# Patient Record
Sex: MALE | Race: WHITE | Employment: PART TIME | URBAN - METROPOLITAN AREA
[De-identification: names, ages, dates, MRNs, and addresses within clinical notes are randomized per-mention and may not be internally consistent; named-entity substitution may affect disease eponyms.]

---

## 2017-07-31 ENCOUNTER — TELEPHONE (OUTPATIENT)
Dept: FAMILY MEDICINE CLINIC | Age: 62
End: 2017-07-31

## 2017-12-04 DIAGNOSIS — I10 ESSENTIAL HYPERTENSION: ICD-10-CM

## 2017-12-04 RX ORDER — ATENOLOL 100 MG/1
TABLET ORAL
Qty: 30 TABLET | Refills: 0 | Status: SHIPPED | OUTPATIENT
Start: 2017-12-04 | End: 2017-12-05 | Stop reason: SDUPTHER

## 2017-12-04 NOTE — TELEPHONE ENCOUNTER
[facility] [address] fax received refill of Atenolol 50 mg Tablet. LM for patient to call the office to schedule visit as well.

## 2017-12-04 NOTE — TELEPHONE ENCOUNTER
Patient called and scheduled an appointment for 1/2/18. He could not come in sooner due to work schedule.

## 2017-12-05 DIAGNOSIS — I10 ESSENTIAL HYPERTENSION: ICD-10-CM

## 2017-12-05 RX ORDER — ATENOLOL 100 MG/1
TABLET ORAL
Qty: 90 TABLET | Refills: 3 | Status: ON HOLD | OUTPATIENT
Start: 2017-12-05 | End: 2021-12-07 | Stop reason: ALTCHOICE

## 2017-12-15 DIAGNOSIS — I10 ESSENTIAL HYPERTENSION: ICD-10-CM

## 2017-12-15 RX ORDER — ATENOLOL 50 MG/1
TABLET ORAL
Qty: 90 TABLET | Refills: 2 | Status: ON HOLD | OUTPATIENT
Start: 2017-12-15 | End: 2021-12-07

## 2018-01-02 ENCOUNTER — OFFICE VISIT (OUTPATIENT)
Dept: FAMILY MEDICINE CLINIC | Age: 63
End: 2018-01-02
Payer: COMMERCIAL

## 2018-01-02 VITALS
BODY MASS INDEX: 37.27 KG/M2 | DIASTOLIC BLOOD PRESSURE: 84 MMHG | HEART RATE: 60 BPM | RESPIRATION RATE: 12 BRPM | SYSTOLIC BLOOD PRESSURE: 152 MMHG | HEIGHT: 65 IN | WEIGHT: 223.7 LBS

## 2018-01-02 DIAGNOSIS — E66.9 OBESITY (BMI 30-39.9): ICD-10-CM

## 2018-01-02 DIAGNOSIS — Z00.00 WELL ADULT EXAM: Primary | ICD-10-CM

## 2018-01-02 DIAGNOSIS — E78.2 MIXED HYPERLIPIDEMIA: ICD-10-CM

## 2018-01-02 DIAGNOSIS — I10 ESSENTIAL HYPERTENSION: ICD-10-CM

## 2018-01-02 PROCEDURE — 99396 PREV VISIT EST AGE 40-64: CPT | Performed by: NURSE PRACTITIONER

## 2018-01-02 RX ORDER — ATENOLOL 100 MG/1
TABLET ORAL
Qty: 90 TABLET | Refills: 3 | Status: CANCELLED | OUTPATIENT
Start: 2018-01-02

## 2018-01-02 RX ORDER — ATENOLOL 50 MG/1
TABLET ORAL
Qty: 90 TABLET | Refills: 2 | Status: CANCELLED | OUTPATIENT
Start: 2018-01-02

## 2018-01-02 RX ORDER — METOPROLOL SUCCINATE 100 MG/1
100 TABLET, EXTENDED RELEASE ORAL DAILY
Qty: 90 TABLET | Refills: 3 | Status: SHIPPED | OUTPATIENT
Start: 2018-01-02 | End: 2018-12-03 | Stop reason: SDUPTHER

## 2018-01-02 RX ORDER — AMLODIPINE BESYLATE 10 MG/1
TABLET ORAL
Qty: 90 TABLET | Refills: 3 | Status: SHIPPED | OUTPATIENT
Start: 2018-01-02 | End: 2019-02-16 | Stop reason: SDUPTHER

## 2018-01-02 ASSESSMENT — ENCOUNTER SYMPTOMS
COUGH: 0
NAUSEA: 0
ABDOMINAL PAIN: 0

## 2018-01-02 ASSESSMENT — PATIENT HEALTH QUESTIONNAIRE - PHQ9
1. LITTLE INTEREST OR PLEASURE IN DOING THINGS: 0
2. FEELING DOWN, DEPRESSED OR HOPELESS: 0
SUM OF ALL RESPONSES TO PHQ9 QUESTIONS 1 & 2: 0
SUM OF ALL RESPONSES TO PHQ QUESTIONS 1-9: 0

## 2018-01-02 NOTE — PATIENT INSTRUCTIONS
You may receive a survey about your visit with us today. The feedback from our patients helps us identify what is working well and where the service to all patients can be enhanced. Thank you! Patient Education        High Blood Pressure: Care Instructions  Your Care Instructions    If your blood pressure is usually above 140/90, you have high blood pressure, or hypertension. That means the top number is 140 or higher or the bottom number is 90 or higher, or both. Despite what a lot of people think, high blood pressure usually doesn't cause headaches or make you feel dizzy or lightheaded. It usually has no symptoms. But it does increase your risk for heart attack, stroke, and kidney or eye damage. The higher your blood pressure, the more your risk increases. Your doctor will give you a goal for your blood pressure. Your goal will be based on your health and your age. An example of a goal is to keep your blood pressure below 140/90. Lifestyle changes, such as eating healthy and being active, are always important to help lower blood pressure. You might also take medicine to reach your blood pressure goal.  Follow-up care is a key part of your treatment and safety. Be sure to make and go to all appointments, and call your doctor if you are having problems. It's also a good idea to know your test results and keep a list of the medicines you take. How can you care for yourself at home? Medical treatment  · If you stop taking your medicine, your blood pressure will go back up. You may take one or more types of medicine to lower your blood pressure. Be safe with medicines. Take your medicine exactly as prescribed. Call your doctor if you think you are having a problem with your medicine. · Talk to your doctor before you start taking aspirin every day. Aspirin can help certain people lower their risk of a heart attack or stroke.  But taking aspirin isn't right for everyone, because it can cause serious

## 2018-01-02 NOTE — PROGRESS NOTES
08/30/2013 0651            No results found for: TSH, F4ADKQL, E0NJWON, THYROIDAB    Lab Results   Component Value Date    WBC 13.7 (H) 08/30/2013    HGB 12.8 (L) 08/30/2013    HCT 39.0 (L) 08/30/2013    MCV 89.2 08/30/2013     08/30/2013         Health Maintenance   Topic Date Due    Hepatitis C screen  1955    HIV screen  06/24/1970    DTaP/Tdap/Td vaccine (1 - Tdap) 01/02/2008    Diabetes screen  11/04/2016    Flu vaccine (1) 09/01/2017    Lipid screen  11/04/2018    Colon cancer screen colonoscopy  01/11/2026    Zostavax vaccine  Addressed       Immunization History   Administered Date(s) Administered    Tetanus 01/01/2008       Review of Systems   Constitutional: Negative for chills and fever. HENT: Negative. Respiratory: Negative for cough. Gastrointestinal: Negative for abdominal pain and nausea. Musculoskeletal: Negative for arthralgias, gait problem and myalgias. Skin: Negative for rash. Neurological: Negative for dizziness and light-headedness. Psychiatric/Behavioral: Negative. Objective:   Physical Exam   Constitutional: He is oriented to person, place, and time. Vital signs are normal. He appears well-developed and well-nourished. He is active. He does not have a sickly appearance. No distress. HENT:   Right Ear: Tympanic membrane normal.   Left Ear: Tympanic membrane normal.   Nose: Nose normal.   Mouth/Throat: Oropharynx is clear and moist and mucous membranes are normal.   Cardiovascular: Normal rate, regular rhythm, S1 normal, S2 normal, normal heart sounds and normal pulses. Exam reveals no S3. No murmur heard. Pulmonary/Chest: Effort normal and breath sounds normal. He has no decreased breath sounds. He has no wheezes. He has no rhonchi. Abdominal: Soft. Bowel sounds are normal. There is no tenderness. Neurological: He is alert and oriented to person, place, and time. Psychiatric: He has a normal mood and affect.  His behavior is normal.

## 2018-12-03 DIAGNOSIS — I10 ESSENTIAL HYPERTENSION: ICD-10-CM

## 2018-12-03 RX ORDER — METOPROLOL SUCCINATE 100 MG/1
100 TABLET, EXTENDED RELEASE ORAL DAILY
Qty: 90 TABLET | Refills: 3 | Status: SHIPPED | OUTPATIENT
Start: 2018-12-03 | End: 2019-12-11 | Stop reason: SDUPTHER

## 2019-02-16 DIAGNOSIS — I10 ESSENTIAL HYPERTENSION: ICD-10-CM

## 2019-02-18 RX ORDER — AMLODIPINE BESYLATE 10 MG/1
TABLET ORAL
Qty: 90 TABLET | Refills: 3 | Status: SHIPPED | OUTPATIENT
Start: 2019-02-18 | End: 2020-02-24

## 2019-12-11 DIAGNOSIS — I10 ESSENTIAL HYPERTENSION: ICD-10-CM

## 2019-12-11 RX ORDER — METOPROLOL SUCCINATE 100 MG/1
100 TABLET, EXTENDED RELEASE ORAL DAILY
Qty: 90 TABLET | Refills: 0 | Status: SHIPPED | OUTPATIENT
Start: 2019-12-11 | End: 2020-01-13

## 2020-01-13 RX ORDER — METOPROLOL SUCCINATE 100 MG/1
TABLET, EXTENDED RELEASE ORAL
Qty: 90 TABLET | Refills: 0 | Status: SHIPPED | OUTPATIENT
Start: 2020-01-13 | End: 2020-03-16

## 2020-02-24 RX ORDER — AMLODIPINE BESYLATE 10 MG/1
TABLET ORAL
Qty: 90 TABLET | Refills: 3 | Status: SHIPPED | OUTPATIENT
Start: 2020-02-24 | End: 2020-03-17

## 2020-03-17 RX ORDER — AMLODIPINE BESYLATE 10 MG/1
TABLET ORAL
Qty: 90 TABLET | Refills: 3 | Status: SHIPPED | OUTPATIENT
Start: 2020-03-17 | End: 2020-12-31 | Stop reason: SDUPTHER

## 2020-03-27 ENCOUNTER — TELEPHONE (OUTPATIENT)
Dept: FAMILY MEDICINE CLINIC | Age: 65
End: 2020-03-27

## 2020-12-31 DIAGNOSIS — I10 ESSENTIAL HYPERTENSION: ICD-10-CM

## 2021-01-04 RX ORDER — AMLODIPINE BESYLATE 10 MG/1
10 TABLET ORAL DAILY
Qty: 90 TABLET | Refills: 0 | Status: SHIPPED | OUTPATIENT
Start: 2021-01-04 | End: 2021-02-26 | Stop reason: SDUPTHER

## 2021-02-26 DIAGNOSIS — I10 ESSENTIAL HYPERTENSION: ICD-10-CM

## 2021-02-26 RX ORDER — AMLODIPINE BESYLATE 10 MG/1
10 TABLET ORAL DAILY
Qty: 90 TABLET | Refills: 0 | Status: SHIPPED | OUTPATIENT
Start: 2021-02-26 | End: 2021-05-26 | Stop reason: SDUPTHER

## 2021-02-26 NOTE — TELEPHONE ENCOUNTER
Requested Prescriptions     Pending Prescriptions Disp Refills    amLODIPine (NORVASC) 10 MG tablet 90 tablet 0     Sig: Take 1 tablet by mouth daily

## 2021-05-26 DIAGNOSIS — I10 ESSENTIAL HYPERTENSION: ICD-10-CM

## 2021-05-26 RX ORDER — AMLODIPINE BESYLATE 10 MG/1
10 TABLET ORAL DAILY
Qty: 90 TABLET | Refills: 0 | Status: SHIPPED | OUTPATIENT
Start: 2021-05-26 | End: 2021-08-06 | Stop reason: SDUPTHER

## 2021-05-26 RX ORDER — METOPROLOL SUCCINATE 100 MG/1
100 TABLET, EXTENDED RELEASE ORAL DAILY
Qty: 90 TABLET | Refills: 3 | Status: ON HOLD | OUTPATIENT
Start: 2021-05-26 | End: 2021-12-15 | Stop reason: SDUPTHER

## 2021-08-06 DIAGNOSIS — I10 ESSENTIAL HYPERTENSION: ICD-10-CM

## 2021-08-06 RX ORDER — AMLODIPINE BESYLATE 10 MG/1
10 TABLET ORAL DAILY
Qty: 90 TABLET | Refills: 0 | Status: SHIPPED | OUTPATIENT
Start: 2021-08-06 | End: 2021-11-24 | Stop reason: SDUPTHER

## 2021-08-06 NOTE — TELEPHONE ENCOUNTER
----- Message from Francesca Mcmahon sent at 8/5/2021  6:21 PM EDT -----  Subject: Refill Request    QUESTIONS  Name of Medication? amLODIPine (NORVASC) 10 MG tablet  Patient-reported dosage and instructions? 1 per day   How many days do you have left? 30  Preferred Pharmacy? [id number] [facility] - [place], [place] phone number (if available)? [phone]  ---------------------------------------------------------------------------  --------------  CALL BACK INFO  What is the best way for the office to contact you? OK to leave message on   voicemail  Preferred Call Back Phone Number?  [phone] normal...

## 2021-11-24 DIAGNOSIS — I10 ESSENTIAL HYPERTENSION: ICD-10-CM

## 2021-11-24 RX ORDER — AMLODIPINE BESYLATE 10 MG/1
10 TABLET ORAL DAILY
Qty: 90 TABLET | Refills: 0 | Status: ON HOLD | OUTPATIENT
Start: 2021-11-24 | End: 2022-01-27 | Stop reason: HOSPADM

## 2021-11-24 RX ORDER — AMLODIPINE BESYLATE 10 MG/1
TABLET ORAL
Qty: 90 TABLET | Refills: 0 | OUTPATIENT
Start: 2021-11-24

## 2021-11-24 NOTE — TELEPHONE ENCOUNTER
Spoke with patient annual exam scheduled for 1/14/22. Patient is out of Amlodipine 10 mg asking to send script to [facility]. Patient will check with the pharmacy later today.

## 2021-12-07 ENCOUNTER — APPOINTMENT (OUTPATIENT)
Dept: CT IMAGING | Age: 66
DRG: 177 | End: 2021-12-07
Payer: MEDICARE

## 2021-12-07 ENCOUNTER — APPOINTMENT (OUTPATIENT)
Dept: GENERAL RADIOLOGY | Age: 66
DRG: 177 | End: 2021-12-07
Payer: MEDICARE

## 2021-12-07 ENCOUNTER — HOSPITAL ENCOUNTER (INPATIENT)
Age: 66
LOS: 13 days | Discharge: NURSING, REHAB OR LONG-TERM CARE | DRG: 177 | End: 2021-12-20
Attending: EMERGENCY MEDICINE | Admitting: INTERNAL MEDICINE
Payer: MEDICARE

## 2021-12-07 DIAGNOSIS — J96.01 ACUTE RESPIRATORY FAILURE WITH HYPOXIA (HCC): ICD-10-CM

## 2021-12-07 DIAGNOSIS — I10 ESSENTIAL HYPERTENSION: ICD-10-CM

## 2021-12-07 DIAGNOSIS — U07.1 COVID-19: Primary | ICD-10-CM

## 2021-12-07 LAB
ANION GAP SERPL CALCULATED.3IONS-SCNC: 17 MEQ/L (ref 8–16)
APTT: 29.7 SECONDS (ref 22–38)
BASOPHILS # BLD: 0.6 %
BASOPHILS ABSOLUTE: 0 THOU/MM3 (ref 0–0.1)
BUN BLDV-MCNC: 17 MG/DL (ref 7–22)
C-REACTIVE PROTEIN: 9.3 MG/DL (ref 0–1)
CALCIUM SERPL-MCNC: 8.8 MG/DL (ref 8.5–10.5)
CHLORIDE BLD-SCNC: 98 MEQ/L (ref 98–111)
CO2: 22 MEQ/L (ref 23–33)
CREAT SERPL-MCNC: 0.9 MG/DL (ref 0.4–1.2)
EKG ATRIAL RATE: 71 BPM
EKG P AXIS: 48 DEGREES
EKG P-R INTERVAL: 214 MS
EKG Q-T INTERVAL: 414 MS
EKG QRS DURATION: 90 MS
EKG QTC CALCULATION (BAZETT): 449 MS
EKG R AXIS: 17 DEGREES
EKG T AXIS: 79 DEGREES
EKG VENTRICULAR RATE: 71 BPM
EOSINOPHIL # BLD: 0.3 %
EOSINOPHILS ABSOLUTE: 0 THOU/MM3 (ref 0–0.4)
ERYTHROCYTE [DISTWIDTH] IN BLOOD BY AUTOMATED COUNT: 12.5 % (ref 11.5–14.5)
ERYTHROCYTE [DISTWIDTH] IN BLOOD BY AUTOMATED COUNT: 40.4 FL (ref 35–45)
FERRITIN: 1561 NG/ML (ref 22–322)
FLU A ANTIGEN: NEGATIVE
FLU B ANTIGEN: NEGATIVE
GFR SERPL CREATININE-BSD FRML MDRD: 84 ML/MIN/1.73M2
GLUCOSE BLD-MCNC: 224 MG/DL (ref 70–108)
GLUCOSE BLD-MCNC: 284 MG/DL (ref 70–108)
HCT VFR BLD CALC: 48.1 % (ref 42–52)
HEMOGLOBIN: 15.8 GM/DL (ref 14–18)
IMMATURE GRANS (ABS): 0.12 THOU/MM3 (ref 0–0.07)
IMMATURE GRANULOCYTES: 1.7 %
INR BLD: 1.3 (ref 0.85–1.13)
LD: 575 U/L (ref 100–190)
LYMPHOCYTES # BLD: 11.1 %
LYMPHOCYTES ABSOLUTE: 0.8 THOU/MM3 (ref 1–4.8)
MCH RBC QN AUTO: 29.2 PG (ref 26–33)
MCHC RBC AUTO-ENTMCNC: 32.8 GM/DL (ref 32.2–35.5)
MCV RBC AUTO: 88.7 FL (ref 80–94)
MONOCYTES # BLD: 9.3 %
MONOCYTES ABSOLUTE: 0.6 THOU/MM3 (ref 0.4–1.3)
NUCLEATED RED BLOOD CELLS: 0 /100 WBC
OSMOLALITY CALCULATION: 282.3 MOSMOL/KG (ref 275–300)
PLATELET # BLD: 246 THOU/MM3 (ref 130–400)
PMV BLD AUTO: 10.7 FL (ref 9.4–12.4)
POTASSIUM REFLEX MAGNESIUM: 3.7 MEQ/L (ref 3.5–5.2)
PRO-BNP: 451.7 PG/ML (ref 0–900)
PROCALCITONIN: 0.12 NG/ML (ref 0.01–0.09)
RBC # BLD: 5.42 MILL/MM3 (ref 4.7–6.1)
SARS-COV-2, NAAT: DETECTED
SEG NEUTROPHILS: 77 %
SEGMENTED NEUTROPHILS ABSOLUTE COUNT: 5.3 THOU/MM3 (ref 1.8–7.7)
SODIUM BLD-SCNC: 137 MEQ/L (ref 135–145)
TROPONIN T: < 0.01 NG/ML
WBC # BLD: 6.9 THOU/MM3 (ref 4.8–10.8)

## 2021-12-07 PROCEDURE — 2700000000 HC OXYGEN THERAPY PER DAY

## 2021-12-07 PROCEDURE — 99284 EMERGENCY DEPT VISIT MOD MDM: CPT

## 2021-12-07 PROCEDURE — 93010 ELECTROCARDIOGRAM REPORT: CPT | Performed by: NUCLEAR MEDICINE

## 2021-12-07 PROCEDURE — 74174 CTA ABD&PLVS W/CONTRAST: CPT

## 2021-12-07 PROCEDURE — 82728 ASSAY OF FERRITIN: CPT

## 2021-12-07 PROCEDURE — 6360000002 HC RX W HCPCS: Performed by: STUDENT IN AN ORGANIZED HEALTH CARE EDUCATION/TRAINING PROGRAM

## 2021-12-07 PROCEDURE — 83880 ASSAY OF NATRIURETIC PEPTIDE: CPT

## 2021-12-07 PROCEDURE — 87804 INFLUENZA ASSAY W/OPTIC: CPT

## 2021-12-07 PROCEDURE — 82948 REAGENT STRIP/BLOOD GLUCOSE: CPT

## 2021-12-07 PROCEDURE — 36415 COLL VENOUS BLD VENIPUNCTURE: CPT

## 2021-12-07 PROCEDURE — 85610 PROTHROMBIN TIME: CPT

## 2021-12-07 PROCEDURE — 84145 PROCALCITONIN (PCT): CPT

## 2021-12-07 PROCEDURE — 84484 ASSAY OF TROPONIN QUANT: CPT

## 2021-12-07 PROCEDURE — 96374 THER/PROPH/DIAG INJ IV PUSH: CPT

## 2021-12-07 PROCEDURE — 87635 SARS-COV-2 COVID-19 AMP PRB: CPT

## 2021-12-07 PROCEDURE — 86140 C-REACTIVE PROTEIN: CPT

## 2021-12-07 PROCEDURE — 85730 THROMBOPLASTIN TIME PARTIAL: CPT

## 2021-12-07 PROCEDURE — 71275 CT ANGIOGRAPHY CHEST: CPT

## 2021-12-07 PROCEDURE — 71045 X-RAY EXAM CHEST 1 VIEW: CPT

## 2021-12-07 PROCEDURE — 2140000000 HC CCU INTERMEDIATE R&B

## 2021-12-07 PROCEDURE — 6370000000 HC RX 637 (ALT 250 FOR IP): Performed by: STUDENT IN AN ORGANIZED HEALTH CARE EDUCATION/TRAINING PROGRAM

## 2021-12-07 PROCEDURE — 80048 BASIC METABOLIC PNL TOTAL CA: CPT

## 2021-12-07 PROCEDURE — 85025 COMPLETE CBC W/AUTO DIFF WBC: CPT

## 2021-12-07 PROCEDURE — 94761 N-INVAS EAR/PLS OXIMETRY MLT: CPT

## 2021-12-07 PROCEDURE — 51798 US URINE CAPACITY MEASURE: CPT

## 2021-12-07 PROCEDURE — 83615 LACTATE (LD) (LDH) ENZYME: CPT

## 2021-12-07 PROCEDURE — 6370000000 HC RX 637 (ALT 250 FOR IP): Performed by: NURSE PRACTITIONER

## 2021-12-07 PROCEDURE — 2580000003 HC RX 258: Performed by: NURSE PRACTITIONER

## 2021-12-07 PROCEDURE — 99223 1ST HOSP IP/OBS HIGH 75: CPT | Performed by: NURSE PRACTITIONER

## 2021-12-07 PROCEDURE — 93005 ELECTROCARDIOGRAM TRACING: CPT | Performed by: STUDENT IN AN ORGANIZED HEALTH CARE EDUCATION/TRAINING PROGRAM

## 2021-12-07 PROCEDURE — 6360000004 HC RX CONTRAST MEDICATION: Performed by: STUDENT IN AN ORGANIZED HEALTH CARE EDUCATION/TRAINING PROGRAM

## 2021-12-07 PROCEDURE — 94640 AIRWAY INHALATION TREATMENT: CPT

## 2021-12-07 RX ORDER — DEXAMETHASONE SODIUM PHOSPHATE 10 MG/ML
10 INJECTION, EMULSION INTRAMUSCULAR; INTRAVENOUS EVERY 24 HOURS
Status: DISCONTINUED | OUTPATIENT
Start: 2021-12-16 | End: 2021-12-10 | Stop reason: ALTCHOICE

## 2021-12-07 RX ORDER — GUAIFENESIN/DEXTROMETHORPHAN 100-10MG/5
5 SYRUP ORAL EVERY 4 HOURS PRN
Status: DISCONTINUED | OUTPATIENT
Start: 2021-12-07 | End: 2021-12-20 | Stop reason: HOSPADM

## 2021-12-07 RX ORDER — DEXAMETHASONE SODIUM PHOSPHATE 4 MG/ML
10 INJECTION, SOLUTION INTRA-ARTICULAR; INTRALESIONAL; INTRAMUSCULAR; INTRAVENOUS; SOFT TISSUE ONCE
Status: COMPLETED | OUTPATIENT
Start: 2021-12-07 | End: 2021-12-07

## 2021-12-07 RX ORDER — DEXAMETHASONE SODIUM PHOSPHATE 10 MG/ML
10 INJECTION, EMULSION INTRAMUSCULAR; INTRAVENOUS ONCE
Status: DISCONTINUED | OUTPATIENT
Start: 2021-12-07 | End: 2021-12-10 | Stop reason: ALTCHOICE

## 2021-12-07 RX ORDER — AMLODIPINE BESYLATE 10 MG/1
10 TABLET ORAL DAILY
Status: DISCONTINUED | OUTPATIENT
Start: 2021-12-07 | End: 2021-12-20 | Stop reason: HOSPADM

## 2021-12-07 RX ORDER — ASCORBIC ACID 500 MG
500 TABLET ORAL DAILY
Status: DISCONTINUED | OUTPATIENT
Start: 2021-12-07 | End: 2021-12-20 | Stop reason: HOSPADM

## 2021-12-07 RX ORDER — AZITHROMYCIN 250 MG/1
500 TABLET, FILM COATED ORAL DAILY
Status: ACTIVE | OUTPATIENT
Start: 2021-12-07 | End: 2021-12-08

## 2021-12-07 RX ORDER — SODIUM CHLORIDE 9 MG/ML
25 INJECTION, SOLUTION INTRAVENOUS PRN
Status: DISCONTINUED | OUTPATIENT
Start: 2021-12-07 | End: 2021-12-20 | Stop reason: HOSPADM

## 2021-12-07 RX ORDER — IPRATROPIUM BROMIDE AND ALBUTEROL SULFATE 2.5; .5 MG/3ML; MG/3ML
1 SOLUTION RESPIRATORY (INHALATION) ONCE
Status: COMPLETED | OUTPATIENT
Start: 2021-12-07 | End: 2021-12-07

## 2021-12-07 RX ORDER — ONDANSETRON 2 MG/ML
4 INJECTION INTRAMUSCULAR; INTRAVENOUS EVERY 6 HOURS PRN
Status: DISCONTINUED | OUTPATIENT
Start: 2021-12-07 | End: 2021-12-20 | Stop reason: HOSPADM

## 2021-12-07 RX ORDER — SODIUM CHLORIDE 0.9 % (FLUSH) 0.9 %
5-40 SYRINGE (ML) INJECTION EVERY 12 HOURS SCHEDULED
Status: DISCONTINUED | OUTPATIENT
Start: 2021-12-07 | End: 2021-12-20 | Stop reason: HOSPADM

## 2021-12-07 RX ORDER — ZINC SULFATE 50(220)MG
50 CAPSULE ORAL DAILY
Status: DISCONTINUED | OUTPATIENT
Start: 2021-12-07 | End: 2021-12-20 | Stop reason: HOSPADM

## 2021-12-07 RX ORDER — DEXAMETHASONE SODIUM PHOSPHATE 4 MG/ML
10 INJECTION, SOLUTION INTRA-ARTICULAR; INTRALESIONAL; INTRAMUSCULAR; INTRAVENOUS; SOFT TISSUE EVERY 24 HOURS
Status: DISCONTINUED | OUTPATIENT
Start: 2021-12-12 | End: 2021-12-07

## 2021-12-07 RX ORDER — ACETAMINOPHEN 650 MG/1
650 SUPPOSITORY RECTAL EVERY 6 HOURS PRN
Status: DISCONTINUED | OUTPATIENT
Start: 2021-12-07 | End: 2021-12-20 | Stop reason: HOSPADM

## 2021-12-07 RX ORDER — SODIUM CHLORIDE 0.9 % (FLUSH) 0.9 %
5-40 SYRINGE (ML) INJECTION PRN
Status: DISCONTINUED | OUTPATIENT
Start: 2021-12-07 | End: 2021-12-20 | Stop reason: HOSPADM

## 2021-12-07 RX ORDER — METOPROLOL SUCCINATE 100 MG/1
100 TABLET, EXTENDED RELEASE ORAL DAILY
Status: DISCONTINUED | OUTPATIENT
Start: 2021-12-07 | End: 2021-12-12

## 2021-12-07 RX ORDER — POLYETHYLENE GLYCOL 3350 17 G/17G
17 POWDER, FOR SOLUTION ORAL DAILY PRN
Status: DISCONTINUED | OUTPATIENT
Start: 2021-12-07 | End: 2021-12-20 | Stop reason: HOSPADM

## 2021-12-07 RX ORDER — VITAMIN B COMPLEX
2000 TABLET ORAL DAILY
Status: DISCONTINUED | OUTPATIENT
Start: 2021-12-07 | End: 2021-12-20 | Stop reason: HOSPADM

## 2021-12-07 RX ORDER — ONDANSETRON 4 MG/1
4 TABLET, ORALLY DISINTEGRATING ORAL EVERY 8 HOURS PRN
Status: DISCONTINUED | OUTPATIENT
Start: 2021-12-07 | End: 2021-12-20 | Stop reason: HOSPADM

## 2021-12-07 RX ORDER — ACETAMINOPHEN 325 MG/1
650 TABLET ORAL EVERY 6 HOURS PRN
Status: DISCONTINUED | OUTPATIENT
Start: 2021-12-07 | End: 2021-12-20 | Stop reason: HOSPADM

## 2021-12-07 RX ORDER — AZITHROMYCIN 250 MG/1
250 TABLET, FILM COATED ORAL DAILY
Status: COMPLETED | OUTPATIENT
Start: 2021-12-08 | End: 2021-12-11

## 2021-12-07 RX ADMIN — METOPROLOL SUCCINATE 100 MG: 100 TABLET, EXTENDED RELEASE ORAL at 20:53

## 2021-12-07 RX ADMIN — AMLODIPINE BESYLATE 10 MG: 10 TABLET ORAL at 20:53

## 2021-12-07 RX ADMIN — IPRATROPIUM BROMIDE AND ALBUTEROL SULFATE 1 AMPULE: .5; 3 SOLUTION RESPIRATORY (INHALATION) at 10:32

## 2021-12-07 RX ADMIN — DEXAMETHASONE SODIUM PHOSPHATE 10 MG: 4 INJECTION INTRA-ARTICULAR; INTRALESIONAL; INTRAMUSCULAR; INTRAVENOUS; SOFT TISSUE at 12:07

## 2021-12-07 RX ADMIN — IOPAMIDOL 80 ML: 755 INJECTION, SOLUTION INTRAVENOUS at 13:17

## 2021-12-07 RX ADMIN — SODIUM CHLORIDE, PRESERVATIVE FREE 10 ML: 5 INJECTION INTRAVENOUS at 20:53

## 2021-12-07 ASSESSMENT — ENCOUNTER SYMPTOMS
BACK PAIN: 0
COUGH: 1
SORE THROAT: 0
DIARRHEA: 0
ABDOMINAL PAIN: 0
SINUS PAIN: 0
VOMITING: 0
NAUSEA: 0
EYE REDNESS: 0
RHINORRHEA: 0
SHORTNESS OF BREATH: 1

## 2021-12-07 ASSESSMENT — PAIN SCALES - GENERAL: PAINLEVEL_OUTOF10: 0

## 2021-12-07 NOTE — ED NOTES
Called [facility] to notify that pt would be up soon, talked to [address]. Pt transported by cart, stable.       Oleg Angulo  12/07/21 1927

## 2021-12-07 NOTE — ED NOTES
Pt resting on cot with unlabored respirations. Pt shows no signs of distress.       Tor FARMERMJREGINO RN  12/07/21 9404

## 2021-12-07 NOTE — H&P
History & Physical        Patient:  Nikole Thayer  YOB: 1955    MRN: [id number]     Acct: [de-identified]    PCP: DIONTE Dueñas CNP    Date of Admission: 12/7/2021    Date of Service: Pt seen/examined on 12/07/21  and Admitted to Inpatient with expected LOS greater than two midnights due to medical therapy. ASSESSMENT/PLAN:    1. Pneumonia secondary to COVID-19 infection--Decadron 20 mg daily x 5 doses then 10 mg daily x 5 doses; baricitinib 12/7; add vitamin C, vitamin D and zinc; check CRP however other inflammatory markers are elevated; CTA of the chest is pending  2. Acute hypoxic respiratory failure--requiring high flow oxygen, wean as able, add incentive spirometry and Acapella  3. Gross hematuria--await urinalysis, urology has been consulted  4. Essential hypertension, uncontrolled--patient has not taken his medications in the last 2 to 3 weeks per him, will resume his home medications and monitor and adjust accordingly  5. Obesity with BMI 37.23    Chief Complaint: Shortness of breath, fatigue, cough    History Of Present Illness:    77 y.o. male who presented to [facility] with feeling well for 4 weeks; he has a past medical history of hypertension; he states he has been short of breath along with fatigue, myalgias and a nonproductive cough for the last 4 weeks; he states the last 2 days his symptoms have increased which prompted him for an evaluation in the ER today; he has received 1 dose of the Franks Ser vaccination for Covid. He complains of lightheadedness and dizziness along with a productive cough and shortness of breath; he denies chest pain, denies nausea, vomiting or diarrhea; he tells me he is not eaten anything in the last 2 to 3 weeks; he also tells me he is not taking his blood pressure medicine in the last 2 to 3 weeks either; in the ER it was noted that he had gross hematuria which patient states this is new.     In the emergency department, he had initial O2 sat of 78%, he was initially placed on 6 L bringing him up to 90% and is currently on high flow oxygen at 90% FiO2 and 40 L satting 98%; chest x-ray showed pneumonia; CT of the abdomen and chest are currently pending, he is being admitted to hospital service for further care and evaluation. Past Medical History:          Diagnosis Date    Diabetes mellitus ([facility] [address]     Hypertension        Past Surgical History:          Procedure Laterality Date    OTHER SURGICAL HISTORY  Aug 29, 2013    Neck Abcess Incision and Drainage (Dr. Gennaro Wiggins, [address] [place] [address])       Medications Prior to Admission:      Prior to Admission medications    Medication Sig Start Date End Date Taking? Authorizing Provider   amLODIPine (NORVASC) 10 MG tablet Take 1 tablet by mouth daily 11/24/21   DIONTE Justin CNP   metoprolol succinate (TOPROL XL) 100 MG extended release tablet Take 1 tablet by mouth daily 5/26/21   DIONTE Justin CNP   atenolol (TENORMIN) 50 MG tablet TAKE 3 TABLETS BY MOUTH EVERY DAY  Patient taking differently: take 1 tablet by mouth daily along with atenolol 100mg 12/15/17   DIONTE Justin CNP   atenolol (TENORMIN) 100 MG tablet Take 1 tablet by mouth once daily. Take with 50 mg tab 12/5/17   DIONTE Justin CNP   aspirin 81 MG tablet Take 81 mg by mouth daily. Historical Provider, MD       Allergies:  Patient has no known allergies. Social History:   reports that he has never smoked. He has never used smokeless tobacco. He reports that he does not drink alcohol and does not use drugs.     Family History:      Positive as follows:        Problem Relation Age of Onset    Heart Disease Father     Other Mother         alzheimers       REVIEW OF SYSTEMS:     Constitutional: ROS: positive for - fatigue or malaise  Head: no headache, no head injury, no migraine   Eyes ROS: denies blurred/double vision  Ears ROS: no hearing difficulty, no tinnitus  Mouth and Throat ROS: no ulceration, dysphagia, dental caries  Psychological ROS: no depression, no anxiety, no panic attacks, denies suicide/homicide ideation  Endocrine ROS: denies polyuria, polydypsia, no heat or cold intolerance  Respiratory ROS: positive for - cough, shortness of breath and sputum changes  Cardiovascular ROS: no chest pain or dyspnea on exertion  Gastrointestinal ROS: no abdominal pain, change in bowel habits, or black or bloody stools  Genito-Urinary ROS: denies dysuria, frequency, urgency; denies hematuria  Musculoskeletal ROS: negative  Neurological ROS: no syncope, no seizures, no numbness or tingling of hands, no numbness or tingling of feet, no paresis  Dermatology: no skin rash, no eczema  Endocrine: no polyuria, polydypsia, no heat/cold intolerance  Hematology: denies bruising easily, denies bleeding problems, denies clotting disorders    PHYSICAL EXAM:    BP (!) 190/73   Pulse 70   Temp 98.1 °F (36.7 °C) (Oral)   Resp 14   SpO2 98%     General appearance:  No apparent distress, appears stated age and cooperative. HEENT:  Normal cephalic, atraumatic without obvious deformity. Pupils equal, round, and reactive to light. Conjunctivae/corneas clear. Neck: Supple, with full range of motion. No jugular venous distention. Trachea midline. Respiratory:  Normal respiratory effort. Diminished with occasional rhonchi  Cardiovascular:  Regular rate and rhythm with normal S1/S2 without murmurs, rubs or gallops. Abdomen: Soft, non-tender, non-distended with normal bowel sounds. Obese  Musculoskeletal:  No clubbing, cyanosis or edema bilaterally. Full range of motion without deformity. Skin: Skin color, texture, turgor normal.    Neurologic:  Neurovascularly intact without any focal sensory/motor deficits.  Cranial nerves: II-XII intact, grossly non-focal.  Psychiatric:  Alert and oriented, thought content appropriate  Capillary Refill: Brisk,< 3 seconds   Peripheral Pulses: +2 palpable, equal bilaterally       Labs: Recent Labs     12/07/21  1027   WBC 6.9   HGB 15.8   HCT 48.1        Recent Labs     12/07/21  1027      K 3.7   CL 98   CO2 22*   BUN 17   CREATININE 0.9   CALCIUM 8.8     Recent Labs     12/07/21  1027   INR 1.30*     Recent Labs     12/07/21  1027   TROPONINT < 0.010     Procalcitonin:  Recent Labs     12/07/21  1027   PROCAL 0.12*     Radiology:     XR CHEST PORTABLE    Result Date: 12/7/2021  PROCEDURE: XR CHEST PORTABLE CLINICAL INFORMATION: Shortness of breath. COMPARISON: None. TECHNIQUE: AP portable chest radiograph performed. FINDINGS: Lines/tubes: None. Heart/mediastinum: The heart size is normal. The pulmonary vascularity is unremarkable. Lungs: Bilateral multifocal upper and lower lobe airspace opacities and interstitial opacities are observed. No pleural effusion or pneumothorax is identified. Low lung volumes are present. Bones: Diffuse osteopenia is observed. The visualized skeletal structures appear intact. Degenerative disc disease is noted in the thoracic spine. Multifocal airspace opacities suggest pneumonia in the appropriate clinical setting. The patient is pending CT assessment. **This report has been created using voice recognition software. It may contain minor errors which are inherent in voice recognition technology. ** Final report electronically signed by Dr Lazarus Silversmith on 12/7/2021 10:58 AM    Thank you DIONTE Ventura CNP for the opportunity to be involved in this patient's care.     Electronically signed by DIONTE Duenas CNP on 12/7/2021 at 12:37 PM

## 2021-12-07 NOTE — ED PROVIDER NOTES
[place] ENCOUNTER          Pt Name: Mandeep Melgoza  MRN: [id number]  [place] 1955  Date of evaluation: 12/7/2021  Treating Resident Physician: Grace Bourne MD  Supervising Physician: Dr Lebron Vega       Chief Complaint   Patient presents with    Cough     History obtained from the patient. HISTORY OF PRESENT ILLNESS    HPI  Mandeep Melgoza is a 77 y.o. male with pmhx HTN and DM who presents to the emergency department for evaluation of dyspnea on exertion shortness of breath fatigue myalgias nonproductive cough for the past 4 weeks has worsened over the past 2 days. He also had an episode of gross hematuria noted this a.m. that was painless. Denies any abdominal pain nausea vomiting but does mention having some nonbloody loose/watery stools over last 3 days. Partially vaccinated against COVID-19 having received 1 dose of moderna. The patient has no other acute complaints at this time. REVIEW OF SYSTEMS   Review of Systems   Constitutional: Positive for chills and fatigue. Negative for fever. HENT: Negative for rhinorrhea, sinus pain and sore throat. Eyes: Negative for redness. Respiratory: Positive for cough and shortness of breath. Cardiovascular: Negative for chest pain. Gastrointestinal: Negative for abdominal pain, diarrhea, nausea and vomiting. Genitourinary: Positive for hematuria. Negative for dysuria, penile pain, penile swelling, scrotal swelling and testicular pain. Musculoskeletal: Positive for myalgias. Negative for back pain. Skin: Negative for rash. Neurological: Negative for light-headedness and headaches. Psychiatric/Behavioral: Negative for agitation.          PAST MEDICAL AND SURGICAL HISTORY     Past Medical History:   Diagnosis Date    Diabetes mellitus ([address]     Hypertension      Past Surgical History:   Procedure Laterality Date    OTHER SURGICAL HISTORY Aug 29, 2013    Neck Abcess Incision and Drainage (Dr. Treasure Steward, [facility])         MEDICATIONS   No current facility-administered medications for this encounter. Current Outpatient Medications:     amLODIPine (NORVASC) 10 MG tablet, Take 1 tablet by mouth daily, Disp: 90 tablet, Rfl: 0    metoprolol succinate (TOPROL XL) 100 MG extended release tablet, Take 1 tablet by mouth daily, Disp: 90 tablet, Rfl: 3    atenolol (TENORMIN) 50 MG tablet, TAKE 3 TABLETS BY MOUTH EVERY DAY (Patient taking differently: take 1 tablet by mouth daily along with atenolol 100mg), Disp: 90 tablet, Rfl: 2    atenolol (TENORMIN) 100 MG tablet, Take 1 tablet by mouth once daily. Take with 50 mg tab, Disp: 90 tablet, Rfl: 3    aspirin 81 MG tablet, Take 81 mg by mouth daily. , Disp: , Rfl:       SOCIAL HISTORY     Social History     Social History Narrative    Not on file     Social History     Tobacco Use    Smoking status: Never Smoker    Smokeless tobacco: Never Used   Substance Use Topics    Alcohol use: No    Drug use: No         ALLERGIES   No Known Allergies      FAMILY HISTORY     Family History   Problem Relation Age of Onset    Heart Disease Father     Other Mother         alzheimers         PREVIOUS RECORDS   Previous records reviewed: Patient was seen on 7/21/2014 for an abscess. PHYSICAL EXAM     ED Triage Vitals [12/07/21 0952]   BP Temp Temp Source Pulse Resp SpO2 Height Weight   (!) 190/73 98.1 °F (36.7 °C) Oral 83 24 (!) 78 % -- --     Initial vital signs and nursing assessment reviewed and abnormal from Hypertensive. Pulsoximetry is abnormal per my interpretation. Additional Vital Signs:  Vitals:    12/07/21 1055   BP:    Pulse:    Resp:    Temp:    SpO2: 98%       Physical Exam  Vitals and nursing note reviewed. Constitutional:       General: He is in acute distress. Appearance: He is ill-appearing. HENT:      Head: Normocephalic and atraumatic.       Right Ear: External ear normal.      Left Ear: External ear normal.      Nose: Nose normal.      Mouth/Throat:      Mouth: Mucous membranes are dry. Pharynx: Oropharynx is clear. Eyes:      General: No scleral icterus. Conjunctiva/sclera: Conjunctivae normal.   Cardiovascular:      Rate and Rhythm: Normal rate and regular rhythm. Pulses: Normal pulses. Heart sounds: Normal heart sounds. Pulmonary:      Effort: Respiratory distress present. Comments: Diminished bilaterally  Abdominal:      General: Abdomen is flat. There is no distension. Palpations: Abdomen is soft. Tenderness: There is no abdominal tenderness. There is no guarding or rebound. Genitourinary:     Comments: Dried blood at the tip of the glans, testicles are descended with intact cremasteric reflexes bilaterally no swelling masses or tenderness to palpation  Musculoskeletal:         General: Normal range of motion. Cervical back: Normal range of motion and neck supple. No rigidity. No muscular tenderness. Lymphadenopathy:      Cervical: No cervical adenopathy. Skin:     General: Skin is warm and dry. Capillary Refill: Capillary refill takes less than 2 seconds. Coloration: Skin is not jaundiced. Neurological:      General: No focal deficit present. Mental Status: He is alert and oriented to person, place, and time. Psychiatric:         Mood and Affect: Mood normal.         Behavior: Behavior normal.           MEDICAL DECISION MAKING   Initial Assessment: This is a 79-year-old man who has had fever fatigue myalgias dyspnea exertion or shortness of breath of the past 4 weeks of progressively worsening the past few days. Was hypoxic upon arrival requiring supplemental oxygenation. He has only received 1 dose of a COVID-19 vaccination of moderna. Also came in for evaluation of gross hematuria noted this a.m. that was painless he has no testicular pain swelling or abdominal pain.   Does mention having 2-3 episodes of loose/watery diarrhea last couple days. Differential Diagnosis Included but not limited to: COVID-19, influenza, pneumonia, superimposed bacterial pneumonia, pulmonary embolism, malignancy,    MDM:   Patient was updated on his COVID-19 status, he is requiring high flow nasal cannula for his symptomatic hypoxia. He was given Decadron. CTAs of the chest and abdomen are pending however the hospitalist was contacted for admission and agreed to accept the patient.         ED RESULTS   Laboratory results:  Labs Reviewed   COVID-19, RAPID - Abnormal; Notable for the following components:       Result Value    SARS-CoV-2, NAAT DETECTED (*)     All other components within normal limits   CBC WITH AUTO DIFFERENTIAL - Abnormal; Notable for the following components:    Lymphocytes Absolute 0.8 (*)     Immature Grans (Abs) 0.12 (*)     All other components within normal limits   BASIC METABOLIC PANEL W/ REFLEX TO MG FOR LOW K - Abnormal; Notable for the following components:    CO2 22 (*)     Glucose 224 (*)     All other components within normal limits   PROTIME-INR - Abnormal; Notable for the following components:    INR 1.30 (*)     All other components within normal limits   LACTATE DEHYDROGENASE - Abnormal; Notable for the following components:     (*)     All other components within normal limits   FERRITIN - Abnormal; Notable for the following components:    Ferritin 1,561 (*)     All other components within normal limits   PROCALCITONIN - Abnormal; Notable for the following components:    Procalcitonin 0.12 (*)     All other components within normal limits   ANION GAP - Abnormal; Notable for the following components:    Anion Gap 17.0 (*)     All other components within normal limits   GLOMERULAR FILTRATION RATE, ESTIMATED - Abnormal; Notable for the following components:    Est, Glom Filt Rate 84 (*)     All other components within normal limits   RAPID INFLUENZA A/B ANTIGENS   TROPONIN   BRAIN NATRIURETIC PEPTIDE   APTT   OSMOLALITY       Radiologic studies results:  XR CHEST PORTABLE   Final Result   Multifocal airspace opacities suggest pneumonia in the appropriate clinical setting. The patient is pending CT assessment. **This report has been created using voice recognition software. It may contain minor errors which are inherent in voice recognition technology. **      Final report electronically signed by Dr Gardenia Morrell on 12/7/2021 10:58 AM      [facility] [address]    (Results Pending)    [facility] [address]    (Results Pending)       ED Medications administered this visit:   Medications   ipratropium-albuterol (DUONEB) nebulizer solution 1 ampule (1 ampule Inhalation Given 12/7/21 1032)   Dexamethasone Sodium Phosphate injection 10 mg (10 mg IntraVENous Given 12/7/21 1207)         ED COURSE     ED Course as of 12/07/21 1229   Tue Dec 07, 2021   1042 WBC: 6.9 [AL]   1042 Hemoglobin Quant: 15.8 [AL]   1042 Hematocrit: 48.1 [AL]   1042 Platelet Count: 129 [AL]   1058 INR(!): 1.30  Is not on any anticoagulant medications he does mention being on a baby aspirin daily. [AL]   1059 aPTT: 29.7 [AL]   1228 SARS-CoV-2, NAAT(!!): DETECTED [AL]   1228 Flu A Antigen: Negative [AL]   7822 Flu B Antigen: Negative [AL]   1228 Procalcitonin(!): 0.12 [AL]   1228 Ferritin(!): 1,561 [AL]      ED Course User Index  [AL] Gael De Luna MD       MEDICATION CHANGES     New Prescriptions    No medications on file         FINAL DISPOSITION     Final diagnoses:   COVID-19   Acute respiratory failure with hypoxia ([facility] [address]     Condition: condition: stable  Dispo: Admit to telemetry      This transcription was electronically signed. Parts of this transcriptions may have been dictated by use of voice recognition software and electronically transcribed, and parts may have been transcribed with the assistance of an ED scribe. The transcription may contain errors not detected in proofreading.   Please refer to my supervising physician's documentation if my documentation differs.     Electronically Signed: Elana Dickerson MD, 12/07/21, 12:29 PM         Elana Dickerson MD  Resident  12/07/21 2748       Elana Dickerson MD  Resident  12/07/21 8115

## 2021-12-08 LAB
AVERAGE GLUCOSE: 237 MG/DL (ref 70–126)
BACTERIA: ABNORMAL /HPF
BILIRUBIN URINE: NEGATIVE
BLOOD, URINE: ABNORMAL
C-REACTIVE PROTEIN: 10.05 MG/DL (ref 0–1)
CASTS 2: ABNORMAL /LPF
CASTS UA: ABNORMAL /LPF
CHARACTER, URINE: CLEAR
COLOR: YELLOW
CRYSTALS, UA: ABNORMAL
EPITHELIAL CELLS, UA: ABNORMAL /HPF
GLUCOSE BLD-MCNC: 147 MG/DL (ref 70–108)
GLUCOSE BLD-MCNC: 248 MG/DL (ref 70–108)
GLUCOSE BLD-MCNC: 295 MG/DL (ref 70–108)
GLUCOSE BLD-MCNC: 466 MG/DL (ref 70–108)
GLUCOSE URINE: >= 1000 MG/DL
HBA1C MFR BLD: 9.9 % (ref 4.4–6.4)
KETONES, URINE: ABNORMAL
LEUKOCYTE ESTERASE, URINE: NEGATIVE
MISCELLANEOUS 2: ABNORMAL
NITRITE, URINE: NEGATIVE
PH UA: 5.5 (ref 5–9)
PROTEIN UA: 30
RBC URINE: ABNORMAL /HPF
RENAL EPITHELIAL, UA: ABNORMAL
SPECIFIC GRAVITY, URINE: > 1.03 (ref 1–1.03)
UROBILINOGEN, URINE: 1 EU/DL (ref 0–1)
WBC UA: ABNORMAL /HPF
YEAST: ABNORMAL

## 2021-12-08 PROCEDURE — 6370000000 HC RX 637 (ALT 250 FOR IP): Performed by: NURSE PRACTITIONER

## 2021-12-08 PROCEDURE — 6370000000 HC RX 637 (ALT 250 FOR IP): Performed by: UROLOGY

## 2021-12-08 PROCEDURE — 83036 HEMOGLOBIN GLYCOSYLATED A1C: CPT

## 2021-12-08 PROCEDURE — 81001 URINALYSIS AUTO W/SCOPE: CPT

## 2021-12-08 PROCEDURE — 6370000000 HC RX 637 (ALT 250 FOR IP): Performed by: INTERNAL MEDICINE

## 2021-12-08 PROCEDURE — 82948 REAGENT STRIP/BLOOD GLUCOSE: CPT

## 2021-12-08 PROCEDURE — 6370000000 HC RX 637 (ALT 250 FOR IP): Performed by: HOSPITALIST

## 2021-12-08 PROCEDURE — 94761 N-INVAS EAR/PLS OXIMETRY MLT: CPT

## 2021-12-08 PROCEDURE — 2700000000 HC OXYGEN THERAPY PER DAY

## 2021-12-08 PROCEDURE — 99233 SBSQ HOSP IP/OBS HIGH 50: CPT | Performed by: INTERNAL MEDICINE

## 2021-12-08 PROCEDURE — 86140 C-REACTIVE PROTEIN: CPT

## 2021-12-08 PROCEDURE — 2140000000 HC CCU INTERMEDIATE R&B

## 2021-12-08 PROCEDURE — 36415 COLL VENOUS BLD VENIPUNCTURE: CPT

## 2021-12-08 PROCEDURE — 99221 1ST HOSP IP/OBS SF/LOW 40: CPT | Performed by: UROLOGY

## 2021-12-08 PROCEDURE — 2580000003 HC RX 258: Performed by: NURSE PRACTITIONER

## 2021-12-08 RX ORDER — HYDRALAZINE HYDROCHLORIDE 25 MG/1
25 TABLET, FILM COATED ORAL EVERY 8 HOURS
Status: DISCONTINUED | OUTPATIENT
Start: 2021-12-08 | End: 2021-12-09

## 2021-12-08 RX ORDER — NICOTINE POLACRILEX 4 MG
15 LOZENGE BUCCAL PRN
Status: DISCONTINUED | OUTPATIENT
Start: 2021-12-08 | End: 2021-12-20 | Stop reason: HOSPADM

## 2021-12-08 RX ORDER — INSULIN GLARGINE 100 [IU]/ML
0.25 INJECTION, SOLUTION SUBCUTANEOUS NIGHTLY
Status: DISCONTINUED | OUTPATIENT
Start: 2021-12-08 | End: 2021-12-15

## 2021-12-08 RX ORDER — TAMSULOSIN HYDROCHLORIDE 0.4 MG/1
0.4 CAPSULE ORAL DAILY
Status: DISCONTINUED | OUTPATIENT
Start: 2021-12-08 | End: 2021-12-20 | Stop reason: HOSPADM

## 2021-12-08 RX ORDER — DEXTROSE MONOHYDRATE 25 G/50ML
12.5 INJECTION, SOLUTION INTRAVENOUS PRN
Status: DISCONTINUED | OUTPATIENT
Start: 2021-12-08 | End: 2021-12-20 | Stop reason: HOSPADM

## 2021-12-08 RX ORDER — DEXTROSE MONOHYDRATE 50 MG/ML
100 INJECTION, SOLUTION INTRAVENOUS PRN
Status: DISCONTINUED | OUTPATIENT
Start: 2021-12-08 | End: 2021-12-20 | Stop reason: HOSPADM

## 2021-12-08 RX ADMIN — SODIUM CHLORIDE, PRESERVATIVE FREE 10 ML: 5 INJECTION INTRAVENOUS at 09:29

## 2021-12-08 RX ADMIN — SODIUM CHLORIDE, PRESERVATIVE FREE 10 ML: 5 INJECTION INTRAVENOUS at 22:08

## 2021-12-08 RX ADMIN — Medication 50 MG: at 10:39

## 2021-12-08 RX ADMIN — AZITHROMYCIN MONOHYDRATE 250 MG: 250 TABLET ORAL at 09:30

## 2021-12-08 RX ADMIN — BARICITINIB 4 MG: 2 TABLET, FILM COATED ORAL at 22:07

## 2021-12-08 RX ADMIN — INSULIN LISPRO 12 UNITS: 100 INJECTION, SOLUTION INTRAVENOUS; SUBCUTANEOUS at 12:43

## 2021-12-08 RX ADMIN — METOPROLOL SUCCINATE 100 MG: 100 TABLET, EXTENDED RELEASE ORAL at 09:28

## 2021-12-08 RX ADMIN — INSULIN LISPRO 6 UNITS: 100 INJECTION, SOLUTION INTRAVENOUS; SUBCUTANEOUS at 17:57

## 2021-12-08 RX ADMIN — Medication 2000 UNITS: at 10:39

## 2021-12-08 RX ADMIN — AMLODIPINE BESYLATE 10 MG: 10 TABLET ORAL at 09:28

## 2021-12-08 RX ADMIN — INSULIN GLARGINE 24 UNITS: 100 INJECTION, SOLUTION SUBCUTANEOUS at 22:09

## 2021-12-08 RX ADMIN — TAMSULOSIN HYDROCHLORIDE 0.4 MG: 0.4 CAPSULE ORAL at 10:39

## 2021-12-08 RX ADMIN — HYDRALAZINE HYDROCHLORIDE 25 MG: 25 TABLET, FILM COATED ORAL at 22:22

## 2021-12-08 RX ADMIN — OXYCODONE HYDROCHLORIDE AND ACETAMINOPHEN 500 MG: 500 TABLET ORAL at 10:39

## 2021-12-08 RX ADMIN — INSULIN LISPRO 4 UNITS: 100 INJECTION, SOLUTION INTRAVENOUS; SUBCUTANEOUS at 09:31

## 2021-12-08 ASSESSMENT — PAIN SCALES - GENERAL
PAINLEVEL_OUTOF10: 0
PAINLEVEL_OUTOF10: 0

## 2021-12-08 NOTE — CARE COORDINATION
12/8/21, 11:13 AM EST  DISCHARGE PLANNING EVALUATION:    Mandeep Melgoza       Admitted: 12/7/2021/ [address]  day: 1   Location: [address] [place] [address]- Reason for admit: Acute respiratory failure with hypoxia ([facility] [address] [J96.01]  COVID-19 virus detected [U07.1]  COVID-19 [U07.1]   PMH:  has a past medical history of Diabetes mellitus ([facility] [address] Hyperlipidemia, and Hypertension. Procedure:   12/7 CXR: Multifocal airspace opacities suggest pneumonia in the appropriate clinical setting  12/7 CTA Chest/Abd/Pelvis: No pulmonary embolism. No aneurysm of the thoracic or abdominal aorta. No dissection; extensive bilateral infiltrates    Barriers to Discharge: Presented to ED with c/o lightheadedness and dizziness with productive cough and SOB for 4 weeks that worsened in the last 2 days. Reports not eating much in the last 2-3 weeks and has not been taking his BP medicine in the last 2-3 weeks. In ED had gross hematuria which he states is new. Urology consulted. Sats on arrival to ED were 78%; placed on 6L O2 then required HFNC. +COVID 19. Admitted to 3B. Remains on HFNC, 40L, 70% FIO2, sats 92%. Afebrile. SB 40's. Ox4. Dietitian consulted. Telemetry, IS, agustín Savage. Norvasc, vit C, po zithromax, IV decadron, lantus, SSI, toprol xl, flomax, vit D, zinc. Procal 0.12, CRP 9.3 - now 10.05, , trop neg, hgbA1C 9.9, ferritin 1561, INR 1.3. Rapid flu was negative. PCP: DIONTE Martino CNP  Readmission Risk Score: 11.1 ( )%    Patient Goals/Plan/Treatment Preferences: Spoke with Darvin Glover; states he lives at home alone and did not use any DME or have any HH services PTA. He drives, cares for himself independently, and has a PCP. If he needs home O2, he states he has no preference for [address]. He is agreeable to [facility] at discharge. Transportation/Food Security/Housekeeping Addressed:  No issues identified.

## 2021-12-08 NOTE — PROGRESS NOTES
Hospitalist Progress Note      Patient:  Omkar Gomez    Unit/Bed:3B-38/038-A  YOB: 1955  MRN: [id number]   Acct: [de-identified]   PCP: DIONTE Sylvester CNP  Date of Admission: 12/7/2021    Assessment/Plan:    1. Acute hypoxic resp failure  COVID 19 PNA   - On high dose steroids. Add baricitinib   - Wean off O2 as tolerated   - On IV abx    2. DM II - uncontrolled   - A1c 9.9   - start on basal-bolus regimen   -Continue sliding scale. Monitor blood glucose closely. Will adjust as needed. 3. Essential Hypertension   - BP not well controlled. Add hydralazine    4. Obesity   - patient will benefit with weight loss and dietary modification    5.  Gross hematuria - resolved   - urology following    Chief Complaint: SOB    Initial H and P:-      77 y.o. male who presented to [facility] with feeling well for 4 weeks; he has a past medical history of hypertension; he states he has been short of breath along with fatigue, myalgias and a nonproductive cough for the last 4 weeks; he states the last 2 days his symptoms have increased which prompted him for an evaluation in the ER today; he has received 1 dose of the Moderna vaccination for Covid.     He complains of lightheadedness and dizziness along with a productive cough and shortness of breath; he denies chest pain, denies nausea, vomiting or diarrhea; he tells me he is not eaten anything in the last 2 to 3 weeks; he also tells me he is not taking his blood pressure medicine in the last 2 to 3 weeks either; in the ER it was noted that he had gross hematuria which patient states this is new.     In the emergency department, he had initial O2 sat of 78%, he was initially placed on 6 L bringing him up to 90% and is currently on high flow oxygen at 90% FiO2 and 40 L satting 98%; chest x-ray showed pneumonia; CT of the abdomen and chest are currently pending, he is being admitted Subjective (past 24 hours):     Feels better. No worsening in dyspnea. No NVD. No fevers or chills. Past medical history, family history, social history and allergies reviewed again and is unchanged since admission. ROS (All review of systems completed. Pertinent positives noted. Otherwise All other systems reviewed and negative.)     Medications:  Reviewed    Infusion Medications    dextrose      sodium chloride       Scheduled Medications    insulin lispro  0-12 Units SubCUTAneous TID WC    insulin lispro  0-6 Units SubCUTAneous Nightly    insulin glargine  0.25 Units/kg SubCUTAneous Nightly    tamsulosin  0.4 mg Oral Daily    insulin lispro  7 Units SubCUTAneous TID WC    amLODIPine  10 mg Oral Daily    metoprolol succinate  100 mg Oral Daily    sodium chloride flush  5-40 mL IntraVENous 2 times per day    [Held by provider] enoxaparin  30 mg SubCUTAneous BID    Vitamin D  2,000 Units Oral Daily    ascorbic acid  500 mg Oral Daily    zinc sulfate  50 mg Oral Daily    dexamethasone  10 mg IntraVENous Once    [START ON 12/12/2021] dexamethasone  20 mg IntraVENous Q24H    Followed by   Halle Brace ON 12/16/2021] dexamethasone  10 mg IntraVENous Q24H    azithromycin  250 mg Oral Daily     PRN Meds: glucose, dextrose, glucagon (rDNA), dextrose, sodium chloride flush, sodium chloride, ondansetron **OR** ondansetron, polyethylene glycol, acetaminophen **OR** acetaminophen, guaiFENesin-dextromethorphan      Intake/Output Summary (Last 24 hours) at 12/8/2021 1516  Last data filed at 12/8/2021 1421  Gross per 24 hour   Intake 1040 ml   Output 1460 ml   Net -420 ml       Diet:  ADULT DIET; Regular; No Added Salt (3-4 gm)    Exam:  BP (!) 184/82   Pulse 53   Temp 97.4 °F (36.3 °C) (Oral)   Resp 20   Ht 5' 6\" (1.676 m)   Wt 213 lb (96.6 kg)   SpO2 94%   BMI 34.38 kg/m²   General appearance: No apparent distress, appears stated age and cooperative.   HEENT: Pupils equal, round, and reactive to light. Conjunctivae/corneas clear. Neck: Supple, with full range of motion. No jugular venous distention. Trachea midline. Respiratory: diminished breath sounds b/l  Cardiovascular: S1/S2 without murmurs, rubs or gallops. Abdomen: Soft, non-tender, non-distended with normal bowel sounds. Musculoskeletal: passive and active ROM x 4 extremities. Skin: Skin color, texture, turgor normal.  No rashes or lesions. Neurologic:  Neurovascularly intact without any focal sensory/motor deficits. Cranial nerves: II-XII intact, grossly non-focal.  Psychiatric: Alert and oriented, thought content appropriate, normal insight  Capillary Refill: Brisk,< 3 seconds   Peripheral Pulses: +2 palpable, equal bilaterally     Labs:   Recent Labs     12/07/21  1027   WBC 6.9   HGB 15.8   HCT 48.1        Recent Labs     12/07/21  1027      K 3.7   CL 98   CO2 22*   BUN 17   CREATININE 0.9   CALCIUM 8.8     No results for input(s): AST, ALT, BILIDIR, BILITOT, ALKPHOS in the last 72 hours. Recent Labs     12/07/21  1027   INR 1.30*     No results for input(s): Patrisha Meigs in the last 72 hours. Microbiology:    Blood culture #1: No results found for: [facility]    Blood culture #2:No results found for: Eric Cunningham    Organism:No results found for: [facility]      Lab Results   Component Value Date    LABGRAM  07/21/2014     Few segmented neutrophils observed. Rare epithelial cells observed. Rare gram positive cocci occurring singly and in pairs.          MRSA culture only:No results found for: [facility]    Urine culture: No results found for: LABURIN    Respiratory culture: No results found for: CULTRESP    Aerobic and Anaerobic :  Lab Results   Component Value Date    LABAERO No growth-preliminary  No growth   07/21/2014     No results found for: VERONICA    Urinalysis:      Lab Results   Component Value Date    NITRU NEGATIVE 12/08/2021    WBCUA 0-2 12/08/2021    BACTERIA NONE SEEN 12/08/2021    RBCUA 5-10 12/08/2021    BLOODU SMALL 12/08/2021    GLUCOSEU >= 1000 12/08/2021       Radiology:  CTA CHEST W WO CONTRAST   Final Result       1. No pulmonary embolism. No aneurysm of the thoracic or abdominal aorta. No dissection. 2. Extensive bilateral infiltrates. **This report has been created using voice recognition software. It may contain minor errors which are inherent in voice recognition technology. **      Final report electronically signed by Dr. Alta Cortez on 12/7/2021 1:41 PM      CTA ABDOMEN PELVIS W WO CONTRAST   Final Result       1. No pulmonary embolism. No aneurysm of the thoracic or abdominal aorta. No dissection. 2. Extensive bilateral infiltrates. **This report has been created using voice recognition software. It may contain minor errors which are inherent in voice recognition technology. **      Final report electronically signed by Dr. Alta Cortez on 12/7/2021 1:41 PM      XR CHEST PORTABLE   Final Result   Multifocal airspace opacities suggest pneumonia in the appropriate clinical setting. The patient is pending CT assessment. **This report has been created using voice recognition software. It may contain minor errors which are inherent in voice recognition technology. **      Final report electronically signed by Dr Jenni Corona on 12/7/2021 10:58 AM        CTA CHEST W WO CONTRAST    Result Date: 12/7/2021  PROCEDURE: CTA CHEST W WO CONTRAST, CTA ABDOMEN PELVIS W WO CONTRAST CLINICAL INFORMATION: pulmonary embolism. COMPARISON: No prior study. TECHNIQUE: 2-D multiplanar pre and postcontrast images of the chest abdomen and pelvis with 3-D MIPS and 3-D rendering of the aorta. Isovue was the intravenous contrast utilized. All CT scans at this facility use dose modulation, iterative reconstruction, and/or weight-based dosing when appropriate to reduce radiation dose to as low as reasonably achievable. FINDINGS:  Chest There is no pulmonary embolism.   The aorta is within acceptable limits. The heart size is normal. There is no pericardial or pleural effusion. There is a right hilar enlarged lymph node measuring 11 mm short axis. There is a subcarinal mass/adenopathy 16 mm short axis. Extensive bilateral upper lobe, perihilar and lower lobe groundglass infiltrates. There is no effusion. No suspicious osseous lesions are present. The thyroid gland is enlarged and heterogeneous in attenuation suggesting underlying nodules. Abdomen and pelvis: There is no aneurysm or dissection. There is no renal calculi. There is no hydronephrosis. Kidneys enhance symmetrically. No gallstones are present. Liver and spleen are unremarkable. Pancreas is unremarkable. Adrenals are normal. No central mesenteric, retroperitoneal, pelvic or inguinal lymphadenopathy. Urinary bladder is distended. No abnormal fluid collections. No bowel obstruction. No suspicious bone lesions. It changes throughout the lumbar spine degenerative change both hips right greater than left. 1. No pulmonary embolism. No aneurysm of the thoracic or abdominal aorta. No dissection. 2. Extensive bilateral infiltrates. **This report has been created using voice recognition software. It may contain minor errors which are inherent in voice recognition technology. ** Final report electronically signed by Dr. Juliet Quezada on 12/7/2021 1:41 PM    XR CHEST PORTABLE    Result Date: 12/7/2021  PROCEDURE: XR CHEST PORTABLE CLINICAL INFORMATION: Shortness of breath. COMPARISON: None. TECHNIQUE: AP portable chest radiograph performed. FINDINGS: Lines/tubes: None. Heart/mediastinum: The heart size is normal. The pulmonary vascularity is unremarkable. Lungs: Bilateral multifocal upper and lower lobe airspace opacities and interstitial opacities are observed. No pleural effusion or pneumothorax is identified. Low lung volumes are present. Bones: Diffuse osteopenia is observed. The visualized skeletal structures appear intact.  Degenerative disc disease is noted in the thoracic spine. Multifocal airspace opacities suggest pneumonia in the appropriate clinical setting. The patient is pending CT assessment. **This report has been created using voice recognition software. It may contain minor errors which are inherent in voice recognition technology. ** Final report electronically signed by Dr Gianni Panda on 12/7/2021 10:58 AM    CTA ABDOMEN PELVIS W WO CONTRAST    Result Date: 12/7/2021  PROCEDURE: CTA CHEST W WO CONTRAST, CTA ABDOMEN PELVIS W WO CONTRAST CLINICAL INFORMATION: pulmonary embolism. COMPARISON: No prior study. TECHNIQUE: 2-D multiplanar pre and postcontrast images of the chest abdomen and pelvis with 3-D MIPS and 3-D rendering of the aorta. Isovue was the intravenous contrast utilized. All CT scans at this facility use dose modulation, iterative reconstruction, and/or weight-based dosing when appropriate to reduce radiation dose to as low as reasonably achievable. FINDINGS:  Chest There is no pulmonary embolism. The aorta is within acceptable limits. The heart size is normal. There is no pericardial or pleural effusion. There is a right hilar enlarged lymph node measuring 11 mm short axis. There is a subcarinal mass/adenopathy 16 mm short axis. Extensive bilateral upper lobe, perihilar and lower lobe groundglass infiltrates. There is no effusion. No suspicious osseous lesions are present. The thyroid gland is enlarged and heterogeneous in attenuation suggesting underlying nodules. Abdomen and pelvis: There is no aneurysm or dissection. There is no renal calculi. There is no hydronephrosis. Kidneys enhance symmetrically. No gallstones are present. Liver and spleen are unremarkable. Pancreas is unremarkable. Adrenals are normal. No central mesenteric, retroperitoneal, pelvic or inguinal lymphadenopathy. Urinary bladder is distended. No abnormal fluid collections. No bowel obstruction. No suspicious bone lesions.  It changes throughout the lumbar spine degenerative change both hips right greater than left. 1. No pulmonary embolism. No aneurysm of the thoracic or abdominal aorta. No dissection. 2. Extensive bilateral infiltrates. **This report has been created using voice recognition software. It may contain minor errors which are inherent in voice recognition technology. ** Final report electronically signed by Dr. Morales Goldsmith on 12/7/2021 1:41 PM      Electronically signed by Eleanor Romano MD on 12/8/2021 at 3:16 PM

## 2021-12-08 NOTE — CONSULTS
[address] [address]  Dept: [phone]  Loc: [phone]  Visit Date: 12/7/2021    Urology Consult Note    Reason for Consult:  Gross hematuria  Requesting Physician:  Crista Gabriel    History Obtained From:  Patient, EMR, nurse    Chief Complaint: SOB, fatigue, cough    HISTORY OF PRESENT ILLNESS:                 The patient is a 77 y.o. male with significant past medical history of with feeling well for 4 weeks; he has a past medical history of hypertension; he states he has been short of breath along with fatigue, myalgias and a nonproductive cough for the last 4 weeks; he states the last 2 days his symptoms have increased which prompted him for an evaluation in the ER today; he has received 1 dose of the Moderna vaccination for Covid.     He complains of lightheadedness and dizziness along with a productive cough and shortness of breath; he denies chest pain, denies nausea, vomiting or diarrhea; he tells me he is not eaten anything in the last 2 to 3 weeks; he also tells me he is not taking his blood pressure medicine in the last 2 to 3 weeks either; in the ER it was noted that he had gross hematuria which patient states this is new.     In the emergency department, he had initial O2 sat of 78%, he was initially placed on 6 L bringing him up to 90% and is currently on high flow oxygen at 90% FiO2 and 40 L satting 98%; chest x-ray showed pneumonia. Urology was consulted for gross hematuria. Bladder scan showed 728ml, order for randolph catheter was given. Nurse able to pass, but reports some resistance at prostate  CT shows   There is no renal calculi. There is no hydronephrosis. Kidneys enhance symmetrically. No gallstones are present. Liver and spleen are unremarkable. Pancreas is unremarkable. Adrenals are normal.   No central mesenteric, retroperitoneal, pelvic or inguinal lymphadenopathy.    Urinary bladder is distended. No abnormal fluid collections. Ua with small blood, neg nitrite, neg leuk. No infection in UA      Past Medical History:        Diagnosis Date    Diabetes mellitus ([address]     Hyperlipidemia     Hypertension      Past Surgical History:        Procedure Laterality Date    ENDOSCOPY, COLON, DIAGNOSTIC      swallowing difficulties    OTHER SURGICAL HISTORY  Aug 29, 2013    Neck Abcess Incision and Drainage (Dr. Robbie Mcneill, [facility])     Allergies:  Patient has no known allergies. Social History:  Social History     Socioeconomic History    Marital status:      Spouse name: Not on file    Number of children: Not on file    Years of education: Not on file    Highest education level: Not on file   Occupational History    Not on file   Tobacco Use    Smoking status: Never Smoker    Smokeless tobacco: Never Used   Substance and Sexual Activity    Alcohol use: No    Drug use: No    Sexual activity: Not on file   Other Topics Concern    Not on file   Social History Narrative    Not on file     Social Determinants of Health     Financial Resource Strain:     Difficulty of Paying Living Expenses: Not on file   Food Insecurity:     Worried About Running Out of Food in the Last Year: Not on file    Mackenzie of Food in the Last Year: Not on file   Transportation Needs:     Lack of Transportation (Medical): Not on file    Lack of Transportation (Non-Medical):  Not on file   Physical Activity:     Days of Exercise per Week: Not on file    Minutes of Exercise per Session: Not on file   Stress:     Feeling of Stress : Not on file   Social Connections:     Frequency of Communication with Friends and Family: Not on file    Frequency of Social Gatherings with Friends and Family: Not on file    Attends Bahai Services: Not on file    Active Member of Clubs or Organizations: Not on file    Attends Club or Organization Meetings: Not on file    Marital Status: Not on file   Intimate Partner Violence:     Fear of Current or Ex-Partner: Not on file    Emotionally Abused: Not on file    Physically Abused: Not on file    Sexually Abused: Not on file   Housing Stability:     Unable to Pay for Housing in the Last Year: Not on file    Number of Places Lived in the Last Year: Not on file    Unstable Housing in the Last Year: Not on file     Family History:       Problem Relation Age of Onset    Heart Disease Father     Other Mother         alzheimers       ROS:  Constitutional: ++fatigue, malaise  Eyes: Denies reported visual changes. ENT: Denies headache, difficulty swallowing, earache, and nosebleeds. Cardiovascular: Negative for chest pain, palpitations, tachycardia or edema. Respiratory: Denies cough or SOB. GI:The patient denies abdominal or flank pain, anorexia, nausea or vomiting. : see HPI. Musculoskeletal: Patient denies low back pain or painful or reduced range of ROM. Neurological: The patient denies any symptoms of neurological impairment or TIA. Psychiatric: Denies anxiety or depression. Skin: Denies rash or lesions. All remaining ROS negative. PHYSICAL EXAM:  VITALS:  BP (!) 199/88   Pulse (!) 49   Temp 97.6 °F (36.4 °C) (Oral)   Resp 20   Ht 5' 6\" (1.676 m)   Wt 213 lb (96.6 kg)   SpO2 98%   BMI 34.38 kg/m² . Nursing note and vitals reviewed. Constitutional: Alert and oriented times x3, no acute distress, and cooperative to examination with appropriate mood and affect. HEENT:   Head:         Normocephalic and atraumatic. Mouth/Throat:          Mucous membranes are normal.   Eyes:         EOM are normal. No scleral icterus. Nose:    The external appearance of the nose is normal  Ears: The ears appear normal to external inspection. Cardiovascular:       Normal rate, regular rhythm. Pulmonary/Chest:  Normal respiratory rate and rhthym. No use of accessory muscles. On high flow, 70%FiO2   Abdominal:          Soft. No tenderness.              Genitalia: Normal uncircumcised penis  Urethral meatus is normal in size and location  Scrotal contents normal to inspection and palpation   Normal testes palpated bilaterally  Dale draining yellow urine  Musculoskeletal:    Normal range of motion. He exhibits no edema or tenderness of lower extremities. Extremities:    No cyanosis, clubbing, or edema present. Neurological:    Alert and oriented. DATA:  CBC:   Lab Results   Component Value Date    WBC 6.9 12/07/2021    RBC 5.42 12/07/2021    HGB 15.8 12/07/2021    HCT 48.1 12/07/2021    MCV 88.7 12/07/2021    MCH 29.2 12/07/2021    MCHC 32.8 12/07/2021    RDW 13.0 08/30/2013     12/07/2021    MPV 10.7 12/07/2021     BMP:    Lab Results   Component Value Date     12/07/2021    K 3.7 12/07/2021    CL 98 12/07/2021    CO2 22 12/07/2021    BUN 17 12/07/2021    CREATININE 0.9 12/07/2021    CALCIUM 8.8 12/07/2021    LABGLOM 84 12/07/2021    GLUCOSE 224 12/07/2021     BUN/Creatinine:    Lab Results   Component Value Date    BUN 17 12/07/2021    CREATININE 0.9 12/07/2021     Magnesium:  No results found for: MG  Phosphorus:  No results found for: PHOS  PT/INR:    Lab Results   Component Value Date    INR 1.30 12/07/2021     U/A:    Lab Results   Component Value Date    COLORU YELLOW 12/08/2021    PHUR 5.5 12/08/2021    WBCUA 0-2 12/08/2021    RBCUA 5-10 12/08/2021    YEAST NONE SEEN 12/08/2021    BACTERIA NONE SEEN 12/08/2021    LEUKOCYTESUR NEGATIVE 12/08/2021    UROBILINOGEN 1.0 12/08/2021    BILIRUBINUR NEGATIVE 12/08/2021    BLOODU SMALL 12/08/2021    GLUCOSEU >= 1000 12/08/2021       Imaging: The patient has had a CT Without and With Contrast which I have independently reviewed along with its accompanying report.   The study demonstrates   Narrative   PROCEDURE: CTA CHEST W WO CONTRAST, CTA ABDOMEN PELVIS W WO CONTRAST       CLINICAL INFORMATION: pulmonary embolism.       COMPARISON: No prior study.       TECHNIQUE: 2-D multiplanar pre and postcontrast images of the chest abdomen and pelvis with 3-D MIPS and 3-D rendering of the aorta. Isovue was the intravenous contrast utilized.       All CT scans at this facility use dose modulation, iterative reconstruction, and/or weight-based dosing when appropriate to reduce radiation dose to as low as reasonably achievable.       FINDINGS:    Chest       There is no pulmonary embolism.  The aorta is within acceptable limits.           The heart size is normal. There is no pericardial or pleural effusion. Hollace Gondola is a right hilar enlarged lymph node measuring 11 mm short axis. There is a subcarinal mass/adenopathy 16 mm short axis.       Extensive bilateral upper lobe, perihilar and lower lobe groundglass infiltrates. There is no effusion.  No suspicious osseous lesions are present.     The thyroid gland is enlarged and heterogeneous in attenuation suggesting underlying nodules.               Abdomen and pelvis: There is no aneurysm or dissection. There is no renal calculi. There is no hydronephrosis. Kidneys enhance symmetrically. No gallstones are present. Liver and spleen are unremarkable. Pancreas is unremarkable. Adrenals are normal.   No central mesenteric, retroperitoneal, pelvic or inguinal lymphadenopathy. Urinary bladder is distended. No abnormal fluid collections. No bowel obstruction. No suspicious bone lesions. It changes throughout the lumbar spine degenerative change both hips right greater than left.           Impression       1. No pulmonary embolism. No aneurysm of the thoracic or abdominal aorta. No dissection. 2. Extensive bilateral infiltrates.               IMPRESSION/PLAN:  Dale catheter draining yellow urine, no infection in urine. Ua micro 5-10 RBC    Gross hematuria - pt reported and episode yesterday, Ua with small blood.   CT shows no stones, no hydro, distended urinary bladder  Urinary retention - will start on Flomax    Urology will follow peripherally, call with return of hematuria    Thank you for including us in the care of [id number], APRN - CNP, APRN  12/08/21 9:10 AM  Urology

## 2021-12-08 NOTE — PROCEDURES
A Bladder scan was performed at 2310 . The patient's last void was at 2100 . The residual amount was measured to be 728 ML. Report of results was given to [facility].

## 2021-12-09 LAB
ANION GAP SERPL CALCULATED.3IONS-SCNC: 14 MEQ/L (ref 8–16)
BUN BLDV-MCNC: 28 MG/DL (ref 7–22)
C-REACTIVE PROTEIN: 5.34 MG/DL (ref 0–1)
CALCIUM SERPL-MCNC: 8.4 MG/DL (ref 8.5–10.5)
CHLORIDE BLD-SCNC: 99 MEQ/L (ref 98–111)
CO2: 21 MEQ/L (ref 23–33)
CREAT SERPL-MCNC: 1.1 MG/DL (ref 0.4–1.2)
ERYTHROCYTE [DISTWIDTH] IN BLOOD BY AUTOMATED COUNT: 13 % (ref 11.5–14.5)
ERYTHROCYTE [DISTWIDTH] IN BLOOD BY AUTOMATED COUNT: 43.5 FL (ref 35–45)
GFR SERPL CREATININE-BSD FRML MDRD: 67 ML/MIN/1.73M2
GLUCOSE BLD-MCNC: 103 MG/DL (ref 70–108)
GLUCOSE BLD-MCNC: 150 MG/DL (ref 70–108)
GLUCOSE BLD-MCNC: 163 MG/DL (ref 70–108)
GLUCOSE BLD-MCNC: 189 MG/DL (ref 70–108)
GLUCOSE BLD-MCNC: 226 MG/DL (ref 70–108)
HCT VFR BLD CALC: 43.6 % (ref 42–52)
HEMOGLOBIN: 13.9 GM/DL (ref 14–18)
MCH RBC QN AUTO: 29.1 PG (ref 26–33)
MCHC RBC AUTO-ENTMCNC: 31.9 GM/DL (ref 32.2–35.5)
MCV RBC AUTO: 91.2 FL (ref 80–94)
PLATELET # BLD: 254 THOU/MM3 (ref 130–400)
PMV BLD AUTO: 10.7 FL (ref 9.4–12.4)
POTASSIUM SERPL-SCNC: 4.4 MEQ/L (ref 3.5–5.2)
PROCALCITONIN: 0.12 NG/ML (ref 0.01–0.09)
RBC # BLD: 4.78 MILL/MM3 (ref 4.7–6.1)
SODIUM BLD-SCNC: 134 MEQ/L (ref 135–145)
WBC # BLD: 8.9 THOU/MM3 (ref 4.8–10.8)

## 2021-12-09 PROCEDURE — 36415 COLL VENOUS BLD VENIPUNCTURE: CPT

## 2021-12-09 PROCEDURE — 99231 SBSQ HOSP IP/OBS SF/LOW 25: CPT | Performed by: NURSE PRACTITIONER

## 2021-12-09 PROCEDURE — 94761 N-INVAS EAR/PLS OXIMETRY MLT: CPT

## 2021-12-09 PROCEDURE — 82948 REAGENT STRIP/BLOOD GLUCOSE: CPT

## 2021-12-09 PROCEDURE — 99233 SBSQ HOSP IP/OBS HIGH 50: CPT | Performed by: INTERNAL MEDICINE

## 2021-12-09 PROCEDURE — 85027 COMPLETE CBC AUTOMATED: CPT

## 2021-12-09 PROCEDURE — 6370000000 HC RX 637 (ALT 250 FOR IP): Performed by: UROLOGY

## 2021-12-09 PROCEDURE — 80048 BASIC METABOLIC PNL TOTAL CA: CPT

## 2021-12-09 PROCEDURE — 86140 C-REACTIVE PROTEIN: CPT

## 2021-12-09 PROCEDURE — 84145 PROCALCITONIN (PCT): CPT

## 2021-12-09 PROCEDURE — 6370000000 HC RX 637 (ALT 250 FOR IP): Performed by: NURSE PRACTITIONER

## 2021-12-09 PROCEDURE — 6370000000 HC RX 637 (ALT 250 FOR IP): Performed by: HOSPITALIST

## 2021-12-09 PROCEDURE — 6370000000 HC RX 637 (ALT 250 FOR IP): Performed by: INTERNAL MEDICINE

## 2021-12-09 PROCEDURE — 2140000000 HC CCU INTERMEDIATE R&B

## 2021-12-09 PROCEDURE — 2700000000 HC OXYGEN THERAPY PER DAY

## 2021-12-09 PROCEDURE — 2580000003 HC RX 258: Performed by: NURSE PRACTITIONER

## 2021-12-09 RX ORDER — TAMSULOSIN HYDROCHLORIDE 0.4 MG/1
0.4 CAPSULE ORAL DAILY
Qty: 30 CAPSULE | Refills: 3 | Status: SHIPPED | OUTPATIENT
Start: 2021-12-10 | End: 2022-04-04 | Stop reason: SDUPTHER

## 2021-12-09 RX ORDER — HYDRALAZINE HYDROCHLORIDE 50 MG/1
50 TABLET, FILM COATED ORAL EVERY 8 HOURS
Status: DISCONTINUED | OUTPATIENT
Start: 2021-12-09 | End: 2021-12-13

## 2021-12-09 RX ADMIN — Medication 2000 UNITS: at 08:03

## 2021-12-09 RX ADMIN — HYDRALAZINE HYDROCHLORIDE 50 MG: 50 TABLET ORAL at 20:59

## 2021-12-09 RX ADMIN — OXYCODONE HYDROCHLORIDE AND ACETAMINOPHEN 500 MG: 500 TABLET ORAL at 08:05

## 2021-12-09 RX ADMIN — HYDRALAZINE HYDROCHLORIDE 25 MG: 25 TABLET, FILM COATED ORAL at 08:04

## 2021-12-09 RX ADMIN — INSULIN GLARGINE 24 UNITS: 100 INJECTION, SOLUTION SUBCUTANEOUS at 21:00

## 2021-12-09 RX ADMIN — HYDRALAZINE HYDROCHLORIDE 50 MG: 50 TABLET ORAL at 14:23

## 2021-12-09 RX ADMIN — TAMSULOSIN HYDROCHLORIDE 0.4 MG: 0.4 CAPSULE ORAL at 08:05

## 2021-12-09 RX ADMIN — Medication 50 MG: at 08:03

## 2021-12-09 RX ADMIN — AMLODIPINE BESYLATE 10 MG: 10 TABLET ORAL at 08:05

## 2021-12-09 RX ADMIN — METOPROLOL SUCCINATE 100 MG: 100 TABLET, EXTENDED RELEASE ORAL at 08:05

## 2021-12-09 RX ADMIN — BARICITINIB 4 MG: 2 TABLET, FILM COATED ORAL at 08:04

## 2021-12-09 RX ADMIN — HYDRALAZINE HYDROCHLORIDE 25 MG: 25 TABLET, FILM COATED ORAL at 06:04

## 2021-12-09 RX ADMIN — SODIUM CHLORIDE, PRESERVATIVE FREE 10 ML: 5 INJECTION INTRAVENOUS at 21:03

## 2021-12-09 RX ADMIN — SODIUM CHLORIDE, PRESERVATIVE FREE 10 ML: 5 INJECTION INTRAVENOUS at 08:06

## 2021-12-09 RX ADMIN — INSULIN LISPRO 2 UNITS: 100 INJECTION, SOLUTION INTRAVENOUS; SUBCUTANEOUS at 08:50

## 2021-12-09 RX ADMIN — INSULIN LISPRO 2 UNITS: 100 INJECTION, SOLUTION INTRAVENOUS; SUBCUTANEOUS at 13:03

## 2021-12-09 RX ADMIN — AZITHROMYCIN MONOHYDRATE 250 MG: 250 TABLET ORAL at 08:06

## 2021-12-09 ASSESSMENT — PAIN SCALES - GENERAL
PAINLEVEL_OUTOF10: 0

## 2021-12-09 NOTE — PROGRESS NOTES
Hospitalist Progress Note      Patient:  Neeraj [facility]    Unit/Bed:[id number]-38/038-A  YOB: 1955  MRN: [id number]   Acct: [de-identified]   PCP: DIONTE Tao CNP  Date of Admission: 12/7/2021    Assessment/Plan:    1. Acute hypoxic resp failure  COVID 19 PNA   - On high dose steroids. Add baricitinib   - Wean off O2 as tolerated   - On IV abx    12/9: On HFNC, slowly weaning down. Continue steroids and baricitinib and abx. Inflam markers trending down. CTA on 12/7, no PE. 2. DM II - uncontrolled   - A1c 9.9   - start on basal-bolus regimen   -Continue sliding scale. Monitor blood glucose closely. Will adjust as needed. 12/9: Better controlled this am. Continue as above    3. Essential Hypertension   - BP not well controlled. Add hydralazine    12/9: suboptimal control. Increase hydralazine dose    4. Obesity   - patient will benefit with weight loss and dietary modification    5.  Gross hematuria - resolved   - urology following    Chief Complaint: SOB    Initial H and P:-      77 y.o. male who presented to [address] [place] [address] with feeling well for 4 weeks; he has a past medical history of hypertension; he states he has been short of breath along with fatigue, myalgias and a nonproductive cough for the last 4 weeks; he states the last 2 days his symptoms have increased which prompted him for an evaluation in the ER today; he has received 1 dose of the Moderna vaccination for Covid.     He complains of lightheadedness and dizziness along with a productive cough and shortness of breath; he denies chest pain, denies nausea, vomiting or diarrhea; he tells me he is not eaten anything in the last 2 to 3 weeks; he also tells me he is not taking his blood pressure medicine in the last 2 to 3 weeks either; in the ER it was noted that he had gross hematuria which patient states this is new.     In the emergency department, he had initial O2 sat of 78%, he was initially placed on 6 L bringing him up to 90% and is currently on high flow oxygen at 90% FiO2 and 40 L satting 98%; chest x-ray showed pneumonia; CT of the abdomen and chest are currently pending, he is being admitted     Subjective (past 24 hours):     Feels better. No worsening in dyspnea. No NVD. No fevers or chills. Past medical history, family history, social history and allergies reviewed again and is unchanged since admission. ROS (All review of systems completed. Pertinent positives noted. Otherwise All other systems reviewed and negative.)     Medications:  Reviewed    Infusion Medications    dextrose      sodium chloride       Scheduled Medications    insulin lispro  0-12 Units SubCUTAneous TID WC    insulin lispro  0-6 Units SubCUTAneous Nightly    insulin glargine  0.25 Units/kg SubCUTAneous Nightly    tamsulosin  0.4 mg Oral Daily    insulin lispro  7 Units SubCUTAneous TID WC    baricitinib  4 mg Oral Daily    hydrALAZINE  25 mg Oral Q8H    amLODIPine  10 mg Oral Daily    metoprolol succinate  100 mg Oral Daily    sodium chloride flush  5-40 mL IntraVENous 2 times per day    [Held by provider] enoxaparin  30 mg SubCUTAneous BID    Vitamin D  2,000 Units Oral Daily    ascorbic acid  500 mg Oral Daily    zinc sulfate  50 mg Oral Daily    dexamethasone  10 mg IntraVENous Once    [START ON 12/12/2021] dexamethasone  20 mg IntraVENous Q24H    Followed by   Howard Fuentes ON 12/16/2021] dexamethasone  10 mg IntraVENous Q24H    azithromycin  250 mg Oral Daily     PRN Meds: glucose, dextrose, glucagon (rDNA), dextrose, sodium chloride flush, sodium chloride, ondansetron **OR** ondansetron, polyethylene glycol, acetaminophen **OR** acetaminophen, guaiFENesin-dextromethorphan      Intake/Output Summary (Last 24 hours) at 12/9/2021 0849  Last data filed at 12/8/2021 2143  Gross per 24 hour   Intake 440 ml   Output 550 ml   Net -110 ml       Diet:  ADULT DIET; Regular;  No Added Salt (3-4 gm)    Exam:  BP (!) 184/81   Pulse 50   Temp 98 °F (36.7 °C) (Oral)   Resp 20   Ht 5' 6\" (1.676 m)   Wt 213 lb (96.6 kg)   SpO2 92%   BMI 34.38 kg/m²   General appearance: No apparent distress, appears stated age and cooperative. HEENT: Pupils equal, round, and reactive to light. Conjunctivae/corneas clear. Neck: Supple, with full range of motion. No jugular venous distention. Trachea midline. Respiratory: diminished breath sounds b/l  Cardiovascular: S1/S2 without murmurs, rubs or gallops. Abdomen: Soft, non-tender, non-distended with normal bowel sounds. Musculoskeletal: passive and active ROM x 4 extremities. Skin: Skin color, texture, turgor normal.  No rashes or lesions. Neurologic:  Neurovascularly intact without any focal sensory/motor deficits. Cranial nerves: II-XII intact, grossly non-focal.  Psychiatric: Alert and oriented, thought content appropriate, normal insight  Capillary Refill: Brisk,< 3 seconds   Peripheral Pulses: +2 palpable, equal bilaterally     Labs:   Recent Labs     12/07/21  1027 12/09/21  0424   WBC 6.9 8.9   HGB 15.8 13.9*   HCT 48.1 43.6    254     Recent Labs     12/07/21  1027 12/09/21  0424    134*   K 3.7 4.4   CL 98 99   CO2 22* 21*   BUN 17 28*   CREATININE 0.9 1.1   CALCIUM 8.8 8.4*     No results for input(s): AST, ALT, BILIDIR, BILITOT, ALKPHOS in the last 72 hours. Recent Labs     12/07/21  1027   INR 1.30*     No results for input(s): Maria Dolores Dross in the last 72 hours. Microbiology:    Blood culture #1: No results found for: [facility]    Blood culture #2:No results found for: [facility]    Organism:No results found for: Health system      Lab Results   Component Value Date    LABGRAM  07/21/2014     Few segmented neutrophils observed. Rare epithelial cells observed. Rare gram positive cocci occurring singly and in pairs.          MRSA culture only:No results found for: [facility]    Urine culture: No results found for: LABURIN    Respiratory abdomen and pelvis with 3-D MIPS and 3-D rendering of the aorta. Isovue was the intravenous contrast utilized. All CT scans at this facility use dose modulation, iterative reconstruction, and/or weight-based dosing when appropriate to reduce radiation dose to as low as reasonably achievable. FINDINGS:  Chest There is no pulmonary embolism. The aorta is within acceptable limits. The heart size is normal. There is no pericardial or pleural effusion. There is a right hilar enlarged lymph node measuring 11 mm short axis. There is a subcarinal mass/adenopathy 16 mm short axis. Extensive bilateral upper lobe, perihilar and lower lobe groundglass infiltrates. There is no effusion. No suspicious osseous lesions are present. The thyroid gland is enlarged and heterogeneous in attenuation suggesting underlying nodules. Abdomen and pelvis: There is no aneurysm or dissection. There is no renal calculi. There is no hydronephrosis. Kidneys enhance symmetrically. No gallstones are present. Liver and spleen are unremarkable. Pancreas is unremarkable. Adrenals are normal. No central mesenteric, retroperitoneal, pelvic or inguinal lymphadenopathy. Urinary bladder is distended. No abnormal fluid collections. No bowel obstruction. No suspicious bone lesions. It changes throughout the lumbar spine degenerative change both hips right greater than left. 1. No pulmonary embolism. No aneurysm of the thoracic or abdominal aorta. No dissection. 2. Extensive bilateral infiltrates. **This report has been created using voice recognition software. It may contain minor errors which are inherent in voice recognition technology. ** Final report electronically signed by Dr. Thom Crystal on 12/7/2021 1:41 PM    XR CHEST PORTABLE    Result Date: 12/7/2021  PROCEDURE: XR CHEST PORTABLE CLINICAL INFORMATION: Shortness of breath. COMPARISON: None. TECHNIQUE: AP portable chest radiograph performed. FINDINGS: Lines/tubes: None. Heart/mediastinum:  The heart size is normal. The pulmonary vascularity is unremarkable. Lungs: Bilateral multifocal upper and lower lobe airspace opacities and interstitial opacities are observed. No pleural effusion or pneumothorax is identified. Low lung volumes are present. Bones: Diffuse osteopenia is observed. The visualized skeletal structures appear intact. Degenerative disc disease is noted in the thoracic spine. Multifocal airspace opacities suggest pneumonia in the appropriate clinical setting. The patient is pending CT assessment. **This report has been created using voice recognition software. It may contain minor errors which are inherent in voice recognition technology. ** Final report electronically signed by Dr Dacia Araujo on 12/7/2021 10:58 AM    CTA ABDOMEN PELVIS W WO CONTRAST    Result Date: 12/7/2021  PROCEDURE: CTA CHEST W WO CONTRAST, CTA ABDOMEN PELVIS W WO CONTRAST CLINICAL INFORMATION: pulmonary embolism. COMPARISON: No prior study. TECHNIQUE: 2-D multiplanar pre and postcontrast images of the chest abdomen and pelvis with 3-D MIPS and 3-D rendering of the aorta. Isovue was the intravenous contrast utilized. All CT scans at this facility use dose modulation, iterative reconstruction, and/or weight-based dosing when appropriate to reduce radiation dose to as low as reasonably achievable. FINDINGS:  Chest There is no pulmonary embolism. The aorta is within acceptable limits. The heart size is normal. There is no pericardial or pleural effusion. There is a right hilar enlarged lymph node measuring 11 mm short axis. There is a subcarinal mass/adenopathy 16 mm short axis. Extensive bilateral upper lobe, perihilar and lower lobe groundglass infiltrates. There is no effusion. No suspicious osseous lesions are present. The thyroid gland is enlarged and heterogeneous in attenuation suggesting underlying nodules. Abdomen and pelvis: There is no aneurysm or dissection. There is no renal calculi.  There is no hydronephrosis. Kidneys enhance symmetrically. No gallstones are present. Liver and spleen are unremarkable. Pancreas is unremarkable. Adrenals are normal. No central mesenteric, retroperitoneal, pelvic or inguinal lymphadenopathy. Urinary bladder is distended. No abnormal fluid collections. No bowel obstruction. No suspicious bone lesions. It changes throughout the lumbar spine degenerative change both hips right greater than left. 1. No pulmonary embolism. No aneurysm of the thoracic or abdominal aorta. No dissection. 2. Extensive bilateral infiltrates. **This report has been created using voice recognition software. It may contain minor errors which are inherent in voice recognition technology. ** Final report electronically signed by Dr. Mack Ayala on 12/7/2021 1:41 PM      Electronically signed by Tory Rodriguez MD on 12/9/2021 at 8:49 AM

## 2021-12-09 NOTE — PROGRESS NOTES
Urology Progress Note    Chief Complaint: SOB, fatigue, cough  Reason for consultation:  Gross hematuria       Subjective: Dale draining goldstein colored urine. No abdominal pain. Vitals:  BP (!) 140/64   Pulse 54   Temp 98 °F (36.7 °C) (Oral)   Resp 20   Ht 5' 6\" (1.676 m)   Wt 213 lb (96.6 kg)   SpO2 94%   BMI 34.38 kg/m²   Temp  Av.4 °F (36.9 °C)  Min: 98 °F (36.7 °C)  Max: 99.1 °F (37.3 °C)    Intake/Output Summary (Last 24 hours) at 2021 1405  Last data filed at 2021 1104  Gross per 24 hour   Intake 400 ml   Output 550 ml   Net -150 ml       Social History     Socioeconomic History    Marital status:      Spouse name: Not on file    Number of children: Not on file    Years of education: Not on file    Highest education level: Not on file   Occupational History    Not on file   Tobacco Use    Smoking status: Never Smoker    Smokeless tobacco: Never Used   Substance and Sexual Activity    Alcohol use: No    Drug use: No    Sexual activity: Not on file   Other Topics Concern    Not on file   Social History Narrative    Not on file     Social Determinants of Health     Financial Resource Strain:     Difficulty of Paying Living Expenses: Not on file   Food Insecurity:     Worried About Running Out of Food in the Last Year: Not on file    Mackenzie of Food in the Last Year: Not on file   Transportation Needs:     Lack of Transportation (Medical): Not on file    Lack of Transportation (Non-Medical):  Not on file   Physical Activity:     Days of Exercise per Week: Not on file    Minutes of Exercise per Session: Not on file   Stress:     Feeling of Stress : Not on file   Social Connections:     Frequency of Communication with Friends and Family: Not on file    Frequency of Social Gatherings with Friends and Family: Not on file    Attends Jew Services: Not on file    Active Member of Clubs or Organizations: Not on file    Attends Club or Organization Meetings: Not on file    Marital Status: Not on file   Intimate Partner Violence:     Fear of Current or Ex-Partner: Not on file    Emotionally Abused: Not on file    Physically Abused: Not on file    Sexually Abused: Not on file   Housing Stability:     Unable to Pay for Housing in the Last Year: Not on file    Number of [place] in the Last Year: Not on file    Unstable Housing in the Last Year: Not on file     Family History   Problem Relation Age of Onset    Heart Disease Father     Other Mother         alzheimers     No Known Allergies      Constitutional: Alert and oriented times x3, no acute distress, and cooperative to examination with appropriate mood and affect. HEENT:   Head:         Normocephalic and atraumatic. Mucous membranes are normal.   Eyes:         EOM are normal. No scleral icterus. Nose:    The external appearance of the nose is normal  Ears: The ears appear normal to external inspection. Cardiovascular:       Normal rate, regular rhythm. Pulmonary/Chest:  On hiflow O2. No increased work of breathing. Productive cough. Diminished lung sounds  Abdominal:          Soft. No tenderness. Active bowel sounds. Genitalia:    Dale catheter draining goldstein colored urine  Musculoskeletal:    Normal range of motion. Exhibits no edema or tenderness of lower extremities. Extremities:    No cyanosis, clubbing, or edema present. Neurological:    Alert and oriented.      Labs:  WBC:    Lab Results   Component Value Date    WBC 8.9 12/09/2021     Hemoglobin/Hematocrit:    Lab Results   Component Value Date    HGB 13.9 12/09/2021    HCT 43.6 12/09/2021     BMP:    Lab Results   Component Value Date     12/09/2021    K 4.4 12/09/2021    K 3.7 12/07/2021    CL 99 12/09/2021    CO2 21 12/09/2021    BUN 28 12/09/2021    LABALBU 3.7 08/30/2013    CREATININE 1.1 12/09/2021    CALCIUM 8.4 12/09/2021    LABGLOM 67 12/09/2021     information found for this patient  Narrative PROCEDURE: CTA CHEST W WO CONTRAST, CTA ABDOMEN PELVIS W WO CONTRAST       CLINICAL INFORMATION: pulmonary embolism.       COMPARISON: No prior study.       TECHNIQUE: 2-D multiplanar pre and postcontrast images of the chest abdomen and pelvis with 3-D MIPS and 3-D rendering of the aorta. Isovue was the intravenous contrast utilized.       All CT scans at this facility use dose modulation, iterative reconstruction, and/or weight-based dosing when appropriate to reduce radiation dose to as low as reasonably achievable.       FINDINGS:    Chest       There is no pulmonary embolism.  The aorta is within acceptable limits.           The heart size is normal. There is no pericardial or pleural effusion. Calleen Sylvia is a right hilar enlarged lymph node measuring 11 mm short axis. There is a subcarinal mass/adenopathy 16 mm short axis.       Extensive bilateral upper lobe, perihilar and lower lobe groundglass infiltrates. There is no effusion.  No suspicious osseous lesions are present.     The thyroid gland is enlarged and heterogeneous in attenuation suggesting underlying nodules.               Abdomen and pelvis: There is no aneurysm or dissection. There is no renal calculi. There is no hydronephrosis. Kidneys enhance symmetrically. No gallstones are present. Liver and spleen are unremarkable. Pancreas is unremarkable. Adrenals are normal.   No central mesenteric, retroperitoneal, pelvic or inguinal lymphadenopathy. Urinary bladder is distended. No abnormal fluid collections. No bowel obstruction. No suspicious bone lesions. It changes throughout the lumbar spine degenerative change both hips right greater than left.           Impression       1. No pulmonary embolism. No aneurysm of the thoracic or abdominal aorta. No dissection. 2. Extensive bilateral infiltrates.               Impression:  Gross hematuria, resolved  Acute urinary retention  BPH  COVID 19    Plan:    No further hematuria.   CTA without acute findings. Possible cystoscopy in follow up. Call urology if recurs. Urinary retention of 728 mls of urine. Started on Flomax--continue at d/c.     Voiding trial in 1 week if clinically stable. F/u in urology office at discharge for retention follow up and possible cystoscopy. Urology signing off. Please call for any hematuria recurrence, change in urologic condition, or questions.     DIONTE Mcgee - [facility]  12/09/21 2:05 PM  Urology

## 2021-12-09 NOTE — CARE COORDINATION
Discharge Planning Update:    Hospitalist following. Requiring high flow: 70%, 40L. CRP 5.34. IV zithromax. Baricitinib. IV dexamethasone. Vit C, D, zinc. Plans home , new DM clinic. Follow for home O2 needs.

## 2021-12-09 NOTE — PROGRESS NOTES
Comprehensive Nutrition Assessment    Type and Reason for Visit:  Initial, Consult (poor po pta, DM education)    Nutrition Recommendations/Plan:   Diet changed to Carb Controlled: 4 cho choices per meal and continue VERONICA  Patient declines ONS stating appetite is improved  Basic DM education provided and plan DM clinic post discharge    Nutrition Assessment:    Pt. nutritionally compromised AEB unintentional weight loss pta, poor po pta. At risk for further nutrition compromise r/t admitted with acute respiratory failure with hypoxia, +covid with symptom onset 4 weeks pta, obese, currently on HFNC, Hgb A1C 9.9 (12/8/21), and underlying medical condition (hx: DM, HTN, HLD). Nutrition recommendations/interventions as per above. Malnutrition Assessment:  Malnutrition Status: At risk for malnutrition (Comment)    Context:  Acute Illness     Findings of the 6 clinical characteristics of malnutrition:  Energy Intake:  1 - 75% or less of estimated energy requirements for 7 or more days  Weight Loss:   (cannot verify but 40# (16%) loss over ~1 month per patient)     Body Fat Loss:  No significant body fat loss     Muscle Mass Loss:  No significant muscle mass loss    Fluid Accumulation:  No significant fluid accumulation     Strength:  Not Performed    Estimated Daily Nutrient Needs:  Energy (kcal):  ~1800 (~18 kcals/kg); Weight Used for Energy Requirements:  Current (97kg - bedscale weight on 12/7)     Protein (g):   (1.2-2.0 grams/kg); Weight Used for Protein Requirements:  Ideal (65kg)          Nutrition Related Findings:    Patient to ED with 4 week hx of not feeling well:  short of breath along with fatigue, myalgias, nonproductive cough, not eating. Patient seen today on HFNC and reports that his appetite is better and patient does admit that he is a picky eater. Patient says that he was not eating pta. Patient says that his weight was 253# and now he is 213#.  Offered ONS and patient declines at this time since he is feeling better. Patient does admit that he is filling up faster. Patient again declines ONS. Patient is agreeable to reviewed Carb Controlled diet and states that he does not think that he has ever had diet education for his DM. Basic education provided to patient and questions answered. Patient is agreeable to continued DM follow-up post-discharge and knows that plans of for DM clinic after hospital stay. Patient mentions that he is retired so will have the time to attend clinic. Patient is aware of elevated Hbg a1C (9.9 on 12/8/21) and says that it should be more like 6. Labs reviewed: sodium: 134, BUN: 28, POC: 189, CRP: 5.34. Meds reviewed: vitamin C, vitamin D, zinc, Lantus, Humalog, ATB    Wounds:  None       Current Nutrition Therapies:    ADULT DIET; Regular; No Added Salt (3-4 gm)    Anthropometric Measures:  · Height: 5' 6\" (167.6 cm)  · Current Body Weight: 213 lb (96.6 kg) (12/7/21, bedscale, no edema)   · Admission Body Weight: 213 lb (96.6 kg) (12/7, bedscale, no edema)    · Usual Body Weight: 253 lb (114.8 kg) (per patient report, no weight hx in EMR)     · Ideal Body Weight: 142 lbs;     · BMI: 34.4  · Adjusted Body Weight:  ; No Adjustment   · BMI Categories: Obese Class 1 (BMI 30.0-34. 9)       Nutrition Diagnosis:   · Unintended weight loss related to catabolic illness, inadequate protein-energy intake as evidenced by intake 0-25%, intake 51-75%, poor intake prior to admission    Nutrition Interventions:   Food and/or Nutrient Delivery:  Continue Current Diet  Nutrition Education/Counseling:  Education completed (12/9 - basic DM educ provided based on 4 carb servings/meal, plan DM clinic post hospital stay)   Coordination of Nutrition Care:  No recommendation at this time    Goals:  Patient will consume 75% of meal during LOS       Nutrition Monitoring and Evaluation:   Behavioral-Environmental Outcomes:  None Identified   Food/Nutrient Intake Outcomes:  Diet Advancement/Tolerance, Food and Nutrient Intake  Physical Signs/Symptoms Outcomes:  Biochemical Data, GI Status, Fluid Status or Edema, Hemodynamic Status, Meal Time Behavior, Nutrition Focused Physical Findings, Skin, Weight     Discharge Planning:     Too soon to determine     Electronically signed by Lina Guzman RD, LD on 12/9/21 at 2:34 PM EST    Contact: [phone]

## 2021-12-10 LAB
GLUCOSE BLD-MCNC: 102 MG/DL (ref 70–108)
GLUCOSE BLD-MCNC: 114 MG/DL (ref 70–108)
GLUCOSE BLD-MCNC: 120 MG/DL (ref 70–108)
GLUCOSE BLD-MCNC: 120 MG/DL (ref 70–108)

## 2021-12-10 PROCEDURE — 2700000000 HC OXYGEN THERAPY PER DAY

## 2021-12-10 PROCEDURE — 2580000003 HC RX 258: Performed by: NURSE PRACTITIONER

## 2021-12-10 PROCEDURE — 82948 REAGENT STRIP/BLOOD GLUCOSE: CPT

## 2021-12-10 PROCEDURE — 6370000000 HC RX 637 (ALT 250 FOR IP): Performed by: UROLOGY

## 2021-12-10 PROCEDURE — 2140000000 HC CCU INTERMEDIATE R&B

## 2021-12-10 PROCEDURE — 94761 N-INVAS EAR/PLS OXIMETRY MLT: CPT

## 2021-12-10 PROCEDURE — 6360000002 HC RX W HCPCS: Performed by: INTERNAL MEDICINE

## 2021-12-10 PROCEDURE — 6370000000 HC RX 637 (ALT 250 FOR IP): Performed by: INTERNAL MEDICINE

## 2021-12-10 PROCEDURE — 6370000000 HC RX 637 (ALT 250 FOR IP): Performed by: HOSPITALIST

## 2021-12-10 PROCEDURE — 6360000002 HC RX W HCPCS: Performed by: NURSE PRACTITIONER

## 2021-12-10 PROCEDURE — XW0DXM6 INTRODUCTION OF BARICITINIB INTO MOUTH AND PHARYNX, EXTERNAL APPROACH, NEW TECHNOLOGY GROUP 6: ICD-10-PCS | Performed by: INTERNAL MEDICINE

## 2021-12-10 PROCEDURE — 99233 SBSQ HOSP IP/OBS HIGH 50: CPT | Performed by: INTERNAL MEDICINE

## 2021-12-10 PROCEDURE — 6370000000 HC RX 637 (ALT 250 FOR IP): Performed by: NURSE PRACTITIONER

## 2021-12-10 RX ORDER — DEXAMETHASONE 4 MG/1
6 TABLET ORAL DAILY
Status: COMPLETED | OUTPATIENT
Start: 2021-12-10 | End: 2021-12-19

## 2021-12-10 RX ADMIN — TAMSULOSIN HYDROCHLORIDE 0.4 MG: 0.4 CAPSULE ORAL at 10:08

## 2021-12-10 RX ADMIN — Medication 2000 UNITS: at 10:08

## 2021-12-10 RX ADMIN — SODIUM CHLORIDE, PRESERVATIVE FREE 10 ML: 5 INJECTION INTRAVENOUS at 10:09

## 2021-12-10 RX ADMIN — ACETAMINOPHEN 650 MG: 325 TABLET ORAL at 15:54

## 2021-12-10 RX ADMIN — HYDRALAZINE HYDROCHLORIDE 50 MG: 50 TABLET ORAL at 05:36

## 2021-12-10 RX ADMIN — SODIUM CHLORIDE, PRESERVATIVE FREE 10 ML: 5 INJECTION INTRAVENOUS at 20:43

## 2021-12-10 RX ADMIN — INSULIN GLARGINE 24 UNITS: 100 INJECTION, SOLUTION SUBCUTANEOUS at 20:43

## 2021-12-10 RX ADMIN — DEXAMETHASONE 6 MG: 4 TABLET ORAL at 17:55

## 2021-12-10 RX ADMIN — AZITHROMYCIN MONOHYDRATE 250 MG: 250 TABLET ORAL at 10:14

## 2021-12-10 RX ADMIN — HYDRALAZINE HYDROCHLORIDE 50 MG: 50 TABLET ORAL at 20:42

## 2021-12-10 RX ADMIN — ENOXAPARIN SODIUM 30 MG: 100 INJECTION SUBCUTANEOUS at 20:42

## 2021-12-10 RX ADMIN — METOPROLOL SUCCINATE 100 MG: 100 TABLET, EXTENDED RELEASE ORAL at 10:08

## 2021-12-10 RX ADMIN — BARICITINIB 4 MG: 2 TABLET, FILM COATED ORAL at 10:08

## 2021-12-10 RX ADMIN — HYDRALAZINE HYDROCHLORIDE 50 MG: 50 TABLET ORAL at 15:54

## 2021-12-10 RX ADMIN — Medication 50 MG: at 10:07

## 2021-12-10 RX ADMIN — AMLODIPINE BESYLATE 10 MG: 10 TABLET ORAL at 10:08

## 2021-12-10 RX ADMIN — OXYCODONE HYDROCHLORIDE AND ACETAMINOPHEN 500 MG: 500 TABLET ORAL at 10:08

## 2021-12-10 ASSESSMENT — PAIN SCALES - GENERAL
PAINLEVEL_OUTOF10: 0

## 2021-12-10 NOTE — PROGRESS NOTES
"Zandra Mayen D/C'd. Discharge instructions including the importance of hydration, the use of OTC medications, information on Fussiness, viral syndrome, and neutropenia associated infection and the proper follow up recommendations have been provided to the pt's parents. Pt's parents verbalizes understanding, no further questions or concerns at this time. A copy of the discharge instructions have been provided to pt's parents. A signed copy is in the chart. Pt carried out of department by mother; pt in NAD, awake, alert, and age appropriate. VS stable, BP (!) 111/57   Pulse 125   Temp 36.8 °C (98.3 °F) (Temporal) Comment: Pt neutropenic w/ abd. pain. temporal temp checked.  Resp 36   Ht 0.66 m (2' 2\")   Wt 7.865 kg (17 lb 5.4 oz)   SpO2 99%   BMI 18.03 kg/m²    Pt's parents aware of need to return to ER for concerns or condition changes.    " Called and updated Pt's point of contact on overnight events and pt status. All questions answered at this time.

## 2021-12-10 NOTE — PROGRESS NOTES
gross hematuria which patient states this is new.     In the emergency department, he had initial O2 sat of 78%, he was initially placed on 6 L bringing him up to 90% and is currently on high flow oxygen at 90% FiO2 and 40 L satting 98%; chest x-ray showed pneumonia; CT of the abdomen and chest are currently pending, he is being admitted     Subjective (past 24 hours):     Feels better. No worsening in dyspnea. No NVD. No fevers or chills. Past medical history, family history, social history and allergies reviewed again and is unchanged since admission. ROS (All review of systems completed. Pertinent positives noted.  Otherwise All other systems reviewed and negative.)     Medications:  Reviewed    Infusion Medications    dextrose      sodium chloride       Scheduled Medications    hydrALAZINE  50 mg Oral Q8H    insulin lispro  0-12 Units SubCUTAneous TID WC    insulin lispro  0-6 Units SubCUTAneous Nightly    insulin glargine  0.25 Units/kg SubCUTAneous Nightly    tamsulosin  0.4 mg Oral Daily    insulin lispro  7 Units SubCUTAneous TID WC    baricitinib  4 mg Oral Daily    amLODIPine  10 mg Oral Daily    metoprolol succinate  100 mg Oral Daily    sodium chloride flush  5-40 mL IntraVENous 2 times per day    [Held by provider] enoxaparin  30 mg SubCUTAneous BID    Vitamin D  2,000 Units Oral Daily    ascorbic acid  500 mg Oral Daily    zinc sulfate  50 mg Oral Daily    dexamethasone  10 mg IntraVENous Once    [START ON 12/12/2021] dexamethasone  20 mg IntraVENous Q24H    Followed by   Mayodan Seats ON 12/16/2021] dexamethasone  10 mg IntraVENous Q24H    azithromycin  250 mg Oral Daily     PRN Meds: glucose, dextrose, glucagon (rDNA), dextrose, sodium chloride flush, sodium chloride, ondansetron **OR** ondansetron, polyethylene glycol, acetaminophen **OR** acetaminophen, guaiFENesin-dextromethorphan      Intake/Output Summary (Last 24 hours) at 12/10/2021 1417  Last data filed at 12/10/2021 2782  Gross per 24 hour   Intake 1100 ml   Output 1175 ml   Net -75 ml       Diet:  ADULT DIET; Regular; 4 carb choices (60 gm/meal); No Added Salt (3-4 gm)    Exam:  BP (!) 174/77   Pulse 60   Temp 98.4 °F (36.9 °C) (Oral)   Resp 20   Ht 5' 6\" (1.676 m)   Wt 213 lb (96.6 kg)   SpO2 93%   BMI 34.38 kg/m²   General appearance: No apparent distress, appears stated age and cooperative. HEENT: Pupils equal, round, and reactive to light. Conjunctivae/corneas clear. Neck: Supple, with full range of motion. No jugular venous distention. Trachea midline. Respiratory: diminished breath sounds b/l  Cardiovascular: S1/S2 without murmurs, rubs or gallops. Abdomen: Soft, non-tender, non-distended with normal bowel sounds. Musculoskeletal: passive and active ROM x 4 extremities. Skin: Skin color, texture, turgor normal.  No rashes or lesions. Neurologic:  Neurovascularly intact without any focal sensory/motor deficits. Cranial nerves: II-XII intact, grossly non-focal.  Psychiatric: Alert and oriented, thought content appropriate, normal insight  Capillary Refill: Brisk,< 3 seconds   Peripheral Pulses: +2 palpable, equal bilaterally     Labs:   Recent Labs     12/09/21  0424   WBC 8.9   HGB 13.9*   HCT 43.6        Recent Labs     12/09/21  0424   *   K 4.4   CL 99   CO2 21*   BUN 28*   CREATININE 1.1   CALCIUM 8.4*     No results for input(s): AST, ALT, BILIDIR, BILITOT, ALKPHOS in the last 72 hours. No results for input(s): INR in the last 72 hours. No results for input(s): Darian Jakes in the last 72 hours. Microbiology:    Blood culture #1: No results found for: [facility]    Blood culture #2:No results found for: Afshan Simpson    Organism:No results found for: [facility]      Lab Results   Component Value Date    LABGRAM  07/21/2014     Few segmented neutrophils observed. Rare epithelial cells observed. Rare gram positive cocci occurring singly and in pairs.          MRSA culture only:No results found for: [facility]    Urine culture: No results found for: LABURIN    Respiratory culture: No results found for: CULTRESP    Aerobic and Anaerobic :  Lab Results   Component Value Date    LABAERO No growth-preliminary  No growth   07/21/2014     No results found for: LABANAE    Urinalysis:      Lab Results   Component Value Date    NITRU NEGATIVE 12/08/2021    WBCUA 0-2 12/08/2021    BACTERIA NONE SEEN 12/08/2021    RBCUA 5-10 12/08/2021    BLOODU SMALL 12/08/2021    GLUCOSEU >= 1000 12/08/2021       Radiology:  CTA CHEST W WO CONTRAST   Final Result       1. No pulmonary embolism. No aneurysm of the thoracic or abdominal aorta. No dissection. 2. Extensive bilateral infiltrates. **This report has been created using voice recognition software. It may contain minor errors which are inherent in voice recognition technology. **      Final report electronically signed by Dr. Della Thayer on 12/7/2021 1:41 PM      CTA ABDOMEN PELVIS W WO CONTRAST   Final Result       1. No pulmonary embolism. No aneurysm of the thoracic or abdominal aorta. No dissection. 2. Extensive bilateral infiltrates. **This report has been created using voice recognition software. It may contain minor errors which are inherent in voice recognition technology. **      Final report electronically signed by Dr. Della Thayer on 12/7/2021 1:41 PM      XR CHEST PORTABLE   Final Result   Multifocal airspace opacities suggest pneumonia in the appropriate clinical setting. The patient is pending CT assessment. **This report has been created using voice recognition software. It may contain minor errors which are inherent in voice recognition technology. **      Final report electronically signed by Dr Jazmine Paige on 12/7/2021 10:58 AM        CTA CHEST W WO CONTRAST    Result Date: 12/7/2021  PROCEDURE: CTA CHEST W WO CONTRAST, CTA ABDOMEN PELVIS W WO CONTRAST CLINICAL INFORMATION: pulmonary embolism. COMPARISON: No prior study. TECHNIQUE: 2-D multiplanar pre and postcontrast images of the chest abdomen and pelvis with 3-D MIPS and 3-D rendering of the aorta. Isovue was the intravenous contrast utilized. All CT scans at this facility use dose modulation, iterative reconstruction, and/or weight-based dosing when appropriate to reduce radiation dose to as low as reasonably achievable. FINDINGS:  Chest There is no pulmonary embolism. The aorta is within acceptable limits. The heart size is normal. There is no pericardial or pleural effusion. There is a right hilar enlarged lymph node measuring 11 mm short axis. There is a subcarinal mass/adenopathy 16 mm short axis. Extensive bilateral upper lobe, perihilar and lower lobe groundglass infiltrates. There is no effusion. No suspicious osseous lesions are present. The thyroid gland is enlarged and heterogeneous in attenuation suggesting underlying nodules. Abdomen and pelvis: There is no aneurysm or dissection. There is no renal calculi. There is no hydronephrosis. Kidneys enhance symmetrically. No gallstones are present. Liver and spleen are unremarkable. Pancreas is unremarkable. Adrenals are normal. No central mesenteric, retroperitoneal, pelvic or inguinal lymphadenopathy. Urinary bladder is distended. No abnormal fluid collections. No bowel obstruction. No suspicious bone lesions. It changes throughout the lumbar spine degenerative change both hips right greater than left. 1. No pulmonary embolism. No aneurysm of the thoracic or abdominal aorta. No dissection. 2. Extensive bilateral infiltrates. **This report has been created using voice recognition software. It may contain minor errors which are inherent in voice recognition technology. ** Final report electronically signed by Dr. José Manuel Lama on 12/7/2021 1:41 PM    XR CHEST PORTABLE    Result Date: 12/7/2021  PROCEDURE: XR CHEST PORTABLE CLINICAL INFORMATION: Shortness of breath. COMPARISON: None.  TECHNIQUE: AP portable chest radiograph performed. FINDINGS: Lines/tubes: None. Heart/mediastinum: The heart size is normal. The pulmonary vascularity is unremarkable. Lungs: Bilateral multifocal upper and lower lobe airspace opacities and interstitial opacities are observed. No pleural effusion or pneumothorax is identified. Low lung volumes are present. Bones: Diffuse osteopenia is observed. The visualized skeletal structures appear intact. Degenerative disc disease is noted in the thoracic spine. Multifocal airspace opacities suggest pneumonia in the appropriate clinical setting. The patient is pending CT assessment. **This report has been created using voice recognition software. It may contain minor errors which are inherent in voice recognition technology. ** Final report electronically signed by Dr Ritu Paz on 12/7/2021 10:58 AM    CTA ABDOMEN PELVIS W WO CONTRAST    Result Date: 12/7/2021  PROCEDURE: CTA CHEST W WO CONTRAST, CTA ABDOMEN PELVIS W WO CONTRAST CLINICAL INFORMATION: pulmonary embolism. COMPARISON: No prior study. TECHNIQUE: 2-D multiplanar pre and postcontrast images of the chest abdomen and pelvis with 3-D MIPS and 3-D rendering of the aorta. Isovue was the intravenous contrast utilized. All CT scans at this facility use dose modulation, iterative reconstruction, and/or weight-based dosing when appropriate to reduce radiation dose to as low as reasonably achievable. FINDINGS:  Chest There is no pulmonary embolism. The aorta is within acceptable limits. The heart size is normal. There is no pericardial or pleural effusion. There is a right hilar enlarged lymph node measuring 11 mm short axis. There is a subcarinal mass/adenopathy 16 mm short axis. Extensive bilateral upper lobe, perihilar and lower lobe groundglass infiltrates. There is no effusion. No suspicious osseous lesions are present. The thyroid gland is enlarged and heterogeneous in attenuation suggesting underlying nodules. Abdomen and pelvis:  There is no aneurysm or dissection. There is no renal calculi. There is no hydronephrosis. Kidneys enhance symmetrically. No gallstones are present. Liver and spleen are unremarkable. Pancreas is unremarkable. Adrenals are normal. No central mesenteric, retroperitoneal, pelvic or inguinal lymphadenopathy. Urinary bladder is distended. No abnormal fluid collections. No bowel obstruction. No suspicious bone lesions. It changes throughout the lumbar spine degenerative change both hips right greater than left. 1. No pulmonary embolism. No aneurysm of the thoracic or abdominal aorta. No dissection. 2. Extensive bilateral infiltrates. **This report has been created using voice recognition software. It may contain minor errors which are inherent in voice recognition technology. ** Final report electronically signed by Dr. Ade Madsen on 12/7/2021 1:41 PM      Electronically signed by Myah Garber MD on 12/10/2021 at 2:17 PM

## 2021-12-11 ENCOUNTER — APPOINTMENT (OUTPATIENT)
Dept: GENERAL RADIOLOGY | Age: 66
DRG: 177 | End: 2021-12-11
Payer: MEDICARE

## 2021-12-11 LAB
ALBUMIN SERPL-MCNC: 3.4 G/DL (ref 3.5–5.1)
ALP BLD-CCNC: 95 U/L (ref 38–126)
ALT SERPL-CCNC: 61 U/L (ref 11–66)
ANION GAP SERPL CALCULATED.3IONS-SCNC: 11 MEQ/L (ref 8–16)
AST SERPL-CCNC: 43 U/L (ref 5–40)
BILIRUB SERPL-MCNC: 0.6 MG/DL (ref 0.3–1.2)
BUN BLDV-MCNC: 28 MG/DL (ref 7–22)
C-REACTIVE PROTEIN: 4.08 MG/DL (ref 0–1)
CALCIUM SERPL-MCNC: 9.1 MG/DL (ref 8.5–10.5)
CHLORIDE BLD-SCNC: 103 MEQ/L (ref 98–111)
CO2: 22 MEQ/L (ref 23–33)
CREAT SERPL-MCNC: 1 MG/DL (ref 0.4–1.2)
ERYTHROCYTE [DISTWIDTH] IN BLOOD BY AUTOMATED COUNT: 12.5 % (ref 11.5–14.5)
ERYTHROCYTE [DISTWIDTH] IN BLOOD BY AUTOMATED COUNT: 40.2 FL (ref 35–45)
FERRITIN: 1503 NG/ML (ref 22–322)
GFR SERPL CREATININE-BSD FRML MDRD: 75 ML/MIN/1.73M2
GLUCOSE BLD-MCNC: 173 MG/DL (ref 70–108)
GLUCOSE BLD-MCNC: 177 MG/DL (ref 70–108)
GLUCOSE BLD-MCNC: 190 MG/DL (ref 70–108)
GLUCOSE BLD-MCNC: 212 MG/DL (ref 70–108)
GLUCOSE BLD-MCNC: 261 MG/DL (ref 70–108)
HCT VFR BLD CALC: 48.9 % (ref 42–52)
HEMOGLOBIN: 15.9 GM/DL (ref 14–18)
LD: 465 U/L (ref 100–190)
MCH RBC QN AUTO: 28.7 PG (ref 26–33)
MCHC RBC AUTO-ENTMCNC: 32.5 GM/DL (ref 32.2–35.5)
MCV RBC AUTO: 88.3 FL (ref 80–94)
PLATELET # BLD: 297 THOU/MM3 (ref 130–400)
PMV BLD AUTO: 10.5 FL (ref 9.4–12.4)
POTASSIUM SERPL-SCNC: 5.4 MEQ/L (ref 3.5–5.2)
PROCALCITONIN: 0.09 NG/ML (ref 0.01–0.09)
RBC # BLD: 5.54 MILL/MM3 (ref 4.7–6.1)
SODIUM BLD-SCNC: 136 MEQ/L (ref 135–145)
TOTAL PROTEIN: 7.5 G/DL (ref 6.1–8)
WBC # BLD: 7.8 THOU/MM3 (ref 4.8–10.8)

## 2021-12-11 PROCEDURE — 84145 PROCALCITONIN (PCT): CPT

## 2021-12-11 PROCEDURE — 6360000002 HC RX W HCPCS: Performed by: NURSE PRACTITIONER

## 2021-12-11 PROCEDURE — 2580000003 HC RX 258: Performed by: NURSE PRACTITIONER

## 2021-12-11 PROCEDURE — 2140000000 HC CCU INTERMEDIATE R&B

## 2021-12-11 PROCEDURE — 82948 REAGENT STRIP/BLOOD GLUCOSE: CPT

## 2021-12-11 PROCEDURE — 6370000000 HC RX 637 (ALT 250 FOR IP): Performed by: NURSE PRACTITIONER

## 2021-12-11 PROCEDURE — 2700000000 HC OXYGEN THERAPY PER DAY

## 2021-12-11 PROCEDURE — 6360000002 HC RX W HCPCS: Performed by: INTERNAL MEDICINE

## 2021-12-11 PROCEDURE — 80053 COMPREHEN METABOLIC PANEL: CPT

## 2021-12-11 PROCEDURE — 6370000000 HC RX 637 (ALT 250 FOR IP): Performed by: INTERNAL MEDICINE

## 2021-12-11 PROCEDURE — 6370000000 HC RX 637 (ALT 250 FOR IP): Performed by: UROLOGY

## 2021-12-11 PROCEDURE — 86140 C-REACTIVE PROTEIN: CPT

## 2021-12-11 PROCEDURE — 99233 SBSQ HOSP IP/OBS HIGH 50: CPT | Performed by: INTERNAL MEDICINE

## 2021-12-11 PROCEDURE — 36415 COLL VENOUS BLD VENIPUNCTURE: CPT

## 2021-12-11 PROCEDURE — 71045 X-RAY EXAM CHEST 1 VIEW: CPT

## 2021-12-11 PROCEDURE — 83615 LACTATE (LD) (LDH) ENZYME: CPT

## 2021-12-11 PROCEDURE — 6370000000 HC RX 637 (ALT 250 FOR IP): Performed by: HOSPITALIST

## 2021-12-11 PROCEDURE — 85027 COMPLETE CBC AUTOMATED: CPT

## 2021-12-11 PROCEDURE — 82728 ASSAY OF FERRITIN: CPT

## 2021-12-11 RX ORDER — FUROSEMIDE 10 MG/ML
40 INJECTION INTRAMUSCULAR; INTRAVENOUS ONCE
Status: COMPLETED | OUTPATIENT
Start: 2021-12-11 | End: 2021-12-11

## 2021-12-11 RX ADMIN — OXYCODONE HYDROCHLORIDE AND ACETAMINOPHEN 500 MG: 500 TABLET ORAL at 10:00

## 2021-12-11 RX ADMIN — HYDRALAZINE HYDROCHLORIDE 50 MG: 50 TABLET ORAL at 20:46

## 2021-12-11 RX ADMIN — HYDRALAZINE HYDROCHLORIDE 50 MG: 50 TABLET ORAL at 13:27

## 2021-12-11 RX ADMIN — SODIUM CHLORIDE, PRESERVATIVE FREE 10 ML: 5 INJECTION INTRAVENOUS at 21:13

## 2021-12-11 RX ADMIN — ENOXAPARIN SODIUM 30 MG: 100 INJECTION SUBCUTANEOUS at 09:59

## 2021-12-11 RX ADMIN — METOPROLOL SUCCINATE 100 MG: 100 TABLET, EXTENDED RELEASE ORAL at 10:00

## 2021-12-11 RX ADMIN — AMLODIPINE BESYLATE 10 MG: 10 TABLET ORAL at 10:00

## 2021-12-11 RX ADMIN — FUROSEMIDE 40 MG: 40 INJECTION, SOLUTION INTRAMUSCULAR; INTRAVENOUS at 15:19

## 2021-12-11 RX ADMIN — ENOXAPARIN SODIUM 30 MG: 100 INJECTION SUBCUTANEOUS at 20:42

## 2021-12-11 RX ADMIN — TAMSULOSIN HYDROCHLORIDE 0.4 MG: 0.4 CAPSULE ORAL at 09:59

## 2021-12-11 RX ADMIN — AZITHROMYCIN MONOHYDRATE 250 MG: 250 TABLET ORAL at 10:02

## 2021-12-11 RX ADMIN — INSULIN LISPRO 2 UNITS: 100 INJECTION, SOLUTION INTRAVENOUS; SUBCUTANEOUS at 13:35

## 2021-12-11 RX ADMIN — DEXAMETHASONE 6 MG: 4 TABLET ORAL at 09:59

## 2021-12-11 RX ADMIN — INSULIN LISPRO 6 UNITS: 100 INJECTION, SOLUTION INTRAVENOUS; SUBCUTANEOUS at 17:00

## 2021-12-11 RX ADMIN — Medication 2000 UNITS: at 10:00

## 2021-12-11 RX ADMIN — INSULIN GLARGINE 24 UNITS: 100 INJECTION, SOLUTION SUBCUTANEOUS at 20:42

## 2021-12-11 RX ADMIN — SODIUM CHLORIDE, PRESERVATIVE FREE 10 ML: 5 INJECTION INTRAVENOUS at 10:00

## 2021-12-11 RX ADMIN — Medication 50 MG: at 09:59

## 2021-12-11 RX ADMIN — HYDRALAZINE HYDROCHLORIDE 50 MG: 50 TABLET ORAL at 04:27

## 2021-12-11 RX ADMIN — BARICITINIB 4 MG: 2 TABLET, FILM COATED ORAL at 09:59

## 2021-12-11 RX ADMIN — INSULIN LISPRO 2 UNITS: 100 INJECTION, SOLUTION INTRAVENOUS; SUBCUTANEOUS at 09:38

## 2021-12-11 ASSESSMENT — PAIN SCALES - GENERAL
PAINLEVEL_OUTOF10: 0

## 2021-12-11 NOTE — PROGRESS NOTES
Hospitalist Progress Note      Patient:  Nikole Thayer    Unit/Bed:3B-38/038-A  YOB: 1955  MRN: [id number]   Acct: [de-identified]   PCP: DIONTE Dueñas CNP  Date of Admission: 12/7/2021    Assessment/Plan:    1. Acute hypoxic resp failure  COVID 19 PNA   - On high dose steroids. Add baricitinib   - Wean off O2 as tolerated   - On IV abx    12/9: On HFNC, slowly weaning down. Continue steroids and baricitinib and abx. Inflam markers trending down. CTA on 12/7, no PE.   12/10: On minimal high flow settings. Will transition to nasal cannula today. Continue care as above. 12/11: Was transitioned to NC overnight but with minimal physical activity had to be placed back on HFNC. Not ready yet. Continue care as above. CXR shows slightly worsening. Inflam markers trending down    2. DM II - uncontrolled   - A1c 9.9   - start on basal-bolus regimen   -Continue sliding scale. Monitor blood glucose closely. Will adjust as needed. 12/9: Better controlled this am. Continue as above    3. Essential Hypertension   - BP not well controlled. Add hydralazine    12/9: suboptimal control. Increase hydralazine dose    4. Obesity   - patient will benefit with weight loss and dietary modification    5. Gross hematuria - resolved   - urology following    6.  Mild Hyperkalemia   - Give dose a lasix   - Monitor for now    Chief Complaint: SOB    Initial H and P:-      77 y.o. male who presented to [address] [place] [address] with feeling well for 4 weeks; he has a past medical history of hypertension; he states he has been short of breath along with fatigue, myalgias and a nonproductive cough for the last 4 weeks; he states the last 2 days his symptoms have increased which prompted him for an evaluation in the ER today; he has received 1 dose of the Moderna vaccination for Covid.     He complains of lightheadedness and dizziness along with a productive cough and shortness of breath; he denies chest pain, denies nausea, vomiting or diarrhea; he tells me he is not eaten anything in the last 2 to 3 weeks; he also tells me he is not taking his blood pressure medicine in the last 2 to 3 weeks either; in the ER it was noted that he had gross hematuria which patient states this is new.     In the emergency department, he had initial O2 sat of 78%, he was initially placed on 6 L bringing him up to 90% and is currently on high flow oxygen at 90% FiO2 and 40 L satting 98%; chest x-ray showed pneumonia; CT of the abdomen and chest are currently pending, he is being admitted     Subjective (past 24 hours):     Feels about the same. No NVD. NO chest pain. Past medical history, family history, social history and allergies reviewed again and is unchanged since admission. ROS (All review of systems completed. Pertinent positives noted.  Otherwise All other systems reviewed and negative.)     Medications:  Reviewed    Infusion Medications    dextrose      sodium chloride       Scheduled Medications    dexamethasone  6 mg Oral Daily    hydrALAZINE  50 mg Oral Q8H    insulin lispro  0-12 Units SubCUTAneous TID WC    insulin lispro  0-6 Units SubCUTAneous Nightly    insulin glargine  0.25 Units/kg SubCUTAneous Nightly    tamsulosin  0.4 mg Oral Daily    insulin lispro  7 Units SubCUTAneous TID WC    baricitinib  4 mg Oral Daily    amLODIPine  10 mg Oral Daily    metoprolol succinate  100 mg Oral Daily    sodium chloride flush  5-40 mL IntraVENous 2 times per day    enoxaparin  30 mg SubCUTAneous BID    Vitamin D  2,000 Units Oral Daily    ascorbic acid  500 mg Oral Daily    zinc sulfate  50 mg Oral Daily     PRN Meds: glucose, dextrose, glucagon (rDNA), dextrose, sodium chloride flush, sodium chloride, ondansetron **OR** ondansetron, polyethylene glycol, acetaminophen **OR** acetaminophen, guaiFENesin-dextromethorphan      Intake/Output Summary (Last 24 hours) at 12/11/2021 1401  Last data filed at 12/11/2021 0425  Gross per 24 hour   Intake 480 ml   Output 1550 ml   Net -1070 ml       Diet:  ADULT DIET; Regular; 4 carb choices (60 gm/meal); No Added Salt (3-4 gm)    Exam:  BP (!) 150/67   Pulse 61   Temp 98.2 °F (36.8 °C) (Oral)   Resp 20   Ht 5' 6\" (1.676 m)   Wt 213 lb (96.6 kg)   SpO2 96%   BMI 34.38 kg/m²   General appearance: No apparent distress, appears stated age and cooperative. HEENT: Pupils equal, round, and reactive to light. Conjunctivae/corneas clear. Neck: Supple, with full range of motion. No jugular venous distention. Trachea midline. Respiratory: diminished breath sounds b/l  Cardiovascular: S1/S2 without murmurs, rubs or gallops. Abdomen: Soft, non-tender, non-distended with normal bowel sounds. Musculoskeletal: passive and active ROM x 4 extremities. Skin: Skin color, texture, turgor normal.  No rashes or lesions. Neurologic:  Neurovascularly intact without any focal sensory/motor deficits. Cranial nerves: II-XII intact, grossly non-focal.  Psychiatric: Alert and oriented, thought content appropriate, normal insight  Capillary Refill: Brisk,< 3 seconds   Peripheral Pulses: +2 palpable, equal bilaterally     Labs:   Recent Labs     12/09/21  0424 12/11/21  1027   WBC 8.9 7.8   HGB 13.9* 15.9   HCT 43.6 48.9    297     Recent Labs     12/09/21  0424 12/11/21  1026   * 136   K 4.4 5.4*   CL 99 103   CO2 21* 22*   BUN 28* 28*   CREATININE 1.1 1.0   CALCIUM 8.4* 9.1     Recent Labs     12/11/21  1026   AST 43*   ALT 61   BILITOT 0.6   ALKPHOS 95     No results for input(s): INR in the last 72 hours. No results for input(s): Patrisha Meigs in the last 72 hours. Microbiology:    Blood culture #1: No results found for: [facility]    Blood culture #2:No results found for: Eric Cunningham    Organism:No results found for: [facility]      Lab Results   Component Value Date    LABGRAM  07/21/2014     Few segmented neutrophils observed.   Rare epithelial cells observed. Rare gram positive cocci occurring singly and in pairs. MRSA culture only:No results found for: [facility]    Urine culture: No results found for: LABURIN    Respiratory culture: No results found for: CULTRESP    Aerobic and Anaerobic :  Lab Results   Component Value Date    LABAERO No growth-preliminary  No growth   07/21/2014     No results found for: LABANAE    Urinalysis:      Lab Results   Component Value Date    NITRU NEGATIVE 12/08/2021    WBCUA 0-2 12/08/2021    BACTERIA NONE SEEN 12/08/2021    RBCUA 5-10 12/08/2021    BLOODU SMALL 12/08/2021    GLUCOSEU >= 1000 12/08/2021       Radiology:  XR CHEST PORTABLE   Final Result   Redemonstration of diffuse patchy opacities which is more confluent in the right upper lobe and mildly worsened in the interval.               **This report has been created using voice recognition software. It may contain minor errors which are inherent in voice recognition technology. **      Final report electronically signed by Dr. Aracely Solis MD on 12/11/2021 11:21 AM      CTA CHEST W [address]   Final Result       1. No pulmonary embolism. No aneurysm of the thoracic or abdominal aorta. No dissection. 2. Extensive bilateral infiltrates. **This report has been created using voice recognition software. It may contain minor errors which are inherent in voice recognition technology. **      Final report electronically signed by Dr. Thom Crystal on 12/7/2021 1:41 PM      CTA ABDOMEN PELVIS W WO CONTRAST   Final Result       1. No pulmonary embolism. No aneurysm of the thoracic or abdominal aorta. No dissection. 2. Extensive bilateral infiltrates. **This report has been created using voice recognition software. It may contain minor errors which are inherent in voice recognition technology. **      Final report electronically signed by Dr. Thom Crystal on 12/7/2021 1:41 PM      XR CHEST PORTABLE   Final Result Multifocal airspace opacities suggest pneumonia in the appropriate clinical setting. The patient is pending CT assessment. **This report has been created using voice recognition software. It may contain minor errors which are inherent in voice recognition technology. **      Final report electronically signed by Dr Bharati Joyce on 12/7/2021 10:58 AM        CTA CHEST W WO CONTRAST    Result Date: 12/7/2021  PROCEDURE: CTA CHEST W WO CONTRAST, CTA ABDOMEN PELVIS W WO CONTRAST CLINICAL INFORMATION: pulmonary embolism. COMPARISON: No prior study. TECHNIQUE: 2-D multiplanar pre and postcontrast images of the chest abdomen and pelvis with 3-D MIPS and 3-D rendering of the aorta. Isovue was the intravenous contrast utilized. All CT scans at this facility use dose modulation, iterative reconstruction, and/or weight-based dosing when appropriate to reduce radiation dose to as low as reasonably achievable. FINDINGS:  Chest There is no pulmonary embolism. The aorta is within acceptable limits. The heart size is normal. There is no pericardial or pleural effusion. There is a right hilar enlarged lymph node measuring 11 mm short axis. There is a subcarinal mass/adenopathy 16 mm short axis. Extensive bilateral upper lobe, perihilar and lower lobe groundglass infiltrates. There is no effusion. No suspicious osseous lesions are present. The thyroid gland is enlarged and heterogeneous in attenuation suggesting underlying nodules. Abdomen and pelvis: There is no aneurysm or dissection. There is no renal calculi. There is no hydronephrosis. Kidneys enhance symmetrically. No gallstones are present. Liver and spleen are unremarkable. Pancreas is unremarkable. Adrenals are normal. No central mesenteric, retroperitoneal, pelvic or inguinal lymphadenopathy. Urinary bladder is distended. No abnormal fluid collections. No bowel obstruction. No suspicious bone lesions.  It changes throughout the lumbar spine degenerative change both hips right greater than left. 1. No pulmonary embolism. No aneurysm of the thoracic or abdominal aorta. No dissection. 2. Extensive bilateral infiltrates. **This report has been created using voice recognition software. It may contain minor errors which are inherent in voice recognition technology. ** Final report electronically signed by Dr. Mack Ayala on 12/7/2021 1:41 PM    XR CHEST PORTABLE    Result Date: 12/7/2021  PROCEDURE: XR CHEST PORTABLE CLINICAL INFORMATION: Shortness of breath. COMPARISON: None. TECHNIQUE: AP portable chest radiograph performed. FINDINGS: Lines/tubes: None. Heart/mediastinum: The heart size is normal. The pulmonary vascularity is unremarkable. Lungs: Bilateral multifocal upper and lower lobe airspace opacities and interstitial opacities are observed. No pleural effusion or pneumothorax is identified. Low lung volumes are present. Bones: Diffuse osteopenia is observed. The visualized skeletal structures appear intact. Degenerative disc disease is noted in the thoracic spine. Multifocal airspace opacities suggest pneumonia in the appropriate clinical setting. The patient is pending CT assessment. **This report has been created using voice recognition software. It may contain minor errors which are inherent in voice recognition technology. ** Final report electronically signed by Dr Sohan Buchanan on 12/7/2021 10:58 AM    CTA ABDOMEN PELVIS W WO CONTRAST    Result Date: 12/7/2021  PROCEDURE: CTA CHEST W WO CONTRAST, CTA ABDOMEN PELVIS W WO CONTRAST CLINICAL INFORMATION: pulmonary embolism. COMPARISON: No prior study. TECHNIQUE: 2-D multiplanar pre and postcontrast images of the chest abdomen and pelvis with 3-D MIPS and 3-D rendering of the aorta. Isovue was the intravenous contrast utilized.  All CT scans at this facility use dose modulation, iterative reconstruction, and/or weight-based dosing when appropriate to reduce radiation dose to as low as reasonably achievable. FINDINGS:  Chest There is no pulmonary embolism. The aorta is within acceptable limits. The heart size is normal. There is no pericardial or pleural effusion. There is a right hilar enlarged lymph node measuring 11 mm short axis. There is a subcarinal mass/adenopathy 16 mm short axis. Extensive bilateral upper lobe, perihilar and lower lobe groundglass infiltrates. There is no effusion. No suspicious osseous lesions are present. The thyroid gland is enlarged and heterogeneous in attenuation suggesting underlying nodules. Abdomen and pelvis: There is no aneurysm or dissection. There is no renal calculi. There is no hydronephrosis. Kidneys enhance symmetrically. No gallstones are present. Liver and spleen are unremarkable. Pancreas is unremarkable. Adrenals are normal. No central mesenteric, retroperitoneal, pelvic or inguinal lymphadenopathy. Urinary bladder is distended. No abnormal fluid collections. No bowel obstruction. No suspicious bone lesions. It changes throughout the lumbar spine degenerative change both hips right greater than left. 1. No pulmonary embolism. No aneurysm of the thoracic or abdominal aorta. No dissection. 2. Extensive bilateral infiltrates. **This report has been created using voice recognition software. It may contain minor errors which are inherent in voice recognition technology. ** Final report electronically signed by Dr. Juliet Quezada on 12/7/2021 1:41 PM      Electronically signed by Evan Walker MD on 12/11/2021 at 2:01 PM

## 2021-12-12 LAB
ALBUMIN SERPL-MCNC: 3.2 G/DL (ref 3.5–5.1)
ALP BLD-CCNC: 81 U/L (ref 38–126)
ALT SERPL-CCNC: 47 U/L (ref 11–66)
ANION GAP SERPL CALCULATED.3IONS-SCNC: 14 MEQ/L (ref 8–16)
AST SERPL-CCNC: 28 U/L (ref 5–40)
BILIRUB SERPL-MCNC: 0.4 MG/DL (ref 0.3–1.2)
BUN BLDV-MCNC: 36 MG/DL (ref 7–22)
C-REACTIVE PROTEIN: 1.65 MG/DL (ref 0–1)
CALCIUM SERPL-MCNC: 8.5 MG/DL (ref 8.5–10.5)
CHLORIDE BLD-SCNC: 103 MEQ/L (ref 98–111)
CO2: 21 MEQ/L (ref 23–33)
CREAT SERPL-MCNC: 1.1 MG/DL (ref 0.4–1.2)
ERYTHROCYTE [DISTWIDTH] IN BLOOD BY AUTOMATED COUNT: 12.6 % (ref 11.5–14.5)
ERYTHROCYTE [DISTWIDTH] IN BLOOD BY AUTOMATED COUNT: 41.3 FL (ref 35–45)
GFR SERPL CREATININE-BSD FRML MDRD: 67 ML/MIN/1.73M2
GLUCOSE BLD-MCNC: 135 MG/DL (ref 70–108)
GLUCOSE BLD-MCNC: 160 MG/DL (ref 70–108)
GLUCOSE BLD-MCNC: 165 MG/DL (ref 70–108)
GLUCOSE BLD-MCNC: 169 MG/DL (ref 70–108)
GLUCOSE BLD-MCNC: 203 MG/DL (ref 70–108)
HCT VFR BLD CALC: 44.4 % (ref 42–52)
HEMOGLOBIN: 14.3 GM/DL (ref 14–18)
MCH RBC QN AUTO: 28.8 PG (ref 26–33)
MCHC RBC AUTO-ENTMCNC: 32.2 GM/DL (ref 32.2–35.5)
MCV RBC AUTO: 89.5 FL (ref 80–94)
PLATELET # BLD: 272 THOU/MM3 (ref 130–400)
PMV BLD AUTO: 10.7 FL (ref 9.4–12.4)
POTASSIUM SERPL-SCNC: 4.8 MEQ/L (ref 3.5–5.2)
RBC # BLD: 4.96 MILL/MM3 (ref 4.7–6.1)
SODIUM BLD-SCNC: 138 MEQ/L (ref 135–145)
TOTAL PROTEIN: 6.5 G/DL (ref 6.1–8)
WBC # BLD: 9.4 THOU/MM3 (ref 4.8–10.8)

## 2021-12-12 PROCEDURE — 6370000000 HC RX 637 (ALT 250 FOR IP): Performed by: INTERNAL MEDICINE

## 2021-12-12 PROCEDURE — 6360000002 HC RX W HCPCS: Performed by: INTERNAL MEDICINE

## 2021-12-12 PROCEDURE — 94761 N-INVAS EAR/PLS OXIMETRY MLT: CPT

## 2021-12-12 PROCEDURE — 2580000003 HC RX 258: Performed by: NURSE PRACTITIONER

## 2021-12-12 PROCEDURE — 6370000000 HC RX 637 (ALT 250 FOR IP): Performed by: HOSPITALIST

## 2021-12-12 PROCEDURE — 99233 SBSQ HOSP IP/OBS HIGH 50: CPT | Performed by: INTERNAL MEDICINE

## 2021-12-12 PROCEDURE — 80053 COMPREHEN METABOLIC PANEL: CPT

## 2021-12-12 PROCEDURE — 6360000002 HC RX W HCPCS: Performed by: NURSE PRACTITIONER

## 2021-12-12 PROCEDURE — 2700000000 HC OXYGEN THERAPY PER DAY

## 2021-12-12 PROCEDURE — 82948 REAGENT STRIP/BLOOD GLUCOSE: CPT

## 2021-12-12 PROCEDURE — 86140 C-REACTIVE PROTEIN: CPT

## 2021-12-12 PROCEDURE — 85027 COMPLETE CBC AUTOMATED: CPT

## 2021-12-12 PROCEDURE — 6370000000 HC RX 637 (ALT 250 FOR IP): Performed by: NURSE PRACTITIONER

## 2021-12-12 PROCEDURE — 36415 COLL VENOUS BLD VENIPUNCTURE: CPT

## 2021-12-12 PROCEDURE — 6370000000 HC RX 637 (ALT 250 FOR IP): Performed by: UROLOGY

## 2021-12-12 PROCEDURE — 2140000000 HC CCU INTERMEDIATE R&B

## 2021-12-12 RX ORDER — METOPROLOL SUCCINATE 100 MG/1
100 TABLET, EXTENDED RELEASE ORAL DAILY
Status: DISCONTINUED | OUTPATIENT
Start: 2021-12-13 | End: 2021-12-19

## 2021-12-12 RX ADMIN — Medication 2000 UNITS: at 09:41

## 2021-12-12 RX ADMIN — AMLODIPINE BESYLATE 10 MG: 10 TABLET ORAL at 09:44

## 2021-12-12 RX ADMIN — ENOXAPARIN SODIUM 30 MG: 100 INJECTION SUBCUTANEOUS at 21:10

## 2021-12-12 RX ADMIN — BARICITINIB 4 MG: 2 TABLET, FILM COATED ORAL at 09:43

## 2021-12-12 RX ADMIN — OXYCODONE HYDROCHLORIDE AND ACETAMINOPHEN 500 MG: 500 TABLET ORAL at 09:43

## 2021-12-12 RX ADMIN — SODIUM CHLORIDE, PRESERVATIVE FREE 10 ML: 5 INJECTION INTRAVENOUS at 10:13

## 2021-12-12 RX ADMIN — TAMSULOSIN HYDROCHLORIDE 0.4 MG: 0.4 CAPSULE ORAL at 09:41

## 2021-12-12 RX ADMIN — HYDRALAZINE HYDROCHLORIDE 50 MG: 50 TABLET ORAL at 21:10

## 2021-12-12 RX ADMIN — HYDRALAZINE HYDROCHLORIDE 50 MG: 50 TABLET ORAL at 12:33

## 2021-12-12 RX ADMIN — Medication 50 MG: at 09:40

## 2021-12-12 RX ADMIN — METOPROLOL SUCCINATE 100 MG: 100 TABLET, EXTENDED RELEASE ORAL at 09:42

## 2021-12-12 RX ADMIN — ENOXAPARIN SODIUM 30 MG: 100 INJECTION SUBCUTANEOUS at 09:44

## 2021-12-12 RX ADMIN — HYDRALAZINE HYDROCHLORIDE 50 MG: 50 TABLET ORAL at 04:27

## 2021-12-12 RX ADMIN — SODIUM CHLORIDE, PRESERVATIVE FREE 10 ML: 5 INJECTION INTRAVENOUS at 21:19

## 2021-12-12 RX ADMIN — INSULIN GLARGINE 24 UNITS: 100 INJECTION, SOLUTION SUBCUTANEOUS at 21:06

## 2021-12-12 RX ADMIN — INSULIN LISPRO 2 UNITS: 100 INJECTION, SOLUTION INTRAVENOUS; SUBCUTANEOUS at 12:34

## 2021-12-12 RX ADMIN — INSULIN LISPRO 4 UNITS: 100 INJECTION, SOLUTION INTRAVENOUS; SUBCUTANEOUS at 17:47

## 2021-12-12 RX ADMIN — DEXAMETHASONE 6 MG: 4 TABLET ORAL at 09:43

## 2021-12-12 ASSESSMENT — PAIN SCALES - GENERAL
PAINLEVEL_OUTOF10: 0

## 2021-12-12 NOTE — PROGRESS NOTES
Hospitalist Progress Note      Patient:  Dolly Wasserman    Unit/Bed:3B-38/038-A  YOB: 1955  MRN: [id number]   Acct: [de-identified]   PCP: DIONTE Mckeon CNP  Date of Admission: 12/7/2021    Assessment/Plan:    1. Acute hypoxic resp failure  COVID 19 PNA   - On high dose steroids. Add baricitinib   - Wean off O2 as tolerated   - On IV abx    12/9: On HFNC, slowly weaning down. Continue steroids and baricitinib and abx. Inflam markers trending down. CTA on 12/7, no PE.   12/10: On minimal high flow settings. Will transition to nasal cannula today. Continue care as above. 12/11: Was transitioned to NC overnight but with minimal physical activity had to be placed back on HFNC. Not ready yet. Continue care as above. CXR shows slightly worsening. Inflam markers trending down  12/12: Weaning down on HFNC. Otherwise, no issues. 2. DM II - uncontrolled   - A1c 9.9   - start on basal-bolus regimen   -Continue sliding scale. Monitor blood glucose closely. Will adjust as needed. 12/9: Better controlled this am. Continue as above    3. Essential Hypertension   - BP not well controlled. Add hydralazine    12/9: suboptimal control. Increase hydralazine dose    4. Obesity   - patient will benefit with weight loss and dietary modification    5. Gross hematuria - resolved   - urology following    6. Mild Hyperkalemia   - Give dose a lasix   - Monitor for now  12/12: Stable    7. Sinus bradycardia likely related to COVID   -  Continue to monitor.  Asymptomatic    Chief Complaint: SOB    Initial H and P:-      77 y.o. male who presented to [address] [place] [address] with feeling well for 4 weeks; he has a past medical history of hypertension; he states he has been short of breath along with fatigue, myalgias and a nonproductive cough for the last 4 weeks; he states the last 2 days his symptoms have increased which prompted him for an evaluation in the ER today; he has received 1 dose of the Moderna vaccination for Covid.     He complains of lightheadedness and dizziness along with a productive cough and shortness of breath; he denies chest pain, denies nausea, vomiting or diarrhea; he tells me he is not eaten anything in the last 2 to 3 weeks; he also tells me he is not taking his blood pressure medicine in the last 2 to 3 weeks either; in the ER it was noted that he had gross hematuria which patient states this is new.     In the emergency department, he had initial O2 sat of 78%, he was initially placed on 6 L bringing him up to 90% and is currently on high flow oxygen at 90% FiO2 and 40 L satting 98%; chest x-ray showed pneumonia; CT of the abdomen and chest are currently pending, he is being admitted     Subjective (past 24 hours):     Feels about the same. No NVD. NO chest pain. Past medical history, family history, social history and allergies reviewed again and is unchanged since admission. ROS (All review of systems completed. Pertinent positives noted.  Otherwise All other systems reviewed and negative.)     Medications:  Reviewed    Infusion Medications    dextrose      sodium chloride       Scheduled Medications    [START ON 12/13/2021] metoprolol succinate  100 mg Oral Daily    dexamethasone  6 mg Oral Daily    hydrALAZINE  50 mg Oral Q8H    insulin lispro  0-12 Units SubCUTAneous TID     insulin lispro  0-6 Units SubCUTAneous Nightly    insulin glargine  0.25 Units/kg SubCUTAneous Nightly    tamsulosin  0.4 mg Oral Daily    insulin lispro  7 Units SubCUTAneous TID     baricitinib  4 mg Oral Daily    amLODIPine  10 mg Oral Daily    sodium chloride flush  5-40 mL IntraVENous 2 times per day    enoxaparin  30 mg SubCUTAneous BID    Vitamin D  2,000 Units Oral Daily    ascorbic acid  500 mg Oral Daily    zinc sulfate  50 mg Oral Daily     PRN Meds: glucose, dextrose, glucagon (rDNA), dextrose, sodium chloride flush, sodium chloride, ondansetron **OR** ondansetron, polyethylene glycol, acetaminophen **OR** acetaminophen, guaiFENesin-dextromethorphan      Intake/Output Summary (Last 24 hours) at 12/12/2021 1343  Last data filed at 12/12/2021 0310  Gross per 24 hour   Intake --   Output 1750 ml   Net -1750 ml       Diet:  ADULT DIET; Regular; 4 carb choices (60 gm/meal); No Added Salt (3-4 gm)    Exam:  BP (!) 177/80   Pulse (!) 46   Temp 97.9 °F (36.6 °C) (Oral)   Resp 20   Ht 5' 6\" (1.676 m)   Wt 213 lb (96.6 kg)   SpO2 93%   BMI 34.38 kg/m²   General appearance: No apparent distress, appears stated age and cooperative. HEENT: Pupils equal, round, and reactive to light. Conjunctivae/corneas clear. Neck: Supple, with full range of motion. No jugular venous distention. Trachea midline. Respiratory: diminished breath sounds b/l  Cardiovascular: S1/S2 without murmurs, rubs or gallops. Abdomen: Soft, non-tender, non-distended with normal bowel sounds. Musculoskeletal: passive and active ROM x 4 extremities. Skin: Skin color, texture, turgor normal.  No rashes or lesions. Neurologic:  Neurovascularly intact without any focal sensory/motor deficits. Cranial nerves: II-XII intact, grossly non-focal.  Psychiatric: Alert and oriented, thought content appropriate, normal insight  Capillary Refill: Brisk,< 3 seconds   Peripheral Pulses: +2 palpable, equal bilaterally     Labs:   Recent Labs     12/11/21  1027 12/12/21  0351   WBC 7.8 9.4   HGB 15.9 14.3   HCT 48.9 44.4    272     Recent Labs     12/11/21  1026 12/12/21  0351    138   K 5.4* 4.8    103   CO2 22* 21*   BUN 28* 36*   CREATININE 1.0 1.1   CALCIUM 9.1 8.5     Recent Labs     12/11/21  1026 12/12/21  0351   AST 43* 28   ALT 61 47   BILITOT 0.6 0.4   ALKPHOS 95 81     No results for input(s): INR in the last 72 hours. No results for input(s): Kathyrn Belch in the last 72 hours.     Microbiology:    Blood culture #1: No results found for: [facility]    Blood culture #2:No results found for: Kellen Fell    Organism:No results found for: [facility]      Lab Results   Component Value Date    LABGRAM  07/21/2014     Few segmented neutrophils observed. Rare epithelial cells observed. Rare gram positive cocci occurring singly and in pairs. MRSA culture only:No results found for: [address]    Urine culture: No results found for: LABURIN    Respiratory culture: No results found for: CULTRESP    Aerobic and Anaerobic :  Lab Results   Component Value Date    LABAERO No growth-preliminary  No growth   07/21/2014     No results found for: LABANAE    Urinalysis:      Lab Results   Component Value Date    NITRU NEGATIVE 12/08/2021    WBCUA 0-2 12/08/2021    BACTERIA NONE SEEN 12/08/2021    RBCUA 5-10 12/08/2021    BLOODU SMALL 12/08/2021    GLUCOSEU >= 1000 12/08/2021       Radiology:  XR CHEST PORTABLE   Final Result   Redemonstration of diffuse patchy opacities which is more confluent in the right upper lobe and mildly worsened in the interval.               **This report has been created using voice recognition software. It may contain minor errors which are inherent in voice recognition technology. **      Final report electronically signed by Dr. Tyrese Bauer MD on 12/11/2021 11:21 AM      CTA CHEST [address]   Final Result       1. No pulmonary embolism. No aneurysm of the thoracic or abdominal aorta. No dissection. 2. Extensive bilateral infiltrates. **This report has been created using voice recognition software. It may contain minor errors which are inherent in voice recognition technology. **      Final report electronically signed by Dr. Morales Goldsmith on 12/7/2021 1:41 PM      CTA ABDOMEN PELVIS W WO CONTRAST   Final Result       1. No pulmonary embolism. No aneurysm of the thoracic or abdominal aorta. No dissection. 2. Extensive bilateral infiltrates. **This report has been created using voice recognition software.  It may contain minor errors which are inherent in voice recognition technology. **      Final report electronically signed by Dr. Tg Neves on 12/7/2021 1:41 PM      XR CHEST PORTABLE   Final Result   Multifocal airspace opacities suggest pneumonia in the appropriate clinical setting. The patient is pending CT assessment. **This report has been created using voice recognition software. It may contain minor errors which are inherent in voice recognition technology. **      Final report electronically signed by Dr Giovanny Sharma on 12/7/2021 10:58 AM        CTA CHEST W WO CONTRAST    Result Date: 12/7/2021  PROCEDURE: CTA CHEST W WO CONTRAST, CTA ABDOMEN PELVIS W WO CONTRAST CLINICAL INFORMATION: pulmonary embolism. COMPARISON: No prior study. TECHNIQUE: 2-D multiplanar pre and postcontrast images of the chest abdomen and pelvis with 3-D MIPS and 3-D rendering of the aorta. Isovue was the intravenous contrast utilized. All CT scans at this facility use dose modulation, iterative reconstruction, and/or weight-based dosing when appropriate to reduce radiation dose to as low as reasonably achievable. FINDINGS:  Chest There is no pulmonary embolism. The aorta is within acceptable limits. The heart size is normal. There is no pericardial or pleural effusion. There is a right hilar enlarged lymph node measuring 11 mm short axis. There is a subcarinal mass/adenopathy 16 mm short axis. Extensive bilateral upper lobe, perihilar and lower lobe groundglass infiltrates. There is no effusion. No suspicious osseous lesions are present. The thyroid gland is enlarged and heterogeneous in attenuation suggesting underlying nodules. Abdomen and pelvis: There is no aneurysm or dissection. There is no renal calculi. There is no hydronephrosis. Kidneys enhance symmetrically. No gallstones are present. Liver and spleen are unremarkable. Pancreas is unremarkable.  Adrenals are normal. No central mesenteric, retroperitoneal, pelvic or inguinal lymphadenopathy. Urinary bladder is distended. No abnormal fluid collections. No bowel obstruction. No suspicious bone lesions. It changes throughout the lumbar spine degenerative change both hips right greater than left. 1. No pulmonary embolism. No aneurysm of the thoracic or abdominal aorta. No dissection. 2. Extensive bilateral infiltrates. **This report has been created using voice recognition software. It may contain minor errors which are inherent in voice recognition technology. ** Final report electronically signed by Dr. Santa Holly on 12/7/2021 1:41 PM    XR CHEST PORTABLE    Result Date: 12/7/2021  PROCEDURE: XR CHEST PORTABLE CLINICAL INFORMATION: Shortness of breath. COMPARISON: None. TECHNIQUE: AP portable chest radiograph performed. FINDINGS: Lines/tubes: None. Heart/mediastinum: The heart size is normal. The pulmonary vascularity is unremarkable. Lungs: Bilateral multifocal upper and lower lobe airspace opacities and interstitial opacities are observed. No pleural effusion or pneumothorax is identified. Low lung volumes are present. Bones: Diffuse osteopenia is observed. The visualized skeletal structures appear intact. Degenerative disc disease is noted in the thoracic spine. Multifocal airspace opacities suggest pneumonia in the appropriate clinical setting. The patient is pending CT assessment. **This report has been created using voice recognition software. It may contain minor errors which are inherent in voice recognition technology. ** Final report electronically signed by Dr Devin Baires on 12/7/2021 10:58 AM    CTA ABDOMEN PELVIS W WO CONTRAST    Result Date: 12/7/2021  PROCEDURE: CTA CHEST W WO CONTRAST, CTA ABDOMEN PELVIS W WO CONTRAST CLINICAL INFORMATION: pulmonary embolism. COMPARISON: No prior study. TECHNIQUE: 2-D multiplanar pre and postcontrast images of the chest abdomen and pelvis with 3-D MIPS and 3-D rendering of the aorta.  Isovue was the intravenous contrast utilized. All CT scans at this facility use dose modulation, iterative reconstruction, and/or weight-based dosing when appropriate to reduce radiation dose to as low as reasonably achievable. FINDINGS:  Chest There is no pulmonary embolism. The aorta is within acceptable limits. The heart size is normal. There is no pericardial or pleural effusion. There is a right hilar enlarged lymph node measuring 11 mm short axis. There is a subcarinal mass/adenopathy 16 mm short axis. Extensive bilateral upper lobe, perihilar and lower lobe groundglass infiltrates. There is no effusion. No suspicious osseous lesions are present. The thyroid gland is enlarged and heterogeneous in attenuation suggesting underlying nodules. Abdomen and pelvis: There is no aneurysm or dissection. There is no renal calculi. There is no hydronephrosis. Kidneys enhance symmetrically. No gallstones are present. Liver and spleen are unremarkable. Pancreas is unremarkable. Adrenals are normal. No central mesenteric, retroperitoneal, pelvic or inguinal lymphadenopathy. Urinary bladder is distended. No abnormal fluid collections. No bowel obstruction. No suspicious bone lesions. It changes throughout the lumbar spine degenerative change both hips right greater than left. 1. No pulmonary embolism. No aneurysm of the thoracic or abdominal aorta. No dissection. 2. Extensive bilateral infiltrates. **This report has been created using voice recognition software. It may contain minor errors which are inherent in voice recognition technology. ** Final report electronically signed by Dr. Tg Neves on 12/7/2021 1:41 PM      Electronically signed by New Carrasco MD on 12/12/2021 at 1:43 PM

## 2021-12-13 LAB
ANION GAP SERPL CALCULATED.3IONS-SCNC: 9 MEQ/L (ref 8–16)
BUN BLDV-MCNC: 35 MG/DL (ref 7–22)
CALCIUM SERPL-MCNC: 8.6 MG/DL (ref 8.5–10.5)
CHLORIDE BLD-SCNC: 104 MEQ/L (ref 98–111)
CO2: 21 MEQ/L (ref 23–33)
CREAT SERPL-MCNC: 1 MG/DL (ref 0.4–1.2)
EKG ATRIAL RATE: 51 BPM
EKG P AXIS: -14 DEGREES
EKG P-R INTERVAL: 224 MS
EKG Q-T INTERVAL: 476 MS
EKG QRS DURATION: 104 MS
EKG QTC CALCULATION (BAZETT): 438 MS
EKG R AXIS: 21 DEGREES
EKG T AXIS: 46 DEGREES
EKG VENTRICULAR RATE: 51 BPM
ERYTHROCYTE [DISTWIDTH] IN BLOOD BY AUTOMATED COUNT: 12.6 % (ref 11.5–14.5)
ERYTHROCYTE [DISTWIDTH] IN BLOOD BY AUTOMATED COUNT: 41.1 FL (ref 35–45)
GFR SERPL CREATININE-BSD FRML MDRD: 75 ML/MIN/1.73M2
GLUCOSE BLD-MCNC: 113 MG/DL (ref 70–108)
GLUCOSE BLD-MCNC: 138 MG/DL (ref 70–108)
GLUCOSE BLD-MCNC: 166 MG/DL (ref 70–108)
GLUCOSE BLD-MCNC: 186 MG/DL (ref 70–108)
GLUCOSE BLD-MCNC: 236 MG/DL (ref 70–108)
HCT VFR BLD CALC: 43.8 % (ref 42–52)
HEMOGLOBIN: 14 GM/DL (ref 14–18)
LV EF: 60 %
LVEF MODALITY: NORMAL
MCH RBC QN AUTO: 28.7 PG (ref 26–33)
MCHC RBC AUTO-ENTMCNC: 32 GM/DL (ref 32.2–35.5)
MCV RBC AUTO: 89.9 FL (ref 80–94)
PLATELET # BLD: 245 THOU/MM3 (ref 130–400)
PMV BLD AUTO: 11.2 FL (ref 9.4–12.4)
POTASSIUM SERPL-SCNC: 4.7 MEQ/L (ref 3.5–5.2)
RBC # BLD: 4.87 MILL/MM3 (ref 4.7–6.1)
SODIUM BLD-SCNC: 134 MEQ/L (ref 135–145)
WBC # BLD: 9.5 THOU/MM3 (ref 4.8–10.8)

## 2021-12-13 PROCEDURE — 36415 COLL VENOUS BLD VENIPUNCTURE: CPT

## 2021-12-13 PROCEDURE — 99233 SBSQ HOSP IP/OBS HIGH 50: CPT | Performed by: INTERNAL MEDICINE

## 2021-12-13 PROCEDURE — 6370000000 HC RX 637 (ALT 250 FOR IP): Performed by: INTERNAL MEDICINE

## 2021-12-13 PROCEDURE — 6370000000 HC RX 637 (ALT 250 FOR IP): Performed by: UROLOGY

## 2021-12-13 PROCEDURE — 85027 COMPLETE CBC AUTOMATED: CPT

## 2021-12-13 PROCEDURE — 6360000002 HC RX W HCPCS: Performed by: INTERNAL MEDICINE

## 2021-12-13 PROCEDURE — 93306 TTE W/DOPPLER COMPLETE: CPT

## 2021-12-13 PROCEDURE — 6360000002 HC RX W HCPCS: Performed by: NURSE PRACTITIONER

## 2021-12-13 PROCEDURE — 93010 ELECTROCARDIOGRAM REPORT: CPT | Performed by: INTERNAL MEDICINE

## 2021-12-13 PROCEDURE — 2140000000 HC CCU INTERMEDIATE R&B

## 2021-12-13 PROCEDURE — 6370000000 HC RX 637 (ALT 250 FOR IP): Performed by: NURSE PRACTITIONER

## 2021-12-13 PROCEDURE — 82948 REAGENT STRIP/BLOOD GLUCOSE: CPT

## 2021-12-13 PROCEDURE — 94761 N-INVAS EAR/PLS OXIMETRY MLT: CPT

## 2021-12-13 PROCEDURE — 80048 BASIC METABOLIC PNL TOTAL CA: CPT

## 2021-12-13 PROCEDURE — 93005 ELECTROCARDIOGRAM TRACING: CPT | Performed by: INTERNAL MEDICINE

## 2021-12-13 PROCEDURE — 2580000003 HC RX 258: Performed by: NURSE PRACTITIONER

## 2021-12-13 PROCEDURE — 6370000000 HC RX 637 (ALT 250 FOR IP): Performed by: HOSPITALIST

## 2021-12-13 RX ORDER — HYDROCHLOROTHIAZIDE 25 MG/1
25 TABLET ORAL DAILY
Status: DISCONTINUED | OUTPATIENT
Start: 2021-12-13 | End: 2021-12-14

## 2021-12-13 RX ORDER — HYDRALAZINE HYDROCHLORIDE 50 MG/1
100 TABLET, FILM COATED ORAL EVERY 8 HOURS
Status: DISCONTINUED | OUTPATIENT
Start: 2021-12-13 | End: 2021-12-20 | Stop reason: HOSPADM

## 2021-12-13 RX ADMIN — INSULIN LISPRO 2 UNITS: 100 INJECTION, SOLUTION INTRAVENOUS; SUBCUTANEOUS at 14:57

## 2021-12-13 RX ADMIN — ENOXAPARIN SODIUM 30 MG: 100 INJECTION SUBCUTANEOUS at 08:30

## 2021-12-13 RX ADMIN — BARICITINIB 4 MG: 2 TABLET, FILM COATED ORAL at 08:30

## 2021-12-13 RX ADMIN — HYDRALAZINE HYDROCHLORIDE 50 MG: 50 TABLET ORAL at 04:48

## 2021-12-13 RX ADMIN — TAMSULOSIN HYDROCHLORIDE 0.4 MG: 0.4 CAPSULE ORAL at 08:30

## 2021-12-13 RX ADMIN — ENOXAPARIN SODIUM 30 MG: 100 INJECTION SUBCUTANEOUS at 21:31

## 2021-12-13 RX ADMIN — DEXAMETHASONE 6 MG: 4 TABLET ORAL at 08:30

## 2021-12-13 RX ADMIN — HYDRALAZINE HYDROCHLORIDE 100 MG: 50 TABLET ORAL at 21:31

## 2021-12-13 RX ADMIN — INSULIN GLARGINE 24 UNITS: 100 INJECTION, SOLUTION SUBCUTANEOUS at 21:31

## 2021-12-13 RX ADMIN — INSULIN LISPRO 4 UNITS: 100 INJECTION, SOLUTION INTRAVENOUS; SUBCUTANEOUS at 18:17

## 2021-12-13 RX ADMIN — HYDRALAZINE HYDROCHLORIDE 100 MG: 50 TABLET ORAL at 15:05

## 2021-12-13 RX ADMIN — SODIUM CHLORIDE, PRESERVATIVE FREE 10 ML: 5 INJECTION INTRAVENOUS at 21:33

## 2021-12-13 RX ADMIN — Medication 2000 UNITS: at 08:30

## 2021-12-13 RX ADMIN — OXYCODONE HYDROCHLORIDE AND ACETAMINOPHEN 500 MG: 500 TABLET ORAL at 08:30

## 2021-12-13 RX ADMIN — Medication 50 MG: at 08:30

## 2021-12-13 RX ADMIN — AMLODIPINE BESYLATE 10 MG: 10 TABLET ORAL at 08:30

## 2021-12-13 RX ADMIN — HYDROCHLOROTHIAZIDE 25 MG: 25 TABLET ORAL at 15:05

## 2021-12-13 RX ADMIN — SODIUM CHLORIDE, PRESERVATIVE FREE 10 ML: 5 INJECTION INTRAVENOUS at 08:31

## 2021-12-13 ASSESSMENT — PAIN SCALES - GENERAL
PAINLEVEL_OUTOF10: 0

## 2021-12-13 NOTE — PROGRESS NOTES
Hospitalist Progress Note      Patient:  Hina Urias    Unit/Bed:3B-38/038-A  YOB: 1955  MRN: [id number]   Acct: [de-identified]   PCP: DIONTE Nolan CNP  Date of Admission: 12/7/2021    Assessment/Plan:    1. Acute hypoxic resp failure  COVID 19 PNA   - On high dose steroids. Add baricitinib   - Wean off O2 as tolerated   - On IV abx    12/9: On HFNC, slowly weaning down. Continue steroids and baricitinib and abx. Inflam markers trending down. CTA on 12/7, no PE.   12/10: On minimal high flow settings. Will transition to nasal cannula today. Continue care as above. 12/11: Was transitioned to NC overnight but with minimal physical activity had to be placed back on HFNC. Not ready yet. Continue care as above. CXR shows slightly worsening. Inflam markers trending down  12/12: Weaning down on HFNC. Otherwise, no issues. 12/13: On minimal settings. Plan to switch to NC today. Also needs PT/OT as he looks really deconditioned. 2. DM II - uncontrolled   - A1c 9.9   - start on basal-bolus regimen   -Continue sliding scale. Monitor blood glucose closely. Will adjust as needed. 12/9: Better controlled this am. Continue as above    3. Essential Hypertension   - BP not well controlled. Add hydralazine    12/9: suboptimal control. Increase hydralazine dose  12/13: Hydralazine increased, BB stopped, add HCTZ    4. Obesity   - patient will benefit with weight loss and dietary modification    5. Gross hematuria - resolved   - urology following    6. Sinus bradycardia   -  Continue to monitor. Asymptomatic    12/13: EKG shows sinus [place] with first degree AV block. Check Echo. Likely due to COVID/decadron.  Hold home BB    Chief Complaint: SOB    Initial H and P:-      77 y.o. [facility] [place] [address] with feeling well for 4 weeks; he has a past medical history of hypertension; he states he has been short of breath along with fatigue, myalgias and a nonproductive cough for the last 4 weeks; he states the last 2 days his symptoms have increased which prompted him for an evaluation in the ER today; he has received 1 dose of the Moderna vaccination for Covid.     He complains of lightheadedness and dizziness along with a productive cough and shortness of breath; he denies chest pain, denies nausea, vomiting or diarrhea; he tells me he is not eaten anything in the last 2 to 3 weeks; he also tells me he is not taking his blood pressure medicine in the last 2 to 3 weeks either; in the ER it was noted that he had gross hematuria which patient states this is new.     In the emergency department, he had initial O2 sat of 78%, he was initially placed on 6 L bringing him up to 90% and is currently on high flow oxygen at 90% FiO2 and 40 L satting 98%; chest x-ray showed pneumonia; CT of the abdomen and chest are currently pending, he is being admitted     Subjective (past 24 hours):     Feels about the same. No NVD. NO chest pain. Past medical history, family history, social history and allergies reviewed again and is unchanged since admission. ROS (All review of systems completed. Pertinent positives noted.  Otherwise All other systems reviewed and negative.)     Medications:  Reviewed    Infusion Medications    dextrose      sodium chloride       Scheduled Medications    hydrALAZINE  100 mg Oral Q8H    hydroCHLOROthiazide  25 mg Oral Daily    [Held by provider] metoprolol succinate  100 mg Oral Daily    dexamethasone  6 mg Oral Daily    insulin lispro  0-12 Units SubCUTAneous TID     insulin lispro  0-6 Units SubCUTAneous Nightly    insulin glargine  0.25 Units/kg SubCUTAneous Nightly    tamsulosin  0.4 mg Oral Daily    insulin lispro  7 Units SubCUTAneous TID     baricitinib  4 mg Oral Daily    amLODIPine  10 mg Oral Daily    sodium chloride flush  5-40 mL IntraVENous 2 times per day    enoxaparin  30 mg SubCUTAneous 8.6     Recent Labs     12/11/21  1026 12/12/21  0351   AST 43* 28   ALT 61 47   BILITOT 0.6 0.4   ALKPHOS 95 81     No results for input(s): INR in the last 72 hours. No results for input(s): Magdalene Chavez in the last 72 hours. Microbiology:    Blood culture #1: No results found for: [facility]    Blood culture #2:No results found for: Ricardo Tony    Organism:No results found for: [facility]      Lab Results   Component Value Date    LABGRAM  07/21/2014     Few segmented neutrophils observed. Rare epithelial cells observed. Rare gram positive cocci occurring singly and in pairs. MRSA culture only:No results found for: [address]     Urine culture: No results found for: LABURIN    Respiratory culture: No results found for: CULTRESP    Aerobic and Anaerobic :  Lab Results   Component Value Date    LABAERO No growth-preliminary  No growth   07/21/2014     No results found for: LABANAE    Urinalysis:      Lab Results   Component Value Date    NITRU NEGATIVE 12/08/2021    WBCUA 0-2 12/08/2021    BACTERIA NONE SEEN 12/08/2021    RBCUA 5-10 12/08/2021    BLOODU SMALL 12/08/2021    GLUCOSEU >= 1000 12/08/2021       Radiology:  XR CHEST PORTABLE   Final Result   Redemonstration of diffuse patchy opacities which is more confluent in the right upper lobe and mildly worsened in the interval.               **This report has been created using voice recognition software. It may contain minor errors which are inherent in voice recognition technology. **      Final report electronically signed by Dr. Leighton Smith MD on 12/11/2021 11:21 AM      CTA CHEST [address]   Final Result       1. No pulmonary embolism. No aneurysm of the thoracic or abdominal aorta. No dissection. 2. Extensive bilateral infiltrates. **This report has been created using voice recognition software. It may contain minor errors which are inherent in voice recognition technology. **      Final report electronically signed by Dr. Jr Villagran Gregorio on 12/7/2021 1:41 PM      CTA ABDOMEN PELVIS W WO CONTRAST   Final Result       1. No pulmonary embolism. No aneurysm of the thoracic or abdominal aorta. No dissection. 2. Extensive bilateral infiltrates. **This report has been created using voice recognition software. It may contain minor errors which are inherent in voice recognition technology. **      Final report electronically signed by Dr. Taylor Turcios on 12/7/2021 1:41 PM      XR CHEST PORTABLE   Final Result   Multifocal airspace opacities suggest pneumonia in the appropriate clinical setting. The patient is pending CT assessment. **This report has been created using voice recognition software. It may contain minor errors which are inherent in voice recognition technology. **      Final report electronically signed by Dr Bhavesh Herrera on 12/7/2021 10:58 AM        CTA CHEST W WO CONTRAST    Result Date: 12/7/2021  PROCEDURE: CTA CHEST W WO CONTRAST, CTA ABDOMEN PELVIS W WO CONTRAST CLINICAL INFORMATION: pulmonary embolism. COMPARISON: No prior study. TECHNIQUE: 2-D multiplanar pre and postcontrast images of the chest abdomen and pelvis with 3-D MIPS and 3-D rendering of the aorta. Isovue was the intravenous contrast utilized. All CT scans at this facility use dose modulation, iterative reconstruction, and/or weight-based dosing when appropriate to reduce radiation dose to as low as reasonably achievable. FINDINGS:  Chest There is no pulmonary embolism. The aorta is within acceptable limits. The heart size is normal. There is no pericardial or pleural effusion. There is a right hilar enlarged lymph node measuring 11 mm short axis. There is a subcarinal mass/adenopathy 16 mm short axis. Extensive bilateral upper lobe, perihilar and lower lobe groundglass infiltrates. There is no effusion. No suspicious osseous lesions are present. The thyroid gland is enlarged and heterogeneous in attenuation suggesting underlying nodules. Abdomen and pelvis: There is no aneurysm or dissection. There is no renal calculi. There is no hydronephrosis. Kidneys enhance symmetrically. No gallstones are present. Liver and spleen are unremarkable. Pancreas is unremarkable. Adrenals are normal. No central mesenteric, retroperitoneal, pelvic or inguinal lymphadenopathy. Urinary bladder is distended. No abnormal fluid collections. No bowel obstruction. No suspicious bone lesions. It changes throughout the lumbar spine degenerative change both hips right greater than left. 1. No pulmonary embolism. No aneurysm of the thoracic or abdominal aorta. No dissection. 2. Extensive bilateral infiltrates. **This report has been created using voice recognition software. It may contain minor errors which are inherent in voice recognition technology. ** Final report electronically signed by Dr. Gilmar Gomez on 12/7/2021 1:41 PM    XR CHEST PORTABLE    Result Date: 12/7/2021  PROCEDURE: XR CHEST PORTABLE CLINICAL INFORMATION: Shortness of breath. COMPARISON: None. TECHNIQUE: AP portable chest radiograph performed. FINDINGS: Lines/tubes: None. Heart/mediastinum: The heart size is normal. The pulmonary vascularity is unremarkable. Lungs: Bilateral multifocal upper and lower lobe airspace opacities and interstitial opacities are observed. No pleural effusion or pneumothorax is identified. Low lung volumes are present. Bones: Diffuse osteopenia is observed. The visualized skeletal structures appear intact. Degenerative disc disease is noted in the thoracic spine. Multifocal airspace opacities suggest pneumonia in the appropriate clinical setting. The patient is pending CT assessment. **This report has been created using voice recognition software. It may contain minor errors which are inherent in voice recognition technology. ** Final report electronically signed by Dr Rand Butler on 12/7/2021 10:58 AM    CTA ABDOMEN PELVIS W WO CONTRAST    Result Date: 12/7/2021  PROCEDURE: CTA CHEST W WO CONTRAST, CTA ABDOMEN PELVIS W WO CONTRAST CLINICAL INFORMATION: pulmonary embolism. COMPARISON: No prior study. TECHNIQUE: 2-D multiplanar pre and postcontrast images of the chest abdomen and pelvis with 3-D MIPS and 3-D rendering of the aorta. Isovue was the intravenous contrast utilized. All CT scans at this facility use dose modulation, iterative reconstruction, and/or weight-based dosing when appropriate to reduce radiation dose to as low as reasonably achievable. FINDINGS:  Chest There is no pulmonary embolism. The aorta is within acceptable limits. The heart size is normal. There is no pericardial or pleural effusion. There is a right hilar enlarged lymph node measuring 11 mm short axis. There is a subcarinal mass/adenopathy 16 mm short axis. Extensive bilateral upper lobe, perihilar and lower lobe groundglass infiltrates. There is no effusion. No suspicious osseous lesions are present. The thyroid gland is enlarged and heterogeneous in attenuation suggesting underlying nodules. Abdomen and pelvis: There is no aneurysm or dissection. There is no renal calculi. There is no hydronephrosis. Kidneys enhance symmetrically. No gallstones are present. Liver and spleen are unremarkable. Pancreas is unremarkable. Adrenals are normal. No central mesenteric, retroperitoneal, pelvic or inguinal lymphadenopathy. Urinary bladder is distended. No abnormal fluid collections. No bowel obstruction. No suspicious bone lesions. It changes throughout the lumbar spine degenerative change both hips right greater than left. 1. No pulmonary embolism. No aneurysm of the thoracic or abdominal aorta. No dissection. 2. Extensive bilateral infiltrates. **This report has been created using voice recognition software. It may contain minor errors which are inherent in voice recognition technology. ** Final report electronically signed by Dr. Alta Cortez on 12/7/2021 1:41 PM      Electronically signed by Jose Lopez MD on 12/13/2021 at 11:59 AM

## 2021-12-13 NOTE — CARE COORDINATION
Discharge Planning Update:    Remains on high flow, FiO2 35, 15L, sat is 92%. Plan to attempt to wean to NC today. Vit C, baricitinib, po decadron, lovenox, Vit D, and zinc in use. PT/OT ordered.  Electronically signed by Selvin Bacon RN on 12/13/2021 at 3:09 PM

## 2021-12-14 LAB
ANION GAP SERPL CALCULATED.3IONS-SCNC: 16 MEQ/L (ref 8–16)
BUN BLDV-MCNC: 29 MG/DL (ref 7–22)
CALCIUM SERPL-MCNC: 8.9 MG/DL (ref 8.5–10.5)
CHLORIDE BLD-SCNC: 101 MEQ/L (ref 98–111)
CO2: 18 MEQ/L (ref 23–33)
CREAT SERPL-MCNC: 1 MG/DL (ref 0.4–1.2)
ERYTHROCYTE [DISTWIDTH] IN BLOOD BY AUTOMATED COUNT: 12.4 % (ref 11.5–14.5)
ERYTHROCYTE [DISTWIDTH] IN BLOOD BY AUTOMATED COUNT: 40.3 FL (ref 35–45)
GFR SERPL CREATININE-BSD FRML MDRD: 75 ML/MIN/1.73M2
GLUCOSE BLD-MCNC: 130 MG/DL (ref 70–108)
GLUCOSE BLD-MCNC: 153 MG/DL (ref 70–108)
GLUCOSE BLD-MCNC: 253 MG/DL (ref 70–108)
GLUCOSE BLD-MCNC: 267 MG/DL (ref 70–108)
GLUCOSE BLD-MCNC: 277 MG/DL (ref 70–108)
HCT VFR BLD CALC: 47 % (ref 42–52)
HEMOGLOBIN: 15.6 GM/DL (ref 14–18)
MCH RBC QN AUTO: 29.5 PG (ref 26–33)
MCHC RBC AUTO-ENTMCNC: 33.2 GM/DL (ref 32.2–35.5)
MCV RBC AUTO: 88.8 FL (ref 80–94)
PLATELET # BLD: 270 THOU/MM3 (ref 130–400)
PMV BLD AUTO: 10.9 FL (ref 9.4–12.4)
POTASSIUM SERPL-SCNC: 4.5 MEQ/L (ref 3.5–5.2)
RBC # BLD: 5.29 MILL/MM3 (ref 4.7–6.1)
SODIUM BLD-SCNC: 135 MEQ/L (ref 135–145)
WBC # BLD: 12.5 THOU/MM3 (ref 4.8–10.8)

## 2021-12-14 PROCEDURE — 6370000000 HC RX 637 (ALT 250 FOR IP): Performed by: INTERNAL MEDICINE

## 2021-12-14 PROCEDURE — 97530 THERAPEUTIC ACTIVITIES: CPT

## 2021-12-14 PROCEDURE — 80048 BASIC METABOLIC PNL TOTAL CA: CPT

## 2021-12-14 PROCEDURE — 97167 OT EVAL HIGH COMPLEX 60 MIN: CPT

## 2021-12-14 PROCEDURE — 6370000000 HC RX 637 (ALT 250 FOR IP): Performed by: HOSPITALIST

## 2021-12-14 PROCEDURE — 82948 REAGENT STRIP/BLOOD GLUCOSE: CPT

## 2021-12-14 PROCEDURE — 99233 SBSQ HOSP IP/OBS HIGH 50: CPT | Performed by: INTERNAL MEDICINE

## 2021-12-14 PROCEDURE — 6360000002 HC RX W HCPCS: Performed by: INTERNAL MEDICINE

## 2021-12-14 PROCEDURE — 6370000000 HC RX 637 (ALT 250 FOR IP): Performed by: NURSE PRACTITIONER

## 2021-12-14 PROCEDURE — 6370000000 HC RX 637 (ALT 250 FOR IP): Performed by: UROLOGY

## 2021-12-14 PROCEDURE — 6360000002 HC RX W HCPCS: Performed by: NURSE PRACTITIONER

## 2021-12-14 PROCEDURE — 85027 COMPLETE CBC AUTOMATED: CPT

## 2021-12-14 PROCEDURE — 2140000000 HC CCU INTERMEDIATE R&B

## 2021-12-14 PROCEDURE — 2580000003 HC RX 258: Performed by: NURSE PRACTITIONER

## 2021-12-14 PROCEDURE — 6370000000 HC RX 637 (ALT 250 FOR IP): Performed by: PHYSICIAN ASSISTANT

## 2021-12-14 PROCEDURE — 36415 COLL VENOUS BLD VENIPUNCTURE: CPT

## 2021-12-14 RX ORDER — HYDROCHLOROTHIAZIDE 25 MG/1
50 TABLET ORAL DAILY
Status: DISCONTINUED | OUTPATIENT
Start: 2021-12-15 | End: 2021-12-20 | Stop reason: HOSPADM

## 2021-12-14 RX ORDER — SIMETHICONE 80 MG
80 TABLET,CHEWABLE ORAL EVERY 6 HOURS PRN
Status: DISCONTINUED | OUTPATIENT
Start: 2021-12-14 | End: 2021-12-20 | Stop reason: HOSPADM

## 2021-12-14 RX ORDER — DOXAZOSIN 2 MG/1
2 TABLET ORAL DAILY
Status: DISCONTINUED | OUTPATIENT
Start: 2021-12-14 | End: 2021-12-18

## 2021-12-14 RX ORDER — LOSARTAN POTASSIUM 100 MG/1
100 TABLET ORAL DAILY
Status: DISCONTINUED | OUTPATIENT
Start: 2021-12-15 | End: 2021-12-20 | Stop reason: HOSPADM

## 2021-12-14 RX ORDER — LOSARTAN POTASSIUM 50 MG/1
50 TABLET ORAL DAILY
Status: DISCONTINUED | OUTPATIENT
Start: 2021-12-14 | End: 2021-12-14

## 2021-12-14 RX ADMIN — ENOXAPARIN SODIUM 30 MG: 100 INJECTION SUBCUTANEOUS at 21:20

## 2021-12-14 RX ADMIN — Medication 2000 UNITS: at 09:05

## 2021-12-14 RX ADMIN — SIMETHICONE 80 MG: 80 TABLET, CHEWABLE ORAL at 21:20

## 2021-12-14 RX ADMIN — DOXAZOSIN 2 MG: 2 TABLET ORAL at 13:42

## 2021-12-14 RX ADMIN — HYDROCHLOROTHIAZIDE 25 MG: 25 TABLET ORAL at 09:08

## 2021-12-14 RX ADMIN — INSULIN LISPRO 6 UNITS: 100 INJECTION, SOLUTION INTRAVENOUS; SUBCUTANEOUS at 17:47

## 2021-12-14 RX ADMIN — Medication 50 MG: at 09:05

## 2021-12-14 RX ADMIN — HYDRALAZINE HYDROCHLORIDE 100 MG: 50 TABLET ORAL at 13:16

## 2021-12-14 RX ADMIN — SODIUM CHLORIDE, PRESERVATIVE FREE 10 ML: 5 INJECTION INTRAVENOUS at 09:05

## 2021-12-14 RX ADMIN — OXYCODONE HYDROCHLORIDE AND ACETAMINOPHEN 500 MG: 500 TABLET ORAL at 09:06

## 2021-12-14 RX ADMIN — BARICITINIB 4 MG: 2 TABLET, FILM COATED ORAL at 09:05

## 2021-12-14 RX ADMIN — DEXAMETHASONE 6 MG: 4 TABLET ORAL at 09:05

## 2021-12-14 RX ADMIN — TAMSULOSIN HYDROCHLORIDE 0.4 MG: 0.4 CAPSULE ORAL at 09:08

## 2021-12-14 RX ADMIN — INSULIN LISPRO 6 UNITS: 100 INJECTION, SOLUTION INTRAVENOUS; SUBCUTANEOUS at 13:20

## 2021-12-14 RX ADMIN — HYDRALAZINE HYDROCHLORIDE 100 MG: 50 TABLET ORAL at 21:20

## 2021-12-14 RX ADMIN — LOSARTAN POTASSIUM 50 MG: 50 TABLET, FILM COATED ORAL at 02:04

## 2021-12-14 RX ADMIN — ENOXAPARIN SODIUM 30 MG: 100 INJECTION SUBCUTANEOUS at 09:05

## 2021-12-14 RX ADMIN — SODIUM CHLORIDE, PRESERVATIVE FREE 10 ML: 5 INJECTION INTRAVENOUS at 21:20

## 2021-12-14 RX ADMIN — POLYETHYLENE GLYCOL 3350 17 G: 17 POWDER, FOR SOLUTION ORAL at 21:21

## 2021-12-14 RX ADMIN — AMLODIPINE BESYLATE 10 MG: 10 TABLET ORAL at 09:06

## 2021-12-14 RX ADMIN — SIMETHICONE 80 MG: 80 TABLET, CHEWABLE ORAL at 14:18

## 2021-12-14 RX ADMIN — HYDRALAZINE HYDROCHLORIDE 100 MG: 50 TABLET ORAL at 04:06

## 2021-12-14 RX ADMIN — INSULIN GLARGINE 24 UNITS: 100 INJECTION, SOLUTION SUBCUTANEOUS at 21:18

## 2021-12-14 ASSESSMENT — PAIN SCALES - GENERAL
PAINLEVEL_OUTOF10: 0
PAINLEVEL_OUTOF10: 0

## 2021-12-14 NOTE — CARE COORDINATION
DISCHARGE PLANNING UPDATE    Barriers to Discharge: WBC 12.5; monitor. /s-190/s; monitor.  Oxygen 3L, Olumiant, PO Steroids, Cozaar, Lopressor, Vitamin D, Zinc continued    Discharge Plan: lives alone (apartment) w new [facility]; monitor for home oxygen eval; therapy recommends (likely SNF) not safe home alone; SW referral

## 2021-12-14 NOTE — PROGRESS NOTES
Nurse alerted [facility] PA to increasing blood pressures of this patient. 2318 BP was 199/82 and at 2327 BP was 191/73. The PA has not yet added any additional BP medication. Will continue to monitor. 0300-Shasha CASTANO ordered Cozar this was scheduled and given at 2AM and rechecked at [facility]. The blood pressure was still found to be very high (192/82). The nurse again consulted [facility] for further orders. [facility] PA asked if patient was symptomatic. Patient is not complaining of shortness of breath or chest pain of any kind. Patient is resting comfortably. This was reported to the PA.  Will continue to monitor

## 2021-12-14 NOTE — PROGRESS NOTES
Writer attempted to call patient's brother Ismael Pinto ([phone]) to provide an update. Call went to voicemail. No message left.

## 2021-12-14 NOTE — PROGRESS NOTES
Hospitalist Progress Note      Patient:  Siena Guzman    Unit/Bed:3B-38/038-A  YOB: 1955  MRN: [id number]   Acct: [de-identified]   PCP: DIONTE Castillo CNP  Date of Admission: 12/7/2021    Assessment/Plan:    1. Acute hypoxic resp failure  COVID 19 PNA   - On high dose steroids. Add baricitinib   - Wean off O2 as tolerated   - On IV abx    12/9: On HFNC, slowly weaning down. Continue steroids and baricitinib and abx. Inflam markers trending down. CTA on 12/7, no PE.   12/10: On minimal high flow settings. Will transition to nasal cannula today. Continue care as above. 12/11: Was transitioned to NC overnight but with minimal physical activity had to be placed back on HFNC. Not ready yet. Continue care as above. CXR shows slightly worsening. Inflam markers trending down  12/12: Weaning down on HFNC. Otherwise, no issues. 12/13: On minimal settings. Plan to switch to NC today. Also needs PT/OT as he looks really deconditioned. 12/14: Switched to 3L NC. Still awaiting PT/OT. Needs Home O2 eval prior to d/c    2. DM II - uncontrolled   - A1c 9.9   - start on basal-bolus regimen   -Continue sliding scale. Monitor blood glucose closely. Will adjust as needed. 12/9: Better controlled this am. Continue as above    3. Essential Hypertension   - BP not well controlled. Add hydralazine    12/9: suboptimal control. Increase hydralazine dose  12/13: Hydralazine increased, BB stopped, add HCTZ  12/14: Still uncontrolled. Increase HCTZ, ARB, Hydralazine, will add cardura. 4. Obesity   - patient will benefit with weight loss and dietary modification    5. Gross hematuria - resolved   - urology following    6. Sinus bradycardia   -  Continue to monitor. Asymptomatic    12/13: EKG shows sinus [place] with first degree AV block. Check Echo. Likely due to COVID/decadron.  Hold home BB    Chief Complaint: SOB    Initial H and P:-      77 y.o. male who presented to [address] [place] [address] with feeling well for 4 weeks; he has a past medical history of hypertension; he states he has been short of breath along with fatigue, myalgias and a nonproductive cough for the last 4 weeks; he states the last 2 days his symptoms have increased which prompted him for an evaluation in the ER today; he has received 1 dose of the Moderna vaccination for Covid.     He complains of lightheadedness and dizziness along with a productive cough and shortness of breath; he denies chest pain, denies nausea, vomiting or diarrhea; he tells me he is not eaten anything in the last 2 to 3 weeks; he also tells me he is not taking his blood pressure medicine in the last 2 to 3 weeks either; in the ER it was noted that he had gross hematuria which patient states this is new.     In the emergency department, he had initial O2 sat of 78%, he was initially placed on 6 L bringing him up to 90% and is currently on high flow oxygen at 90% FiO2 and 40 L satting 98%; chest x-ray showed pneumonia; CT of the abdomen and chest are currently pending, he is being admitted     Subjective (past 24 hours):     Feels about the same. No NVD. NO chest pain. Past medical history, family history, social history and allergies reviewed again and is unchanged since admission. ROS (All review of systems completed. Pertinent positives noted.  Otherwise All other systems reviewed and negative.)     Medications:  Reviewed    Infusion Medications    dextrose      sodium chloride       Scheduled Medications    [START ON 12/15/2021] hydroCHLOROthiazide  50 mg Oral Daily    [START ON 12/15/2021] losartan  100 mg Oral Daily    hydrALAZINE  100 mg Oral Q8H    [Held by provider] metoprolol succinate  100 mg Oral Daily    dexamethasone  6 mg Oral Daily    insulin lispro  0-12 Units SubCUTAneous TID     insulin lispro  0-6 Units SubCUTAneous Nightly    insulin glargine  0.25 Units/kg SubCUTAneous Nightly    tamsulosin  0.4 mg Oral Daily    insulin lispro  7 Units SubCUTAneous TID     baricitinib  4 mg Oral Daily    amLODIPine  10 mg Oral Daily    sodium chloride flush  5-40 mL IntraVENous 2 times per day    enoxaparin  30 mg SubCUTAneous BID    Vitamin D  2,000 Units Oral Daily    ascorbic acid  500 mg Oral Daily    zinc sulfate  50 mg Oral Daily     PRN Meds: perflutren lipid microspheres, glucose, dextrose, glucagon (rDNA), dextrose, sodium chloride flush, sodium chloride, ondansetron **OR** ondansetron, polyethylene glycol, acetaminophen **OR** acetaminophen, guaiFENesin-dextromethorphan      Intake/Output Summary (Last 24 hours) at 12/14/2021 1240  Last data filed at 12/14/2021 0300  Gross per 24 hour   Intake 180 ml   Output 1775 ml   Net -1595 ml       Diet:  ADULT DIET; Regular; 4 carb choices (60 gm/meal); No Added Salt (3-4 gm)    Exam:  BP (!) 187/77   Pulse 78   Temp 98.3 °F (36.8 °C) (Oral)   Resp 20   Ht 5' 6\" (1.676 m)   Wt 213 lb (96.6 kg)   SpO2 90%   BMI 34.38 kg/m²   General appearance: No apparent distress, appears stated age and cooperative. HEENT: Pupils equal, round, and reactive to light. Conjunctivae/corneas clear. Neck: Supple, with full range of motion. No jugular venous distention. Trachea midline. Respiratory: diminished breath sounds b/l  Cardiovascular: S1/S2 without murmurs, rubs or gallops. Abdomen: Soft, non-tender, non-distended with normal bowel sounds. Musculoskeletal: passive and active ROM x 4 extremities. Skin: Skin color, texture, turgor normal.  No rashes or lesions. Neurologic:  Neurovascularly intact without any focal sensory/motor deficits.  Cranial nerves: II-XII intact, grossly non-focal.  Psychiatric: Alert and oriented, thought content appropriate, normal insight  Capillary Refill: Brisk,< 3 seconds   Peripheral Pulses: +2 palpable, equal bilaterally     Labs:   Recent Labs     12/12/21  0351 12/13/21  0341 12/14/21  0344   WBC 9.4 9.5 12.5*   HGB 14.3 14.0 15.6   HCT 44.4 43.8 47.0    245 270     Recent Labs     12/12/21  0351 12/13/21  0341 12/14/21  0344    134* 135   K 4.8 4.7 4.5    104 101   CO2 21* 21* 18*   BUN 36* 35* 29*   CREATININE 1.1 1.0 1.0   CALCIUM 8.5 8.6 8.9     Recent Labs     12/12/21  0351   AST 28   ALT 47   BILITOT 0.4   ALKPHOS 81     No results for input(s): INR in the last 72 hours. No results for input(s): Samantha Horn in the last 72 hours. Microbiology:    Blood culture #1: No results found for: [facility]    Blood culture #2:No results found for: Chris Otero    Organism:No results found for: [facility]      Lab Results   Component Value Date    LABGRAM  07/21/2014     Few segmented neutrophils observed. Rare epithelial cells observed. Rare gram positive cocci occurring singly and in pairs. MRSA culture only:No results found for: [facility]    Urine culture: No results found for: LABURIN    Respiratory culture: No results found for: CULTRESP    Aerobic and Anaerobic :  Lab Results   Component Value Date    LABAERO No growth-preliminary  No growth   07/21/2014     No results found for: VERONICA    Urinalysis:      Lab Results   Component Value Date    NITRU NEGATIVE 12/08/2021    WBCUA 0-2 12/08/2021    BACTERIA NONE SEEN 12/08/2021    RBCUA 5-10 12/08/2021    BLOODU SMALL 12/08/2021    GLUCOSEU >= 1000 12/08/2021       Radiology:  XR CHEST PORTABLE   Final Result   Redemonstration of diffuse patchy opacities which is more confluent in the right upper lobe and mildly worsened in the interval.               **This report has been created using voice recognition software. It may contain minor errors which are inherent in voice recognition technology. **      Final report electronically signed by Dr. Ron Ojeda MD on 12/11/2021 11:21 AM      CTA CHEST [address]   Final Result       1. No pulmonary embolism. No aneurysm of the thoracic or abdominal aorta. No dissection.    2. Extensive bilateral infiltrates. **This report has been created using voice recognition software. It may contain minor errors which are inherent in voice recognition technology. **      Final report electronically signed by Dr. Charisse Issa on 12/7/2021 1:41 PM      CTA ABDOMEN PELVIS W WO CONTRAST   Final Result       1. No pulmonary embolism. No aneurysm of the thoracic or abdominal aorta. No dissection. 2. Extensive bilateral infiltrates. **This report has been created using voice recognition software. It may contain minor errors which are inherent in voice recognition technology. **      Final report electronically signed by Dr. Charisse Issa on 12/7/2021 1:41 PM      XR CHEST PORTABLE   Final Result   Multifocal airspace opacities suggest pneumonia in the appropriate clinical setting. The patient is pending CT assessment. **This report has been created using voice recognition software. It may contain minor errors which are inherent in voice recognition technology. **      Final report electronically signed by Dr Rika Pack on 12/7/2021 10:58 AM        CTA CHEST W WO CONTRAST    Result Date: 12/7/2021  PROCEDURE: CTA CHEST W WO CONTRAST, CTA ABDOMEN PELVIS W WO CONTRAST CLINICAL INFORMATION: pulmonary embolism. COMPARISON: No prior study. TECHNIQUE: 2-D multiplanar pre and postcontrast images of the chest abdomen and pelvis with 3-D MIPS and 3-D rendering of the aorta. Isovue was the intravenous contrast utilized. All CT scans at this facility use dose modulation, iterative reconstruction, and/or weight-based dosing when appropriate to reduce radiation dose to as low as reasonably achievable. FINDINGS:  Chest There is no pulmonary embolism. The aorta is within acceptable limits. The heart size is normal. There is no pericardial or pleural effusion. There is a right hilar enlarged lymph node measuring 11 mm short axis. There is a subcarinal mass/adenopathy 16 mm short axis.  Extensive bilateral upper lobe, perihilar and lower lobe groundglass infiltrates. There is no effusion. No suspicious osseous lesions are present. The thyroid gland is enlarged and heterogeneous in attenuation suggesting underlying nodules. Abdomen and pelvis: There is no aneurysm or dissection. There is no renal calculi. There is no hydronephrosis. Kidneys enhance symmetrically. No gallstones are present. Liver and spleen are unremarkable. Pancreas is unremarkable. Adrenals are normal. No central mesenteric, retroperitoneal, pelvic or inguinal lymphadenopathy. Urinary bladder is distended. No abnormal fluid collections. No bowel obstruction. No suspicious bone lesions. It changes throughout the lumbar spine degenerative change both hips right greater than left. 1. No pulmonary embolism. No aneurysm of the thoracic or abdominal aorta. No dissection. 2. Extensive bilateral infiltrates. **This report has been created using voice recognition software. It may contain minor errors which are inherent in voice recognition technology. ** Final report electronically signed by Dr. Maverick Barbosa on 12/7/2021 1:41 PM    XR CHEST PORTABLE    Result Date: 12/7/2021  PROCEDURE: XR CHEST PORTABLE CLINICAL INFORMATION: Shortness of breath. COMPARISON: None. TECHNIQUE: AP portable chest radiograph performed. FINDINGS: Lines/tubes: None. Heart/mediastinum: The heart size is normal. The pulmonary vascularity is unremarkable. Lungs: Bilateral multifocal upper and lower lobe airspace opacities and interstitial opacities are observed. No pleural effusion or pneumothorax is identified. Low lung volumes are present. Bones: Diffuse osteopenia is observed. The visualized skeletal structures appear intact. Degenerative disc disease is noted in the thoracic spine. Multifocal airspace opacities suggest pneumonia in the appropriate clinical setting. The patient is pending CT assessment.  **This report has been created using voice recognition software. It may contain minor errors which are inherent in voice recognition technology. ** Final report electronically signed by Dr Dacia Araujo on 12/7/2021 10:58 AM    CTA ABDOMEN PELVIS W WO CONTRAST    Result Date: 12/7/2021  PROCEDURE: CTA CHEST W WO CONTRAST, CTA ABDOMEN PELVIS W WO CONTRAST CLINICAL INFORMATION: pulmonary embolism. COMPARISON: No prior study. TECHNIQUE: 2-D multiplanar pre and postcontrast images of the chest abdomen and pelvis with 3-D MIPS and 3-D rendering of the aorta. Isovue was the intravenous contrast utilized. All CT scans at this facility use dose modulation, iterative reconstruction, and/or weight-based dosing when appropriate to reduce radiation dose to as low as reasonably achievable. FINDINGS:  Chest There is no pulmonary embolism. The aorta is within acceptable limits. The heart size is normal. There is no pericardial or pleural effusion. There is a right hilar enlarged lymph node measuring 11 mm short axis. There is a subcarinal mass/adenopathy 16 mm short axis. Extensive bilateral upper lobe, perihilar and lower lobe groundglass infiltrates. There is no effusion. No suspicious osseous lesions are present. The thyroid gland is enlarged and heterogeneous in attenuation suggesting underlying nodules. Abdomen and pelvis: There is no aneurysm or dissection. There is no renal calculi. There is no hydronephrosis. Kidneys enhance symmetrically. No gallstones are present. Liver and spleen are unremarkable. Pancreas is unremarkable. Adrenals are normal. No central mesenteric, retroperitoneal, pelvic or inguinal lymphadenopathy. Urinary bladder is distended. No abnormal fluid collections. No bowel obstruction. No suspicious bone lesions. It changes throughout the lumbar spine degenerative change both hips right greater than left. 1. No pulmonary embolism. No aneurysm of the thoracic or abdominal aorta. No dissection. 2. Extensive bilateral infiltrates.  **This report has been created using voice recognition software. It may contain minor errors which are inherent in voice recognition technology. ** Final report electronically signed by Dr. Maverick Barbosa on 12/7/2021 1:41 PM      Electronically signed by Kenny Dyer MD on 12/14/2021 at 12:40 PM

## 2021-12-14 NOTE — PROGRESS NOTES
[facility] 60  INPATIENT OCCUPATIONAL THERAPY  [facility] CCU-STEPDOWN 3B  EVALUATION    Time:   Time In: 6918  Time Out: 1218  Timed Code Treatment Minutes: 23 Minutes  Minutes: 34          Date: 2021  Patient Name: Mandeep Melgoza,   Gender: male      MRN: [id number]  : 1955  (77 y.o.)  Referring Practitioner: Shantell Aguilar MD  Diagnosis: acute respiratory failure with hypoxia  Additional Pertinent Hx: In the emergency department, he had initial O2 sat of 78%, requiring HFNC. CXR showed pneumonia. Restrictions/Precautions:  Restrictions/Precautions: General Precautions, Fall Risk, Isolation  Position Activity Restriction  Other position/activity restrictions: droplet +    Subjective  Chart Reviewed: Yes, Orders, Progress Notes, History and Physical  Patient assessed for rehabilitation services?: Yes  Family / Caregiver Present: No    Subjective: Pt seated in bed upon arrival, agreeable to OT session. RT just arriving to attempt completing home O2 eval, both RN and RT ok with OT assisting. Pt reports \"feeling good today\" denies feeling SOB although is visibly SOB both at rest and with activity. Pain:  Pain Assessment  Patient Currently in Pain: Denies    Vitals: Blood Pressure: systolic reading in 507P in bed/at rest with tech present to take vitals   Oxygen: 88-91% on 3L on arrival; Titrated to 4L with pt able to maintain at 91-92% at rest; with transition to EOB, Sp02 decreasing to 82% at lowest, with O2 titrated to 5 then 6L; Max cues for pursed lip breathing and pt only able to increase Spo2 to 86% at highest when seating at EOB for ~5 minutes, therefore returned to supine.  After resting for ~10 minutes, Sp02 still remained at 88-90% on 6L  Heart Rate: 68 bpm on arrival, remained stable    Social/Functional History:  Lives With: Alone  Type of Home: Apartment  Home Layout: One level  Home Access: Level entry  Home Equipment:  (none)   Bathroom Shower/Tub: Tub/Shower unit  Bathroom Toilet: Standard  Bathroom Equipment:  (none)       ADL Assistance: Independent  Homemaking Assistance: Independent  Ambulation Assistance: Independent  Transfer Assistance: Independent    Active : Yes  Occupation: Part time employment  Type of occupation: [employer] in [place]  Additional Comments: No home O2 at baseline    VISION:WFL    HEARING:  WFL    COGNITION: Slow Processing, Decreased Recall, Decreased Insight, Impaired Memory, Decreased Problem Solving and Decreased Safety Awareness  Poor recall/memory, repeating questions multiple times during session without awareness. Poor insight into impairments. Pt states throughout \"I don't feel SOB\" although is visibly SOB with Sp02 <90%. RANGE OF MOTION:  Bilateral Upper Extremity:  WFL    STRENGTH:  Bilateral Upper Extremity:  Impaired - grossly deconditioned    ADL:   Lower Extremity Dressing: Dependent. socks. BALANCE:  Sitting Balance:  Stand By Assistance, [employer]. at EOB; occasional posterior lean; max cues for pursed lip breathing; tolerated sitting at EOB X5 minutes with SP02 not increasing to 90%, therefore returned to supine and session terminated    BED MOBILITY:  Supine to Sit: Minimal Assistance, with head of bed raised, with rail, with verbal cues , with increased time for completion ; assist to elevate trunk; pt required rest break after each aspect of task (with each movement of leg or transition with sitting upright)  Sit to Supine: Minimal Assistance to bring BLes up into bed  Scooting: Contact Guard Assistance ; advancing hips forward to EOB    TRANSFERS:  OTR to further assess as able/appropriate    FUNCTIONAL MOBILITY:  OTR to further assess as able/appropriate    Activity Tolerance:  Patient tolerance of  treatment: poor. Significantly limited by decline in Sp02, unable to tolerate activity/maintain saturations.  Significantly increased time required for monitoring Spo2, cues for pursed lip breathing. RN/RT informed of all. Assessment:  Assessment: Pt presents requiring increased assistance for ADLs, transfers, and functional mobility compared to PLOF. Pt will continue to benefit from OT services to improve independence with these tasks, in addition to overall strength/endurance to facilitate return to PLOF. Performance deficits / Impairments: Decreased functional mobility , Decreased ADL status, Decreased strength, Decreased cognition, Decreased endurance, Decreased balance, Decreased high-level IADLs  Prognosis: Fair  REQUIRES OT FOLLOW UP: Yes  Decision Making: High Complexity    Treatment Initiated: Treatment and education initiated within context of evaluation. Evaluation time included review of current medical information, gathering information related to past medical, social and functional history, completion of standardized testing, formal and informal observation of tasks, assessment of data and development of plan of care and goals. Treatment time included skilled education and facilitation of tasks to increase safety and independence with ADL's for improved functional independence and quality of life. Discharge Recommendations:  Patient would benefit from continued therapy after discharge, Continue to assess pending progress (not safe to return home alone)    Patient Education:  OT Education: OT Role, Plan of Care, Energy Conservation (pursed lip breathing)    Equipment Recommendations: Other: will continue to monitor pending progress    Plan:  Times per week: 5x  Current Treatment Recommendations: Strengthening, Balance Training, Functional Mobility Training, Endurance Training, Patient/Caregiver Education & Training, Equipment Evaluation, Education, & procurement, Safety Education & Training, Self-Care / ADL. See long-term goal time frame for expected duration of plan of care. If no long-term goals established, a short length of stay is anticipated.     Goals:     Short term goals  Time Frame for Short term goals: by discharge  Short term goal 1: Pt will tolerate sitting at EOB X10 minutes with supervision and Sp02 >=90% in prep for ADL completion. Short term goal 2: Pt will complete BADL tasks with minimal assistance, incorporating ECT with minimal cues, to increase independence and ease with self care tasks. Short term goal 3: Pt will tolerate further assessment of transfers/functional mobility by OTR when Spo2 90% at EOB         Following session, patient left in safe position with all fall risk precautions in place.

## 2021-12-14 NOTE — CARE COORDINATION
12/14/21, 3:25 PM EST    DISCHARGE PLANNING EVALUATION    Spoke with patient and he is in agreement with a short term rehab stay at [facility]. Referral made to Kevin Arthur and they will review.

## 2021-12-15 LAB
ANION GAP SERPL CALCULATED.3IONS-SCNC: 16 MEQ/L (ref 8–16)
BUN BLDV-MCNC: 35 MG/DL (ref 7–22)
CALCIUM SERPL-MCNC: 9.1 MG/DL (ref 8.5–10.5)
CHLORIDE BLD-SCNC: 98 MEQ/L (ref 98–111)
CO2: 19 MEQ/L (ref 23–33)
CREAT SERPL-MCNC: 1.1 MG/DL (ref 0.4–1.2)
ERYTHROCYTE [DISTWIDTH] IN BLOOD BY AUTOMATED COUNT: 12.8 % (ref 11.5–14.5)
ERYTHROCYTE [DISTWIDTH] IN BLOOD BY AUTOMATED COUNT: 41.8 FL (ref 35–45)
GFR SERPL CREATININE-BSD FRML MDRD: 67 ML/MIN/1.73M2
GLUCOSE BLD-MCNC: 172 MG/DL (ref 70–108)
GLUCOSE BLD-MCNC: 197 MG/DL (ref 70–108)
GLUCOSE BLD-MCNC: 207 MG/DL (ref 70–108)
GLUCOSE BLD-MCNC: 223 MG/DL (ref 70–108)
GLUCOSE BLD-MCNC: 236 MG/DL (ref 70–108)
HCT VFR BLD CALC: 48.1 % (ref 42–52)
HEMOGLOBIN: 15.5 GM/DL (ref 14–18)
MCH RBC QN AUTO: 28.9 PG (ref 26–33)
MCHC RBC AUTO-ENTMCNC: 32.2 GM/DL (ref 32.2–35.5)
MCV RBC AUTO: 89.6 FL (ref 80–94)
PLATELET # BLD: 246 THOU/MM3 (ref 130–400)
PMV BLD AUTO: 11.3 FL (ref 9.4–12.4)
POTASSIUM SERPL-SCNC: 4.7 MEQ/L (ref 3.5–5.2)
RBC # BLD: 5.37 MILL/MM3 (ref 4.7–6.1)
SODIUM BLD-SCNC: 133 MEQ/L (ref 135–145)
WBC # BLD: 17.5 THOU/MM3 (ref 4.8–10.8)

## 2021-12-15 PROCEDURE — 36415 COLL VENOUS BLD VENIPUNCTURE: CPT

## 2021-12-15 PROCEDURE — 6370000000 HC RX 637 (ALT 250 FOR IP): Performed by: INTERNAL MEDICINE

## 2021-12-15 PROCEDURE — 6370000000 HC RX 637 (ALT 250 FOR IP): Performed by: UROLOGY

## 2021-12-15 PROCEDURE — 6370000000 HC RX 637 (ALT 250 FOR IP): Performed by: NURSE PRACTITIONER

## 2021-12-15 PROCEDURE — 6360000002 HC RX W HCPCS: Performed by: INTERNAL MEDICINE

## 2021-12-15 PROCEDURE — 97530 THERAPEUTIC ACTIVITIES: CPT

## 2021-12-15 PROCEDURE — 6370000000 HC RX 637 (ALT 250 FOR IP): Performed by: STUDENT IN AN ORGANIZED HEALTH CARE EDUCATION/TRAINING PROGRAM

## 2021-12-15 PROCEDURE — 97162 PT EVAL MOD COMPLEX 30 MIN: CPT

## 2021-12-15 PROCEDURE — 97535 SELF CARE MNGMENT TRAINING: CPT

## 2021-12-15 PROCEDURE — 85027 COMPLETE CBC AUTOMATED: CPT

## 2021-12-15 PROCEDURE — 99233 SBSQ HOSP IP/OBS HIGH 50: CPT | Performed by: INTERNAL MEDICINE

## 2021-12-15 PROCEDURE — 6360000002 HC RX W HCPCS: Performed by: NURSE PRACTITIONER

## 2021-12-15 PROCEDURE — 82948 REAGENT STRIP/BLOOD GLUCOSE: CPT

## 2021-12-15 PROCEDURE — 80048 BASIC METABOLIC PNL TOTAL CA: CPT

## 2021-12-15 PROCEDURE — 2580000003 HC RX 258: Performed by: NURSE PRACTITIONER

## 2021-12-15 PROCEDURE — 2140000000 HC CCU INTERMEDIATE R&B

## 2021-12-15 RX ORDER — SIMETHICONE 80 MG
80 TABLET,CHEWABLE ORAL EVERY 6 HOURS PRN
Qty: 30 TABLET | Refills: 0 | DISCHARGE
Start: 2021-12-15 | End: 2022-04-04

## 2021-12-15 RX ORDER — DEXAMETHASONE 6 MG/1
6 TABLET ORAL DAILY
Qty: 4 TABLET | Refills: 0 | DISCHARGE
Start: 2021-12-16 | End: 2021-12-20

## 2021-12-15 RX ORDER — INSULIN GLARGINE 100 [IU]/ML
30 INJECTION, SOLUTION SUBCUTANEOUS NIGHTLY
Status: DISCONTINUED | OUTPATIENT
Start: 2021-12-15 | End: 2021-12-19

## 2021-12-15 RX ORDER — CHOLECALCIFEROL (VITAMIN D3) 50 MCG
2000 TABLET ORAL DAILY
Qty: 30 TABLET | Refills: 0 | DISCHARGE
Start: 2021-12-16 | End: 2022-04-04

## 2021-12-15 RX ORDER — HYDRALAZINE HYDROCHLORIDE 100 MG/1
100 TABLET, FILM COATED ORAL EVERY 8 HOURS
Qty: 90 TABLET | Refills: 0 | DISCHARGE
Start: 2021-12-15 | End: 2022-04-04

## 2021-12-15 RX ORDER — ZINC SULFATE 50(220)MG
50 CAPSULE ORAL DAILY
Qty: 30 CAPSULE | Refills: 0 | DISCHARGE
Start: 2021-12-16 | End: 2022-04-04

## 2021-12-15 RX ORDER — GUAIFENESIN/DEXTROMETHORPHAN 100-10MG/5
5 SYRUP ORAL EVERY 4 HOURS PRN
Qty: 120 ML | DISCHARGE
Start: 2021-12-15 | End: 2021-12-25

## 2021-12-15 RX ORDER — HYDROCHLOROTHIAZIDE 50 MG/1
50 TABLET ORAL DAILY
Qty: 30 TABLET | Refills: 3 | Status: ON HOLD | DISCHARGE
Start: 2021-12-16 | End: 2022-01-27 | Stop reason: HOSPADM

## 2021-12-15 RX ORDER — METOPROLOL SUCCINATE 100 MG/1
50 TABLET, EXTENDED RELEASE ORAL DAILY
Qty: 30 TABLET | Refills: 0 | DISCHARGE
Start: 2021-12-15 | End: 2021-12-20 | Stop reason: HOSPADM

## 2021-12-15 RX ORDER — LOSARTAN POTASSIUM 100 MG/1
100 TABLET ORAL DAILY
Qty: 30 TABLET | Refills: 0 | DISCHARGE
Start: 2021-12-16 | End: 2022-04-04 | Stop reason: SDUPTHER

## 2021-12-15 RX ORDER — DOXAZOSIN 2 MG/1
2 TABLET ORAL DAILY
Qty: 30 TABLET | Refills: 0 | DISCHARGE
Start: 2021-12-16 | End: 2021-12-20 | Stop reason: HOSPADM

## 2021-12-15 RX ORDER — ASCORBIC ACID 500 MG
500 TABLET ORAL DAILY
Qty: 30 TABLET | Refills: 0 | DISCHARGE
Start: 2021-12-16 | End: 2022-04-04

## 2021-12-15 RX ORDER — INSULIN GLARGINE 100 [IU]/ML
24 INJECTION, SOLUTION SUBCUTANEOUS NIGHTLY
Qty: 10 ML | Refills: 0 | DISCHARGE
Start: 2021-12-15 | End: 2021-12-20 | Stop reason: HOSPADM

## 2021-12-15 RX ADMIN — DEXAMETHASONE 6 MG: 4 TABLET ORAL at 11:15

## 2021-12-15 RX ADMIN — HYDRALAZINE HYDROCHLORIDE 100 MG: 50 TABLET ORAL at 17:28

## 2021-12-15 RX ADMIN — INSULIN LISPRO 4 UNITS: 100 INJECTION, SOLUTION INTRAVENOUS; SUBCUTANEOUS at 17:21

## 2021-12-15 RX ADMIN — HYDRALAZINE HYDROCHLORIDE 100 MG: 50 TABLET ORAL at 05:11

## 2021-12-15 RX ADMIN — OXYCODONE HYDROCHLORIDE AND ACETAMINOPHEN 500 MG: 500 TABLET ORAL at 11:15

## 2021-12-15 RX ADMIN — SODIUM CHLORIDE, PRESERVATIVE FREE 10 ML: 5 INJECTION INTRAVENOUS at 11:16

## 2021-12-15 RX ADMIN — INSULIN GLARGINE 30 UNITS: 100 INJECTION, SOLUTION SUBCUTANEOUS at 21:31

## 2021-12-15 RX ADMIN — Medication 50 MG: at 11:15

## 2021-12-15 RX ADMIN — TAMSULOSIN HYDROCHLORIDE 0.4 MG: 0.4 CAPSULE ORAL at 11:15

## 2021-12-15 RX ADMIN — Medication 2000 UNITS: at 11:16

## 2021-12-15 RX ADMIN — BARICITINIB 4 MG: 2 TABLET, FILM COATED ORAL at 11:14

## 2021-12-15 RX ADMIN — AMLODIPINE BESYLATE 10 MG: 10 TABLET ORAL at 11:16

## 2021-12-15 RX ADMIN — DOXAZOSIN 2 MG: 2 TABLET ORAL at 11:51

## 2021-12-15 RX ADMIN — ENOXAPARIN SODIUM 30 MG: 100 INJECTION SUBCUTANEOUS at 11:14

## 2021-12-15 RX ADMIN — LOSARTAN POTASSIUM 100 MG: 100 TABLET, FILM COATED ORAL at 11:15

## 2021-12-15 RX ADMIN — HYDROCHLOROTHIAZIDE 50 MG: 25 TABLET ORAL at 11:15

## 2021-12-15 RX ADMIN — SODIUM CHLORIDE, PRESERVATIVE FREE 10 ML: 5 INJECTION INTRAVENOUS at 21:32

## 2021-12-15 RX ADMIN — INSULIN LISPRO 4 UNITS: 100 INJECTION, SOLUTION INTRAVENOUS; SUBCUTANEOUS at 11:20

## 2021-12-15 RX ADMIN — HYDRALAZINE HYDROCHLORIDE 100 MG: 50 TABLET ORAL at 21:30

## 2021-12-15 RX ADMIN — SIMETHICONE 80 MG: 80 TABLET, CHEWABLE ORAL at 17:24

## 2021-12-15 RX ADMIN — ENOXAPARIN SODIUM 30 MG: 100 INJECTION SUBCUTANEOUS at 21:30

## 2021-12-15 ASSESSMENT — PAIN SCALES - GENERAL
PAINLEVEL_OUTOF10: 0

## 2021-12-15 NOTE — CARE COORDINATION
12/15/21, 2:35 PM EST    DISCHARGE PLANNING EVALUATION    Patient accepted to [facility] when medically stable spoke with Abad Wolff. Patient also aware.

## 2021-12-15 NOTE — PROGRESS NOTES
[address] [place] [address]  Occupational Therapy  Daily Note  Time:   Time In: 0848  Time Out: 0911  Timed Code Treatment Minutes: 23 Minutes  Minutes: 23          Date: 12/15/2021  Patient Name: Eduard Blake,   Gender: male      Room: [facility]38/038-A  MRN: [id number]  : 1955  (77 y.o.)  Referring Practitioner: Ira Barragan MD  Diagnosis: acute respiratory failure with hypoxia  Additional Pertinent Hx: In the emergency department, he had initial O2 sat of 78%, requiring HFNC. CXR showed pneumonia. Restrictions/Precautions:  Restrictions/Precautions: General Precautions, Fall Risk, Isolation  Position Activity Restriction  Other position/activity restrictions: droplet +     SUBJECTIVE: RN approved session. Patient supine in bed upon OT arrival and slowly working on breakfast. Patient agreeable to OT and to sit in recliner to eat breakfast.     PAIN:  Reports \"gas pain\"; did not rate. Vitals: Oxygen: on 4 L O2 NC with resting at 91%, upon sitting EOB; O2 dropped to 80% with increased time to increase and O2 was increased to 5 L and still taking time to increase to 90%. O2 turned back down to 4 L once seated in recliner and had time for rest break with O2 at 91%. COGNITION: Slow Processing and Decreased Insight    ADL:   Feeding: with set-up. for tray and opening some containers  Lower Extremity Dressing: Stand By Assistance. with increased time to reach foot by crossing leg to doff/don slipper socks seated in recliner  Toileting: Maximum Assistance. for BM toilet hygiene. noted patient had soiled his chux pad in his bed and stood by recliner for this writer to complete BM toilet hygiene with MAX A. . BALANCE:  Sitting Balance:  Stand By Assistance. no unsteadiness while seated EOB to prep for stand pivot to recliner   Standing Balance: Contact Guard Assistance.  wtih no AD; reaching out to items in room to steady self    BED MOBILITY:  Supine to Sit: Minimal Assistance cues for hand placement and crossing midline with R hand to  bed rails to assist with sitting upright. TRANSFERS:  Sit to Stand:  Minimal Assistance. from EOB; cues for hand placement   Stand Pivot: Contact Guard Assistance, Minimal Assistance. with increased time and assist to manage O2 tubing and catheter. patient reaching out to recliner arm rest. patient would benefit from 2 w/w to complete     FUNCTIONAL MOBILITY:  Assistive Device: not tested  Assist Level:  Not tested. Distance: OTR to assess when appropriate and stable           ASSESSMENT:  Assessment: patient is motivated to participate in OT and requires increased time to decrease SOB b/t change in position and cues for pursed lip breathing t/o. patient demonstrates unsteadiness with functional tranfers and would benefit from continued, skilled OT to increase activity tolerance, ease and (I) with ADLs and functional transfers to safely transition to prior living environment, decrease caregiver burden and prevent re-hospitalization. Activity Tolerance:  Patient tolerance of  treatment: fair. O2 dependent, de-conditioned, unsteadiness with functional transfers        Discharge Recommendations: Continue to assess pending progress and Patient would benefit from continued OT at discharge  Equipment Recommendations:  Other: will continue to monitor pending progress  Plan: Times per week: 5x  Current Treatment Recommendations: Strengthening, Balance Training, Functional Mobility Training, Endurance Training, Patient/Caregiver Education & Training, Equipment Evaluation, Education, & procurement, Safety Education & Training, Self-Care / ADL    Patient Education  Patient Education: Role of OT, Plan of Care, ADL's, Precautions, Importance of Increasing Activity and pursed lip breathing     Goals  Short term goals  Time Frame for Short term goals: by discharge  Short term goal 1: Pt will tolerate sitting at EOB X10 minutes with supervision and Sp02 >=90% in prep for ADL completion. Short term goal 2: Pt will complete BADL tasks with minimal assistance, incorporating ECT with minimal cues, to increase independence and ease with self care tasks. Short term goal 3: Pt will tolerate further assessment of transfers/functional mobility by OTR when Spo2 90% at EOB, MET-REVISED. Revised Short-Term Goals  Short term goals  Time Frame for Short term goals: by discharge  Short term goal 1: Pt will tolerate sitting at EOB X10 minutes with supervision and Sp02 >=90% in prep for ADL completion. Short term goal 2: Pt will complete BADL tasks with minimal assistance, incorporating ECT with minimal cues, to increase independence and ease with self care tasks. Short term goal 3: Patient will complete stand pivot transfers with least restrictive device with (S) and 0-1 verbal cues for safety and sequencing. Short term goal 4: patient will tolerate 4 min functional standing with two hand release with MOD I with O2 >/= 90% to increase activity tolerance for toileting. Short term goal 5: OTR to assess functional mobility and create goal as appropriate. Following session, patient left in safe position with all fall risk precautions in place.

## 2021-12-15 NOTE — PROGRESS NOTES
[facility]  INPATIENT PHYSICAL THERAPY  EVALUATION  [facility] CCU-STEPDOWN 3B - 3B-38/038-A    Time In: 5858  Time Out: 0387  Timed Code Treatment Minutes: 25 Minutes  Minutes: 38        Date: 12/15/2021  Patient Name: Nataliya Mary,  Gender:  male        MRN: [id number]  : 1955  (77 y.o.)      Referring Practitioner: Jorge Ahmadi MD  Diagnosis: Acute respiratory failure with hypoxia  Additional Pertinent Hx: 77 y.o. male who presented to [facility] with feeling well for 4 weeks; he has a past medical history of hypertension; he states he has been short of breath along with fatigue, myalgias and a nonproductive cough for the last 4 weeks; he states the last 2 days his symptoms have increased which prompted him for an evaluation in the ER today; he has received 1 dose of the Ladene Zenaida vaccination for Covid. He complains of lightheadedness and dizziness along with a productive cough and shortness of breath; he denies chest pain, denies nausea, vomiting or diarrhea; he tells me he is not eaten anything in the last 2 to 3 weeks; he also tells me he is not taking his blood pressure medicine in the last 2 to 3 weeks either; in the ER it was noted that he had gross hematuria which patient states this is new. Restrictions/Precautions:  Restrictions/Precautions: General Precautions, Fall Risk, Isolation  Position Activity Restriction  Other position/activity restrictions: droplet +    Subjective:  Chart Reviewed: Yes  Patient assessed for rehabilitation services?: Yes  Family / Caregiver Present: No  Subjective: RN approved session, pt is seated in recliner, agreeable to PT.     General:  Overall Orientation Status: Within Functional Limits  Follows Commands: Within Functional Limits    Vision: Within Functional Limits    Hearing: Within functional limits       Pain: denies    Vitals: Oxygen: 4 L, 90-93% at rest; 78% during ambulation, denies dizziness, lightheadedness or SOB, recovers to 90% in just under 2 minutes then decreased to low to mid 80's for >10 minutes, titrated to 5 L and pt able to maintain sats >90% (RN notified)    Social/Functional History:    Lives With: Alone  Type of Home: Apartment  Home Layout: One level  Home Access: Level entry  Home Equipment:  (none)     Bathroom Shower/Tub: Tub/Shower unit  Bathroom Toilet: Standard  Bathroom Equipment:  (none)       ADL Assistance: Independent  Homemaking Assistance: Independent  Ambulation Assistance: Independent  Transfer Assistance: Independent    Active : Yes  Occupation: Part time employment  Type of occupation: [employer] in [facility] [employer]  Additional Comments: No home O2 at baseline    OBJECTIVE:    Balance:  Static Standing Balance: [employer]  Dynamic Standing Balance: Minimal Assistance    Bed Mobility:  Not Tested    Transfers:  Sit to Stand: [address] 6 to [address]    Ambulation:  Minimal Assistance  Distance: 14 feet  Surface: Level Tile  Device:Rolling Walker  Gait Deviations: Forward Flexed Posture, Slow Mariama, Decreased Step Length Bilaterally, Decreased Gait Speed and Unsteady Gait  **Cues for posture, direction and walker safety, pt denies dizziness or SOB when up    Exercise:  Patient was guided in 15-20 reps of IS, multiple cues for proper technique and to slow down, cues for posture. Functional Outcome Measures: Completed  AM-PAC Inpatient Mobility Raw Score : 16  AM-PAC Inpatient T-Scale Score : 40.78    ASSESSMENT:  Activity Tolerance:  Patient tolerance of  treatment: fair. Treatment Initiated: Treatment and education initiated within context of evaluation. Evaluation time included review of current medical information, gathering information related to past medical, social and functional history, completion of standardized testing, formal and informal observation of tasks, assessment of data and development of plan of care and goals. Treatment time included skilled education and facilitation of tasks to increase safety and independence with functional mobility for improved independence and quality of life. Assessment: Body structures, Functions, Activity limitations: Decreased functional mobility , Decreased balance, Decreased strength, Decreased endurance  Assessment: Pt tolerates session fair, limited by impaired endurance, weakness. PT to continue to progress strength and functional mobility. Prognosis: Good    REQUIRES PT FOLLOW UP: Yes    Discharge Recommendations:  Discharge Recommendations: [address]    Patient Education:  PT Education: PT Role, Plan of Care, Energy Conservation    Equipment Recommendations:  Equipment Needed: No    Plan:  Times per week: 5x C  Current Treatment Recommendations: Strengthening, Safety Education & Training, Balance Training, Endurance Training, Functional Mobility Training, Transfer Training, Gait Training, Patient/Caregiver Education & Training    Goals:  Patient goals : to get stronger  Short term goals  Time Frame for Short term goals: by discharge  Short term goal 1: Pt to transfer supine <--> sit SBA to enable pt to get in/out of bed. Short term goal 2: Pt to transfer sit <--> stand SBA for increased functional mobility. Short term goal 3: Pt to ambulate >50 feet with RW SBA for household ambulation. Long term goals  Time Frame for Long term goals : NA due to short length of stay. Following session, patient left in safe position with all fall risk precautions in place.

## 2021-12-15 NOTE — DISCHARGE INSTR - COC
Continuity of Care Form    Patient Name: Sidra Velazquez   :  1955  MRN:  [id number]    Admit date:  2021  Discharge date:  21    Code Status Order: Full Code   Advance Directives:      Admitting Physician:  No admitting provider for patient encounter. PCP: DIONTE Garcia CNP    Discharging Nurse: [facility] Unit/Room#: 3B-38/038-A  Discharging Unit Phone Number: [phone]    Emergency Contact:   Extended Emergency Contact Information  Primary Emergency Contact: Nic Salinas Phone: [phone]  Relation: Brother/Sister   needed? No    Past Surgical History:  Past Surgical History:   Procedure Laterality Date    ENDOSCOPY, COLON, DIAGNOSTIC      swallowing difficulties    OTHER SURGICAL HISTORY  Aug 29, 2013    Neck Abcess Incision and Drainage (Dr. Estephanie Mendez, [facility])       Immunization History:   Immunization History   Administered Date(s) Administered    Tetanus 2008       Active Problems:  Patient Active Problem List   Diagnosis Code    Cellulitis and abscess of neck L03.221, L02.11    Hypertension I10    Leukocytosis D72.829    Obesity (BMI 30-39. 9) E66.9    Hyperlipidemia E78.5    COVID-19 virus detected U07.1       Isolation/Infection:   Isolation            Droplet Plus          Patient Infection Status       Infection Onset Added Last Indicated Last Indicated By Review Planned Expiration Resolved Resolved By    COVID-19 21 SARS-CoV-2 NAAT (Rapid) 21      S/S x 4 weeks, worsening , + - 78%    Resolved    COVID-19 (Rule Out) 21 SARS-CoV-2 NAAT (Rapid) (Ordered)   21 Rule-Out Test Resulted            Nurse Assessment:  Last Vital Signs: BP (!) 181/75   Pulse 84   Temp 98.2 °F (36.8 °C) (Oral)   Resp 20   Ht 5' 6\" (1.676 m)   Wt 209 lb 6.4 oz (95 kg)   SpO2 91%   BMI 33.80 kg/m²     Last documented pain score (0-10 scale): Pain Level: 0  Last Weight: Wt Readings from Last 1 Encounters:   12/15/21 209 lb 6.4 oz (95 kg)     Mental Status:  oriented    IV Access:  - None    Nursing Mobility/ADLs:  Walking   Assisted  Transfer  Assisted  Bathing  Assisted  Dressing  Assisted  Toileting  Assisted  Feeding  Independent  Med Admin  Assisted  Med Delivery   crushed    Wound Care Documentation and Therapy:  Incision 08/29/13 Neck Right (Active)   Number of days: 9714        Elimination:  Continence: Bowel: Yes  Bladder: Yes  Urinary Catheter: randolph-retention  Colostomy/Ileostomy/Ileal Conduit: No       Date of Last Bm:12/19/21    Intake/Output Summary (Last 24 hours) at 12/15/2021 1439  Last data filed at 12/15/2021 0307  Gross per 24 hour   Intake 790 ml   Output 475 ml   Net 315 ml     I/O last 3 completed shifts: In: 56 [P.O.:890]  Out: 1175 [Urine:1175]    Safety Concerns:     None    Impairments/Disabilities:      None    Nutrition Therapy:  Current Nutrition Therapy:   - Oral Diet:  Carb Control 4 carbs/meal (1800kcals/day)    Routes of Feeding: Oral  Liquids: Thin Liquids  Daily Fluid Restriction: no  Last Modified Barium Swallow with Video (Video Swallowing Test): not done    Treatments at the Time of Hospital Discharge:   Respiratory Treatments: oxygen  Oxygen Therapy:  is on oxygen at 1 L/min per nasal cannula.   Ventilator:    - No ventilator support    Rehab Therapies: Physical Therapy, Occupational Therapy, and Speech/Language Therapy  Weight Bearing Status/Restrictions: No weight bearing restirctions  Other Medical Equipment (for information only, NOT a DME order):  wheelchair, walker, and hospital bed  Other Treatments: none    Patient's personal belongings (please select all that are sent with patient):  Glasses, Dentures upper and lower    RN SIGNATURE:  Electronically signed by Mandeep Marmolejo RN on 12/20/21 at 9:31 AM EST    CASE MANAGEMENT/SOCIAL WORK SECTION    Inpatient Status Date: 12/7/2021    Readmission Risk Assessment Score:  Readmission Risk Risk of Unplanned Readmission:  13           Discharging to Facility/ Agency   Name:  [facility] [address] [place] [address] [address], [address] [address]         Phone: [phone]       Fax: [phone]          Dialysis Facility (if applicable)   Name:  Address:  Dialysis Schedule:  Phone:  Fax:    / signature: Electronically signed by PALMA Gomez on 12/15/21 at 2:40 PM EST    PHYSICIAN SECTION    Prognosis: Good    Condition at Discharge: Stable    Rehab Potential (if transferring to Rehab): Good    Recommended Labs or Other Treatments After Discharge:       Physician Certification: I certify the above information and transfer of Marlys Hyde  is necessary for the continuing treatment of the diagnosis listed and that he requires [facility]   for less 30 days.      Update Admission H&P: No change in H&P    PHYSICIAN SIGNATURE:  Electronically signed by Chandler Koyanagi, MD on 12/20/21 at 10:42 AM EST

## 2021-12-15 NOTE — PROGRESS NOTES
Patients brother called back to check on brother. Gave him an update and answered his questions. Thankful for update.

## 2021-12-15 NOTE — CARE COORDINATION
DISCHARGE PLAN UPDATE      Barriers to Discharge:  WBC 17.5, Barcitinib, Decadron po, Vitamins. PT/OT. On 4L oxygen NC sats 89%. Discharge Plans: plans rehab stay at [facility]. SW follows.

## 2021-12-15 NOTE — PROGRESS NOTES
PROGRESS NOTE      Patient:  Scottie Lindsay      Unit/Bed:3B-38/038-A    YOB: 1955    MRN: [id number]       Acct: [de-identified]     PCP: Huel Cockayne, APRN - CNP    Date of Admission: 12/7/2021      Assessment/Plan:    Anticipated Discharge pending weaning    Active Hospital Problems    Diagnosis Date Noted    COVID-19 virus detected [U07.1] 12/07/2021     Acute hypoxic resp failure secondary to Covid-19  COVID 19 Pneumonia  - Continue Decadron, Baricitnib, Vitamins. Completed 5 days of Zithromax  - Currently on NC. Wean off O2 as tolerated  - Still awaiting PT/OT. Needs Home O2 eval prior to d/c     DM II - uncontrolled  - A1c 9.9  - Increase Lantus to 30 units QHS, Humalog 10 units TID AC plus SSI      Primary Hypertension  - BP continues to be uncontrolled   - Continue Norvasc 10 mg daily, Cardura 2 mg daily, Hydralazine 100 mg TID, HCTZ 50 mg daily, Cozaar 100 mg daily    Urinary retention  Gross hematuria - resolved  - Dale catheter in place. Will try voiding trial this week  - Continue Flomax  - Urology signed off. Follow up at discharge. May need cystoscopy.     Sinus bradycardia  - Likely due to COVID/Decadron?  - 12/13: EKG shows sinus orlando with first degree AV block. - Echo 12/13 no structural heart disease. EF 60%. - Hold home Toprol.  - Continue to monitor. Asymptomatic     Obesity  - Lifestyle modifications     Chief Complaint: shortness of breath    Hospital Course:      77 y. o. male who presented to [address] [place] [address] with feeling well for 4 weeks; he has a past medical history of hypertension; he states he has been short of breath along with fatigue, myalgias and a nonproductive cough for the last 4 weeks; he states the last 2 days his symptoms have increased which prompted him for an evaluation in the ER today; he has received 1 dose of the Moderna vaccination for Covid.     He complains of lightheadedness and dizziness along with a productive cough and shortness of breath; he denies chest pain, denies nausea, vomiting or diarrhea; he tells me he is not eaten anything in the last 2 to 3 weeks; he also tells me he is not taking his blood pressure medicine in the last 2 to 3 weeks either; in the ER it was noted that he had gross hematuria which patient states this is new.     In the emergency department, he had initial O2 sat of 78%, he was initially placed on 6 L bringing him up to 90% and is currently on high flow oxygen at 90% FiO2 and 40 L satting 98%; chest x-ray showed pneumonia; CT of the abdomen and chest are currently pending, he is being admitted        Subjective:  Patient seen and examined. No acute events overnight. He is sitting up in chair working with PT. He reports improvement in breathing. He denies fevers, chills, chest pain, abdominal pain, nausea/vomiting, leg pain or swelling.      Medications:  Reviewed    Infusion Medications    dextrose      sodium chloride       Scheduled Medications    hydroCHLOROthiazide  50 mg Oral Daily    losartan  100 mg Oral Daily    doxazosin  2 mg Oral Daily    hydrALAZINE  100 mg Oral Q8H    [Held by provider] metoprolol succinate  100 mg Oral Daily    dexamethasone  6 mg Oral Daily    insulin lispro  0-12 Units SubCUTAneous TID WC    insulin lispro  0-6 Units SubCUTAneous Nightly    insulin glargine  0.25 Units/kg SubCUTAneous Nightly    tamsulosin  0.4 mg Oral Daily    insulin lispro  7 Units SubCUTAneous TID WC    baricitinib  4 mg Oral Daily    amLODIPine  10 mg Oral Daily    sodium chloride flush  5-40 mL IntraVENous 2 times per day    enoxaparin  30 mg SubCUTAneous BID    Vitamin D  2,000 Units Oral Daily    ascorbic acid  500 mg Oral Daily    zinc sulfate  50 mg Oral Daily     PRN Meds: simethicone, perflutren lipid microspheres, glucose, dextrose, glucagon (rDNA), dextrose, sodium chloride flush, sodium chloride, ondansetron **OR** ondansetron, polyethylene glycol, acetaminophen **OR** acetaminophen, guaiFENesin-dextromethorphan      Intake/Output Summary (Last 24 hours) at 12/15/2021 1352  Last data filed at 12/15/2021 0307  Gross per 24 hour   Intake 890 ml   Output 1175 ml   Net -285 ml       Diet:  ADULT DIET; Regular; 4 carb choices (60 gm/meal); No Added Salt (3-4 gm)    Exam:  BP (!) 145/65   Pulse 85   Temp 97.9 °F (36.6 °C) (Oral)   Resp 20   Ht 5' 6\" (1.676 m)   Wt 209 lb 6.4 oz (95 kg)   SpO2 (!) 89%   BMI 33.80 kg/m²     Physical Exam  General appearance: No apparent distress, appears stated age and cooperative. HEENT: Pupils equal, round, and reactive to light. Conjunctivae/corneas clear. Neck: Supple, with full range of motion. No jugular venous distention. Trachea midline. Respiratory: Lungs tight with diminished breath sounds b/l  Cardiovascular: RRR, N2/T6 with systolic ejection murmur  Abdomen: Soft, non-tender, non-distended with normal bowel sounds. Musculoskeletal: passive and active ROM x 4 extremities. Skin: Skin color, texture, turgor normal.  No rashes or lesions. Neurologic:  Neurovascularly intact without any focal sensory/motor deficits. Cranial nerves: II-XII intact, grossly non-focal.  Psychiatric: Alert and oriented, thought content appropriate, normal insight  Capillary Refill: Brisk,< 3 seconds   Peripheral Pulses: +2 palpable, equal bilaterally     Labs:   Recent Labs     12/13/21  0341 12/14/21  0344 12/15/21  0341   WBC 9.5 12.5* 17.5*   HGB 14.0 15.6 15.5   HCT 43.8 47.0 48.1    270 246     Recent Labs     12/13/21  0341 12/14/21  0344 12/15/21  0341   * 135 133*   K 4.7 4.5 4.7    101 98   CO2 21* 18* 19*   BUN 35* 29* 35*   CREATININE 1.0 1.0 1.1   CALCIUM 8.6 8.9 9.1     No results for input(s): AST, ALT, BILIDIR, BILITOT, ALKPHOS in the last 72 hours. No results for input(s): INR in the last 72 hours. No results for input(s): Jamie Brunson in the last 72 hours.     Urinalysis:      Lab Results   Component Value Date    NITRU NEGATIVE 12/08/2021    WBCUA 0-2 12/08/2021    BACTERIA NONE SEEN 12/08/2021    RBCUA 5-10 12/08/2021    BLOODU SMALL 12/08/2021    GLUCOSEU >= 1000 12/08/2021       Radiology:  XR CHEST PORTABLE   Final Result   Redemonstration of diffuse patchy opacities which is more confluent in the right upper lobe and mildly worsened in the interval.               **This report has been created using voice recognition software. It may contain minor errors which are inherent in voice recognition technology. **      Final report electronically signed by Dr. James Wu MD on 12/11/2021 11:21 AM      CTA CHEST [address]   Final Result       1. No pulmonary embolism. No aneurysm of the thoracic or abdominal aorta. No dissection. 2. Extensive bilateral infiltrates. **This report has been created using voice recognition software. It may contain minor errors which are inherent in voice recognition technology. **      Final report electronically signed by Dr. Deng Ojeda on 12/7/2021 1:41 PM      CTA ABDOMEN PELVIS W WO CONTRAST   Final Result       1. No pulmonary embolism. No aneurysm of the thoracic or abdominal aorta. No dissection. 2. Extensive bilateral infiltrates. **This report has been created using voice recognition software. It may contain minor errors which are inherent in voice recognition technology. **      Final report electronically signed by Dr. Deng Ojeda on 12/7/2021 1:41 PM      XR CHEST PORTABLE   Final Result   Multifocal airspace opacities suggest pneumonia in the appropriate clinical setting. The patient is pending CT assessment. **This report has been created using voice recognition software. It may contain minor errors which are inherent in voice recognition technology. **      Final report electronically signed by Dr Tracy Horn on 12/7/2021 10:58 AM          Diet: ADULT DIET; Regular; 4 carb choices (60 gm/meal);  No Added Salt (3-4 gm)    DVT prophylaxis: [x] Lovenox                                 [] SCDs                                 [] SQ Heparin                                 [] Encourage ambulation           [] Already on Anticoagulation     Disposition:    [] Home       [] TCU       [] Rehab       [] Psych       [x] SNF       [] [facility]       [] Other-    Code Status: Full Code    PT/OT Eval Status: pending      Electronically signed by Kaitlin Gonzales MD on 12/15/2021 at 1:52 PM    This note was electronically signed. Parts of this note may have been dictated by use of voice recognition software and electronically transcribed. The note may contain errors not detected in proofreading. Please refer to my supervising physician's documentation if my documentation differs.

## 2021-12-15 NOTE — CARE COORDINATION
12/15/21, 10:44 AM EST    DISCHARGE PLANNING EVALUATION    Spoke with Adebayo Dee at [facility] and follow up on referral.  No answer at this time.

## 2021-12-16 LAB
ANION GAP SERPL CALCULATED.3IONS-SCNC: 16 MEQ/L (ref 8–16)
BASOPHILS # BLD: 0.2 %
BASOPHILS ABSOLUTE: 0 THOU/MM3 (ref 0–0.1)
BUN BLDV-MCNC: 37 MG/DL (ref 7–22)
CALCIUM SERPL-MCNC: 8.9 MG/DL (ref 8.5–10.5)
CHLORIDE BLD-SCNC: 97 MEQ/L (ref 98–111)
CO2: 19 MEQ/L (ref 23–33)
CREAT SERPL-MCNC: 1.2 MG/DL (ref 0.4–1.2)
EOSINOPHIL # BLD: 0 %
EOSINOPHILS ABSOLUTE: 0 THOU/MM3 (ref 0–0.4)
ERYTHROCYTE [DISTWIDTH] IN BLOOD BY AUTOMATED COUNT: 12.7 % (ref 11.5–14.5)
ERYTHROCYTE [DISTWIDTH] IN BLOOD BY AUTOMATED COUNT: 41.6 FL (ref 35–45)
GFR SERPL CREATININE-BSD FRML MDRD: 60 ML/MIN/1.73M2
GLUCOSE BLD-MCNC: 124 MG/DL (ref 70–108)
GLUCOSE BLD-MCNC: 168 MG/DL (ref 70–108)
GLUCOSE BLD-MCNC: 169 MG/DL (ref 70–108)
GLUCOSE BLD-MCNC: 181 MG/DL (ref 70–108)
GLUCOSE BLD-MCNC: 215 MG/DL (ref 70–108)
HCT VFR BLD CALC: 48 % (ref 42–52)
HEMOGLOBIN: 15.7 GM/DL (ref 14–18)
IMMATURE GRANS (ABS): 0.2 THOU/MM3 (ref 0–0.07)
IMMATURE GRANULOCYTES: 1 %
LYMPHOCYTES # BLD: 5.1 %
LYMPHOCYTES ABSOLUTE: 1 THOU/MM3 (ref 1–4.8)
MCH RBC QN AUTO: 29.3 PG (ref 26–33)
MCHC RBC AUTO-ENTMCNC: 32.7 GM/DL (ref 32.2–35.5)
MCV RBC AUTO: 89.6 FL (ref 80–94)
MONOCYTES # BLD: 6 %
MONOCYTES ABSOLUTE: 1.2 THOU/MM3 (ref 0.4–1.3)
NUCLEATED RED BLOOD CELLS: 0 /100 WBC
PLATELET # BLD: 265 THOU/MM3 (ref 130–400)
PMV BLD AUTO: 11 FL (ref 9.4–12.4)
POTASSIUM SERPL-SCNC: 4.9 MEQ/L (ref 3.5–5.2)
RBC # BLD: 5.36 MILL/MM3 (ref 4.7–6.1)
SEG NEUTROPHILS: 87.7 %
SEGMENTED NEUTROPHILS ABSOLUTE COUNT: 17.1 THOU/MM3 (ref 1.8–7.7)
SODIUM BLD-SCNC: 132 MEQ/L (ref 135–145)
WBC # BLD: 19.5 THOU/MM3 (ref 4.8–10.8)

## 2021-12-16 PROCEDURE — 6370000000 HC RX 637 (ALT 250 FOR IP): Performed by: INTERNAL MEDICINE

## 2021-12-16 PROCEDURE — 97530 THERAPEUTIC ACTIVITIES: CPT

## 2021-12-16 PROCEDURE — 6370000000 HC RX 637 (ALT 250 FOR IP): Performed by: NURSE PRACTITIONER

## 2021-12-16 PROCEDURE — 2580000003 HC RX 258: Performed by: NURSE PRACTITIONER

## 2021-12-16 PROCEDURE — 6370000000 HC RX 637 (ALT 250 FOR IP): Performed by: STUDENT IN AN ORGANIZED HEALTH CARE EDUCATION/TRAINING PROGRAM

## 2021-12-16 PROCEDURE — 6370000000 HC RX 637 (ALT 250 FOR IP): Performed by: UROLOGY

## 2021-12-16 PROCEDURE — 36415 COLL VENOUS BLD VENIPUNCTURE: CPT

## 2021-12-16 PROCEDURE — 80048 BASIC METABOLIC PNL TOTAL CA: CPT

## 2021-12-16 PROCEDURE — 6360000002 HC RX W HCPCS: Performed by: NURSE PRACTITIONER

## 2021-12-16 PROCEDURE — 85025 COMPLETE CBC W/AUTO DIFF WBC: CPT

## 2021-12-16 PROCEDURE — 99233 SBSQ HOSP IP/OBS HIGH 50: CPT | Performed by: INTERNAL MEDICINE

## 2021-12-16 PROCEDURE — 82948 REAGENT STRIP/BLOOD GLUCOSE: CPT

## 2021-12-16 PROCEDURE — 2700000000 HC OXYGEN THERAPY PER DAY

## 2021-12-16 PROCEDURE — 97116 GAIT TRAINING THERAPY: CPT

## 2021-12-16 PROCEDURE — 97110 THERAPEUTIC EXERCISES: CPT

## 2021-12-16 PROCEDURE — 2140000000 HC CCU INTERMEDIATE R&B

## 2021-12-16 PROCEDURE — 6360000002 HC RX W HCPCS: Performed by: INTERNAL MEDICINE

## 2021-12-16 RX ORDER — FAMOTIDINE 20 MG/1
20 TABLET, FILM COATED ORAL 2 TIMES DAILY
Status: DISCONTINUED | OUTPATIENT
Start: 2021-12-16 | End: 2021-12-17

## 2021-12-16 RX ADMIN — HYDROCHLOROTHIAZIDE 50 MG: 25 TABLET ORAL at 10:59

## 2021-12-16 RX ADMIN — Medication 50 MG: at 10:59

## 2021-12-16 RX ADMIN — INSULIN LISPRO 2 UNITS: 100 INJECTION, SOLUTION INTRAVENOUS; SUBCUTANEOUS at 10:29

## 2021-12-16 RX ADMIN — SODIUM CHLORIDE, PRESERVATIVE FREE 10 ML: 5 INJECTION INTRAVENOUS at 22:27

## 2021-12-16 RX ADMIN — DOXAZOSIN 2 MG: 2 TABLET ORAL at 11:00

## 2021-12-16 RX ADMIN — ENOXAPARIN SODIUM 30 MG: 100 INJECTION SUBCUTANEOUS at 22:26

## 2021-12-16 RX ADMIN — HYDRALAZINE HYDROCHLORIDE 100 MG: 50 TABLET ORAL at 05:17

## 2021-12-16 RX ADMIN — INSULIN GLARGINE 30 UNITS: 100 INJECTION, SOLUTION SUBCUTANEOUS at 22:27

## 2021-12-16 RX ADMIN — HYDRALAZINE HYDROCHLORIDE 100 MG: 50 TABLET ORAL at 14:35

## 2021-12-16 RX ADMIN — INSULIN LISPRO 4 UNITS: 100 INJECTION, SOLUTION INTRAVENOUS; SUBCUTANEOUS at 14:24

## 2021-12-16 RX ADMIN — BARICITINIB 4 MG: 2 TABLET, FILM COATED ORAL at 10:59

## 2021-12-16 RX ADMIN — TAMSULOSIN HYDROCHLORIDE 0.4 MG: 0.4 CAPSULE ORAL at 11:00

## 2021-12-16 RX ADMIN — FAMOTIDINE 20 MG: 20 TABLET ORAL at 22:26

## 2021-12-16 RX ADMIN — HYDRALAZINE HYDROCHLORIDE 100 MG: 50 TABLET ORAL at 22:28

## 2021-12-16 RX ADMIN — DEXAMETHASONE 6 MG: 4 TABLET ORAL at 10:59

## 2021-12-16 RX ADMIN — LOSARTAN POTASSIUM 100 MG: 100 TABLET, FILM COATED ORAL at 10:59

## 2021-12-16 RX ADMIN — OXYCODONE HYDROCHLORIDE AND ACETAMINOPHEN 500 MG: 500 TABLET ORAL at 10:59

## 2021-12-16 RX ADMIN — AMLODIPINE BESYLATE 10 MG: 10 TABLET ORAL at 10:59

## 2021-12-16 RX ADMIN — ENOXAPARIN SODIUM 30 MG: 100 INJECTION SUBCUTANEOUS at 11:00

## 2021-12-16 RX ADMIN — Medication 2000 UNITS: at 10:59

## 2021-12-16 RX ADMIN — INSULIN LISPRO 2 UNITS: 100 INJECTION, SOLUTION INTRAVENOUS; SUBCUTANEOUS at 18:16

## 2021-12-16 RX ADMIN — SODIUM CHLORIDE, PRESERVATIVE FREE 10 ML: 5 INJECTION INTRAVENOUS at 11:00

## 2021-12-16 ASSESSMENT — PAIN SCALES - GENERAL
PAINLEVEL_OUTOF10: 0
PAINLEVEL_OUTOF10: 0

## 2021-12-16 NOTE — CARE COORDINATION
Discharge Plan update    [address] day: 9        Barriers to Discharge: 6 L O2 via Nasal Canula  sats 94% and with activity patient dropping O2 at 81 %. Decadron, Baricitnib, Vitamins, Lantus and ISS with DM control. Dale in place, follow for void trial.     Patient Goals/Plan/Treatment Preferences: plans [address] SNF when med stable.

## 2021-12-16 NOTE — PROGRESS NOTES
PROGRESS NOTE      Patient:  Sidra Velazquez      Unit/Bed:3B-38/038-A    YOB: 1955    MRN: [id number]       Acct: [de-identified]     PCP: DIONTE Garcia - CNP    Date of Admission: 12/7/2021      Assessment/Plan:    Acute hypoxic resp failure secondary to Covid-19  COVID 19 Pneumonia  - Patient required high flow nasal cannula this admission  - CTA 12/7/2021 showing no PE  - Continue Decadron, Baricitnib, Vitamins. Completed 5 days of Zithromax  - Currently on NC. Wean off O2 as tolerated  - Pulmonary hygiene     DM II - uncontrolled  - A1c 9.9  - Lantus 30 units QHS, Humalog 10 units TID AC plus SSI      Primary Hypertension  - Blood pressures better controlled currently  - Continue Norvasc 10 mg daily, Cardura 2 mg daily, Hydralazine 100 mg TID, HCTZ 50 mg daily, Cozaar 100 mg daily    Urinary retention  Gross hematuria - resolved  - Dale catheter in place. Will try voiding trial this week  - Continue Flomax  - Urology signed off. Follow up at discharge. May need cystoscopy. Reflux  - Likely due to being on steroids  - We will add Pepcid      Sinus bradycardia  - Likely due to COVID/Decadron?  - 12/13: EKG shows sinus orlando with first degree AV block. - Echo 12/13 no structural heart disease. EF 60%. - Hold home Toprol.  - Continue to monitor. Asymptomatic     Obesity  - Lifestyle modifications       Chief Complaint: shortness of breath    Hospital Course:      77 y. o. male who presented to [facility] with feeling well for 4 weeks; he has a past medical history of hypertension; he states he has been short of breath along with fatigue, myalgias and a nonproductive cough for the last 4 weeks; he states the last 2 days his symptoms have increased which prompted him for an evaluation in the ER today; he has received 1 dose of the Moderna vaccination for Covid.     He complains of lightheadedness and dizziness along with a productive cough and shortness of breath; he denies chest pain, denies nausea, vomiting or diarrhea; he tells me he is not eaten anything in the last 2 to 3 weeks; he also tells me he is not taking his blood pressure medicine in the last 2 to 3 weeks either; in the ER it was noted that he had gross hematuria which patient states this is new.     In the emergency department, he had initial O2 sat of 78%, he was initially placed on 6 L bringing him up to 90% and is currently on high flow oxygen at 90% FiO2 and 40 L satting 98%; chest x-ray showed pneumonia; CT of the abdomen and chest are currently pending, he is being admitted        Subjective:  Patient seen and examined. Overall patient feels about the same as yesterday. Feels that his breathing is doing all right. Endorses some reflux symptoms/pain. Denies any shortness of breath at rest.  Denies any chest pain, nausea/vomiting, leg swelling.     Medications:  Reviewed    Infusion Medications    dextrose      sodium chloride       Scheduled Medications    insulin glargine  30 Units SubCUTAneous Nightly    insulin lispro  10 Units SubCUTAneous TID     hydroCHLOROthiazide  50 mg Oral Daily    losartan  100 mg Oral Daily    doxazosin  2 mg Oral Daily    hydrALAZINE  100 mg Oral Q8H    [Held by provider] metoprolol succinate  100 mg Oral Daily    dexamethasone  6 mg Oral Daily    insulin lispro  0-12 Units SubCUTAneous TID WC    insulin lispro  0-6 Units SubCUTAneous Nightly    tamsulosin  0.4 mg Oral Daily    baricitinib  4 mg Oral Daily    amLODIPine  10 mg Oral Daily    sodium chloride flush  5-40 mL IntraVENous 2 times per day    enoxaparin  30 mg SubCUTAneous BID    Vitamin D  2,000 Units Oral Daily    ascorbic acid  500 mg Oral Daily    zinc sulfate  50 mg Oral Daily     PRN Meds: simethicone, perflutren lipid microspheres, glucose, dextrose, glucagon (rDNA), dextrose, sodium chloride flush, sodium chloride, ondansetron **OR** ondansetron, polyethylene glycol, acetaminophen **OR** acetaminophen, guaiFENesin-dextromethorphan      Intake/Output Summary (Last 24 hours) at 12/16/2021 1451  Last data filed at 12/16/2021 1149  Gross per 24 hour   Intake 1300 ml   Output 1425 ml   Net -125 ml       Diet:  ADULT DIET; Regular; 4 carb choices (60 gm/meal); No Added Salt (3-4 gm)    Exam:  BP (!) 164/58   Pulse 84   Temp 97.4 °F (36.3 °C) (Oral)   Resp 20   Ht 5' 6\" (1.676 m)   Wt 207 lb 6.4 oz (94.1 kg)   SpO2 91%   BMI 33.48 kg/m²       General appearance: No apparent distress, appears stated age and cooperative. Sitting in chair, supple oxygen intact  HEENT: Pupils equal, round, and reactive to light. Conjunctivae/corneas clear. Neck: Supple, with full range of motion. No jugular venous distention. Trachea midline. Respiratory:  Normal respiratory effort on supplemental oxygen, lungs with diminished breath sounds bilaterally, no wheezing  Cardiovascular: RRR, S0/Z3 with systolic ejection murmur  Abdomen: Soft, non-tender, non-distended with normal bowel sounds. Musculoskeletal: passive and active ROM x 4 extremities. Skin: Skin color, texture, turgor normal.  No rashes or lesions. Neurologic:  Neurovascularly intact without any focal sensory/motor deficits. Cranial nerves: II-XII intact, grossly non-focal.  Psychiatric: Alert and oriented, thought content appropriate, normal insight  Capillary Refill: Brisk,< 3 seconds   Peripheral Pulses: +2 palpable, equal bilaterally     Labs:   Recent Labs     12/14/21  0344 12/15/21  0341 12/16/21  0339   WBC 12.5* 17.5* 19.5*   HGB 15.6 15.5 15.7   HCT 47.0 48.1 48.0    246 265     Recent Labs     12/14/21  0344 12/15/21  0341 12/16/21  0339    133* 132*   K 4.5 4.7 4.9    98 97*   CO2 18* 19* 19*   BUN 29* 35* 37*   CREATININE 1.0 1.1 1.2   CALCIUM 8.9 9.1 8.9     No results for input(s): AST, ALT, BILIDIR, BILITOT, ALKPHOS in the last 72 hours. No results for input(s): INR in the last 72 hours.   No results for input(s): Prentis Revels in the last 72 hours. Urinalysis:      Lab Results   Component Value Date    NITRU NEGATIVE 12/08/2021    WBCUA 0-2 12/08/2021    BACTERIA NONE SEEN 12/08/2021    RBCUA 5-10 12/08/2021    BLOODU SMALL 12/08/2021    GLUCOSEU >= 1000 12/08/2021       Radiology:  XR CHEST PORTABLE   Final Result   Redemonstration of diffuse patchy opacities which is more confluent in the right upper lobe and mildly worsened in the interval.               **This report has been created using voice recognition software. It may contain minor errors which are inherent in voice recognition technology. **      Final report electronically signed by Dr. Tyrese Bauer MD on 12/11/2021 11:21 AM      CTA CHEST [address]   Final Result       1. No pulmonary embolism. No aneurysm of the thoracic or abdominal aorta. No dissection. 2. Extensive bilateral infiltrates. **This report has been created using voice recognition software. It may contain minor errors which are inherent in voice recognition technology. **      Final report electronically signed by Dr. Morales Goldsmith on 12/7/2021 1:41 PM      CTA ABDOMEN PELVIS W WO CONTRAST   Final Result       1. No pulmonary embolism. No aneurysm of the thoracic or abdominal aorta. No dissection. 2. Extensive bilateral infiltrates. **This report has been created using voice recognition software. It may contain minor errors which are inherent in voice recognition technology. **      Final report electronically signed by Dr. Morales Goldsmith on 12/7/2021 1:41 PM      XR CHEST PORTABLE   Final Result   Multifocal airspace opacities suggest pneumonia in the appropriate clinical setting. The patient is pending CT assessment. **This report has been created using voice recognition software. It may contain minor errors which are inherent in voice recognition technology. **      Final report electronically signed by Dr Stormy Kim on 12/7/2021 10:58 AM          Diet: ADULT DIET; Regular; 4 carb choices (60 gm/meal); No Added Salt (3-4 gm)    DVT prophylaxis: [x] Lovenox                                 [] SCDs                                 [] SQ Heparin                                 [] Encourage ambulation           [] Already on Anticoagulation     Disposition:    [] Home       [] TCU       [] Rehab       [] Psych       [x] SNF       [] [facility]       [] Other-    Code Status: Full Code    PT/OT Eval Status: pending      Electronically signed by Taylor Goel MD on 12/16/2021 at 2:51 PM    This note was electronically signed. Parts of this note may have been dictated by use of voice recognition software and electronically transcribed. The note may contain errors not detected in proofreading. Please refer to my supervising physician's documentation if my documentation differs.

## 2021-12-16 NOTE — PROGRESS NOTES
[address] [place] [address]  INPATIENT PHYSICAL THERAPY  DAILY NOTE  [facility] CCU-STEPDOWN 3B - 3B-38/038-A  Time In: 1280  Time Out: 1248  Timed Code Treatment Minutes: 33 Minutes  Minutes: 33          Date: 2021  Patient Name: Kalani Islas,  Gender:  male        MRN: [id number]  : 1955  (77 y.o.)     Referring Practitioner: Crista Gabriel MD  Diagnosis: Acute respiratory failure with hypoxia  Additional Pertinent Hx: 77 y.o. male who presented to [address] [place] [address] with feeling well for 4 weeks; he has a past medical history of hypertension; he states he has been short of breath along with fatigue, myalgias and a nonproductive cough for the last 4 weeks; he states the last 2 days his symptoms have increased which prompted him for an evaluation in the ER today; he has received 1 dose of the Claudeen Fermo vaccination for Covid. He complains of lightheadedness and dizziness along with a productive cough and shortness of breath; he denies chest pain, denies nausea, vomiting or diarrhea; he tells me he is not eaten anything in the last 2 to 3 weeks; he also tells me he is not taking his blood pressure medicine in the last 2 to 3 weeks either; in the ER it was noted that he had gross hematuria which patient states this is new. Prior Level of Function:  Lives With: Alone  Type of Home: Apartment  Home Layout: One level  Home Access: Level entry  Home Equipment:  (none)   Bathroom Shower/Tub: Tub/Shower unit  Bathroom Toilet: Standard  Bathroom Equipment:  (none)    ADL Assistance: Independent  Homemaking Assistance: Independent  Ambulation Assistance: Independent  Transfer Assistance: Independent  Active : Yes  Additional Comments: No home O2 at baseline    Restrictions/Precautions:  Restrictions/Precautions: General Precautions, Fall Risk, Isolation  Position Activity Restriction  Other position/activity restrictions: droplet +     SUBJECTIVE: OK to see pt per nursing.  Pt in bedside chair when therapy arrived, agreeable to PT session. Pt required increased time due to SOB reported during session. PAIN: chest pain with coughing, did not rate    Vitals: Oxygen: 6 L of O2, 89% at rest, 88-91% with exercises, decreased to 84% with exercises, required 5 min of  deep breathing to recover    OBJECTIVE:  Bed Mobility:  Not Tested    Transfers:  Sit to Stand: Contact Guard Assistance, X 1, cues for hand placement, with verbal cues  Stand to Sit:Contact Guard Assistance, X 1, cues for hand placement, with verbal cues  RW for support as needed, with cues for safety  Ambulation:  Contact Guard Assistance, X 1, with cues for safety, with verbal cues , with increased time for completion  Distance: 15 feet, in room  Surface: Level Tile  Device:Rolling Walker  Gait Deviations: Forward Flexed Posture, Slow Mariama, Decreased Step Length Bilaterally, Decreased Weight Shift Bilaterally, Decreased Gait Speed, Mild Path Deviations, Unsteady Gait and Decreased Terminal Knee Extension  Cues for safety with max A for O2 management  Balance:  Static Sitting Balance:  Independent  Static Standing Balance: Contact Guard Assistance, X 1, with cues for safety, with verbal cues   RW for support for safety  Exercise:  Patient was guided in 1 set(s) 15 reps of exercise to both lower extremities. Ankle pumps, Glut sets, Quad sets, Heelslides, Hip abduction/adduction and Straight leg raises. Exercises were completed for increased independence with functional mobility. Pt required VC and visual cues for proper technique of exercises for completion, good demo, frequent rest breaks required. Functional Outcome Measures: Completed  AM-PAC Inpatient Mobility Raw Score : 16  AM-PAC Inpatient T-Scale Score : 40.78    ASSESSMENT:  Assessment: Patient progressing toward established goals. Activity Tolerance:  Patient tolerance of  treatment: fair.  SOB reported, reduced O2 with mobility     Equipment Recommendations:Equipment Needed: No  Discharge Recommendations: Continue to assess pending progress, Subacte/Skilled Nursing Facility and Patient would benefit from continued PT at discharge  Plan: Times per week: 5x C  Current Treatment Recommendations: Strengthening, Safety Education & Training, Balance Training, Endurance Training, Functional Mobility Training, Transfer Training, Gait Training, Patient/Caregiver Education & Training    Patient Education  Patient Education: Plan of Care, Home Exercise Program, Precautions/Restrictions, Equipment Education, Transfers, Gait, Use of Gait Blanco, Verbal Exercise Instruction, Incentive Spirometer, Pursed Lip Breathing,  - Patient Verbalized Understanding, - Patient Requires Continued Education    Goals:  Patient goals : to get stronger  Short term goals  Time Frame for Short term goals: by discharge  Short term goal 1: Pt to transfer supine <--> sit SBA to enable pt to get in/out of bed. Short term goal 2: Pt to transfer sit <--> stand SBA for increased functional mobility. Short term goal 3: Pt to ambulate >50 feet with RW SBA for household ambulation. Long term goals  Time Frame for Long term goals : NA due to short length of stay. Following session, patient left in safe position with all fall risk precautions in place. Pt left in bedside chair with all needs and call light in reach following session.

## 2021-12-16 NOTE — PROGRESS NOTES
[address] [place] [address]  Occupational Therapy  Daily Note  Time:   Time In: 830  Time Out:   Timed Code Treatment Minutes: 26 Minutes  Minutes: 26          Date: 2021  Patient Name: Neeraj Pompa,   Gender: male      Room: -38/038-A  MRN: [id number]  : 1955  (77 y.o.)  Referring Practitioner: Kayden Gonsales MD  Diagnosis: acute respiratory failure with hypoxia  Additional Pertinent Hx: In the emergency department, he had initial O2 sat of 78%, requiring HFNC. CXR showed pneumonia. Restrictions/Precautions:  Restrictions/Precautions: General Precautions, Fall Risk, Isolation  Position Activity Restriction  Other position/activity restrictions: droplet +     SUBJECTIVE: RN approved session, In bed upon arrival, agreeable to OT session, cooperative    PAIN: 0/10:     Vitals: Patient on 6 L O2 via Nasal Canula  upon arrival to room. Patient O2 sats at 92 %. Upon activity patient dropping O2 at 81 %. Pt requiring maximum lengthy rest break(s) to recover. COGNITION: WFL    ADL:   Grooming: with set-up. washed face sitting in bedside chair . BALANCE:  Sitting Balance:  Supervision. at EOB greater than 5 minutes to recover breathing    BED MOBILITY:  Supine to Sit: Stand By Assistance HOB elevated, used bedrail    TRANSFERS:  Sit to Stand:  Contact Guard Assistance. EOB  Stand to Sit: Contact Guard Assistance. bedside chair    FUNCTIONAL MOBILITY:  Assistive Device: Rolling Walker  Assist Level:  CGA-min assist  Distance: EOB>chair        ASSESSMENT:     Activity Tolerance:  Patient tolerance of  treatment: good. Limited by breathing      Discharge Recommendations: Continue to assess pending progress  Equipment Recommendations:  Other: will continue to monitor pending progress  Plan: Times per week: 5x  Current Treatment Recommendations: Strengthening, Balance Training, Functional Mobility Training, Endurance Training, Patient/Caregiver Education & Training, Equipment Evaluation, Education, & procurement, Safety Education & Training, Self-Care / ADL    Patient Education  Patient Education: ADL's and deep breathing    Goals  Short term goals  Time Frame for Short term goals: by discharge  Short term goal 1: Pt will tolerate sitting at EOB X10 minutes with supervision and Sp02 >=90% in prep for ADL completion. Short term goal 2: Pt will complete BADL tasks with minimal assistance, incorporating ECT with minimal cues, to increase independence and ease with self care tasks. Short term goal 3: Patient will complete stand pivot transfers with least restrictive device with (S) and 0-1 verbal cues for safety and sequencing. Short term goal 4: patient will tolerate 4 min functional standing with two hand release with MOD I with O2 >/= 90% to increase activity tolerance for toileting. Short term goal 5: OTR to assess functional mobility and create goal as appropriate. Goal met, revised. Revised Short-Term Goals  Short term goals  Time Frame for Short term goals: by discharge  Short term goal 1: Pt will tolerate sitting at EOB X10 minutes with supervision and Sp02 >=90% in prep for ADL completion. Short term goal 2: Pt will complete BADL tasks with minimal assistance, incorporating ECT with minimal cues, to increase independence and ease with self care tasks. Short term goal 3: Patient will complete stand pivot transfers with least restrictive device with (S) and 0-1 verbal cues for safety and sequencing. Short term goal 4: patient will tolerate 4 min functional standing with two hand release with MOD I with O2 >/= 90% to increase activity tolerance for toileting. Short term goal 5: Pt will increase activity tolerance for functional mobility to/from [facility] or chair, progressing to bathroom as able, with supervision and Sp02 >=90% in prep for toileting tasks. Following session, patient left in safe position with all fall risk precautions in place.     Treatment session

## 2021-12-17 LAB
ANION GAP SERPL CALCULATED.3IONS-SCNC: 16 MEQ/L (ref 8–16)
ANION GAP SERPL CALCULATED.3IONS-SCNC: 17 MEQ/L (ref 8–16)
BUN BLDV-MCNC: 66 MG/DL (ref 7–22)
BUN BLDV-MCNC: 71 MG/DL (ref 7–22)
CALCIUM SERPL-MCNC: 8.7 MG/DL (ref 8.5–10.5)
CALCIUM SERPL-MCNC: 8.9 MG/DL (ref 8.5–10.5)
CHLORIDE BLD-SCNC: 96 MEQ/L (ref 98–111)
CHLORIDE BLD-SCNC: 97 MEQ/L (ref 98–111)
CO2: 18 MEQ/L (ref 23–33)
CO2: 18 MEQ/L (ref 23–33)
CREAT SERPL-MCNC: 1.7 MG/DL (ref 0.4–1.2)
CREAT SERPL-MCNC: 1.9 MG/DL (ref 0.4–1.2)
ERYTHROCYTE [DISTWIDTH] IN BLOOD BY AUTOMATED COUNT: 13.1 % (ref 11.5–14.5)
ERYTHROCYTE [DISTWIDTH] IN BLOOD BY AUTOMATED COUNT: 42.9 FL (ref 35–45)
GFR SERPL CREATININE-BSD FRML MDRD: 36 ML/MIN/1.73M2
GFR SERPL CREATININE-BSD FRML MDRD: 40 ML/MIN/1.73M2
GLUCOSE BLD-MCNC: 108 MG/DL (ref 70–108)
GLUCOSE BLD-MCNC: 125 MG/DL (ref 70–108)
GLUCOSE BLD-MCNC: 173 MG/DL (ref 70–108)
GLUCOSE BLD-MCNC: 182 MG/DL (ref 70–108)
GLUCOSE BLD-MCNC: 192 MG/DL (ref 70–108)
GLUCOSE BLD-MCNC: 205 MG/DL (ref 70–108)
GLUCOSE BLD-MCNC: 207 MG/DL (ref 70–108)
HCT VFR BLD CALC: 45 % (ref 42–52)
HEMOGLOBIN: 14.8 GM/DL (ref 14–18)
MCH RBC QN AUTO: 29.6 PG (ref 26–33)
MCHC RBC AUTO-ENTMCNC: 32.9 GM/DL (ref 32.2–35.5)
MCV RBC AUTO: 90 FL (ref 80–94)
PLATELET # BLD: 280 THOU/MM3 (ref 130–400)
PMV BLD AUTO: 11.2 FL (ref 9.4–12.4)
POTASSIUM SERPL-SCNC: 5.4 MEQ/L (ref 3.5–5.2)
POTASSIUM SERPL-SCNC: 5.4 MEQ/L (ref 3.5–5.2)
RBC # BLD: 5 MILL/MM3 (ref 4.7–6.1)
SODIUM BLD-SCNC: 130 MEQ/L (ref 135–145)
SODIUM BLD-SCNC: 132 MEQ/L (ref 135–145)
WBC # BLD: 20.4 THOU/MM3 (ref 4.8–10.8)

## 2021-12-17 PROCEDURE — 6370000000 HC RX 637 (ALT 250 FOR IP): Performed by: UROLOGY

## 2021-12-17 PROCEDURE — 82948 REAGENT STRIP/BLOOD GLUCOSE: CPT

## 2021-12-17 PROCEDURE — 97116 GAIT TRAINING THERAPY: CPT

## 2021-12-17 PROCEDURE — 6360000002 HC RX W HCPCS: Performed by: NURSE PRACTITIONER

## 2021-12-17 PROCEDURE — 2140000000 HC CCU INTERMEDIATE R&B

## 2021-12-17 PROCEDURE — 2580000003 HC RX 258: Performed by: STUDENT IN AN ORGANIZED HEALTH CARE EDUCATION/TRAINING PROGRAM

## 2021-12-17 PROCEDURE — 85027 COMPLETE CBC AUTOMATED: CPT

## 2021-12-17 PROCEDURE — 97530 THERAPEUTIC ACTIVITIES: CPT

## 2021-12-17 PROCEDURE — 6370000000 HC RX 637 (ALT 250 FOR IP): Performed by: INTERNAL MEDICINE

## 2021-12-17 PROCEDURE — 97110 THERAPEUTIC EXERCISES: CPT

## 2021-12-17 PROCEDURE — 97535 SELF CARE MNGMENT TRAINING: CPT

## 2021-12-17 PROCEDURE — 6370000000 HC RX 637 (ALT 250 FOR IP): Performed by: NURSE PRACTITIONER

## 2021-12-17 PROCEDURE — 6370000000 HC RX 637 (ALT 250 FOR IP): Performed by: STUDENT IN AN ORGANIZED HEALTH CARE EDUCATION/TRAINING PROGRAM

## 2021-12-17 PROCEDURE — 99233 SBSQ HOSP IP/OBS HIGH 50: CPT | Performed by: INTERNAL MEDICINE

## 2021-12-17 PROCEDURE — 6360000002 HC RX W HCPCS: Performed by: INTERNAL MEDICINE

## 2021-12-17 PROCEDURE — 80048 BASIC METABOLIC PNL TOTAL CA: CPT

## 2021-12-17 PROCEDURE — 2580000003 HC RX 258: Performed by: NURSE PRACTITIONER

## 2021-12-17 PROCEDURE — 36415 COLL VENOUS BLD VENIPUNCTURE: CPT

## 2021-12-17 RX ORDER — FAMOTIDINE 20 MG/1
20 TABLET, FILM COATED ORAL DAILY
Status: DISCONTINUED | OUTPATIENT
Start: 2021-12-17 | End: 2021-12-19

## 2021-12-17 RX ORDER — SODIUM CHLORIDE 9 MG/ML
INJECTION, SOLUTION INTRAVENOUS CONTINUOUS
Status: DISCONTINUED | OUTPATIENT
Start: 2021-12-17 | End: 2021-12-19

## 2021-12-17 RX ADMIN — ENOXAPARIN SODIUM 30 MG: 100 INJECTION SUBCUTANEOUS at 10:12

## 2021-12-17 RX ADMIN — SODIUM CHLORIDE, PRESERVATIVE FREE 10 ML: 5 INJECTION INTRAVENOUS at 21:09

## 2021-12-17 RX ADMIN — INSULIN GLARGINE 30 UNITS: 100 INJECTION, SOLUTION SUBCUTANEOUS at 21:05

## 2021-12-17 RX ADMIN — DOXAZOSIN 2 MG: 2 TABLET ORAL at 10:13

## 2021-12-17 RX ADMIN — DEXAMETHASONE 6 MG: 4 TABLET ORAL at 10:13

## 2021-12-17 RX ADMIN — FAMOTIDINE 20 MG: 20 TABLET ORAL at 10:53

## 2021-12-17 RX ADMIN — SODIUM CHLORIDE: 9 INJECTION, SOLUTION INTRAVENOUS at 10:42

## 2021-12-17 RX ADMIN — HYDRALAZINE HYDROCHLORIDE 100 MG: 50 TABLET ORAL at 22:24

## 2021-12-17 RX ADMIN — BARICITINIB 2 MG: 2 TABLET, FILM COATED ORAL at 10:13

## 2021-12-17 RX ADMIN — HYDRALAZINE HYDROCHLORIDE 100 MG: 50 TABLET ORAL at 15:26

## 2021-12-17 RX ADMIN — HYDRALAZINE HYDROCHLORIDE 100 MG: 50 TABLET ORAL at 06:39

## 2021-12-17 RX ADMIN — AMLODIPINE BESYLATE 10 MG: 10 TABLET ORAL at 10:13

## 2021-12-17 RX ADMIN — SODIUM CHLORIDE, PRESERVATIVE FREE 10 ML: 5 INJECTION INTRAVENOUS at 10:12

## 2021-12-17 RX ADMIN — TAMSULOSIN HYDROCHLORIDE 0.4 MG: 0.4 CAPSULE ORAL at 10:13

## 2021-12-17 RX ADMIN — ENOXAPARIN SODIUM 30 MG: 100 INJECTION SUBCUTANEOUS at 21:08

## 2021-12-17 RX ADMIN — Medication 50 MG: at 10:13

## 2021-12-17 RX ADMIN — Medication 2000 UNITS: at 10:13

## 2021-12-17 RX ADMIN — OXYCODONE HYDROCHLORIDE AND ACETAMINOPHEN 500 MG: 500 TABLET ORAL at 10:13

## 2021-12-17 ASSESSMENT — PAIN SCALES - GENERAL
PAINLEVEL_OUTOF10: 0

## 2021-12-17 NOTE — PROGRESS NOTES
[address] [place] [address]  Occupational Therapy  Daily Note  Time:   Time In: 0800  Time Out: 0825  Timed Code Treatment Minutes: 25 Minutes  Minutes: 25          Date: 2021  Patient Name: Jacqueline Simmons,   Gender: male      Room: -38/038-A  MRN: [id number]  : 1955  (77 y.o.)  Referring Practitioner: Angie Mccloud MD  Diagnosis: acute respiratory failure with hypoxia  Additional Pertinent Hx: In the emergency department, he had initial O2 sat of 78%, requiring HFNC. CXR showed pneumonia. Restrictions/Precautions:  Restrictions/Precautions: General Precautions, Fall Risk, Isolation  Position Activity Restriction  Other position/activity restrictions: droplet +     SUBJECTIVE: Nurse Laureen obregon'd session, In bed upon arrival, agreeable to OT session    PAIN: 0/10:     Vitals: Patient on 6 L O2 via Nasal Canula  upon arrival to room. Patient O2 sats at 94 %. Upon activity patient dropping O2 at 83 %. Pt requiring moderate rest break(s) to recover. COGNITION: WNL    ADL:   Grooming: with set-up. brushed teeth sitting in bedside chair. BED MOBILITY:  Supine to Sit: Stand By Assistance, with head of bed raised, with rail      TRANSFERS:  Sit to Stand:  Contact Guard Assistance. EOB  Stand to Sit: Contact Guard Assistance. Bedside chair    FUNCTIONAL MOBILITY:  Assistive Device: Rolling Walker  Assist Level:  Contact Guard Assistance. Distance:  EOB to bedside chair       ADDITIONAL ACTIVITIES:  Guided patient through BUE AROM this date x10 reps x1 set in chair in order to increase BUE strength and improve activity tolerance for ADLs and homemaking tasks. ASSESSMENT:     Activity Tolerance:  Patient tolerance of  treatment: good. Discharge Recommendations: Not safe to return home at this time  Equipment Recommendations:  Other: will continue to monitor pending progress  Plan: Times per week: 5x  Current Treatment Recommendations: Strengthening, Balance Training, Functional Mobility Training, Endurance Training, Patient/Caregiver Education & Training, Equipment Evaluation, Education, & procurement, Safety Education & Training, Self-Care / ADL    Patient Education  Patient Education: Reviewed Prior Education    Goals  Short term goals  Time Frame for Short term goals: by discharge  Short term goal 1: Pt will tolerate sitting at EOB X10 minutes with supervision and Sp02 >=90% in prep for ADL completion. Short term goal 2: Pt will complete BADL tasks with minimal assistance, incorporating ECT with minimal cues, to increase independence and ease with self care tasks. Short term goal 3: Patient will complete stand pivot transfers with least restrictive device with (S) and 0-1 verbal cues for safety and sequencing. Short term goal 4: patient will tolerate 4 min functional standing with two hand release with MOD I with O2 >/= 90% to increase activity tolerance for toileting. Short term goal 5: Pt will increase activity tolerance for functional mobility to/from [facility] or chair, progressing to bathroom as able, with supervision and Sp02 >=90% in prep for toileting tasks. Following session, patient left in safe position with all fall risk precautions in place.

## 2021-12-17 NOTE — PROGRESS NOTES
[facility]  INPATIENT PHYSICAL THERAPY  DAILY NOTE  [facility] CCU-STEPDOWN 3B - 3B-38/038-A      Time In: 9435  Time Out: 2957  Timed Code Treatment Minutes: 41 Minutes  Minutes: 41          Date: 2021  Patient Name: Scottie Lindsay,  Gender:  male        MRN: [id number]  : 1955  (77 y.o.)     Referring Practitioner: Thu Christian MD  Diagnosis: Acute respiratory failure with hypoxia  Additional Pertinent Hx: 77 y.o. male who presented to [facility] with feeling well for 4 weeks; he has a past medical history of hypertension; he states he has been short of breath along with fatigue, myalgias and a nonproductive cough for the last 4 weeks; he states the last 2 days his symptoms have increased which prompted him for an evaluation in the ER today; he has received 1 dose of the Liss Charnley vaccination for Covid. He complains of lightheadedness and dizziness along with a productive cough and shortness of breath; he denies chest pain, denies nausea, vomiting or diarrhea; he tells me he is not eaten anything in the last 2 to 3 weeks; he also tells me he is not taking his blood pressure medicine in the last 2 to 3 weeks either; in the ER it was noted that he had gross hematuria which patient states this is new. Prior Level of Function:  Lives With: Alone  Type of Home: Apartment  Home Layout: One level  Home Access: Level entry  Home Equipment:  (none)   Bathroom Shower/Tub: Tub/Shower unit  Bathroom Toilet: Standard  Bathroom Equipment:  (none)    ADL Assistance: Independent  Homemaking Assistance: Independent  Ambulation Assistance: Independent  Transfer Assistance: Independent  Active :  Yes  Additional Comments: No home O2 at baseline    Restrictions/Precautions:  Restrictions/Precautions: General Precautions, Fall Risk, Isolation  Position Activity Restriction  Other position/activity restrictions: droplet +       SUBJECTIVE: pt cooperative and receptive to education during session     PAIN: 0/10:       Vitals: Oxygen: 6L-  Pt does get SOB and required rest breaks and educated on pursed lip breathing he did drop to 81% - 94% he took several minutes to recover     OBJECTIVE:  Bed Mobility:  Not Tested    Transfers:  Sit to Stand: Contact Guard Assistance  [facility] to [facility]  * cues for hand placement   Ambulation:   Contact Guard Assistance  Distance: 20x1  Surface: Level Tile  Device:Rolling Walker  Gait Deviations:  Slow Mariama and he needed cues to stay closer to walker noted heavy reliance on walker  and assist to manage all his lines- pt SOB with gait     Balance:  standing w/o UE at support working on [employer] and deep breathing technique with CGA     Exercise:  Patient was guided w/ use of acapella which caused increased coughing and he was able to cough a lot following and used his incentive spirometer - provided handouts on pursed lip breathing as he did struggle with proper technique- pt completed shoulder rolls and shoulder horz abd/add with deep breathing technique   Exercises were completed for increased independence with functional mobility. Functional Outcome Measures: Completed  AM-PAC Inpatient Mobility Raw Score : 16  AM-PAC Inpatient T-Scale Score : 40.78    ASSESSMENT:  Assessment: Patient progressing toward established goals. and However pt cont to demonstrate decreased strength, endurance and required 6L O2. Pt would greatly benefit from cont skilled therapy  Activity Tolerance:  Patient tolerance of  treatment: fair.  Rest breaks given        Equipment Recommendations:Equipment Needed: No  Discharge Recommendations: Subacte/Skilled Nursing Facility  Plan: Times per week: 5x C  Current Treatment Recommendations: Strengthening, Safety Education & Training, Balance Training, Endurance Training, Functional Mobility Training, Transfer Training, Gait Training, Patient/Caregiver Education & Training    Patient Education  Patient Education: Plan of Care, Home Exercise Program, Incentive Spirometer, Pursed Lip Breathing    Goals:  Patient goals : to get stronger  Short term goals  Time Frame for Short term goals: by discharge  Short term goal 1: Pt to transfer supine <--> sit SBA to enable pt to get in/out of bed. Short term goal 2: Pt to transfer sit <--> stand SBA for increased functional mobility. Short term goal 3: Pt to ambulate >50 feet with RW SBA for household ambulation. Long term goals  Time Frame for Long term goals : NA due to short length of stay. Following session, patient left in safe position with all fall risk precautions in place.

## 2021-12-17 NOTE — PROGRESS NOTES
This RN attempted to call the patient's brother to give an update on how the patient did today. There was no answer, but I left a voicemail for him to call back.

## 2021-12-17 NOTE — PROGRESS NOTES
Pharmacy Renal Adjustment    Lincoln Rodgers is a 77 y.o. male. Pharmacy renally adjust the following medications per P&T approved policy: baricitinib & famotidine    Height:   Ht Readings from Last 1 Encounters:   12/07/21 5' 6\" (1.676 m)     Weight:  Wt Readings from Last 1 Encounters:   12/17/21 210 lb 12.8 oz (95.6 kg)     Recent Labs     12/16/21  0339 12/17/21  0352   BUN 37* 66*   CREATININE 1.2 1.9*     Estimated Creatinine Clearance: 41 mL/min (A) (based on SCr of 1.9 mg/dL (H)).   Calculated CrCl:    Assessment:  DARINEL    Plan:   Decrease baricitinb from 4 mg daily to 2 mg daily  Decrease famotidine from 20 mg bid to 20 mg daily

## 2021-12-17 NOTE — CARE COORDINATION
DISCHARGE PLANNING UPDATE    Barriers to Discharge: Na+ 132, WBC 20.4; monitor.  Oxygen 4L, Olumiant, PO Decadron, Vitamin D, Zinc continued    Discharge Plan: lives alone (apartment); plans new [place], new [facility] when medically cleared; therapy following

## 2021-12-18 LAB
ANION GAP SERPL CALCULATED.3IONS-SCNC: 16 MEQ/L (ref 8–16)
BASOPHILS # BLD: 0.1 %
BASOPHILS ABSOLUTE: 0 THOU/MM3 (ref 0–0.1)
BUN BLDV-MCNC: 70 MG/DL (ref 7–22)
CALCIUM SERPL-MCNC: 8.7 MG/DL (ref 8.5–10.5)
CHLORIDE BLD-SCNC: 99 MEQ/L (ref 98–111)
CO2: 18 MEQ/L (ref 23–33)
CREAT SERPL-MCNC: 1.5 MG/DL (ref 0.4–1.2)
EOSINOPHIL # BLD: 0 %
EOSINOPHILS ABSOLUTE: 0 THOU/MM3 (ref 0–0.4)
ERYTHROCYTE [DISTWIDTH] IN BLOOD BY AUTOMATED COUNT: 13 % (ref 11.5–14.5)
ERYTHROCYTE [DISTWIDTH] IN BLOOD BY AUTOMATED COUNT: 41.1 FL (ref 35–45)
GFR SERPL CREATININE-BSD FRML MDRD: 47 ML/MIN/1.73M2
GLUCOSE BLD-MCNC: 109 MG/DL (ref 70–108)
GLUCOSE BLD-MCNC: 132 MG/DL (ref 70–108)
GLUCOSE BLD-MCNC: 156 MG/DL (ref 70–108)
GLUCOSE BLD-MCNC: 163 MG/DL (ref 70–108)
GLUCOSE BLD-MCNC: 163 MG/DL (ref 70–108)
GLUCOSE BLD-MCNC: 84 MG/DL (ref 70–108)
HCT VFR BLD CALC: 43.5 % (ref 42–52)
HEMOGLOBIN: 14.6 GM/DL (ref 14–18)
IMMATURE GRANS (ABS): 0.13 THOU/MM3 (ref 0–0.07)
IMMATURE GRANULOCYTES: 0.8 %
LYMPHOCYTES # BLD: 5.1 %
LYMPHOCYTES ABSOLUTE: 0.8 THOU/MM3 (ref 1–4.8)
MCH RBC QN AUTO: 29.4 PG (ref 26–33)
MCHC RBC AUTO-ENTMCNC: 33.6 GM/DL (ref 32.2–35.5)
MCV RBC AUTO: 87.5 FL (ref 80–94)
MONOCYTES # BLD: 6.3 %
MONOCYTES ABSOLUTE: 1 THOU/MM3 (ref 0.4–1.3)
NUCLEATED RED BLOOD CELLS: 0 /100 WBC
PLATELET # BLD: 276 THOU/MM3 (ref 130–400)
PMV BLD AUTO: 10.9 FL (ref 9.4–12.4)
POTASSIUM SERPL-SCNC: 4.9 MEQ/L (ref 3.5–5.2)
RBC # BLD: 4.97 MILL/MM3 (ref 4.7–6.1)
SEG NEUTROPHILS: 87.7 %
SEGMENTED NEUTROPHILS ABSOLUTE COUNT: 13.9 THOU/MM3 (ref 1.8–7.7)
SODIUM BLD-SCNC: 133 MEQ/L (ref 135–145)
WBC # BLD: 15.9 THOU/MM3 (ref 4.8–10.8)

## 2021-12-18 PROCEDURE — 82948 REAGENT STRIP/BLOOD GLUCOSE: CPT

## 2021-12-18 PROCEDURE — 6370000000 HC RX 637 (ALT 250 FOR IP): Performed by: NURSE PRACTITIONER

## 2021-12-18 PROCEDURE — 6360000002 HC RX W HCPCS: Performed by: INTERNAL MEDICINE

## 2021-12-18 PROCEDURE — 6360000002 HC RX W HCPCS: Performed by: NURSE PRACTITIONER

## 2021-12-18 PROCEDURE — 85025 COMPLETE CBC W/AUTO DIFF WBC: CPT

## 2021-12-18 PROCEDURE — 6370000000 HC RX 637 (ALT 250 FOR IP): Performed by: STUDENT IN AN ORGANIZED HEALTH CARE EDUCATION/TRAINING PROGRAM

## 2021-12-18 PROCEDURE — 2580000003 HC RX 258: Performed by: STUDENT IN AN ORGANIZED HEALTH CARE EDUCATION/TRAINING PROGRAM

## 2021-12-18 PROCEDURE — 6370000000 HC RX 637 (ALT 250 FOR IP): Performed by: UROLOGY

## 2021-12-18 PROCEDURE — 80048 BASIC METABOLIC PNL TOTAL CA: CPT

## 2021-12-18 PROCEDURE — 6370000000 HC RX 637 (ALT 250 FOR IP): Performed by: INTERNAL MEDICINE

## 2021-12-18 PROCEDURE — 36415 COLL VENOUS BLD VENIPUNCTURE: CPT

## 2021-12-18 PROCEDURE — 99233 SBSQ HOSP IP/OBS HIGH 50: CPT | Performed by: INTERNAL MEDICINE

## 2021-12-18 PROCEDURE — 2140000000 HC CCU INTERMEDIATE R&B

## 2021-12-18 RX ORDER — DOXAZOSIN MESYLATE 4 MG/1
4 TABLET ORAL DAILY
Status: DISCONTINUED | OUTPATIENT
Start: 2021-12-18 | End: 2021-12-20 | Stop reason: HOSPADM

## 2021-12-18 RX ADMIN — ENOXAPARIN SODIUM 30 MG: 100 INJECTION SUBCUTANEOUS at 09:06

## 2021-12-18 RX ADMIN — DOXAZOSIN 4 MG: 4 TABLET ORAL at 09:54

## 2021-12-18 RX ADMIN — ENOXAPARIN SODIUM 30 MG: 100 INJECTION SUBCUTANEOUS at 21:36

## 2021-12-18 RX ADMIN — SODIUM CHLORIDE: 9 INJECTION, SOLUTION INTRAVENOUS at 16:46

## 2021-12-18 RX ADMIN — Medication 50 MG: at 09:07

## 2021-12-18 RX ADMIN — INSULIN LISPRO 2 UNITS: 100 INJECTION, SOLUTION INTRAVENOUS; SUBCUTANEOUS at 12:19

## 2021-12-18 RX ADMIN — AMLODIPINE BESYLATE 10 MG: 10 TABLET ORAL at 09:08

## 2021-12-18 RX ADMIN — HYDRALAZINE HYDROCHLORIDE 100 MG: 50 TABLET ORAL at 06:50

## 2021-12-18 RX ADMIN — HYDRALAZINE HYDROCHLORIDE 100 MG: 50 TABLET ORAL at 21:38

## 2021-12-18 RX ADMIN — BARICITINIB 2 MG: 2 TABLET, FILM COATED ORAL at 09:08

## 2021-12-18 RX ADMIN — Medication 2000 UNITS: at 09:07

## 2021-12-18 RX ADMIN — TAMSULOSIN HYDROCHLORIDE 0.4 MG: 0.4 CAPSULE ORAL at 09:07

## 2021-12-18 RX ADMIN — INSULIN GLARGINE 30 UNITS: 100 INJECTION, SOLUTION SUBCUTANEOUS at 21:28

## 2021-12-18 RX ADMIN — HYDRALAZINE HYDROCHLORIDE 100 MG: 50 TABLET ORAL at 14:03

## 2021-12-18 RX ADMIN — SODIUM CHLORIDE: 9 INJECTION, SOLUTION INTRAVENOUS at 07:12

## 2021-12-18 RX ADMIN — OXYCODONE HYDROCHLORIDE AND ACETAMINOPHEN 500 MG: 500 TABLET ORAL at 09:08

## 2021-12-18 RX ADMIN — FAMOTIDINE 20 MG: 20 TABLET ORAL at 09:08

## 2021-12-18 RX ADMIN — DEXAMETHASONE 6 MG: 4 TABLET ORAL at 09:08

## 2021-12-18 NOTE — PROGRESS NOTES
Writer called patient's brother Colton Hurt ([phone]) to give daily report. Jalil's wife answered the telephone and report was given to her.

## 2021-12-18 NOTE — PROGRESS NOTES
shows sinus [place] with first degree AV block. - Echo 12/13 no structural heart disease. EF 60%. - Hold home Toprol.  - Continue to monitor. Asymptomatic     Obesity  - Lifestyle modifications       Chief Complaint: shortness of breath    Hospital Course:      77 y. o. male who presented to [facility] with feeling well for 4 weeks; he has a past medical history of hypertension; he states he has been short of breath along with fatigue, myalgias and a nonproductive cough for the last 4 weeks; he states the last 2 days his symptoms have increased which prompted him for an evaluation in the ER today; he has received 1 dose of the Moderna vaccination for Covid.     He complains of lightheadedness and dizziness along with a productive cough and shortness of breath; he denies chest pain, denies nausea, vomiting or diarrhea; he tells me he is not eaten anything in the last 2 to 3 weeks; he also tells me he is not taking his blood pressure medicine in the last 2 to 3 weeks either; in the ER it was noted that he had gross hematuria which patient states this is new.     In the emergency department, he had initial O2 sat of 78%, he was initially placed on 6 L bringing him up to 90% and is currently on high flow oxygen at 90% FiO2 and 40 L satting 98%; chest x-ray showed pneumonia; CT of the abdomen and chest are currently pending, he is being admitted        Subjective:  Patient seen and examined. No acute events overnight. Patient states he feels okay today, but still having some shortness of breath and cough. He denies any pain, fevers, chills, chest pain, palpitations, nausea/vomiting, constipation, diarrhea, leg pain or swelling. He reports improvement in GERD-symptoms with starting Pepcid. Developed DARINEL overnight.      Medications:  Reviewed    Infusion Medications    sodium chloride 100 mL/hr at 12/18/21 5899    dextrose      sodium chloride       Scheduled Medications    baricitinib  2 mg Oral Daily  famotidine  20 mg Oral Daily    insulin glargine  30 Units SubCUTAneous Nightly    insulin lispro  10 Units SubCUTAneous TID WC    [Held by provider] hydroCHLOROthiazide  50 mg Oral Daily    [Held by provider] losartan  100 mg Oral Daily    doxazosin  2 mg Oral Daily    hydrALAZINE  100 mg Oral Q8H    [Held by provider] metoprolol succinate  100 mg Oral Daily    dexamethasone  6 mg Oral Daily    insulin lispro  0-12 Units SubCUTAneous TID WC    insulin lispro  0-6 Units SubCUTAneous Nightly    tamsulosin  0.4 mg Oral Daily    amLODIPine  10 mg Oral Daily    sodium chloride flush  5-40 mL IntraVENous 2 times per day    enoxaparin  30 mg SubCUTAneous BID    Vitamin D  2,000 Units Oral Daily    ascorbic acid  500 mg Oral Daily    zinc sulfate  50 mg Oral Daily     PRN Meds: simethicone, perflutren lipid microspheres, glucose, dextrose, glucagon (rDNA), dextrose, sodium chloride flush, sodium chloride, ondansetron **OR** ondansetron, polyethylene glycol, acetaminophen **OR** acetaminophen, guaiFENesin-dextromethorphan      Intake/Output Summary (Last 24 hours) at 12/18/2021 0855  Last data filed at 12/18/2021 0416  Gross per 24 hour   Intake 200 ml   Output 1200 ml   Net -1000 ml       Diet:  ADULT DIET; Regular; 4 carb choices (60 gm/meal); No Added Salt (3-4 gm)    Exam:  BP (!) 161/69   Pulse 78   Temp 97.7 °F (36.5 °C) (Oral)   Resp 22   Ht 5' 6\" (1.676 m)   Wt 210 lb 12.8 oz (95.6 kg)   SpO2 94%   BMI 34.02 kg/m²       General appearance: No apparent distress, appears stated age and cooperative. Sitting in chair, oxygen intact  HEENT: Pupils equal, round, and reactive to light. Conjunctivae/corneas clear. Neck: Supple, with full range of motion. No jugular venous distention. Trachea midline.   Respiratory:  Normal respiratory effort on supplemental oxygen, lungs with diminished breath sounds bilaterally, no wheezing  Cardiovascular: RRR, W5/G8 with systolic ejection murmur  Abdomen: Soft, non-tender, non-distended with normal bowel sounds. Musculoskeletal: passive and active ROM x 4 extremities. Skin: Skin color, texture, turgor normal.  No rashes or lesions. Neurologic:  Neurovascularly intact without any focal sensory/motor deficits. Cranial nerves: II-XII intact, grossly non-focal.  Psychiatric: Alert and oriented, thought content appropriate, normal insight  Capillary Refill: Brisk,< 3 seconds   Peripheral Pulses: +2 palpable, equal bilaterally     Labs:   Recent Labs     12/16/21  0339 12/17/21  0352 12/18/21  0354   WBC 19.5* 20.4* 15.9*   HGB 15.7 14.8 14.6   HCT 48.0 45.0 43.5    280 276     Recent Labs     12/17/21  0352 12/17/21  1758 12/18/21  0354   * 130* 133*   K 5.4* 5.4* 4.9   CL 97* 96* 99   CO2 18* 18* 18*   BUN 66* 71* 70*   CREATININE 1.9* 1.7* 1.5*   CALCIUM 8.7 8.9 8.7     No results for input(s): AST, ALT, BILIDIR, BILITOT, ALKPHOS in the last 72 hours. No results for input(s): INR in the last 72 hours. No results for input(s): Prentis Revels in the last 72 hours. Urinalysis:      Lab Results   Component Value Date    NITRU NEGATIVE 12/08/2021    WBCUA 0-2 12/08/2021    BACTERIA NONE SEEN 12/08/2021    RBCUA 5-10 12/08/2021    BLOODU SMALL 12/08/2021    GLUCOSEU >= 1000 12/08/2021       Radiology:  XR CHEST PORTABLE   Final Result   Redemonstration of diffuse patchy opacities which is more confluent in the right upper lobe and mildly worsened in the interval.               **This report has been created using voice recognition software. It may contain minor errors which are inherent in voice recognition technology. **      Final report electronically signed by Dr. Tyrese Bauer MD on 12/11/2021 11:21 AM      CTA CHEST [address]   Final Result       1. No pulmonary embolism. No aneurysm of the thoracic or abdominal aorta. No dissection. 2. Extensive bilateral infiltrates.                **This report has been created using voice recognition software. It may contain minor errors which are inherent in voice recognition technology. **      Final report electronically signed by Dr. William Barrera on 12/7/2021 1:41 PM      CTA ABDOMEN PELVIS W WO CONTRAST   Final Result       1. No pulmonary embolism. No aneurysm of the thoracic or abdominal aorta. No dissection. 2. Extensive bilateral infiltrates. **This report has been created using voice recognition software. It may contain minor errors which are inherent in voice recognition technology. **      Final report electronically signed by Dr. William Barrera on 12/7/2021 1:41 PM      XR CHEST PORTABLE   Final Result   Multifocal airspace opacities suggest pneumonia in the appropriate clinical setting. The patient is pending CT assessment. **This report has been created using voice recognition software. It may contain minor errors which are inherent in voice recognition technology. **      Final report electronically signed by Dr Serene Phelps on 12/7/2021 10:58 AM          Diet: ADULT DIET; Regular; 4 carb choices (60 gm/meal); No Added Salt (3-4 gm)    DVT prophylaxis: [x] Lovenox                                 [] SCDs                                 [] SQ Heparin                                 [] Encourage ambulation           [] Already on Anticoagulation     Disposition:    [] Home       [] TCU       [] Rehab       [] Psych       [x] SNF       [] [place]       [] Other-    Code Status: Full Code    PT/OT Eval Status: on board      Electronically signed by Faiza Emerson DO on 12/18/2021 at 8:55 AM    This note was electronically signed. Parts of this note may have been dictated by use of voice recognition software and electronically transcribed. The note may contain errors not detected in proofreading. Please refer to my supervising physician's documentation if my documentation differs.

## 2021-12-18 NOTE — ACP (ADVANCE CARE PLANNING)
Advance Care Planning     Advance Care Planning Inpatient Note  [facility] Department    Today's Date: 12/18/2021  Unit: [facility] CCU-STEPDOWN 3B    Received request from rounding. Upon review of chart and communication with care team, patient's decision making abilities are not in question. . Patient was/were present in the room during visit. Goals of ACP Conversation:  Discuss advance care planning documents  Facilitate a discussion related to patient's goals of care as they align with the patient's values and beliefs. Health Care Decision Makers:       Primary Decision Maker: Tommy Nielsen - Brother/Sister - [phone]    Secondary Decision Maker: Cande Dmitry  Bubba - [phone]    Summary:  Completed New Documents    Advance Care Planning Documents (Patient Wishes):  Healthcare Power of /Advance Directive Appointment of Health Care Agent  Living Will/Advance Directive     Assessment:   rounded on unit. Pt was recently moved to a nasal canula from high flow oxygen. Pt became tearful when  asked about his joe. Pt made a vow to God to share his testimony if he lived. Pt is not currently a member of a [employer].  completed HCPOA and Living Will with pt. Pt has a son that he does not interact with much, and pt made his brother his primary agent.  encouraged pt to let both his agents know that they have been listed on his AD and to have conversations with them about his end of life wishes. Interventions:  Provided education on documents for clarity and greater understanding  Discussed and provided education on state decision maker hierarchy  Assisted in the completion of documents according to patient's wishes at this time  Encouraged ongoing ACP conversation with future decision makers and loved ones    Care Preferences Communicated:   No    Outcomes/Plan:  New advance directive completed.   Returned original document(s) to patient, as well as copies for distribution to appointed agents  Copy of advance directive given to staff to scan into medical record. Routed ACP note to attending provider or other IDT member.   Teach Back Method used to verify the patient's and/or Healthcare Decision Maker's understanding of key information in the advance directive documents    Electronically signed by Que Tsaiin Intern on 12/18/2021 at 1:04 PM

## 2021-12-19 LAB
ANION GAP SERPL CALCULATED.3IONS-SCNC: 11 MEQ/L (ref 8–16)
BASOPHILS # BLD: 0.1 %
BASOPHILS ABSOLUTE: 0 THOU/MM3 (ref 0–0.1)
BUN BLDV-MCNC: 51 MG/DL (ref 7–22)
C-REACTIVE PROTEIN: 0.71 MG/DL (ref 0–1)
CALCIUM SERPL-MCNC: 8.4 MG/DL (ref 8.5–10.5)
CHLORIDE BLD-SCNC: 104 MEQ/L (ref 98–111)
CO2: 19 MEQ/L (ref 23–33)
CREAT SERPL-MCNC: 1.1 MG/DL (ref 0.4–1.2)
EOSINOPHIL # BLD: 0.1 %
EOSINOPHILS ABSOLUTE: 0 THOU/MM3 (ref 0–0.4)
ERYTHROCYTE [DISTWIDTH] IN BLOOD BY AUTOMATED COUNT: 12.8 % (ref 11.5–14.5)
ERYTHROCYTE [DISTWIDTH] IN BLOOD BY AUTOMATED COUNT: 41.9 FL (ref 35–45)
GFR SERPL CREATININE-BSD FRML MDRD: 67 ML/MIN/1.73M2
GLUCOSE BLD-MCNC: 103 MG/DL (ref 70–108)
GLUCOSE BLD-MCNC: 108 MG/DL (ref 70–108)
GLUCOSE BLD-MCNC: 268 MG/DL (ref 70–108)
GLUCOSE BLD-MCNC: 66 MG/DL (ref 70–108)
GLUCOSE BLD-MCNC: 97 MG/DL (ref 70–108)
HCT VFR BLD CALC: 41.5 % (ref 42–52)
HEMOGLOBIN: 13.6 GM/DL (ref 14–18)
IMMATURE GRANS (ABS): 0.1 THOU/MM3 (ref 0–0.07)
IMMATURE GRANULOCYTES: 0.9 %
LYMPHOCYTES # BLD: 7.1 %
LYMPHOCYTES ABSOLUTE: 0.8 THOU/MM3 (ref 1–4.8)
MCH RBC QN AUTO: 29.2 PG (ref 26–33)
MCHC RBC AUTO-ENTMCNC: 32.8 GM/DL (ref 32.2–35.5)
MCV RBC AUTO: 89.1 FL (ref 80–94)
MONOCYTES # BLD: 9.1 %
MONOCYTES ABSOLUTE: 1 THOU/MM3 (ref 0.4–1.3)
NUCLEATED RED BLOOD CELLS: 0 /100 WBC
PLATELET # BLD: 238 THOU/MM3 (ref 130–400)
PMV BLD AUTO: 11.1 FL (ref 9.4–12.4)
POTASSIUM SERPL-SCNC: 4.7 MEQ/L (ref 3.5–5.2)
RBC # BLD: 4.66 MILL/MM3 (ref 4.7–6.1)
SEG NEUTROPHILS: 82.7 %
SEGMENTED NEUTROPHILS ABSOLUTE COUNT: 9.3 THOU/MM3 (ref 1.8–7.7)
SODIUM BLD-SCNC: 134 MEQ/L (ref 135–145)
WBC # BLD: 11.3 THOU/MM3 (ref 4.8–10.8)

## 2021-12-19 PROCEDURE — 6360000002 HC RX W HCPCS: Performed by: NURSE PRACTITIONER

## 2021-12-19 PROCEDURE — 82948 REAGENT STRIP/BLOOD GLUCOSE: CPT

## 2021-12-19 PROCEDURE — 6370000000 HC RX 637 (ALT 250 FOR IP): Performed by: UROLOGY

## 2021-12-19 PROCEDURE — 6370000000 HC RX 637 (ALT 250 FOR IP): Performed by: PHARMACIST

## 2021-12-19 PROCEDURE — 2580000003 HC RX 258: Performed by: NURSE PRACTITIONER

## 2021-12-19 PROCEDURE — 80048 BASIC METABOLIC PNL TOTAL CA: CPT

## 2021-12-19 PROCEDURE — 86140 C-REACTIVE PROTEIN: CPT

## 2021-12-19 PROCEDURE — 6370000000 HC RX 637 (ALT 250 FOR IP): Performed by: INTERNAL MEDICINE

## 2021-12-19 PROCEDURE — 36415 COLL VENOUS BLD VENIPUNCTURE: CPT

## 2021-12-19 PROCEDURE — 99232 SBSQ HOSP IP/OBS MODERATE 35: CPT | Performed by: INTERNAL MEDICINE

## 2021-12-19 PROCEDURE — 2140000000 HC CCU INTERMEDIATE R&B

## 2021-12-19 PROCEDURE — 6370000000 HC RX 637 (ALT 250 FOR IP): Performed by: NURSE PRACTITIONER

## 2021-12-19 PROCEDURE — 2580000003 HC RX 258: Performed by: STUDENT IN AN ORGANIZED HEALTH CARE EDUCATION/TRAINING PROGRAM

## 2021-12-19 PROCEDURE — 85025 COMPLETE CBC W/AUTO DIFF WBC: CPT

## 2021-12-19 PROCEDURE — 6360000002 HC RX W HCPCS: Performed by: INTERNAL MEDICINE

## 2021-12-19 RX ORDER — INSULIN GLARGINE 100 [IU]/ML
25 INJECTION, SOLUTION SUBCUTANEOUS NIGHTLY
Status: DISCONTINUED | OUTPATIENT
Start: 2021-12-19 | End: 2021-12-20 | Stop reason: HOSPADM

## 2021-12-19 RX ORDER — METOPROLOL SUCCINATE 50 MG/1
50 TABLET, EXTENDED RELEASE ORAL DAILY
Status: DISCONTINUED | OUTPATIENT
Start: 2021-12-20 | End: 2021-12-20 | Stop reason: HOSPADM

## 2021-12-19 RX ORDER — FAMOTIDINE 20 MG/1
20 TABLET, FILM COATED ORAL 2 TIMES DAILY
Status: DISCONTINUED | OUTPATIENT
Start: 2021-12-19 | End: 2021-12-20 | Stop reason: HOSPADM

## 2021-12-19 RX ADMIN — FAMOTIDINE 20 MG: 20 TABLET ORAL at 21:30

## 2021-12-19 RX ADMIN — SODIUM CHLORIDE, PRESERVATIVE FREE 10 ML: 5 INJECTION INTRAVENOUS at 08:22

## 2021-12-19 RX ADMIN — HYDRALAZINE HYDROCHLORIDE 100 MG: 50 TABLET ORAL at 21:30

## 2021-12-19 RX ADMIN — FAMOTIDINE 20 MG: 20 TABLET ORAL at 08:22

## 2021-12-19 RX ADMIN — SODIUM CHLORIDE: 9 INJECTION, SOLUTION INTRAVENOUS at 04:10

## 2021-12-19 RX ADMIN — HYDRALAZINE HYDROCHLORIDE 100 MG: 50 TABLET ORAL at 13:23

## 2021-12-19 RX ADMIN — BARICITINIB 2 MG: 2 TABLET, FILM COATED ORAL at 08:21

## 2021-12-19 RX ADMIN — HYDROCHLOROTHIAZIDE 50 MG: 25 TABLET ORAL at 08:21

## 2021-12-19 RX ADMIN — AMLODIPINE BESYLATE 10 MG: 10 TABLET ORAL at 08:22

## 2021-12-19 RX ADMIN — OXYCODONE HYDROCHLORIDE AND ACETAMINOPHEN 500 MG: 500 TABLET ORAL at 08:21

## 2021-12-19 RX ADMIN — HYDRALAZINE HYDROCHLORIDE 100 MG: 50 TABLET ORAL at 06:47

## 2021-12-19 RX ADMIN — DOXAZOSIN 4 MG: 4 TABLET ORAL at 08:22

## 2021-12-19 RX ADMIN — Medication 2000 UNITS: at 08:21

## 2021-12-19 RX ADMIN — SODIUM CHLORIDE, PRESERVATIVE FREE 10 ML: 5 INJECTION INTRAVENOUS at 22:33

## 2021-12-19 RX ADMIN — DEXAMETHASONE 6 MG: 4 TABLET ORAL at 08:21

## 2021-12-19 RX ADMIN — INSULIN LISPRO 6 UNITS: 100 INJECTION, SOLUTION INTRAVENOUS; SUBCUTANEOUS at 13:24

## 2021-12-19 RX ADMIN — ENOXAPARIN SODIUM 30 MG: 100 INJECTION SUBCUTANEOUS at 08:23

## 2021-12-19 RX ADMIN — ENOXAPARIN SODIUM 30 MG: 100 INJECTION SUBCUTANEOUS at 21:31

## 2021-12-19 RX ADMIN — Medication 50 MG: at 08:22

## 2021-12-19 RX ADMIN — TAMSULOSIN HYDROCHLORIDE 0.4 MG: 0.4 CAPSULE ORAL at 08:22

## 2021-12-19 NOTE — PROGRESS NOTES
Baricitinib Follow-Up    Current Dose: 2 mg once daily    Recent Labs     12/17/21  0352 12/18/21  0354 12/19/21  0415   WBC 20.4* 15.9* 11.3*   SEGSPCT  --  87.7 82.7   IMMGRAN  --  0.8  0.13* 0.9  0.10*   SEGSABS  --  13.9* 9.3*   LYMPHSABS  --  0.8* 0.8*     eGFR:   Recent Labs     12/17/21  1758 12/18/21  0354 12/19/21  0415   LABGLOM 40* 47* 67*     Plan:  Increase baricitinib to 4 mg once daily    EUA Dosing Reference for COVID-19 (FDA 2021):  eGFR ? 60: No dosage adjustment necessary. eGFR 30 to < 60: 2 mg once daily. eGFR 15 to < 30: 1 mg once daily. eGFR < 15: Use is not recommended. Consider stopping baricitinib if:  Absolute lymphocyte count (ALC) is < 200   Absolute neutrophil count (ANC) is < 500  ALT > 5 x ULN  AST > 10 x ULN  Acute DVT or PE      [employer], Pharm. ARYA., BCPS, BCCCP 12/19/2021 11:24 AM

## 2021-12-19 NOTE — CARE COORDINATION
DISCHARGE PLANNING UPDATE    Barriers to Discharge: medically cleared by Attending; left message for [facility]; await call back    Discharge Plan: as above    Update: Catalina Ortiz, [address] Admissions reports they cannot accept patient today r/t staffing concerns; but can accept 1st thing, Monday, 12/20; updated Attending  Electronically signed by Karen Tran RN on 12/19/2021 at 12:05 PM

## 2021-12-19 NOTE — PROGRESS NOTES
PROGRESS NOTE      Patient:  Eduard Blake      Unit/Bed:3B-38/038-A    YOB: 1955    MRN: [id number]       Acct: [de-identified]     PCP: DIONTE Santiago CNP    Date of Admission: 12/7/2021    Discharge planning: medically stable to go to [address] [place] [address]. Will go tomorrow due to staffing. Assessment/Plan:    Acute hypoxic resp failure secondary to Covid-19  COVID 19 Pneumonia  - Patient required high flow nasal cannula this admission. Currently on 1L NC.  - CTA 12/7/2021 showing no PE  - Continue Decadron, Baricitnib, Vitamins. Completed 5 days of Zithromax. Encouraged pulmonary hygiene. - Wean off O2 as tolerated, home o2 evaluation. Will need SNF for continued weaning.     Type II diabetes mellitus.  - A1c 9.9 on 12/8/21. New diagnosis. Blood sugars controlled. - Continue Lantus 30 units QHS, Humalog 10 units TID AC plus SSI    - Will need close follow up with PCP, referral to diabetes clinic. DARINEL, resolved  - BUN:Creatinine ratio 66/1.9, supports pre-renal etiology. Urine dark this am. Dehydration? Hyperkalemia, resolved  - Likely due to uremia / DARINEL; however, specimen with low-level hemolysis. Anion gap metabolic acidosis  - Likely due to DARINEL. Has been acidotic since admission.  - BMP in am    Leukocytosis, improving  - WBC 11.3 this am. Secondary to steroids. Has been afebrile. - Monitor CBC    Primary Hypertension  - Blood pressures uncontrolled with holding antihypertensives due to DARINEL  - Continue Norvasc 10 mg daily, Cardura 4 mg daily, Hydralazine 100 mg TID, HCTZ 50 mg daily  - Will restart Toprol at 50 mg to see if HR tolerates it. Resume HCTZ today. Hold Losartan until tomorrow. Urinary retention  Gross hematuria - resolved  - Dale catheter in place. Will try voiding trial this week  - Continue Flomax  - Urology signed off. Follow up at discharge with Dr. Sona Pro. May need cystoscopy. GERD  - Likely due to being on steroids.  Improving.  - Continue Pepcid      Sinus (12/20/21)    Medications:  Reviewed    Infusion Medications    dextrose      sodium chloride       Scheduled Medications    [START ON 12/20/2021] metoprolol succinate  50 mg Oral Daily    famotidine  20 mg Oral BID    [START ON 12/20/2021] baricitinib  4 mg Oral Daily    doxazosin  4 mg Oral Daily    insulin glargine  30 Units SubCUTAneous Nightly    insulin lispro  10 Units SubCUTAneous TID WC    hydroCHLOROthiazide  50 mg Oral Daily    [Held by provider] losartan  100 mg Oral Daily    hydrALAZINE  100 mg Oral Q8H    insulin lispro  0-12 Units SubCUTAneous TID WC    insulin lispro  0-6 Units SubCUTAneous Nightly    tamsulosin  0.4 mg Oral Daily    amLODIPine  10 mg Oral Daily    sodium chloride flush  5-40 mL IntraVENous 2 times per day    enoxaparin  30 mg SubCUTAneous BID    Vitamin D  2,000 Units Oral Daily    ascorbic acid  500 mg Oral Daily    zinc sulfate  50 mg Oral Daily     PRN Meds: simethicone, perflutren lipid microspheres, glucose, dextrose, glucagon (rDNA), dextrose, sodium chloride flush, sodium chloride, ondansetron **OR** ondansetron, polyethylene glycol, acetaminophen **OR** acetaminophen, guaiFENesin-dextromethorphan      Intake/Output Summary (Last 24 hours) at 12/19/2021 1139  Last data filed at 12/19/2021 0419  Gross per 24 hour   Intake 120 ml   Output 2400 ml   Net -2280 ml       Diet:  ADULT DIET; Regular; 4 carb choices (60 gm/meal); No Added Salt (3-4 gm)    Exam:  BP (!) 177/49   Pulse 73   Temp 98.6 °F (37 °C) (Oral)   Resp 20   Ht 5' 6\" (1.676 m)   Wt 210 lb 12.8 oz (95.6 kg)   SpO2 90%   BMI 34.02 kg/m²       General appearance: No apparent distress, appears stated age and cooperative. Sitting in chair, oxygen intact  HEENT: Pupils equal, round, and reactive to light. Conjunctivae/corneas clear. Neck: Supple, with full range of motion. No jugular venous distention. Trachea midline.   Respiratory:  Normal respiratory effort on supplemental oxygen, lungs with diminished breath sounds bilaterally, no wheezing  Cardiovascular: RRR, D7/C1 with systolic ejection murmur  Abdomen: Soft, non-tender, non-distended with normal bowel sounds. Musculoskeletal: passive and active ROM x 4 extremities. Skin: Skin color, texture, turgor normal.  No rashes or lesions. Neurologic:  Neurovascularly intact without any focal sensory/motor deficits. Cranial nerves: II-XII intact, grossly non-focal.  Psychiatric: Alert and oriented, thought content appropriate, normal insight  Capillary Refill: Brisk,< 3 seconds   Peripheral Pulses: +2 palpable, equal bilaterally     Labs:   Recent Labs     12/17/21  0352 12/18/21  0354 12/19/21  0415   WBC 20.4* 15.9* 11.3*   HGB 14.8 14.6 13.6*   HCT 45.0 43.5 41.5*    276 238     Recent Labs     12/17/21  1758 12/18/21  0354 12/19/21  0415   * 133* 134*   K 5.4* 4.9 4.7   CL 96* 99 104   CO2 18* 18* 19*   BUN 71* 70* 51*   CREATININE 1.7* 1.5* 1.1   CALCIUM 8.9 8.7 8.4*     No results for input(s): AST, ALT, BILIDIR, BILITOT, ALKPHOS in the last 72 hours. No results for input(s): INR in the last 72 hours. No results for input(s): Kaleen Hope in the last 72 hours. Urinalysis:      Lab Results   Component Value Date    NITRU NEGATIVE 12/08/2021    WBCUA 0-2 12/08/2021    BACTERIA NONE SEEN 12/08/2021    RBCUA 5-10 12/08/2021    BLOODU SMALL 12/08/2021    GLUCOSEU >= 1000 12/08/2021       Radiology:  XR CHEST PORTABLE   Final Result   Redemonstration of diffuse patchy opacities which is more confluent in the right upper lobe and mildly worsened in the interval.               **This report has been created using voice recognition software. It may contain minor errors which are inherent in voice recognition technology. **      Final report electronically signed by Dr. Carl Redman MD on 12/11/2021 11:21 AM      CTA CHEST [address]   Final Result       1. No pulmonary embolism.  No aneurysm of the thoracic or abdominal aorta. No dissection. 2. Extensive bilateral infiltrates. **This report has been created using voice recognition software. It may contain minor errors which are inherent in voice recognition technology. **      Final report electronically signed by Dr. Mack Ayala on 12/7/2021 1:41 PM      CTA ABDOMEN PELVIS W WO CONTRAST   Final Result       1. No pulmonary embolism. No aneurysm of the thoracic or abdominal aorta. No dissection. 2. Extensive bilateral infiltrates. **This report has been created using voice recognition software. It may contain minor errors which are inherent in voice recognition technology. **      Final report electronically signed by Dr. Mack Ayala on 12/7/2021 1:41 PM      XR CHEST PORTABLE   Final Result   Multifocal airspace opacities suggest pneumonia in the appropriate clinical setting. The patient is pending CT assessment. **This report has been created using voice recognition software. It may contain minor errors which are inherent in voice recognition technology. **      Final report electronically signed by Dr Sohan Buchanan on 12/7/2021 10:58 AM          Diet: ADULT DIET; Regular; 4 carb choices (60 gm/meal); No Added Salt (3-4 gm)    DVT prophylaxis: [x] Lovenox                                 [] SCDs                                 [] SQ Heparin                                 [] Encourage ambulation           [] Already on Anticoagulation     Disposition:    [] Home       [] TCU       [] Rehab       [] Psych       [x] SNF       [] [facility]       [] Other-    Code Status: Full Code    PT/OT Eval Status: on board      Electronically signed by Ruma Ulloa MD on 12/19/2021 at 11:39 AM    This note was electronically signed. Parts of this note may have been dictated by use of voice recognition software and electronically transcribed. The note may contain errors not detected in proofreading.  Please refer to my supervising physician's documentation if my documentation differs.

## 2021-12-20 VITALS
WEIGHT: 195.5 LBS | HEART RATE: 75 BPM | OXYGEN SATURATION: 94 % | BODY MASS INDEX: 31.42 KG/M2 | DIASTOLIC BLOOD PRESSURE: 60 MMHG | RESPIRATION RATE: 18 BRPM | HEIGHT: 66 IN | TEMPERATURE: 97.9 F | SYSTOLIC BLOOD PRESSURE: 134 MMHG

## 2021-12-20 LAB
ANION GAP SERPL CALCULATED.3IONS-SCNC: 12 MEQ/L (ref 8–16)
BASOPHILS # BLD: 0.1 %
BASOPHILS ABSOLUTE: 0 THOU/MM3 (ref 0–0.1)
BUN BLDV-MCNC: 38 MG/DL (ref 7–22)
CALCIUM SERPL-MCNC: 8.2 MG/DL (ref 8.5–10.5)
CHLORIDE BLD-SCNC: 102 MEQ/L (ref 98–111)
CO2: 19 MEQ/L (ref 23–33)
CREAT SERPL-MCNC: 1 MG/DL (ref 0.4–1.2)
EOSINOPHIL # BLD: 0.2 %
EOSINOPHILS ABSOLUTE: 0 THOU/MM3 (ref 0–0.4)
ERYTHROCYTE [DISTWIDTH] IN BLOOD BY AUTOMATED COUNT: 12.7 % (ref 11.5–14.5)
ERYTHROCYTE [DISTWIDTH] IN BLOOD BY AUTOMATED COUNT: 42.6 FL (ref 35–45)
GFR SERPL CREATININE-BSD FRML MDRD: 75 ML/MIN/1.73M2
GLUCOSE BLD-MCNC: 121 MG/DL (ref 70–108)
GLUCOSE BLD-MCNC: 82 MG/DL (ref 70–108)
GLUCOSE BLD-MCNC: 99 MG/DL (ref 70–108)
HCT VFR BLD CALC: 42.9 % (ref 42–52)
HEMOGLOBIN: 13.7 GM/DL (ref 14–18)
IMMATURE GRANS (ABS): 0.08 THOU/MM3 (ref 0–0.07)
IMMATURE GRANULOCYTES: 0.6 %
LYMPHOCYTES # BLD: 5.8 %
LYMPHOCYTES ABSOLUTE: 0.7 THOU/MM3 (ref 1–4.8)
MCH RBC QN AUTO: 29 PG (ref 26–33)
MCHC RBC AUTO-ENTMCNC: 31.9 GM/DL (ref 32.2–35.5)
MCV RBC AUTO: 90.7 FL (ref 80–94)
MONOCYTES # BLD: 8.9 %
MONOCYTES ABSOLUTE: 1.1 THOU/MM3 (ref 0.4–1.3)
NUCLEATED RED BLOOD CELLS: 0 /100 WBC
PLATELET # BLD: 222 THOU/MM3 (ref 130–400)
PMV BLD AUTO: 11.2 FL (ref 9.4–12.4)
POTASSIUM SERPL-SCNC: 4.6 MEQ/L (ref 3.5–5.2)
RBC # BLD: 4.73 MILL/MM3 (ref 4.7–6.1)
SEG NEUTROPHILS: 84.4 %
SEGMENTED NEUTROPHILS ABSOLUTE COUNT: 10.8 THOU/MM3 (ref 1.8–7.7)
SODIUM BLD-SCNC: 133 MEQ/L (ref 135–145)
WBC # BLD: 12.8 THOU/MM3 (ref 4.8–10.8)

## 2021-12-20 PROCEDURE — 2580000003 HC RX 258: Performed by: NURSE PRACTITIONER

## 2021-12-20 PROCEDURE — 85025 COMPLETE CBC W/AUTO DIFF WBC: CPT

## 2021-12-20 PROCEDURE — 6370000000 HC RX 637 (ALT 250 FOR IP): Performed by: INTERNAL MEDICINE

## 2021-12-20 PROCEDURE — 82948 REAGENT STRIP/BLOOD GLUCOSE: CPT

## 2021-12-20 PROCEDURE — 6370000000 HC RX 637 (ALT 250 FOR IP): Performed by: NURSE PRACTITIONER

## 2021-12-20 PROCEDURE — 36415 COLL VENOUS BLD VENIPUNCTURE: CPT

## 2021-12-20 PROCEDURE — 6370000000 HC RX 637 (ALT 250 FOR IP): Performed by: STUDENT IN AN ORGANIZED HEALTH CARE EDUCATION/TRAINING PROGRAM

## 2021-12-20 PROCEDURE — 6370000000 HC RX 637 (ALT 250 FOR IP): Performed by: PHARMACIST

## 2021-12-20 PROCEDURE — 99239 HOSP IP/OBS DSCHRG MGMT >30: CPT | Performed by: FAMILY MEDICINE

## 2021-12-20 PROCEDURE — 80048 BASIC METABOLIC PNL TOTAL CA: CPT

## 2021-12-20 PROCEDURE — 6360000002 HC RX W HCPCS: Performed by: NURSE PRACTITIONER

## 2021-12-20 PROCEDURE — 6370000000 HC RX 637 (ALT 250 FOR IP): Performed by: UROLOGY

## 2021-12-20 RX ORDER — FAMOTIDINE 20 MG/1
20 TABLET, FILM COATED ORAL 2 TIMES DAILY
Qty: 60 TABLET | Refills: 3 | DISCHARGE
Start: 2021-12-20 | End: 2022-04-04

## 2021-12-20 RX ORDER — INSULIN GLARGINE 100 [IU]/ML
25 INJECTION, SOLUTION SUBCUTANEOUS NIGHTLY
Qty: 10 ML | Refills: 3 | Status: ON HOLD | DISCHARGE
Start: 2021-12-20 | End: 2022-01-27 | Stop reason: HOSPADM

## 2021-12-20 RX ORDER — DOXAZOSIN MESYLATE 4 MG/1
4 TABLET ORAL DAILY
Qty: 30 TABLET | Refills: 1 | Status: ON HOLD | DISCHARGE
Start: 2021-12-21 | End: 2022-01-27 | Stop reason: HOSPADM

## 2021-12-20 RX ORDER — METOPROLOL SUCCINATE 50 MG/1
50 TABLET, EXTENDED RELEASE ORAL DAILY
Qty: 30 TABLET | Refills: 3 | DISCHARGE
Start: 2021-12-21 | End: 2022-04-04

## 2021-12-20 RX ADMIN — INSULIN LISPRO 8 UNITS: 100 INJECTION, SOLUTION INTRAVENOUS; SUBCUTANEOUS at 09:32

## 2021-12-20 RX ADMIN — TAMSULOSIN HYDROCHLORIDE 0.4 MG: 0.4 CAPSULE ORAL at 09:07

## 2021-12-20 RX ADMIN — HYDRALAZINE HYDROCHLORIDE 100 MG: 50 TABLET ORAL at 13:26

## 2021-12-20 RX ADMIN — FAMOTIDINE 20 MG: 20 TABLET ORAL at 09:07

## 2021-12-20 RX ADMIN — ENOXAPARIN SODIUM 30 MG: 100 INJECTION SUBCUTANEOUS at 09:32

## 2021-12-20 RX ADMIN — METOPROLOL SUCCINATE 50 MG: 50 TABLET, EXTENDED RELEASE ORAL at 09:09

## 2021-12-20 RX ADMIN — OXYCODONE HYDROCHLORIDE AND ACETAMINOPHEN 500 MG: 500 TABLET ORAL at 09:08

## 2021-12-20 RX ADMIN — SODIUM CHLORIDE, PRESERVATIVE FREE 10 ML: 5 INJECTION INTRAVENOUS at 09:09

## 2021-12-20 RX ADMIN — AMLODIPINE BESYLATE 10 MG: 10 TABLET ORAL at 09:08

## 2021-12-20 RX ADMIN — HYDROCHLOROTHIAZIDE 50 MG: 25 TABLET ORAL at 09:08

## 2021-12-20 RX ADMIN — Medication 50 MG: at 09:09

## 2021-12-20 RX ADMIN — DOXAZOSIN 4 MG: 4 TABLET ORAL at 09:07

## 2021-12-20 RX ADMIN — Medication 2000 UNITS: at 09:07

## 2021-12-20 RX ADMIN — BARICITINIB 4 MG: 2 TABLET, FILM COATED ORAL at 09:07

## 2021-12-20 RX ADMIN — HYDRALAZINE HYDROCHLORIDE 100 MG: 50 TABLET ORAL at 05:08

## 2021-12-20 ASSESSMENT — PAIN SCALES - GENERAL: PAINLEVEL_OUTOF10: 0

## 2021-12-20 NOTE — CARE COORDINATION
12/20/21, 11:04 AM EST    Patient goals/plan/ treatment preferences discussed by  and . Patient goals/plan/ treatment preferences reviewed with patient/ family. Patient/ family verbalize understanding of discharge plan and are in agreement with goal/plan/treatment preferences. Understanding was demonstrated using the teach back method. AVS provided by RN at time of discharge, which includes all necessary medical information pertaining to the patients current course of illness, treatment, post-discharge goals of care, and treatment preferences. Services After Discharge  Services At/After Discharge: Swapna Law [facility])   IMM Letter  IMM Letter given to Patient/Family/Significant other/Guardian/POA/by[de-identified] Gilbert Garcia CM  IMM Letter date given[de-identified] 12/20/21  IMM Letter time given[de-identified] 4531     Marden Scheuermann will be discharged to [facility] today. He will be skilled at the facility. He will be transported by ambulette. Spoke with Marden Scheuermann and he is aware of the out of pocket cost of transport. Discharge instructions and transport forms are attached to blue discharge packet. Spoke with patient and he asked that SW call his brother to make him aware. Called and spoke with his sister in law as his brother was sleeping. She will let her spouse know about the discharge.   Spoke with Neelima at [facility] and she is aware transport has been set up for 2:00pm.

## 2021-12-21 NOTE — DISCHARGE SUMMARY
Hospital Medicine Discharge Summary      Patient Identification:   Rere Miller   : 1955  MRN: [id number]   Account: [de-identified]      Patient's PCP: DIONTE Justin CNP    Admit Date: 2021     Discharge Date: 2021      Admitting Physician: No admitting provider for patient encounter. Discharge Physician: Bruce Sales MD     Discharge Diagnoses: Active Hospital Problems    Diagnosis Date Noted    Essential hypertension [I10]      Priority: Medium    COVID-19 [U07.1]     Acute respiratory failure with hypoxia ([address] [J96.01]     COVID-19 virus detected [U07.1] 2021       The patient was seen and examined on day of discharge and this discharge summary is in conjunction with any daily progress note from day of discharge. Hospital Course:   Per admission H&P:  \"Patient is a 77 y. o. male who presented to [facility] with feeling well for 4 weeks; he has a past medical history of hypertension; he states he has been short of breath along with fatigue, myalgias and a nonproductive cough for the last 4 weeks; he states the last 2 days his symptoms have increased which prompted him for an evaluation in the ER today; he has received 1 dose of the Moderna vaccination for Covid.     He complains of lightheadedness and dizziness along with a productive cough and shortness of breath; he denies chest pain, denies nausea, vomiting or diarrhea; he tells me he is not eaten anything in the last 2 to 3 weeks; he also tells me he is not taking his blood pressure medicine in the last 2 to 3 weeks either; in the ER it was noted that he had gross hematuria which patient states this is new.     In the emergency department, he had initial O2 sat of 78%, he was initially placed on 6 L bringing him up to 90% and is currently on high flow oxygen at 90% FiO2 and 40 L satting 98%; chest x-ray showed pneumonia; CT of the abdomen and chest are currently pending, he is being admitted\"    Patient was treated with Decadron, Baricitinib and azithromycin. Patient's Oxygen was weaned off to RA. He was discharged stable to [facility]. See treatment details:    Acute hypoxic resp failure secondary to Covid-19, resolved  COVID 19 Pneumonia  - CTA 12/7/2021 showing no PE  - Continue Decadron, Baricitnib, Vitamins. Completed 5 days of Zithromax. .  - Wean off O2 as tolerated  - discharge to SNF     Type II diabetes mellitus.  - A1c 9.9 on 12/8/21. New diagnosis. - Continue Lantus 25 units QHS, Humalog 7 units TID AC plus SSI    - Will need close follow up with PCP, referral to diabetes clinic. DARINEL, resolved     Hyperkalemia, resolved     NAG metabolic acidosis, improved     Leukocytosis, improved  Secondary to steroids. Has been afebrile.     Primary Hypertension, uncontrolled  - Continue Norvasc, Cardura, Hydralazine, Losartan/HCTZ, Toprol      Urinary retention  Gross hematuria - resolved  - Dale catheter in place, void trial  - Continue Flomax  - Urology signed off. Follow up at discharge with Dr. Skye Pedro. May need cystoscopy.     GERD  - Continue Pepcid      Sinus bradycardia, resolved   - 12/13: EKG shows sinus [place] with first degree AV block. .  - Resume home Toprol     Obesity  - Lifestyle modifications     Exam:     Vitals:  Vitals:    12/20/21 0326 12/20/21 0508 12/20/21 0815 12/20/21 1130   BP: (!) 176/73 (!) 160/59 (!) 170/75 134/60   Pulse: 81  82 75   Resp: 24  20 18   Temp: 99.1 °F (37.3 °C)  98.9 °F (37.2 °C) 97.9 °F (36.6 °C)   TempSrc: Oral  Oral Oral   SpO2: 94%   94%   Weight: 195 lb 8 oz (88.7 kg)      Height:         Weight: Weight: 195 lb 8 oz (88.7 kg)     24 hour intake/output:No intake or output data in the 24 hours ending 12/21/21 1625      General appearance:  No apparent distress, appears stated age and cooperative. HEENT:  Normal cephalic, atraumatic without obvious deformity. Pupils equal, round, and reactive to light. Extra ocular muscles intact. Conjunctivae/corneas clear. Neck: Supple, with full range of motion. No jugular venous distention. Trachea midline. Respiratory:  Normal respiratory effort. Diminished BS bilaterally without Rales/Wheezes/Rhonchi. Cardiovascular:  Regular rate and rhythm with normal Q0/Y8 with systolic murmur, no rubs or gallops. Abdomen: Soft, non-tender, non-distended with normal bowel sounds. Musculoskeletal:  No clubbing, cyanosis or edema bilaterally. Full range of motion without deformity. Skin: Skin color, texture, turgor normal.  No rashes or lesions. Neurologic:  Neurovascularly intact without any focal sensory/motor deficits. Cranial nerves: II-XII intact, grossly non-focal.  Psychiatric:  Alert and oriented, thought content appropriate, normal insight  Capillary Refill: Brisk,< 3 seconds   Peripheral Pulses: +2 palpable, equal bilaterally       Labs: For convenience and continuity at follow-up the following most recent labs are provided:      CBC:    Lab Results   Component Value Date    WBC 12.8 12/20/2021    HGB 13.7 12/20/2021    HCT 42.9 12/20/2021     12/20/2021       Renal:    Lab Results   Component Value Date     12/20/2021    K 4.6 12/20/2021    K 3.7 12/07/2021     12/20/2021    CO2 19 12/20/2021    BUN 38 12/20/2021    CREATININE 1.0 12/20/2021    CALCIUM 8.2 12/20/2021         Significant Diagnostic Studies    Radiology:   XR CHEST PORTABLE   Final Result   Redemonstration of diffuse patchy opacities which is more confluent in the right upper lobe and mildly worsened in the interval.               **This report has been created using voice recognition software. It may contain minor errors which are inherent in voice recognition technology. **      Final report electronically signed by Dr. Jeremie Perdomo MD on 12/11/2021 11:21 AM      CTA CHEST W [address]   Final Result       1. No pulmonary embolism. No aneurysm of the thoracic or abdominal aorta. No dissection.    2. Extensive bilateral infiltrates. **This report has been created using voice recognition software. It may contain minor errors which are inherent in voice recognition technology. **      Final report electronically signed by Dr. Rahul Cook on 12/7/2021 1:41 PM      CTA ABDOMEN PELVIS W WO CONTRAST   Final Result       1. No pulmonary embolism. No aneurysm of the thoracic or abdominal aorta. No dissection. 2. Extensive bilateral infiltrates. **This report has been created using voice recognition software. It may contain minor errors which are inherent in voice recognition technology. **      Final report electronically signed by Dr. Rahul Cook on 12/7/2021 1:41 PM      XR CHEST PORTABLE   Final Result   Multifocal airspace opacities suggest pneumonia in the appropriate clinical setting. The patient is pending CT assessment. **This report has been created using voice recognition software. It may contain minor errors which are inherent in voice recognition technology. **      Final report electronically signed by Dr Ricky Hardin on 12/7/2021 10:58 AM             Consults:     IP CONSULT TO UROLOGY  IP CONSULT TO DIETITIAN  IP CONSULT TO DIETITIAN  IP CONSULT TO SOCIAL WORK    Disposition:    [] Home       [] TCU       [] Rehab       [] Psych       [x] SNF       [] [facility]       [] Other-    Condition at Discharge: Stable    Code Status:  Prior     Patient Instructions:    Discharge lab work:    Activity: activity as tolerated  Diet: No diet orders on file      Follow-up visits:   Belia Ron MD  [address] [place] [address] [facility]    On 1/3/2022  possible cystoscopy  ,  LT MSG IN OFFICE WAITING ON REPLY //  [place] [address]  [address] [place] [address]  [phone]              [facility] PHYSICIAN TO FOLLOW         Discharge Medications:        Medication List      START taking these medications    ascorbic acid 500 MG tablet  Commonly known as: VITAMIN C  Take 1 tablet by mouth daily  Notes to patient: covid supplement     doxazosin 4 MG tablet  Commonly known as: CARDURA  Take 1 tablet by mouth daily     famotidine 20 MG tablet  Commonly known as: PEPCID  Take 1 tablet by mouth 2 times daily     guaiFENesin-dextromethorphan 100-10 MG/5ML syrup  Commonly known as: ROBITUSSIN DM  Take 5 mLs by mouth every 4 hours as needed for Cough     hydrALAZINE 100 MG tablet  Commonly known as: APRESOLINE  Take 1 tablet by mouth every 8 hours     hydroCHLOROthiazide 50 MG tablet  Commonly known as: HYDRODIURIL  Take 1 tablet by mouth daily     insulin glargine 100 UNIT/ML injection vial  Commonly known as: LANTUS  Inject 25 Units into the skin nightly     * insulin lispro 100 UNIT/ML injection vial  Commonly known as: HUMALOG  Inject 0-12 Units into the skin 3 times daily (with meals)  Notes to patient: Medium Dose Corrective Insulin Scale for Meals  Glucose:     Dose:  If <139   =  No Insulin  140-199 = 2 Units  200-249 = 4 Units  250-299 = 6 Units  300-349 = 8 Units  350-400 = 10 Units  Above 400 = 12 Units     * insulin lispro 100 UNIT/ML injection vial  Commonly known as: HUMALOG  Inject 0-6 Units into the skin nightly  Notes to patient: Medium Dose Corrective Insulin Scale for Bedtime  Glucose:   Dose:   = No Insulin  140-199 = 1 Unit  200-249 = 2 Units  250-299 = 3 Units  300-349 = 4 Units  350-400 = 5 Units  > 400     = 6 Units        * insulin lispro 100 UNIT/ML injection vial  Commonly known as: HUMALOG  Inject 7 Units into the skin 3 times daily (with meals)     losartan 100 MG tablet  Commonly known as: COZAAR  Take 1 tablet by mouth daily     metoprolol succinate 50 MG extended release tablet  Commonly known as: TOPROL XL  Take 1 tablet by mouth daily     simethicone 80 MG chewable tablet  Commonly known as: MYLICON  Take 1 tablet by mouth every 6 hours as needed for Flatulence (abdominal cramping)     tamsulosin 0.4 MG capsule  Commonly known as: FLOMAX  Take 1 capsule by mouth daily     vitamin D 50 MCG (2000 UT) Tabs tablet  Commonly known as: CHOLECALCIFEROL  Take 1 tablet by mouth daily     zinc sulfate 220 (50 Zn) MG capsule  Commonly known as: ZINCATE  Take 1 capsule by mouth daily         * This list has 3 medication(s) that are the same as other medications prescribed for you. Read the directions carefully, and ask your doctor or other care provider to review them with you. CONTINUE taking these medications    amLODIPine 10 MG tablet  Commonly known as: NORVASC  Take 1 tablet by mouth daily     aspirin 81 MG tablet        STOP taking these medications    atenolol 100 MG tablet  Commonly known as: TENORMIN        ASK your doctor about these medications    dexamethasone 6 MG tablet  Commonly known as: DECADRON  Take 1 tablet by mouth daily for 4 doses  Ask about: Should I take this medication?            Where to Get Your Medications      These medications were sent to [facility], [address] [place] [address] Way    Phone: [phone]   · tamsulosin 0.4 MG capsule     Information about where to get these medications is not yet available    Ask your nurse or doctor about these medications  · ascorbic acid 500 MG tablet  · dexamethasone 6 MG tablet  · doxazosin 4 MG tablet  · famotidine 20 MG tablet  · guaiFENesin-dextromethorphan 100-10 MG/5ML syrup  · hydrALAZINE 100 MG tablet  · hydroCHLOROthiazide 50 MG tablet  · insulin glargine 100 UNIT/ML injection vial  · insulin lispro 100 UNIT/ML injection vial  · insulin lispro 100 UNIT/ML injection vial  · insulin lispro 100 UNIT/ML injection vial  · losartan 100 MG tablet  · metoprolol succinate 50 MG extended release tablet  · simethicone 80 MG chewable tablet  · vitamin D 50 MCG (2000 UT) Tabs tablet  · zinc sulfate 220 (50 Zn) MG capsule         Time Spent on discharge is more than

## 2022-01-08 ENCOUNTER — APPOINTMENT (OUTPATIENT)
Dept: INTERVENTIONAL RADIOLOGY/VASCULAR | Age: 67
End: 2022-01-08
Payer: MEDICARE

## 2022-01-08 ENCOUNTER — HOSPITAL ENCOUNTER (EMERGENCY)
Age: 67
Discharge: NURSING, REHAB OR LONG-TERM CARE | End: 2022-01-08
Attending: EMERGENCY MEDICINE
Payer: MEDICARE

## 2022-01-08 VITALS
OXYGEN SATURATION: 95 % | SYSTOLIC BLOOD PRESSURE: 128 MMHG | RESPIRATION RATE: 18 BRPM | HEART RATE: 98 BPM | DIASTOLIC BLOOD PRESSURE: 73 MMHG | TEMPERATURE: 98.5 F

## 2022-01-08 DIAGNOSIS — I82.402 ACUTE DEEP VEIN THROMBOSIS (DVT) OF LEFT LOWER EXTREMITY, UNSPECIFIED VEIN (HCC): Primary | ICD-10-CM

## 2022-01-08 DIAGNOSIS — N17.9 AKI (ACUTE KIDNEY INJURY) (HCC): ICD-10-CM

## 2022-01-08 LAB
ANION GAP SERPL CALCULATED.3IONS-SCNC: 10 MEQ/L (ref 8–16)
BASOPHILS # BLD: 0.3 %
BASOPHILS ABSOLUTE: 0 THOU/MM3 (ref 0–0.1)
BUN BLDV-MCNC: 95 MG/DL (ref 7–22)
CALCIUM SERPL-MCNC: 8.5 MG/DL (ref 8.5–10.5)
CHLORIDE BLD-SCNC: 104 MEQ/L (ref 98–111)
CO2: 21 MEQ/L (ref 23–33)
CREAT SERPL-MCNC: 1.7 MG/DL (ref 0.4–1.2)
EOSINOPHIL # BLD: 0.9 %
EOSINOPHILS ABSOLUTE: 0.1 THOU/MM3 (ref 0–0.4)
ERYTHROCYTE [DISTWIDTH] IN BLOOD BY AUTOMATED COUNT: 14.5 % (ref 11.5–14.5)
ERYTHROCYTE [DISTWIDTH] IN BLOOD BY AUTOMATED COUNT: 47 FL (ref 35–45)
GFR SERPL CREATININE-BSD FRML MDRD: 40 ML/MIN/1.73M2
GLUCOSE BLD-MCNC: 88 MG/DL (ref 70–108)
HCT VFR BLD CALC: 36.3 % (ref 42–52)
HEMOGLOBIN: 11.9 GM/DL (ref 14–18)
IMMATURE GRANS (ABS): 0.04 THOU/MM3 (ref 0–0.07)
IMMATURE GRANULOCYTES: 0.3 %
LYMPHOCYTES # BLD: 6.5 %
LYMPHOCYTES ABSOLUTE: 0.8 THOU/MM3 (ref 1–4.8)
MCH RBC QN AUTO: 29.3 PG (ref 26–33)
MCHC RBC AUTO-ENTMCNC: 32.8 GM/DL (ref 32.2–35.5)
MCV RBC AUTO: 89.4 FL (ref 80–94)
MONOCYTES # BLD: 8 %
MONOCYTES ABSOLUTE: 1 THOU/MM3 (ref 0.4–1.3)
NUCLEATED RED BLOOD CELLS: 0 /100 WBC
OSMOLALITY CALCULATION: 298.9 MOSMOL/KG (ref 275–300)
PLATELET # BLD: 157 THOU/MM3 (ref 130–400)
PMV BLD AUTO: 9.9 FL (ref 9.4–12.4)
POTASSIUM SERPL-SCNC: 4.3 MEQ/L (ref 3.5–5.2)
RBC # BLD: 4.06 MILL/MM3 (ref 4.7–6.1)
SEG NEUTROPHILS: 84 %
SEGMENTED NEUTROPHILS ABSOLUTE COUNT: 10.9 THOU/MM3 (ref 1.8–7.7)
SODIUM BLD-SCNC: 135 MEQ/L (ref 135–145)
WBC # BLD: 13 THOU/MM3 (ref 4.8–10.8)

## 2022-01-08 PROCEDURE — 80048 BASIC METABOLIC PNL TOTAL CA: CPT

## 2022-01-08 PROCEDURE — 93971 EXTREMITY STUDY: CPT

## 2022-01-08 PROCEDURE — 36415 COLL VENOUS BLD VENIPUNCTURE: CPT

## 2022-01-08 PROCEDURE — 99285 EMERGENCY DEPT VISIT HI MDM: CPT

## 2022-01-08 PROCEDURE — 6370000000 HC RX 637 (ALT 250 FOR IP): Performed by: EMERGENCY MEDICINE

## 2022-01-08 PROCEDURE — 85025 COMPLETE CBC W/AUTO DIFF WBC: CPT

## 2022-01-08 PROCEDURE — 2580000003 HC RX 258: Performed by: EMERGENCY MEDICINE

## 2022-01-08 RX ORDER — SODIUM CHLORIDE 9 MG/ML
INJECTION, SOLUTION INTRAVENOUS CONTINUOUS
Status: DISCONTINUED | OUTPATIENT
Start: 2022-01-08 | End: 2022-01-08 | Stop reason: HOSPADM

## 2022-01-08 RX ORDER — 0.9 % SODIUM CHLORIDE 0.9 %
250 INTRAVENOUS SOLUTION INTRAVENOUS ONCE
Status: COMPLETED | OUTPATIENT
Start: 2022-01-08 | End: 2022-01-08

## 2022-01-08 RX ORDER — OXYCODONE HYDROCHLORIDE AND ACETAMINOPHEN 5; 325 MG/1; MG/1
1 TABLET ORAL ONCE
Status: COMPLETED | OUTPATIENT
Start: 2022-01-08 | End: 2022-01-08

## 2022-01-08 RX ADMIN — SODIUM CHLORIDE: 9 INJECTION, SOLUTION INTRAVENOUS at 17:20

## 2022-01-08 RX ADMIN — APIXABAN 10 MG: 5 TABLET, FILM COATED ORAL at 17:39

## 2022-01-08 RX ADMIN — OXYCODONE AND ACETAMINOPHEN 1 TABLET: 5; 325 TABLET ORAL at 14:24

## 2022-01-08 RX ADMIN — SODIUM CHLORIDE 250 ML: 9 INJECTION, SOLUTION INTRAVENOUS at 17:40

## 2022-01-08 ASSESSMENT — PAIN DESCRIPTION - ORIENTATION: ORIENTATION: LEFT;PROXIMAL

## 2022-01-08 ASSESSMENT — PAIN DESCRIPTION - DESCRIPTORS
DESCRIPTORS: ACHING
DESCRIPTORS: ACHING

## 2022-01-08 ASSESSMENT — ENCOUNTER SYMPTOMS
CONSTIPATION: 0
WHEEZING: 0
VOICE CHANGE: 0
VOMITING: 0
CHEST TIGHTNESS: 0
TROUBLE SWALLOWING: 0
NAUSEA: 0
RHINORRHEA: 0
SHORTNESS OF BREATH: 0
SORE THROAT: 0
ABDOMINAL PAIN: 0
COUGH: 0
SINUS PRESSURE: 0
DIARRHEA: 0
BACK PAIN: 0

## 2022-01-08 ASSESSMENT — PAIN DESCRIPTION - PAIN TYPE
TYPE: ACUTE PAIN
TYPE: ACUTE PAIN

## 2022-01-08 ASSESSMENT — PAIN DESCRIPTION - FREQUENCY
FREQUENCY: CONTINUOUS
FREQUENCY: CONTINUOUS

## 2022-01-08 ASSESSMENT — PAIN SCALES - GENERAL
PAINLEVEL_OUTOF10: 5
PAINLEVEL_OUTOF10: 4
PAINLEVEL_OUTOF10: 2

## 2022-01-08 ASSESSMENT — PAIN DESCRIPTION - PROGRESSION: CLINICAL_PROGRESSION: NOT CHANGED

## 2022-01-08 ASSESSMENT — PAIN DESCRIPTION - ONSET: ONSET: SUDDEN

## 2022-01-08 NOTE — ED NOTES
MD rounding at bedside, plan is to be DC back to Rehab center, pt agrees.      Angel Shelley RN  01/08/22 [id number]

## 2022-01-08 NOTE — ED NOTES
Pt's Eliquis was never received from pharmacy. Called pharmacy, Eliquis will be re dispensed.       Kathy Dorsey RN  01/08/22 5245

## 2022-01-08 NOTE — ED TRIAGE NOTES
Pt brought in by EMS from [facility] rehab center c/o sudden onset of left thigh pain. Pt denies injury, reports pain constant.

## 2022-01-08 NOTE — ED NOTES
Bed: 042A  Expected date:   Expected time:   Means of arrival:   Comments:  James Wilcox RN  01/08/22 7789

## 2022-01-08 NOTE — ED PROVIDER NOTES
[address] [address]        Room # 69/148B    CHIEF COMPLAINT    Chief Complaint   Patient presents with    Leg Pain       Nurses Notes reviewed and I agree except as noted in the HPI. HPI    Arun Dunn is a 77 y.o. male who presents for evaluation of pain on the left thigh since 8 AM this morning. Patient denies any injury or trauma, no fall. Pain just became spontaneous, hurts to move. The patient is currently living at nursing home for a rehab following a Covid pneumonia, patient developed debility following hospitalization 12/7/2021 to 12/20/2021. The patient's denies any injury or trauma or fall, denies any shortness of breath or chest pain. Patient does not use any more oxygen in the nursing home. REVIEW OF SYSTEMS    Review of Systems   Constitutional: Negative for appetite change, chills, diaphoresis, fatigue and fever. HENT: Negative for congestion, ear discharge, ear pain, postnasal drip, rhinorrhea, sinus pressure, sore throat, trouble swallowing and voice change. Respiratory: Negative for cough, chest tightness, shortness of breath and wheezing. Cardiovascular: Negative for chest pain, palpitations and leg swelling. Gastrointestinal: Negative for abdominal pain, constipation, diarrhea, nausea and vomiting. Musculoskeletal: Negative for arthralgias, back pain, joint swelling, myalgias, neck pain and neck stiffness. Left thigh pain   Skin: Negative for rash. Neurological: Negative for dizziness, syncope, weakness, light-headedness, numbness and headaches. PAST MEDICAL HISTORY     has a past medical history of Diabetes mellitus ([facility] [address] Hyperlipidemia, and Hypertension. SURGICAL HISTORY   has a past surgical history that includes other surgical history (Aug 29, 2013) and Endoscopy, colon, diagnostic.     CURRENT MEDICATIONS    Discharge Medication List as of 1/8/2022  6:37 PM      CONTINUE these medications which have NOT CHANGED    Details   metoprolol succinate (TOPROL XL) 50 MG extended release tablet Take 1 tablet by mouth daily, Disp-30 tablet, R-3DC to SNF      doxazosin (CARDURA) 4 MG tablet Take 1 tablet by mouth daily, Disp-30 tablet, R-1DC to SNF      famotidine (PEPCID) 20 MG tablet Take 1 tablet by mouth 2 times daily, Disp-60 tablet, R-3DC to SNF      insulin glargine (LANTUS) 100 UNIT/ML injection vial Inject 25 Units into the skin nightly, Disp-10 mL, R-3DC to SNF      hydrALAZINE (APRESOLINE) 100 MG tablet Take 1 tablet by mouth every 8 hours, Disp-90 tablet, R-0DC to SNF      losartan (COZAAR) 100 MG tablet Take 1 tablet by mouth daily, Disp-30 tablet, R-0DC to SNF      hydroCHLOROthiazide (HYDRODIURIL) 50 MG tablet Take 1 tablet by mouth daily, Disp-30 tablet, R-3DC to SNF      simethicone (MYLICON) 80 MG chewable tablet Take 1 tablet by mouth every 6 hours as needed for Flatulence (abdominal cramping), Disp-30 tablet, R-0DC to SNF      ascorbic acid (VITAMIN C) 500 MG tablet Take 1 tablet by mouth daily, Disp-30 tablet, R-0DC to SNF      Vitamin D (CHOLECALCIFEROL) 50 MCG (2000 UT) TABS tablet Take 1 tablet by mouth daily, Disp-30 tablet, R-0Labeling may look different. 25 mcg=1000 Units. Please double check dosages. DC to SNF      !! insulin lispro (HUMALOG) 100 UNIT/ML injection vial Inject 0-12 Units into the skin 3 times daily (with meals), Disp-10 mL, R-3DC to SNF      !! insulin lispro (HUMALOG) 100 UNIT/ML injection vial Inject 0-6 Units into the skin nightly, Disp-10 mL, R-3DC to SNF      !! insulin lispro (HUMALOG) 100 UNIT/ML injection vial Inject 7 Units into the skin 3 times daily (with meals), Disp-10 mL, R-0DC to SNF      zinc sulfate (ZINCATE) 220 (50 Zn) MG capsule Take 1 capsule by mouth daily, Disp-30 capsule, R-0DC to SNF      tamsulosin (FLOMAX) 0.4 MG capsule Take 1 capsule by mouth daily, Disp-30 capsule, R-3Normal      amLODIPine (NORVASC) 10 MG tablet Take 1 tablet by mouth daily, Disp-90 tablet, R-0Normal      aspirin 81 MG tablet Take 81 mg by mouth daily. !! - Potential duplicate medications found. Please discuss with provider. ALLERGIES    has No Known Allergies. FAMILY HISTORY    He indicated that the status of his mother is unknown. He indicated that the status of his father is unknown.   family history includes Heart Disease in his father; Other in his mother. SOCIAL HISTORY     reports that he has never smoked. He has never used smokeless tobacco. He reports that he does not drink alcohol and does not use drugs. PHYSICAL EXAM      INITIAL VITALS: /73   Pulse 98   Temp 98.5 °F (36.9 °C) (Oral)   Resp 18   SpO2 95% Estimated body mass index is 31.55 kg/m² as calculated from the following:    Height as of 12/7/21: 5' 6\" (1.676 m). Weight as of 12/20/21: 195 lb 8 oz (88.7 kg). Physical Exam  Vitals reviewed. Constitutional:       Appearance: He is well-developed. HENT:      Head: Normocephalic and atraumatic. Right Ear: External ear normal.      Left Ear: External ear normal.      Nose: Nose normal.   Eyes:      General: No scleral icterus. Conjunctiva/sclera: Conjunctivae normal.      Pupils: Pupils are equal, round, and reactive to light. Neck:      Thyroid: No thyromegaly. Vascular: No JVD. Cardiovascular:      Rate and Rhythm: Normal rate and regular rhythm. Heart sounds: No murmur heard. No friction rub. Pulmonary:      Effort: Pulmonary effort is normal.      Breath sounds: Decreased breath sounds present. No wheezing or rales. Chest:      Chest wall: No tenderness. Abdominal:      General: Bowel sounds are normal.      Palpations: Abdomen is soft. There is no mass. Tenderness: There is no abdominal tenderness. Musculoskeletal:         General: Tenderness (Left medial thigh, including the knee, there is no swelling, dorsalis pedis are full and equal) present.       Cervical back: Normal range of motion and neck supple. Lymphadenopathy:      Cervical: No cervical adenopathy. Skin:     Findings: No rash. Neurological:      Mental Status: He is alert and oriented to person, place, and time. Psychiatric:         Behavior: Behavior is cooperative. MEDICAL DECISION MAKING    DIFFERENTIAL DIAGNOSIS:  Left medial thigh pain possibly muscle spasms rule out DVT, patient had history of COVID-pneumonia currently in rehab      DIAGNOSTIC RESULTS    RADIOLOGY:  I have reviewed radiologic plain film image(s). The plain films will be read or overread by the radiologist.All other non-plain film images(s) such as CT, Ultrasound and MRI have been read by the radiologist.  VL DUP LOWER EXTREMITY VENOUS LEFT   Final Result      Acute thrombus in the peroneal and anterior tibial veins.       Final report electronically signed by Dr. Caty Redd on 1/8/2022 3:22 PM          LABS:   Labs Reviewed   CBC WITH AUTO DIFFERENTIAL - Abnormal; Notable for the following components:       Result Value    WBC 13.0 (*)     RBC 4.06 (*)     Hemoglobin 11.9 (*)     Hematocrit 36.3 (*)     RDW-SD 47.0 (*)     Segs Absolute 10.9 (*)     Lymphocytes Absolute 0.8 (*)     All other components within normal limits   BASIC METABOLIC PANEL - Abnormal; Notable for the following components:    CO2 21 (*)     BUN 95 (*)     CREATININE 1.7 (*)     All other components within normal limits   GLOMERULAR FILTRATION RATE, ESTIMATED - Abnormal; Notable for the following components:    Est, Glom Filt Rate 40 (*)     All other components within normal limits   ANION GAP   OSMOLALITY     All other unresulted laboratory test above are normal:    Vitals:    Vitals:    01/08/22 1729 01/08/22 1855 01/08/22 1910 01/08/22 1942   BP: 108/65 (!) 119/58 115/60 128/73   Pulse: 97 92 92 98   Resp: 18 18 18 18   Temp:       TempSrc:       SpO2:  94% 94% 95%       EMERGENCY DEPARTMENT COURSE:    Medications   oxyCODONE-acetaminophen (PERCOCET) 5-325 MG per tablet 1 tablet (1 tablet Oral Given 1/8/22 1424)   0.9 % sodium chloride bolus (0 mLs IntraVENous Stopped 1/8/22 2001)   apixaban (ELIQUIS) tablet 10 mg (10 mg Oral Given 1/8/22 1739)       The pt was seen and evaluated by me. Within the department, I observed the pt's vitalsigns to be within acceptable range. Laboratory and Radiological studies were performed, results were reviewed with the patient. Within the department, the pt was treated with Percocet and Eliquis. I observed the pt's condition to be hemodynamically stable during the duration of their stay. I explained my proposed course of treatment to the pt, and they were amenable to my decision. They were discharged home, and they will return to the ED if their symptoms become more severein nature, or otherwise change. Controlled Substances Monitoring:        CRITICAL CARE:   None. CONSULTS:  None    PROCEDURES:  None. FINAL IMPRESSION       1. Acute deep vein thrombosis (DVT) of left lower extremity, unspecified vein (HCC)    2. DARINEL (acute kidney injury) [facility])          DISPOSITION/PLAN  PATIENT REFERRED TO:  Minnie Sims, APRN - CNP  [address] [place] [address]  [address] [place] [address] Road [facility]  [phone]    Schedule an appointment as soon as possible for a visit in 3 days      [facility] EMERGENCY DEPT  [address]    As needed    DISCHARGE MEDICATIONS:  Discharge Medication List as of 1/8/2022  6:37 PM      START taking these medications    Details   apixaban (ELIQUIS) 5 MG TABS tablet Take 2 tablets by mouth 2 times daily for 7 days, Disp-28 tablet, R-0Print               (Please note that portions of this note were completed with a voice recognition program and electronically transcribed. Efforts were [facility] edit the dictations but occasionally words are mis-transcribed . The transcription may contain errors not detected in proofreading.   This transcription was electronically signed.) 01/12/22 4:11 PM      Emily Pittman MD      Emergency room physician           Emily Pittman MD  01/12/22 8669

## 2022-01-09 NOTE — ED NOTES
Pt's care transferred to transfer team at this time, report given. Pt is being transferred back to [facility] in stable condition.      Jermain Curtis RN  01/08/22 3263

## 2022-01-10 ENCOUNTER — TELEPHONE (OUTPATIENT)
Dept: FAMILY MEDICINE CLINIC | Age: 67
End: 2022-01-10

## 2022-01-14 ENCOUNTER — APPOINTMENT (OUTPATIENT)
Dept: CT IMAGING | Age: 67
DRG: 853 | End: 2022-01-14
Payer: MEDICARE

## 2022-01-14 ENCOUNTER — APPOINTMENT (OUTPATIENT)
Dept: GENERAL RADIOLOGY | Age: 67
DRG: 853 | End: 2022-01-14
Payer: MEDICARE

## 2022-01-14 ENCOUNTER — ANESTHESIA EVENT (OUTPATIENT)
Dept: OPERATING ROOM | Age: 67
DRG: 853 | End: 2022-01-14
Payer: MEDICARE

## 2022-01-14 ENCOUNTER — HOSPITAL ENCOUNTER (INPATIENT)
Age: 67
LOS: 13 days | Discharge: NURSING, REHAB OR LONG-TERM CARE | DRG: 853 | End: 2022-01-27
Attending: EMERGENCY MEDICINE | Admitting: INTERNAL MEDICINE
Payer: MEDICARE

## 2022-01-14 ENCOUNTER — ANESTHESIA (OUTPATIENT)
Dept: OPERATING ROOM | Age: 67
DRG: 853 | End: 2022-01-14
Payer: MEDICARE

## 2022-01-14 VITALS
DIASTOLIC BLOOD PRESSURE: 67 MMHG | SYSTOLIC BLOOD PRESSURE: 148 MMHG | OXYGEN SATURATION: 99 % | RESPIRATION RATE: 28 BRPM

## 2022-01-14 DIAGNOSIS — A41.9 SEPTIC SHOCK (HCC): Primary | ICD-10-CM

## 2022-01-14 DIAGNOSIS — R65.21 SEPTIC SHOCK (HCC): Primary | ICD-10-CM

## 2022-01-14 DIAGNOSIS — M79.89 NECROTIZING SOFT TISSUE INFECTION: ICD-10-CM

## 2022-01-14 PROBLEM — L89.95 PRESSURE INJURY, UNSTAGEABLE, WITH INFECTION (HCC): Status: ACTIVE | Noted: 2022-01-14

## 2022-01-14 PROBLEM — L89.154 PRESSURE INJURY OF SACRAL REGION, STAGE 4 (HCC): Status: ACTIVE | Noted: 2022-01-14

## 2022-01-14 PROBLEM — L08.9 PRESSURE INJURY, UNSTAGEABLE, WITH INFECTION (HCC): Status: ACTIVE | Noted: 2022-01-14

## 2022-01-14 PROBLEM — K61.31 HORSESHOE ABSCESS OF ISCHIORECTAL SPACE: Status: ACTIVE | Noted: 2022-01-14

## 2022-01-14 PROBLEM — M72.6 NECROTIZING FASCIITIS OF PELVIC REGION AND THIGH (HCC): Status: ACTIVE | Noted: 2022-01-14

## 2022-01-14 LAB
ALBUMIN SERPL-MCNC: 2.2 G/DL (ref 3.5–5.1)
ALP BLD-CCNC: 148 U/L (ref 38–126)
ALT SERPL-CCNC: 84 U/L (ref 11–66)
ANION GAP SERPL CALCULATED.3IONS-SCNC: 17 MEQ/L (ref 8–16)
AST SERPL-CCNC: 52 U/L (ref 5–40)
ATYPICAL LYMPHOCYTES: ABNORMAL %
AVERAGE GLUCOSE: 183 MG/DL (ref 70–126)
BACTERIA: ABNORMAL /HPF
BASOPHILS # BLD: 0.2 %
BASOPHILS ABSOLUTE: 0 THOU/MM3 (ref 0–0.1)
BETA-HYDROXYBUTYRATE: 7.19 MG/DL (ref 0.2–2.81)
BILIRUB SERPL-MCNC: 0.5 MG/DL (ref 0.3–1.2)
BILIRUBIN URINE: NEGATIVE
BLOOD, URINE: NEGATIVE
BUN BLDV-MCNC: 166 MG/DL (ref 7–22)
C-REACTIVE PROTEIN: 22.92 MG/DL (ref 0–1)
CALCIUM SERPL-MCNC: 8.4 MG/DL (ref 8.5–10.5)
CASTS 2: ABNORMAL /LPF
CASTS UA: ABNORMAL /LPF
CHARACTER, URINE: CLEAR
CHLORIDE BLD-SCNC: 104 MEQ/L (ref 98–111)
CO2: 18 MEQ/L (ref 23–33)
COLOR: YELLOW
CREAT SERPL-MCNC: 4.4 MG/DL (ref 0.4–1.2)
CRYSTALS, UA: ABNORMAL
EOSINOPHIL # BLD: 0.1 %
EOSINOPHILS ABSOLUTE: 0 THOU/MM3 (ref 0–0.4)
EPITHELIAL CELLS, UA: ABNORMAL /HPF
ERYTHROCYTE [DISTWIDTH] IN BLOOD BY AUTOMATED COUNT: 15.1 % (ref 11.5–14.5)
ERYTHROCYTE [DISTWIDTH] IN BLOOD BY AUTOMATED COUNT: 50.4 FL (ref 35–45)
GFR SERPL CREATININE-BSD FRML MDRD: 14 ML/MIN/1.73M2
GLUCOSE BLD-MCNC: 101 MG/DL (ref 70–108)
GLUCOSE URINE: NEGATIVE MG/DL
HBA1C MFR BLD: 8.1 % (ref 4.4–6.4)
HCT VFR BLD CALC: 34 % (ref 42–52)
HEMOGLOBIN: 10.8 GM/DL (ref 14–18)
IMMATURE GRANS (ABS): 0.28 THOU/MM3 (ref 0–0.07)
IMMATURE GRANULOCYTES: 1.3 %
KETONES, URINE: NEGATIVE
LACTIC ACID, SEPSIS: 2.4 MMOL/L (ref 0.5–1.9)
LEUKOCYTE ESTERASE, URINE: ABNORMAL
LYMPHOCYTES # BLD: 6.4 %
LYMPHOCYTES ABSOLUTE: 1.4 THOU/MM3 (ref 1–4.8)
MAGNESIUM: 3.4 MG/DL (ref 1.6–2.4)
MCH RBC QN AUTO: 29.3 PG (ref 26–33)
MCHC RBC AUTO-ENTMCNC: 31.8 GM/DL (ref 32.2–35.5)
MCV RBC AUTO: 92.1 FL (ref 80–94)
MISCELLANEOUS 2: ABNORMAL
MONOCYTES # BLD: 8.2 %
MONOCYTES ABSOLUTE: 1.8 THOU/MM3 (ref 0.4–1.3)
NITRITE, URINE: NEGATIVE
NUCLEATED RED BLOOD CELLS: 0 /100 WBC
OSMOLALITY CALCULATION: 332.4 MOSMOL/KG (ref 275–300)
PH UA: 5 (ref 5–9)
PLATELET # BLD: 254 THOU/MM3 (ref 130–400)
PLATELET ESTIMATE: ADEQUATE
PMV BLD AUTO: 11.2 FL (ref 9.4–12.4)
POIKILOCYTES: ABNORMAL
POTASSIUM SERPL-SCNC: 5.3 MEQ/L (ref 3.5–5.2)
PROCALCITONIN: 1.1 NG/ML (ref 0.01–0.09)
PROTEIN UA: ABNORMAL
RBC # BLD: 3.69 MILL/MM3 (ref 4.7–6.1)
RBC URINE: ABNORMAL /HPF
RENAL EPITHELIAL, UA: ABNORMAL
SCAN OF BLOOD SMEAR: NORMAL
SEDIMENTATION RATE, ERYTHROCYTE: 114 MM/HR (ref 0–10)
SEG NEUTROPHILS: 83.8 %
SEGMENTED NEUTROPHILS ABSOLUTE COUNT: 18.2 THOU/MM3 (ref 1.8–7.7)
SODIUM BLD-SCNC: 139 MEQ/L (ref 135–145)
SPECIFIC GRAVITY, URINE: 1.02 (ref 1–1.03)
TOTAL PROTEIN: 6.3 G/DL (ref 6.1–8)
TOXIC GRANULATION: PRESENT
UROBILINOGEN, URINE: 0.2 EU/DL (ref 0–1)
WBC # BLD: 21.7 THOU/MM3 (ref 4.8–10.8)
WBC UA: ABNORMAL /HPF
YEAST: ABNORMAL

## 2022-01-14 PROCEDURE — 71045 X-RAY EXAM CHEST 1 VIEW: CPT

## 2022-01-14 PROCEDURE — 99223 1ST HOSP IP/OBS HIGH 75: CPT | Performed by: SURGERY

## 2022-01-14 PROCEDURE — 87075 CULTR BACTERIA EXCEPT BLOOD: CPT

## 2022-01-14 PROCEDURE — 3700000001 HC ADD 15 MINUTES (ANESTHESIA): Performed by: SURGERY

## 2022-01-14 PROCEDURE — 85025 COMPLETE CBC W/AUTO DIFF WBC: CPT

## 2022-01-14 PROCEDURE — 96361 HYDRATE IV INFUSION ADD-ON: CPT

## 2022-01-14 PROCEDURE — 2500000003 HC RX 250 WO HCPCS: Performed by: NURSE ANESTHETIST, CERTIFIED REGISTERED

## 2022-01-14 PROCEDURE — 11042 DBRDMT SUBQ TIS 1ST 20SQCM/<: CPT | Performed by: SURGERY

## 2022-01-14 PROCEDURE — 11045 DBRDMT SUBQ TISS EACH ADDL: CPT | Performed by: SURGERY

## 2022-01-14 PROCEDURE — 46040 I&D ISCHIORCT&/PERIRCT ABSC: CPT | Performed by: SURGERY

## 2022-01-14 PROCEDURE — 83036 HEMOGLOBIN GLYCOSYLATED A1C: CPT

## 2022-01-14 PROCEDURE — 84145 PROCALCITONIN (PCT): CPT

## 2022-01-14 PROCEDURE — 96365 THER/PROPH/DIAG IV INF INIT: CPT

## 2022-01-14 PROCEDURE — 99291 CRITICAL CARE FIRST HOUR: CPT | Performed by: FAMILY MEDICINE

## 2022-01-14 PROCEDURE — 7100000001 HC PACU RECOVERY - ADDTL 15 MIN: Performed by: SURGERY

## 2022-01-14 PROCEDURE — 2709999900 HC NON-CHARGEABLE SUPPLY: Performed by: SURGERY

## 2022-01-14 PROCEDURE — 81001 URINALYSIS AUTO W/SCOPE: CPT

## 2022-01-14 PROCEDURE — 87205 SMEAR GRAM STAIN: CPT

## 2022-01-14 PROCEDURE — 6360000002 HC RX W HCPCS

## 2022-01-14 PROCEDURE — 11046 DBRDMT MUSC&/FSCA EA ADDL: CPT | Performed by: SURGERY

## 2022-01-14 PROCEDURE — 7100000000 HC PACU RECOVERY - FIRST 15 MIN: Performed by: SURGERY

## 2022-01-14 PROCEDURE — 74176 CT ABD & PELVIS W/O CONTRAST: CPT

## 2022-01-14 PROCEDURE — 6370000000 HC RX 637 (ALT 250 FOR IP): Performed by: SURGERY

## 2022-01-14 PROCEDURE — 6360000002 HC RX W HCPCS: Performed by: PHYSICIAN ASSISTANT

## 2022-01-14 PROCEDURE — 2580000003 HC RX 258: Performed by: NURSE ANESTHETIST, CERTIFIED REGISTERED

## 2022-01-14 PROCEDURE — 3700000000 HC ANESTHESIA ATTENDED CARE: Performed by: SURGERY

## 2022-01-14 PROCEDURE — 96367 TX/PROPH/DG ADDL SEQ IV INF: CPT

## 2022-01-14 PROCEDURE — 6360000002 HC RX W HCPCS: Performed by: STUDENT IN AN ORGANIZED HEALTH CARE EDUCATION/TRAINING PROGRAM

## 2022-01-14 PROCEDURE — 86140 C-REACTIVE PROTEIN: CPT

## 2022-01-14 PROCEDURE — 85651 RBC SED RATE NONAUTOMATED: CPT

## 2022-01-14 PROCEDURE — 2580000003 HC RX 258: Performed by: PHYSICIAN ASSISTANT

## 2022-01-14 PROCEDURE — 80053 COMPREHEN METABOLIC PANEL: CPT

## 2022-01-14 PROCEDURE — 0KBN0ZZ EXCISION OF RIGHT HIP MUSCLE, OPEN APPROACH: ICD-10-PCS | Performed by: SURGERY

## 2022-01-14 PROCEDURE — 3600000005 HC SURGERY LEVEL 5 BASE: Performed by: SURGERY

## 2022-01-14 PROCEDURE — 96375 TX/PRO/DX INJ NEW DRUG ADDON: CPT

## 2022-01-14 PROCEDURE — 82010 KETONE BODYS QUAN: CPT

## 2022-01-14 PROCEDURE — 3600000015 HC SURGERY LEVEL 5 ADDTL 15MIN: Performed by: SURGERY

## 2022-01-14 PROCEDURE — 83605 ASSAY OF LACTIC ACID: CPT

## 2022-01-14 PROCEDURE — 99285 EMERGENCY DEPT VISIT HI MDM: CPT

## 2022-01-14 PROCEDURE — 87147 CULTURE TYPE IMMUNOLOGIC: CPT

## 2022-01-14 PROCEDURE — 88304 TISSUE EXAM BY PATHOLOGIST: CPT

## 2022-01-14 PROCEDURE — 6360000002 HC RX W HCPCS: Performed by: NURSE ANESTHETIST, CERTIFIED REGISTERED

## 2022-01-14 PROCEDURE — 6360000002 HC RX W HCPCS: Performed by: FAMILY MEDICINE

## 2022-01-14 PROCEDURE — 87077 CULTURE AEROBIC IDENTIFY: CPT

## 2022-01-14 PROCEDURE — 36415 COLL VENOUS BLD VENIPUNCTURE: CPT

## 2022-01-14 PROCEDURE — 87186 SC STD MICRODIL/AGAR DIL: CPT

## 2022-01-14 PROCEDURE — 83735 ASSAY OF MAGNESIUM: CPT

## 2022-01-14 PROCEDURE — 87040 BLOOD CULTURE FOR BACTERIA: CPT

## 2022-01-14 PROCEDURE — 11043 DBRDMT MUSC&/FSCA 1ST 20/<: CPT | Performed by: SURGERY

## 2022-01-14 PROCEDURE — 87070 CULTURE OTHR SPECIMN AEROBIC: CPT

## 2022-01-14 PROCEDURE — 2000000000 HC ICU R&B

## 2022-01-14 RX ORDER — EPHEDRINE SULFATE/0.9% NACL/PF 50 MG/5 ML
SYRINGE (ML) INTRAVENOUS PRN
Status: DISCONTINUED | OUTPATIENT
Start: 2022-01-14 | End: 2022-01-14 | Stop reason: SDUPTHER

## 2022-01-14 RX ORDER — MIDAZOLAM HYDROCHLORIDE 1 MG/ML
2 INJECTION INTRAMUSCULAR; INTRAVENOUS ONCE
Status: COMPLETED | OUTPATIENT
Start: 2022-01-15 | End: 2022-01-14

## 2022-01-14 RX ORDER — SODIUM CHLORIDE 9 MG/ML
INJECTION, SOLUTION INTRAVENOUS CONTINUOUS PRN
Status: DISCONTINUED | OUTPATIENT
Start: 2022-01-14 | End: 2022-01-14 | Stop reason: SDUPTHER

## 2022-01-14 RX ORDER — VASOPRESSIN 20 U/ML
INJECTION PARENTERAL PRN
Status: DISCONTINUED | OUTPATIENT
Start: 2022-01-14 | End: 2022-01-14 | Stop reason: SDUPTHER

## 2022-01-14 RX ORDER — FENTANYL CITRATE 50 UG/ML
INJECTION, SOLUTION INTRAMUSCULAR; INTRAVENOUS PRN
Status: DISCONTINUED | OUTPATIENT
Start: 2022-01-14 | End: 2022-01-14 | Stop reason: SDUPTHER

## 2022-01-14 RX ORDER — OXYCODONE HYDROCHLORIDE AND ACETAMINOPHEN 5; 325 MG/1; MG/1
1 TABLET ORAL
Status: ACTIVE | OUTPATIENT
Start: 2022-01-14 | End: 2022-01-14

## 2022-01-14 RX ORDER — NOREPINEPHRINE BIT/0.9 % NACL 16MG/250ML
2-100 INFUSION BOTTLE (ML) INTRAVENOUS CONTINUOUS
Status: DISCONTINUED | OUTPATIENT
Start: 2022-01-14 | End: 2022-01-16

## 2022-01-14 RX ORDER — PROMETHAZINE HYDROCHLORIDE 25 MG/ML
6.25 INJECTION, SOLUTION INTRAMUSCULAR; INTRAVENOUS
Status: ACTIVE | OUTPATIENT
Start: 2022-01-14 | End: 2022-01-14

## 2022-01-14 RX ORDER — HYDRALAZINE HYDROCHLORIDE 20 MG/ML
5 INJECTION INTRAMUSCULAR; INTRAVENOUS EVERY 10 MIN PRN
Status: DISCONTINUED | OUTPATIENT
Start: 2022-01-14 | End: 2022-01-15

## 2022-01-14 RX ORDER — MEPERIDINE HYDROCHLORIDE 25 MG/ML
12.5 INJECTION INTRAMUSCULAR; INTRAVENOUS; SUBCUTANEOUS EVERY 5 MIN PRN
Status: DISCONTINUED | OUTPATIENT
Start: 2022-01-14 | End: 2022-01-15

## 2022-01-14 RX ORDER — 0.9 % SODIUM CHLORIDE 0.9 %
500 INTRAVENOUS SOLUTION INTRAVENOUS ONCE
Status: DISCONTINUED | OUTPATIENT
Start: 2022-01-14 | End: 2022-01-14

## 2022-01-14 RX ORDER — DIPHENHYDRAMINE HYDROCHLORIDE 50 MG/ML
12.5 INJECTION INTRAMUSCULAR; INTRAVENOUS
Status: ACTIVE | OUTPATIENT
Start: 2022-01-14 | End: 2022-01-14

## 2022-01-14 RX ORDER — FENTANYL CITRATE 50 UG/ML
25 INJECTION, SOLUTION INTRAMUSCULAR; INTRAVENOUS EVERY 5 MIN PRN
Status: DISCONTINUED | OUTPATIENT
Start: 2022-01-14 | End: 2022-01-15

## 2022-01-14 RX ORDER — PROPOFOL 10 MG/ML
INJECTION, EMULSION INTRAVENOUS PRN
Status: DISCONTINUED | OUTPATIENT
Start: 2022-01-14 | End: 2022-01-14 | Stop reason: SDUPTHER

## 2022-01-14 RX ORDER — FENTANYL CITRATE 50 UG/ML
50 INJECTION, SOLUTION INTRAMUSCULAR; INTRAVENOUS ONCE
Status: COMPLETED | OUTPATIENT
Start: 2022-01-14 | End: 2022-01-14

## 2022-01-14 RX ORDER — MIDAZOLAM HYDROCHLORIDE 1 MG/ML
INJECTION INTRAMUSCULAR; INTRAVENOUS
Status: COMPLETED
Start: 2022-01-14 | End: 2022-01-14

## 2022-01-14 RX ORDER — FENTANYL CITRATE 50 UG/ML
75 INJECTION, SOLUTION INTRAMUSCULAR; INTRAVENOUS ONCE
Status: COMPLETED | OUTPATIENT
Start: 2022-01-14 | End: 2022-01-14

## 2022-01-14 RX ORDER — PHENYLEPHRINE HCL IN 0.9% NACL 1 MG/10 ML
SYRINGE (ML) INTRAVENOUS PRN
Status: DISCONTINUED | OUTPATIENT
Start: 2022-01-14 | End: 2022-01-14 | Stop reason: SDUPTHER

## 2022-01-14 RX ORDER — ONDANSETRON 2 MG/ML
4 INJECTION INTRAMUSCULAR; INTRAVENOUS
Status: ACTIVE | OUTPATIENT
Start: 2022-01-14 | End: 2022-01-14

## 2022-01-14 RX ORDER — LIDOCAINE HYDROCHLORIDE 20 MG/ML
INJECTION, SOLUTION INTRAVENOUS PRN
Status: DISCONTINUED | OUTPATIENT
Start: 2022-01-14 | End: 2022-01-14 | Stop reason: SDUPTHER

## 2022-01-14 RX ORDER — 0.9 % SODIUM CHLORIDE 0.9 %
30 INTRAVENOUS SOLUTION INTRAVENOUS ONCE
Status: COMPLETED | OUTPATIENT
Start: 2022-01-14 | End: 2022-01-14

## 2022-01-14 RX ORDER — ROCURONIUM BROMIDE 10 MG/ML
INJECTION, SOLUTION INTRAVENOUS PRN
Status: DISCONTINUED | OUTPATIENT
Start: 2022-01-14 | End: 2022-01-14 | Stop reason: SDUPTHER

## 2022-01-14 RX ORDER — FENTANYL CITRATE 50 UG/ML
50 INJECTION, SOLUTION INTRAMUSCULAR; INTRAVENOUS EVERY 5 MIN PRN
Status: DISCONTINUED | OUTPATIENT
Start: 2022-01-14 | End: 2022-01-15

## 2022-01-14 RX ORDER — NOREPINEPHRINE BIT/0.9 % NACL 16MG/250ML
INFUSION BOTTLE (ML) INTRAVENOUS
Status: COMPLETED
Start: 2022-01-14 | End: 2022-01-15

## 2022-01-14 RX ADMIN — ROCURONIUM BROMIDE 100 MG: 10 INJECTION INTRAVENOUS at 21:05

## 2022-01-14 RX ADMIN — PHENYLEPHRINE HYDROCHLORIDE 300 MCG/MIN: 10 INJECTION INTRAVENOUS at 21:49

## 2022-01-14 RX ADMIN — Medication 200 MCG: at 21:41

## 2022-01-14 RX ADMIN — Medication 200 MCG: at 21:34

## 2022-01-14 RX ADMIN — PROPOFOL 120 MG: 10 INJECTION, EMULSION INTRAVENOUS at 21:05

## 2022-01-14 RX ADMIN — LIDOCAINE HYDROCHLORIDE 100 MG: 20 INJECTION, SOLUTION INTRAVENOUS at 21:05

## 2022-01-14 RX ADMIN — VASOPRESSIN 1 UNITS: 20 INJECTION INTRAVENOUS at 21:48

## 2022-01-14 RX ADMIN — VASOPRESSIN 1 UNITS: 20 INJECTION INTRAVENOUS at 21:28

## 2022-01-14 RX ADMIN — VANCOMYCIN HYDROCHLORIDE 1250 MG: 5 INJECTION, POWDER, LYOPHILIZED, FOR SOLUTION INTRAVENOUS at 18:32

## 2022-01-14 RX ADMIN — VASOPRESSIN 1 UNITS: 20 INJECTION INTRAVENOUS at 21:34

## 2022-01-14 RX ADMIN — VASOPRESSIN 1 UNITS: 20 INJECTION INTRAVENOUS at 21:51

## 2022-01-14 RX ADMIN — MIDAZOLAM 2 MG: 1 INJECTION INTRAMUSCULAR; INTRAVENOUS at 23:05

## 2022-01-14 RX ADMIN — Medication 300 MCG: at 21:56

## 2022-01-14 RX ADMIN — Medication 300 MCG: at 21:12

## 2022-01-14 RX ADMIN — Medication 200 MCG: at 21:09

## 2022-01-14 RX ADMIN — FENTANYL CITRATE 50 MCG: 50 INJECTION, SOLUTION INTRAMUSCULAR; INTRAVENOUS at 23:49

## 2022-01-14 RX ADMIN — Medication 300 MCG: at 21:17

## 2022-01-14 RX ADMIN — SODIUM CHLORIDE: 9 INJECTION, SOLUTION INTRAVENOUS at 20:57

## 2022-01-14 RX ADMIN — FENTANYL CITRATE 50 MCG: 50 INJECTION, SOLUTION INTRAMUSCULAR; INTRAVENOUS at 22:51

## 2022-01-14 RX ADMIN — VASOPRESSIN 1 UNITS: 20 INJECTION INTRAVENOUS at 21:43

## 2022-01-14 RX ADMIN — VASOPRESSIN 1 UNITS: 20 INJECTION INTRAVENOUS at 21:18

## 2022-01-14 RX ADMIN — FENTANYL CITRATE 50 MCG: 50 INJECTION, SOLUTION INTRAMUSCULAR; INTRAVENOUS at 20:58

## 2022-01-14 RX ADMIN — VASOPRESSIN 2 UNITS: 20 INJECTION INTRAVENOUS at 21:56

## 2022-01-14 RX ADMIN — Medication 10 MG: at 21:25

## 2022-01-14 RX ADMIN — SUGAMMADEX 200 MG: 100 INJECTION, SOLUTION INTRAVENOUS at 22:03

## 2022-01-14 RX ADMIN — Medication 300 MCG: at 21:25

## 2022-01-14 RX ADMIN — MIDAZOLAM 2 MG: 1 INJECTION INTRAMUSCULAR; INTRAVENOUS at 23:33

## 2022-01-14 RX ADMIN — SODIUM CHLORIDE 1914 ML: 9 INJECTION, SOLUTION INTRAVENOUS at 17:44

## 2022-01-14 RX ADMIN — Medication 300 MCG: at 21:14

## 2022-01-14 RX ADMIN — Medication 200 MCG: at 20:58

## 2022-01-14 RX ADMIN — PIPERACILLIN AND TAZOBACTAM 4500 MG: 4; .5 INJECTION, POWDER, FOR SOLUTION INTRAVENOUS at 17:50

## 2022-01-14 RX ADMIN — FENTANYL CITRATE 75 MCG: 50 INJECTION, SOLUTION INTRAMUSCULAR; INTRAVENOUS at 17:44

## 2022-01-14 RX ADMIN — Medication 10 MG: at 21:34

## 2022-01-14 RX ADMIN — VASOPRESSIN 2 UNITS: 20 INJECTION INTRAVENOUS at 21:41

## 2022-01-14 RX ADMIN — Medication 10 MG: at 21:17

## 2022-01-14 RX ADMIN — Medication 10 MG: at 21:15

## 2022-01-14 RX ADMIN — Medication 10 MG: at 21:41

## 2022-01-14 RX ADMIN — MIDAZOLAM HYDROCHLORIDE 2 MG: 1 INJECTION INTRAMUSCULAR; INTRAVENOUS at 23:05

## 2022-01-14 ASSESSMENT — PULMONARY FUNCTION TESTS
PIF_VALUE: 20
PIF_VALUE: 21
PIF_VALUE: 0
PIF_VALUE: 20
PIF_VALUE: 1
PIF_VALUE: 21
PIF_VALUE: 21
PIF_VALUE: 1
PIF_VALUE: 20
PIF_VALUE: 0
PIF_VALUE: 21
PIF_VALUE: 17
PIF_VALUE: 0
PIF_VALUE: 21
PIF_VALUE: 0
PIF_VALUE: 19
PIF_VALUE: 21
PIF_VALUE: 16
PIF_VALUE: 19
PIF_VALUE: 21
PIF_VALUE: 20
PIF_VALUE: 21
PIF_VALUE: 16
PIF_VALUE: 20
PIF_VALUE: 21
PIF_VALUE: 4
PIF_VALUE: 20
PIF_VALUE: 21
PIF_VALUE: 19
PIF_VALUE: 15
PIF_VALUE: 21
PIF_VALUE: 21
PIF_VALUE: 18
PIF_VALUE: 21
PIF_VALUE: 1
PIF_VALUE: 20
PIF_VALUE: 0
PIF_VALUE: 22
PIF_VALUE: 6
PIF_VALUE: 20
PIF_VALUE: 21
PIF_VALUE: 20
PIF_VALUE: 19
PIF_VALUE: 21
PIF_VALUE: 21
PIF_VALUE: 19
PIF_VALUE: 21
PIF_VALUE: 1
PIF_VALUE: 21
PIF_VALUE: 21
PIF_VALUE: 19
PIF_VALUE: 21
PIF_VALUE: 21
PIF_VALUE: 16
PIF_VALUE: 21
PIF_VALUE: 0
PIF_VALUE: 20
PIF_VALUE: 21
PIF_VALUE: 21
PIF_VALUE: 22
PIF_VALUE: 21
PIF_VALUE: 18
PIF_VALUE: 21
PIF_VALUE: 20
PIF_VALUE: 20
PIF_VALUE: 21
PIF_VALUE: 21

## 2022-01-14 ASSESSMENT — PAIN DESCRIPTION - FREQUENCY: FREQUENCY: CONTINUOUS

## 2022-01-14 ASSESSMENT — PAIN DESCRIPTION - PAIN TYPE
TYPE: ACUTE PAIN
TYPE: SURGICAL PAIN

## 2022-01-14 ASSESSMENT — PAIN DESCRIPTION - LOCATION
LOCATION: BUTTOCKS
LOCATION: BUTTOCKS

## 2022-01-14 ASSESSMENT — PAIN SCALES - GENERAL
PAINLEVEL_OUTOF10: 3
PAINLEVEL_OUTOF10: 0
PAINLEVEL_OUTOF10: 2
PAINLEVEL_OUTOF10: 4

## 2022-01-14 NOTE — Clinical Note
Patient Class: Inpatient [101]   REQUIRED: Diagnosis: Sepsis ([facility] [address] [[id number]]   Estimated Length of Stay: Estimated stay of more than 2 midnights   Admitting Provider: Eric Mcfadden [[id number]]   Telemetry/Cardiac Monitoring Required?: Yes

## 2022-01-14 NOTE — ED NOTES
Pt medicated per MAR. Pt tolerated well. Respirations unlabored.       Carlos VAUGHN, RN  01/14/22 9237

## 2022-01-15 LAB
ABO: NORMAL
ACT TEG: 121 SECONDS (ref 86–118)
ALBUMIN SERPL-MCNC: 1.7 G/DL (ref 3.5–5.1)
ALBUMIN SERPL-MCNC: 1.8 G/DL (ref 3.5–5.1)
ALBUMIN SERPL-MCNC: 2 G/DL (ref 3.5–5.1)
ALLEN TEST: ABNORMAL
ALLEN TEST: ABNORMAL
ALP BLD-CCNC: 101 U/L (ref 38–126)
ALP BLD-CCNC: 122 U/L (ref 38–126)
ALP BLD-CCNC: 125 U/L (ref 38–126)
ALT SERPL-CCNC: 49 U/L (ref 11–66)
ALT SERPL-CCNC: 71 U/L (ref 11–66)
ALT SERPL-CCNC: 72 U/L (ref 11–66)
AMORPHOUS: ABNORMAL
ANGLE, RAPID TEG: 76.4 DEG (ref 64–80)
ANION GAP SERPL CALCULATED.3IONS-SCNC: 15 MEQ/L (ref 8–16)
ANION GAP SERPL CALCULATED.3IONS-SCNC: 16 MEQ/L (ref 8–16)
ANION GAP SERPL CALCULATED.3IONS-SCNC: 8 MEQ/L (ref 8–16)
ANTIBODY SCREEN: NORMAL
APTT: 34.2 SECONDS (ref 22–38)
AST SERPL-CCNC: 21 U/L (ref 5–40)
AST SERPL-CCNC: 38 U/L (ref 5–40)
AST SERPL-CCNC: 44 U/L (ref 5–40)
BACTERIA: ABNORMAL
BASE EXCESS (CALCULATED): -10.7 MMOL/L (ref -2.5–2.5)
BASE EXCESS (CALCULATED): -2.8 MMOL/L (ref -2.5–2.5)
BASOPHILS # BLD: 0.1 %
BASOPHILS ABSOLUTE: 0 THOU/MM3 (ref 0–0.1)
BILIRUB SERPL-MCNC: 0.4 MG/DL (ref 0.3–1.2)
BILIRUB SERPL-MCNC: 0.5 MG/DL (ref 0.3–1.2)
BILIRUB SERPL-MCNC: 0.5 MG/DL (ref 0.3–1.2)
BILIRUBIN DIRECT: 0.3 MG/DL (ref 0–0.3)
BILIRUBIN DIRECT: 0.4 MG/DL (ref 0–0.3)
BILIRUBIN URINE: NEGATIVE
BLOOD, URINE: ABNORMAL
BUN BLDV-MCNC: 117 MG/DL (ref 7–22)
BUN BLDV-MCNC: 141 MG/DL (ref 7–22)
BUN BLDV-MCNC: 74 MG/DL (ref 7–22)
CALCIUM IONIZED: 1.15 MMOL/L (ref 1.12–1.32)
CALCIUM SERPL-MCNC: 6.7 MG/DL (ref 8.5–10.5)
CALCIUM SERPL-MCNC: 7 MG/DL (ref 8.5–10.5)
CALCIUM SERPL-MCNC: 7.4 MG/DL (ref 8.5–10.5)
CASTS: ABNORMAL /LPF
CHARACTER, URINE: CLEAR
CHLORIDE BLD-SCNC: 111 MEQ/L (ref 98–111)
CHLORIDE BLD-SCNC: 115 MEQ/L (ref 98–111)
CHLORIDE BLD-SCNC: 122 MEQ/L (ref 98–111)
CO2: 12 MEQ/L (ref 23–33)
CO2: 14 MEQ/L (ref 23–33)
CO2: 20 MEQ/L (ref 23–33)
COLLECTED BY:: ABNORMAL
COLLECTED BY:: ABNORMAL
COLOR: YELLOW
CREAT SERPL-MCNC: 1.4 MG/DL (ref 0.4–1.2)
CREAT SERPL-MCNC: 2.6 MG/DL (ref 0.4–1.2)
CREAT SERPL-MCNC: 3.4 MG/DL (ref 0.4–1.2)
CREATININE URINE: 60.1 MG/DL
CRYSTALS: ABNORMAL
DEVICE: ABNORMAL
DEVICE: ABNORMAL
EOSINOPHIL # BLD: 0 %
EOSINOPHILS ABSOLUTE: 0 THOU/MM3 (ref 0–0.4)
EPITHELIAL CELLS, UA: ABNORMAL /HPF
EPL-TEG: 0 % (ref 0–15)
ERYTHROCYTE [DISTWIDTH] IN BLOOD BY AUTOMATED COUNT: 15 % (ref 11.5–14.5)
ERYTHROCYTE [DISTWIDTH] IN BLOOD BY AUTOMATED COUNT: 15.1 % (ref 11.5–14.5)
ERYTHROCYTE [DISTWIDTH] IN BLOOD BY AUTOMATED COUNT: 51.7 FL (ref 35–45)
ERYTHROCYTE [DISTWIDTH] IN BLOOD BY AUTOMATED COUNT: 52.9 FL (ref 35–45)
FIBRIN SPLIT PRODUCTS: ABNORMAL MCG/ML
FIBRINOGEN: 544 MG/100ML (ref 155–475)
GFR SERPL CREATININE-BSD FRML MDRD: 18 ML/MIN/1.73M2
GFR SERPL CREATININE-BSD FRML MDRD: 25 ML/MIN/1.73M2
GFR SERPL CREATININE-BSD FRML MDRD: 51 ML/MIN/1.73M2
GLUCOSE BLD-MCNC: 108 MG/DL (ref 70–108)
GLUCOSE BLD-MCNC: 152 MG/DL (ref 70–108)
GLUCOSE BLD-MCNC: 188 MG/DL (ref 70–108)
GLUCOSE BLD-MCNC: 195 MG/DL (ref 70–108)
GLUCOSE BLD-MCNC: 215 MG/DL (ref 70–108)
GLUCOSE BLD-MCNC: 234 MG/DL (ref 70–108)
GLUCOSE BLD-MCNC: 243 MG/DL (ref 70–108)
GLUCOSE BLD-MCNC: 99 MG/DL (ref 70–108)
GLUCOSE, URINE: NEGATIVE MG/DL
HCO3: 13 MMOL/L (ref 23–28)
HCO3: 21 MMOL/L (ref 23–28)
HCT VFR BLD CALC: 23 % (ref 42–52)
HCT VFR BLD CALC: 23 % (ref 42–52)
HCT VFR BLD CALC: 23.2 % (ref 42–52)
HCT VFR BLD CALC: 23.4 % (ref 42–52)
HCT VFR BLD CALC: 27.1 % (ref 42–52)
HCT VFR BLD CALC: 30 % (ref 42–52)
HEMOGLOBIN: 7.3 GM/DL (ref 14–18)
HEMOGLOBIN: 7.4 GM/DL (ref 14–18)
HEMOGLOBIN: 7.5 GM/DL (ref 14–18)
HEMOGLOBIN: 7.5 GM/DL (ref 14–18)
HEMOGLOBIN: 8.3 GM/DL (ref 14–18)
HEMOGLOBIN: 9.6 GM/DL (ref 14–18)
HEPARIN THERAPY: NO
IFIO2: 4
IMMATURE GRANS (ABS): 0.36 THOU/MM3 (ref 0–0.07)
IMMATURE GRANULOCYTES: 1.8 %
INR BLD: 2.92 (ref 0.85–1.13)
INR BLD: 4.55 (ref 0.85–1.13)
KETONES, URINE: NEGATIVE
KINETICS RAPID TEG: 1 MINUTES (ref 0.5–2.3)
LACTIC ACID, SEPSIS: 1.2 MMOL/L (ref 0.5–1.9)
LACTIC ACID, SEPSIS: 1.6 MMOL/L (ref 0.5–1.9)
LACTIC ACID, SEPSIS: 1.6 MMOL/L (ref 0.5–1.9)
LACTIC ACID, SEPSIS: 3.3 MMOL/L (ref 0.5–1.9)
LACTIC ACID, SEPSIS: 3.6 MMOL/L (ref 0.5–1.9)
LEUKOCYTE ESTERASE, URINE: ABNORMAL
LY30 (LYSIS) TEG: 0 % (ref 0–7.5)
LYMPHOCYTES # BLD: 6.3 %
LYMPHOCYTES ABSOLUTE: 1.3 THOU/MM3 (ref 1–4.8)
MA(MAX CLOT) RAPID TEG: 78.9 MM (ref 52–71)
MAGNESIUM: 2.5 MG/DL (ref 1.6–2.4)
MCH RBC QN AUTO: 29.3 PG (ref 26–33)
MCH RBC QN AUTO: 29.7 PG (ref 26–33)
MCHC RBC AUTO-ENTMCNC: 30.6 GM/DL (ref 32.2–35.5)
MCHC RBC AUTO-ENTMCNC: 32 GM/DL (ref 32.2–35.5)
MCV RBC AUTO: 92.9 FL (ref 80–94)
MCV RBC AUTO: 95.8 FL (ref 80–94)
MONOCYTES # BLD: 7.4 %
MONOCYTES ABSOLUTE: 1.5 THOU/MM3 (ref 0.4–1.3)
MRSA SCREEN RT-PCR: NEGATIVE
MUCUS: ABNORMAL
NITRITE, URINE: NEGATIVE
NUCLEATED RED BLOOD CELLS: 0 /100 WBC
O2 SATURATION: 90 %
O2 SATURATION: 96 %
PCO2: 23 MMHG (ref 35–45)
PCO2: 30 MMHG (ref 35–45)
PH BLOOD GAS: 7.37 (ref 7.35–7.45)
PH BLOOD GAS: 7.45 (ref 7.35–7.45)
PH UA: 5.5 (ref 5–9)
PLATELET # BLD: 235 THOU/MM3 (ref 130–400)
PLATELET # BLD: 264 THOU/MM3 (ref 130–400)
PMV BLD AUTO: 11.2 FL (ref 9.4–12.4)
PMV BLD AUTO: 11.2 FL (ref 9.4–12.4)
PO2: 55 MMHG (ref 71–104)
PO2: 82 MMHG (ref 71–104)
POTASSIUM REFLEX MAGNESIUM: 4.5 MEQ/L (ref 3.5–5.2)
POTASSIUM SERPL-SCNC: 3.9 MEQ/L (ref 3.5–5.2)
POTASSIUM SERPL-SCNC: 4.3 MEQ/L (ref 3.5–5.2)
PROTEIN UA: ABNORMAL MG/DL
RBC # BLD: 2.83 MILL/MM3 (ref 4.7–6.1)
RBC # BLD: 3.23 MILL/MM3 (ref 4.7–6.1)
RBC URINE: ABNORMAL /HPF
REACTION TIME RAPID TEG: 0.8 MINUTES (ref 0.4–1)
RH FACTOR: NORMAL
SEG NEUTROPHILS: 84.4 %
SEGMENTED NEUTROPHILS ABSOLUTE COUNT: 17.2 THOU/MM3 (ref 1.8–7.7)
SODIUM BLD-SCNC: 140 MEQ/L (ref 135–145)
SODIUM BLD-SCNC: 143 MEQ/L (ref 135–145)
SODIUM BLD-SCNC: 150 MEQ/L (ref 135–145)
SODIUM URINE: 25 MEQ/L
SOURCE, BLOOD GAS: ABNORMAL
SOURCE, BLOOD GAS: ABNORMAL
SPECIFIC GRAVITY UA: 1.01 (ref 1–1.03)
TOTAL PROTEIN: 4.2 G/DL (ref 6.1–8)
TOTAL PROTEIN: 4.3 G/DL (ref 6.1–8)
TOTAL PROTEIN: 4.6 G/DL (ref 6.1–8)
UROBILINOGEN, URINE: 0.2 EU/DL (ref 0–1)
VANCOMYCIN RANDOM: 7 UG/ML (ref 0.1–39.9)
VANCOMYCIN RESISTANT ENTEROCOCCUS: NEGATIVE
WBC # BLD: 20.4 THOU/MM3 (ref 4.8–10.8)
WBC # BLD: 29 THOU/MM3 (ref 4.8–10.8)
WBC UA: ABNORMAL /HPF
YEAST: ABNORMAL

## 2022-01-15 PROCEDURE — 2580000003 HC RX 258: Performed by: INTERNAL MEDICINE

## 2022-01-15 PROCEDURE — 2580000003 HC RX 258: Performed by: PHARMACIST

## 2022-01-15 PROCEDURE — 2580000003 HC RX 258

## 2022-01-15 PROCEDURE — 87641 MR-STAPH DNA AMP PROBE: CPT

## 2022-01-15 PROCEDURE — 85014 HEMATOCRIT: CPT

## 2022-01-15 PROCEDURE — 99024 POSTOP FOLLOW-UP VISIT: CPT | Performed by: SURGERY

## 2022-01-15 PROCEDURE — 80202 ASSAY OF VANCOMYCIN: CPT

## 2022-01-15 PROCEDURE — 85362 FIBRIN DEGRADATION PRODUCTS: CPT

## 2022-01-15 PROCEDURE — 36556 INSERT NON-TUNNEL CV CATH: CPT | Performed by: FAMILY MEDICINE

## 2022-01-15 PROCEDURE — 85385 FIBRINOGEN ANTIGEN: CPT

## 2022-01-15 PROCEDURE — 85610 PROTHROMBIN TIME: CPT

## 2022-01-15 PROCEDURE — 99233 SBSQ HOSP IP/OBS HIGH 50: CPT | Performed by: INTERNAL MEDICINE

## 2022-01-15 PROCEDURE — 36430 TRANSFUSION BLD/BLD COMPNT: CPT

## 2022-01-15 PROCEDURE — 05HM33Z INSERTION OF INFUSION DEVICE INTO RIGHT INTERNAL JUGULAR VEIN, PERCUTANEOUS APPROACH: ICD-10-PCS | Performed by: SURGERY

## 2022-01-15 PROCEDURE — 36620 INSERTION CATHETER ARTERY: CPT

## 2022-01-15 PROCEDURE — 85025 COMPLETE CBC W/AUTO DIFF WBC: CPT

## 2022-01-15 PROCEDURE — P9017 PLASMA 1 DONOR FRZ W/IN 8 HR: HCPCS

## 2022-01-15 PROCEDURE — 87081 CULTURE SCREEN ONLY: CPT

## 2022-01-15 PROCEDURE — 87086 URINE CULTURE/COLONY COUNT: CPT

## 2022-01-15 PROCEDURE — 80053 COMPREHEN METABOLIC PANEL: CPT

## 2022-01-15 PROCEDURE — 82330 ASSAY OF CALCIUM: CPT

## 2022-01-15 PROCEDURE — 37799 UNLISTED PX VASCULAR SURGERY: CPT

## 2022-01-15 PROCEDURE — 2500000003 HC RX 250 WO HCPCS: Performed by: STUDENT IN AN ORGANIZED HEALTH CARE EDUCATION/TRAINING PROGRAM

## 2022-01-15 PROCEDURE — 2000000000 HC ICU R&B

## 2022-01-15 PROCEDURE — 36556 INSERT NON-TUNNEL CV CATH: CPT

## 2022-01-15 PROCEDURE — 82248 BILIRUBIN DIRECT: CPT

## 2022-01-15 PROCEDURE — 2580000003 HC RX 258: Performed by: STUDENT IN AN ORGANIZED HEALTH CARE EDUCATION/TRAINING PROGRAM

## 2022-01-15 PROCEDURE — 84300 ASSAY OF URINE SODIUM: CPT

## 2022-01-15 PROCEDURE — 6360000002 HC RX W HCPCS

## 2022-01-15 PROCEDURE — 82803 BLOOD GASES ANY COMBINATION: CPT

## 2022-01-15 PROCEDURE — 6360000002 HC RX W HCPCS: Performed by: PHARMACIST

## 2022-01-15 PROCEDURE — 6370000000 HC RX 637 (ALT 250 FOR IP): Performed by: NURSE PRACTITIONER

## 2022-01-15 PROCEDURE — 83605 ASSAY OF LACTIC ACID: CPT

## 2022-01-15 PROCEDURE — 85027 COMPLETE CBC AUTOMATED: CPT

## 2022-01-15 PROCEDURE — 81001 URINALYSIS AUTO W/SCOPE: CPT

## 2022-01-15 PROCEDURE — 83735 ASSAY OF MAGNESIUM: CPT

## 2022-01-15 PROCEDURE — 36592 COLLECT BLOOD FROM PICC: CPT

## 2022-01-15 PROCEDURE — 6370000000 HC RX 637 (ALT 250 FOR IP): Performed by: STUDENT IN AN ORGANIZED HEALTH CARE EDUCATION/TRAINING PROGRAM

## 2022-01-15 PROCEDURE — 6360000002 HC RX W HCPCS: Performed by: STUDENT IN AN ORGANIZED HEALTH CARE EDUCATION/TRAINING PROGRAM

## 2022-01-15 PROCEDURE — 36620 INSERTION CATHETER ARTERY: CPT | Performed by: FAMILY MEDICINE

## 2022-01-15 PROCEDURE — 82948 REAGENT STRIP/BLOOD GLUCOSE: CPT

## 2022-01-15 PROCEDURE — 2500000003 HC RX 250 WO HCPCS: Performed by: SURGERY

## 2022-01-15 PROCEDURE — P9016 RBC LEUKOCYTES REDUCED: HCPCS

## 2022-01-15 PROCEDURE — 86900 BLOOD TYPING SEROLOGIC ABO: CPT

## 2022-01-15 PROCEDURE — 85018 HEMOGLOBIN: CPT

## 2022-01-15 PROCEDURE — 36415 COLL VENOUS BLD VENIPUNCTURE: CPT

## 2022-01-15 PROCEDURE — 86850 RBC ANTIBODY SCREEN: CPT

## 2022-01-15 PROCEDURE — 87500 VANOMYCIN DNA AMP PROBE: CPT

## 2022-01-15 PROCEDURE — 82570 ASSAY OF URINE CREATININE: CPT

## 2022-01-15 PROCEDURE — 86901 BLOOD TYPING SEROLOGIC RH(D): CPT

## 2022-01-15 PROCEDURE — 85730 THROMBOPLASTIN TIME PARTIAL: CPT

## 2022-01-15 PROCEDURE — 86923 COMPATIBILITY TEST ELECTRIC: CPT

## 2022-01-15 RX ORDER — ONDANSETRON 4 MG/1
4 TABLET, ORALLY DISINTEGRATING ORAL EVERY 8 HOURS PRN
Status: DISCONTINUED | OUTPATIENT
Start: 2022-01-15 | End: 2022-01-27 | Stop reason: HOSPADM

## 2022-01-15 RX ORDER — SODIUM CHLORIDE 9 MG/ML
INJECTION, SOLUTION INTRAVENOUS PRN
Status: DISCONTINUED | OUTPATIENT
Start: 2022-01-15 | End: 2022-01-24 | Stop reason: SDUPTHER

## 2022-01-15 RX ORDER — ONDANSETRON 2 MG/ML
4 INJECTION INTRAMUSCULAR; INTRAVENOUS EVERY 6 HOURS PRN
Status: DISCONTINUED | OUTPATIENT
Start: 2022-01-15 | End: 2022-01-27 | Stop reason: HOSPADM

## 2022-01-15 RX ORDER — HYDRALAZINE HYDROCHLORIDE 50 MG/1
100 TABLET, FILM COATED ORAL EVERY 8 HOURS
Status: DISCONTINUED | OUTPATIENT
Start: 2022-01-15 | End: 2022-01-27 | Stop reason: HOSPADM

## 2022-01-15 RX ORDER — METOPROLOL SUCCINATE 50 MG/1
50 TABLET, EXTENDED RELEASE ORAL DAILY
Status: DISCONTINUED | OUTPATIENT
Start: 2022-01-15 | End: 2022-01-27 | Stop reason: HOSPADM

## 2022-01-15 RX ORDER — AMLODIPINE BESYLATE 10 MG/1
10 TABLET ORAL DAILY
Status: DISCONTINUED | OUTPATIENT
Start: 2022-01-15 | End: 2022-01-27 | Stop reason: HOSPADM

## 2022-01-15 RX ORDER — HYDROCHLOROTHIAZIDE 25 MG/1
50 TABLET ORAL DAILY
Status: DISCONTINUED | OUTPATIENT
Start: 2022-01-15 | End: 2022-01-27 | Stop reason: HOSPADM

## 2022-01-15 RX ORDER — SODIUM CHLORIDE 0.9 % (FLUSH) 0.9 %
5-40 SYRINGE (ML) INJECTION PRN
Status: DISCONTINUED | OUTPATIENT
Start: 2022-01-15 | End: 2022-01-27 | Stop reason: HOSPADM

## 2022-01-15 RX ORDER — SODIUM CHLORIDE 9 MG/ML
INJECTION, SOLUTION INTRAVENOUS CONTINUOUS
Status: DISCONTINUED | OUTPATIENT
Start: 2022-01-15 | End: 2022-01-15

## 2022-01-15 RX ORDER — FAMOTIDINE 20 MG/1
20 TABLET, FILM COATED ORAL 2 TIMES DAILY
Status: DISCONTINUED | OUTPATIENT
Start: 2022-01-15 | End: 2022-01-15 | Stop reason: DRUGHIGH

## 2022-01-15 RX ORDER — DEXTROSE MONOHYDRATE 25 G/50ML
12.5 INJECTION, SOLUTION INTRAVENOUS PRN
Status: DISCONTINUED | OUTPATIENT
Start: 2022-01-15 | End: 2022-01-27 | Stop reason: HOSPADM

## 2022-01-15 RX ORDER — FENTANYL CITRATE 50 UG/ML
50 INJECTION, SOLUTION INTRAMUSCULAR; INTRAVENOUS
Status: DISCONTINUED | OUTPATIENT
Start: 2022-01-15 | End: 2022-01-27 | Stop reason: HOSPADM

## 2022-01-15 RX ORDER — HYDROCODONE BITARTRATE AND ACETAMINOPHEN 5; 325 MG/1; MG/1
1 TABLET ORAL EVERY 4 HOURS PRN
Status: DISCONTINUED | OUTPATIENT
Start: 2022-01-15 | End: 2022-01-27 | Stop reason: HOSPADM

## 2022-01-15 RX ORDER — LOSARTAN POTASSIUM 100 MG/1
100 TABLET ORAL DAILY
Status: DISCONTINUED | OUTPATIENT
Start: 2022-01-15 | End: 2022-01-27 | Stop reason: HOSPADM

## 2022-01-15 RX ORDER — CLINDAMYCIN PHOSPHATE 600 MG/50ML
600 INJECTION INTRAVENOUS EVERY 8 HOURS
Status: DISCONTINUED | OUTPATIENT
Start: 2022-01-15 | End: 2022-01-24

## 2022-01-15 RX ORDER — ASPIRIN 81 MG/1
81 TABLET ORAL DAILY
Status: DISCONTINUED | OUTPATIENT
Start: 2022-01-15 | End: 2022-01-27 | Stop reason: HOSPADM

## 2022-01-15 RX ORDER — SODIUM CHLORIDE 450 MG/100ML
INJECTION, SOLUTION INTRAVENOUS CONTINUOUS
Status: DISCONTINUED | OUTPATIENT
Start: 2022-01-15 | End: 2022-01-16

## 2022-01-15 RX ORDER — SODIUM CHLORIDE 0.9 % (FLUSH) 0.9 %
5-40 SYRINGE (ML) INJECTION EVERY 12 HOURS SCHEDULED
Status: DISCONTINUED | OUTPATIENT
Start: 2022-01-15 | End: 2022-01-27 | Stop reason: HOSPADM

## 2022-01-15 RX ORDER — SODIUM CHLORIDE, SODIUM LACTATE, POTASSIUM CHLORIDE, CALCIUM CHLORIDE 600; 310; 30; 20 MG/100ML; MG/100ML; MG/100ML; MG/100ML
INJECTION, SOLUTION INTRAVENOUS CONTINUOUS
Status: DISCONTINUED | OUTPATIENT
Start: 2022-01-15 | End: 2022-01-16

## 2022-01-15 RX ORDER — SODIUM CHLORIDE 9 MG/ML
25 INJECTION, SOLUTION INTRAVENOUS PRN
Status: DISCONTINUED | OUTPATIENT
Start: 2022-01-15 | End: 2022-01-27 | Stop reason: HOSPADM

## 2022-01-15 RX ORDER — FAMOTIDINE 20 MG/1
10 TABLET, FILM COATED ORAL DAILY
Status: DISCONTINUED | OUTPATIENT
Start: 2022-01-15 | End: 2022-01-17

## 2022-01-15 RX ORDER — DEXTROSE MONOHYDRATE 50 MG/ML
100 INJECTION, SOLUTION INTRAVENOUS PRN
Status: DISCONTINUED | OUTPATIENT
Start: 2022-01-15 | End: 2022-01-27 | Stop reason: HOSPADM

## 2022-01-15 RX ORDER — 0.9 % SODIUM CHLORIDE 0.9 %
500 INTRAVENOUS SOLUTION INTRAVENOUS ONCE
Status: COMPLETED | OUTPATIENT
Start: 2022-01-15 | End: 2022-01-15

## 2022-01-15 RX ORDER — NICOTINE POLACRILEX 4 MG
15 LOZENGE BUCCAL PRN
Status: DISCONTINUED | OUTPATIENT
Start: 2022-01-15 | End: 2022-01-27 | Stop reason: HOSPADM

## 2022-01-15 RX ORDER — TAMSULOSIN HYDROCHLORIDE 0.4 MG/1
0.4 CAPSULE ORAL DAILY
Status: DISCONTINUED | OUTPATIENT
Start: 2022-01-15 | End: 2022-01-27 | Stop reason: HOSPADM

## 2022-01-15 RX ORDER — FENTANYL CITRATE 50 UG/ML
50 INJECTION, SOLUTION INTRAMUSCULAR; INTRAVENOUS
Status: DISCONTINUED | OUTPATIENT
Start: 2022-01-15 | End: 2022-01-15

## 2022-01-15 RX ADMIN — INSULIN LISPRO 3 UNITS: 100 INJECTION, SOLUTION INTRAVENOUS; SUBCUTANEOUS at 22:08

## 2022-01-15 RX ADMIN — FENTANYL CITRATE 50 MCG: 0.05 INJECTION, SOLUTION INTRAMUSCULAR; INTRAVENOUS at 13:33

## 2022-01-15 RX ADMIN — INSULIN LISPRO 6 UNITS: 100 INJECTION, SOLUTION INTRAVENOUS; SUBCUTANEOUS at 10:34

## 2022-01-15 RX ADMIN — SODIUM CHLORIDE 500 ML: 9 INJECTION, SOLUTION INTRAVENOUS at 07:36

## 2022-01-15 RX ADMIN — SODIUM CHLORIDE, POTASSIUM CHLORIDE, SODIUM LACTATE AND CALCIUM CHLORIDE: 600; 310; 30; 20 INJECTION, SOLUTION INTRAVENOUS at 01:45

## 2022-01-15 RX ADMIN — INSULIN LISPRO 3 UNITS: 100 INJECTION, SOLUTION INTRAVENOUS; SUBCUTANEOUS at 12:18

## 2022-01-15 RX ADMIN — VASOPRESSIN 0.03 UNITS/MIN: 20 INJECTION INTRAVENOUS at 09:14

## 2022-01-15 RX ADMIN — FENTANYL CITRATE 50 MCG: 0.05 INJECTION, SOLUTION INTRAMUSCULAR; INTRAVENOUS at 17:08

## 2022-01-15 RX ADMIN — FENTANYL CITRATE 50 MCG: 0.05 INJECTION, SOLUTION INTRAMUSCULAR; INTRAVENOUS at 08:30

## 2022-01-15 RX ADMIN — VASOPRESSIN 0.03 UNITS/MIN: 20 INJECTION INTRAVENOUS at 00:59

## 2022-01-15 RX ADMIN — PHENYLEPHRINE HYDROCHLORIDE 100 MCG/MIN: 10 INJECTION INTRAVENOUS at 04:00

## 2022-01-15 RX ADMIN — SODIUM CHLORIDE: 9 INJECTION, SOLUTION INTRAVENOUS at 08:51

## 2022-01-15 RX ADMIN — Medication 13 MCG/MIN: at 00:08

## 2022-01-15 RX ADMIN — SODIUM CHLORIDE: 9 INJECTION, SOLUTION INTRAVENOUS at 13:26

## 2022-01-15 RX ADMIN — FENTANYL CITRATE 50 MCG: 50 INJECTION, SOLUTION INTRAMUSCULAR; INTRAVENOUS at 00:52

## 2022-01-15 RX ADMIN — SODIUM CHLORIDE, PRESERVATIVE FREE 10 ML: 5 INJECTION INTRAVENOUS at 08:27

## 2022-01-15 RX ADMIN — VANCOMYCIN HYDROCHLORIDE 1500 MG: 5 INJECTION, POWDER, LYOPHILIZED, FOR SOLUTION INTRAVENOUS at 21:43

## 2022-01-15 RX ADMIN — TAMSULOSIN HYDROCHLORIDE 0.4 MG: 0.4 CAPSULE ORAL at 21:47

## 2022-01-15 RX ADMIN — PIPERACILLIN AND TAZOBACTAM 3375 MG: 3; .375 INJECTION, POWDER, LYOPHILIZED, FOR SOLUTION INTRAVENOUS at 14:23

## 2022-01-15 RX ADMIN — SODIUM CHLORIDE: 4.5 INJECTION, SOLUTION INTRAVENOUS at 21:58

## 2022-01-15 RX ADMIN — INSULIN LISPRO 6 UNITS: 100 INJECTION, SOLUTION INTRAVENOUS; SUBCUTANEOUS at 03:13

## 2022-01-15 RX ADMIN — SODIUM CHLORIDE, PRESERVATIVE FREE 10 ML: 5 INJECTION INTRAVENOUS at 21:40

## 2022-01-15 RX ADMIN — FENTANYL CITRATE 50 MCG: 0.05 INJECTION, SOLUTION INTRAMUSCULAR; INTRAVENOUS at 01:36

## 2022-01-15 RX ADMIN — CLINDAMYCIN PHOSPHATE 600 MG: 600 INJECTION, SOLUTION INTRAVENOUS at 23:02

## 2022-01-15 RX ADMIN — PIPERACILLIN AND TAZOBACTAM 3375 MG: 3; .375 INJECTION, POWDER, LYOPHILIZED, FOR SOLUTION INTRAVENOUS at 21:52

## 2022-01-15 RX ADMIN — PIPERACILLIN AND TAZOBACTAM 3375 MG: 3; .375 INJECTION, POWDER, LYOPHILIZED, FOR SOLUTION INTRAVENOUS at 02:59

## 2022-01-15 ASSESSMENT — PAIN DESCRIPTION - FREQUENCY: FREQUENCY: CONTINUOUS

## 2022-01-15 ASSESSMENT — PAIN DESCRIPTION - PAIN TYPE: TYPE: SURGICAL PAIN

## 2022-01-15 ASSESSMENT — PAIN SCALES - GENERAL: PAINLEVEL_OUTOF10: 1

## 2022-01-15 ASSESSMENT — PAIN DESCRIPTION - LOCATION: LOCATION: BUTTOCKS

## 2022-01-15 ASSESSMENT — PAIN DESCRIPTION - DESCRIPTORS: DESCRIPTORS: PRESSURE

## 2022-01-15 NOTE — PROGRESS NOTES
Patient seen and evaluated after nurse reported continuous bleeding post op. Op note reviewed. 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 Sammie Underwood this evening. Patient has been on Eliquis. Upon assessment patient with complete saturation and continual bleeding noted through packing over sacral wound. Dressing reinforce and direct pressure held for 10 minutes. Bleeding continued. Sacral packing was removed to identify bleeding sites. After packing was removed patient with numerous areas of ozzing within wound bed, no large bleeding source identified. Quick clot hemostatic dressing was packed into the wound and direct pressure was again held for 10 minutes. Bleeding controlled on reassessment. Pressure dressing applied over quick clot. Repeat labs reviewed. Hgb 8.3, initial 10.8. INR 4.55, initial 1.30. TEG pending. 2 units pRBCs ordered by ICU staff. Continue to monitor bleeding. Repeat labs in AM. General surgeon, Dr. Sammie Underwood, notified.     Electronically signed by Sony Patel PA-C on 1/15/2022 at 2:16 AM

## 2022-01-15 NOTE — OP NOTE
[address] [address]  Operative Report    PATIENT NAME: Jasbir Cano  MEDICAL RECORD NO. [id number]  SURGEON: Navdeep Grayson MD MD FACS  Primary Care Physician: Ricard Phoenix, APRN - CNP  Date: 1/14/2022, 10:24 PM     PROCEDURE PERFORMED: 1. 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 DRAINS: He has 2 silicone loop drains through counterincisions with 4 total counterincisions 2 on each side 1 anteriorly and 1 posteriorly as well as 1 inch iodoform packing in all spaces and 4 x 4 soaked Dakin's in his decubitus  DISPOSITION: PACU - guarded condition. CONDITION: stable      Narrative:    Patient was brought to the operating room underwent general trach anesthesia timeout was taken was placed in [address] [place] [address] because of his arthritis and limited range of motion very little movement of his at his hips could be done.   The perineum was prepped with Betadine he had a necrotic area posteriorly near the anal verge at about the 2 o'clock position in his entire area of perirectally on the right-hand side from the 12:00 to the 6 o'clock position anteriorly at least 8 or 10 cm wide by palpation was indurated and firm. Debriding the necrotic site seen on the outside at about 2:00 we then entered the space which was the ischial rectal space and there was also another necrotic area that was more superficial at about the 5 o'clock position and this necrotic area from the 2 o'clock position connected to this and a more subcutaneous plane both of these holes were debrided. We dissected into the ischial rectal space and then made another counterincision approximately 3 inches long up near the base of the hemiscrotum breaking into the deep SQ rectal space here there was a lot of bloody black dark fluid but the tissue itself there was some necrotic areas but not the entire area underneath the flaps here. These were then completely drained loculations were broken up and necrotic tissue was debrided. A total area of 6 x 6 x 4 cm² were debrided in total from this right side. A incision was made on the patient's left side in a mirror image location of the incisions on the right side and this deep ischial rectal infection also spread to the left side as well there is no necrotic openings or necrotic tissue just a lot of murky looking fluid that came out extended up to the ischial tuberosities and perirectally. This these 2 counterincisions all loculations were broken up and they were then connected to each other. At this point taking a large silicone drains that were placed in 1 counterincision at the other and then they were sewn to themselves with a 3-0 Prolene suture 1 in each side to keep these openings and then 1 inch iodoform gauze was then packed through each hole to help with hemostasis and packing.   At this point we had to change the patient's position because we could not get to the decubitus ulcer    The patient was then turned on his right side down and he had a large necrotic decubitus ulcer present. Using sharp dissection a large area in 1 solid piece using a 10 blade excising skin and subtenons tissue fascia and part of the gluteus muscle the fascia over the sacrum was necrotic appearing as well this was all debrided and a large area measuring 15 x 12 x 6 cm out to good tissue in all directions. It was evident that this necrotizing fasciitis infection he had was from his perirectal area and not spread from his sacral decubitus. At this point we suspect this with Dakin's solution soaked 4 x 4's dressings and mesh panties were applied and the patient was brought back to recovery room relatively hypotensive on pressors.

## 2022-01-15 NOTE — PROGRESS NOTES
Dr Odalys Lanza and Jeniffer Rajput NP at bedside    01:00 Jeniffer Rajput NP at bedside to assess bleeding. 01:10 Calib assessing coccyx wound at this time. Attempting to find source of bleed. Noting there to be multiple \"oozing sites\" in wound. Holding pressure on bleeding sites at this time. 01:16 Applying quick clot gauze pads at this time. Continuing to hold pressure. 01:22 Levo increased to 25mcg/min  Bleeding noted to mostly subside. Packing with gauze and pressure dressing at this time.

## 2022-01-15 NOTE — PROCEDURES
Arterial Line:    Indication: Septic shock on 2 vasopressors    Complications:  none    EBL:  <1cc    Procedure:  After informed consent was obtained, the left radial approach was utilized. Arterial pulsation was identified using US guidance. Patient subsequently was prepped and draped in sterile fashion utilizing chlorhexidine prep, sterile gown, sterile gloves, mask, hair net, and sterile whole-body drape. Patient received 2 cc of local anesthesia with lidocaine. Introducer needle was advanced subcutaneously until arterial flow was obtained. Utilizing modified Seldinger technique, guidewire was placed through the introducer needle. Introducer needle was withdrawn leaving the guidewire in place. Dilator was placed over the guidewire and removed. Arterial catheter was placed into the lumen. Confirmation of wire placement was confirmed by US. Guidewire removed. Secondary cleansing at the site was obtained with chlorhexidine. Catheter was secured to the skin utilizing silk suture.       Electronically signed Deadra Skiff DO PGY 2

## 2022-01-15 NOTE — ED PROVIDER NOTES
ATTENDING NOTE:    I supervised and discussed the history, physical exam and the management of this patient with the PA. I reviewed the PA's note and agree with the documented findings and plan of care.       Electronically verified by MD Emanuel Juarez MD  01/15/22 6699

## 2022-01-15 NOTE — ED NOTES
Tami Sauceda from surgery at bedside at this time. All questions answered.      [address], RN  01/14/22 2018

## 2022-01-15 NOTE — ED NOTES
Report given to [facility], RN.       Freeman Arcos VCEQRACHEL, NILSA  01/14/22 5433

## 2022-01-15 NOTE — ED NOTES
Pt sleeping on cot at this time. No needs voiced. Will continue to monitor.       Shadia Cabrera RN  01/14/22 0555

## 2022-01-15 NOTE — CARE COORDINATION
1/15/22, 12:08 PM EST  DISCHARGE PLANNING EVALUATION:    Karla Saldivar       Admitted: 1/14/2022/ [address], [address] [facility] day: 1   Location: -11/011-A Reason for admit: Sepsis ([facility] [address] [A41.9]  Septic shock ([address] [A41.9, R65.21]   PMH:  has a past medical history of Diabetes mellitus ([address] Hyperlipidemia, and Hypertension. Procedure:   1/14 CXR: Interval improvement since previous plain radiographs dated 11 December 2021 with reduction in the bilateral upper lobe infiltrates; borderline cardiomegaly; thoracic spondylosis  1/14 CT Abd/pelvis:   1. Decubitus ulcer overlying the sacrum new since previous CT scan of the pelvis dated 7 December 2021.   2. Air   in the perineum, new since previous study dated 7 December 2021.   3. Dale catheter in place. Air in the urinary bladder. 4. Changes of ankylosing spondylitis involving the lower thoracic, lumbar spine and sacroiliac iliac joints. Deformity at the thoracolumbar junction. 5. Calcification along the gallbladder wall. 6. Bilateral inguinal lymph nodes. 7. Areas of atelectasis or scarring at both lung bases     1/14 Excisional debridement and drainage of perirectal horseshoe abscess of deep ischiorectal space total tissue removed 6 x 6 x 4 cm; Excisional debridement of skin subcutaneous tissue muscle and fascia of stage IV sacral decubitus measuring 15 x 12 x 6 cm; Tissue sent for tissue culture as well as pathology  1/15 CVC RI  1/15 CXR:   1. Right IJ central line tip in the right atrium. This can be retracted by    3 cm. 2. Bilateral patchy interstitial opacities which may reflect infiltrate. 3. Borderline cardiomegaly. Barriers to Discharge: Presented to ED with c/o buttock pain. Developed decubitus ulcer approx 5 weeks ago which has been debrided at [facility]. Found to have Creatinine of 4.4 and WBC 21 in ED. CT abd/pelvis revealed free air in perineum.  General Surgery consulted and was taken to OR for debridement of perirectal abscess and sacral stage IV decubitus on 1/14 - see note above. ICU post-op on vasopressors. ID consulted for necrotizing perineal infection and sepsis. Philo and CVC placed. Received 1 PRBC & 2 FFP today. Afebrile. NSR. On room air. Ox4. Follows commands. Open drain x2 to bilateral buttocks. Telemetry, wound care, drain care, randolph. IVF, levo @ 8 mcg/min, marcel @ 40 mcg/kmin, vasopressin @ 0.03 units/min, pepcid, prn IV fentanyl, SSI, IV zosyn, IV vancomycin. Received 30 ml/kg fld bolus yesterday. Received 500 ml fld bolus x1 today. K+ 5.3 - now 4.5, CO2 18- now 12,  -now 117, Creat 4.4 -now 2.6, AG 17 - now 16, Mg 2.5, LA 2.4 - 3.6 - now 1.6, procal 1.1, CRP 22.92, alb 2.2 - now 1.7, alk phos 148 - now 122, alt 84 - now 71, ast 52 - now 38, direct bili -0.4, beta-hydroxybutyrate 7.19, hgbA1C 8.1, wbc 21.7 - now 29, hgb 10.8 - now 7.5, sed rate 114, INR 4.55, fibrinogen 544, FDP 5-20. Urine w/trace leukocytes -sent for culture. Blood cultures sent. Surgical debridement culture sent. PCP: DIONTE Garcia - CNP  Readmission Risk Score: 22.4 ( )%    Patient Goals/Plan/Treatment Preferences: From [facility]. Attempted to speak with Eric Chin; he was asleep. Will need meet and greet and Readmit interview. SW consulted. Transportation/Food Security/Housekeeping Addressed:  No issues identified.

## 2022-01-15 NOTE — CONSULTS
[address] [id number]  General Surgery Consult Note  Dr. Yaw Metz MD  Pt Name: Nidhi Fermin  MRN: [id number]  YOB: 1955  Date of evaluation: 1/14/2022  Primary Care Physician: DIONTE Amezcua CNP  Patient evaluated at the request of  Dr. Tigist Goss  Reason for evaluation: possible necrotizing infection  IMPRESSIONS:     Active Hospital Problems    Diagnosis Date Noted    Necrotizing soft tissue infection [M79.89] 01/14/2022    Pressure injury, unstageable, with infection ([facility] [address] [L89.95, L08.9] 01/14/2022     PLANS:   1. Emergent incision and debridement of sacral decubitus and perineal area in case this is a necrotizing fasciitis  2. He has been on Eliquis he has not taken any since last night however his creatinine is elevated so it is likely his clearance will be prolonged even though its been 24 hours  3. He certainly has signs of systemic illness with leukocytosis hypotension elevated procalcitonin elevated lactic acid and acute renal failure with a creatinine of 4.4  SUBJECTIVE:   History of Chief Complaint:    Nidhi Fermin is a 77 y.o. male who presented to ED with buttock pain. He was just discharged from a stay at the hospital on the hospitalist service December 20 to [facility]. Since that time he has developed a decubitus ulcer which he thinks has been around 5 weeks documentation is very limited as to know how long its been here. He says they have been debriding it at [facility]. He presented today with increasing buttock pain and was found to have a creatinine of 4.4 white count 21,000 elevated sed rate and procalcitonin and a CT scan of the abdomen and pelvis was completed at approximately 6:49 PM.  We have not received a formal consult request from anybody but received perfect serve messages from the PA taking care of this patient in the emergency department about the patient's status.   CT scan of the abdomen pelvis revealed air in the perineum which is new since his last 1 on December 7 as well as the visible decubitus ulcer new since December 7. He was in the emergency department noted on January 8 with complaints of leg pain was evaluated here but no mention of this ulcer is made at that time. I saw the patient in room 21 in emergency department where he was complaining of some buttock pain at the area where the decubitus ulcer is. By palpation there is no crepitus but he is tender on the right-hand side in the gluteus area. He does state he has not been peeing very much at all or drinking very much at all either. Past Medical History  Past Medical History:   Diagnosis Date    Diabetes mellitus ([facility] [address]     Hyperlipidemia     Hypertension      Past Surgical History  Past Surgical History:   Procedure Laterality Date    ENDOSCOPY, COLON, DIAGNOSTIC      swallowing difficulties    OTHER SURGICAL HISTORY  Aug 29, 2013    Neck Abcess Incision and Drainage (Dr. Stone Ramos, [facility])     Medications  Prior to Admission medications    Medication Sig Start Date End Date Taking?  Authorizing Provider   apixaban (ELIQUIS) 5 MG TABS tablet Take 2 tablets by mouth 2 times daily for 7 days 1/8/22 1/15/22  Truman Lim MD   metoprolol succinate (TOPROL XL) 50 MG extended release tablet Take 1 tablet by mouth daily 12/21/21   Elaine Martinez MD   doxazosin (CARDURA) 4 MG tablet Take 1 tablet by mouth daily 12/21/21   Elaine Martinez MD   famotidine (PEPCID) 20 MG tablet Take 1 tablet by mouth 2 times daily 12/20/21   Elaine Martinez MD   insulin glargine (LANTUS) 100 UNIT/ML injection vial Inject 25 Units into the skin nightly 12/20/21   Elaine Martinez MD   hydrALAZINE (APRESOLINE) 100 MG tablet Take 1 tablet by mouth every 8 hours 12/15/21   Ernesto Fofana MD   losartan (COZAAR) 100 MG tablet Take 1 tablet by mouth daily 12/16/21   Ernesto Fofana MD   hydroCHLOROthiazide (HYDRODIURIL) 50 MG tablet Take 1 tablet by mouth daily 12/16/21   Ernesto Fofana MD simethicone (MYLICON) 80 MG chewable tablet Take 1 tablet by mouth every 6 hours as needed for Flatulence (abdominal cramping) 12/15/21   Alexa Easley MD   ascorbic acid (VITAMIN C) 500 MG tablet Take 1 tablet by mouth daily 12/16/21   Alexa Easley MD   Vitamin D (CHOLECALCIFEROL) 50 MCG (2000 UT) TABS tablet Take 1 tablet by mouth daily 12/16/21   Alexa Easley MD   insulin lispro (HUMALOG) 100 UNIT/ML injection vial Inject 0-12 Units into the skin 3 times daily (with meals) 12/15/21   Alexa Easley MD   insulin lispro (HUMALOG) 100 UNIT/ML injection vial Inject 0-6 Units into the skin nightly 12/15/21   Alexa Easley MD   insulin lispro (HUMALOG) 100 UNIT/ML injection vial Inject 7 Units into the skin 3 times daily (with meals) 12/15/21   Alexa Easley MD   zinc sulfate (ZINCATE) 220 (50 Zn) MG capsule Take 1 capsule by mouth daily 12/16/21   Alexa Easley MD   tamsulosin [facility]) 0.4 MG capsule Take 1 capsule by mouth daily 12/10/21   DIONTE Gibson CNP   amLODIPine (NORVASC) 10 MG tablet Take 1 tablet by mouth daily 11/24/21   DIONTE Arceo CNP   aspirin 81 MG tablet Take 81 mg by mouth daily. Historical Provider, MD    Scheduled Meds:    Continuous Infusions:  PRN Meds:. Allergies  Patient has no known allergies.   Family History  Family History   Problem Relation Age of Onset    Heart Disease Father     Other Mother         alzheimers     Social History  Social History     Socioeconomic History    Marital status:      Spouse name: None    Number of children: None    Years of education: None    Highest education level: None   Occupational History    None   Tobacco Use    Smoking status: Never Smoker    Smokeless tobacco: Never Used   Substance and Sexual Activity    Alcohol use: No    Drug use: No    Sexual activity: None   Other Topics Concern    None   Social History Narrative    None     Social Determinants of Health     Financial Resource Strain:     Difficulty of Paying Living Expenses: Not on file   Food Insecurity:     Worried About Running Out of Food in the Last Year: Not on file    Ran Out of Food in the Last Year: Not on file   Transportation Needs:     Lack of Transportation (Medical): Not on file    Lack of Transportation (Non-Medical): Not on file   Physical Activity:     Days of Exercise per Week: Not on file    Minutes of Exercise per Session: Not on file   Stress:     Feeling of Stress : Not on file   Social Connections:     Frequency of Communication with Friends and Family: Not on file    Frequency of Social Gatherings with Friends and Family: Not on file    Attends Tenriism Services: Not on file    Active Member of [address] [place] [address] or Organizations: Not on file    Attends Club or Organization Meetings: Not on file    Marital Status: Not on file   Intimate Partner Violence:     Fear of Current or Ex-Partner: Not on file    Emotionally Abused: Not on file    Physically Abused: Not on file    Sexually Abused: Not on file   Housing Stability:     Unable to Pay for Housing in the Last Year: Not on file    Number of [place] in the Last Year: Not on file    Unstable Housing in the Last Year: Not on file     Review of Systems:  General positive for  fevers  negative for  malaise  HEENT Denies any diplopia, tinnitus or vertigo  Resp recent COVID and end of December  Cardiac Denies any chest pain, palpitations, claudication or edema  GI Denies any melena, hematochezia, hematemesis or pyrosis   describes not much urine output but has been drinking very much  Heme Denies bruising or bleeding easily  Endocrine Denies any history of diabetes or thyroid disease  Neuro Denies any focal motor or sensory deficits  OBJECTIVE:   CURRENT VITALS:  weight is 195 lb (88.5 kg). His oral temperature is 98 °F (36.7 °C). His blood pressure is 94/50 (abnormal) and his pulse is 100. His respiration is 20 and oxygen saturation is 93%.   Body mass index is 31.47 kg/m². Temperature Range (24h):Temp: 98 °F (36.7 °C) Temp  Av °F (36.7 °C)  Min: 98 °F (36.7 °C)  Max: 98 °F (36.7 °C)  BP Range (27E): Systolic (68BSW), LHX:66 , Min:78 , NOK:75     Diastolic (55PJI), VLS:64, Min:32, Max:50    Pulse Range (24h): Pulse  Av.5  Min: 100  Max: 113  Respiration Range (24h): Resp  Av.8  Min: 18  Max: 36  Current Pulse Ox (24h):  SpO2: 93 %  Pulse Ox Range (24h):  SpO2  Av.3 %  Min: 92 %  Max: 100 %  Oxygen Amount and Delivery:    CONSTITUTIONAL: fatigued, alert, cooperative, mild distress, appears older than stated age and mildly obese  SKIN: Skin color, texture, turgor decreased. No rashes or lesions. LYMPH: no cervical nodes  HEENT: Head is normocephalic, atraumatic. EOMI, PERRLA. NECK: Supple, symmetrical, trachea midline, no adenopathy, thyroid symmetric, not enlarged and no tenderness, skin normal.  CHEST/LUNGS: chest symmetric with normal A/P diameter, normal respiratory rate and rhythm, lungs clear to auscultation without wheezes, rales or rhonchi. No accessory muscle use. Scars None   CARDIOVASCULAR: Heart sounds are normal.  Regular rate and rhythm without murmur, gallop or rub. Normal S1 and S2. . Carotid and femoral pulses 2+/4 and equal bilaterally. ABDOMEN: Normal shape. No scar(s) present. Normal bowel sounds. No bruits. Soft, nondistended, no masses or organomegaly. no evidence of hernia. Percussion: Normal without hepatosplenomegally. Tenderness: absent. RECTAL:   Large necrotic decubitus ulcer with undermining as well as some necrotic areas right near the anal verge tender in the right buttocks area no palpable crepitance anywhere. NEUROLOGIC: There are no focalizing motor or sensory deficits. CN II-XII are grossly intact. Katty Gin EXTREMITIES: no cyanosis, no clubbing and no edema.   LABS:     Recent Labs     22  1600   WBC 21.7*   HGB 10.8*   HCT 34.0*         K 5.3*      CO2 18*   *   CREATININE 4.4*   MG 3.4* CALCIUM 8.4*   AST 52*   ALT 84*   BILITOT 0.5     CMP:    Lab Results   Component Value Date     01/14/2022    K 5.3 01/14/2022    K 3.7 12/07/2021     01/14/2022    CO2 18 01/14/2022     01/14/2022    CREATININE 4.4 01/14/2022    LABGLOM 14 01/14/2022    GLUCOSE 101 01/14/2022    PROT 6.3 01/14/2022    LABALBU 2.2 01/14/2022    CALCIUM 8.4 01/14/2022    BILITOT 0.5 01/14/2022    ALKPHOS 148 01/14/2022    AST 52 01/14/2022    ALT 84 01/14/2022     PT/INR:    Lab Results   Component Value Date    INR 1.30 12/07/2021     Results for Ed Horowitz (MRN [id number]) as of 1/14/2022 20:15   Ref. Range 1/14/2022 16:00   Procalcitonin Latest Ref Range: 0.01 - 0.09 ng/mL 1.10 (H)   Results for Ed Horowitz (MRN [id number]) as of 1/14/2022 20:15   Ref. Range 1/14/2022 16:00   CRP Latest Ref Range: 0.00 - 1.00 mg/dl 22.92 (H)   Results for Ed Horowitz (MRN [id number]) as of 1/14/2022 20:15   Ref. Range 1/14/2022 17:09   Lactic Acid, Sepsis Latest Ref Range: 0.5 - 1.9 mmol/L 2.4 (H)     RADIOLOGY:   I have personally reviewed the following films:  CT ABDOMEN PELVIS WO CONTRAST Additional Contrast? None    Result Date: 1/14/2022   1. Decubitus ulcer overlying the sacrum new since previous CT scan of the pelvis dated 7 December 2021. 2. Air   in the perineum, new since previous study dated 7 December 2021. 3. Dale catheter in place. Air in the urinary bladder. 4. Changes of ankylosing spondylitis involving the lower thoracic, lumbar spine and sacroiliac iliac joints. Deformity at the thoracolumbar junction. 5. Calcification along the gallbladder wall. 6. Bilateral inguinal lymph nodes. 7. Areas of atelectasis or scarring at both lung bases. **This report has been created using voice recognition software. It may contain minor errors which are inherent in voice recognition technology. ** Final report electronically signed by DR Tata Ahmadi on 1/14/2022 6:49 PM        XR CHEST PORTABLE    Result Date: 1/14/2022  1. Interval improvement since previous plain radiographs dated 11 December 2021 with reduction in the bilateral upper lobe infiltrates. . 2. Borderline cardiomegaly. 3. Thoracic spondylosis. **This report has been created using voice recognition software. It may contain minor errors which are inherent in voice recognition technology. ** Final report electronically signed by DR Amina Omalley on 1/14/2022 5:31 PM    VL DUP LOWER EXTREMITY VENOUS LEFT    Result Date: 1/8/2022  Acute thrombus in the peroneal and anterior tibial veins. Final report electronically signed by Dr. Andre Aguilar on 1/8/2022 3:22 PM           Thank you for the evaluation.  Further recommendations above        Electronically signed by Zeinab Schuster MD on 1/14/2022 at 8:14 PM

## 2022-01-15 NOTE — ANESTHESIA PRE PROCEDURE
Department of Anesthesiology  Preprocedure Note       Name:  Mayi Mckenzie   Age:  77 y.o.  :  1955                                          MRN:  [id number]         Date:  2022      Surgeon: Eyad Sow):  Melvina Diaz MD    Procedure: Procedure(s):  DECUBITUS ULCER DEBRIDEMENT    Medications prior to admission:   Prior to Admission medications    Medication Sig Start Date End Date Taking?  Authorizing Provider   apixaban (ELIQUIS) 5 MG TABS tablet Take 2 tablets by mouth 2 times daily for 7 days 1/8/22 1/15/22  Jose Rafael Evans MD   metoprolol succinate (TOPROL XL) 50 MG extended release tablet Take 1 tablet by mouth daily 21   Jessie Perry MD   doxazosin (CARDURA) 4 MG tablet Take 1 tablet by mouth daily 21   Jessie Perry MD   famotidine (PEPCID) 20 MG tablet Take 1 tablet by mouth 2 times daily 21   Jessie Perry MD   insulin glargine (LANTUS) 100 UNIT/ML injection vial Inject 25 Units into the skin nightly 21   Jessie Perry MD   hydrALAZINE (APRESOLINE) 100 MG tablet Take 1 tablet by mouth every 8 hours 12/15/21   Alexa Easley MD   losartan (COZAAR) 100 MG tablet Take 1 tablet by mouth daily 21   Alexa Easley MD   hydroCHLOROthiazide (HYDRODIURIL) 50 MG tablet Take 1 tablet by mouth daily 21   Alexa Easley MD   simethicone (MYLICON) 80 MG chewable tablet Take 1 tablet by mouth every 6 hours as needed for Flatulence (abdominal cramping) 12/15/21   Alexa Easley MD   ascorbic acid (VITAMIN C) 500 MG tablet Take 1 tablet by mouth daily 21   Alexa Easley MD   Vitamin D (CHOLECALCIFEROL) 50 MCG ( UT) TABS tablet Take 1 tablet by mouth daily 21   Alexa Easley MD   insulin lispro (HUMALOG) 100 UNIT/ML injection vial Inject 0-12 Units into the skin 3 times daily (with meals) 12/15/21   Alexa Easley MD   insulin lispro (HUMALOG) 100 UNIT/ML injection vial Inject 0-6 Units into the skin nightly 12/15/21   Alexa Easley MD   insulin lispro (HUMALOG) 100 UNIT/ML injection vial Inject 7 Units into the skin 3 times daily (with meals) 12/15/21   Robyn Parker MD   zinc sulfate (ZINCATE) 220 (50 Zn) MG capsule Take 1 capsule by mouth daily 12/16/21   Robyn Parker MD   tamsulosin [facility]) 0.4 MG capsule Take 1 capsule by mouth daily 12/10/21   Gamaliel Alonso, APRN - CNP   amLODIPine (NORVASC) 10 MG tablet Take 1 tablet by mouth daily 11/24/21   Abbey Ames APRN - CNP   aspirin 81 MG tablet Take 81 mg by mouth daily. Historical Provider, MD       Current medications:    No current facility-administered medications for this encounter.      Current Outpatient Medications   Medication Sig Dispense Refill    apixaban (ELIQUIS) 5 MG TABS tablet Take 2 tablets by mouth 2 times daily for 7 days 28 tablet 0    metoprolol succinate (TOPROL XL) 50 MG extended release tablet Take 1 tablet by mouth daily 30 tablet 3    doxazosin (CARDURA) 4 MG tablet Take 1 tablet by mouth daily 30 tablet 1    famotidine (PEPCID) 20 MG tablet Take 1 tablet by mouth 2 times daily 60 tablet 3    insulin glargine (LANTUS) 100 UNIT/ML injection vial Inject 25 Units into the skin nightly 10 mL 3    hydrALAZINE (APRESOLINE) 100 MG tablet Take 1 tablet by mouth every 8 hours 90 tablet 0    losartan (COZAAR) 100 MG tablet Take 1 tablet by mouth daily 30 tablet 0    hydroCHLOROthiazide (HYDRODIURIL) 50 MG tablet Take 1 tablet by mouth daily 30 tablet 3    simethicone (MYLICON) 80 MG chewable tablet Take 1 tablet by mouth every 6 hours as needed for Flatulence (abdominal cramping) 30 tablet 0    ascorbic acid (VITAMIN C) 500 MG tablet Take 1 tablet by mouth daily 30 tablet 0    Vitamin D (CHOLECALCIFEROL) 50 MCG (2000 UT) TABS tablet Take 1 tablet by mouth daily 30 tablet 0    insulin lispro (HUMALOG) 100 UNIT/ML injection vial Inject 0-12 Units into the skin 3 times daily (with meals) 10 mL 3    insulin lispro (HUMALOG) 100 UNIT/ML injection vial Inject 0-6 Units into the skin nightly 10 mL 3    insulin lispro (HUMALOG) 100 UNIT/ML injection vial Inject 7 Units into the skin 3 times daily (with meals) 10 mL 0    zinc sulfate (ZINCATE) 220 (50 Zn) MG capsule Take 1 capsule by mouth daily 30 capsule 0    tamsulosin (FLOMAX) 0.4 MG capsule Take 1 capsule by mouth daily 30 capsule 3    amLODIPine (NORVASC) 10 MG tablet Take 1 tablet by mouth daily 90 tablet 0    aspirin 81 MG tablet Take 81 mg by mouth daily. Allergies:  No Known Allergies    Problem List:    Patient Active Problem List   Diagnosis Code    Cellulitis and abscess of neck L03.221, L02.11    Essential hypertension I10    Leukocytosis D72.829    Obesity (BMI 30-39. 9) E66.9    Hyperlipidemia E78.5    COVID-19 virus detected U07.1    COVID-19 U07.1    Acute respiratory failure with hypoxia (HCC) J96.01    Necrotizing soft tissue infection M79.89    Pressure injury, unstageable, with infection (HCC) L89.95, L08.9    Sepsis ([facility] [address] A41.9       Past Medical History:        Diagnosis Date    Diabetes mellitus ([facility] [address]     Hyperlipidemia     Hypertension        Past Surgical History:        Procedure Laterality Date    ENDOSCOPY, COLON, DIAGNOSTIC      swallowing difficulties    OTHER SURGICAL HISTORY  Aug 29, 2013    Neck Abcess Incision and Drainage (Dr. Stone Ramos, [facility])       Social History:    Social History     Tobacco Use    Smoking status: Never Smoker    Smokeless tobacco: Never Used   Substance Use Topics    Alcohol use:  No                                Counseling given: Not Answered      Vital Signs (Current):   Vitals:    01/14/22 1732 01/14/22 1831 01/14/22 1928 01/14/22 1930   BP: (!) 92/36 (!) 97/50  (!) 94/50   Pulse: 108 104 100    Resp: (!) 36 18 20    Temp:       TempSrc:       SpO2: 94% 100% 93%    Weight:                                                  BP Readings from Last 3 Encounters:   01/14/22 (!) 94/50   01/08/22 128/73   12/20/21 134/60       NPO Status: BMI:   Wt Readings from Last 3 Encounters:   01/14/22 195 lb (88.5 kg)   12/20/21 195 lb 8 oz (88.7 kg)   01/02/18 223 lb 11.2 oz (101.5 kg)     Body mass index is 31.47 kg/m². CBC:   Lab Results   Component Value Date    WBC 21.7 01/14/2022    RBC 3.69 01/14/2022    HGB 10.8 01/14/2022    HCT 34.0 01/14/2022    MCV 92.1 01/14/2022    RDW 13.0 08/30/2013     01/14/2022       CMP:   Lab Results   Component Value Date     01/14/2022    K 5.3 01/14/2022    K 3.7 12/07/2021     01/14/2022    CO2 18 01/14/2022     01/14/2022    CREATININE 4.4 01/14/2022    LABGLOM 14 01/14/2022    GLUCOSE 101 01/14/2022    PROT 6.3 01/14/2022    CALCIUM 8.4 01/14/2022    BILITOT 0.5 01/14/2022    ALKPHOS 148 01/14/2022    AST 52 01/14/2022    ALT 84 01/14/2022       POC Tests: No results for input(s): POCGLU, POCNA, POCK, POCCL, POCBUN, POCHEMO, POCHCT in the last 72 hours.     Coags:   Lab Results   Component Value Date    INR 1.30 12/07/2021    APTT 29.7 12/07/2021       HCG (If Applicable): No results found for: PREGTESTUR, PREGSERUM, HCG, HCGQUANT     ABGs: No results found for: PHART, PO2ART, EXE4OUZ, XAL3QTQ, BEART, F3EUURNW     Type & Screen (If Applicable):  No results found for: LABABO, LABRH    Drug/Infectious Status (If Applicable):  No results found for: HIV, HEPCAB    COVID-19 Screening (If Applicable):   Lab Results   Component Value Date    COVID19 DETECTED 12/07/2021           Anesthesia Evaluation  Patient summary reviewed no history of anesthetic complications:   Airway: Mallampati: II  TM distance: >3 FB   Neck ROM: full  Mouth opening: > = 3 FB Dental:    (+) edentulous      Pulmonary:normal exam        (-) COPD and asthma                           Cardiovascular:  Exercise tolerance: good (>4 METS),   (+) hypertension: no interval change,     (-) past MI and CAD                Neuro/Psych:      (-) seizures and CVA           GI/Hepatic/Renal:        (-) GERD, liver disease and no renal disease       Endo/Other:    (+) Diabetes, .    (-) hypothyroidism, hyperthyroidism               Abdominal:   (+) obese,           Vascular: Other Findings:             Anesthesia Plan      general     ASA 2     (GETA. PIV. Additional access can be obtained after induction if needed. Standard ASA monitors. IV/PO opioids and other adjuncts as needed for pain control. PACU post op for recovery. Possible anesthetics complications were discussed with the patient, including but not limited to: PONV, damage to the airway and surrounding structures (teeth, lips, gums, tongue, etc.), adverse reactions to medicine, cardiac complications (MI, CHF, arrhythmias, etc.), respiratory complications (post-op ventilation, respiratory failure, etc.), neurologic complications (nerve damage, stroke, seizure), and death. The patient was given the opportunity to ask questions and all questions were answered to the patient's satisfaction. The patient is in agreement with the anesthetic plan.  )  Induction: intravenous and rapid sequence. Anesthetic plan and risks discussed with patient. Plan discussed with CRNA.                   Hugo Olmedo DO   1/14/2022

## 2022-01-15 NOTE — PROGRESS NOTES
ID nurse on call returned page. MD covering needs to be notified Monday for routine consult. Writer conveyed in nursing handoff. ICU provider ordering antibiotics. See orders and note.

## 2022-01-15 NOTE — PROGRESS NOTES
Pharmacy Renal Adjustment    Delvin Ramirez is a 77 y.o. male. Pharmacy renally adjusted the following medications per P&T approved policy: Pepcid and Zosyn    Height:   Ht Readings from Last 1 Encounters:   12/07/21 5' 6\" (1.676 m)     Weight:  Wt Readings from Last 1 Encounters:   01/14/22 195 lb (88.5 kg)     Recent Labs     01/14/22  1600   *   CREATININE 4.4*     Estimated Creatinine Clearance: 17 mL/min (A) (based on SCr of 4.4 mg/dL [facility] AT [facility])). Calculated CrCl: 15 ml/min    Assessment:  DARINEL    Plan:   Change  Pepcid from 20 mg twice daily to 10 mg daily. Change Zosyn extended infusion from every 8 hours to every 12 hours.     Nury Kenyon RPHPharmD  1/15/2022   1:35 AM

## 2022-01-15 NOTE — PROGRESS NOTES
Arrived in OR holding with chart and phone, consents obtained. Checked in per Tarik Barron RN. Phone placed in a bag and labeled, kept with chart.

## 2022-01-15 NOTE — PROGRESS NOTES
Admitted to 4D ICU from PACU. Bedside handoff to writer from Dr. Cody Jane. Assessment completed. See flowsheet. Patient denies pain. Alert /oriented x4. Anterior assessment completed. Posterior deferred for line placement and unstable hemodynamically.

## 2022-01-15 NOTE — PROGRESS NOTES
Turned patient for posterior skin assessment. Buttock/ surgical wound saturated with darwin blood and clots. Dr. Syed BHAKTA md informed and Dr. Saini Late surgical resident. Stat labs sent. - 2nd RN Mark assessed skin. Left heel with 3.3x3 cm pressure ulcer. Edges blanchable redness, center dark non blanchable.

## 2022-01-15 NOTE — PROCEDURES
Central Venous Catheterization Procedure Note    Indication:  [x] Lack of adequate peripheral iv access  [x] Infusion of medications with high risk of extravasation injury  [] Hemodynamic monitoring  [] Hemodialysis  [] Extracorporeal therapies  [] Other:                     Time Out:  [x] Time out was completed immediately prior to the start of the procedure which included verification of the correct patient, correct site and agreement on the procedure to be done. [] Time out was not done because procedure was emergent      Consent:  [x] The patient/surrogate decision maker was informed of the procedure indications, risks, benefits and alternatives. Questions were answered and consent was obtained to proceed with the procedure. [] Consent was not obtained, because the procedure was emergent or patient was unable to consent and surrogate decision maker was not available. Type of Central Line Being Placed:   [x] Central venous    [] Hemodialysis  [] Pulmonary artery    Location:   [x] Internal Jugular Vein [x] Right [] Left  [] Subclavian Vein  [] Right [] Left  [] Femoral Vein   [] Right [] Left      Central Line Bundle:  [x] I washed/disinfected my hands prior to starting procedure  [x] Hat      [x] Mask      [x] Sterile gown      [x] Sterile gloves were worn throughout the procedure  [x] Everyone in the room during the procedure wore a mask  [x] Chlorhexidine was utilized to prep the skin and allowed to dry completely  [] Povidone-iodine was used to prep the skin and allowed to dry completely  [x] Full body drape was used to cover the patient    Ultrasound Guidance:   [x] Yes      [] No    Anesthetic Used:  [x] Lidocaine      [] Other    Sterile Technique Used Throughout Procedure?   [x] Yes      [] No    Description/Findings:   [x] Dark nonpulsatile blood return obtained from all ports  [] Appropriate wavefom(s) seen  [] Other    Complications:  [x] None apparent  [] Other:    Estimated Blood Loss (excluding fluid removed, if applicable):  < 5ml    Blood Cultures obtained during line insertion due to the following indication(s):  Septic Shock    Follow-up x-ray: Confirmation of Tip in Right atrium    Proceduralist:   I personally performed the procedure documented as signed by this procedure note. Assistant(s):  Dr Vania Hunter    Supervision:  Supervision was required for this procedure. Dr Vania Hunter was physically present and supervised all key elements of the procedure.      Electronically signed by Pranav Jurado DO PGY-2 on 1/15/2022 at 2:22 AM

## 2022-01-15 NOTE — PROGRESS NOTES
Renal Adjustment Follow-up    Recent Labs     01/15/22  0030 01/15/22  0542   * 117*   CREATININE 3.4* 2.6*     Estimated Creatinine Clearance: 29 mL/min (A) (based on SCr of 2.6 mg/dL (H)). Plan: Increase zosyn to 3.375 grams IV q8h for improving renal function and severity of infection.     Mauri Mccabe PharmD, BCPS   1/15/2022  2:34 PM

## 2022-01-15 NOTE — PROGRESS NOTES
[facility] -  Nas Campos MD M.D. FACS  Daily Progress Note    Pt Name: Korina Gill  Medical Record Number: [id number]  Date of Birth 1955   Today's Date: 1/15/2022    ASSESSMENT:     1. POD # 1  2. 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 Chief Complaint:  Chief Complaint   Patient presents with    Wound Check           PLAN:     1. Bleeding has stopped coagulopathy noted with elevated INR FFP has been ordered and infusing  2. White count up to 29,000 he is currently on 3 pressors  3. We will leave packing in place until tomorrow since coagulopathy has not been fully reversed yet      SUBJECTIVE:     Paige Solis is an ICU .   Response to voice      OBJECTIVE:     Patient Vitals for the past 24 hrs:   BP Temp Temp src Pulse Resp SpO2 Weight   01/15/22 0623 -- 98.4 °F (36.9 °C) Axillary 116 22 97 % --   01/15/22 0615 -- -- -- 110 22 98 % --   01/15/22 0609 (!) 130/45 98 °F (36.7 °C) -- 109 22 98 % --   01/15/22 0600 (!) 115/58 -- -- 107 22 98 % --   01/15/22 0545 -- -- -- 115 23 98 % --   01/15/22 0530 -- -- -- 104 17 98 % --   01/15/22 0515 -- -- -- 105 20 98 % --   01/15/22 0500 (!) 114/59 97.5 °F (36.4 °C) Oral 114 23 98 % --   01/15/22 0445 -- 97.6 °F (36.4 °C) Oral 108 24 98 % --   01/15/22 0430 -- -- -- 108 19 98 % --   01/15/22 0415 -- -- -- 118 22 98 % --   01/15/22 0400 (!) 105/57 -- -- 114 8 98 % --   01/15/22 0345 -- -- -- 121 23 98 % --   01/15/22 0330 -- -- -- 124 26 94 % --   01/15/22 0315 -- -- -- 120 23 98 % --   01/15/22 0300 (!) 110/51 -- -- 120 25 97 % -- 01/15/22 0245 -- 97.5 °F (36.4 °C) -- 115 22 96 % --   01/15/22 0238 -- -- -- 114 28 96 % --   01/15/22 0237 (!) 109/38 97.8 °F (36.6 °C) Oral 121 23 95 % --   01/15/22 0230 -- -- -- -- -- -- 190 lb 14.7 oz (86.6 kg)   01/15/22 0145 (!) 100/53 -- -- 122 19 96 % --   01/15/22 0140 (!) 112/55 -- -- 125 25 97 % --   01/15/22 0135 (!) 93/50 -- -- 126 23 97 % --   01/15/22 0130 (!) 100/54 -- -- 130 24 97 % --   01/15/22 0125 99/75 -- -- 130 19 95 % --   01/15/22 0120 (!) 78/49 -- -- 127 23 -- --   01/15/22 0115 (!) 81/57 -- -- 130 27 -- --   01/15/22 0110 82/73 -- -- 127 30 92 % --   01/15/22 0105 (!) 87/52 -- -- 117 25 98 % --   01/15/22 0100 (!) 100/51 -- -- 116 24 98 % --   01/15/22 0055 (!) 78/55 -- -- 121 25 97 % --   01/15/22 0050 (!) 86/51 -- -- 124 23 91 % --   01/15/22 0045 (!) 98/48 -- -- 113 23 97 % --   01/15/22 0040 (!) 85/50 -- -- 117 23 97 % --   01/15/22 0035 (!) 77/42 -- -- 110 25 96 % --   01/14/22 2315 (!) 98/46 -- -- 108 30 94 % --   01/14/22 2310 (!) 92/41 -- -- 108 24 97 % --   01/14/22 2305 (!) 101/42 -- -- 109 20 95 % --   01/14/22 2300 (!) 99/45 -- -- 108 21 93 % --   01/14/22 2255 (!) 93/45 -- -- 109 19 95 % --   01/14/22 2250 (!) 89/48 -- -- 108 23 95 % --   01/14/22 2245 (!) 86/48 -- -- 110 (!) 32 96 % --   01/14/22 2225 (!) 96/52 -- -- 107 27 96 % --   01/14/22 2220 (!) 104/57 -- -- 115 26 96 % --   01/14/22 2215 (!) 108/57 -- -- 116 26 96 % --   01/14/22 2211 (!) 107/57 98.7 °F (37.1 °C) Temporal 118 24 92 % --   01/14/22 1930 (!) 94/50 -- -- -- -- -- --   01/14/22 1928 -- -- -- 100 20 93 % --   01/14/22 1831 (!) 97/50 -- -- 104 18 100 % --   01/14/22 1732 (!) 92/36 -- -- 108 (!) 36 94 % --   01/14/22 1712 (!) 78/40 -- -- 110 18 94 % --   01/14/22 1711 -- -- -- -- -- -- 195 lb (88.5 kg)   01/14/22 1619 (!) 87/32 -- -- 110 21 92 % --   01/14/22 1616 -- 98 °F (36.7 °C) Oral 113 24 93 % --         Intake/Output Summary (Last 24 hours) at 1/15/2022 0630  Last data filed at 1/15/2022 5165  Gross per 24 hour   Intake 2943.36 ml   Output 1600 ml   Net 1343.36 ml       In: 2943.4 [I.V.:2229.4; Blood:426.7]  Out: 1600 [Urine:1600]    No intake/output data recorded. Date 01/15/22 0000 - 01/15/22 2359   Shift 9656-0704 4638-7669 1529-1424 24 Hour Total   INTAKE   I.V.(mL/kg) 1375. 4(15.9)   1375. 4(15.9)   Blood(mL/kg) 426.7(4.9)   426.7(4.9)   IV Piggyback(mL/kg) 287. 3(3.3)   287. 3(3.3)   Shift Total(mL/kg) 2089. 4(24.1)   2089. 4(24.1)   OUTPUT   Urine(mL/kg/hr) 600   600   Shift Total(mL/kg) 600(6.9)   600(6.9)   Weight (kg) 86.6 86.6 86.6 86.6       Wt Readings from Last 3 Encounters:   01/15/22 190 lb 14.7 oz (86.6 kg)   12/20/21 195 lb 8 oz (88.7 kg)   01/02/18 223 lb 11.2 oz (101.5 kg)        Body mass index is 30.81 kg/m². Diet: ADULT DIET; Regular; 3 carb choices (45 gm/meal);  Low Potassium (Less than 3000 mg/day)        MEDS:     Scheduled Meds:   [Held by provider] amLODIPine  10 mg Oral Daily    [Held by provider] apixaban  10 mg Oral BID    [Held by provider] aspirin  81 mg Oral Daily    [Held by provider] hydrALAZINE  100 mg Oral Q8H    [Held by provider] hydroCHLOROthiazide  50 mg Oral Daily    [Held by provider] losartan  100 mg Oral Daily    [Held by provider] metoprolol succinate  50 mg Oral Daily    sodium chloride flush  5-40 mL IntraVENous 2 times per day    piperacillin-tazobactam  3,375 mg IntraVENous Q12H    famotidine  10 mg Oral Daily    insulin lispro  0-18 Units SubCUTAneous Q4H    calcium replacement protocol   Other RX Placeholder    vancomycin (VANCOCIN) intermittent dosing (placeholder)   Other RX Placeholder     Continuous Infusions:   sodium chloride      dextrose      lactated ringers 100 mL/hr at 01/15/22 0558    sodium chloride      vasopressin (Septic Shock) infusion 0.04 Units/min (01/15/22 0558)    sodium chloride      phenylephrine (BENJIE-SYNEPHRINE) 50mg/250mL infusion 100 mcg/min (01/15/22 0558)    norepinephrine 25 mcg/min (01/15/22 Jacoby     PRN Meds:sodium chloride flush, 5-40 mL, PRN  sodium chloride, 25 mL, PRN  ondansetron, 4 mg, Q8H PRN   Or  ondansetron, 4 mg, Q6H PRN  glucose, 15 g, PRN  dextrose, 12.5 g, PRN  glucagon (rDNA), 1 mg, PRN  dextrose, 100 mL/hr, PRN  HYDROcodone 5 mg - acetaminophen, 1 tablet, Q4H PRN  sodium chloride, , PRN  fentanNYL, 50 mcg, Q1H PRN  sodium chloride, , PRN          PHYSICAL EXAM:     CONSTITUTIONAL: Sleeping but awakens    WOUND/INCISION: Loop drains in place little drainage at this time packing replaced last night we will leave in place for now        LABS:     CBC:   Recent Labs     01/14/22  1600 01/15/22  0040   WBC 21.7* 20.4*   RBC 3.69* 2.83*   HGB 10.8* 8.3*   HCT 34.0* 27.1*   MCV 92.1 95.8*   MCH 29.3 29.3   MCHC 31.8* 30.6*    235   MPV 11.2 11.2      Last 3 CMP:   Recent Labs     01/14/22  1600 01/15/22  0030    140   K 5.3* 4.3    111   CO2 18* 14*   * 141*   CREATININE 4.4* 3.4*   GLUCOSE 101 195*   CALCIUM 8.4* 6.7*   PROT 6.3 4.6*   LABALBU 2.2* 1.8*   BILITOT 0.5 0.4   ALKPHOS 148* 125   AST 52* 44*   ALT 84* 72*      Troponin: No results for input(s): TROPONINI in the last 72 hours. Calcium:   Lab Results   Component Value Date    CALCIUM 6.7 01/15/2022    CALCIUM 8.4 01/14/2022    CALCIUM 8.5 01/08/2022      Ionized Calcium: No results found for: IONCA   Lipids: No results for input(s): CHOL, HDL in the last 72 hours. Invalid input(s): LDLCALCU  INR:   Recent Labs     01/15/22  0040   INR 4.55*     Lactic Acid: No results found for: LACTA               DVT prophylaxis: [] Lovenox                                 [] SCDs                                 [] SQ Heparin                                 [] Encourage ambulation, low risk for DVT, no chemical or mechanical prophylaxis necessary              [] Already on Anticoagulation      RADIOLOGY:      CT ABDOMEN PELVIS WO CONTRAST Additional Contrast? None    Result Date: 1/14/2022   1.  Decubitus ulcer overlying the sacrum new since previous CT scan of the pelvis dated 7 December 2021. 2. Air   in the perineum, new since previous study dated 7 December 2021. 3. Dale catheter in place. Air in the urinary bladder. 4. Changes of ankylosing spondylitis involving the lower thoracic, lumbar spine and sacroiliac iliac joints. Deformity at the thoracolumbar junction. 5. Calcification along the gallbladder wall. 6. Bilateral inguinal lymph nodes. 7. Areas of atelectasis or scarring at both lung bases. **This report has been created using voice recognition software. It may contain minor errors which are inherent in voice recognition technology. ** Final report electronically signed by DR Chris Fong on 1/14/2022 6:49 PM    XR CHEST PORTABLE    Result Date: 1/15/2022  1. Right IJ central line tip in the right atrium. This can be retracted by 3 cm. 2. Bilateral patchy interstitial opacities which may reflect infiltrate. 3. Borderline cardiomegaly. This document has been electronically signed by: Asha Medina MD on 01/15/2022 12:24 AM    XR CHEST PORTABLE    Result Date: 1/14/2022  1. Interval improvement since previous plain radiographs dated 11 December 2021 with reduction in the bilateral upper lobe infiltrates. . 2. Borderline cardiomegaly. 3. Thoracic spondylosis. **This report has been created using voice recognition software. It may contain minor errors which are inherent in voice recognition technology. ** Final report electronically signed by DR Chris Fong on 1/14/2022 5:31 PM    VL DUP LOWER EXTREMITY VENOUS LEFT    Result Date: 1/8/2022  Acute thrombus in the peroneal and anterior tibial veins. Final report electronically signed by Dr. Letty Summers on 1/8/2022 3:22 PM        Bertin Slaughter MD, M.D.  FACS  Electronically signed 1/15/2022 at 6:30 AM

## 2022-01-15 NOTE — PROGRESS NOTES
2211  Pt. Awake and oriented on adm. To pacu. o2 per nasal cannula applied at 2 liters. Pt. Denies pain. Perineal and buttock dressings intact. 2215  Dr. Diantha Nageotte at bedside. 26  Dr. Devan Sheikh to see pt.  2230  Transfer to .

## 2022-01-15 NOTE — ANESTHESIA POSTPROCEDURE EVALUATION
Department of Anesthesiology  Postprocedure Note    Patient: Kp Vega  MRN: [id number]  YOB: 1955  Date of evaluation: 1/14/2022  Time:  10:19 PM     Procedure Summary     Date: 01/14/22 Room / Location: [facility]  Darrell Gar    Anesthesia Start: 2057 Anesthesia Stop: 2216    Procedure: FULL THICKNESS DEBRIDEMENT OF SACRAL DECUBITUS ULCER, [id number] CM (N/A Coccyx) Diagnosis: (Decubitus Ulcer)    Surgeons: Erika García MD Responsible Provider: Trini Dunn DO    Anesthesia Type: general ASA Status: 2          Anesthesia Type: general    Gregg Phase I:      Gregg Phase II:      Last vitals: Reviewed and per EMR flowsheets.        Anesthesia Post Evaluation    Patient location during evaluation: PACU  Patient participation: complete - patient participated  Level of consciousness: awake and alert  Pain score: 0  Airway patency: patent  Nausea & Vomiting: no nausea and no vomiting  Complications: no  Cardiovascular status: blood pressure returned to baseline and hemodynamically stable  Respiratory status: acceptable and nasal cannula  Hydration status: euvolemic

## 2022-01-15 NOTE — H&P
CRITICAL CARE H&P NOTE      Patient:  Arun Dunn    Unit/Bed:4D-11/011-A  YOB: 1955  MRN: [id number]   PCP: DIONTE Eddy CNP  Date of Admission: 1/14/2022  Chief Complaint:- Buttock Pain     Assessment and Plan:      1. Necrotizing soft tissue infection: Noted on exam seen on CT on arrival.  Patient was started on broad-spectrum antibiotics with vancomycin and Zosyn on arrival. Underwent emergent I&D for necrotizing fasciitis in OR following arrival in ED. 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 Will continue broad-spectrum antibiotics with vancomycin and Zosyn and de-escalate pending gram stain and culture. 2. Septic Shock: POA (SIRS RR, HR, hypotension, WBC) SOFA 6. Secondary to necrotizing fasciitis s/p I&D as noted above. Systolic blood pressure in the 50s noted in the OR following sepsis bolus fluids at 30 cc/kg. Patient received Carlos-Synephrine and vasopressin in the OR and was initiated on Levophed once transferred to the ICU and transitioned to Carlos-Synephrine for Hildy . Patient was started on broad-spectrum antibiotics with vancomycin and Zosyn in the ED. Initial lactic acid 2.4 trend. Procalcitonin elevated at 1.1. We will continue IV fluids at 200 cc/h  3. Stage 3 DARINEL: Creatinine 4.4 on arrival (baseline 1.0) suspect prerenal etiology progressing to ATN in the context of sepsis. Will order urine studies to confirm. Repeat BMP tonight. We will continue IV fluids with LR  4. High anion gap metabolic acidosis: Suspect secondary to DARINEL complicated by lactic acidosis and starvation ketosis evidenced by.   Anion gap 17 lactic acid 2.4 on arrival.  Beta hydroxybutyrate noted to be elevated to 7.19 on arrival.  Suspect element of starvation ketosis given con commitment and low albumin of 2.2. Will trend lactic acid and continue IV fluids. 5. Leukocytosis: WBC 21.7 on arrival suspect secondary to necrotizing soft tissue infection as noted above. We will continue present therapy as described above  6. Mild hyperkalemia: Potassium 5.3 on admission. Suspect secondary to DARINEL. Expected to improve with IV fluids   7. Atrial fibrillation RVR: Previous history noted. A. fib RVR noted after initiation of Levophed. Will transition to phenylephrine as tolerated. Unable to anticoagulate secondary to active hemorrhage. 8. IDDM: Hemoglobin A1c 8.1 on 1/4/2022. Patient's home dose includes 7 units lispro 3 times daily plus SSI and basal bolus of 25 units nightly. We will hold basal bolus and scheduled insulin as patient has remained n.p.o. during the perioperative period and continue with sliding scale insulin. We will continue with Accu-Cheks 4 times daily. Hypoglycemia protocol ordered  9. Acute LLE distal DVT: Noted on ultrasound on 1/8/2022 affecting the anterior tibial and peroneal veins of the left lower extremity. Patient was initiated on Eliquis at that time. This was held during perioperative period due to concern for acute bleeding. Given that this is not a proximal DVT there are no indications to start restart anticoagulation at this time  10. Primary HTN: Holding hydralazine, amlodipine, hydrochlorothiazide, losartan Cardura in the context of sepsis associated hypotension. 11. Transaminitis: Patient noted to have isolated alkaline phosphatase elevation to 148 ALT/AST 84/52 on arrival.  Suspect related to sepsis associated hypotension. Will trend liver enzymes in a.m.  12. Hyperlipidemia: Last lipid panel in 2013 demonstrated hypertriglyceridemia. Recommend follow-up on discharge  13. Urinary retention: Patient was reported to be on 2 alpha blockers on admission. Awaiting med rec to reinitiate therapy  14.  History of COVID-19 infection: Tested positive on 12/7/2021. Patient is status post baricitinib and Decadron and was not intubated. Currently not requiring supplemental oxygen  15. Obesity: BMI 31.47. Recommended lifestyle modification      INITIAL H AND P AND ICU COURSE:  Major Push is a 77 y.o. male who presents to [facility] ED 1/14/2022 with buttock pain. Patient is a lifetime nonsmoker with a PMH significant for recent LLE DVT on Eliquis, IDDM, Primary hypertension, urinary retention. Patient arrived by EMS from [facility] with new decubitus ulcer. Patient believes this was present for multiple weeks however this was not well documented on his previous hospital encounters. He reports he had the area drained approximately one week ago by wound team.  Patient reports worsening pain accompanied by fever sweats and chills over the last week. Of note patient was seen in the ED on 1/8/2022 and ultrasound of the left leg demonstrated acute DVT affecting the peroneal and anterior tibial veins. Initiated on therapy for suspected DVT of left lower extremity with Eliquis 5 mg daily and has been on anticoagulation with Eliquis since that time. Prior to this patient was hospitalized from 12/7/2021 to 12/20/2021 for COVID-19 pneumonia. Patient received the usual therapy of Decadron and baricitinib and was discharged to a skilled nursing facility at [facility] for rehab on room air. Patient is a full code.     ED Course:  Patient Vitals on Arrival T 98 RR 24  BP 87/32 SpO2 93 on RA. Labwork showed mild hyperkalemia, and stage III DARINEL with high anion gap and lactic lactic acidosis. Hypoalbuminemia with elevated beta hydroxybutyrate was also noted. However patient's blood glucose was within normal limits on arrival. Imaging showed decubitus ulcer overlying the sacrum with air in the perineum. Patient was was given sepsis bolus fluids at 30 cc/kg and started on empiric antibiotics with vancomycin and Zosyn.   General surgery was consulted for emergent debridement of decubitus ulcer    Past Medical History:  Per HPI. Social History: Lifetime non-smoker denies alcohol use. ROS   Pain in buttocks, abdomen and left leg with intermittent palpatiations, fevers and chills. A 12 point review of systems was otherwise negative    Scheduled Meds:   norepinephrine-sodium chloride         Continuous Infusions:   norepinephrine         PHYSICAL EXAMINATION:  T:  98.7.  P:  118. RR:  24. B/P:  107/57. FiO2:  4LNC. O2 Sat:  94%. I/O: 1700/1000  Body mass index is 31.47 kg/m². GCS:   14  General:  Acute on Chronically ill appearing elderly white male  HEENT:  Restricted motion in side bending and rotation. Otherwise normocephalic and atraumatic. No scleral icterus. PERR  Neck: supple. No Thyromegaly. Lungs: clear to auscultation. No retractions  Cardiac: 2/6 REID at RUSB. Otherwise tachycardic with irregularly irregular rhythm. No JVD appreciated   Abdomen: soft. Nontender. Extremities:  Cool, no clubbing, cyanosis, or edema x 4. Vasculature: Delayed capillary refill  >3 seconds. Palpable dorsalis pedis pulses. Skin: Active bleed noted from decubitus ulcer, repacked at time of exam.  Skin breakdown noted on heel  Psych: Alert and oriented to person and place, not time. Lymph:  No supraclavicular adenopathy. Neurologic: Exam limited by pain and altered mental status    Data: (All radiographs, tracings, PFTs, and imaging are personally viewed and interpreted unless otherwise noted).  Sodium 139 potassium 5.3 chloride 104 bicarb 18 BUN/creatinine 166/4.4 anion gap 17, magnesium 3.4 glucose 101 calcium 8.4 total protein 6.3 albumin 2.2 alk phos 148 ALT/AST 84/52 total bilirubin 0.5   WBC 21.7 H&H 10.8/34.0 platelets 869   Procalcitonin 1.10 lactic acid 2.4   CT abdomen and pelvis demonstrates decubitus ulcer overlying the sacrum not noted on previous CT. Air in the peritoneum consistent with gas gangrene noted in the vicinity of the decubitus ulcer. Persistent chronic changes consistent with ankylosing spondylitis involving the lower thoracic lumbar spine and sacroiliac joints as well as calcification of the gallbladder wall and enlarged lymph nodes.   There were areas of persistent fibrosis noted at both lung bases   Telemetry shows A. fib RVR        Meets Continued ICU Level Care Criteria:    [x] Yes   [] No - Transfer Planned to listed location:  [] HOSPITALIST CONTACTED-      Case and plan discussed with Dr. Wayne Mcmanus        Electronically signed by Donne Kehr, DO

## 2022-01-15 NOTE — ED PROVIDER NOTES
[facility]  eMERGENCY dEPARTMENT eNCOUnter          CHIEF COMPLAINT       Chief Complaint   Patient presents with    Wound Check       Nurses Notes reviewed and I agree except as noted inthe HPI. HISTORY OF PRESENT ILLNESS    Bisi Streeter is a 77 y.o. male who presents to the Emergency Department for the evaluation of wound check. Patient is currently residing in [facility]. He presents today for wound beds been present on the buttock for approximately 23 days. Patient states he is not been on any medication for treatment of the area. He states it was drained approximately 1 week ago by wound care but has been getting increasingly painful. He reports subjective fevers associated as well as increased thirst and increased urination but states his glucose has been good. States he has been very poorly hydrating but has been compliant with his medications. Reports last dose of medications yesterday evening, did not take any of his medications today. He is on Eliquis for recently diagnosed DVT. The HPI was provided by the patient. REVIEW OF SYSTEMS     Review of Systems   Constitutional: Positive for fever. Endocrine: Positive for polydipsia and polyuria. Skin: Positive for wound. All other systems reviewed and are negative. PAST MEDICAL HISTORY    has a past medical history of Diabetes mellitus ([address] Hyperlipidemia, and Hypertension. SURGICAL HISTORY      has a past surgical history that includes other surgical history (Aug 29, 2013) and Endoscopy, colon, diagnostic.     CURRENT MEDICATIONS       Current Discharge Medication List      CONTINUE these medications which have NOT CHANGED    Details   apixaban (ELIQUIS) 5 MG TABS tablet Take 2 tablets by mouth 2 times daily for 7 days  Qty: 28 tablet, Refills: 0      metoprolol succinate (TOPROL XL) 50 MG extended release tablet Take 1 tablet by mouth daily  Qty: 30 tablet, Refills: 3      doxazosin (CARDURA) 4 MG tablet Take 1 tablet by mouth daily  Qty: 30 tablet, Refills: 1      famotidine (PEPCID) 20 MG tablet Take 1 tablet by mouth 2 times daily  Qty: 60 tablet, Refills: 3      insulin glargine (LANTUS) 100 UNIT/ML injection vial Inject 25 Units into the skin nightly  Qty: 10 mL, Refills: 3      hydrALAZINE (APRESOLINE) 100 MG tablet Take 1 tablet by mouth every 8 hours  Qty: 90 tablet, Refills: 0      losartan (COZAAR) 100 MG tablet Take 1 tablet by mouth daily  Qty: 30 tablet, Refills: 0      hydroCHLOROthiazide (HYDRODIURIL) 50 MG tablet Take 1 tablet by mouth daily  Qty: 30 tablet, Refills: 3      simethicone (MYLICON) 80 MG chewable tablet Take 1 tablet by mouth every 6 hours as needed for Flatulence (abdominal cramping)  Qty: 30 tablet, Refills: 0      ascorbic acid (VITAMIN C) 500 MG tablet Take 1 tablet by mouth daily  Qty: 30 tablet, Refills: 0      Vitamin D (CHOLECALCIFEROL) 50 MCG (2000 UT) TABS tablet Take 1 tablet by mouth daily  Qty: 30 tablet, Refills: 0    Comments: Labeling may look different. 25 mcg=1000 Units. Please double check dosages. !! insulin lispro (HUMALOG) 100 UNIT/ML injection vial Inject 0-12 Units into the skin 3 times daily (with meals)  Qty: 10 mL, Refills: 3      !! insulin lispro (HUMALOG) 100 UNIT/ML injection vial Inject 0-6 Units into the skin nightly  Qty: 10 mL, Refills: 3      !! insulin lispro (HUMALOG) 100 UNIT/ML injection vial Inject 7 Units into the skin 3 times daily (with meals)  Qty: 10 mL, Refills: 0      zinc sulfate (ZINCATE) 220 (50 Zn) MG capsule Take 1 capsule by mouth daily  Qty: 30 capsule, Refills: 0      tamsulosin (FLOMAX) 0.4 MG capsule Take 1 capsule by mouth daily  Qty: 30 capsule, Refills: 3      amLODIPine (NORVASC) 10 MG tablet Take 1 tablet by mouth daily  Qty: 90 tablet, Refills: 0    Associated Diagnoses: Essential hypertension      aspirin 81 MG tablet Take 81 mg by mouth daily.        !! - Potential duplicate medications found. Please discuss with provider. ALLERGIES     has No Known Allergies. FAMILY HISTORY     He indicated that the status of his mother is unknown. He indicated that the status of his father is unknown.   family history includes Heart Disease in his father; Other in his mother. SOCIAL HISTORY      reports that he has never smoked. He has never used smokeless tobacco. He reports that he does not drink alcohol and does not use drugs. PHYSICAL EXAM     INITIAL VITALS:  weight is 195 lb (88.5 kg). His oral temperature is 98 °F (36.7 °C). His blood pressure is 94/50 (abnormal) and his pulse is 100. His respiration is 20 and oxygen saturation is 93%. Physical Exam  Vitals and nursing note reviewed. HENT:      Head: Normocephalic. Eyes:      Conjunctiva/sclera: Conjunctivae normal.   Cardiovascular:      Rate and Rhythm: Tachycardia present. Heart sounds: Normal heart sounds. No murmur heard. Pulmonary:      Effort: Pulmonary effort is normal. No respiratory distress. Breath sounds: No wheezing. Abdominal:      Palpations: Abdomen is soft. Tenderness: There is no abdominal tenderness. There is no guarding. Musculoskeletal:      Cervical back: Normal range of motion. Skin:     General: Skin is warm and dry. Comments: Gluteal wound with notably malodorous discharge noted, per photo below. Extends into the deeper tissues on the right as noted with profound tenderness but no palpable area of induration though exam is limited somewhat secondary to pain. Large area of necrosis noted   Neurological:      General: No focal deficit present. Mental Status: He is alert and oriented to person, place, and time.    Psychiatric:         Mood and Affect: Mood normal.             DIFFERENTIAL DIAGNOSIS:   Differential diagnoses are discussed    DIAGNOSTIC RESULTS     EKG: All EKG's are interpreted by the Emergency Department Physician who either signs or Co-signsthis chart in the absence of a cardiologist.        RADIOLOGY: non-plain film images(s) such as CT, Ultrasound and MRI are read by the radiologist.    CT ABDOMEN PELVIS WO CONTRAST Additional Contrast? None   Final Result       1. Decubitus ulcer overlying the sacrum new since previous CT scan of the pelvis dated 7 December 2021.   2. Air   in the perineum, new since previous study dated 7 December 2021.   3. Dale catheter in place. Air in the urinary bladder. 4. Changes of ankylosing spondylitis involving the lower thoracic, lumbar spine and sacroiliac iliac joints. Deformity at the thoracolumbar junction. 5. Calcification along the gallbladder wall. 6. Bilateral inguinal lymph nodes. 7. Areas of atelectasis or scarring at both lung bases. **This report has been created using voice recognition software. It may contain minor errors which are inherent in voice recognition technology. **      Final report electronically signed by DR Amadeo Dalton on 1/14/2022 6:49 PM      XR CHEST PORTABLE   Final Result   1. Interval improvement since previous plain radiographs dated 11 December 2021 with reduction in the bilateral upper lobe infiltrates. .   2. Borderline cardiomegaly. 3. Thoracic spondylosis. **This report has been created using voice recognition software. It may contain minor errors which are inherent in voice recognition technology. **      Final report electronically signed by DR Amadeo Dalton on 1/14/2022 5:31 PM          LABS:      Labs Reviewed   CBC WITH AUTO DIFFERENTIAL - Abnormal; Notable for the following components:       Result Value    WBC 21.7 (*)     RBC 3.69 (*)     Hemoglobin 10.8 (*)     Hematocrit 34.0 (*)     MCHC 31.8 (*)     RDW-CV 15.1 (*)     RDW-SD 50.4 (*)     Segs Absolute 18.2 (*)     Monocytes Absolute 1.8 (*)     Immature Grans (Abs) 0.28 (*)     All other components within normal limits   BETA-HYDROXYBUTYRATE - Abnormal; Notable for the following components:    Beta-Hydroxybutyrate 7.19 (*)     All other components within normal limits   LACTATE, SEPSIS - Abnormal; Notable for the following components:    Lactic Acid, Sepsis 2.4 (*)     All other components within normal limits   PROCALCITONIN - Abnormal; Notable for the following components:    Procalcitonin 1.10 (*)     All other components within normal limits   COMPREHENSIVE METABOLIC PANEL - Abnormal; Notable for the following components:    CREATININE 4.4 (*)      (*)     Potassium 5.3 (*)     CO2 18 (*)     Calcium 8.4 (*)     AST 52 (*)     Alkaline Phosphatase 148 (*)     Albumin 2.2 (*)     ALT 84 (*)     All other components within normal limits   MAGNESIUM - Abnormal; Notable for the following components:    Magnesium 3.4 (*)     All other components within normal limits   HEMOGLOBIN A1C - Abnormal; Notable for the following components:    Hemoglobin A1C 8.1 (*)     AVERAGE GLUCOSE 183 (*)     All other components within normal limits   SEDIMENTATION RATE - Abnormal; Notable for the following components:    Sed Rate 114 (*)     All other components within normal limits   C-REACTIVE PROTEIN - Abnormal; Notable for the following components:    CRP 22.92 (*)     All other components within normal limits   ANION GAP - Abnormal; Notable for the following components:    Anion Gap 17.0 (*)     All other components within normal limits   GLOMERULAR FILTRATION RATE, ESTIMATED - Abnormal; Notable for the following components:    Est, Glom Filt Rate 14 (*)     All other components within normal limits   OSMOLALITY - Abnormal; Notable for the following components:    Osmolality Calc 332.4 (*)     All other components within normal limits   URINE WITH REFLEXED MICRO - Abnormal; Notable for the following components:    Protein, UA TRACE (*)     Leukocyte Esterase, Urine SMALL (*)     All other components within normal limits   CULTURE, BLOOD 1   CULTURE, BLOOD 2   CULTURE, ANAEROBIC AND AEROBIC   CULTURE, ANAEROBIC AND AEROBIC   SCAN OF BLOOD SMEAR   SURGICAL PATHOLOGY       EMERGENCY DEPARTMENT COURSE:   Vitals:    Vitals:    01/14/22 1732 01/14/22 1831 01/14/22 1928 01/14/22 1930   BP: (!) 92/36 (!) 97/50  (!) 94/50   Pulse: 108 104 100    Resp: (!) 36 18 20    Temp:       TempSrc:       SpO2: 94% 100% 93%    Weight:          10:02 PM EST: The patient was seen and evaluated. Patient presents with borderline hypotension and tachycardia for complaint of gluteal wound that is been present and progressively worsening over the past 4 weeks. On my review of records, I do not see mention of antibiotic treatment since his symptoms began. They did note superficial debridement performed on 12/28/2021 as well as 1/11/2022. Dimensions noted to have increased from 3.7 x 2.8 x 0.1 cm on initial debridement to 12 x 9.4 x 3.7 cm on follow-up debridement. He is kept n.p.o. in the ED. After initial evaluation, treatment with sepsis bolus of normal saline, fentanyl, vancomycin and Zosyn was ordered with patient reporting no pain on my follow-up evaluation. Labs revealed 21,000 white blood cell count with lactic acid 2.4 and procalcitonin 1.1. Beta hydroxybutyrate elevated with bicarb 18 and no ketones on urinalysis. Glucose within normal limits. CRP of 23, sedimentation rate 114. Acute kidney injury noted as well with creatinine 4.4. CT of the abdomen was performed without contrast for this reason and demonstrated decubitus ulcer overlying the sacrum as well as air in the peritoneum. Dale catheter in place with some associated chronic changes. Results were discussed with Dr. Kathrin Alves, general surgery after initial evaluation and again after CT results. Patient will be admitted to the hospitalist service for further management. Patient agreeable with the above plan. CRITICAL CARE:   None    CONSULTS:  General surgery  Hospitalist    PROCEDURES:  None    FINAL IMPRESSION      1.  [facility]    2. Necrotizing soft tissue infection          DISPOSITION/PLAN   Admit    PATIENT REFERRED TO:  No follow-up provider specified.     DISCHARGEMEDICATIONS:  Current Discharge Medication List          (Please note that portions of this note were completedwith a voice recognition program.  Efforts were made to edit the dictations but occasionally words are mis-transcribed.)        Dorita Justin PA-C  01/14/22 [id number]

## 2022-01-15 NOTE — PROGRESS NOTES
[facility] Pharmacokinetic Monitoring Service - Vancomycin     Brady Iyer is a 77 y.o. male starting on vancomycin therapy for sepsis/abscess. Pharmacy consulted by Dr Gilda Gonzalez for monitoring and adjustment. Target Concentration:  Goal trough 15, intermittent dosing due to renal status    Additional Antimicrobials: Zosyn    Pertinent Laboratory Values: Wt Readings from Last 1 Encounters:   01/15/22 190 lb 14.7 oz (86.6 kg)     Temp Readings from Last 1 Encounters:   01/15/22 97.5 °F (36.4 °C)     Estimated Creatinine Clearance: 22 mL/min (A) (based on SCr of 3.4 mg/dL [facility])). Recent Labs     01/14/22  1600 01/15/22  0030 01/15/22  0040   CREATININE 4.4* 3.4*  --    WBC 21.7*  --  20.4*     Procalcitonin: 1.1    Pertinent Cultures:  Culture Date Source Results   01/15/22 urine    01/14/22 blood    01/14/22 blood    01/14/22 ulcer    01/14/22 ulcer      MRSA Nasal Swab: N/A. Non-respiratory infection.     Plan:  Concentration-guided dosing due to renal impairment/insufficiency  Start vancomycin 1250 mg iv once (already given in ER on 1/14/22)  Anticipated trough concentration of 15 at steady state  Renal labs as indicated   Vancomycin random concentration ordered for 01/15/22 @ 1800   Pharmacy will continue to monitor patient and adjust therapy as indicated    Thank you for the consult,  Duane Espinoza, [facility]  1/15/2022 5:46 AM

## 2022-01-15 NOTE — PROGRESS NOTES
CRITICAL CARE Progress Note      Patient:  Wenceslao Leyden    Unit/Bed:4D-11/011-A  YOB: 1955  MRN: [id number]   PCP: DIONTE Andrade CNP  Date of Admission: 1/14/2022  Chief Complaint:- Buttock Pain     Assessment and Plan:      1. Necrotizing soft tissue infection: Noted on exam seen on CT on arrival.  Patient was started on broad-spectrum antibiotics with vancomycin and Zosyn on arrival. Underwent emergent I&D for necrotizing fasciitis in OR following arrival in ED. 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 Wound cultures are pending at this time. 2.  Septic Shock: Secondary to necrotizing fasciitis s/p I&D as noted above. Patient received 30 mls per kilo fluid supplementation. Patient required pressure support and at this time has been weaned. Patient was started on broad-spectrum antibiotics with vancomycin and Zosyn in the ED. Initial lactic acid 2.4 and procalcitonin elevated to 1.1. Currently lactic acid is 1.6. Continuing to trend. Wound cultures are pending at this time. 3.  Stage 3 DARINEL: Creatinine 4.4 on arrival (baseline 1.0) suspect prerenal etiology progressing to ATN in the context of sepsis. Currently creatinine 2.6 appears to be improving. We will continue to trend BMP    4. High anion gap metabolic acidosis: Suspect secondary to DARINEL complicated by lactic acidosis and starvation ketosis evidenced by. Anion gap 17 lactic acid 2.4 on arrival.  Beta hydroxybutyrate noted to be elevated to 7.19 on arrival.  Suspect element of starvation ketosis given con commitment and low albumin of 2.2. Will trend lactic acid and continue IV fluids. 5. Leukocytosis: WBC 21.7 on arrival suspect secondary to necrotizing soft tissue infection as noted above.     Patient infection: Tested positive on 12/7/2021. Patient is status post baricitinib and Decadron and was not intubated. Currently not requiring supplemental oxygen. 15. Obesity: BMI 31.47. Recommended lifestyle modification      INITIAL H AND P AND ICU COURSE:  Mayi Mckenzie is a 77 y.o. male who presents to [facility] ED 1/14/2022 with buttock pain. Patient is a lifetime nonsmoker with a PMH significant for recent LLE DVT on Eliquis, IDDM, Primary hypertension, urinary retention. Patient arrived by EMS from [facility] with new decubitus ulcer. Patient believes this was present for multiple weeks. He reports he had the area drained approximately one week ago by wound team.  Patient reports worsening pain accompanied by fever sweats and chills over the last week. Of note patient was seen in the ED on 1/8/2022 and ultrasound of the left leg demonstrated acute DVT affecting the peroneal and anterior tibial veins. Initiated on therapy for suspected DVT of left lower extremity with Eliquis 5 mg daily and has been on anticoagulation with Eliquis since that time. Prior to this patient was hospitalized from 12/7/2021 to 12/20/2021 for COVID-19 pneumonia. Patient received the usual therapy of Decadron and baricitinib and was discharged to a skilled nursing facility at [facility] for rehab on room air. Patient is a full code.     ED Course:  Patient Vitals on Arrival T 98 RR 24  BP 87/32 SpO2 93 on RA. Labwork showed mild hyperkalemia, and stage III DARINEL with high anion gap and  lactic acidosis. Hypoalbuminemia with elevated beta hydroxybutyrate was also noted. However patient's blood glucose was within normal limits on arrival. Imaging showed decubitus ulcer overlying the sacrum with air in the perineum. Patient was was given sepsis bolus fluids at 30 cc/kg and started on empiric antibiotics with vancomycin and Zosyn.   General surgery was consulted for emergent debridement of decubitus ulcer    01/14/22 Patient was brought to the OR for debridement and drainage of perirectal horseshoe abscess of deep ischio rectal space    01/15/22  Patient has dressing intact to sacral area and no additional bleeding noted today. Past Medical History:  Per HPI. Social History: Lifetime non-smoker denies alcohol use. ROS   Pain in buttocks, abdomen and left leg with intermittent palpatiations, fevers and chills. A 12 point review of systems was otherwise negative    Scheduled Meds:   [Held by provider] amLODIPine  10 mg Oral Daily    [Held by provider] apixaban  10 mg Oral BID    [Held by provider] aspirin  81 mg Oral Daily    [Held by provider] hydrALAZINE  100 mg Oral Q8H    [Held by provider] hydroCHLOROthiazide  50 mg Oral Daily    [Held by provider] losartan  100 mg Oral Daily    [Held by provider] metoprolol succinate  50 mg Oral Daily    sodium chloride flush  5-40 mL IntraVENous 2 times per day    famotidine  10 mg Oral Daily    insulin lispro  0-18 Units SubCUTAneous Q4H    calcium replacement protocol   Other RX Placeholder    vancomycin (VANCOCIN) intermittent dosing (placeholder)   Other RX Placeholder    piperacillin-tazobactam  3,375 mg IntraVENous Q8H     Continuous Infusions:   sodium chloride      dextrose      lactated ringers 20 mL/hr at 01/15/22 1323    sodium chloride      vasopressin (Septic Shock) infusion Stopped (01/15/22 1604)    sodium chloride      phenylephrine (BENJIE-SYNEPHRINE) 50mg/250mL infusion Stopped (01/15/22 1225)    sodium chloride 125 mL/hr at 01/15/22 1438    sodium chloride      norepinephrine Stopped (01/15/22 1603)       PHYSICAL EXAMINATION:  T:  99.1. P:  108. RR:  24. B/P:  87/47. FiO2:  4LNC. O2 Sat:  94%. I/O:2079/ 1700 Total 379   Body mass index is 30.81 kg/m². GCS:   14  General:  Acute on Chronically ill appearing elderly white male  HEENT:  Restricted motion in side bending and rotation. Otherwise normocephalic and atraumatic.   No scleral icterus. PERR  Neck: supple. No Thyromegaly. Lungs: clear to auscultation. No retractions  Cardiac: 2/6 systolic ejection murmur noted. Otherwise tachycardic with irregularly irregular rhythm. No JVD appreciated   Abdomen: soft. Nontender. Extremities:  Cool, no clubbing, cyanosis, or edema x 4. Vasculature: Delayed capillary refill  >3 seconds. Palpable dorsalis pedis pulses. Skin: Dry dressing to sacrum. Skin breakdown noted on heel  Psych: Alert and oriented to person and place, not time. Lymph:  No supraclavicular adenopathy. Neurologic: Exam limited by pain and altered mental status    Data: (All radiographs, tracings, PFTs, and imaging are personally viewed and interpreted unless otherwise noted).  Sodium 143 potassium 4.5 chloride 115 bicarb 12 BUN/creatinine 117/2.6 anion gap 17, magnesium 2.5,  glucose 101 calcium 7.4 total protein 4.2 albumin 1.7 alk phos 122 ALT 71  AST52 total bilirubin 0.5   WBC 29 H&H 7.5 /23.4 platelets 724   Procalcitonin 1.10 lactic acid 1.6   1/14/22 CT abdomen and pelvis demonstrates decubitus ulcer overlying the sacrum not noted on previous CT. Air in the peritoneum consistent with gas gangrene noted in the vicinity of the decubitus ulcer. Persistent chronic changes consistent with ankylosing spondylitis involving the lower thoracic lumbar spine and sacroiliac joints as well as calcification of the gallbladder wall and enlarged lymph nodes.   There were areas of persistent fibrosis noted at both lung bases   Telemetry shows A. fib RVR        Meets Continued ICU Level Care Criteria:    [x] Yes   [] No - Transfer Planned to listed location:  [] HOSPITALIST CONTACTED-      Case and plan discussed with Dr. Conor Sheriff        Electronically signed by Evelina Spurling, MD

## 2022-01-16 LAB
ANION GAP SERPL CALCULATED.3IONS-SCNC: 8 MEQ/L (ref 8–16)
BUN BLDV-MCNC: 57 MG/DL (ref 7–22)
CALCIUM SERPL-MCNC: 7.4 MG/DL (ref 8.5–10.5)
CHLORIDE BLD-SCNC: 122 MEQ/L (ref 98–111)
CO2: 22 MEQ/L (ref 23–33)
CREAT SERPL-MCNC: 1.2 MG/DL (ref 0.4–1.2)
ERYTHROCYTE [DISTWIDTH] IN BLOOD BY AUTOMATED COUNT: 15.8 % (ref 11.5–14.5)
ERYTHROCYTE [DISTWIDTH] IN BLOOD BY AUTOMATED COUNT: 52.8 FL (ref 35–45)
GFR SERPL CREATININE-BSD FRML MDRD: 60 ML/MIN/1.73M2
GLUCOSE BLD-MCNC: 103 MG/DL (ref 70–108)
GLUCOSE BLD-MCNC: 109 MG/DL (ref 70–108)
GLUCOSE BLD-MCNC: 119 MG/DL (ref 70–108)
GLUCOSE BLD-MCNC: 130 MG/DL (ref 70–108)
GLUCOSE BLD-MCNC: 135 MG/DL (ref 70–108)
GLUCOSE BLD-MCNC: 149 MG/DL (ref 70–108)
GLUCOSE BLD-MCNC: 165 MG/DL (ref 70–108)
HCT VFR BLD CALC: 20.8 % (ref 42–52)
HCT VFR BLD CALC: 23.9 % (ref 42–52)
HCT VFR BLD CALC: 25.3 % (ref 42–52)
HEMOGLOBIN: 6.7 GM/DL (ref 14–18)
HEMOGLOBIN: 7.5 GM/DL (ref 14–18)
HEMOGLOBIN: 8 GM/DL (ref 14–18)
INR BLD: 2.37 (ref 0.85–1.13)
MCH RBC QN AUTO: 29.1 PG (ref 26–33)
MCHC RBC AUTO-ENTMCNC: 31.6 GM/DL (ref 32.2–35.5)
MCV RBC AUTO: 92 FL (ref 80–94)
OSMOLALITY URINE: 491 MOSMOL/KG (ref 250–750)
OSMOLALITY: 343 MOSMOL/KG (ref 275–295)
PLATELET # BLD: 203 THOU/MM3 (ref 130–400)
PMV BLD AUTO: 11.1 FL (ref 9.4–12.4)
POTASSIUM SERPL-SCNC: 3.7 MEQ/L (ref 3.5–5.2)
RBC # BLD: 2.75 MILL/MM3 (ref 4.7–6.1)
SODIUM BLD-SCNC: 152 MEQ/L (ref 135–145)
T4 FREE: 1.97 NG/DL (ref 0.93–1.76)
TSH SERPL DL<=0.05 MIU/L-ACNC: 0.23 UIU/ML (ref 0.4–4.2)
VITAMIN D 25-HYDROXY: 10 NG/ML (ref 30–100)
WBC # BLD: 14.4 THOU/MM3 (ref 4.8–10.8)

## 2022-01-16 PROCEDURE — 6360000002 HC RX W HCPCS: Performed by: PHARMACIST

## 2022-01-16 PROCEDURE — 2580000003 HC RX 258: Performed by: PHARMACIST

## 2022-01-16 PROCEDURE — 84443 ASSAY THYROID STIM HORMONE: CPT

## 2022-01-16 PROCEDURE — 2580000003 HC RX 258: Performed by: INTERNAL MEDICINE

## 2022-01-16 PROCEDURE — 99233 SBSQ HOSP IP/OBS HIGH 50: CPT | Performed by: INTERNAL MEDICINE

## 2022-01-16 PROCEDURE — 82948 REAGENT STRIP/BLOOD GLUCOSE: CPT

## 2022-01-16 PROCEDURE — 85014 HEMATOCRIT: CPT

## 2022-01-16 PROCEDURE — 85610 PROTHROMBIN TIME: CPT

## 2022-01-16 PROCEDURE — 2060000000 HC ICU INTERMEDIATE R&B

## 2022-01-16 PROCEDURE — 85027 COMPLETE CBC AUTOMATED: CPT

## 2022-01-16 PROCEDURE — 2580000003 HC RX 258: Performed by: STUDENT IN AN ORGANIZED HEALTH CARE EDUCATION/TRAINING PROGRAM

## 2022-01-16 PROCEDURE — 83930 ASSAY OF BLOOD OSMOLALITY: CPT

## 2022-01-16 PROCEDURE — 2500000003 HC RX 250 WO HCPCS: Performed by: STUDENT IN AN ORGANIZED HEALTH CARE EDUCATION/TRAINING PROGRAM

## 2022-01-16 PROCEDURE — 6370000000 HC RX 637 (ALT 250 FOR IP): Performed by: STUDENT IN AN ORGANIZED HEALTH CARE EDUCATION/TRAINING PROGRAM

## 2022-01-16 PROCEDURE — 80048 BASIC METABOLIC PNL TOTAL CA: CPT

## 2022-01-16 PROCEDURE — 83935 ASSAY OF URINE OSMOLALITY: CPT

## 2022-01-16 PROCEDURE — 85018 HEMOGLOBIN: CPT

## 2022-01-16 PROCEDURE — 6370000000 HC RX 637 (ALT 250 FOR IP): Performed by: NURSE PRACTITIONER

## 2022-01-16 PROCEDURE — 2580000003 HC RX 258: Performed by: NURSE PRACTITIONER

## 2022-01-16 PROCEDURE — 84439 ASSAY OF FREE THYROXINE: CPT

## 2022-01-16 PROCEDURE — 82306 VITAMIN D 25 HYDROXY: CPT

## 2022-01-16 PROCEDURE — 99024 POSTOP FOLLOW-UP VISIT: CPT | Performed by: SURGERY

## 2022-01-16 RX ORDER — SODIUM CHLORIDE 9 MG/ML
INJECTION, SOLUTION INTRAVENOUS PRN
Status: DISCONTINUED | OUTPATIENT
Start: 2022-01-16 | End: 2022-01-24 | Stop reason: SDUPTHER

## 2022-01-16 RX ORDER — DEXTROSE MONOHYDRATE 50 MG/ML
INJECTION, SOLUTION INTRAVENOUS CONTINUOUS
Status: DISCONTINUED | OUTPATIENT
Start: 2022-01-16 | End: 2022-01-20

## 2022-01-16 RX ADMIN — PIPERACILLIN AND TAZOBACTAM 3375 MG: 3; .375 INJECTION, POWDER, LYOPHILIZED, FOR SOLUTION INTRAVENOUS at 13:24

## 2022-01-16 RX ADMIN — TAMSULOSIN HYDROCHLORIDE 0.4 MG: 0.4 CAPSULE ORAL at 10:51

## 2022-01-16 RX ADMIN — INSULIN LISPRO 3 UNITS: 100 INJECTION, SOLUTION INTRAVENOUS; SUBCUTANEOUS at 05:16

## 2022-01-16 RX ADMIN — CLINDAMYCIN PHOSPHATE 600 MG: 600 INJECTION, SOLUTION INTRAVENOUS at 05:13

## 2022-01-16 RX ADMIN — CLINDAMYCIN PHOSPHATE 600 MG: 600 INJECTION, SOLUTION INTRAVENOUS at 14:12

## 2022-01-16 RX ADMIN — FAMOTIDINE 10 MG: 20 TABLET, FILM COATED ORAL at 09:08

## 2022-01-16 RX ADMIN — PIPERACILLIN AND TAZOBACTAM 3375 MG: 3; .375 INJECTION, POWDER, LYOPHILIZED, FOR SOLUTION INTRAVENOUS at 23:38

## 2022-01-16 RX ADMIN — SODIUM CHLORIDE, PRESERVATIVE FREE 30 ML: 5 INJECTION INTRAVENOUS at 09:07

## 2022-01-16 RX ADMIN — PIPERACILLIN AND TAZOBACTAM 3375 MG: 3; .375 INJECTION, POWDER, LYOPHILIZED, FOR SOLUTION INTRAVENOUS at 05:47

## 2022-01-16 RX ADMIN — SODIUM CHLORIDE: 4.5 INJECTION, SOLUTION INTRAVENOUS at 05:44

## 2022-01-16 RX ADMIN — DEXTROSE MONOHYDRATE 50 ML: 50 INJECTION, SOLUTION INTRAVENOUS at 10:50

## 2022-01-16 RX ADMIN — DEXTROSE MONOHYDRATE: 50 INJECTION, SOLUTION INTRAVENOUS at 23:53

## 2022-01-16 RX ADMIN — CLINDAMYCIN PHOSPHATE 600 MG: 600 INJECTION, SOLUTION INTRAVENOUS at 20:56

## 2022-01-16 RX ADMIN — INSULIN LISPRO 3 UNITS: 100 INJECTION, SOLUTION INTRAVENOUS; SUBCUTANEOUS at 01:58

## 2022-01-16 RX ADMIN — SODIUM CHLORIDE, PRESERVATIVE FREE 10 ML: 5 INJECTION INTRAVENOUS at 20:57

## 2022-01-16 ASSESSMENT — ENCOUNTER SYMPTOMS
WHEEZING: 0
DIARRHEA: 0
SHORTNESS OF BREATH: 0
PHOTOPHOBIA: 0
BACK PAIN: 0
VOMITING: 0
COLOR CHANGE: 0
ABDOMINAL PAIN: 0
CHEST TIGHTNESS: 0
TROUBLE SWALLOWING: 0
SORE THROAT: 0

## 2022-01-16 ASSESSMENT — PAIN SCALES - GENERAL
PAINLEVEL_OUTOF10: 0

## 2022-01-16 NOTE — PROGRESS NOTES
[address] [facility] Pharmacokinetic Monitoring Service - Vancomycin    Consulting Provider: Dr. Judith Morgan   Indication: Sepsis/abscess  Target Concentration: Dosing based on anticipated concentration <15 mg/L due to renal impairment/insufficiency  Day of Therapy: 2  Additional Antimicrobials: Zosyn    Pertinent Laboratory Values: Wt Readings from Last 1 Encounters:   01/15/22 190 lb 14.7 oz (86.6 kg)     Temp Readings from Last 1 Encounters:   01/15/22 99.1 °F (37.3 °C) (Axillary)     Estimated Creatinine Clearance: 54 mL/min (A) (based on SCr of 1.4 mg/dL (H)). Recent Labs     01/15/22  0030 01/15/22  0040 01/15/22  0542 01/15/22  1830   CREATININE   < >  --  2.6* 1.4*   WBC  --  20.4* 29.0*  --     < > = values in this interval not displayed. Procalcitonin: 1.1 (1/14/22)    Pertinent Cultures:  Culture Date Source Results   01/15/22 Urine Sent    01/14/22 Blood 1 NGTD   01/14/22 Blood 2 NGTD    01/14/22 Ulcer  Many GPC singly & in pairs, many GNB, moderate large GPB   MRSA Nasal Swab: N/A. Non-respiratory infection. Recent vancomycin administrations                     vancomycin (VANCOCIN) 1250 mg in dextrose 5 % 250 mL IVPB (mg) 1,250 mg New Bag 01/14/22 1832                    Assessment:  Date/Time Current Dose Concentration Timing of Concentration (h) AUC   1/15/22 @ 1830 Intermittent d/t DARINEL 7.0 24 hours after last dose N/A   Note: Serum concentrations collected for AUC dosing may appear elevated if collected in close proximity to the dose administered, this is not necessarily an indication of toxicity    Plan:  Current dosing regimen is sub-therapeutic. Increase dose to vancomycin 1500mg once . Repeat vancomycin concentration ordered for 1/16/22 @ 1500. Pharmacy will continue to monitor patient and adjust therapy as indicated.     Thank you for the consult,  Bhavna Gould [facility]  1/15/2022 7:20 PM

## 2022-01-16 NOTE — CONSULTS
[address] [place] [address]                                  CONSULTATION    PATIENT NAME: Nicolette Cortez                    :        1955  MED REC NO:   [id number]                           ROOM:       0011  ACCOUNT NO:   [de-identified]                           ADMIT DATE: 2022  PROVIDER:     Eusebio Bushy. Grayland Runner, M.D.    Pete Nephew:  2022    He is admitted for necrotic infection on his coccyx. He had wide  surgical debridement by general surgeon. I was asked to assist with his  wound treatment. HISTORY OF PRESENT ILLNESS:  He is a 78-year-old male patient who  presented to the hospital due to pain on his gluteal area. The patient  had a prolonged hospitalization after he had COVID and developed coccyx  wound. He was discharged to [facility]. The patient has been  complaining of more pain on his coccyx area. He had this wound for over  a month. He thinks it developed while he was at the hospital, and he  feels that the nursing home does not take good care of the wound. He is  very weak. He needs help to turn around the bed. At any rate, he came  with worsening wound, and he was emergently taken to surgery where he  had incision and debridement of his sacral decubitus and perineal area. It was reported as necrotizing fasciitis. He is being treated for  sepsis. He is on broad spectrum antibiotics. I was able to get history  from the patient. PAST MEDICAL HISTORY:  His past history is significant for history of  diabetes, hyperlipidemia, hypertension. Since he had wound and COVID,  he has not been very mobile. PAST SURGICAL HISTORY:  Includes colonoscopy, incision and drainage on  his neck and present debridement during this hospitalization. ALLERGIES:  HE HAS NO KNOWN DRUG ALLERGY. SOCIAL HISTORY:  He is . No alcohol. No drug use.     CURRENT MEDICATIONS:  Include amlodipine, Eliquis, aspirin, clindamycin,  famotidine, IV Zosyn. REVIEW OF SYSTEMS:  As noted in the HPI. PHYSICAL EXAMINATION:  GENERAL:  He looks chronically ill. VITAL SIGNS:  Temperature was 99, respiration 21, pulse 84, blood  pressure 133/47. HEENT:  He has pale conjunctivae, anicteric sclerae. CHEST:  Bilateral air entry. CARDIOVASCULAR:  Regular. ABDOMEN:  Soft. He has packed coccyx and perineal wound post surgery. EXTREMITIES:  Left heel unstageable wound _____ was noted on both heels. CNS:  He is awake, oriented to person, place and time. DIAGNOSTICS:  WBC 14.4, hemoglobin 8, hematocrit 25.3, platelets of 874. Sodium 152, potassium 3.7, chloride _____, bicarb 22. BUN 57,  creatinine 1.2. Gram stain shows gram-negative bacilli, gram-positive  cocci single and in pairs. No MRSA was noted. IMPRESSION:  Necrotizing wound infection status post surgical  debridement. PLAN:  Is to continue IV Zosyn, clindamycin. We will stop vancomycin. We will use Dakin solution to pack the wound. Once it is okay by  general surgeon, we will continue to follow patient.         Krista Brooks M.D.    D: 01/16/2022 11:01:27       T: 01/16/2022 12:11:47     ALEXANDER/NAILA_SIMRAN  Job#: [id number]     Doc#: [id number]    CC:

## 2022-01-16 NOTE — FLOWSHEET NOTE
[place] [address] PROGRESS NOTE      Patient: Myrna Cowden  Room #: 4D-11/011-A            YOB: 1955  Age: 77 y.o. Gender: male            Admit Date & Time: 1/14/2022  4:05 PM    Assessment:   responded to request from pt's nurse to provide emotional support. Pt is struggling with theological issues surrounding suffering, and what he may have done to Edward it. \"    Interventions:   provided a listening and supportive presence and encouraged pt to realize he didn't do anything to deserve suffering.  said prayer with pt and taught pt a breathing prayer. Outcomes:  Pt expressed gratitude for encounter. Plan:  1. Provide spiritual care and support. 2.  Pt's nurse requests that  see pt regularly. Electronically signed by Sandra Parekh on 1/16/2022 at [address] [place] [address]  [phone]       01/16/22 1650   Encounter Summary   Services provided to: Patient   Referral/Consult From: Nurse   Continue Visiting Yes  (1/16)   Complexity of Encounter Moderate   Length of Encounter 30 minutes   Spiritual Assessment Completed Yes   Spiritual/Church   Type Spiritual struggle   Assessment Approachable; Anxious; Concerns with suffering   Intervention Active listening;Explored feelings, thoughts, concerns;Explored coping resources;Prayer;Sustaining presence/ Ministry of presence; Discussed belief system/Zoroastrianism practices/joe   Outcome Connection/belonging;Comfort;Engaged in conversation;Expressed feelings/needs/concerns

## 2022-01-16 NOTE — PROGRESS NOTES
CRITICAL CARE PROGRESS NOTE      Patient:  Leonel Araujo    Unit/Bed:4D-11/011-A  YOB: 1955  MRN: [id number]   PCP: DIONTE Rene CNP  Date of Admission: 1/14/2022  Chief Complaint:-Buttock pain    Assessment and Plan:      1. Necrotizing fasciitis: Noted on CT scan on 1/14. Patient was initiated on broad-spectrum antibiotics. Underwent emergent I&D with Dr. Peri Colin. Continue antibiotics. Zosyn day #2, clindamycin day #2. 2. Hypernatremia: D5 water at 50 cc/h. Urine and serum osmolality pending. TSH and T4 pending. 3. Non-anion gap metabolic acidosis: Mild, monitor  4. Leukocytosis: WBC 14.4, continue antibiotic therapy. Afebrile. 5. Normocytic anemia: Patient had noted blood loss postoperatively from wounds. This was packed by general surgery. Hemoglobin 6.7, status post 1 unit packed red blood cells. Repeat hemoglobin 8.0  6. Elevated INR: Status post 2 units FFP. Patient was on Eliquis. Trend INR. No active signs of bleeding at this time. INR trending down. 7. Acute DVT: Was initiated on Eliquis on 1/8/2022 after being diagnosed with anterior tibial and peroneal veins of the left lower extremity. Eliquis was stopped at the time of arrival due to bleeding. Considering IVC filter. Consultation to interventional radiologist.  Suzie Oliveira to hear back. Per guidelines if patient is inpatient or as prolonged immobility IVC filter should be considered. Return phone call from Dr. Paula Panda stating no need for emergent IVC filter. Recommends restarting Eliquis when patient's hemoglobin is stable. 8. Paroxysmal atrial fib: Question of patient having atrial fibrillation with RVR during this admission. There is no EKG representing this rhythm. Patient is currently in sinus rhythm  9. Hypertension: Holding Norvasc, hydralazine, hydrochlorothiazide, Cozaar, Toprol in the setting of hypotension and shock. Resume as appropriate.   10. Hyperlipidemia:   11. Diabetes mellitus: Sliding scale insulin  12. History of urinary retention: Flomax  13. History of COVID-19: Noted hospitalization for COVID-19 requiring no oxygenation support. 14. Obesity: BMI 30.81. INITIAL H AND P AND ICU COURSE:  Javier Em is a 59-year-old white male who presented to [facility] on 1/14/2022 with complaints of buttocks pain. He has a past medical history of lifetime non-smoker, recent left lower extremity DVT on Eliquis, diabetes mellitus, hypertension, urinary retention. Per report he presented to the emergency department via EMS from [facility] with new decubitus ulcer. Patient believes it was present for multiple weeks however was not well documented prior to hospital encounter. He reports that it has been draining for approximately 1 week. Patient reported he was having worsening pain fever and sweats and chills over the last week. In the emergency department he was seen on 1/8/2022 ultrasound of leg demonstrated acute DVT he was started on Eliquis. Of note he was also hospitalized from 12/7/2021 to 12/20/2021 for COVID-19 pneumonia. He returned to his nursing facility on room air. In the emergency department he was positive for lactic acidosis. Imaging revealed a decubitus ulcer with air in the perineum. Patient was fluid resuscitated per sepsis protocol and given Vanco and Zosyn. General surgery was consulted for debridement and concern for necrotizing fasciitis. Initially patient was admitted to stepdown postprocedure he was transferred to the ICU for hypotension. He did undergo emergent debridement of abscess on 1/14 with Dr. Maxine Arreguin. He was initially on vasopressors which have been weaned off. Past Medical History: per HPI   Family History: Mother: Alzheimer's Father: Heart disease  Social History: Lifetime non-smoker, denies alcohol and drug use. Review of Systems   Constitutional: Positive for fatigue. Negative for chills and fever.    HENT: Negative for sore throat and trouble swallowing. Eyes: Negative for photophobia and visual disturbance. Respiratory: Negative for chest tightness, shortness of breath and wheezing. Cardiovascular: Negative for chest pain and leg swelling. Gastrointestinal: Negative for abdominal pain, diarrhea and vomiting. Genitourinary: Negative for decreased urine volume, flank pain and hematuria. Musculoskeletal: Negative for back pain and neck pain. Skin: Positive for wound. Negative for color change and pallor. Allergic/Immunologic: Negative for food allergies. Neurological: Positive for weakness. Negative for dizziness and headaches. Hematological: Negative for adenopathy. Psychiatric/Behavioral: Negative for agitation and confusion. The patient is not nervous/anxious. Scheduled Meds:   [Held by provider] amLODIPine  10 mg Oral Daily    [Held by provider] apixaban  10 mg Oral BID    [Held by provider] aspirin  81 mg Oral Daily    [Held by provider] hydrALAZINE  100 mg Oral Q8H    [Held by provider] hydroCHLOROthiazide  50 mg Oral Daily    [Held by provider] losartan  100 mg Oral Daily    [Held by provider] metoprolol succinate  50 mg Oral Daily    sodium chloride flush  5-40 mL IntraVENous 2 times per day    famotidine  10 mg Oral Daily    insulin lispro  0-18 Units SubCUTAneous Q4H    calcium replacement protocol   Other RX Placeholder    piperacillin-tazobactam  3,375 mg IntraVENous Q8H    tamsulosin  0.4 mg Oral Daily    clindamycin (CLEOCIN) IV  600 mg IntraVENous Q8H     Continuous Infusions:   sodium chloride      dextrose 50 mL (01/16/22 1050)    sodium chloride      dextrose      sodium chloride      sodium chloride         PHYSICAL EXAMINATION:  T:  99.  P:  84. RR:  21. B/P:  108/52. FiO2:  0. O2 Sat:  96.  I/O:  3543/4200  Body mass index is 30.81 kg/m². GCS:  15  General:   Acute on chronically ill-appearing white male  HEENT:  normocephalic and atraumatic.   No scleral icterus. PERR  Neck: supple. No Thyromegaly. Lungs: clear to auscultation. No retractions  Cardiac: RRR. No JVD. Abdomen: soft. Nontender. Extremities:  No clubbing, cyanosis, or edema x 4. Vasculature: sluggish capillary refill > 3 seconds. Palpable dorsalis pedis pulses. Skin:  warm and dry. Wound   Psych:  Alert and oriented x3. Affect appropriate  Lymph:  No supraclavicular adenopathy. Neurologic:  No focal deficit. No seizures. Data: (All radiographs, tracings, PFTs, and imaging are personally viewed and interpreted unless otherwise noted).  Sodium 152, potassium 3.7, chloride 122, CO2 22, BUN 57, creatinine 1.2, anion gap 8.0, glucose 109.  WBC 14.4, hemoglobin 8.0, hematocrit 25.3, platelet count 940   Telemetry shows normal sinus rhythm   CT abdomen pelvis 1/14/2022 reports decubitus ulcer overlying the sacrum. Air in the perineum.  Chest x-ray 1/15/2022 reports bilateral patchy interstitial opacities   Left lower extremity venous Doppler 1/8/2022 reports acute thrombus in the peroneal and anterior tibial vein.  EKG 12/19/2021 sinus bradycardia   Echocardiogram 12/13/2021 reports ejection fraction of 60% with normal LV function    Meets Continued ICU Level Care Criteria:    [] Yes   [x] No - Transfer Planned to listed location: Awaiting bed location  [x] HOSPITALIST CONTACTED-      Case and plan discussed with Dr. Lamine Yancey. Electronically signed by Jamie German. DIONTE Nguyễn CNP  CRITICAL CARE SPECIALIST    Addendum by Dr. Lamine Yancey MD:  I have seen and examined the patient independently. Face to face evaluation and examination was performed. The above evaluation and note has been reviewed. Labs and radiographs were reviewed. I Have discussed with Ms. Jamie German. DIONTE Nguyễn CNP about this patient in detail. Physical exam was performed by me. See below for details. More than half of the total time for this encounter spent by me.   The above assessment and plan has been reviewed. Please see my modifications mentioned below. My modifications:    Physical Exam:  Nursing note and vitals reviewed. Constitutional: Patient appears moderately built and moderately nourished. No distress. Patient is oriented to person, place, and time. HENT:   Head: Normocephalic and atraumatic. Right Ear: External ear normal.   Left Ear: External ear normal.   Mouth/Throat: Oropharynx is clear and moist.  No oral thrush. Eyes: Conjunctivae are normal. Pupils are equal, round, and reactive to light. No scleral icterus. Neck: Neck supple. No JVD present. No tracheal deviation present. Cardiovascular: Normal rate, regular rhythm, normal heart sounds. No murmur heard. Pulmonary/Chest: Effort normal and breath sounds normal. No stridor. No respiratory distress. No wheezes. No rales. Patient exhibits no tenderness. Abdominal: Soft. Patient exhibits no distension. No tenderness. Musculoskeletal: Normal range of motion. Extremities: Patient exhibits edema. Lymphadenopathy:  No cervical adenopathy. Neurological: Patient is alert and oriented to person, place, and time. Skin: Skin is warm and dry. Patient is not diaphoretic. Psychiatric: Patient  has a normal mood and affect. Patient behavior is normal.     Lab Results   Component Value Date    PH 7.45 01/15/2022    PCO2 30 01/15/2022    PO2 55 01/15/2022    HCO3 21 01/15/2022    O2SAT 90 01/15/2022     Lab Results   Component Value Date    IFIO2 4 01/15/2022     CBC:   Recent Labs     01/15/22  0040 01/15/22  0040 01/15/22  0542 01/15/22  1140 01/15/22  2210 01/16/22  0154 01/16/22  0910   WBC 20.4*  --  29.0*  --   --   --  14.4*   HGB 8.3*   < > 9.6*   < > 7.4* 6.7* 8.0*   HCT 27.1*   < > 30.0*   < > 23.0* 20.8* 25.3*     --  264  --   --   --  203    < > = values in this interval not displayed.      BMP:  Recent Labs     01/14/22  1600 01/15/22  0030 01/15/22  0542 01/15/22  1830 01/16/22  0910      < > 143 150* 152*   K 5.3*   < > 4.5 3.9 3.7      < > 115* 122* 122*   CO2 18*   < > 12* 20* 22*   *   < > 117* 74* 57*   CREATININE 4.4*   < > 2.6* 1.4* 1.2   GLUCOSE 101   < > 243* 108 109*   MG 3.4*  --  2.5*  --   --    CALCIUM 8.4*   < > 7.0* 7.4* 7.4*    < > = values in this interval not displayed. Hepatic:   Recent Labs     01/15/22  0030 01/15/22  0542 01/15/22  1830   AST 44* 38 21   ALT 72* 71* 49   BILITOT 0.4 0.5 0.5   ALKPHOS 125 122 101     Cardiac Enzymes: No results for input(s): CKTOTAL, CKMB, TROPONINI in the last 72 hours. BNP: No results for input(s): BNP in the last 72 hours. INR:   Recent Labs     01/15/22  0040 01/15/22  2200 01/16/22  0910   INR 4.55* 2.92* 2.37*     POC   Recent Labs     01/16/22  0153 01/16/22  0908 01/16/22  1241   POCGLU 149* 103 130*     No results for input(s): LACTA in the last 72 hours. Venous duplex scan of the left lower extremity: Performed on 1/8/2022  Sat Jan 8, 2022  3:22   FINDINGS: There is lack of compressibility and absent flow due to acute thrombus in the peroneal and anterior tibial veins. Remaining vein segments demonstrate normal compressibility and color flow. Acute thrombus in the peroneal and anterior tibial veins. CT angiogram of chest performed on 12/7/2021:  PROCEDURE: CTA CHEST W WO CONTRAST, CTA ABDOMEN PELVIS W WO CONTRAST   CLINICAL INFORMATION: pulmonary embolism. 1. No pulmonary embolism. No aneurysm of the thoracic or abdominal aorta. No dissection. 2. Extensive bilateral infiltrates. Patient's IV fluids were changed to D5 water at 50 mL/h due to worsening of hyponatremia from 0.45 NS. Patient's INR is improving.     Jacob Reid MD 1/16/2022 1:11 PM

## 2022-01-16 NOTE — PROGRESS NOTES
[facility] -  Clarice Moreno MD M.D. FACS  Daily Progress Note    Pt Name: Bisi Streeter  Medical Record Number: [id number]  Date of Birth 1955   Today's Date: 1/16/2022    ASSESSMENT:     1. POD # 2  2. 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 Still coagulopathic  2. Hg paul drifted down below 7 being transfused  3. White count pending from 0130 lab but off all pressors  4. We will leave packing in place until coagulopathy has been fully reversed       SUBJECTIVE:     Shawna Son is an ICU .   Response to voice, off pressors      OBJECTIVE:     Patient Vitals for the past 24 hrs:   BP Temp Temp src Pulse Resp SpO2   01/16/22 0600 (!) 104/49 -- -- 83 21 94 %   01/16/22 0545 -- -- -- 86 16 94 %   01/16/22 0530 -- 98.2 °F (36.8 °C) Oral 87 17 93 %   01/16/22 0520 (!) 126/43 99.2 °F (37.3 °C) Oral 90 16 94 %   01/16/22 0515 -- -- -- 84 18 93 %   01/16/22 0500 (!) 101/51 -- -- 87 20 96 %   01/16/22 0445 -- -- -- 92 24 93 %   01/16/22 0430 -- -- -- 89 24 93 %   01/16/22 0400 -- -- -- 84 23 95 %   01/16/22 0345 -- -- -- 85 17 91 %   01/16/22 0330 -- -- -- 85 20 95 %   01/16/22 0315 -- -- -- 86 18 95 %   01/16/22 0300 110/77 -- -- 90 20 98 %   01/16/22 0245 -- -- -- 91 20 95 %   01/16/22 0230 -- -- -- 86 19 95 %   01/16/22 0215 -- -- -- 90 20 94 %   01/16/22 0200 (!) 113/53 -- -- 91 21 97 %   01/16/22 0145 -- -- -- 91 24 94 %   01/16/22 0130 -- -- -- 89 27 94 %   01/16/22 0115 -- -- -- 90 29 92 % 01/16/22 0100 (!) 93/49 -- -- 90 26 97 %   01/16/22 0045 -- -- -- 91 23 95 %   01/16/22 0030 -- -- -- 93 27 93 %   01/16/22 0015 -- -- -- 91 28 93 %   01/16/22 0000 (!) 97/50 -- -- 93 25 94 %   01/15/22 2345 -- -- -- 94 24 92 %   01/15/22 2330 -- -- -- 98 26 92 %   01/15/22 2315 -- -- -- 98 23 93 %   01/15/22 2300 (!) 102/49 -- -- 99 23 97 %   01/15/22 2245 -- -- -- 97 25 95 %   01/15/22 2230 -- -- -- 104 24 95 %   01/15/22 2215 -- -- -- 101 15 97 %   01/15/22 2200 (!) 102/49 -- -- 105 20 97 %   01/15/22 2145 -- -- -- 102 21 95 %   01/15/22 2130 -- -- -- 104 19 94 %   01/15/22 2115 -- -- -- 101 23 95 %   01/15/22 2100 (!) 109/50 -- -- 101 18 95 %   01/15/22 2045 -- -- -- 106 21 96 %   01/15/22 2030 -- -- -- 101 23 95 %   01/15/22 2015 -- -- -- 102 21 95 %   01/15/22 2000 (!) 96/46 98.3 °F (36.8 °C) Oral 106 19 95 %   01/15/22 1945 -- -- -- 107 23 94 %   01/15/22 1930 -- -- -- 109 20 95 %   01/15/22 1915 -- -- -- 109 19 95 %   01/15/22 1800 (!) 87/47 -- -- 108 20 95 %   01/15/22 1700 119/61 -- -- 102 19 96 %   01/15/22 1600 120/67 -- -- 94 20 95 %   01/15/22 1500 (!) 145/58 -- -- 89 21 96 %   01/15/22 1400 (!) 121/58 -- -- 81 20 98 %   01/15/22 1300 (!) 106/53 -- -- 80 24 98 %   01/15/22 1200 (!) 108/54 99.1 °F (37.3 °C) Axillary 80 26 98 %   01/15/22 1100 (!) 115/49 -- -- 83 24 98 %   01/15/22 1000 (!) 124/58 -- -- 89 23 98 %   01/15/22 0900 (!) 112/56 -- -- 96 25 97 %   01/15/22 0847 (!) 134/47 98.6 °F (37 °C) -- 90 22 --   01/15/22 0800 (!) 124/56 98.6 °F (37 °C) Axillary 95 24 98 %   01/15/22 0715 -- -- -- 106 22 98 %   01/15/22 0700 (!) 115/57 -- -- 106 24 98 %   01/15/22 0645 -- -- -- 107 22 98 %   01/15/22 0630 -- -- -- 111 27 97 %         Intake/Output Summary (Last 24 hours) at 1/16/2022 0626  Last data filed at 1/16/2022 0300  Gross per 24 hour   Intake 3483.88 ml   Output 3500 ml   Net -16.12 ml       In: 6427.2 [P.O.:150;  I.V.:5013.2; Blood:426.7]  Out: 5100 [Urine:5100]    I/O last 3 completed shifts: In: 5022.9 [I.V.:4024.3; Blood:426.7; IV Piggyback:571.9]  Out: 3300 [Urine:3300]     Date 01/16/22 0000 - 01/16/22 2359   Shift 4763-1403 4678-2409 7759-1179 24 Hour Total   INTAKE   P.O.(mL/kg/hr) 90   90   Shift Total(mL/kg) 90(1)   90(1)   OUTPUT   Shift Total(mL/kg)       Weight (kg) 86.6 86.6 86.6 86.6       Wt Readings from Last 3 Encounters:   01/15/22 190 lb 14.7 oz (86.6 kg)   12/20/21 195 lb 8 oz (88.7 kg)   01/02/18 223 lb 11.2 oz (101.5 kg)        Body mass index is 30.81 kg/m². Diet: ADULT DIET; Regular; 3 carb choices (45 gm/meal);  Low Potassium (Less than 3000 mg/day)        MEDS:     Scheduled Meds:   [Held by provider] amLODIPine  10 mg Oral Daily    [Held by provider] apixaban  10 mg Oral BID    [Held by provider] aspirin  81 mg Oral Daily    [Held by provider] hydrALAZINE  100 mg Oral Q8H    [Held by provider] hydroCHLOROthiazide  50 mg Oral Daily    [Held by provider] losartan  100 mg Oral Daily    [Held by provider] metoprolol succinate  50 mg Oral Daily    sodium chloride flush  5-40 mL IntraVENous 2 times per day    famotidine  10 mg Oral Daily    insulin lispro  0-18 Units SubCUTAneous Q4H    calcium replacement protocol   Other RX Placeholder    vancomycin (VANCOCIN) intermittent dosing (placeholder)   Other RX Placeholder    piperacillin-tazobactam  3,375 mg IntraVENous Q8H    tamsulosin  0.4 mg Oral Daily    clindamycin (CLEOCIN) IV  600 mg IntraVENous Q8H     Continuous Infusions:   sodium chloride      sodium chloride      dextrose      lactated ringers 20 mL/hr at 01/15/22 1323    sodium chloride      vasopressin (Septic Shock) infusion Stopped (01/15/22 1604)    sodium chloride      phenylephrine (BENJIE-SYNEPHRINE) 50mg/250mL infusion Stopped (01/15/22 1225)    sodium chloride 125 mL/hr at 01/16/22 0544    norepinephrine Stopped (01/15/22 1603)     PRN Meds:sodium chloride, , PRN  sodium chloride flush, 5-40 mL, PRN  sodium chloride, 25 mL, PRN  ondansetron, 4 mg, Q8H PRN   Or  ondansetron, 4 mg, Q6H PRN  glucose, 15 g, PRN  dextrose, 12.5 g, PRN  glucagon (rDNA), 1 mg, PRN  dextrose, 100 mL/hr, PRN  HYDROcodone 5 mg - acetaminophen, 1 tablet, Q4H PRN  sodium chloride, , PRN  fentanNYL, 50 mcg, Q1H PRN  sodium chloride, , PRN          PHYSICAL EXAM:     CONSTITUTIONAL: Sleeping but awakens    WOUND/INCISION: Loop drains in place little drainage at this time packing  will leave in place for now        LABS:     CBC:   Recent Labs     01/14/22  1600 01/14/22  1600 01/15/22  0040 01/15/22  0040 01/15/22  0542 01/15/22  1140 01/15/22  1830 01/15/22  2210 01/16/22  0154   WBC 21.7*  --  20.4*  --  29.0*  --   --   --   --    RBC 3.69*  --  2.83*  --  3.23*  --   --   --   --    HGB 10.8*   < > 8.3*   < > 9.6*   < > 7.3* 7.4* 6.7*   HCT 34.0*   < > 27.1*   < > 30.0*   < > 23.0* 23.0* 20.8*   MCV 92.1  --  95.8*  --  92.9  --   --   --   --    MCH 29.3  --  29.3  --  29.7  --   --   --   --    MCHC 31.8*  --  30.6*  --  32.0*  --   --   --   --      --  235  --  264  --   --   --   --    MPV 11.2  --  11.2  --  11.2  --   --   --   --     < > = values in this interval not displayed. Last 3 CMP:   Recent Labs     01/15/22  0030 01/15/22  0542 01/15/22  1830    143 150*   K 4.3 4.5 3.9    115* 122*   CO2 14* 12* 20*   * 117* 74*   CREATININE 3.4* 2.6* 1.4*   GLUCOSE 195* 243* 108   CALCIUM 6.7* 7.0* 7.4*   PROT 4.6* 4.2* 4.3*   LABALBU 1.8* 1.7* 2.0*   BILITOT 0.4 0.5 0.5   ALKPHOS 125 122 101   AST 44* 38 21   ALT 72* 71* 49      Troponin: No results for input(s): TROPONINI in the last 72 hours. Calcium:   Lab Results   Component Value Date    CALCIUM 7.4 01/15/2022    CALCIUM 7.0 01/15/2022    CALCIUM 6.7 01/15/2022      Ionized Calcium: No results found for: IONCA   Lipids: No results for input(s): CHOL, HDL in the last 72 hours.     Invalid input(s): LDLCALCU  INR:   Recent Labs     01/15/22  0040 01/15/22  2200   INR 4.55* 2.92*     Lactic Acid: No results found for: LACTA               DVT prophylaxis: [] Lovenox                                 [] SCDs                                 [] SQ Heparin                                 [] Encourage ambulation, low risk for DVT, no chemical or mechanical prophylaxis necessary              [] Already on Anticoagulation      RADIOLOGY:      CT ABDOMEN PELVIS WO CONTRAST Additional Contrast? None    Result Date: 1/14/2022   1. Decubitus ulcer overlying the sacrum new since previous CT scan of the pelvis dated 7 December 2021. 2. Air   in the perineum, new since previous study dated 7 December 2021. 3. Dale catheter in place. Air in the urinary bladder. 4. Changes of ankylosing spondylitis involving the lower thoracic, lumbar spine and sacroiliac iliac joints. Deformity at the thoracolumbar junction. 5. Calcification along the gallbladder wall. 6. Bilateral inguinal lymph nodes. 7. Areas of atelectasis or scarring at both lung bases. **This report has been created using voice recognition software. It may contain minor errors which are inherent in voice recognition technology. ** Final report electronically signed by DR Keisha Lockett on 1/14/2022 6:49 PM    XR CHEST PORTABLE    Result Date: 1/15/2022  1. Right IJ central line tip in the right atrium. This can be retracted by 3 cm. 2. Bilateral patchy interstitial opacities which may reflect infiltrate. 3. Borderline cardiomegaly. This document has been electronically signed by: Dequan Yanez MD on 01/15/2022 12:24 AM    XR CHEST PORTABLE    Result Date: 1/14/2022  1. Interval improvement since previous plain radiographs dated 11 December 2021 with reduction in the bilateral upper lobe infiltrates. . 2. Borderline cardiomegaly. 3. Thoracic spondylosis. **This report has been created using voice recognition software. It may contain minor errors which are inherent in voice recognition technology. ** Final report electronically signed by DR Keisha Lockett on 1/14/2022 5:31 PM    VL DUP LOWER EXTREMITY VENOUS LEFT    Result Date: 1/8/2022  Acute thrombus in the peroneal and anterior tibial veins. Final report electronically signed by Dr. Alberta Gloria on 1/8/2022 3:22 PM        Letty Patterson MD, M.D.  FACS  Electronically signed 1/16/2022 at 6:26 AM

## 2022-01-17 LAB
ALBUMIN SERPL-MCNC: 1.9 G/DL (ref 3.5–5.1)
ALP BLD-CCNC: 100 U/L (ref 38–126)
ALT SERPL-CCNC: 44 U/L (ref 11–66)
ANION GAP SERPL CALCULATED.3IONS-SCNC: 8 MEQ/L (ref 8–16)
AST SERPL-CCNC: 25 U/L (ref 5–40)
BASOPHILS # BLD: 0.3 %
BASOPHILS ABSOLUTE: 0 THOU/MM3 (ref 0–0.1)
BILIRUB SERPL-MCNC: 0.4 MG/DL (ref 0.3–1.2)
BILIRUBIN DIRECT: < 0.2 MG/DL (ref 0–0.3)
BUN BLDV-MCNC: 47 MG/DL (ref 7–22)
CALCIUM IONIZED: 1.19 MMOL/L (ref 1.12–1.32)
CALCIUM SERPL-MCNC: 7.6 MG/DL (ref 8.5–10.5)
CHLORIDE BLD-SCNC: 121 MEQ/L (ref 98–111)
CO2: 23 MEQ/L (ref 23–33)
CREAT SERPL-MCNC: 1.2 MG/DL (ref 0.4–1.2)
EOSINOPHIL # BLD: 0.6 %
EOSINOPHILS ABSOLUTE: 0.1 THOU/MM3 (ref 0–0.4)
ERYTHROCYTE [DISTWIDTH] IN BLOOD BY AUTOMATED COUNT: 16 % (ref 11.5–14.5)
ERYTHROCYTE [DISTWIDTH] IN BLOOD BY AUTOMATED COUNT: 54.4 FL (ref 35–45)
GFR SERPL CREATININE-BSD FRML MDRD: 60 ML/MIN/1.73M2
GLUCOSE BLD-MCNC: 117 MG/DL (ref 70–108)
GLUCOSE BLD-MCNC: 119 MG/DL (ref 70–108)
GLUCOSE BLD-MCNC: 137 MG/DL (ref 70–108)
GLUCOSE BLD-MCNC: 141 MG/DL (ref 70–108)
GLUCOSE BLD-MCNC: 141 MG/DL (ref 70–108)
GLUCOSE BLD-MCNC: 162 MG/DL (ref 70–108)
HCT VFR BLD CALC: 24.9 % (ref 42–52)
HEMOGLOBIN: 7.8 GM/DL (ref 14–18)
IMMATURE GRANS (ABS): 0.57 THOU/MM3 (ref 0–0.07)
IMMATURE GRANULOCYTES: 4.7 %
LYMPHOCYTES # BLD: 12.7 %
LYMPHOCYTES ABSOLUTE: 1.5 THOU/MM3 (ref 1–4.8)
MAGNESIUM: 2.6 MG/DL (ref 1.6–2.4)
MCH RBC QN AUTO: 29.1 PG (ref 26–33)
MCHC RBC AUTO-ENTMCNC: 31.3 GM/DL (ref 32.2–35.5)
MCV RBC AUTO: 92.9 FL (ref 80–94)
MONOCYTES # BLD: 7.3 %
MONOCYTES ABSOLUTE: 0.9 THOU/MM3 (ref 0.4–1.3)
MRSA SCREEN: NORMAL
NUCLEATED RED BLOOD CELLS: 0 /100 WBC
PHOSPHORUS: 2.9 MG/DL (ref 2.4–4.7)
PLATELET # BLD: 221 THOU/MM3 (ref 130–400)
PMV BLD AUTO: 10.8 FL (ref 9.4–12.4)
POTASSIUM SERPL-SCNC: 3.7 MEQ/L (ref 3.5–5.2)
RBC # BLD: 2.68 MILL/MM3 (ref 4.7–6.1)
SEG NEUTROPHILS: 74.4 %
SEGMENTED NEUTROPHILS ABSOLUTE COUNT: 9.1 THOU/MM3 (ref 1.8–7.7)
SODIUM BLD-SCNC: 152 MEQ/L (ref 135–145)
TOTAL PROTEIN: 4.6 G/DL (ref 6.1–8)
URINE CULTURE, ROUTINE: NORMAL
WBC # BLD: 12.2 THOU/MM3 (ref 4.8–10.8)

## 2022-01-17 PROCEDURE — 99232 SBSQ HOSP IP/OBS MODERATE 35: CPT | Performed by: INTERNAL MEDICINE

## 2022-01-17 PROCEDURE — 83735 ASSAY OF MAGNESIUM: CPT

## 2022-01-17 PROCEDURE — 92610 EVALUATE SWALLOWING FUNCTION: CPT

## 2022-01-17 PROCEDURE — 82248 BILIRUBIN DIRECT: CPT

## 2022-01-17 PROCEDURE — 2580000003 HC RX 258: Performed by: NURSE PRACTITIONER

## 2022-01-17 PROCEDURE — 99024 POSTOP FOLLOW-UP VISIT: CPT | Performed by: SURGERY

## 2022-01-17 PROCEDURE — 2580000003 HC RX 258: Performed by: PHARMACIST

## 2022-01-17 PROCEDURE — 2060000000 HC ICU INTERMEDIATE R&B

## 2022-01-17 PROCEDURE — 80053 COMPREHEN METABOLIC PANEL: CPT

## 2022-01-17 PROCEDURE — 84100 ASSAY OF PHOSPHORUS: CPT

## 2022-01-17 PROCEDURE — 2500000003 HC RX 250 WO HCPCS: Performed by: STUDENT IN AN ORGANIZED HEALTH CARE EDUCATION/TRAINING PROGRAM

## 2022-01-17 PROCEDURE — 6370000000 HC RX 637 (ALT 250 FOR IP): Performed by: INTERNAL MEDICINE

## 2022-01-17 PROCEDURE — 82330 ASSAY OF CALCIUM: CPT

## 2022-01-17 PROCEDURE — 85025 COMPLETE CBC W/AUTO DIFF WBC: CPT

## 2022-01-17 PROCEDURE — 6370000000 HC RX 637 (ALT 250 FOR IP): Performed by: NURSE PRACTITIONER

## 2022-01-17 PROCEDURE — 82948 REAGENT STRIP/BLOOD GLUCOSE: CPT

## 2022-01-17 PROCEDURE — 6360000002 HC RX W HCPCS: Performed by: PHARMACIST

## 2022-01-17 PROCEDURE — 2580000003 HC RX 258: Performed by: STUDENT IN AN ORGANIZED HEALTH CARE EDUCATION/TRAINING PROGRAM

## 2022-01-17 PROCEDURE — 6370000000 HC RX 637 (ALT 250 FOR IP): Performed by: STUDENT IN AN ORGANIZED HEALTH CARE EDUCATION/TRAINING PROGRAM

## 2022-01-17 PROCEDURE — 6360000002 HC RX W HCPCS: Performed by: STUDENT IN AN ORGANIZED HEALTH CARE EDUCATION/TRAINING PROGRAM

## 2022-01-17 RX ORDER — SODIUM HYPOCHLORITE 1.25 MG/ML
SOLUTION TOPICAL DAILY
Status: DISCONTINUED | OUTPATIENT
Start: 2022-01-17 | End: 2022-01-25

## 2022-01-17 RX ORDER — FAMOTIDINE 20 MG/1
20 TABLET, FILM COATED ORAL 2 TIMES DAILY
Status: DISCONTINUED | OUTPATIENT
Start: 2022-01-17 | End: 2022-01-27 | Stop reason: HOSPADM

## 2022-01-17 RX ADMIN — PIPERACILLIN AND TAZOBACTAM 3375 MG: 3; .375 INJECTION, POWDER, LYOPHILIZED, FOR SOLUTION INTRAVENOUS at 23:11

## 2022-01-17 RX ADMIN — PIPERACILLIN AND TAZOBACTAM 3375 MG: 3; .375 INJECTION, POWDER, LYOPHILIZED, FOR SOLUTION INTRAVENOUS at 15:33

## 2022-01-17 RX ADMIN — PIPERACILLIN AND TAZOBACTAM 3375 MG: 3; .375 INJECTION, POWDER, LYOPHILIZED, FOR SOLUTION INTRAVENOUS at 06:52

## 2022-01-17 RX ADMIN — FAMOTIDINE 20 MG: 20 TABLET ORAL at 09:06

## 2022-01-17 RX ADMIN — INSULIN LISPRO 3 UNITS: 100 INJECTION, SOLUTION INTRAVENOUS; SUBCUTANEOUS at 20:20

## 2022-01-17 RX ADMIN — DAKIN'S SOLUTION 0.125% (QUARTER STRENGTH): 0.12 SOLUTION at 20:07

## 2022-01-17 RX ADMIN — FENTANYL CITRATE 50 MCG: 0.05 INJECTION, SOLUTION INTRAMUSCULAR; INTRAVENOUS at 13:40

## 2022-01-17 RX ADMIN — FAMOTIDINE 20 MG: 20 TABLET ORAL at 20:07

## 2022-01-17 RX ADMIN — DEXTROSE MONOHYDRATE: 50 INJECTION, SOLUTION INTRAVENOUS at 19:51

## 2022-01-17 RX ADMIN — TAMSULOSIN HYDROCHLORIDE 0.4 MG: 0.4 CAPSULE ORAL at 09:15

## 2022-01-17 RX ADMIN — FENTANYL CITRATE 50 MCG: 0.05 INJECTION, SOLUTION INTRAMUSCULAR; INTRAVENOUS at 20:05

## 2022-01-17 RX ADMIN — SODIUM CHLORIDE, PRESERVATIVE FREE 10 ML: 5 INJECTION INTRAVENOUS at 09:07

## 2022-01-17 RX ADMIN — FENTANYL CITRATE 50 MCG: 0.05 INJECTION, SOLUTION INTRAMUSCULAR; INTRAVENOUS at 06:41

## 2022-01-17 RX ADMIN — DEXTROSE MONOHYDRATE: 50 INJECTION, SOLUTION INTRAVENOUS at 05:03

## 2022-01-17 RX ADMIN — INSULIN LISPRO 3 UNITS: 100 INJECTION, SOLUTION INTRAVENOUS; SUBCUTANEOUS at 12:05

## 2022-01-17 RX ADMIN — INSULIN LISPRO 3 UNITS: 100 INJECTION, SOLUTION INTRAVENOUS; SUBCUTANEOUS at 09:06

## 2022-01-17 RX ADMIN — CLINDAMYCIN PHOSPHATE 600 MG: 600 INJECTION, SOLUTION INTRAVENOUS at 14:37

## 2022-01-17 RX ADMIN — SODIUM CHLORIDE, PRESERVATIVE FREE 10 ML: 5 INJECTION INTRAVENOUS at 20:07

## 2022-01-17 RX ADMIN — CLINDAMYCIN PHOSPHATE 600 MG: 600 INJECTION, SOLUTION INTRAVENOUS at 23:13

## 2022-01-17 RX ADMIN — CLINDAMYCIN PHOSPHATE 600 MG: 600 INJECTION, SOLUTION INTRAVENOUS at 05:02

## 2022-01-17 ASSESSMENT — PAIN SCALES - GENERAL
PAINLEVEL_OUTOF10: 7
PAINLEVEL_OUTOF10: 7
PAINLEVEL_OUTOF10: 3
PAINLEVEL_OUTOF10: 0
PAINLEVEL_OUTOF10: 5
PAINLEVEL_OUTOF10: 9

## 2022-01-17 ASSESSMENT — ENCOUNTER SYMPTOMS
TROUBLE SWALLOWING: 0
COLOR CHANGE: 0
BACK PAIN: 0
DIARRHEA: 0
ABDOMINAL PAIN: 0
SHORTNESS OF BREATH: 0
VOMITING: 0
WHEEZING: 0
SORE THROAT: 0
CHEST TIGHTNESS: 0
PHOTOPHOBIA: 0

## 2022-01-17 ASSESSMENT — PAIN DESCRIPTION - PAIN TYPE: TYPE: ACUTE PAIN;SURGICAL PAIN

## 2022-01-17 ASSESSMENT — PAIN DESCRIPTION - ORIENTATION: ORIENTATION: POSTERIOR;LOWER

## 2022-01-17 ASSESSMENT — PAIN DESCRIPTION - LOCATION: LOCATION: BUTTOCKS

## 2022-01-17 NOTE — FLOWSHEET NOTE
01/17/22 1150   Encounter Summary   Services provided to: Patient   Referral/Consult From: Rounding   Continue Visiting Yes  (1/17)   Complexity of Encounter Moderate   Length of Encounter 30 minutes   Spiritual Assessment Completed Yes   Spiritual/Confucianism   Type Spiritual support   Assessment Calm; Approachable;Tearful; Anxious   Intervention Active listening;Explored feelings, thoughts, concerns;Nurtured hope;Prayer   Outcome Comfort;Expressed gratitude;Engaged in conversation; Shared life review;Coping   Assessment: In my encounter with the 77 yr old patient, while rounding  the unit 4D/ICU,  I provided spiritual care to patient through conversation, I also came to assess the patients spiritual needs present. The pt was having some anxiety and had questions pertaining to his current health crisis. He was thinking that God was punishing him for his past sins. The pt will have a swallow test tomorrow and asked for prayer. Interventions:  I provided prayer, emotional support and words of comfort.  provided a listening presence and encouraged pt to share their beliefs and how they support him during their hospitalization. Outcomes: The patient was encouraged and didnt share any further spiritual needs at this time. The pt remains optimistic and hopeful. The pt shared that they were appreciative for the support. The pt opened up about his tenure in [employer]. In that short time the pt stated that he trusted me. Plan:  Chaplains will follow-up at a later time for assessment of any spiritual care needs present.

## 2022-01-17 NOTE — PROGRESS NOTES
[facility] -  Anne Aiken MD M.D. FACS  Daily Progress Note    Pt Name: Lilia Saha  Medical Record Number: [id number]  Date of Birth 1955   Today's Date: 1/17/2022    ASSESSMENT:     1. POD # 3  2. 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 Packing removed at bedside today we will leave out has a loop drains in place  2.  White count down to 12,200      SUBJECTIVE:     Komal Jaquez is an ICU much more awake and conversant although he does not have much memory of his stay in the ED nor our discussion prior to surgical intervention      OBJECTIVE:     Patient Vitals for the past 24 hrs:   BP Temp Temp src Pulse Resp SpO2   01/17/22 1000 136/64 -- -- 75 15 (!) 88 %   01/17/22 0930 131/63 -- -- 75 18 96 %   01/17/22 0900 (!) 141/66 -- -- 68 17 95 %   01/17/22 0830 (!) 111/45 -- -- 64 17 99 %   01/17/22 0800 (!) 120/49 -- -- 75 18 100 %   01/17/22 0600 (!) 129/59 -- -- 69 21 95 %   01/17/22 0500 138/75 -- -- 73 15 96 %   01/17/22 0400 129/60 97.8 °F (36.6 °C) Oral 72 23 93 %   01/17/22 0300 (!) 129/56 -- -- 68 10 96 %   01/17/22 0200 (!) 130/56 -- -- 71 15 97 %   01/17/22 0100 (!) 121/58 -- -- 72 23 96 %   01/17/22 0000 118/61 97.8 °F (36.6 °C) Oral 69 20 96 %   01/16/22 2300 (!) 122/56 -- -- 74 18 96 %   01/16/22 2200 (!) 130/59 -- -- 80 19 97 %   01/16/22 2100 132/60 -- -- 72 15 94 %   01/16/22 2000 (!) 121/59 -- -- 74 15 90 %   01/16/22 1900 (!) 117/57 97.8 °F (36.6 °C) Oral 71 20 94 % 01/16/22 1700 114/62 -- -- 71 (!) 31 97 %   01/16/22 1600 (!) 108/50 -- -- 70 22 99 %   01/16/22 1553 -- 98.8 °F (37.1 °C) Oral -- -- --   01/16/22 1500 (!) 117/48 -- -- 77 21 98 %   01/16/22 1400 130/87 -- -- 80 16 95 %   01/16/22 1300 (!) 118/47 -- -- 75 20 94 %         Intake/Output Summary (Last 24 hours) at 1/17/2022 1223  Last data filed at 1/17/2022 0112  Gross per 24 hour   Intake 910.47 ml   Output 1775 ml   Net -864.53 ml       In: 910.5 [I.V.:568.4]  Out: 1775 [Urine:1775]    I/O last 3 completed shifts: In: 2374.8 [P.O.:210; I.V.:1557.2; IV Piggyback:607.5]  Out: 4275 [Urine:4275]     Date 01/17/22 0000 - 01/17/22 2359   Shift 0743-5632 3812-8416 3748-1755 24 Hour Total   INTAKE   I.V.(mL/kg) 260.9(3)   260.9(3)   IV Piggyback(mL/kg) 77.4(0.9)   77.4(0.9)   Shift Total(mL/kg) 338.4(3.9)   338.4(3.9)   OUTPUT   Shift Total(mL/kg)       Weight (kg) 86.6 86.6 86.6 86.6       Wt Readings from Last 3 Encounters:   01/15/22 190 lb 14.7 oz (86.6 kg)   12/20/21 195 lb 8 oz (88.7 kg)   01/02/18 223 lb 11.2 oz (101.5 kg)        Body mass index is 30.81 kg/m².      Diet: Diet NPO        MEDS:     Scheduled Meds:   famotidine  20 mg Oral BID    [Held by provider] amLODIPine  10 mg Oral Daily    [Held by provider] apixaban  10 mg Oral BID    [Held by provider] aspirin  81 mg Oral Daily    [Held by provider] hydrALAZINE  100 mg Oral Q8H    [Held by provider] hydroCHLOROthiazide  50 mg Oral Daily    [Held by provider] losartan  100 mg Oral Daily    [Held by provider] metoprolol succinate  50 mg Oral Daily    sodium chloride flush  5-40 mL IntraVENous 2 times per day    insulin lispro  0-18 Units SubCUTAneous Q4H    calcium replacement protocol   Other RX Placeholder    piperacillin-tazobactam  3,375 mg IntraVENous Q8H    tamsulosin  0.4 mg Oral Daily    clindamycin (CLEOCIN) IV  600 mg IntraVENous Q8H     Continuous Infusions:   sodium chloride      dextrose 100 mL/hr at 01/17/22 0910    sodium CALCIUM 7.4 01/15/2022      Ionized Calcium: No results found for: IONCA   Lipids: No results for input(s): CHOL, HDL in the last 72 hours. Invalid input(s): LDLCALCU  INR:   Recent Labs     01/15/22  0040 01/15/22  2200 01/16/22  0910   INR 4.55* 2.92* 2.37*     Lactic Acid: No results found for: LACTA               DVT prophylaxis: [] Lovenox                                 [] SCDs                                 [] SQ Heparin                                 [] Encourage ambulation, low risk for DVT, no chemical or mechanical prophylaxis necessary              [] Already on Anticoagulation      RADIOLOGY:      CT ABDOMEN PELVIS WO CONTRAST Additional Contrast? None    Result Date: 1/14/2022   1. Decubitus ulcer overlying the sacrum new since previous CT scan of the pelvis dated 7 December 2021. 2. Air   in the perineum, new since previous study dated 7 December 2021. 3. Dale catheter in place. Air in the urinary bladder. 4. Changes of ankylosing spondylitis involving the lower thoracic, lumbar spine and sacroiliac iliac joints. Deformity at the thoracolumbar junction. 5. Calcification along the gallbladder wall. 6. Bilateral inguinal lymph nodes. 7. Areas of atelectasis or scarring at both lung bases. **This report has been created using voice recognition software. It may contain minor errors which are inherent in voice recognition technology. ** Final report electronically signed by DR Shital Saldana on 1/14/2022 6:49 PM    XR CHEST PORTABLE    Result Date: 1/15/2022  1. Right IJ central line tip in the right atrium. This can be retracted by 3 cm. 2. Bilateral patchy interstitial opacities which may reflect infiltrate. 3. Borderline cardiomegaly. This document has been electronically signed by: David Beckwith MD on 01/15/2022 12:24 AM    XR CHEST PORTABLE    Result Date: 1/14/2022  1.  Interval improvement since previous plain radiographs dated 11 December 2021 with reduction in the bilateral upper lobe infiltrates. . 2. Borderline cardiomegaly. 3. Thoracic spondylosis. **This report has been created using voice recognition software. It may contain minor errors which are inherent in voice recognition technology. ** Final report electronically signed by DR Francia Severin on 1/14/2022 5:31 PM    VL DUP LOWER EXTREMITY VENOUS LEFT    Result Date: 1/8/2022  Acute thrombus in the peroneal and anterior tibial veins. Final report electronically signed by Dr. Maria G Palmer on 1/8/2022 3:22 PM        Willa Poole MD, M.D.  FACS  Electronically signed 1/17/2022 at 12:23 PM

## 2022-01-17 NOTE — PROGRESS NOTES
CRITICAL CARE PROGRESS NOTE      Patient:  Kp Vega    Unit/Bed:4D-11/011-A  YOB: 1955  MRN: [id number]   PCP: DIONTE Joe CNP  Date of Admission: 1/14/2022  Chief Complaint:-Buttock pain    Assessment and Plan:      1. Necrotizing fasciitis: Noted on CT scan on 1/14. Patient was initiated on broad-spectrum antibiotics. Underwent emergent I&D with Dr. Anna Marie Hooks. Continue antibiotics. Zosyn day #3, clindamycin day #3. 2. Hypernatremia: Today sodium remains elevated to 152. Urine sodium is 25 and urine osmolality is 191 both of which is elevated. Serum osmolality is 152 which is slightly elevated. We will increase D5W to 100ml/hr and restrict sodium intake to 2gm Na. Continue to trend BMP. 3. Leukocytosis: Improved. WBC today 12.2, continue antibiotic therapy. Afebrile. 4. Normocytic anemia: Patient had noted blood loss postoperatively from wounds. Patient has received 2 units of PRBC and 2 units of FFP since arrival. No active bleeding at this time. Today H&H is 7.8 and 23.9. Will continue to trend and watch for bleeding. 5. Elevated INR: Status post 2 units FFP and 2 units PRBC. Patient was on Eliquis for DVT but is held at this time due anemia. 6. Acute DVT: Was initiated on Eliquis on 1/8/2022 after being diagnosed with anterior tibial and peroneal veins of the left lower extremity. Eliquis was stopped at the time of arrival due to bleeding. Considering IVC filter. Consultation to interventional radiologist.  Ricki Goltz to hear back. Per guidelines if patient is inpatient or as prolonged immobility IVC filter should be considered. Return phone call from Dr. Derrell Saab stating no need for emergent IVC filter. Recommends restarting Eliquis when patient's hemoglobin is stable. 7. Paroxysmal atrial fib: Question of patient having atrial fibrillation with RVR during this admission. There is no EKG representing this rhythm.   Patient is currently in sinus rhythm     8. Hypertension: Holding Norvasc, hydralazine, hydrochlorothiazide, Cozaar, Toprol in the setting of hypotension and shock. Resume as appropriate. 9. Hyperlipidemia: per history with last cholesterol level 11/13 231. Patient will be encouraged to follow up with PCP outpatient at time of discharge. 10. Diabetes mellitus: Sliding scale insulin        Will attempt to wean D5W when possible and recheck glucose every 4 hrs. 11. Non-anion gap metabolic acidosis: Resolved. Continue to monitor. 12. History of urinary retention: Flomax    13. History of COVID-19: Noted hospitalization for COVID-19 requiring no oxygenation support. 14. Obesity: BMI 30.81. INITIAL H AND P AND ICU COURSE:  David Garcia is a 78-year-old white male who presented to [facility] on 1/14/2022 with complaints of buttocks pain. He has a past medical history of lifetime non-smoker, recent left lower extremity DVT on Eliquis, diabetes mellitus, hypertension, urinary retention. Per report he presented to the emergency department via EMS from [facility] with new decubitus ulcer. Patient believes it was present for multiple weeks however was not well documented prior to hospital encounter. He reports that it has been draining for approximately 1 week. Patient reported he was having worsening pain fever and sweats and chills over the last week. In the emergency department he was seen on 1/8/2022 ultrasound of leg demonstrated acute DVT he was started on Eliquis. Of note he was also hospitalized from 12/7/2021 to 12/20/2021 for COVID-19 pneumonia. He returned to his nursing facility on room air. In the emergency department he was positive for lactic acidosis. Imaging revealed a decubitus ulcer with air in the perineum. Patient was fluid resuscitated per sepsis protocol and given Vanco and Zosyn. General surgery was consulted for debridement and concern for necrotizing fasciitis.   Initially patient was admitted to stepdown postprocedure he was transferred to the ICU for hypotension. He did undergo emergent debridement of abscess on 1/14 with Dr. Peri Colin. He was initially on vasopressors which have been weaned off.    1/16/22 remains hypernatremic 152. Will increase D5W to 100ml/hr. Also, changed diet to Na 2 gm. Past Medical History: per HPI   Family History: Mother: Alzheimer's Father: Heart disease  Social History: Lifetime non-smoker, denies alcohol and drug use. Review of Systems   Constitutional: Positive for fatigue. Negative for chills and fever. HENT: Negative for sore throat and trouble swallowing. Eyes: Negative for photophobia and visual disturbance. Respiratory: Negative for chest tightness, shortness of breath and wheezing. Cardiovascular: Negative for chest pain and leg swelling. Gastrointestinal: Negative for abdominal pain, diarrhea and vomiting. Genitourinary: Negative for decreased urine volume, flank pain and hematuria. Musculoskeletal: Negative for back pain and neck pain. Skin: Positive for wound. Negative for color change and pallor. Allergic/Immunologic: Negative for food allergies. Neurological: Positive for weakness. Negative for dizziness and headaches. Hematological: Negative for adenopathy. Psychiatric/Behavioral: Negative for agitation and confusion. The patient is not nervous/anxious.         Scheduled Meds:   famotidine  20 mg Oral BID    [Held by provider] amLODIPine  10 mg Oral Daily    [Held by provider] apixaban  10 mg Oral BID    [Held by provider] aspirin  81 mg Oral Daily    [Held by provider] hydrALAZINE  100 mg Oral Q8H    [Held by provider] hydroCHLOROthiazide  50 mg Oral Daily    [Held by provider] losartan  100 mg Oral Daily    [Held by provider] metoprolol succinate  50 mg Oral Daily    sodium chloride flush  5-40 mL IntraVENous 2 times per day    insulin lispro  0-18 Units SubCUTAneous Q4H    calcium replacement protocol   Other RX Placeholder    piperacillin-tazobactam  3,375 mg IntraVENous Q8H    tamsulosin  0.4 mg Oral Daily    clindamycin (CLEOCIN) IV  600 mg IntraVENous Q8H     Continuous Infusions:   sodium chloride      dextrose 100 mL/hr at 01/17/22 0910    sodium chloride      dextrose      sodium chloride      sodium chloride         PHYSICAL EXAMINATION:  T:  96.  P:  75. RR:  21. B/P:  136/64. FiO2:  0. O2 Sat:  96.  I/O:  3543/4200  Body mass index is 30.81 kg/m². GCS:  15  General:   Acute on chronically ill-appearing white male  HEENT:  normocephalic and atraumatic. No scleral icterus. PERR  Neck: supple. No Thyromegaly. Lungs: clear to auscultation. No retractions  Cardiac: RRR. No JVD. Abdomen: soft. Nontender. Extremities:  No clubbing, cyanosis, or edema x 4. Vasculature: sluggish capillary refill > 3 seconds. Palpable dorsalis pedis pulses. Skin:  warm and dry. Wound   Psych:  Alert and oriented x3. Affect appropriate  Lymph:  No supraclavicular adenopathy. Neurologic:  No focal deficit. No seizures. Data: (All radiographs, tracings, PFTs, and imaging are personally viewed and interpreted unless otherwise noted).  Sodium 152, potassium 3.7, chloride 121, CO2 23, BUN 47, creatinine 1.2, anion gap 8.0, glucose 119.  WBC 12.2, hemoglobin 7.8, hematocrit 24.9, platelet count 341   Telemetry shows normal sinus rhythm   CT abdomen pelvis 1/14/2022 reports decubitus ulcer overlying the sacrum. Air in the perineum.  Chest x-ray 1/15/2022 reports bilateral patchy interstitial opacities   Left lower extremity venous Doppler 1/8/2022 reports acute thrombus in the peroneal and anterior tibial vein.    EKG 12/19/2021 sinus bradycardia   Echocardiogram 12/13/2021 reports ejection fraction of 60% with normal LV function    Meets Continued ICU Level Care Criteria:    [] Yes   [x] No - Transfer Planned to listed location: Awaiting bed location  [] HOSPITALIST CONTACTED-  Case and plan discussed with  [place]     Electronically signed by Ana Maria Elias MD  CRITICAL CARE SPECIALIST        Lab Results   Component Value Date    PH 7.45 01/15/2022    PCO2 30 01/15/2022    PO2 55 01/15/2022    HCO3 21 01/15/2022    O2SAT 90 01/15/2022     Lab Results   Component Value Date    IFIO2 4 01/15/2022     CBC:   Recent Labs     01/15/22  0542 01/15/22  1140 01/16/22  0910 01/16/22  2340 01/17/22  0450   WBC 29.0*  --  14.4*  --  12.2*   HGB 9.6*   < > 8.0* 7.5* 7.8*   HCT 30.0*   < > 25.3* 23.9* 24.9*     --  203  --  221    < > = values in this interval not displayed. BMP:  Recent Labs     01/14/22  1600 01/15/22  0030 01/15/22  0542 01/15/22  0542 01/15/22  1830 01/16/22  0910 01/17/22  0450      < > 143   < > 150* 152* 152*   K 5.3*   < > 4.5  --  3.9 3.7 3.7      < > 115*   < > 122* 122* 121*   CO2 18*   < > 12*   < > 20* 22* 23   *   < > 117*   < > 74* 57* 47*   CREATININE 4.4*   < > 2.6*   < > 1.4* 1.2 1.2   GLUCOSE 101   < > 243*   < > 108 109* 119*   MG 3.4*  --  2.5*  --   --   --  2.6*   PHOS  --   --   --   --   --   --  2.9   CALCIUM 8.4*   < > 7.0*   < > 7.4* 7.4* 7.6*    < > = values in this interval not displayed. Hepatic:   Recent Labs     01/15/22  0542 01/15/22  1830 01/17/22  0450   AST 38 21 25   ALT 71* 49 44   BILITOT 0.5 0.5 0.4   ALKPHOS 122 101 100     Cardiac Enzymes: No results for input(s): CKTOTAL, CKMB, TROPONINI in the last 72 hours. BNP: No results for input(s): BNP in the last 72 hours. INR:   Recent Labs     01/15/22  0040 01/15/22  2200 01/16/22  0910   INR 4.55* 2.92* 2.37*     POC   Recent Labs     01/16/22  1627 01/16/22 2029 01/16/22  2344   POCGLU 165* 135* 119*     No results for input(s): LACTA in the last 72 hours.       Venous duplex scan of the left lower extremity: Performed on 1/8/2022  Sat Jan 8, 2022  3:22   FINDINGS: There is lack of compressibility and absent flow due to acute thrombus in the peroneal and

## 2022-01-17 NOTE — PROGRESS NOTES
Progress note: Infectious diseases    Patient - Anne Cook,  Age - 77 y.o.    - 1955      Room Number - 4D-11/011-A   N -  [id number]   [id number]  Date of Admission -  2022  4:05 PM    SUBJECTIVE:   He has no new complaints  OBJECTIVE   VITALS    weight is 190 lb 14.7 oz (86.6 kg). His oral temperature is 98 °F (36.7 °C). His blood pressure is 140/64 (abnormal) and his pulse is 74. His respiration is 20 and oxygen saturation is 96%.        Wt Readings from Last 3 Encounters:   01/15/22 190 lb 14.7 oz (86.6 kg)   21 195 lb 8 oz (88.7 kg)   18 223 lb 11.2 oz (101.5 kg)       I/O (24 Hours)    Intake/Output Summary (Last 24 hours) at 2022 1353  Last data filed at 2022 0112  Gross per 24 hour   Intake 910.47 ml   Output 1775 ml   Net -864.53 ml       General Appearance  Awake, alert, oriented,  not  In acute distress  HEENT - normocephalic, atraumatic, pale  conjunctiva,  anicteric sclera  Neck - Supple, no mass  Lungs -  Bilateral   air entry, no rhonchi, no wheeze  Cardiovascular - Heart sounds are normal.     Abdomen - soft, not distended, nontender,   Neurologic -oriented  Skin - No bruising or bleeding  Extremities - foam dressing to heel  He has full thickness open wound on the coccyx , penrose drain to the ischial area  Necrotic tissue noted around the anal orfice        MEDICATIONS:      famotidine  20 mg Oral BID    [Held by provider] amLODIPine  10 mg Oral Daily    [Held by provider] apixaban  10 mg Oral BID    [Held by provider] aspirin  81 mg Oral Daily    [Held by provider] hydrALAZINE  100 mg Oral Q8H    [Held by provider] hydroCHLOROthiazide  50 mg Oral Daily    [Held by provider] losartan  100 mg Oral Daily    [Held by provider] metoprolol succinate  50 mg Oral Daily    sodium chloride flush  5-40 mL IntraVENous 2 times per day    insulin lispro  0-18 Units SubCUTAneous Q4H    calcium replacement protocol   Other RX Placeholder    piperacillin-tazobactam  3,375 mg IntraVENous Q8H    tamsulosin  0.4 mg Oral Daily    clindamycin (CLEOCIN) IV  600 mg IntraVENous Q8H      sodium chloride      dextrose 100 mL/hr at 01/17/22 0910    sodium chloride      dextrose      sodium chloride      sodium chloride       sodium chloride, sodium chloride flush, sodium chloride, ondansetron **OR** ondansetron, glucose, dextrose, glucagon (rDNA), dextrose, HYDROcodone 5 mg - acetaminophen, sodium chloride, fentanNYL, sodium chloride      LABS:     CBC:   Recent Labs     01/15/22  0542 01/15/22  1140 01/16/22  0910 01/16/22  2340 01/17/22  0450   WBC 29.0*  --  14.4*  --  12.2*   HGB 9.6*   < > 8.0* 7.5* 7.8*     --  203  --  221    < > = values in this interval not displayed. BMP:    Recent Labs     01/15/22  1830 01/16/22  0910 01/17/22  0450   * 152* 152*   K 3.9 3.7 3.7   * 122* 121*   CO2 20* 22* 23   BUN 74* 57* 47*   CREATININE 1.4* 1.2 1.2   GLUCOSE 108 109* 119*     Calcium:  Recent Labs     01/17/22  0450   CALCIUM 7.6*     Ionized Calcium:No results for input(s): IONCA in the last 72 hours. Magnesium:  Recent Labs     01/17/22  0450   MG 2.6*     Phosphorus:  Recent Labs     01/17/22  0450   PHOS 2.9     BNP:No results for input(s): BNP in the last 72 hours.   Glucose:  Recent Labs     01/17/22  0501 01/17/22  0905 01/17/22  1205   POCGLU 117* 141* 162*     HgbA1C:   Recent Labs     01/14/22  1600   LABA1C 8.1*     INR:   Recent Labs     01/15/22  0040 01/15/22  2200 01/16/22  0910   INR 4.55* 2.92* 2.37*     Hepatic:   Recent Labs     01/15/22  0030 01/15/22  0030 01/15/22  0542 01/15/22  1830 01/17/22  0450   ALKPHOS 125   < > 122 101 100   ALT 72*   < > 71* 49 44   AST 44*   < > 38 21 25   PROT 4.6*   < > 4.2* 4.3* 4.6*   BILITOT 0.4   < > 0.5 0.5 0.4   BILIDIR 0.3  --  0.4*  --  <0.2   LABALBU 1.8*   < > 1.7* 2.0* 1.9*    < > = values in

## 2022-01-17 NOTE — PROGRESS NOTES
CRITICAL CARE PROGRESS NOTE      Patient:  Mayi Mckenzie    Unit/Bed:4D-11/011-A  YOB: 1955  MRN: [id number]   PCP: DIONTE Arceo CNP  Date of Admission: 1/14/2022  Chief Complaint:-Buttock pain    Assessment and Plan:      Necrotizing fasciitis: Noted on CT scan on 1/14. Patient was initiated on broad-spectrum antibiotics. Underwent emergent I&D with Dr. Gene Hines. Continue antibiotics. Zosyn day #3, clindamycin day #3. Hypernatremia: patient had  D5 water at 50 cc/h. Urine and serum osmolality pending. TSH and T4 pending. Non-anion gap metabolic acidosis: Mild, monitor    Leukocytosis: WBC 14.4, continue antibiotic therapy. Afebrile. Normocytic anemia: Patient had noted blood loss postoperatively from wounds. This was packed by general surgery. Hemoglobin 6.7, status post 1 unit packed red blood cells. Repeat hemoglobin 8.0    Elevated INR: Status post 2 units FFP. Patient was on Eliquis. Trend INR. No active signs of bleeding at this time. INR trending down. Acute DVT: Was initiated on Eliquis on 1/8/2022 after being diagnosed with anterior tibial and peroneal veins of the left lower extremity. Eliquis was stopped at the time of arrival due to bleeding. Considering IVC filter. Consultation to interventional radiologist.  Mandie Leggett to hear back. Per guidelines if patient is inpatient or as prolonged immobility IVC filter should be considered. Return phone call from Dr. Mil Franco stating no need for emergent IVC filter. Recommends restarting Eliquis when patient's hemoglobin is stable. Paroxysmal atrial fib: Question of patient having atrial fibrillation with RVR during this admission. There is no EKG representing this rhythm. Patient is currently in sinus rhythm    Hypertension: Holding Norvasc, hydralazine, hydrochlorothiazide, Cozaar, Toprol in the setting of hypotension and shock. Resume as appropriate.     Hyperlipidemia:     Diabetes mellitus: Sliding scale insulin    History of urinary retention: Flomax    History of COVID-19: Noted hospitalization for COVID-19 requiring no oxygenation support. Obesity: BMI 30.81. INITIAL H AND P AND ICU COURSE:  Cecily Mcgarry is a 51-year-old white male who presented to [facility] on 1/14/2022 with complaints of buttocks pain. He has a past medical history of lifetime non-smoker, recent left lower extremity DVT on Eliquis, diabetes mellitus, hypertension, urinary retention. Per report he presented to the emergency department via EMS from [facility] with new decubitus ulcer. Patient believes it was present for multiple weeks however was not well documented prior to hospital encounter. He reports that it has been draining for approximately 1 week. Patient reported he was having worsening pain fever and sweats and chills over the last week. In the emergency department he was seen on 1/8/2022 ultrasound of leg demonstrated acute DVT he was started on Eliquis. Of note he was also hospitalized from 12/7/2021 to 12/20/2021 for COVID-19 pneumonia. He returned to his nursing facility on room air. In the emergency department he was positive for lactic acidosis. Imaging revealed a decubitus ulcer with air in the perineum. Patient was fluid resuscitated per sepsis protocol and given Vanco and Zosyn. General surgery was consulted for debridement and concern for necrotizing fasciitis. Initially patient was admitted to stepdown postprocedure he was transferred to the ICU for hypotension. He did undergo emergent debridement of abscess on 1/14 with Dr. John Reinoso. He was initially on vasopressors which have been weaned off. Past Medical History: per HPI   Family History: Mother: Alzheimer's Father: Heart disease  Social History: Lifetime non-smoker, denies alcohol and drug use. Review of Systems   Constitutional: Positive for fatigue. Negative for chills and fever.    HENT: Negative for sore throat and trouble swallowing. Eyes: Negative for photophobia and visual disturbance. Respiratory: Negative for chest tightness, shortness of breath and wheezing. Cardiovascular: Negative for chest pain and leg swelling. Gastrointestinal: Negative for abdominal pain, diarrhea and vomiting. Genitourinary: Negative for decreased urine volume, flank pain and hematuria. Musculoskeletal: Negative for back pain and neck pain. Skin: Positive for wound. Negative for color change and pallor. Allergic/Immunologic: Negative for food allergies. Neurological: Positive for weakness. Negative for dizziness and headaches. Hematological: Negative for adenopathy. Psychiatric/Behavioral: Negative for agitation and confusion. The patient is not nervous/anxious. Scheduled Meds:   famotidine  20 mg Oral BID    [Held by provider] amLODIPine  10 mg Oral Daily    [Held by provider] apixaban  10 mg Oral BID    [Held by provider] aspirin  81 mg Oral Daily    [Held by provider] hydrALAZINE  100 mg Oral Q8H    [Held by provider] hydroCHLOROthiazide  50 mg Oral Daily    [Held by provider] losartan  100 mg Oral Daily    [Held by provider] metoprolol succinate  50 mg Oral Daily    sodium chloride flush  5-40 mL IntraVENous 2 times per day    insulin lispro  0-18 Units SubCUTAneous Q4H    calcium replacement protocol   Other RX Placeholder    piperacillin-tazobactam  3,375 mg IntraVENous Q8H    tamsulosin  0.4 mg Oral Daily    clindamycin (CLEOCIN) IV  600 mg IntraVENous Q8H     Continuous Infusions:   sodium chloride      dextrose 50 mL/hr at 01/17/22 0503    sodium chloride      dextrose      sodium chloride      sodium chloride         PHYSICAL EXAMINATION:  T:  97.8. P:  75. RR:  21. B/P:  136/64. O2 Sat:  96 on RA  I/O:  910/1775 Total -864.5   Body mass index is 30.81 kg/m². GCS:  15  General:   Acute on chronically ill-appearing white male  HEENT:  normocephalic and atraumatic. No scleral icterus. PERR  Neck: supple. No Thyromegaly. Lungs: clear to auscultation. No retractions  Cardiac: RRR. No JVD. Abdomen: soft. Nontender. Extremities:  No clubbing, cyanosis, or edema x 4. Vasculature: sluggish capillary refill > 3 seconds. Palpable dorsalis pedis pulses. Skin:  warm and dry. Wound   Psych:  Alert and oriented x3. Affect appropriate  Lymph:  No supraclavicular adenopathy. Neurologic:  No focal deficit. No seizures. Data: (All radiographs, tracings, PFTs, and imaging are personally viewed and interpreted unless otherwise noted). Sodium 152, potassium 3.7, chloride 122, CO2 22, BUN 57, creatinine 1.2, anion gap 8.0, glucose 109. WBC 14.4, hemoglobin 8.0, hematocrit 25.3, platelet count 377  Telemetry shows normal sinus rhythm  CT abdomen pelvis 1/14/2022 reports decubitus ulcer overlying the sacrum. Air in the perineum. Chest x-ray 1/15/2022 reports bilateral patchy interstitial opacities  Left lower extremity venous Doppler 1/8/2022 reports acute thrombus in the peroneal and anterior tibial vein. EKG 12/19/2021 sinus bradycardia  Echocardiogram 12/13/2021 reports ejection fraction of 60% with normal LV function        Lab Results   Component Value Date    PH 7.45 01/15/2022    PCO2 30 01/15/2022    PO2 55 01/15/2022    HCO3 21 01/15/2022    O2SAT 90 01/15/2022     Lab Results   Component Value Date    IFIO2 4 01/15/2022     CBC:   Recent Labs     01/15/22  0542 01/15/22  1140 01/16/22  0910 01/16/22  2340 01/17/22  0450   WBC 29.0*  --  14.4*  --  12.2*   HGB 9.6*   < > 8.0* 7.5* 7.8*   HCT 30.0*   < > 25.3* 23.9* 24.9*     --  203  --  221    < > = values in this interval not displayed.      BMP:  Recent Labs     01/14/22  1600 01/15/22  0030 01/15/22  0542 01/15/22  0542 01/15/22  1830 01/16/22  0910 01/17/22  0450      < > 143   < > 150* 152* 152*   K 5.3*   < > 4.5  --  3.9 3.7 3.7      < > 115*   < > 122* 122* 121*   CO2 18*   < > 12*   < > 20* 22* 23   * < > 117*   < > 74* 57* 47*   CREATININE 4.4*   < > 2.6*   < > 1.4* 1.2 1.2   GLUCOSE 101   < > 243*   < > 108 109* 119*   MG 3.4*  --  2.5*  --   --   --  2.6*   PHOS  --   --   --   --   --   --  2.9   CALCIUM 8.4*   < > 7.0*   < > 7.4* 7.4* 7.6*    < > = values in this interval not displayed. Hepatic:   Recent Labs     01/15/22  0542 01/15/22  1830 01/17/22  0450   AST 38 21 25   ALT 71* 49 44   BILITOT 0.5 0.5 0.4   ALKPHOS 122 101 100     Cardiac Enzymes: No results for input(s): CKTOTAL, CKMB, TROPONINI in the last 72 hours. BNP: No results for input(s): BNP in the last 72 hours. INR:   Recent Labs     01/15/22  0040 01/15/22  2200 01/16/22  0910   INR 4.55* 2.92* 2.37*     POC   Recent Labs     01/16/22  1627 01/16/22  2029 01/16/22  2344   POCGLU 165* 135* 119*     No results for input(s): LACTA in the last 72 hours. Venous duplex scan of the left lower extremity: Performed on 1/8/2022  Sat Jan 8, 2022  3:22   FINDINGS: There is lack of compressibility and absent flow due to acute thrombus in the peroneal and anterior tibial veins. Remaining vein segments demonstrate normal compressibility and color flow. Acute thrombus in the peroneal and anterior tibial veins. CT angiogram of chest performed on 12/7/2021:  PROCEDURE: CTA CHEST W WO CONTRAST, CTA ABDOMEN PELVIS W WO CONTRAST   CLINICAL INFORMATION: pulmonary embolism. 1. No pulmonary embolism. No aneurysm of the thoracic or abdominal aorta. No dissection. 2. Extensive bilateral infiltrates. Meets Continued ICU Level Care Criteria:    [] Yes   [x] No - Transfer Planned to listed location: Awaiting bed location  [x] HOSPITALIST CONTACTED-      Case and plan discussed with Dr. Ramona Aldridge. Electronically signed by MD Fredis Soliz [address] Anne Swain MD 1/17/2022 8:25 AM    Patient seen by me. Case discussed with resident physician. Please see significant event.   Italicized font represents changes to the note made by me. CC time 35 minutes. Time was discontiguous. Time does not include procedures. Time does include my direct assessment of the patient and coordination of care.   Electronically signed by García Nunez MD on 1/17/2022 at 5:35 PM
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
## 2022-01-17 NOTE — PLAN OF CARE
Problem: DISCHARGE BARRIERS  Goal: Patient's continuum of care needs are met  Outcome: Ongoing  Note: Needs ECF refuses to return to [facility]. See  notes 1/17/22.

## 2022-01-17 NOTE — PROGRESS NOTES
[address] ICU 4D  Clinical Swallow Evaluation      SLP Individual Minutes  Time In: 5689  Time Out: 2811  Minutes: 23  Timed Code Treatment Minutes: 0 Minutes       Date: 2022  Patient Name: Leonel Araujo      CSN: [id number]   : 1955  (77 y.o.)  Gender: male   Referring Physician:  Michele Garcia DO  Diagnosis: Sepsis   Secondary Diagnosis: Dysphagia    History of Present Illness/Injury: Patient admitted to [facility] with the above diagnosis. Per chart review and patient report; patient on a liquid diet only at home prior to hospital visit. Patient also reports \"100lb weight loss within the last 5 weeks. \" ST consulted to complete CSE and determine safety of PO intake. Past Medical History:   Diagnosis Date    Diabetes mellitus ([facility] [address]     Hyperlipidemia     Hypertension        SUBJECTIVE:  NILSA Ozuna approved ST attempt. Patient alert and compliant. Patient complained of pain and was hesitant to sit in a completely upright position due to wound pain; patient did achieve ~25 degree positioning to complete evaluation. No family present. OBJECTIVE:    Pain:  Patient complained of overt pain, did not rate. Current Diet: Regular    Respiratory Status:  Independent    Behavioral Observation:  Alert and Oriented    Oral Mechanism Evaluation:      Facial / Labial Impaired Decreased labial seal/weakness   Lingual Impaired Weakness and decreased ROM   Dentition Impaired Edentulous; patient reports \"ownership of dentures but does not wear any longer. \"   Velum WFL    Vocal Quality WFL    Sensation WFL    Cough Impaired weak     Patient Evaluated Using: Thin liquids, puree  *Patient refused hard solids stating \"I don't think I can. \"     Oral Phase:  Impaired:  Loss of Bolus from Lips    Pharyngeal Phase: Impaired:  Suspected Pharyngeal Residue   *not able to fully validate presence of pharyngeal phase deficits without formal instrumentation/supportive imaging     Signs and Symptoms of Laryngeal Penetration/Aspiration: Immediate Cough, Delayed Cough and Wet Vocal Quality    Impresssions:   Patient presents with mild oral dysphagia and suspected pharyngeal dysfunction. Patient highly deconditioned with inability to maintain upright position. Patient only able to achieve 25 degree position at bedside; at best d/t pain of bottom. Patient reports, \"inablity to consume solids at baseline and prefers very cold liquids. \" Patient with inconsistent coughing and wet vocal quality with thin liquids via cup and straw at bedside, very weak cough overall. Patient refused to consume hard solids stating, \"I don't think I can do that. \" Patient did consume PO trials of puree (applesauce) without overt difficulty, no s/s of aspiration. Patient certainly remains at high risk for aspiration d/t current medical status and s/s of aspiration with liquid trials at bedside; ST recommending NPO with exception of PO medications CRUSHED in puree. ST recommending FEES to be completed 01/18 to further evaluate pharyngeal function of the swallow. Patient agreeable. NILSA Pickard updated. RECOMMENDATIONS/ASSESSMENT:  Instrumental Evaluation: Fiberoptic Endoscopic Evaluation of the Swallow (FEES) 01/18  Diet Recommendations:  NPO; exception PO medications CRUSHED in puree  Strategies:  Recommend NPO; oral care   Rehabilitation Potential: fair    EDUCATION:  Learner: Patient  Education:  Reviewed results and recommendations of this evaluation, Reviewed diet and strategies, Reviewed signs, symptoms and risks of aspiration, Reviewed ST goals and Plan of Care and Reviewed recommendations for follow-up  Evaluation of Education: Verbalizes understanding and Demonstrates with assistance    PLAN:  Recommendations pending FEES    PATIENT GOAL:    Did not state. Will further assess during treatment.     SHORT TERM GOALS:  Short-term Goals  Timeframe for Short-term Goals: 2 weeks  Goal 1: Patient will complete PO trials of ice chips, thin liquids and textures (as clinically appropriate) with SLP only in order to determine readiness for instruemtnal evaluation (FEES). Goal 2: Complete FEES in order to furthur evaluate pharyngeal function of the swallow and determine safety fo PO diet.     LONG TERM GOALS:  No LTGs due to short ELOS    Gil Song, Student Intern

## 2022-01-17 NOTE — CARE COORDINATION
accept Patient at this time. Beena Esau stated that according to his wound he may need LTACH level of care. Message left with Shasha at [employer].

## 2022-01-18 LAB
AEROBIC CULTURE: ABNORMAL
AEROBIC CULTURE: ABNORMAL
ALBUMIN SERPL-MCNC: 1.6 G/DL (ref 3.5–5.1)
ALP BLD-CCNC: 77 U/L (ref 38–126)
ALT SERPL-CCNC: 33 U/L (ref 11–66)
ANAEROBIC CULTURE: ABNORMAL
ANION GAP SERPL CALCULATED.3IONS-SCNC: 7 MEQ/L (ref 8–16)
AST SERPL-CCNC: 17 U/L (ref 5–40)
BILIRUB SERPL-MCNC: 0.3 MG/DL (ref 0.3–1.2)
BILIRUBIN DIRECT: < 0.2 MG/DL (ref 0–0.3)
BUN BLDV-MCNC: 28 MG/DL (ref 7–22)
CALCIUM SERPL-MCNC: 6.5 MG/DL (ref 8.5–10.5)
CHLORIDE BLD-SCNC: 122 MEQ/L (ref 98–111)
CO2: 20 MEQ/L (ref 23–33)
CREAT SERPL-MCNC: 0.7 MG/DL (ref 0.4–1.2)
GFR SERPL CREATININE-BSD FRML MDRD: > 90 ML/MIN/1.73M2
GLUCOSE BLD-MCNC: 119 MG/DL (ref 70–108)
GLUCOSE BLD-MCNC: 121 MG/DL (ref 70–108)
GLUCOSE BLD-MCNC: 133 MG/DL (ref 70–108)
GLUCOSE BLD-MCNC: 140 MG/DL (ref 70–108)
GLUCOSE BLD-MCNC: 272 MG/DL (ref 70–108)
GLUCOSE BLD-MCNC: 78 MG/DL (ref 70–108)
GLUCOSE BLD-MCNC: 99 MG/DL (ref 70–108)
GRAM STAIN RESULT: ABNORMAL
INR BLD: 2.16 (ref 0.85–1.13)
ORGANISM: ABNORMAL
ORGANISM: ABNORMAL
POTASSIUM SERPL-SCNC: 2.7 MEQ/L (ref 3.5–5.2)
POTASSIUM SERPL-SCNC: 3.2 MEQ/L (ref 3.5–5.2)
SODIUM BLD-SCNC: 149 MEQ/L (ref 135–145)
TOTAL PROTEIN: 4.5 G/DL (ref 6.1–8)

## 2022-01-18 PROCEDURE — 92612 ENDOSCOPY SWALLOW (FEES) VID: CPT

## 2022-01-18 PROCEDURE — 6370000000 HC RX 637 (ALT 250 FOR IP): Performed by: STUDENT IN AN ORGANIZED HEALTH CARE EDUCATION/TRAINING PROGRAM

## 2022-01-18 PROCEDURE — 80053 COMPREHEN METABOLIC PANEL: CPT

## 2022-01-18 PROCEDURE — 92526 ORAL FUNCTION THERAPY: CPT

## 2022-01-18 PROCEDURE — 6360000002 HC RX W HCPCS: Performed by: STUDENT IN AN ORGANIZED HEALTH CARE EDUCATION/TRAINING PROGRAM

## 2022-01-18 PROCEDURE — 85610 PROTHROMBIN TIME: CPT

## 2022-01-18 PROCEDURE — 6360000002 HC RX W HCPCS: Performed by: PHARMACIST

## 2022-01-18 PROCEDURE — 84132 ASSAY OF SERUM POTASSIUM: CPT

## 2022-01-18 PROCEDURE — 6370000000 HC RX 637 (ALT 250 FOR IP): Performed by: NURSE PRACTITIONER

## 2022-01-18 PROCEDURE — 2580000003 HC RX 258: Performed by: STUDENT IN AN ORGANIZED HEALTH CARE EDUCATION/TRAINING PROGRAM

## 2022-01-18 PROCEDURE — 2500000003 HC RX 250 WO HCPCS: Performed by: STUDENT IN AN ORGANIZED HEALTH CARE EDUCATION/TRAINING PROGRAM

## 2022-01-18 PROCEDURE — 2060000000 HC ICU INTERMEDIATE R&B

## 2022-01-18 PROCEDURE — 82248 BILIRUBIN DIRECT: CPT

## 2022-01-18 PROCEDURE — 2580000003 HC RX 258: Performed by: SURGERY

## 2022-01-18 PROCEDURE — 2580000003 HC RX 258: Performed by: NURSE PRACTITIONER

## 2022-01-18 PROCEDURE — 6360000002 HC RX W HCPCS: Performed by: SURGERY

## 2022-01-18 PROCEDURE — 2580000003 HC RX 258: Performed by: PHARMACIST

## 2022-01-18 PROCEDURE — 90792 PSYCH DIAG EVAL W/MED SRVCS: CPT | Performed by: PSYCHIATRY & NEUROLOGY

## 2022-01-18 PROCEDURE — 99024 POSTOP FOLLOW-UP VISIT: CPT | Performed by: SURGERY

## 2022-01-18 PROCEDURE — 82948 REAGENT STRIP/BLOOD GLUCOSE: CPT

## 2022-01-18 RX ORDER — PHYTONADIONE 10 MG/ML
10 INJECTION, EMULSION INTRAMUSCULAR; INTRAVENOUS; SUBCUTANEOUS ONCE
Status: DISCONTINUED | OUTPATIENT
Start: 2022-01-18 | End: 2022-01-18

## 2022-01-18 RX ADMIN — FAMOTIDINE 20 MG: 20 TABLET ORAL at 08:11

## 2022-01-18 RX ADMIN — FENTANYL CITRATE 50 MCG: 0.05 INJECTION, SOLUTION INTRAMUSCULAR; INTRAVENOUS at 20:24

## 2022-01-18 RX ADMIN — CLINDAMYCIN PHOSPHATE 600 MG: 600 INJECTION, SOLUTION INTRAVENOUS at 06:26

## 2022-01-18 RX ADMIN — SODIUM CHLORIDE, PRESERVATIVE FREE 10 ML: 5 INJECTION INTRAVENOUS at 08:11

## 2022-01-18 RX ADMIN — INSULIN LISPRO 3 UNITS: 100 INJECTION, SOLUTION INTRAVENOUS; SUBCUTANEOUS at 13:48

## 2022-01-18 RX ADMIN — FAMOTIDINE 20 MG: 20 TABLET ORAL at 22:45

## 2022-01-18 RX ADMIN — TAMSULOSIN HYDROCHLORIDE 0.4 MG: 0.4 CAPSULE ORAL at 10:08

## 2022-01-18 RX ADMIN — DAKIN'S SOLUTION 0.125% (QUARTER STRENGTH): 0.12 SOLUTION at 08:11

## 2022-01-18 RX ADMIN — FENTANYL CITRATE 50 MCG: 0.05 INJECTION, SOLUTION INTRAMUSCULAR; INTRAVENOUS at 21:46

## 2022-01-18 RX ADMIN — PIPERACILLIN AND TAZOBACTAM 3375 MG: 3; .375 INJECTION, POWDER, LYOPHILIZED, FOR SOLUTION INTRAVENOUS at 23:46

## 2022-01-18 RX ADMIN — FENTANYL CITRATE 50 MCG: 0.05 INJECTION, SOLUTION INTRAMUSCULAR; INTRAVENOUS at 05:29

## 2022-01-18 RX ADMIN — INSULIN LISPRO 9 UNITS: 100 INJECTION, SOLUTION INTRAVENOUS; SUBCUTANEOUS at 05:35

## 2022-01-18 RX ADMIN — CLINDAMYCIN PHOSPHATE 600 MG: 600 INJECTION, SOLUTION INTRAVENOUS at 13:52

## 2022-01-18 RX ADMIN — SODIUM CHLORIDE, PRESERVATIVE FREE 10 ML: 5 INJECTION INTRAVENOUS at 20:26

## 2022-01-18 RX ADMIN — PIPERACILLIN AND TAZOBACTAM 3375 MG: 3; .375 INJECTION, POWDER, LYOPHILIZED, FOR SOLUTION INTRAVENOUS at 06:26

## 2022-01-18 RX ADMIN — CLINDAMYCIN PHOSPHATE 600 MG: 600 INJECTION, SOLUTION INTRAVENOUS at 22:48

## 2022-01-18 RX ADMIN — PHYTONADIONE 10 MG: 10 INJECTION, EMULSION INTRAMUSCULAR; INTRAVENOUS; SUBCUTANEOUS at 18:56

## 2022-01-18 RX ADMIN — DEXTROSE MONOHYDRATE: 50 INJECTION, SOLUTION INTRAVENOUS at 06:32

## 2022-01-18 RX ADMIN — PIPERACILLIN AND TAZOBACTAM 3375 MG: 3; .375 INJECTION, POWDER, LYOPHILIZED, FOR SOLUTION INTRAVENOUS at 15:11

## 2022-01-18 ASSESSMENT — PAIN DESCRIPTION - PAIN TYPE
TYPE: ACUTE PAIN
TYPE: ACUTE PAIN

## 2022-01-18 ASSESSMENT — ENCOUNTER SYMPTOMS
COLOR CHANGE: 0
PHOTOPHOBIA: 0
DIARRHEA: 0
BACK PAIN: 0
ABDOMINAL PAIN: 0
CHEST TIGHTNESS: 0
SORE THROAT: 0
TROUBLE SWALLOWING: 0
SHORTNESS OF BREATH: 0
WHEEZING: 0
VOMITING: 0

## 2022-01-18 ASSESSMENT — PAIN SCALES - GENERAL
PAINLEVEL_OUTOF10: 0
PAINLEVEL_OUTOF10: 0
PAINLEVEL_OUTOF10: 10
PAINLEVEL_OUTOF10: 9
PAINLEVEL_OUTOF10: 0
PAINLEVEL_OUTOF10: 8
PAINLEVEL_OUTOF10: 7

## 2022-01-18 NOTE — PROGRESS NOTES
[address] [employer] THERAPY MISSED TREATMENT NOTE  [facility] [email or web address]] NEUROSCIENCES 4A  4A-05/005-A      Date: 2022  Patient Name: Myrna Cowden        CSN: [id number]   : 1955  (77 y.o.)  Gender: male                REASON FOR MISSED TREATMENT: Pt declined therapy this date reporting max pain in buttock. Pt reported that he is going for colostomy tomorrow and would like to hold therapy until after surgery. Pt very pleasant and tearful at times due to situation. Encouragement provided. Will check back as time allows.

## 2022-01-18 NOTE — PROGRESS NOTES
[facility] -  Ponce Mckinney MD M.D. FACS  Daily Progress Note    Pt Name: Arun Dunn  Medical Record Number: [id number]  Date of Birth 1955   Today's Date: 1/18/2022    ASSESSMENT:     1. POD # 4  2. HD # 4  Active Hospital Problems    Diagnosis Date Noted    Shock [facility]) [R57.9]     Necrotizing soft tissue infection [M79.89] 01/14/2022    Pressure injury of sacral region, stage 4 ([address] [L89.154] 01/14/2022    Sepsis ([facility] [address] [A41.9] 01/14/2022    Septic shock (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 Infectious disease recommending diverting loop colostomy  2. I discussed this with the patient this morning and he is agreeable and I think this is a good decision  3. Will need to check his coags as they still are not normal as of his last measurement  4. I will get him on the surgery schedule for diverting loop colostomy      SUBJECTIVE:     Marley Williamson is an ICU much more awake and conversant although he does not have much memory of his stay in the ED nor our discussion prior to surgical intervention. I had a discussion with him regarding consideration of a diverting loop colostomy. He said he had already had a discussion with somebody does not member who probably Dr. Stu Marin but we discussed the reasoning and the indications what the expectations were and the fact that it is is temporary or permanent as it needs to be until week that these wounds healed. He is agreeable to proceed.       OBJECTIVE:     Patient Vitals for the past 24 hrs:   BP Temp Temp src Pulse Resp SpO2   01/18/22 0700 (!) 132/59 -- -- 55 15 97 %   01/18/22 0600 (!) 138/48 -- -- 56 19 94 %   01/18/22 0500 (!) 138/44 -- -- 54 20 95 %   01/18/22 0400 (!) 142/48 98 °F (36.7 °C) Oral 63 15 94 %   01/18/22 0300 (!) 141/44 -- -- 61 26 90 %   01/18/22 0200 (!) 138/46 -- -- 56 23 94 %   01/18/22 0100 (!) 131/43 -- -- 59 22 93 %   01/18/22 0000 (!) 132/56 97.9 °F (36.6 °C) Oral 58 19 95 %   01/17/22 2300 (!) 137/55 -- -- 58 17 96 %   01/17/22 2200 (!) 124/44 -- -- 59 19 94 %   01/17/22 2100 (!) 121/45 -- -- 60 18 96 %   01/17/22 2000 (!) 133/49 -- Oral 64 17 98 %   01/17/22 1900 (!) 147/64 -- -- 64 13 96 %   01/17/22 1800 139/64 -- -- 68 22 93 %   01/17/22 1700 (!) 153/70 -- -- 74 14 98 %   01/17/22 1600 136/62 98.1 °F (36.7 °C) Oral 64 20 94 %   01/17/22 1500 (!) 120/52 -- -- 64 15 (!) 73 %   01/17/22 1400 (!) 121/59 -- -- 65 13 93 %   01/17/22 1300 (!) 136/57 -- -- 69 21 97 %   01/17/22 1200 (!) 140/64 98 °F (36.7 °C) Oral 74 20 96 %   01/17/22 1100 (!) 139/90 -- -- 81 22 (!) 88 %   01/17/22 1000 136/64 -- -- 75 15 (!) 88 %   01/17/22 0930 131/63 -- -- 75 18 96 %   01/17/22 0900 (!) 141/66 -- -- 68 17 95 %   01/17/22 0830 (!) 111/45 -- -- 64 17 99 %         Intake/Output Summary (Last 24 hours) at 1/18/2022 0802  Last data filed at 1/18/2022 4028  Gross per 24 hour   Intake 2630.74 ml   Output 2125 ml   Net 505.74 ml       In: 2630.7 [I.V.:2297.2]  Out: 2125 [Urine:2125]    I/O last 3 completed shifts: In: 2969.1 [I.V.:2558.1; IV Piggyback:411]  Out: 2125 [Urine:2125]     Date 01/18/22 0000 - 01/18/22 2359   Shift 6528-3600 0463-7476 7673-9740 24 Hour Total   INTAKE   I.V.(mL/kg) 1358. 5(15.7)   1358. 5(15.7)   IV Piggyback(mL/kg) 153.2(1.8)   153.2(1.8)   Shift Total(mL/kg) 1511.8(17.5)   1511.8(17.5)   OUTPUT   Urine(mL/kg/hr) 525(0.8)   525   Shift Total(mL/kg) 525(6.1)   525(6.1)   Weight (kg) 86.6 86.6 86.6 86.6       Wt Readings from Last 3 Encounters:   01/15/22 190 lb 14.7 oz (86.6 kg)   12/20/21 195 lb 8 oz (88.7 kg)   01/02/18 223 lb 11.2 oz (101.5 kg)        Body mass index is 30.81 kg/m². Diet: Diet NPO Exceptions are: Other (Specify);  Specify Other Exceptions: PO medications 01/17/22  0450 01/18/22  0525   *   < > 152* 152* 149*   K 3.9   < > 3.7 3.7 2.7*   *   < > 122* 121* 122*   CO2 20*   < > 22* 23 20*   BUN 74*   < > 57* 47* 28*   CREATININE 1.4*   < > 1.2 1.2 0.7   GLUCOSE 108   < > 109* 119* 133*   CALCIUM 7.4*   < > 7.4* 7.6* 6.5*   PROT 4.3*  --   --  4.6* 4.5*   LABALBU 2.0*  --   --  1.9* 1.6*   BILITOT 0.5  --   --  0.4 0.3   ALKPHOS 101  --   --  100 77   AST 21  --   --  25 17   ALT 49  --   --  44 33    < > = values in this interval not displayed. Troponin: No results for input(s): TROPONINI in the last 72 hours. Calcium:   Lab Results   Component Value Date    CALCIUM 6.5 01/18/2022    CALCIUM 7.6 01/17/2022    CALCIUM 7.4 01/16/2022      Ionized Calcium: No results found for: IONCA   Lipids: No results for input(s): CHOL, HDL in the last 72 hours. Invalid input(s): LDLCALCU  INR:   Recent Labs     01/15/22  2200 01/16/22  0910   INR 2.92* 2.37*     Lactic Acid: No results found for: LACTA               DVT prophylaxis: [] Lovenox                                 [] SCDs                                 [] SQ Heparin                                 [] Encourage ambulation, low risk for DVT, no chemical or mechanical prophylaxis necessary              [] Already on Anticoagulation      RADIOLOGY:      CT ABDOMEN PELVIS WO CONTRAST Additional Contrast? None    Result Date: 1/14/2022   1. Decubitus ulcer overlying the sacrum new since previous CT scan of the pelvis dated 7 December 2021. 2. Air   in the perineum, new since previous study dated 7 December 2021. 3. Dale catheter in place. Air in the urinary bladder. 4. Changes of ankylosing spondylitis involving the lower thoracic, lumbar spine and sacroiliac iliac joints. Deformity at the thoracolumbar junction. 5. Calcification along the gallbladder wall. 6. Bilateral inguinal lymph nodes. 7. Areas of atelectasis or scarring at both lung bases.  **This report has been created using voice recognition software. It may contain minor errors which are inherent in voice recognition technology. ** Final report electronically signed by DR Daria Skaggs on 1/14/2022 6:49 PM    XR CHEST PORTABLE    Result Date: 1/15/2022  1. Right IJ central line tip in the right atrium. This can be retracted by 3 cm. 2. Bilateral patchy interstitial opacities which may reflect infiltrate. 3. Borderline cardiomegaly. This document has been electronically signed by: Tran Ayala MD on 01/15/2022 12:24 AM    XR CHEST PORTABLE    Result Date: 1/14/2022  1. Interval improvement since previous plain radiographs dated 11 December 2021 with reduction in the bilateral upper lobe infiltrates. . 2. Borderline cardiomegaly. 3. Thoracic spondylosis. **This report has been created using voice recognition software. It may contain minor errors which are inherent in voice recognition technology. ** Final report electronically signed by DR Daria Skaggs on 1/14/2022 5:31 PM    VL DUP LOWER EXTREMITY VENOUS LEFT    Result Date: 1/8/2022  Acute thrombus in the peroneal and anterior tibial veins. Final report electronically signed by Dr. Tomás Latif on 1/8/2022 3:22 PM        Jo-Ann Boss MD, M.D.  FACS  Electronically signed 1/18/2022 at 8:02 AM

## 2022-01-18 NOTE — PROGRESS NOTES
[address] [address]  Fiberoptic Endoscopic Evaluation of Swallowing & Dysphagia tx   [facility] NEUROSCIENCES 4A      SLP Individual Minutes  Time In: 3684  Time Out: 9359  Minutes: 48  Timed Code Treatment Minutes: 0 Minutes    FEES: 33 minutes  Dysphagia tx: 15 minutes       Date: 2022  Patient Name: Sowmya Sheth       CSN: [id number]   : 1955  (77 y.o.)  Gender: male   Referring Physician:  Dr. Afshan Correa   Diagnosis: Sepsis   Secondary Diagnosis:  Dysphagia   Date of Last MBS/FEES:  N/a   Diet: NPO; exception PO medications crushed in puree   History of Present Illness/Injury: Patient admitted to [facility] with above dx. Patient admitted to [facility] with the above diagnosis. Per chart review and patient report; patient on a liquid diet only at home prior to hospital visit. Patient also reports \"100lb weight loss within the last 5 weeks. \" CSE completed  recommending NPO + completion of FEES to further evaluate pharyngeal function of the swallow.      Please see medical history questionnaire for information related to prior medical history, allergies and medications    Reason for Study: Rule out pharyngeal dysfunction  Prior Swallowing Evaluation/Results: CSE completed  recommending NPO with exception of PO medications crushed in puree  Pain:  0/10    Current Diet: NPO    Respiratory Status: Independent    Behavioral Observation:alert, oriented, cooperative and compliant    Cognition: Exam not limited by cognition    ORAL MECHANISM EXAM:  Facial Symmetry WFL    Labial/Facial WFL    Lingual WFL    Oral Mucosa WFL    Mandibular Movement WFL    Sensation WFL    Cough/Reflexes WFL      FEES PROCEDURE DETAILS:  Procedure performed by: Lorri Whitley M.S. CCC-SLP [id number]  Feeder/Assistant: Mariama Webb Student Intern   Scope/Serial Number: Chip Tip Scope - Red #[id number]  Topical Anesthetic Used: No  Patient Positioning: Bed    Procedure explained and education provided to patient including purpose, benefits and risks of testing. Understanding and agreement with testing was verbalized. A flexible fiber-optic nasoendoscope was passed transnasally to view the nasopharynx, oropharynx, hypopharynx, larynx through the right nares without difficuty. Patient tolerance of procedure: No complications or complaints observed or reported    ANATOMIC/PHYSIOLIGIC ASSESSMENT:  Nasal Cavity [facility]    Velopharyngeal Port [facility]    Base of Tongue [facility]    Lingual Tonsils WFL    Vocal Fold Margin - Right WFL    Vocal Fold Margin - Left WFL    Abductory/Adductory Movement [facility]    Ventricular Folds WFL    Pharyngeal Contraction for Pitch Glide Impaired    Epiglottis   WFL    Valleculae WFL    Secretions - Appearance WFL    Secretions - Location    WFL    Pyriform Sinus   WFL    Glottal Closure   WFL    Arytenoids WFL    Posterior Commisure WFL      PATIENT WAS EVALUATED USING: Ice Chips, Thin, Puree, Soft and Solid    SWALLOW OBSERVATION: Delayed oral transit, Premature spillage, Residue - Vallecular, Residue - Pyriform, Residue - Base of tongue and Residue - Posterior pharyngeal wall    LARYNGEAL PENETRATION/ASPIRATION: No evidence of aspiration and Laryngeal penetration evident during and after swallow    PHARYNGEAL PHASE TALHA SCORE (dysphagia outcome and severity score)  3 = Moderate Dysphagia - Two or more diet consistencies restricted - May exhibit one or more of the following:  Moderate residue clears with cue, Airway penetration to the level of the vocal folds wihtout cough with tow or more consistencies, Aspiration with two consistencies with weak or no reflexive cough, Aspiration of one consistency, no cough and airway penetration with one consistency, no cough    PENETRATION-ASPIRATION SCALE (PAS)   Ice Chips: 1 = Material does not enter the airway  Thin Liquids: 2 = Material enters the airway, remains above vocal folds, and is ejected from the airway  Puree:  2 = Material enters the airway, remains above vocal folds, and is ejected from the airway  Soft Solid:  2 = Material enters the airway, remains above vocal folds, and is ejected from the airway  Hard Solid: 1 = Material does not enter the airway *HOWEVER, patient with NO effort to swallow d/t inability to appropriately masticate. Patient with ejection of material out of oral cavity d/t report \"that is not going to work, too hard. \"     ESOPHAGEAL PHASE: No significant findings     ATTEMPTED TECHNIQUES:  Small Bolus Size Effective    Straw Effective    Cup Not Attempted    Chin Tuck Not Attempted    Head Turn Not Attempted    Spoon Presentations Not Attempted    Volitional Cough Effective    Spontaneous Cough Effective           DIAGNOSTIC IMPRESSIONS:  Patient presents with moderate oral dysphagia and moderate pharyngeal dysphagia as evidence by the findings outlined above. Patient seen at semi-fowlers positioning in bed; very pleasant and cooperative. A flexible fiber-optic nasoendoscope was passed transnasally to view the nasopharynx, oropharynx, hypopharynx, larynx through the right nares without difficuty. Patient consumed PO trials demonstrating with premature spillage, airway entry on the underside of the epiglottis; however, all material consistently cleared to the esophagus. No aspiration noted throughout this study. Patient did demonstrate with reduced pharyngeal constriction as evidence by mild-moderate residue of all material diffusely spread throughout the pharyngeal cavity post swallow; residue did clear to trace-min level utilizing compensatory strategies of double swallow and alternating solids/liquids. Soft and hard solid trials attempted; patient with ineffective oral mastication with limited-0 textural breakdown and bolus formation resulting in swallowing of whole peach + expectoration of hard solid cracker. No overt oral difficulty noted with puree textures.  Patient did demonstate with intermittent cough throughout study; did NOT

## 2022-01-18 NOTE — CARE COORDINATION
**This is a psychiatric care coordination note. This is not to be used for billing purposes. **    Department of Psychiatry  Consult Service   Psychiatric Assessment      Thank you very much for allowing us to participate in the care of this patient. Reason for Consult:  Depression    HISTORY OF PRESENT ILLNESS:          The patient is a 77 y.o. male with a history of LLE DVT on Eliquis, IDDM, primary hypertension, urinary retention. who is admitted medically for necrotizing fasciitis and debridement of a sacral decubitus ulcer. Psychiatry was consulted for depression    Patient reports he was initially depressed upon admission due his multiple medical issues and poor support at the time. He says they did not know if he was going to make it but he was ready to go at the time. He says he is thankful for the surgeons and nurses that have been taking good care of him. He mentions he does not have a lot of family members but he has been having friends and a  visit him. He says he has been through a lot but has been trying to keep his spirits up. He mentions he is strong willed and a strong believer in [employer].  He reports his depression has improved since admission. He says \"I am good now. \"  He denies any recent suicidal ideation. His joe is his protective factor. He reports he was not feeling depressed prior to admission. He has been sleeping and eating well at home. Reports he has been sleeping good in the hospital due to the pain medication they have been giving him. He has been eating okay but reports he has some trouble swallowing which they are going to do a endoscopy to evaluate. His energy and motivation have been okay. Denies any issues with attention and concentration. Denies feeling worthless, hopeless and helpless at this time.     PSYCHIATRIC HISTORY:  Denies any past psychiatric history  · Outpatient psychiatric provider: No one currently  · Suicide attempts: Denies  · Inpatient psychiatric admissions: None    Past psychiatric medications includes:     None  Adverse reactions from psychotropic medications:    No      Lifetime Psychiatric Review of Systems      ·    Obsessions and Compulsions: Denies    ·    Ida or Hypomania: Denies  ·    Hallucinations: Denies  ·    Panic Attacks:  Denies  ·    Delusions:  Denies  ·    Phobias:  Denies  ·    Trauma: Denies    Prior to Admission medications    Medication Sig Start Date End Date Taking?  Authorizing Provider   metoprolol succinate (TOPROL XL) 50 MG extended release tablet Take 1 tablet by mouth daily 12/21/21   Chano Sharp MD   doxazosin (CARDURA) 4 MG tablet Take 1 tablet by mouth daily 12/21/21   Chano Sharp MD   famotidine (PEPCID) 20 MG tablet Take 1 tablet by mouth 2 times daily 12/20/21   Chano Sharp MD   insulin glargine (LANTUS) 100 UNIT/ML injection vial Inject 25 Units into the skin nightly 12/20/21   Chano Sharp MD   hydrALAZINE (APRESOLINE) 100 MG tablet Take 1 tablet by mouth every 8 hours 12/15/21   Saima Celeste MD   losartan (COZAAR) 100 MG tablet Take 1 tablet by mouth daily 12/16/21   Saima Celeste MD   hydroCHLOROthiazide (HYDRODIURIL) 50 MG tablet Take 1 tablet by mouth daily 12/16/21   Saima Celeste MD   simethicone (MYLICON) 80 MG chewable tablet Take 1 tablet by mouth every 6 hours as needed for Flatulence (abdominal cramping) 12/15/21   Saima Celeste MD   ascorbic acid (VITAMIN C) 500 MG tablet Take 1 tablet by mouth daily 12/16/21   Saima Celeste MD   Vitamin D (CHOLECALCIFEROL) 50 MCG (2000 UT) TABS tablet Take 1 tablet by mouth daily 12/16/21   Saima Celeste MD   insulin lispro (HUMALOG) 100 UNIT/ML injection vial Inject 0-12 Units into the skin 3 times daily (with meals) 12/15/21   Saima Celeste MD   insulin lispro (HUMALOG) 100 UNIT/ML injection vial Inject 0-6 Units into the skin nightly 12/15/21   Saima Celeste MD   insulin lispro (HUMALOG) 100 UNIT/ML injection vial Inject 7 Units into the skin 3 times daily (with meals) 12/15/21   Alexa Easley MD   zinc sulfate (ZINCATE) 220 (50 Zn) MG capsule Take 1 capsule by mouth daily 12/16/21   Alexa Easley MD   tamsulosin [facility]) 0.4 MG capsule Take 1 capsule by mouth daily 12/10/21   DIONTE Gibson - CNP   amLODIPine (NORVASC) 10 MG tablet Take 1 tablet by mouth daily 11/24/21   DIONTE Arceo - CNP   aspirin 81 MG tablet Take 81 mg by mouth daily.     Historical Provider, MD        Medications:    Current Facility-Administered Medications: famotidine (PEPCID) tablet 20 mg, 20 mg, Oral, BID  sodium hypochlorite (DAKINS) 0.125 % external solution, , Irrigation, Daily  0.9 % sodium chloride infusion, , IntraVENous, PRN  dextrose 5 % solution, , IntraVENous, Continuous  [Held by provider] amLODIPine (NORVASC) tablet 10 mg, 10 mg, Oral, Daily  [Held by provider] apixaban (ELIQUIS) tablet 10 mg, 10 mg, Oral, BID  [Held by provider] aspirin EC tablet 81 mg, 81 mg, Oral, Daily  [Held by provider] hydrALAZINE (APRESOLINE) tablet 100 mg, 100 mg, Oral, Q8H  [Held by provider] hydroCHLOROthiazide (HYDRODIURIL) tablet 50 mg, 50 mg, Oral, Daily  [Held by provider] losartan (COZAAR) tablet 100 mg, 100 mg, Oral, Daily  [Held by provider] metoprolol succinate (TOPROL XL) extended release tablet 50 mg, 50 mg, Oral, Daily  sodium chloride flush 0.9 % injection 5-40 mL, 5-40 mL, IntraVENous, 2 times per day  sodium chloride flush 0.9 % injection 5-40 mL, 5-40 mL, IntraVENous, PRN  0.9 % sodium chloride infusion, 25 mL, IntraVENous, PRN  ondansetron (ZOFRAN-ODT) disintegrating tablet 4 mg, 4 mg, Oral, Q8H PRN **OR** ondansetron (ZOFRAN) injection 4 mg, 4 mg, IntraVENous, Q6H PRN  glucose (GLUTOSE) 40 % oral gel 15 g, 15 g, Oral, PRN  dextrose 50 % IV solution, 12.5 g, IntraVENous, PRN  glucagon (rDNA) injection 1 mg, 1 mg, IntraMUSCular, PRN  dextrose 5 % solution, 100 mL/hr, IntraVENous, PRN  HYDROcodone-acetaminophen (NORCO) 5-325 MG per tablet 1 tablet, 1 tablet, Oral, Q4H PRN  0.9 % sodium chloride infusion, , IntraVENous, PRN  fentaNYL (SUBLIMAZE) injection 50 mcg, 50 mcg, IntraVENous, Q1H PRN  insulin lispro (HUMALOG) injection vial 0-18 Units, 0-18 Units, SubCUTAneous, Q4H  0.9 % sodium chloride infusion, , IntraVENous, PRN  calcium replacement protocol, , Other, RX Placeholder  piperacillin-tazobactam (ZOSYN) 3,375 mg in dextrose 5 % 50 mL IVPB extended infusion (mini-bag), 3,375 mg, IntraVENous, Q8H  tamsulosin (FLOMAX) capsule 0.4 mg, 0.4 mg, Oral, Daily  clindamycin (CLEOCIN) 600 mg in dextrose 5 % 50 mL IVPB, 600 mg, IntraVENous, Q8H     Past Medical History:        Diagnosis Date    Diabetes mellitus ([facility] [address]     Hyperlipidemia     Hypertension        Past Surgical History:        Procedure Laterality Date    ENDOSCOPY, COLON, DIAGNOSTIC      swallowing difficulties    OTHER SURGICAL HISTORY  Aug 29, 2013    Neck Abcess Incision and Drainage (Dr. Corina Lorenz, [facility])    RECTAL SURGERY N/A 2022    FULL THICKNESS DEBRIDEMENT OF SACRAL DECUBITUS ULCER, [id number] CM performed by Clarice Moreno MD at [address] [facility]: Patient has no known allergies. Social History:      RESIDENCE: Patient was born and raised in [place]. He currently lives in [place] at [address] [place] [address]: 2 marriages. His wife  during his first marriage. Second marriage ended in divorce    CHILDREN: 1 son who is 28years old and lives in [address] [place] [address] Road: Patient works part-time at [employer] in [employer] [facility]. Prior to that, he drove truck for 30 years  EDUCATION: Graduated high school  Patient is a  of the [employer]. He served 6 years in the [employer].     SUBSTANCE USE HISTORY: Denies any alcohol or illicit drug use        Family Medical and Psychiatric History:     Denies any significant family psychiatric history        Problem Relation Age of Onset    Heart Disease Father    [facility] Other Mother alzheimers         Physical  BP (!) 138/48   Pulse 56   Temp 98 °F (36.7 °C) (Oral)   Resp 19   Wt 190 lb 14.7 oz (86.6 kg)   SpO2 94%   BMI 30.81 kg/m²         Mental Status Examination:  Level of consciousness:  Within normal limits  Appearance: hospital attire, lying in bed, fair grooming  Behavior/Motor:  no abnormalities noted  Attitude toward examiner:  cooperative, attentive and good eye contact  Speech:  Spontaneous, normal rate and volume  Mood: Euthymic  Affect: Reactive  Thought processes:  Linear, goal directed, coherent  Thought content: Denies suicidal ideations   Denies homicidal ideations    Denies hallucinations   Denies delusions  Cognition:  Oriented to self, situation, location, date  Concentration clinically adequate  Memory age appropriate  Insight & Judgment:  fair    DSM-5 DIAGNOSIS:      Adjustment disorder    General Medical Condition  Patient Active Problem List   Diagnosis Code    Cellulitis and abscess of neck L03.221, L02.11    Essential hypertension I10    Leukocytosis D72.829    Obesity (BMI 30-39. 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

## 2022-01-18 NOTE — CARE COORDINATION
1/18/22, 11:41 AM EST    DISCHARGE BARRIERS        Patient transferred to  5. Report given to unit SW, Maddie SANZ, regarding discharge plan for this patient.

## 2022-01-18 NOTE — FLOWSHEET NOTE
01/18/22 1058   Encounter Summary   Services provided to: Patient   Referral/Consult From: Sumi   Continue Visiting Yes  (1/18/22)   Complexity of Encounter Moderate   Length of Encounter 15 minutes   Spiritual Assessment Completed Yes   Spiritual/Sikhism   Type Spiritual support   Assessment Calm; Approachable   Intervention Active listening;Nurtured hope   Outcome Comfort;Expressed gratitude   During my follow up contact with the patient (per his request) he stated that he was thankful for the spiritual support yesterday because he was emotionally distressed. He shared that he might have a colostomy bag but he is not fearful. He expounded upon his joe in God. I offered a prayer of strength and hope to the pt. He stated that he was very appreciative. Plan: Continued support per request of pt.

## 2022-01-18 NOTE — CARE COORDINATION
1/18/22, 11:52 AM EST    DISCHARGE ON GOING EVALUATION    Rola Andrade day: 4  Location: -05/005-A Reason for admit: Sepsis [facility]) [A41.9]  Septic shock ([facility] [address] [A41.9, R65.21]   Procedure:   1/14 CT Abd/pelvis:   1. Decubitus ulcer overlying the sacrum new since previous CT scan of the pelvis dated 7 December 2021.   2. Air   in the perineum, new since previous study dated 7 December 2021.   3. Randolph catheter in place. Air in the urinary bladder. 4. Changes of ankylosing spondylitis involving the lower thoracic, lumbar spine and sacroiliac iliac joints. Deformity at the thoracolumbar junction. 5. Calcification along the gallbladder wall. 6. Bilateral inguinal lymph nodes. 7. Areas of atelectasis or scarring at both lung bases      1/14 Excisional debridement and drainage of perirectal horseshoe abscess of deep ischiorectal space total tissue removed 6 x 6 x 4 cm; Excisional debridement of skin subcutaneous tissue muscle and fascia of stage IV sacral decubitus measuring 15 x 12 x 6 cm; Tissue sent for tissue culture as well as pathology  1/15 CVC RIJ    Barriers to Discharge:  Psychiatry saw today; patient denied any need for psychiatric intervention at this time. Plan for diverting colostomy in OR tomorrow morning. FEES today. Afebrile. Afib 60's. On room air. Ox4. Follows commands. Open drain x2 to bilateral buttocks. Dietitian consulted. SLP/PT/OT. Hospitalist, General Surgery, and ID following. Dakins dressing change BID. Surgical culture +pseudomonas aeruginosa. Telemetry, CVC, wound care, drain care, randolph. D5 @ 100 ml/hr, IV clindamycin, pepcid, prn IV fentanyl, SSI, IV zosyn, dakins bid, flomax, Electrolyte replacement protocols. Na+ 149, K+ 3.2, alb 1.6. PCP: DIONTE Bowers CNP  Readmission Risk Score: 23.8 ( )%  Patient Goals/Plan/Treatment Preferences: From [facility]; refuses to return. Plan for new SNF at discharge. SW on case.      Pt transferred to 4A18. Handoff report given to Kp Hayes CM.

## 2022-01-18 NOTE — CONSULTS
Department of Psychiatry  Consult Service   Psychiatric Assessment        Thank you very much for allowing us to participate in the care of this patient.       Reason for Consult:  Depression     HISTORY OF PRESENT ILLNESS:           The patient is a 77 y.o. male with a history of LLE DVT on Eliquis, IDDM, primary hypertension, urinary retention.   who is admitted medically for necrotizing fasciitis and debridement of a sacral decubitus ulcer.      Psychiatry was consulted for depression    Patient reports he felt increasingly depressed when he initially presented to the hospital.  Identify stressors as dealing with multiple medical issues. Mentions that he felt like he had no support however once his friend started showing up and seeing him here he felt very confident. Identifies joe as a protective factor. Denies any suicidal or homicidal ideation plan or intent. Denies any overt feelings of sad down or depression today. Per care coordination note from Reggie Nielsen PA-C:  Patient reports he was initially depressed upon admission due his multiple medical issues and poor support at the time. He says they did not know if he was going to make it but he was ready to go at the time. He says he is thankful for the surgeons and nurses that have been taking good care of him. He mentions he does not have a lot of family members but he has been having friends and a  visit him. He says he has been through a lot but has been trying to keep his spirits up. He mentions he is strong willed and a strong believer in [employer].  He reports his depression has improved since admission. He says \"I am good now. \"  He denies any recent suicidal ideation. His joe is his protective factor. He reports he was not feeling depressed prior to admission. He has been sleeping and eating well at home. Reports he has been sleeping good in the hospital due to the pain medication they have been giving him.   He has been eating okay but reports he has some trouble swallowing which they are going to do a endoscopy to evaluate. His energy and motivation have been okay. Denies any issues with attention and concentration. Denies feeling worthless, hopeless and helpless at this time.     PSYCHIATRIC HISTORY:  Denies any past psychiatric history  · Outpatient psychiatric provider: No one currently  · Suicide attempts: Denies  · Inpatient psychiatric admissions: None     Past psychiatric medications includes:      None  Adverse reactions from psychotropic medications:     No        Lifetime Psychiatric Review of Systems       ·    Obsessions and Compulsions: Denies    ·    Ida or Hypomania: Denies  ·    Hallucinations: Denies  ·    Panic Attacks:  Denies  ·    Delusions:  Denies  ·    Phobias:  Denies  ·    Trauma: Denies     Home Medications           Prior to Admission medications    Medication Sig Start Date End Date Taking?  Authorizing Provider   metoprolol succinate (TOPROL XL) 50 MG extended release tablet Take 1 tablet by mouth daily 12/21/21     Lucy Zarate MD   doxazosin (CARDURA) 4 MG tablet Take 1 tablet by mouth daily 12/21/21     Lucy Zarate MD   famotidine (PEPCID) 20 MG tablet Take 1 tablet by mouth 2 times daily 12/20/21     Lucy Zarate MD   insulin glargine (LANTUS) 100 UNIT/ML injection vial Inject 25 Units into the skin nightly 12/20/21     Lucy Zarate MD   hydrALAZINE (APRESOLINE) 100 MG tablet Take 1 tablet by mouth every 8 hours 12/15/21     Thomas Garcia MD   losartan (COZAAR) 100 MG tablet Take 1 tablet by mouth daily 12/16/21     Thomas Garcia MD   hydroCHLOROthiazide (HYDRODIURIL) 50 MG tablet Take 1 tablet by mouth daily 12/16/21     Thomas Garcia MD   [facility]) 80 MG chewable tablet Take 1 tablet by mouth every 6 hours as needed for Flatulence (abdominal cramping) 12/15/21     Thomas Garcia MD   ascorbic acid (VITAMIN C) 500 MG tablet Take 1 tablet by mouth daily 12/16/21     Valeria Horvath MD   Vitamin D (CHOLECALCIFEROL) 50 MCG (2000 UT) TABS tablet Take 1 tablet by mouth daily 12/16/21     Valeria Horvath MD   insulin lispro (HUMALOG) 100 UNIT/ML injection vial Inject 0-12 Units into the skin 3 times daily (with meals) 12/15/21     Valeria Horvath MD   insulin lispro (HUMALOG) 100 UNIT/ML injection vial Inject 0-6 Units into the skin nightly 12/15/21     Valeria Horvath MD   insulin lispro (HUMALOG) 100 UNIT/ML injection vial Inject 7 Units into the skin 3 times daily (with meals) 12/15/21     Valeria Horvath MD   zinc sulfate (ZINCATE) 220 (50 Zn) MG capsule Take 1 capsule by mouth daily 12/16/21     Valeria Horvath MD   tamsulosin [facility]) 0.4 MG capsule Take 1 capsule by mouth daily 12/10/21     DIONTE Martin CNP   amLODIPine (NORVASC) 10 MG tablet Take 1 tablet by mouth daily 11/24/21     DIONTE Granados CNP   aspirin 81 MG tablet Take 81 mg by mouth daily.       Historical Provider, MD            Medications:      Current Hospital Medications   Current Facility-Administered Medications: famotidine (PEPCID) tablet 20 mg, 20 mg, Oral, BID  sodium hypochlorite (DAKINS) 0.125 % external solution, , Irrigation, Daily  0.9 % sodium chloride infusion, , IntraVENous, PRN  dextrose 5 % solution, , IntraVENous, Continuous  [Held by provider] amLODIPine (NORVASC) tablet 10 mg, 10 mg, Oral, Daily  [Held by provider] apixaban (ELIQUIS) tablet 10 mg, 10 mg, Oral, BID  [Held by provider] aspirin EC tablet 81 mg, 81 mg, Oral, Daily  [Held by provider] hydrALAZINE (APRESOLINE) tablet 100 mg, 100 mg, Oral, Q8H  [Held by provider] hydroCHLOROthiazide (HYDRODIURIL) tablet 50 mg, 50 mg, Oral, Daily  [Held by provider] losartan (COZAAR) tablet 100 mg, 100 mg, Oral, Daily  [Held by provider] metoprolol succinate (TOPROL XL) extended release tablet 50 mg, 50 mg, Oral, Daily  sodium chloride flush 0.9 % injection 5-40 mL, 5-40 mL, IntraVENous, 2 times per day  sodium chloride flush 0.9 % injection 5-40 mL, 5-40 mL, IntraVENous, PRN  0.9 % sodium chloride infusion, 25 mL, IntraVENous, PRN  ondansetron (ZOFRAN-ODT) disintegrating tablet 4 mg, 4 mg, Oral, Q8H PRN **OR** ondansetron (ZOFRAN) injection 4 mg, 4 mg, IntraVENous, Q6H PRN  glucose (GLUTOSE) 40 % oral gel 15 g, 15 g, Oral, PRN  dextrose 50 % IV solution, 12.5 g, IntraVENous, PRN  glucagon (rDNA) injection 1 mg, 1 mg, IntraMUSCular, PRN  dextrose 5 % solution, 100 mL/hr, IntraVENous, PRN  HYDROcodone-acetaminophen (NORCO) 5-325 MG per tablet 1 tablet, 1 tablet, Oral, Q4H PRN  0.9 % sodium chloride infusion, , IntraVENous, PRN  fentaNYL (SUBLIMAZE) injection 50 mcg, 50 mcg, IntraVENous, Q1H PRN  insulin lispro (HUMALOG) injection vial 0-18 Units, 0-18 Units, SubCUTAneous, Q4H  0.9 % sodium chloride infusion, , IntraVENous, PRN  calcium replacement protocol, , Other, RX Placeholder  piperacillin-tazobactam (ZOSYN) 3,375 mg in dextrose 5 % 50 mL IVPB extended infusion (mini-bag), 3,375 mg, IntraVENous, Q8H  tamsulosin (FLOMAX) capsule 0.4 mg, 0.4 mg, Oral, Daily  clindamycin (CLEOCIN) 600 mg in dextrose 5 % 50 mL IVPB, 600 mg, IntraVENous, Q8H         Past Medical History:    Past Medical History             Diagnosis Date    Diabetes mellitus ([facility] [address]      Hyperlipidemia      Hypertension              Past Surgical History:    Past Surgical History             Procedure Laterality Date    ENDOSCOPY, COLON, DIAGNOSTIC         swallowing difficulties    OTHER SURGICAL HISTORY   Aug 29, 2013     Neck Abcess Incision and Drainage ([facility]. El Bartlett, [facility])    RECTAL SURGERY N/A 2022     FULL THICKNESS DEBRIDEMENT OF SACRAL DECUBITUS ULCER, [id number] CM performed by Sarah Reyes MD at [address] [facility]: Patient has no known allergies.       Social History:       RESIDENCE: Patient was born and raised in [place]. He currently lives in [place] at [address] [place] [address] Street: 2 marriages.   His wife  during his first marriage. 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 Prior to that, he drove truck for 30 years  EDUCATION: Graduated high school  Patient is a  of the [employer]. He served 6 years in the [employer].     SUBSTANCE USE HISTORY: Denies any alcohol or illicit drug use           Family Medical and Psychiatric History:      Denies any significant family psychiatric history     Family History             Problem Relation Age of Onset    Heart Disease Father      Other Mother           alzheimers               Physical  BP (!) 138/48   Pulse 56   Temp 98 °F (36.7 °C) (Oral)   Resp 19   Wt 190 lb 14.7 oz (86.6 kg)   SpO2 94%   BMI 30.81 kg/m²            Mental Status Examination:  Level of consciousness:  Within normal limits  Appearance: hospital attire, lying in bed, fair grooming  Behavior/Motor:  no abnormalities noted  Attitude toward examiner:  cooperative, attentive and good eye contact  Speech:  Spontaneous, normal rate and volume  Mood: Euthymic  Affect: Reactive  Thought processes:  Linear, goal directed, coherent  Thought content: Denies suicidal ideations              Denies homicidal ideations               Denies hallucinations              Denies delusions  Cognition:  Oriented to self, situation, location, date  Concentration clinically adequate  Memory age appropriate  Insight & Judgment:  fair     DSM-5 DIAGNOSIS:       Adjustment disorder with depressed mood     General Medical Condition       Patient Active Problem List   Diagnosis Code    Cellulitis and abscess of neck L03.221, L02.11    Essential hypertension I10    Leukocytosis D72.829    Obesity (BMI 30-39. 9) E66.9    Hyperlipidemia E78.5    COVID-19 virus detected U07.1    COVID-19 U07.1    Acute respiratory failure with hypoxia (HCC) J96.01    Necrotizing soft tissue

## 2022-01-18 NOTE — PROGRESS NOTES
Progress note: Infectious diseases    Patient - Myrna Cowden,  Age - 77 y.o.    - 1955      Room Number - 4A-05/005-A   MRN -  [id number]   Acct # - [de-identified]  Date of Admission -  2022  4:05 PM    SUBJECTIVE:   No new issues, now in 4A  OBJECTIVE   VITALS    weight is 190 lb 14.7 oz (86.6 kg). His oral temperature is 98.3 °F (36.8 °C). His blood pressure is 150/72 (abnormal) and his pulse is 62. His respiration is 18 and oxygen saturation is 98%.        Wt Readings from Last 3 Encounters:   01/15/22 190 lb 14.7 oz (86.6 kg)   21 195 lb 8 oz (88.7 kg)   18 223 lb 11.2 oz (101.5 kg)       I/O (24 Hours)    Intake/Output Summary (Last 24 hours) at 2022 1147  Last data filed at 2022 3485  Gross per 24 hour   Intake 2630.74 ml   Output 2125 ml   Net 505.74 ml       General Appearance  Awake, alert, oriented,  not  In acute distress  HEENT - normocephalic, atraumatic, pale  conjunctiva,  anicteric sclera  Neck - Supple, no mass  Lungs -  Bilateral   air entry, no rhonchi, no wheeze  Cardiovascular - Heart sounds are normal.     Abdomen - soft, not distended, nontender,   Neurologic -oriented  Skin - No bruising or bleeding  Extremities - foam dressing to heel  He has full thickness open wound on the coccyx , penrose drain to the ischial area        MEDICATIONS:      famotidine  20 mg Oral BID    sodium hypochlorite   Irrigation Daily    [Held by provider] amLODIPine  10 mg Oral Daily    [Held by provider] apixaban  10 mg Oral BID    [Held by provider] aspirin  81 mg Oral Daily    [Held by provider] hydrALAZINE  100 mg Oral Q8H    [Held by provider] hydroCHLOROthiazide  50 mg Oral Daily    [Held by provider] losartan  100 mg Oral Daily    [Held by provider] metoprolol succinate  50 mg Oral Daily    sodium chloride flush  5-40 mL IntraVENous 2 times per day    insulin lispro  0-18 Units SubCUTAneous Q4H    calcium replacement protocol   Other RX Placeholder    piperacillin-tazobactam  3,375 mg IntraVENous Q8H    tamsulosin  0.4 mg Oral Daily    clindamycin (CLEOCIN) IV  600 mg IntraVENous Q8H      sodium chloride      dextrose 100 mL/hr at 01/18/22 4657    sodium chloride      dextrose      sodium chloride      sodium chloride       sodium chloride, sodium chloride flush, sodium chloride, ondansetron **OR** ondansetron, glucose, dextrose, glucagon (rDNA), dextrose, HYDROcodone 5 mg - acetaminophen, sodium chloride, fentanNYL, sodium chloride      LABS:     CBC:   Recent Labs     01/16/22  0910 01/16/22  2340 01/17/22  0450   WBC 14.4*  --  12.2*   HGB 8.0* 7.5* 7.8*     --  221     BMP:    Recent Labs     01/16/22  0910 01/16/22  0910 01/17/22  0450 01/18/22  0525 01/18/22  0745   *  --  152* 149*  --    K 3.7   < > 3.7 2.7* 3.2*   *  --  121* 122*  --    CO2 22*  --  23 20*  --    BUN 57*  --  47* 28*  --    CREATININE 1.2  --  1.2 0.7  --    GLUCOSE 109*  --  119* 133*  --     < > = values in this interval not displayed. Calcium:  Recent Labs     01/18/22  0525   CALCIUM 6.5*     Ionized Calcium:No results for input(s): IONCA in the last 72 hours. Magnesium:  Recent Labs     01/17/22  0450   MG 2.6*     Phosphorus:  Recent Labs     01/17/22  0450   PHOS 2.9     BNP:No results for input(s): BNP in the last 72 hours. Glucose:  Recent Labs     01/18/22  0009 01/18/22  0533 01/18/22  0818   POCGLU 121* 272* 78     HgbA1C:   No results for input(s): LABA1C in the last 72 hours.   INR:   Recent Labs     01/15/22  2200 01/16/22  0910 01/18/22  0815   INR 2.92* 2.37* 2.16*     Hepatic:   Recent Labs     01/15/22  1830 01/17/22  0450 01/18/22  0525   ALKPHOS 101 100 77   ALT 49 44 33   AST 21 25 17   PROT 4.3* 4.6* 4.5*   BILITOT 0.5 0.4 0.3   BILIDIR  --  <0.2 <0.2   LABALBU 2.0* 1.9* 1.6*        CULTURES:   UA:   No results for input(s): SPECGRAV, [address] [facility], [facility], CLARITYU, MUCUS, PROTEINU, BLOODU, RBCUA, WBCUA, BACTERIA, NITRU, GLUCOSEU, BILIRUBINUR, UROBILINOGEN, KETUA, LABCAST, LABCASTTY, AMORPHOS in the last 72 hours. Invalid input(s): CRYSTALS  Micro:   Lab Results   Component Value Date    BC No growth-preliminary  01/14/2022          Problem list of patient:     Patient Active Problem List   Diagnosis Code    Cellulitis and abscess of neck L03.221, L02.11    Essential hypertension I10    Leukocytosis D72.829    Obesity (BMI 30-39. 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

## 2022-01-19 ENCOUNTER — ANESTHESIA (OUTPATIENT)
Dept: OPERATING ROOM | Age: 67
DRG: 853 | End: 2022-01-19
Payer: MEDICARE

## 2022-01-19 ENCOUNTER — ANESTHESIA EVENT (OUTPATIENT)
Dept: OPERATING ROOM | Age: 67
DRG: 853 | End: 2022-01-19
Payer: MEDICARE

## 2022-01-19 VITALS — OXYGEN SATURATION: 98 % | SYSTOLIC BLOOD PRESSURE: 194 MMHG | TEMPERATURE: 98 F | DIASTOLIC BLOOD PRESSURE: 88 MMHG

## 2022-01-19 LAB
ALBUMIN SERPL-MCNC: 2 G/DL (ref 3.5–5.1)
ALP BLD-CCNC: 99 U/L (ref 38–126)
ALT SERPL-CCNC: 39 U/L (ref 11–66)
ANION GAP SERPL CALCULATED.3IONS-SCNC: 5 MEQ/L (ref 8–16)
AST SERPL-CCNC: 22 U/L (ref 5–40)
BILIRUB SERPL-MCNC: 0.5 MG/DL (ref 0.3–1.2)
BILIRUBIN DIRECT: < 0.2 MG/DL (ref 0–0.3)
BUN BLDV-MCNC: 20 MG/DL (ref 7–22)
CALCIUM SERPL-MCNC: 7.6 MG/DL (ref 8.5–10.5)
CHLORIDE BLD-SCNC: 116 MEQ/L (ref 98–111)
CO2: 25 MEQ/L (ref 23–33)
CREAT SERPL-MCNC: 1 MG/DL (ref 0.4–1.2)
GFR SERPL CREATININE-BSD FRML MDRD: 75 ML/MIN/1.73M2
GLUCOSE BLD-MCNC: 107 MG/DL (ref 70–108)
GLUCOSE BLD-MCNC: 112 MG/DL (ref 70–108)
GLUCOSE BLD-MCNC: 131 MG/DL (ref 70–108)
GLUCOSE BLD-MCNC: 131 MG/DL (ref 70–108)
GLUCOSE BLD-MCNC: 133 MG/DL (ref 70–108)
GLUCOSE BLD-MCNC: 135 MG/DL (ref 70–108)
GLUCOSE BLD-MCNC: 95 MG/DL (ref 70–108)
INR BLD: 1.37 (ref 0.85–1.13)
POTASSIUM SERPL-SCNC: 3.2 MEQ/L (ref 3.5–5.2)
SODIUM BLD-SCNC: 146 MEQ/L (ref 135–145)
TOTAL PROTEIN: 5.1 G/DL (ref 6.1–8)

## 2022-01-19 PROCEDURE — 6370000000 HC RX 637 (ALT 250 FOR IP): Performed by: INTERNAL MEDICINE

## 2022-01-19 PROCEDURE — 7100000001 HC PACU RECOVERY - ADDTL 15 MIN: Performed by: SURGERY

## 2022-01-19 PROCEDURE — 3700000001 HC ADD 15 MINUTES (ANESTHESIA): Performed by: SURGERY

## 2022-01-19 PROCEDURE — 44188 LAP COLOSTOMY: CPT | Performed by: SURGERY

## 2022-01-19 PROCEDURE — 2580000003 HC RX 258: Performed by: SURGERY

## 2022-01-19 PROCEDURE — 2709999900 HC NON-CHARGEABLE SUPPLY: Performed by: SURGERY

## 2022-01-19 PROCEDURE — 2060000000 HC ICU INTERMEDIATE R&B

## 2022-01-19 PROCEDURE — 2500000003 HC RX 250 WO HCPCS: Performed by: ANESTHESIOLOGY

## 2022-01-19 PROCEDURE — 6370000000 HC RX 637 (ALT 250 FOR IP): Performed by: STUDENT IN AN ORGANIZED HEALTH CARE EDUCATION/TRAINING PROGRAM

## 2022-01-19 PROCEDURE — 3600000014 HC SURGERY LEVEL 4 ADDTL 15MIN: Performed by: SURGERY

## 2022-01-19 PROCEDURE — 85610 PROTHROMBIN TIME: CPT

## 2022-01-19 PROCEDURE — 80053 COMPREHEN METABOLIC PANEL: CPT

## 2022-01-19 PROCEDURE — 2500000003 HC RX 250 WO HCPCS: Performed by: SURGERY

## 2022-01-19 PROCEDURE — 2500000003 HC RX 250 WO HCPCS: Performed by: STUDENT IN AN ORGANIZED HEALTH CARE EDUCATION/TRAINING PROGRAM

## 2022-01-19 PROCEDURE — 6360000002 HC RX W HCPCS: Performed by: PHARMACIST

## 2022-01-19 PROCEDURE — 2580000003 HC RX 258: Performed by: STUDENT IN AN ORGANIZED HEALTH CARE EDUCATION/TRAINING PROGRAM

## 2022-01-19 PROCEDURE — 7100000000 HC PACU RECOVERY - FIRST 15 MIN: Performed by: SURGERY

## 2022-01-19 PROCEDURE — 3700000000 HC ANESTHESIA ATTENDED CARE: Performed by: SURGERY

## 2022-01-19 PROCEDURE — 6360000002 HC RX W HCPCS: Performed by: ANESTHESIOLOGY

## 2022-01-19 PROCEDURE — 6370000000 HC RX 637 (ALT 250 FOR IP): Performed by: SURGERY

## 2022-01-19 PROCEDURE — 82248 BILIRUBIN DIRECT: CPT

## 2022-01-19 PROCEDURE — 99233 SBSQ HOSP IP/OBS HIGH 50: CPT | Performed by: INTERNAL MEDICINE

## 2022-01-19 PROCEDURE — 2580000003 HC RX 258: Performed by: PHARMACIST

## 2022-01-19 PROCEDURE — 6360000002 HC RX W HCPCS: Performed by: SURGERY

## 2022-01-19 PROCEDURE — 0D1L0Z4 BYPASS TRANSVERSE COLON TO CUTANEOUS, OPEN APPROACH: ICD-10-PCS | Performed by: SURGERY

## 2022-01-19 PROCEDURE — 82948 REAGENT STRIP/BLOOD GLUCOSE: CPT

## 2022-01-19 PROCEDURE — 6360000002 HC RX W HCPCS: Performed by: STUDENT IN AN ORGANIZED HEALTH CARE EDUCATION/TRAINING PROGRAM

## 2022-01-19 PROCEDURE — 3600000004 HC SURGERY LEVEL 4 BASE: Performed by: SURGERY

## 2022-01-19 PROCEDURE — 2580000003 HC RX 258: Performed by: ANESTHESIOLOGY

## 2022-01-19 RX ORDER — HYDRALAZINE HYDROCHLORIDE 20 MG/ML
5 INJECTION INTRAMUSCULAR; INTRAVENOUS EVERY 10 MIN PRN
Status: DISCONTINUED | OUTPATIENT
Start: 2022-01-19 | End: 2022-01-19 | Stop reason: HOSPADM

## 2022-01-19 RX ORDER — FENTANYL CITRATE 50 UG/ML
50 INJECTION, SOLUTION INTRAMUSCULAR; INTRAVENOUS EVERY 5 MIN PRN
Status: DISCONTINUED | OUTPATIENT
Start: 2022-01-19 | End: 2022-01-19 | Stop reason: HOSPADM

## 2022-01-19 RX ORDER — BUPIVACAINE HYDROCHLORIDE 5 MG/ML
INJECTION, SOLUTION EPIDURAL; INTRACAUDAL PRN
Status: DISCONTINUED | OUTPATIENT
Start: 2022-01-19 | End: 2022-01-19 | Stop reason: ALTCHOICE

## 2022-01-19 RX ORDER — SODIUM CHLORIDE 9 MG/ML
INJECTION, SOLUTION INTRAVENOUS CONTINUOUS PRN
Status: DISCONTINUED | OUTPATIENT
Start: 2022-01-19 | End: 2022-01-19 | Stop reason: SDUPTHER

## 2022-01-19 RX ORDER — ROCURONIUM BROMIDE 10 MG/ML
INJECTION, SOLUTION INTRAVENOUS PRN
Status: DISCONTINUED | OUTPATIENT
Start: 2022-01-19 | End: 2022-01-19 | Stop reason: SDUPTHER

## 2022-01-19 RX ORDER — ONDANSETRON 2 MG/ML
4 INJECTION INTRAMUSCULAR; INTRAVENOUS
Status: DISCONTINUED | OUTPATIENT
Start: 2022-01-19 | End: 2022-01-19 | Stop reason: HOSPADM

## 2022-01-19 RX ORDER — ROCURONIUM BROMIDE 10 MG/ML
INJECTION, SOLUTION INTRAVENOUS PRN
Status: DISCONTINUED | OUTPATIENT
Start: 2022-01-19 | End: 2022-01-19

## 2022-01-19 RX ORDER — MEPERIDINE HYDROCHLORIDE 25 MG/ML
12.5 INJECTION INTRAMUSCULAR; INTRAVENOUS; SUBCUTANEOUS EVERY 5 MIN PRN
Status: DISCONTINUED | OUTPATIENT
Start: 2022-01-19 | End: 2022-01-19 | Stop reason: HOSPADM

## 2022-01-19 RX ORDER — FENTANYL CITRATE 50 UG/ML
INJECTION, SOLUTION INTRAMUSCULAR; INTRAVENOUS PRN
Status: DISCONTINUED | OUTPATIENT
Start: 2022-01-19 | End: 2022-01-19 | Stop reason: SDUPTHER

## 2022-01-19 RX ORDER — ONDANSETRON 2 MG/ML
INJECTION INTRAMUSCULAR; INTRAVENOUS PRN
Status: DISCONTINUED | OUTPATIENT
Start: 2022-01-19 | End: 2022-01-19 | Stop reason: SDUPTHER

## 2022-01-19 RX ORDER — PROPOFOL 10 MG/ML
INJECTION, EMULSION INTRAVENOUS PRN
Status: DISCONTINUED | OUTPATIENT
Start: 2022-01-19 | End: 2022-01-19 | Stop reason: SDUPTHER

## 2022-01-19 RX ADMIN — SODIUM CHLORIDE, PRESERVATIVE FREE 10 ML: 5 INJECTION INTRAVENOUS at 08:56

## 2022-01-19 RX ADMIN — FENTANYL CITRATE 50 MCG: 50 INJECTION, SOLUTION INTRAMUSCULAR; INTRAVENOUS at 11:15

## 2022-01-19 RX ADMIN — PROPOFOL 130 MG: 10 INJECTION, EMULSION INTRAVENOUS at 10:32

## 2022-01-19 RX ADMIN — FENTANYL CITRATE 50 MCG: 50 INJECTION, SOLUTION INTRAMUSCULAR; INTRAVENOUS at 10:27

## 2022-01-19 RX ADMIN — FENTANYL CITRATE 50 MCG: 0.05 INJECTION, SOLUTION INTRAMUSCULAR; INTRAVENOUS at 19:51

## 2022-01-19 RX ADMIN — FENTANYL CITRATE 50 MCG: 0.05 INJECTION, SOLUTION INTRAMUSCULAR; INTRAVENOUS at 00:02

## 2022-01-19 RX ADMIN — SUGAMMADEX 200 MG: 100 INJECTION, SOLUTION INTRAVENOUS at 11:02

## 2022-01-19 RX ADMIN — PIPERACILLIN AND TAZOBACTAM 3375 MG: 3; .375 INJECTION, POWDER, LYOPHILIZED, FOR SOLUTION INTRAVENOUS at 04:42

## 2022-01-19 RX ADMIN — SODIUM CHLORIDE: 9 INJECTION, SOLUTION INTRAVENOUS at 10:23

## 2022-01-19 RX ADMIN — FAMOTIDINE 20 MG: 20 TABLET ORAL at 21:53

## 2022-01-19 RX ADMIN — FENTANYL CITRATE 50 MCG: 50 INJECTION, SOLUTION INTRAMUSCULAR; INTRAVENOUS at 11:20

## 2022-01-19 RX ADMIN — PIPERACILLIN AND TAZOBACTAM 3375 MG: 3; .375 INJECTION, POWDER, LYOPHILIZED, FOR SOLUTION INTRAVENOUS at 10:59

## 2022-01-19 RX ADMIN — CLINDAMYCIN PHOSPHATE 600 MG: 600 INJECTION, SOLUTION INTRAVENOUS at 17:40

## 2022-01-19 RX ADMIN — DAKIN'S SOLUTION 0.125% (QUARTER STRENGTH): 0.12 SOLUTION at 09:30

## 2022-01-19 RX ADMIN — FENTANYL CITRATE 50 MCG: 0.05 INJECTION, SOLUTION INTRAMUSCULAR; INTRAVENOUS at 08:55

## 2022-01-19 RX ADMIN — ROCURONIUM BROMIDE 40 MG: 50 INJECTION, SOLUTION INTRAVENOUS at 10:32

## 2022-01-19 RX ADMIN — PIPERACILLIN AND TAZOBACTAM 3375 MG: 3; .375 INJECTION, POWDER, LYOPHILIZED, FOR SOLUTION INTRAVENOUS at 20:21

## 2022-01-19 RX ADMIN — HYDROCODONE BITARTRATE AND ACETAMINOPHEN 1 TABLET: 5; 325 TABLET ORAL at 22:47

## 2022-01-19 RX ADMIN — PIPERACILLIN AND TAZOBACTAM 3375 MG: 3; .375 INJECTION, POWDER, LYOPHILIZED, FOR SOLUTION INTRAVENOUS at 14:39

## 2022-01-19 RX ADMIN — METOPROLOL SUCCINATE 50 MG: 50 TABLET, FILM COATED, EXTENDED RELEASE ORAL at 21:53

## 2022-01-19 RX ADMIN — CLINDAMYCIN PHOSPHATE 600 MG: 600 INJECTION, SOLUTION INTRAVENOUS at 20:19

## 2022-01-19 RX ADMIN — CLINDAMYCIN PHOSPHATE 600 MG: 600 INJECTION, SOLUTION INTRAVENOUS at 04:45

## 2022-01-19 RX ADMIN — DEXTROSE MONOHYDRATE: 50 INJECTION, SOLUTION INTRAVENOUS at 23:00

## 2022-01-19 RX ADMIN — ONDANSETRON 4 MG: 2 INJECTION INTRAMUSCULAR; INTRAVENOUS at 10:57

## 2022-01-19 RX ADMIN — DAKIN'S SOLUTION 0.125% (QUARTER STRENGTH): 0.12 SOLUTION at 20:29

## 2022-01-19 ASSESSMENT — PULMONARY FUNCTION TESTS
PIF_VALUE: 21
PIF_VALUE: 0
PIF_VALUE: 22
PIF_VALUE: 21
PIF_VALUE: 30
PIF_VALUE: 31
PIF_VALUE: 21
PIF_VALUE: 22
PIF_VALUE: 22
PIF_VALUE: 20
PIF_VALUE: 30
PIF_VALUE: 7
PIF_VALUE: 21
PIF_VALUE: 2
PIF_VALUE: 8
PIF_VALUE: 21
PIF_VALUE: 21
PIF_VALUE: 22
PIF_VALUE: 21
PIF_VALUE: 0
PIF_VALUE: 0
PIF_VALUE: 21
PIF_VALUE: 21
PIF_VALUE: 1
PIF_VALUE: 9
PIF_VALUE: 22
PIF_VALUE: 1
PIF_VALUE: 20
PIF_VALUE: 21
PIF_VALUE: 21
PIF_VALUE: 22
PIF_VALUE: 20
PIF_VALUE: 21
PIF_VALUE: 33
PIF_VALUE: 21
PIF_VALUE: 21
PIF_VALUE: 23
PIF_VALUE: 32
PIF_VALUE: 19

## 2022-01-19 ASSESSMENT — PAIN SCALES - GENERAL
PAINLEVEL_OUTOF10: 0
PAINLEVEL_OUTOF10: 2
PAINLEVEL_OUTOF10: 2
PAINLEVEL_OUTOF10: 0
PAINLEVEL_OUTOF10: 8
PAINLEVEL_OUTOF10: 9
PAINLEVEL_OUTOF10: 3
PAINLEVEL_OUTOF10: 2
PAINLEVEL_OUTOF10: 7
PAINLEVEL_OUTOF10: 8

## 2022-01-19 ASSESSMENT — PAIN DESCRIPTION - LOCATION
LOCATION: ABDOMEN;BUTTOCKS
LOCATION: ABDOMEN;BUTTOCKS

## 2022-01-19 ASSESSMENT — PAIN DESCRIPTION - PAIN TYPE
TYPE: ACUTE PAIN
TYPE: ACUTE PAIN

## 2022-01-19 NOTE — OP NOTE
[facility]  Operative Report    PATIENT NAME: Wenceslao Leyden  MEDICAL RECORD NO. [id number]  SURGEON: Elvis Cummings MD MD FACS  Primary Care Physician: Abbey Ames, APRN - CNP  Date: 1/19/2022, 11:02 AM     PROCEDURE PERFORMED: Diagnostic laparoscopy with diverting transverse loop colostomy  PREOPERATIVE DIAGNOSIS:   Active Hospital Problems    Diagnosis Date Noted    Shock [facility]) [R57.9]     Necrotizing soft tissue infection [M79.89] 01/14/2022    Pressure injury of sacral region, stage 4 ([address] [L89.154] 01/14/2022    Sepsis ([address] [A41.9] 01/14/2022    Septic shock ([address] [A41.9, R65.21] 01/14/2022    Horseshoe abscess of ischiorectal space [K61.31] 01/14/2022    Necrotizing fasciitis of pelvic region and thigh ([address] [M72.6] 01/14/2022      POSTOPERATIVE DIAGNOSIS: Same, path pending  SURGEON:  Elvis Cummings MD MD FACS  ANESTHESIA:  General endotracheal anesthesia and local  ANESTHESIA:  20 OF 0.5% Marcaine and 1% xylocaine in equal parts  ESTIMATED BLOOD LOSS:  1  ml  SPECIMEN: none  COMPLICATIONS:  None; patient tolerated the procedure well. DRAINS: Transverse loop colostomy  DISPOSITION: floor  CONDITION: stable      Narrative:    Patient brought to the operating room moved over to the operating room table general trach anesthesia administered by anesthesia. Abdomen clipped prepped draped sterilely with DuraPrep 3 minutes allowed to pass timeout was taken and confirmed. In the right lower quadrant using a 5 mm Optiview port and a 5 mm scope we advanced into the abdominal cavity under direct vision insufflated the abdomen to pressure 14 peritoneal surfaces glistening there was a large omentum present he had no redundant descending colon and the sigmoid colon was way down the pelvis none of which would have reached for a diverting colostomy easily.   So at this point I elected to place an another 5 mm port in the low midline and using an atraumatic grasper was able to flip the omentum over the colon to identify the location of the transverse colon it would reach up to the abdominal wall and at this point chose a site just to the left of the falciform ligament. While the abdomen was still insufflated and the scope in place a transverse incision was made over this area and subtenons tissue divided electrocautery the midline was identified and a vertical incision was made in the linea alba just large infected reach down with a Bradford clamp and grab the transverse colon here. At this point the abdomen was desufflated and the 5 mm ports removed I then enlarged the fascial opening vertically to be able to bring up the transverse colon through the opening deformity loop colostomy here. Greater omentum was taken down slightly from the gastrocolic ligament this area to have it come up without tension. A suture was then secured used to secure through the tenia on either end to the fascia to hold the transverse colon in place. Once this was completed the tenia that was on the anterior surface was then opened longitudinally for the opening. The colostomy was then matured with interrupted 3-0 Vicryl's and four corners and then a running 3-0 Vicryl. An ostomy appliance was applied.   The 5 mm port sites were closed with 4-0 Vicryl subcuticular stitch and skin affix glue applied patient brought back to recovery in stable condition

## 2022-01-19 NOTE — ANESTHESIA POSTPROCEDURE EVALUATION
Department of Anesthesiology  Postprocedure Note    Patient: Ligia Gray  MRN: [id number]  YOB: 1955  Date of evaluation: 1/19/2022  Time:  12:07 PM     Procedure Summary     Date: 01/19/22 Room / Location: [facility] / [facility]    Anesthesia Start: 3996 Anesthesia Stop: 6234    Procedure: EXPLORATORY LAPAROSCOPY, TRANSVERSE LOOP COLOSTOMY (N/A Abdomen) Diagnosis: (ABSCESS)    Surgeons: Zeinab Schuster MD Responsible Provider: Nando Mcqueen DO    Anesthesia Type: general ASA Status: 4          Anesthesia Type: general    Gregg Phase I: Gregg Score: 9    Gregg Phase II:      Last vitals: Reviewed and per EMR flowsheets.        Anesthesia Post Evaluation    Patient location during evaluation: PACU  Patient participation: complete - patient participated  Level of consciousness: awake and alert  Pain score: 4  Airway patency: patent  Nausea & Vomiting: no nausea and no vomiting  Complications: no  Cardiovascular status: hemodynamically stable and blood pressure returned to baseline  Respiratory status: spontaneous ventilation, room air and acceptable  Hydration status: stable

## 2022-01-19 NOTE — ANESTHESIA PRE PROCEDURE
Department of Anesthesiology  Preprocedure Note       Name:  Ligia Gray   Age:  77 y.o.  :  1955                                          MRN:  [id number]         Date:  2022      Surgeon: Vernon Mittal):  Zeinab Schuster MD    Procedure: Procedure(s):  EXPLORATORY LAPAROSCOPY, DIVERTING COLOSTOMY    Medications prior to admission:   Prior to Admission medications    Medication Sig Start Date End Date Taking?  Authorizing Provider   metoprolol succinate (TOPROL XL) 50 MG extended release tablet Take 1 tablet by mouth daily 21  Yes Emory Harrington MD   doxazosin (CARDURA) 4 MG tablet Take 1 tablet by mouth daily 21  Yes Emory Harrington MD   famotidine (PEPCID) 20 MG tablet Take 1 tablet by mouth 2 times daily 21  Yes Emory Harrington MD   insulin glargine (LANTUS) 100 UNIT/ML injection vial Inject 25 Units into the skin nightly 21  Yes Emory Harrington MD   hydrALAZINE (APRESOLINE) 100 MG tablet Take 1 tablet by mouth every 8 hours 12/15/21  Yes Alissa Merchant MD   losartan (COZAAR) 100 MG tablet Take 1 tablet by mouth daily 21  Yes Alissa Merchant MD   hydroCHLOROthiazide (HYDRODIURIL) 50 MG tablet Take 1 tablet by mouth daily 21  Yes Alissa Merchant MD   simethicone (MYLICON) 80 MG chewable tablet Take 1 tablet by mouth every 6 hours as needed for Flatulence (abdominal cramping) 12/15/21  Yes Alissa Merchant MD   ascorbic acid (VITAMIN C) 500 MG tablet Take 1 tablet by mouth daily 21  Yes Alissa Merchant MD   Vitamin D (CHOLECALCIFEROL) 50 MCG ( UT) TABS tablet Take 1 tablet by mouth daily 21  Yes Alissa Merchant MD   insulin lispro (HUMALOG) 100 UNIT/ML injection vial Inject 0-12 Units into the skin 3 times daily (with meals) 12/15/21  Yes Alissa Merchant MD   insulin lispro (HUMALOG) 100 UNIT/ML injection vial Inject 0-6 Units into the skin nightly 12/15/21  Yes Alissa Merchant MD   insulin lispro (HUMALOG) 100 UNIT/ML injection vial Inject 7 Units into the skin 3 times daily (with meals) 12/15/21  Yes Sonia Ruiz MD   zinc sulfate (ZINCATE) 220 (50 Zn) MG capsule Take 1 capsule by mouth daily 12/16/21  Yes Sonia Ruiz MD   tamsulosin [facility]) 0.4 MG capsule Take 1 capsule by mouth daily 12/10/21  Yes Gi Castaneda Ashton, APRN - CNP   amLODIPine (NORVASC) 10 MG tablet Take 1 tablet by mouth daily 11/24/21  Yes DIONTE Andrade CNP   aspirin 81 MG tablet Take 81 mg by mouth daily.    Yes Historical Provider, MD       Current medications:    Current Facility-Administered Medications   Medication Dose Route Frequency Provider Last Rate Last Admin    piperacillin-tazobactam (ZOSYN) 3,375 mg in dextrose 5 % 50 mL IVPB (mini-bag)  3,375 mg IntraVENous 30 Min Pre-Op Nas Campos MD        famotidine (PEPCID) tablet 20 mg  20 mg Oral BID Darian Jackson DO   20 mg at 01/18/22 2245    sodium hypochlorite (DAKINS) 0.125 % external solution   Irrigation Daily Anival Daigle MD   Given at 01/18/22 1373    0.9 % sodium chloride infusion   IntraVENous PRN DIONTE Chapman CNP        dextrose 5 % solution   IntraVENous Continuous Livan Guillen  mL/hr at 01/18/22 0104 New Bag at 01/18/22 7773    [Held by provider] amLODIPine (NORVASC) tablet 10 mg  10 mg Oral Daily Parisa Myrtlembo, DO        [Held by provider] apixaban (ELIQUIS) tablet 10 mg  10 mg Oral BID Parisa Myrtlembo, DO        [Held by provider] aspirin EC tablet 81 mg  81 mg Oral Daily Parisa Myrtlembo, DO        [Held by provider] hydrALAZINE (APRESOLINE) tablet 100 mg  100 mg Oral Q8H Alejandro Jackson DO        [Held by provider] hydroCHLOROthiazide (HYDRODIURIL) tablet 50 mg  50 mg Oral Daily Parisa Myrtlembo, DO        [Held by provider] losartan (COZAAR) tablet 100 mg  100 mg Oral Daily Parisashawnee Irahetambo, DO        [Held by provider] metoprolol succinate (TOPROL XL) extended release tablet 50 mg  50 mg Oral Daily Alejandro Jackson DO        sodium chloride flush 0.9 % injection 5-40 mL  5-40 mL IntraVENous 2 times per day Génesis Blend, DO   10 mL at 01/19/22 0856    sodium chloride flush 0.9 % injection 5-40 mL  5-40 mL IntraVENous PRN Norvel Nayes Manuel, DO        0.9 % sodium chloride infusion  25 mL IntraVENous PRN Génesis Blend, DO        ondansetron (ZOFRAN-ODT) disintegrating tablet 4 mg  4 mg Oral Q8H PRN Mariama Jackson, DO        Or    ondansetron (ZOFRAN) injection 4 mg  4 mg IntraVENous Q6H PRN Nelson Jackson, DO        glucose (GLUTOSE) 40 % oral gel 15 g  15 g Oral PRN Mariama Jackson, DO        dextrose 50 % IV solution  12.5 g IntraVENous PRN Génesis Blend, DO        glucagon (rDNA) injection 1 mg  1 mg IntraMUSCular PRN Lyndavel Sly Jackson, DO        dextrose 5 % solution  100 mL/hr IntraVENous PRN Génesis Blend, DO        HYDROcodone-acetaminophen (NORCO) 5-325 MG per tablet 1 tablet  1 tablet Oral Q4H PRN Mariama Jackson, DO        0.9 % sodium chloride infusion   IntraVENous PRN Génesis Blend, DO        fentaNYL (SUBLIMAZE) injection 50 mcg  50 mcg IntraVENous Q1H PRN Géneiss Blend, DO   50 mcg at 01/19/22 0855    insulin lispro (HUMALOG) injection vial 0-18 Units  0-18 Units SubCUTAneous Q4H Alejandro Jackson DO   3 Units at 01/18/22 1348    0.9 % sodium chloride infusion   IntraVENous PRN Génesis Blend, DO        calcium replacement protocol   Other RX Placeholder Alejandro Jackson,         piperacillin-tazobactam (ZOSYN) 3,375 mg in dextrose 5 % 50 mL IVPB extended infusion (mini-bag)  3,375 mg IntraVENous Q8H Silas Esters, RPH   Stopped at 01/19/22 0842    tamsulosin (FLOMAX) capsule 0.4 mg  0.4 mg Oral Daily Cosmo Goode MD   0.4 mg at 01/18/22 1008    clindamycin (CLEOCIN) 600 mg in dextrose 5 % 50 mL IVPB  600 mg IntraVENous Q8H Mesfin Diaz DO   Stopped at 01/19/22 0604       Allergies:  No Known Allergies    Problem List:    Patient Active Problem List   Diagnosis Code    Cellulitis and abscess of neck L03.221, L02.11    Essential hypertension I10    Leukocytosis D72.829    Obesity (BMI 30-39. 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 Deo Stewart, [facility])    RECTAL SURGERY N/A 1/14/2022    FULL THICKNESS DEBRIDEMENT OF SACRAL DECUBITUS ULCER, 26w35q1 CM performed by Sheri Zarco MD at [facility] History:    Social History     Tobacco Use    Smoking status: Never Smoker    Smokeless tobacco: Never Used   Substance Use Topics    Alcohol use:  No                                Counseling given: Not Answered      Vital Signs (Current):   Vitals:    01/18/22 2359 01/19/22 0349 01/19/22 0830 01/19/22 0945   BP: (!) 144/92 (!) 158/64 (!) 143/51    Pulse: 64 62 58    Resp: 20 18 16    Temp: 98 °F (36.7 °C) 98.6 °F (37 °C)     TempSrc: Oral Oral     SpO2: 92% 97% 95%    Weight:       Height:    5' 6\" (1.676 m)                                              BP Readings from Last 3 Encounters:   01/19/22 (!) 143/51   01/14/22 (!) 148/67   01/08/22 128/73       NPO Status:                                                                                 BMI:   Wt Readings from Last 3 Encounters:   01/15/22 190 lb 14.7 oz (86.6 kg)   12/20/21 195 lb 8 oz (88.7 kg)   01/02/18 223 lb 11.2 oz (101.5 kg)     Body mass index is 30.81 kg/m². CBC:   Lab Results   Component Value Date    WBC 12.2 01/17/2022    RBC 2.68 01/17/2022    HGB 7.8 01/17/2022    HCT 24.9 01/17/2022    MCV 92.9 01/17/2022    RDW 13.0 08/30/2013     01/17/2022       CMP:   Lab Results   Component Value Date     01/19/2022    K 3.2 01/19/2022    K 4.5 01/15/2022     01/19/2022    CO2 25 01/19/2022    BUN 20 01/19/2022    CREATININE 1.0 01/19/2022    LABGLOM 75 01/19/2022    GLUCOSE 107 01/19/2022    PROT 5.1 01/19/2022    CALCIUM 7.6 01/19/2022    BILITOT 0.5 01/19/2022    ALKPHOS 99 01/19/2022    AST 22 01/19/2022    ALT 39 01/19/2022       POC Tests:   Recent Labs     01/19/22  0802   POCGLU 131*       Coags:   Lab Results   Component Value Date    INR 1.37 01/19/2022    APTT 34.2 01/15/2022       HCG (If Applicable): No results found for: PREGTESTUR, PREGSERUM, HCG, HCGQUANT     ABGs: No results found for: PHART, PO2ART, RNM5SBD, RJS7JCA, BEART, H7YXMWAX     Type & Screen (If Applicable):  Lab Results   Component Value Date    LABRH POS 01/15/2022       Drug/Infectious Status (If Applicable):  No results found for: HIV, HEPCAB    COVID-19 Screening (If Applicable):   Lab Results   Component Value Date    COVID19 DETECTED 12/07/2021           Anesthesia Evaluation  Patient summary reviewed and Nursing notes reviewed no history of anesthetic complications:   Airway: Mallampati: III  TM distance: >3 FB   Neck ROM: limited  Mouth opening: > = 3 FB Dental:    (+) upper dentures and lower dentures      Pulmonary:   (+) decreased breath sounds,                            ROS comment: Recent admission for COVID-19 pneumonia - has since resolved. Pt on room air.    Cardiovascular:  Exercise tolerance: poor (<4 METS),   (+) hypertension:,       ECG reviewed      Echocardiogram reviewed                  Neuro/Psych:   Negative Neuro/Psych ROS

## 2022-01-19 NOTE — PROGRESS NOTES
300 ed[employer]Pad THERAPY MISSED TREATMENT NOTE  [facility] [[email or web address]] NEUROSCIENCES 4A  4A-05/005-A      Date: 2022  Patient Name: Adriel Woods        CSN: [id number]   : 1955  (77 y.o.)  Gender: male                REASON FOR MISSED TREATMENT: Hold treatment per nursing request.  Pt going to surgery for diverting colostomy at 11. Will hold and will try tomorrow.

## 2022-01-19 NOTE — PROGRESS NOTES
[facility] [address]  PHYSICAL THERAPY MISSED TREATMENT NOTE  [facility] 4A    Date: 2022  Patient Name: Musa Aguilar        MRN: [id number]   : 1955  (77 y.o.)  Gender: male                REASON FOR MISSED TREATMENT:  Hold treatment per nursing request.  Pt going to surgery for diverting colostomy at 11. Will hold.

## 2022-01-19 NOTE — PROGRESS NOTES
VS: BP (!) 144/92   Pulse 64   Temp 98 °F (36.7 °C) (Oral)   Resp 20   Wt 190 lb 14.7 oz (86.6 kg)   SpO2 92%   BMI 30.81 kg/m²       Patient has been resting in bed throughout shift. Patient voiced compliant of increased pain and discomfort during shift to wounds. Writer medicated patient with PRN pain medications. Patient voiced relief after administration x2 over 2 hours. Patient requesting more medication prior to tx change. Writer administered per MD order. Patient refusing Mathias from writer at this time. Patient medicated prior to wound dressing change, patient tolerated well. Patient has been NPO since midnight and understands he is to remain NPO d/t surgery. Patient voices no further needs from writer. Patients bed is in lowest position, call light is in reach and room is adequately lit. Will continue to monitor. Layton Ely RN.

## 2022-01-19 NOTE — CARE COORDINATION
1/19/22, 1:35 PM EST    DISCHARGE ON GOING EVALUATION    Isaac Killian day: 5  Location: -05/005-A Reason for admit: Sepsis [facility]) [A41.9]  Septic shock ([facility] [A41.9, R65.21]   Procedure:   1/14 Excisional debridement and drainage of perirectal horseshoe abscess of deep ischiorectal space total tissue removed 6 x 6 x 4 cm; Excisional debridement of skin subcutaneous tissue muscle and fascia of stage IV sacral decubitus measuring 15 x 12 x 6 cm; Tissue sent for tissue culture as well as pathology      1/19 EXPLORATORY LAPAROSCOPY, TRANSVERSE LOOP COLOSTOMY   Barriers to Discharge: PT/OT, pain and nausea control, IV Cleocin, diabetes management, IV Zosyn. PCP: DIONTE Rene CNP  Readmission Risk Score: 23.7 ( )%  Patient Goals/Plan/Treatment Preferences: From [facility] SW following.

## 2022-01-19 NOTE — CARE COORDINATION
1/19/22, 8:14 AM EST    DISCHARGE PLANNING EVALUATION    Left a message for Colby at [facility] [place] [address]. Update 11:32am: Left another message for Colby at [facility] [place] [address]. Called Catarina at [facility] to see if [facility] is in network. They are not at this time. She told  that [facility], [address] are all out of network. [facility] is in network with his insurance, but they are not accepting patients at this time. Update 2:35pm: Have not heard back from Colby at this time. Tried to call the building and was unable to reach a person. Called Vin Larios at The [address] [place] [address]. She will reach out to Mahnaz Castelan and get back with this .

## 2022-01-19 NOTE — PROGRESS NOTES
Pharmacy Medication History Note      List of current medications patient is taking is complete. Source of information: Recent admission records    Changes made to medication list:  Medications removed (include reason, ex. therapy complete or physician discontinued):  None    Medications added/doses adjusted:  None    Other notes (ex.  Recent course of antibiotics, Coumadin dosing):  None      Electronically signed by Nelson Moulton on 1/19/2022 at 9:52 AM

## 2022-01-19 NOTE — PROGRESS NOTES
Hospitalist Progress Note      Patient:  Ghazala Lucas    Unit/Bed:4A-05/005-A  YOB: 1955  MRN: [id number]   Acct: [de-identified]   PCP: DIONTE Ho - CNP  Date of Admission: 1/14/2022    Assessment/Plan:    1. Necrotizing soft tissue infection   -Status post emergent excisional debridement and drainage of bilateral abscess of deep ischial rectal space and excisional debridement of skin subcutaneous tissue muscle and fascia.   -Continue IV antibiotics per ID recommendations. Awaiting cultures. Duration to be determined per ID.   -Status post diverting colostomy on 1/19/2022    2. Hypernatremia -improving   -We will cut back on D5W. Stopping tomorrow. 3. Acute DVT   -Was previously on Eliquis but that had to be stopped due to recent surgery. IR was consulted for IVC placement but recommended no emergent need for it at this point. Will discuss with surgery, in regards to when it would be okay to restart anticoagulation. 4. Septic shock due to above - resolved    5. Essential HTN   - Holding home BP medications for now due to Borderline BP, but will restart now as BP is significantly high. 6. DMII   - Continue ISS. BG controlled. Will monitor and adjust accordingly. 7. Acute on chronic blood loss anemia   - No hgb available today. Will recheck tomorrow morning    8. HLD    9. Hx of COVID 19  10. Urinary retention    Chief Complaint: Back pain    Initial H and P:-      22-year-old white male who presented to [facility] on 1/14/2022 with complaints of buttocks pain. He has a past medical history of lifetime non-smoker, recent left lower extremity DVT on Eliquis, diabetes mellitus, hypertension, urinary retention. Per report he presented to the emergency department via EMS from [facility] with new decubitus ulcer.   Patient believes it was present for multiple weeks however was not well documented prior to hospital encounter. He reports that it has been draining for approximately 1 week. Patient reported he was having worsening pain fever and sweats and chills over the last week. In the emergency department he was seen on 1/8/2022 ultrasound of leg demonstrated acute DVT he was started on Eliquis. Of note he was also hospitalized from 12/7/2021 to 12/20/2021 for COVID-19 pneumonia. He returned to his nursing facility on room air. In the emergency department he was positive for lactic acidosis. Imaging revealed a decubitus ulcer with air in the perineum. Patient was fluid resuscitated per sepsis protocol and given Vanco and Zosyn. General surgery was consulted for debridement and concern for necrotizing fasciitis. Initially patient was admitted to stepdown postprocedure he was transferred to the ICU for hypotension. He did undergo emergent debridement of abscess on 1/14 with Dr. James Mora. He was initially on vasopressors which have been weaned off. Subjective (past 24 hours):     Patient seen postop. Complaining of some discomfort in the back. States the procedure went without any issues. Denies any nausea vomiting. No fevers or chills. No other complaints at this point. Past medical history, family history, social history and allergies reviewed again and is unchanged since admission. ROS (All review of systems completed. Pertinent positives noted.  Otherwise All other systems reviewed and negative.)     Medications:  Reviewed    Infusion Medications    sodium chloride      dextrose 100 mL/hr at 01/18/22 8706    sodium chloride      dextrose      sodium chloride      sodium chloride       Scheduled Medications    piperacillin-tazobactam  3,375 mg IntraVENous 30 Min Pre-Op    famotidine  20 mg Oral BID    sodium hypochlorite   Irrigation Daily    [Held by provider] amLODIPine  10 mg Oral Daily    [Held by provider] apixaban  10 mg Oral BID    [Held by provider] aspirin  81 mg Oral Daily    [Held by provider] hydrALAZINE  100 mg Oral Q8H    [Held by provider] hydroCHLOROthiazide  50 mg Oral Daily    [Held by provider] losartan  100 mg Oral Daily    [Held by provider] metoprolol succinate  50 mg Oral Daily    sodium chloride flush  5-40 mL IntraVENous 2 times per day    insulin lispro  0-18 Units SubCUTAneous Q4H    calcium replacement protocol   Other RX Placeholder    piperacillin-tazobactam  3,375 mg IntraVENous Q8H    tamsulosin  0.4 mg Oral Daily    clindamycin (CLEOCIN) IV  600 mg IntraVENous Q8H     PRN Meds: sodium chloride, sodium chloride flush, sodium chloride, ondansetron **OR** ondansetron, glucose, dextrose, glucagon (rDNA), dextrose, HYDROcodone 5 mg - acetaminophen, sodium chloride, fentanNYL, sodium chloride      Intake/Output Summary (Last 24 hours) at 1/19/2022 1553  Last data filed at 1/19/2022 1340  Gross per 24 hour   Intake 740 ml   Output 1701 ml   Net -961 ml       Diet:  Diet NPO    Exam:  BP (!) 184/75   Pulse 62   Temp 96.1 °F (35.6 °C) (Oral)   Resp 16   Ht 5' 6\" (1.676 m)   Wt 190 lb 14.7 oz (86.6 kg)   SpO2 94%   BMI 30.81 kg/m²   General appearance: No apparent distress, appears stated age and cooperative. HEENT: Pupils equal, round, and reactive to light. Conjunctivae/corneas clear. Neck: Supple, with full range of motion. No jugular venous distention. Trachea midline. Respiratory:  Normal respiratory effort. Clear to auscultation, bilaterally without Rales/Wheezes/Rhonchi. Cardiovascular: Regular rate and rhythm with normal S1/S2 without murmurs, rubs or gallops. Abdomen: Soft, colostomy in place  Musculoskeletal: passive and active ROM x 4 extremities. Skin: unable to examine the back as patient is in discomfort from surgery, will reexamine tomorrow  Neurologic:  Neurovascularly intact without any focal sensory/motor deficits.  Cranial nerves: II-XII intact, grossly non-focal.  Psychiatric: Alert and oriented, thought content appropriate, normal insight  Capillary Refill: Brisk,< 3 seconds   Peripheral Pulses: +2 palpable, equal bilaterally     Labs:   Recent Labs     01/16/22  2340 01/17/22  0450   WBC  --  12.2*   HGB 7.5* 7.8*   HCT 23.9* 24.9*   PLT  --  221     Recent Labs     01/17/22  0450 01/17/22  0450 01/18/22  0525 01/18/22  0745 01/19/22  0500   *  --  149*  --  146*   K 3.7   < > 2.7* 3.2* 3.2*   *  --  122*  --  116*   CO2 23  --  20*  --  25   BUN 47*  --  28*  --  20   CREATININE 1.2  --  0.7  --  1.0   CALCIUM 7.6*  --  6.5*  --  7.6*   PHOS 2.9  --   --   --   --     < > = values in this interval not displayed. Recent Labs     01/17/22  0450 01/18/22  0525 01/19/22  0500   AST 25 17 22   ALT 44 33 39   BILIDIR <0.2 <0.2 <0.2   BILITOT 0.4 0.3 0.5   ALKPHOS 100 77 99     Recent Labs     01/18/22  0815 01/19/22  0500   INR 2.16* 1.37*     No results for input(s): Honorio Barrios in the last 72 hours. Microbiology:    Blood culture #1:   Lab Results   Component Value Date    BC No growth-preliminary  01/14/2022       Blood culture #2:No results found for: [place]    Organism:  Lab Results   Component Value Date    ORG Pseudomonas aeruginosa 01/14/2022    ORG mixed anaerobic growth 01/14/2022         Lab Results   Component Value Date    LABGRAM  01/14/2022     Few segmented neutrophils observed. No epithelial cells observed. Many gram positive cocci occurring singly and in pairs. Many gram negative bacilli. Moderate large gram positive bacilli.         MRSA culture only:No results found for: [facility]    Urine culture:   Lab Results   Component Value Date    LABURIN No growth-preliminary No growth  01/15/2022       Respiratory culture: No results found for: CULTRESP    Aerobic and Anaerobic :  Lab Results   Component Value Date    LABAERO  01/14/2022     Culture also yielded light growth of Enterococcus species, Streptococcus species (Viridans group), Enteric gram negative bacilli, Staphylococcus species (coagulase negative), and gram positive bacilli most consistent with Corynebacterium species. If a true mixed infection is suspected, then broad spectrum empiric antibiotic therapy is indicated. LABAERO light growth  01/14/2022     Lab Results   Component Value Date    LABANAE  01/14/2022     Culture yielded heavy mixed anaerobic growth, including gram positive bacilli and multiple colony types of both gram negative bacill and gram positive cocci. If a true mixed aerobic and anaerobic infection is suspected, then broad spectrum empiric antibiotic therapy is indicated and should include coverage for anaerobic organisms. Urinalysis:      Lab Results   Component Value Date    NITRU NEGATIVE 01/15/2022    WBCUA 5-9 01/15/2022    BACTERIA FEW 01/15/2022    RBCUA 10-15 01/15/2022    BLOODU MODERATE 01/15/2022    SPECGRAV 1.015 01/15/2022    GLUCOSEU NEGATIVE 01/14/2022       Radiology:  XR CHEST PORTABLE   Final Result   1. Right IJ central line tip in the right atrium. This can be retracted by    3 cm. 2. Bilateral patchy interstitial opacities which may reflect infiltrate. 3. Borderline cardiomegaly. This document has been electronically signed by: Marylee Burdock, MD on    01/15/2022 12:24 AM      CT ABDOMEN PELVIS WO CONTRAST Additional Contrast? None   Final Result       1. Decubitus ulcer overlying the sacrum new since previous CT scan of the pelvis dated 7 December 2021.   2. Air   in the perineum, new since previous study dated 7 December 2021.   3. Dale catheter in place. Air in the urinary bladder. 4. Changes of ankylosing spondylitis involving the lower thoracic, lumbar spine and sacroiliac iliac joints. Deformity at the thoracolumbar junction. 5. Calcification along the gallbladder wall. 6. Bilateral inguinal lymph nodes. 7. Areas of atelectasis or scarring at both lung bases. **This report has been created using voice recognition software.  It may contain minor errors which are inherent in voice recognition technology. **      Final report electronically signed by DR Dulce Bello on 1/14/2022 6:49 PM      XR CHEST PORTABLE   Final Result   1. Interval improvement since previous plain radiographs dated 11 December 2021 with reduction in the bilateral upper lobe infiltrates. .   2. Borderline cardiomegaly. 3. Thoracic spondylosis. **This report has been created using voice recognition software. It may contain minor errors which are inherent in voice recognition technology. **      Final report electronically signed by DR Dulce Bello on 1/14/2022 5:31 PM        CT ABDOMEN PELVIS WO CONTRAST Additional Contrast? None    Result Date: 1/14/2022  PROCEDURE: CT ABDOMEN PELVIS WO CONTRAST CLINICAL INFORMATION: buttock wound . COMPARISON: CTA abdomen and pelvis dated 7 December 2021. . TECHNIQUE: Axial 5 mm CT images were obtained through the abdomen and pelvis. No contrast was given. Coronal and sagittal reconstructions were obtained. All CT scans at this facility use dose modulation, iterative reconstruction, and/or weight-based dosing when appropriate to reduce radiation dose to as low as reasonably achievable. FINDINGS: There are areas of atelectasis or scarring at both lung bases. The base of the heart is within appropriate limits. The liver is grossly normal. The spleen is normal. The adrenal glands and pancreas are grossly normal. There is calcification along the gallbladder wall. .  There is no hydronephrosis or stones of either kidney. No contour deforming renal masses are noted. There is a small hiatal hernia. No abnormalities of the small bowel loops are noted. The IVC and aorta are of normal caliber. There is no adenopathy. There is a Dale catheter in place. There is  air within the urinary bladder. The prostate gland measures 4.1 x 3.4 cm in size. . There is no pelvic free fluid. The colon is grossly normal. There is bilateral inguinal lymph nodes in place.  There are possible changes of ankylosing spondylitis involving the lumbar spine and sacroiliac joints bilaterally. There is deformity at the thoracolumbar junction There is a decubitus ulcer overlying the sacrum. There is is air within the perineum suggestive off inflammatory process. .      1. Decubitus ulcer overlying the sacrum new since previous CT scan of the pelvis dated 7 December 2021. 2. Air   in the perineum, new since previous study dated 7 December 2021. 3. Dale catheter in place. Air in the urinary bladder. 4. Changes of ankylosing spondylitis involving the lower thoracic, lumbar spine and sacroiliac iliac joints. Deformity at the thoracolumbar junction. 5. Calcification along the gallbladder wall. 6. Bilateral inguinal lymph nodes. 7. Areas of atelectasis or scarring at both lung bases. **This report has been created using voice recognition software. It may contain minor errors which are inherent in voice recognition technology. ** Final report electronically signed by DR Amadeo Dalton on 1/14/2022 6:49 PM    XR CHEST PORTABLE    Result Date: 1/15/2022  1 view chest x-ray Comparison: None Findings: Right IJ central line tip in the right atrium. Bilateral patchy interstitial opacities. No pleural effusion or pneumothorax. Heart is borderline enlarged. No pulmonary vascular congestion. No acute fracture. 1. Right IJ central line tip in the right atrium. This can be retracted by 3 cm. 2. Bilateral patchy interstitial opacities which may reflect infiltrate. 3. Borderline cardiomegaly. This document has been electronically signed by: Itz Galan MD on 01/15/2022 12:24 AM    XR CHEST PORTABLE    Result Date: 1/14/2022  PROCEDURE: XR CHEST PORTABLE CLINICAL INFORMATION: pre-op. COMPARISON: Chest x-ray dated 11 December 2021. TECHNIQUE: AP upright view of the chest. FINDINGS: There is borderline cardiomegaly. .The mediastinum is not widened.   There are bilateral infiltrates, significantly improved since previous study dated 11 December 2021. There are no effusions. . The pulmonary vascularity is normal. There is thoracic spondylosis. 1. Interval improvement since previous plain radiographs dated 11 December 2021 with reduction in the bilateral upper lobe infiltrates. . 2. Borderline cardiomegaly. 3. Thoracic spondylosis. **This report has been created using voice recognition software. It may contain minor errors which are inherent in voice recognition technology. ** Final report electronically signed by DR Keon Farmer on 1/14/2022 5:31 PM      Electronically signed by Nithya Sow MD on 1/19/2022 at 3:53 PM

## 2022-01-19 NOTE — PLAN OF CARE
Problem: Falls - Risk of:  Goal: Will remain free from falls  Description: Will remain free from falls  Outcome: Met This Shift  Goal: Absence of physical injury  Description: Absence of physical injury  Outcome: Met This Shift     Problem: Skin Integrity:  Goal: Will show no infection signs and symptoms  Description: Will show no infection signs and symptoms  Outcome: Ongoing     Problem: Pain:  Goal: Pain level will decrease  Description: Pain level will decrease  Outcome: Ongoing  Goal: Control of acute pain  Description: Control of acute pain  Outcome: Ongoing

## 2022-01-19 NOTE — PROGRESS NOTES
Comprehensive Nutrition Assessment    Type and Reason for Visit:  Initial,Consult (general nutrition management, dysphagia, ONS, wounds)    Nutrition Recommendations/Plan:   -Recommend consider MVI and probiotic.  -Resume diet per SLP after surgery when able. -Plan ONS initiation: Glucerna TID and Julius BID. Nutrition Assessment:    Pt. nutritionally compromised AEB wounds. At risk for further nutrition compromise r/t NPO status for surgery today-diverting colostomy, variable oral intake in the last 1 month with unplanned weight loss, admit with sepsis, nectrotizing fascitis, s/p excisional debridement perirectal horseshoe abscess-stage IV sacral decubitis, dysphagia, acute DVT, recent covid-19, increased nutrient needs for wound healing, and underlying medical condition (hx: DB, HTN, HLD, covid 12/7/21). Malnutrition Assessment:  Malnutrition Status: At risk for malnutrition (Comment)    Context:  Acute Illness     Findings of the 6 clinical characteristics of malnutrition:  Energy Intake:  Mild decrease in energy intake (Comment)  Weight Loss:  No significant weight loss (Note 2.4% loss in the last 1 month)     Body Fat Loss:  No significant body fat loss     Muscle Mass Loss:  No significant muscle mass loss    Fluid Accumulation:  1 - Mild  (perineal edema)   Strength:  Not Performed    Estimated Daily Nutrient Needs:  Energy (kcal):  0008-0636 kcals (15-18 kcals/kg/day); Weight Used for Energy Requirements:   (87 kg)     Protein (g):  98 grams or more (1.5 grams/kg IBW/day); Weight Used for Protein Requirements:   (65 kg)        Fluid (ml/day):  per MD;       Nutrition Related Findings:   Patient seen earlier this morning. Reports decline in appetite/intake over the last month when he had Covid-19, hospitalized, then went to [facility] for rehab. States disliked ECF food, admits he is a picky eater, was on pureed diet there. SLP on case, s/p FEEs-pureed with thin recommended.   Pt states he developed buttocks wound at [facility], pain was so bad he couldn't stand anything. NPO at present for surgery-diverting colostomy. 1/17/22: Hgb 7.8, 1/18/22: Sodium 149, K+ 3.2, BUN 28, Creatinine 0.7, Glucose 133. Rx: cleocin, pepcid, humalog, zosyn, D5 at 100 ml/hr, fentanyl. Wounds:   (s/p I+D sacral decubitus of perirectal horseshoe abscess 1/14/22, left heel incisional would)       Current Nutrition Therapies:    Diet NPO    Anthropometric Measures:  · Height: 5' 6\" (167.6 cm)  · Current Body Weight: 190 lb 14.7 oz (86.6 kg) (1/15/22 perineal edema)   · Admission Body Weight: 195 lb (88.5 kg) (1/14/22 perineal edema)    · Usual Body Weight:  (Per pt~ 213#. Per EMR: 12/7/21: 195#8oz)     · Ideal Body Weight: 142 lbs; % Ideal Body Weight 134.4 %   · BMI: 30.8  · Adjusted Body Weight:  ; No Adjustment   · Adjusted BMI:      · BMI Categories: Obese Class 1 (BMI 30.0-34. 9)       Nutrition Diagnosis:   · Increased nutrient needs related to increase demand for energy/nutrients as evidenced by wounds      Nutrition Interventions:   Food and/or Nutrient Delivery:  Continue NPO (When diet restarted after surgery plan ONS initiation.)  Nutrition Education/Counseling:  Education initiated (Encouraged oral intake, good protein sources, and ONS use to assist in wound healing efforts.)   Coordination of Nutrition Care:  Continue to monitor while inpatient    Goals:  Pt will consume 75% or more of meals to assist in wound healing during LOS. Nutrition Monitoring and Evaluation:   Behavioral-Environmental Outcomes:  None Identified   Food/Nutrient Intake Outcomes:  Diet Advancement/Tolerance,Food and Nutrient Intake,Supplement Intake  Physical Signs/Symptoms Outcomes:  Biochemical Data,Chewing or Swallowing,GI Status,Fluid Status or Edema,Nutrition Focused Physical Findings,Skin,Weight     Discharge Planning:     Too soon to determine     Electronically signed by Angely Lala RD, LD on 1/19/22 at 9:52 AM EST    Contact: [phone]

## 2022-01-19 NOTE — PROGRESS NOTES
Progress note: Infectious diseases    Patient - Lilia Saha,  Age - 77 y.o.    - 1955      Room Number - 4A-05/005-A   N -  [id number]   Acct # - [de-identified]  Date of Admission -  2022  4:05 PM    SUBJECTIVE:   He had diverting colostomy. OBJECTIVE   VITALS    height is 5' 6\" (1.676 m) and weight is 190 lb 14.7 oz (86.6 kg). His oral temperature is 96.1 °F (35.6 °C). His blood pressure is 184/75 (abnormal) and his pulse is 62. His respiration is 16 and oxygen saturation is 94%.        Wt Readings from Last 3 Encounters:   01/15/22 190 lb 14.7 oz (86.6 kg)   21 195 lb 8 oz (88.7 kg)   18 223 lb 11.2 oz (101.5 kg)       I/O (24 Hours)    Intake/Output Summary (Last 24 hours) at 2022 1726  Last data filed at 2022 1340  Gross per 24 hour   Intake 740 ml   Output 1701 ml   Net -961 ml       General Appearance  Awake, alert, oriented,  not  In acute distress  HEENT - normocephalic, atraumatic, pale  conjunctiva,  anicteric sclera  Neck - Supple, no mass  Lungs -  Bilateral   air entry, no rhonchi, no wheeze  Cardiovascular - Heart sounds are normal.     Abdomen - soft, colostomy notedNeurologic -oriented  Skin - No bruising or bleeding  Extremities - foam dressing to heel  Open coccyx wound    MEDICATIONS:      piperacillin-tazobactam  3,375 mg IntraVENous 30 Min Pre-Op    famotidine  20 mg Oral BID    sodium hypochlorite   Irrigation Daily    amLODIPine  10 mg Oral Daily    [Held by provider] apixaban  10 mg Oral BID    [Held by provider] aspirin  81 mg Oral Daily    hydrALAZINE  100 mg Oral Q8H    [Held by provider] hydroCHLOROthiazide  50 mg Oral Daily    losartan  100 mg Oral Daily    [Held by provider] metoprolol succinate  50 mg Oral Daily    sodium chloride flush  5-40 mL IntraVENous 2 times per day    insulin lispro  0-18 Units SubCUTAneous Q4H    calcium replacement protocol   Other RX Placeholder    piperacillin-tazobactam  3,375 mg IntraVENous Q8H    tamsulosin  0.4 mg Oral Daily    clindamycin (CLEOCIN) IV  600 mg IntraVENous Q8H      sodium chloride      dextrose 100 mL/hr at 01/18/22 5134    sodium chloride      dextrose      sodium chloride      sodium chloride       sodium chloride, sodium chloride flush, sodium chloride, ondansetron **OR** ondansetron, glucose, dextrose, glucagon (rDNA), dextrose, HYDROcodone 5 mg - acetaminophen, sodium chloride, fentanNYL, sodium chloride      LABS:     CBC:   Recent Labs     01/16/22  2340 01/17/22  0450   WBC  --  12.2*   HGB 7.5* 7.8*   PLT  --  221     BMP:    Recent Labs     01/17/22  0450 01/17/22  0450 01/18/22  0525 01/18/22  0745 01/19/22  0500   *  --  149*  --  146*   K 3.7   < > 2.7* 3.2* 3.2*   *  --  122*  --  116*   CO2 23  --  20*  --  25   BUN 47*  --  28*  --  20   CREATININE 1.2  --  0.7  --  1.0   GLUCOSE 119*  --  133*  --  107    < > = values in this interval not displayed. Calcium:  Recent Labs     01/19/22  0500   CALCIUM 7.6*     Ionized Calcium:No results for input(s): IONCA in the last 72 hours. Magnesium:  Recent Labs     01/17/22  0450   MG 2.6*     Phosphorus:  Recent Labs     01/17/22  0450   PHOS 2.9     BNP:No results for input(s): BNP in the last 72 hours. Glucose:  Recent Labs     01/19/22  0802 01/19/22  1248 01/19/22  1600   POCGLU 131* 135* 131*     HgbA1C:   No results for input(s): LABA1C in the last 72 hours.   INR:   Recent Labs     01/18/22  0815 01/19/22  0500   INR 2.16* 1.37*     Hepatic:   Recent Labs     01/17/22  0450 01/18/22  0525 01/19/22  0500   ALKPHOS 100 77 99   ALT 44 33 39   AST 25 17 22   PROT 4.6* 4.5* 5.1*   BILITOT 0.4 0.3 0.5   BILIDIR <0.2 <0.2 <0.2   LABALBU 1.9* 1.6* 2.0*        CULTURES:   UA:   No results for input(s): Paulett Castles, COLORU, CLARITYU, MUCUS, PROTEINU, BLOODU, RBCUA, WBCUA, BACTERIA, NITRU, GLUCOSEU, BILIRUBINUR, UROBILINOGEN, KETUA, LABCAST, LABCASTTY, AMORPHOS in the last 72 hours. Invalid input(s): CRYSTALS  Micro:   Lab Results   Component Value Date    BC No growth-preliminary  01/14/2022          Problem list of patient:     Patient Active Problem List   Diagnosis Code    Cellulitis and abscess of neck L03.221, L02.11    Essential hypertension I10    Leukocytosis D72.829    Obesity (BMI 30-39. 9) E66.9    Hyperlipidemia E78.5    COVID-19 virus detected U07.1    COVID-19 U07.1    Acute respiratory failure with hypoxia (HCC) J96.01    Necrotizing soft tissue infection M79.89    Pressure injury of sacral region, stage 4 ([facility]) L89.154    Sepsis ([facility]) A41.9    Septic shock ([facility]) A41.9, R65.21    Horseshoe abscess of ischiorectal space K61.31    Necrotizing fasciitis of pelvic region and thigh ([facility]) M72.6    Pressure injury, unstageable, with infection ([facility]) L89.95, L08.9    Shock ([address] R57.9         ASSESSMENT/PLAN   Necrotizing wound infection related to pressure injury  Deconditioning due to recent COVID -19 infection  Had diverting colostomy  Leola Vargas MD, MD, FACP 1/19/2022 5:26 PM

## 2022-01-20 LAB
ALBUMIN SERPL-MCNC: 1.8 G/DL (ref 3.5–5.1)
ALP BLD-CCNC: 81 U/L (ref 38–126)
ALT SERPL-CCNC: 30 U/L (ref 11–66)
ANION GAP SERPL CALCULATED.3IONS-SCNC: 8 MEQ/L (ref 8–16)
AST SERPL-CCNC: 16 U/L (ref 5–40)
BILIRUB SERPL-MCNC: 0.3 MG/DL (ref 0.3–1.2)
BILIRUBIN DIRECT: < 0.2 MG/DL (ref 0–0.3)
BLOOD CULTURE, ROUTINE: NORMAL
BLOOD CULTURE, ROUTINE: NORMAL
BUN BLDV-MCNC: 13 MG/DL (ref 7–22)
CALCIUM IONIZED: 1.14 MMOL/L (ref 1.12–1.32)
CALCIUM SERPL-MCNC: 7.2 MG/DL (ref 8.5–10.5)
CHLORIDE BLD-SCNC: 113 MEQ/L (ref 98–111)
CO2: 23 MEQ/L (ref 23–33)
CREAT SERPL-MCNC: 0.7 MG/DL (ref 0.4–1.2)
ERYTHROCYTE [DISTWIDTH] IN BLOOD BY AUTOMATED COUNT: 14.6 % (ref 11.5–14.5)
ERYTHROCYTE [DISTWIDTH] IN BLOOD BY AUTOMATED COUNT: 48.7 FL (ref 35–45)
GFR SERPL CREATININE-BSD FRML MDRD: > 90 ML/MIN/1.73M2
GLUCOSE BLD-MCNC: 107 MG/DL (ref 70–108)
GLUCOSE BLD-MCNC: 110 MG/DL (ref 70–108)
GLUCOSE BLD-MCNC: 122 MG/DL (ref 70–108)
GLUCOSE BLD-MCNC: 126 MG/DL (ref 70–108)
GLUCOSE BLD-MCNC: 179 MG/DL (ref 70–108)
HCT VFR BLD CALC: 24.2 % (ref 42–52)
HEMOGLOBIN: 7.6 GM/DL (ref 14–18)
INR BLD: 1.26 (ref 0.85–1.13)
MAGNESIUM: 1.9 MG/DL (ref 1.6–2.4)
MCH RBC QN AUTO: 28.9 PG (ref 26–33)
MCHC RBC AUTO-ENTMCNC: 31.4 GM/DL (ref 32.2–35.5)
MCV RBC AUTO: 92 FL (ref 80–94)
PHOSPHORUS: 1.9 MG/DL (ref 2.4–4.7)
PLATELET # BLD: 249 THOU/MM3 (ref 130–400)
PMV BLD AUTO: 10.3 FL (ref 9.4–12.4)
POTASSIUM SERPL-SCNC: 3 MEQ/L (ref 3.5–5.2)
RBC # BLD: 2.63 MILL/MM3 (ref 4.7–6.1)
SODIUM BLD-SCNC: 144 MEQ/L (ref 135–145)
TOTAL PROTEIN: 5.1 G/DL (ref 6.1–8)
WBC # BLD: 15.1 THOU/MM3 (ref 4.8–10.8)

## 2022-01-20 PROCEDURE — 6370000000 HC RX 637 (ALT 250 FOR IP): Performed by: STUDENT IN AN ORGANIZED HEALTH CARE EDUCATION/TRAINING PROGRAM

## 2022-01-20 PROCEDURE — 2580000003 HC RX 258: Performed by: STUDENT IN AN ORGANIZED HEALTH CARE EDUCATION/TRAINING PROGRAM

## 2022-01-20 PROCEDURE — 82948 REAGENT STRIP/BLOOD GLUCOSE: CPT

## 2022-01-20 PROCEDURE — P9041 ALBUMIN (HUMAN),5%, 50ML: HCPCS | Performed by: STUDENT IN AN ORGANIZED HEALTH CARE EDUCATION/TRAINING PROGRAM

## 2022-01-20 PROCEDURE — 6360000002 HC RX W HCPCS: Performed by: SURGERY

## 2022-01-20 PROCEDURE — 85610 PROTHROMBIN TIME: CPT

## 2022-01-20 PROCEDURE — 97162 PT EVAL MOD COMPLEX 30 MIN: CPT

## 2022-01-20 PROCEDURE — 99233 SBSQ HOSP IP/OBS HIGH 50: CPT | Performed by: INTERNAL MEDICINE

## 2022-01-20 PROCEDURE — 2060000000 HC ICU INTERMEDIATE R&B

## 2022-01-20 PROCEDURE — 82330 ASSAY OF CALCIUM: CPT

## 2022-01-20 PROCEDURE — 84100 ASSAY OF PHOSPHORUS: CPT

## 2022-01-20 PROCEDURE — 97530 THERAPEUTIC ACTIVITIES: CPT

## 2022-01-20 PROCEDURE — 92526 ORAL FUNCTION THERAPY: CPT

## 2022-01-20 PROCEDURE — 6370000000 HC RX 637 (ALT 250 FOR IP): Performed by: SURGERY

## 2022-01-20 PROCEDURE — 85027 COMPLETE CBC AUTOMATED: CPT

## 2022-01-20 PROCEDURE — 2500000003 HC RX 250 WO HCPCS: Performed by: SURGERY

## 2022-01-20 PROCEDURE — 80053 COMPREHEN METABOLIC PANEL: CPT

## 2022-01-20 PROCEDURE — 2500000003 HC RX 250 WO HCPCS: Performed by: STUDENT IN AN ORGANIZED HEALTH CARE EDUCATION/TRAINING PROGRAM

## 2022-01-20 PROCEDURE — 83735 ASSAY OF MAGNESIUM: CPT

## 2022-01-20 PROCEDURE — 6360000002 HC RX W HCPCS: Performed by: FAMILY MEDICINE

## 2022-01-20 PROCEDURE — 97166 OT EVAL MOD COMPLEX 45 MIN: CPT

## 2022-01-20 PROCEDURE — 6360000002 HC RX W HCPCS: Performed by: STUDENT IN AN ORGANIZED HEALTH CARE EDUCATION/TRAINING PROGRAM

## 2022-01-20 PROCEDURE — 99024 POSTOP FOLLOW-UP VISIT: CPT | Performed by: SURGERY

## 2022-01-20 PROCEDURE — 82248 BILIRUBIN DIRECT: CPT

## 2022-01-20 PROCEDURE — 2580000003 HC RX 258: Performed by: SURGERY

## 2022-01-20 RX ORDER — POTASSIUM CHLORIDE 7.45 MG/ML
10 INJECTION INTRAVENOUS PRN
Status: DISCONTINUED | OUTPATIENT
Start: 2022-01-20 | End: 2022-01-27 | Stop reason: HOSPADM

## 2022-01-20 RX ORDER — ALBUMIN, HUMAN INJ 5% 5 %
25 SOLUTION INTRAVENOUS ONCE
Status: COMPLETED | OUTPATIENT
Start: 2022-01-20 | End: 2022-01-20

## 2022-01-20 RX ORDER — POTASSIUM CHLORIDE 20 MEQ/1
40 TABLET, EXTENDED RELEASE ORAL PRN
Status: DISCONTINUED | OUTPATIENT
Start: 2022-01-20 | End: 2022-01-27 | Stop reason: HOSPADM

## 2022-01-20 RX ADMIN — PIPERACILLIN AND TAZOBACTAM 3375 MG: 3; .375 INJECTION, POWDER, LYOPHILIZED, FOR SOLUTION INTRAVENOUS at 14:39

## 2022-01-20 RX ADMIN — APIXABAN 5 MG: 5 TABLET, FILM COATED ORAL at 11:13

## 2022-01-20 RX ADMIN — HYDROCODONE BITARTRATE AND ACETAMINOPHEN 1 TABLET: 5; 325 TABLET ORAL at 08:10

## 2022-01-20 RX ADMIN — SODIUM CHLORIDE, PRESERVATIVE FREE 10 ML: 5 INJECTION INTRAVENOUS at 08:13

## 2022-01-20 RX ADMIN — POTASSIUM CHLORIDE 10 MEQ: 7.46 INJECTION, SOLUTION INTRAVENOUS at 12:42

## 2022-01-20 RX ADMIN — CLINDAMYCIN PHOSPHATE 600 MG: 600 INJECTION, SOLUTION INTRAVENOUS at 11:41

## 2022-01-20 RX ADMIN — POTASSIUM CHLORIDE 10 MEQ: 7.46 INJECTION, SOLUTION INTRAVENOUS at 11:17

## 2022-01-20 RX ADMIN — CLINDAMYCIN PHOSPHATE 600 MG: 600 INJECTION, SOLUTION INTRAVENOUS at 04:50

## 2022-01-20 RX ADMIN — ONDANSETRON 4 MG: 2 INJECTION INTRAMUSCULAR; INTRAVENOUS at 08:24

## 2022-01-20 RX ADMIN — PIPERACILLIN AND TAZOBACTAM 3375 MG: 3; .375 INJECTION, POWDER, LYOPHILIZED, FOR SOLUTION INTRAVENOUS at 22:37

## 2022-01-20 RX ADMIN — ASPIRIN 81 MG: 81 TABLET, COATED ORAL at 08:12

## 2022-01-20 RX ADMIN — FAMOTIDINE 20 MG: 20 TABLET ORAL at 21:52

## 2022-01-20 RX ADMIN — PIPERACILLIN AND TAZOBACTAM 3375 MG: 3; .375 INJECTION, POWDER, LYOPHILIZED, FOR SOLUTION INTRAVENOUS at 04:52

## 2022-01-20 RX ADMIN — CLINDAMYCIN PHOSPHATE 600 MG: 600 INJECTION, SOLUTION INTRAVENOUS at 21:38

## 2022-01-20 RX ADMIN — FENTANYL CITRATE 50 MCG: 0.05 INJECTION, SOLUTION INTRAMUSCULAR; INTRAVENOUS at 12:18

## 2022-01-20 RX ADMIN — HYDRALAZINE HYDROCHLORIDE 100 MG: 50 TABLET ORAL at 00:08

## 2022-01-20 RX ADMIN — HYDRALAZINE HYDROCHLORIDE 100 MG: 50 TABLET ORAL at 08:12

## 2022-01-20 RX ADMIN — APIXABAN 5 MG: 5 TABLET, FILM COATED ORAL at 21:43

## 2022-01-20 RX ADMIN — POTASSIUM CHLORIDE 10 MEQ: 7.46 INJECTION, SOLUTION INTRAVENOUS at 07:00

## 2022-01-20 RX ADMIN — FAMOTIDINE 20 MG: 20 TABLET ORAL at 08:12

## 2022-01-20 RX ADMIN — POTASSIUM CHLORIDE 10 MEQ: 7.46 INJECTION, SOLUTION INTRAVENOUS at 09:49

## 2022-01-20 RX ADMIN — AMLODIPINE BESYLATE 10 MG: 10 TABLET ORAL at 08:12

## 2022-01-20 RX ADMIN — ALBUMIN (HUMAN) 25 G: 12.5 INJECTION, SOLUTION INTRAVENOUS at 13:23

## 2022-01-20 RX ADMIN — POTASSIUM CHLORIDE 10 MEQ: 7.46 INJECTION, SOLUTION INTRAVENOUS at 07:55

## 2022-01-20 RX ADMIN — DAKIN'S SOLUTION 0.125% (QUARTER STRENGTH): 0.12 SOLUTION at 08:14

## 2022-01-20 RX ADMIN — TAMSULOSIN HYDROCHLORIDE 0.4 MG: 0.4 CAPSULE ORAL at 08:12

## 2022-01-20 RX ADMIN — LOSARTAN POTASSIUM 100 MG: 100 TABLET, FILM COATED ORAL at 08:12

## 2022-01-20 RX ADMIN — POTASSIUM PHOSPHATE, MONOBASIC AND POTASSIUM PHOSPHATE, DIBASIC 30 MMOL: 224; 236 INJECTION, SOLUTION, CONCENTRATE INTRAVENOUS at 11:31

## 2022-01-20 RX ADMIN — HYDROCODONE BITARTRATE AND ACETAMINOPHEN 1 TABLET: 5; 325 TABLET ORAL at 14:36

## 2022-01-20 RX ADMIN — POTASSIUM CHLORIDE 10 MEQ: 7.46 INJECTION, SOLUTION INTRAVENOUS at 14:41

## 2022-01-20 RX ADMIN — SODIUM CHLORIDE, PRESERVATIVE FREE 10 ML: 5 INJECTION INTRAVENOUS at 22:31

## 2022-01-20 ASSESSMENT — PAIN SCALES - GENERAL
PAINLEVEL_OUTOF10: 0
PAINLEVEL_OUTOF10: 6
PAINLEVEL_OUTOF10: 0
PAINLEVEL_OUTOF10: 0
PAINLEVEL_OUTOF10: 7
PAINLEVEL_OUTOF10: 2
PAINLEVEL_OUTOF10: 0

## 2022-01-20 ASSESSMENT — PAIN DESCRIPTION - LOCATION: LOCATION: BUTTOCKS

## 2022-01-20 NOTE — PROGRESS NOTES
Progress note: Infectious diseases    Patient - Elaina Rogel,  Age - 77 y.o.    - 1955      Room Number - 4A-05/005-A   MRN -  [id number]   Acct # - [de-identified]  Date of Admission -  2022  4:05 PM    SUBJECTIVE:   His colostomy is functioning well. OBJECTIVE   VITALS    height is 5' 6\" (1.676 m) and weight is 190 lb 14.7 oz (86.6 kg). His axillary temperature is 97.5 °F (36.4 °C). His blood pressure is 155/70 (abnormal) and his pulse is 55. His respiration is 17 and oxygen saturation is 94%. Wt Readings from Last 3 Encounters:   01/15/22 190 lb 14.7 oz (86.6 kg)   21 195 lb 8 oz (88.7 kg)   18 223 lb 11.2 oz (101.5 kg)       I/O (24 Hours)    Intake/Output Summary (Last 24 hours) at 2022 1125  Last data filed at 2022 0504  Gross per 24 hour   Intake 0 ml   Output 1375 ml   Net -1375 ml       General Appearance  Awake, alert, oriented,  not  In acute distress  HEENT - normocephalic, atraumatic, pale  conjunctiva,  anicteric sclera  Neck - Supple, no mass  Lungs -  Bilateral   air entry, no rhonchi, no wheeze  Cardiovascular - Heart sounds are normal.     Abdomen - soft, colostomy functioning, soft abdomen  Skin - No bruising or bleeding  Extremities - foam dressing to heel  Dressed coccyx wound.     MEDICATIONS:      phosphorus replacement protocol   Other RX Placeholder    albumin human  25 g IntraVENous Once    apixaban  5 mg Oral BID    potassium phosphate IVPB  30 mmol IntraVENous Once    piperacillin-tazobactam  3,375 mg IntraVENous 30 Min Pre-Op    famotidine  20 mg Oral BID    sodium hypochlorite   Irrigation Daily    amLODIPine  10 mg Oral Daily    aspirin  81 mg Oral Daily    hydrALAZINE  100 mg Oral Q8H    [Held by provider] hydroCHLOROthiazide  50 mg Oral Daily    losartan  100 mg Oral Daily    metoprolol succinate  50 mg Oral Daily    sodium chloride flush 5-40 mL IntraVENous 2 times per day    insulin lispro  0-18 Units SubCUTAneous Q4H    calcium replacement protocol   Other RX Placeholder    piperacillin-tazobactam  3,375 mg IntraVENous Q8H    tamsulosin  0.4 mg Oral Daily    clindamycin (CLEOCIN) IV  600 mg IntraVENous Q8H      sodium chloride      sodium chloride      dextrose      sodium chloride      sodium chloride       potassium chloride **OR** potassium alternative oral replacement **OR** potassium chloride, sodium chloride, sodium chloride flush, sodium chloride, ondansetron **OR** ondansetron, glucose, dextrose, glucagon (rDNA), dextrose, HYDROcodone 5 mg - acetaminophen, sodium chloride, fentanNYL, sodium chloride      LABS:     CBC:   Recent Labs     01/20/22 0445   WBC 15.1*   HGB 7.6*        BMP:    Recent Labs     01/18/22  0525 01/18/22  0525 01/18/22  0745 01/19/22  0500 01/20/22  0445   *  --   --  146* 144   K 2.7*   < > 3.2* 3.2* 3.0*   *  --   --  116* 113*   CO2 20*  --   --  25 23   BUN 28*  --   --  20 13   CREATININE 0.7  --   --  1.0 0.7   GLUCOSE 133*  --   --  107 107    < > = values in this interval not displayed. Calcium:  Recent Labs     01/20/22 0445   CALCIUM 7.2*     Ionized Calcium:No results for input(s): IONCA in the last 72 hours. Magnesium:  Recent Labs     01/20/22 0445   MG 1.9     Phosphorus:  Recent Labs     01/20/22  0445   PHOS 1.9*     BNP:No results for input(s): BNP in the last 72 hours.   Glucose:  Recent Labs     01/19/22  2324 01/20/22  0442 01/20/22  0844   POCGLU 112* 110* 126*    INR:   Recent Labs     01/18/22  0815 01/19/22  0500 01/20/22  0445   INR 2.16* 1.37* 1.26*     Hepatic:   Recent Labs     01/18/22  0525 01/19/22  0500 01/20/22  0445   ALKPHOS 77 99 81   ALT 33 39 30   AST 17 22 16   PROT 4.5* 5.1* 5.1*   BILITOT 0.3 0.5 0.3   BILIDIR <0.2 <0.2 <0.2   LABALBU 1.6* 2.0* 1.8*         Problem list of patient:     Patient Active Problem List   Diagnosis Code    Cellulitis and abscess of neck L03.221, L02.11    Essential hypertension I10    Leukocytosis D72.829    Obesity (BMI 30-39. 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

## 2022-01-20 NOTE — PROGRESS NOTES
[facility]  INPATIENT SPEECH THERAPY  [facility] NEUROSCIENCES 4A  DAILY NOTE    TIME   SLP Individual Minutes  Time In: 2373  Time Out: 1628  Minutes: 9  Timed Code Treatment Minutes: 0 Minutes       Date: 2022  Patient Name: Wenceslao Leyden      CSN: [id number]   : 1955  (77 y.o.)  Gender: male   Referring Physician: Kendra Cevallos MD   Diagnosis: Sepsis   Secondary Diagnosis: Dysphagia   Precautions: fall risk, aspiration precautions   Current Diet: Pureed diet and thin liquids   Swallowing Strategies: Full Upright Position, Small Bite/Sip, Multiple Swallow, Pulmonary Monitoring, Alternate Solids and Liquids, Limit Distractions and Monitor for Fatigue    Date of Last MBS/FEES: FEES 22    Pain:  No pain reported. Subjective:  Session approved by RN, Shahid Peterson. Patient seen upright in bed. Alert and cooperative. Short-Term Goals:  SHORT TERM GOAL #1:  Goal 1: Patient will consume puree diet with thin liquids without s/s of aspiration with use of compensatory strategies (alternate solids/liquids, double swallow) in order to safely maintain adequate hydration and nutrition  INTERVENTIONS: Patient reports good success with current diet. Completed review and education of the following safe swallowing strategies/precautions--  *Sit upright   *Small bites/drinks  *Alternate solids/liquids  *Double swallows    SHORT TERM GOAL #2:  Goal 2: Patient will complete oral and pharyngeal exercises x15 in order to improve overall oral mastication + pharyngeal constricition. INTERVENTIONS: External lingual lateralization: x10 good success  External lingual circles: x10 each direction-- decreased ROM in clockwise rotation, decompensation with fatigue   Effortful swallows: with and without PO-- x10 fair success at best *poor initiation of dry swallows.      HEP= x10 repetitions, TID     SHORT TERM GOAL #3:  Goal 3: Patient will complete repeat instrumental evaluation (if patient requests soft and hard solids) in order to determine readiness for diet advancement  INTERVENTIONS: Not appropriate at this time. SHORT TERM GOAL #4:  Goal 4: Patient will demosntrate understanding of compensatory strategies (alternate solids/liquids, double swallow) in order to improve overall safety of the swallow  INTERVENTIONS: See STG 1.     Long-Term Goals:  No established LTG's given short ELOS        EDUCATION:  Learner: Patient  Education:  Reviewed results and recommendations of this evaluation, Reviewed diet and strategies and Reviewed ST goals and Plan of Care  Evaluation of Education: Verbalizes understanding, Needs further instruction and Family not present    ASSESSMENT/PLAN:  Activity Tolerance:  Patient tolerance of  treatment: good. Assessment/Plan: Patient progressing toward established goals. Continues to require skilled care of licensed speech pathologist to progress toward achievement of established goals and plan of care. .     Plan for Next Session: dysphagia management/intervention      Robert Landis M.S. CCC-SLP [id number] 1/20/2022

## 2022-01-20 NOTE — PROGRESS NOTES
[facility] 60  INPATIENT OCCUPATIONAL THERAPY  [facility] NEUROSCIENCES 4A  EVALUATION    Time:   Time In: 9559  Time Out: 2167  Timed Code Treatment Minutes: 24 Minutes  Minutes: 34          Date: 2022  Patient Name: Lara Pack,   Gender: male      MRN: [id number]  : 1955  (77 y.o.)  Referring Practitioner: Raquel Robins MD  Diagnosis: Sepsis  Additional Pertinent Hx: Lara Pack is a 77 y.o. male who presents to [address] [place] [address] ED 2022 with buttock pain. Patient is a lifetime nonsmoker with a PMH significant for recent LLE DVT on Eliquis, IDDM, Primary hypertension, urinary retention. Patient arrived by EMS from [facility] with new decubitus ulcer. Patient believes this was present for multiple weeks however this was not well documented on his previous hospital encounters. He reports he had the area drained approximately one week ago by wound team.  Patient reports worsening pain accompanied by fever sweats and chills over the last week. Of note patient was seen in the ED on 2022 and ultrasound of the left leg demonstrated acute DVT affecting the peroneal and anterior tibial veins. Initiated on therapy for suspected DVT of left lower extremity with Eliquis 5 mg daily and has been on anticoagulation with Eliquis since that time. Prior to this patient was hospitalized from 2021 to 2021 for COVID-19 pneumonia. Patient received the usual therapy of Decadron and baricitinib and was discharged to a skilled nursing facility at [facility] for rehab on room air.  sx  EXPLORATORY LAPAROSCOPY, DIVERTING COLOSTOMY    Restrictions/Precautions:  Restrictions/Precautions: General Precautions,Fall Risk  Position Activity Restriction  Other position/activity restrictions: colostomy    Subjective  Chart Reviewed: Yes,Orders,Progress Notes,History and Physical,Operative Notes  Patient assessed for rehabilitation services?: Yes  Response to previous treatment: Patient with no complaints from previous session  Family / Caregiver Present: No    Subjective: RN approved of OT session. Pt supine in bed on arrival and agreeable to therapy with min encouragement. Pt stated he wants to be able to do more but cant d/t pain. Pain:  Pain Assessment  Patient Currently in Pain: Yes (10/10 sitting EOB, 1/10 at end of session)  Pain Location: Buttocks    Vitals: Vitals not assessed per clinical judgement, see nursing flowsheet    Social/Functional History:  Type of Home: Facility           ADL Assistance: Needs assistance  Homemaking Assistance: Needs assistance  Ambulation Assistance: Needs assistance  Transfer Assistance: Needs assistance          Additional Comments: Pt admitted 12/07/2021 d/ COVID. Pt was from home and was indep with ADLs, IADLS, mobility, and transfers. At discharge, pt was CGA for mobility & transfers. Pt was discharged to an ECF. At the [facility] pt reported that he required A for ADLs, IALDs, and did not ambulate much. VISION:WFL    HEARING:  WFL    COGNITION: Decreased Insight and Decreased Problem Solving    RANGE OF MOTION:  Bilateral Upper Extremity:  WFL    STRENGTH:  Bilateral Upper Extremity:  grossly 5/5     SENSATION:   Not tested     ADL:   Grooming: set up for oral care   Lower Extremity Dressing: Dependent. Don bilateral socks. BALANCE:  Sitting Balance:  Maximum Assistance, X 1. with unilateral lean to left.  able to set EOB for ~5 seconds before needing to lay back down d/t 10/10 pain in buttocks area     BED MOBILITY:  Rolling to Left: Maximum Assistance, X 1, with verbal cues  For technique   Rolling to Right: Maximum Assistance, X 1, with verbal cues     Supine to Sit: Maximum Assistance, X 1    Sit to Supine: Maximum Assistance x1   Scooting: Dependent using hercules sheet toward HOB    *completed multiple bed rolls for correct positioning of pillows to ensure comfort     TRANSFERS:  ALVARO     FUNCTIONAL MOBILITY:  ALVARO     Activity Tolerance:  Patient tolerance of  treatment: fair limited d/t pain       Assessment:  Assessment:  .pt requires assistance with ADLs, transfers, and functional mobility compared to  PLOF. Pt would benefit from skilled OT services to address these tasks, in addition to strength and endurance to return to PLOF. Performance deficits / Impairments: Decreased functional mobility ,Decreased strength,Decreased endurance,Decreased ADL status,Decreased high-level IADLs,Decreased ROM  Prognosis: Fair  REQUIRES OT FOLLOW UP: Yes  Decision Making: Medium Complexity    Treatment Initiated: Treatment and education initiated within context of evaluation. Evaluation time included review of current medical information, gathering information related to past medical, social and functional history, completion of standardized testing, formal and informal observation of tasks, assessment of data and development of plan of care and goals. Treatment time included skilled education and facilitation of tasks to increase safety and independence with ADL's for improved functional independence and quality of life. Discharge Recommendations:  Subacute/Skilled Nursing Facility    Patient Education:  OT Education: OT Role,Plan of Care,ADL Adaptive Strategies    Equipment Recommendations:  Equipment Needed:  (continue to assess)    Plan:  Times per week: 5x  Times per day: Daily  Current Treatment Recommendations: Strengthening,Safety Education & Training,Patient/Caregiver Education & Training,Self-Care / [place] Training. See long-term goal time frame for expected duration of plan of care. If no long-term goals established, a short length of stay is anticipated.     Goals:  Patient goals : increase indep  Short term goals  Time Frame for Short term goals: by discharge  Short term goal 1: pt will tolerate sitting EOB with min A x1 for 5 minutes to increase indep with completing ADLs  Short term goal 2: pt will complete UB ADL with set up only to increase indep with grooming and dressing tasks. Short term goal 3: pt will complete BUE mod resisitve exercises with min cuing for technique to increase strength and endurance required for ADLs  Short term goal 4: OTR to assess functional transfers and functional mobility when appropriate         Following session, patient left in safe position with all fall risk precautions in place.

## 2022-01-20 NOTE — PROGRESS NOTES
Pt agreeable to 3rd attempt this date. Pt willing to try and sit on EOB this date. General:  Overall Orientation Status: Within Normal Limits  Follows Commands: Within Functional Limits    Vision: Impaired  Vision Exceptions: Wears glasses for reading    Hearing: Within functional limits         Pain: mod pain with sitting EOB, did not give rating    Vitals: Vitals not assessed per clinical judgement, see nursing flowsheet    Social/Functional History:    Lives With: Alone  Type of Home: Facility  Home Layout: One level  Home Access: Level entry             ADL Assistance: Independent  Homemaking Assistance: Independent  Ambulation Assistance: Independent  Transfer Assistance: Independent    Active : Yes     Additional Comments: Pt admitted 12/07/2021 d/ COVID. Pt was from home and was indep with ADLs, IADLS, mobility, and transfers. At discharge, pt was CGA for mobility & transfers. Pt was discharged to an ECF. At the [facility] pt reported that he required A for ADLs, IALDs, and did not ambulate much. Pt plans to return home. OBJECTIVE:  Range of Motion:  Bilateral Lower Extremity: WFL    Strength:  Bilateral Lower Extremity: Impaired - grossly deconditioned    Balance:  Static Sitting Balance:  Stand By Assistance, Contact Guard Assistance, X 1, with cues for safety, with verbal cues, intermittent B UE support required for safety   Dynamic Sitting Balance: Contact Guard Assistance, Minimal Assistance, X 1, with cues for safety, with verbal cues. Pt completed reaching x10 on each UE to help improve core strength and mobility.  Pt tolerated about 9-10 min sitting EOB    Bed Mobility:  Rolling to Right: Maximum Assistance, X 1, with head of bed flat, with rail, with verbal cues    Supine to Sit: Maximum Assistance, X 1, with head of bed raised, with rail, with verbal cues, assist required for trunk support   Sit to Supine: Maximum Assistance, X 1, with head of bed flat, with verbal cues, assist for B LE and trunk support for safety   Scooting: Dependent with use of hercules sheet for support. Transfers:  Not Tested  Not safe at this time due to weakness   Ambulation:  Not Tested    Functional Outcome Measures: Completed  AM-PAC Inpatient Mobility without Stair Climbing Raw Score : 7  AM-PAC Inpatient without Stair Climbing T-Scale Score : 28.66    ASSESSMENT:  Activity Tolerance:  Patient tolerance of  treatment: fair. Increased pain      Treatment Initiated: Treatment and education initiated within context of evaluation. Evaluation time included review of current medical information, gathering information related to past medical, social and functional history, completion of standardized testing, formal and informal observation of tasks, assessment of data and development of plan of care and goals. Treatment time included skilled education and facilitation of tasks to increase safety and independence with functional mobility for improved independence and quality of life. Assessment: Body structures, Functions, Activity limitations: Decreased functional mobility ,Increased pain,Decreased balance,Decreased posture,Decreased strength,Decreased safe awareness,Decreased endurance  Assessment: Pt cont to require skilled PT services to progress with balance, strength, endurance to progress with seated tasks, transfers to progress with functional mobility and progress towards PLOF safely. Pt requires 1 person assist for bed mobility tasks and not able to complete transfers at this time due to increased weakness.   Prognosis: Fair    REQUIRES PT FOLLOW UP: Yes    Discharge Recommendations:  Discharge Recommendations: Continue to assess pending progress,Subacute/Skilled Nursing Jessica Mckenzie would benefit from continued therapy after discharge    Patient Education:  PT Education: Tish Smith of [address] [facility]  Patient Education: d/c planning    Equipment Recommendations:  Equipment Needed: No    Plan:  Times per week: 5x GM  Current Treatment Recommendations: Strengthening,Neuromuscular Re-education,Home Exercise Program,Safety Education & Training,Balance Training,Endurance Training,Patient/Caregiver Education & Training,Equipment Evaluation, Education, & procurement,Positioning    Goals:  Patient goals : walk again  Short term goals  Time Frame for Short term goals: by discharge  Short term goal 1: Pt will tolerate 15 min of sitting EOB to complete functional mobility to improve core strength and progress with transfers. Short term goal 2: Pt will demo supine to and from sit transfers with min A x1 for safety to progress with overall mobility. Short term goal 3: PT to assess transfers/gait when appropriate. Short term goal 4: Pt will tolerate 10-20 reps of ther ex to increase overall mobility. Long term goals  Time Frame for Long term goals : NA due to short ELOS    Following session, patient left in safe position with all fall risk precautions in place. Pt back in bed following session with all needs and call light in reach following session, nursing aware.

## 2022-01-20 NOTE — PROGRESS NOTES
Patient lying in bed on left side with eyes closed. Clear pathways in room. Bed in low position, bed alarm on, and call light within reach. Vital signs obtained, /82, taken on right upper arm. Left radial pulse 59 BPM. Respiratory rate 16. Temperature 96.8. SpO2 96% on right index finger, patient on room air. Patient states a pain level of 0 on a scale of 0-10. Will continue to assess.  Mathis Olszewski [place] [facility] [place] [facility]----------------------------------------------------

## 2022-01-20 NOTE — PROGRESS NOTES
[facility] -  Favio Dewey MD M.D. FACS  Daily Progress Note    Pt Name: Adriel Woods  Medical Record Number: [id number]  Date of Birth 1955   Today's Date: 1/20/2022    ASSESSMENT:     1. POD # 6 , #1  2. 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 Ok to restart anticoagulation, no further surgery planned  2. Will be a long healing process with his large sacral decubitus and his open perineal wounds  3.  working with trying to find a discharge location when patient is medically stable      SUBJECTIVE:     [facility] is stable postop after his diverting loop colostomy done yesterday.        OBJECTIVE:     Patient Vitals for the past 24 hrs:   BP Temp Temp src Pulse Resp SpO2 Height   01/20/22 0444 (!) 161/72 97.1 °F (36.2 °C) Oral 56 16 93 % --   01/19/22 2330 (!) 144/49 98.1 °F (36.7 °C) Oral 80 18 96 % --   01/19/22 2015 (!) 170/61 98.1 °F (36.7 °C) Oral 63 16 96 % --   01/19/22 1847 (!) 180/74 -- Oral 64 18 -- --   01/19/22 1445 (!) 184/75 96.1 °F (35.6 °C) Oral 62 -- 94 % --   01/19/22 1244 (!) 164/77 98.1 °F (36.7 °C) Axillary 60 16 95 % --   01/19/22 1215 (!) 172/70 -- -- 53 8 97 % --   01/19/22 1210 (!) 163/73 -- -- 54 19 95 % --   01/19/22 1205 (!) 155/64 -- -- 52 11 97 % --   01/19/22 1200 (!) 157/72 -- -- 53 14 97 % --   01/19/22 1155 (!) 142/66 -- -- 54 10 95 % --   01/19/22 1150 (!) 146/68 -- -- 56 10 94 % --   01/19/22 1145 (!) 147/67 -- -- 54 10 95 % -- 01/19/22 1140 (!) 152/64 -- -- 57 16 96 % --   01/19/22 1135 (!) 151/68 -- -- 55 19 98 % --   01/19/22 1130 (!) 154/70 -- -- 55 14 96 % --   01/19/22 1125 (!) 145/59 -- -- 56 17 94 % --   01/19/22 1120 (!) 145/60 -- -- 57 14 97 % --   01/19/22 1115 (!) 149/67 -- -- 60 18 98 % --   01/19/22 1110 (!) 150/68 -- -- 60 17 97 % --   01/19/22 1109 (!) 151/68 99.2 °F (37.3 °C) Temporal 57 22 96 % --   01/19/22 0945 -- -- -- -- -- -- 5' 6\" (1.676 m)   01/19/22 0830 (!) 143/51 -- -- 58 16 95 % --         Intake/Output Summary (Last 24 hours) at 1/20/2022 8905  Last data filed at 1/20/2022 0504  Gross per 24 hour   Intake 500 ml   Output 1376 ml   Net -876 ml       In: 740 [P.O.:240; I.V.:500]  Out: 2376 [Urine:2375]    I/O last 3 completed shifts: In: 740 [P.O.:240; I.V.:500]  Out: 1701 [Urine:1700; Blood:1]     Date 01/20/22 0000 - 01/20/22 2359   Shift 1253-2263 0459-4473 4592-3969 24 Hour Total   INTAKE   Shift Total(mL/kg)       OUTPUT   Urine(mL/kg/hr) 675   675   Shift Total(mL/kg) 675(7.8)   675(7.8)   Weight (kg) 86.6 86.6 86.6 86.6       Wt Readings from Last 3 Encounters:   01/15/22 190 lb 14.7 oz (86.6 kg)   12/20/21 195 lb 8 oz (88.7 kg)   01/02/18 223 lb 11.2 oz (101.5 kg)        Body mass index is 30.81 kg/m².      Diet: Diet NPO Exceptions are: Sips of Water with Meds        MEDS:     Scheduled Meds:   potassium replacement protocol   Other RX Placeholder    piperacillin-tazobactam  3,375 mg IntraVENous 30 Min Pre-Op    famotidine  20 mg Oral BID    sodium hypochlorite   Irrigation Daily    amLODIPine  10 mg Oral Daily    [Held by provider] apixaban  10 mg Oral BID    [Held by provider] aspirin  81 mg Oral Daily    hydrALAZINE  100 mg Oral Q8H    [Held by provider] hydroCHLOROthiazide  50 mg Oral Daily    losartan  100 mg Oral Daily    metoprolol succinate  50 mg Oral Daily    sodium chloride flush  5-40 mL IntraVENous 2 times per day    insulin lispro  0-18 Units SubCUTAneous Q4H    calcium replacement protocol   Other RX Placeholder    piperacillin-tazobactam  3,375 mg IntraVENous Q8H    tamsulosin  0.4 mg Oral Daily    clindamycin (CLEOCIN) IV  600 mg IntraVENous Q8H     Continuous Infusions:   sodium chloride      dextrose 50 mL/hr at 01/19/22 2300    sodium chloride      dextrose      sodium chloride      sodium chloride       PRN Meds:sodium chloride, , PRN  sodium chloride flush, 5-40 mL, PRN  sodium chloride, 25 mL, PRN  ondansetron, 4 mg, Q8H PRN   Or  ondansetron, 4 mg, Q6H PRN  glucose, 15 g, PRN  dextrose, 12.5 g, PRN  glucagon (rDNA), 1 mg, PRN  dextrose, 100 mL/hr, PRN  HYDROcodone 5 mg - acetaminophen, 1 tablet, Q4H PRN  sodium chloride, , PRN  fentanNYL, 50 mcg, Q1H PRN  sodium chloride, , PRN          PHYSICAL EXAM:     CONSTITUTIONAL: Sleeping but awakens    WOUND/INCISION: Loop drains in place little bloody  drainage at this time  he has Dakin's 4 x 4's in his sacral decubitus debridement site        LABS:     CBC:   Recent Labs     01/20/22  0445   WBC 15.1*   RBC 2.63*   HGB 7.6*   HCT 24.2*   MCV 92.0   MCH 28.9   MCHC 31.4*      MPV 10.3      Last 3 CMP:   Recent Labs     01/18/22  0525 01/18/22  0525 01/18/22  0745 01/19/22  0500 01/20/22  0445   *  --   --  146* 144   K 2.7*   < > 3.2* 3.2* 3.0*   *  --   --  116* 113*   CO2 20*  --   --  25 23   BUN 28*  --   --  20 13   CREATININE 0.7  --   --  1.0 0.7   GLUCOSE 133*  --   --  107 107   CALCIUM 6.5*  --   --  7.6* 7.2*   PROT 4.5*  --   --  5.1* 5.1*   LABALBU 1.6*  --   --  2.0* 1.8*   BILITOT 0.3  --   --  0.5 0.3   ALKPHOS 77  --   --  99 81   AST 17  --   --  22 16   ALT 33  --   --  39 30    < > = values in this interval not displayed. Troponin: No results for input(s): TROPONINI in the last 72 hours.   Calcium:   Lab Results   Component Value Date    CALCIUM 7.2 01/20/2022    CALCIUM 7.6 01/19/2022    CALCIUM 6.5 01/18/2022      Ionized Calcium: No results found for: IONCA   Lipids: No results for input(s): CHOL, HDL in the last 72 hours. Invalid input(s): LDLCALCU  INR:   Recent Labs     01/18/22  0815 01/19/22  0500 01/20/22  0445   INR 2.16* 1.37* 1.26*     Lactic Acid: No results found for: LACTA               DVT prophylaxis: [] Lovenox                                 [] SCDs                                 [] SQ Heparin                                 [] Encourage ambulation, low risk for DVT, no chemical or mechanical prophylaxis necessary              [] Already on Anticoagulation      RADIOLOGY:      CT ABDOMEN PELVIS WO CONTRAST Additional Contrast? None    Result Date: 1/14/2022   1. Decubitus ulcer overlying the sacrum new since previous CT scan of the pelvis dated 7 December 2021. 2. Air   in the perineum, new since previous study dated 7 December 2021. 3. Dale catheter in place. Air in the urinary bladder. 4. Changes of ankylosing spondylitis involving the lower thoracic, lumbar spine and sacroiliac iliac joints. Deformity at the thoracolumbar junction. 5. Calcification along the gallbladder wall. 6. Bilateral inguinal lymph nodes. 7. Areas of atelectasis or scarring at both lung bases. **This report has been created using voice recognition software. It may contain minor errors which are inherent in voice recognition technology. ** Final report electronically signed by DR Escobar Garza on 1/14/2022 6:49 PM    XR CHEST PORTABLE    Result Date: 1/15/2022  1. Right IJ central line tip in the right atrium. This can be retracted by 3 cm. 2. Bilateral patchy interstitial opacities which may reflect infiltrate. 3. Borderline cardiomegaly. This document has been electronically signed by: Steven Mckeon MD on 01/15/2022 12:24 AM    XR CHEST PORTABLE    Result Date: 1/14/2022  1. Interval improvement since previous plain radiographs dated 11 December 2021 with reduction in the bilateral upper lobe infiltrates. . 2. Borderline cardiomegaly. 3. Thoracic spondylosis.  **This report has been created using voice recognition software. It may contain minor errors which are inherent in voice recognition technology. ** Final report electronically signed by DR Joanne Ponce on 1/14/2022 5:31 PM    VL DUP LOWER EXTREMITY VENOUS LEFT    Result Date: 1/8/2022  Acute thrombus in the peroneal and anterior tibial veins. Final report electronically signed by Dr. Aury Benoit on 1/8/2022 3:22 PM        Berlin Loepz MD, MELIZABETH.  FACS  Electronically signed 1/20/2022 at 6:33 AM

## 2022-01-20 NOTE — PROGRESS NOTES
INTERNAL MEDICINE HOSPITALIST PROGRESS NOTE      Patient:  Jessica Bess    Unit/Bed:4A-05/005-A  YOB: 1955  MRN: [id number]   PCP: DIONTE Gregorio - CNP  Date of Admission: 1/14/2022  Date of Service: 1/20/2022  Length of Stay: 6 days    Assessment and Plan:    1. Necrotizing soft tissue infection              -Status post emergent excisional debridement and drainage of bilateral abscess of deep ischial rectal space and excisional debridement of skin subcutaneous tissue muscle and fascia.              -Continue IV antibiotics per ID recommendations. Awaiting cultures. Duration to be determined per ID.              -Status post diverting colostomy on 1/19/2022     2. Hypernatremia -Resolved. Continue to trend for recurrence     3. Acute DVT              -eliquis restarted at 5 mg bid     4. Septic shock due to above - resolved     5. Essential HTN              - CCM. Will adjust dependent on past 24 hour values.     6. DMII              - Continue ISS. BG controlled. Will monitor and adjust accordingly.      7. Acute on chronic blood loss anemia              - stable     8. HLD     9. Hx of COVID 19  10. Urinary retention    Consults:   PHARMACY TO DOSE VANCOMYCIN  IP CONSULT TO INFECTIOUS DISEASES  IP CONSULT TO SOCIAL WORK  IP CONSULT TO PSYCHIATRY  IP CONSULT TO DIETITIAN  IP CONSULT TO DIETITIAN    Subjective (Past 24 hour events):  Doing well postoperatively     Past medical history, family history, social history and allergies reviewed again and is unchanged since admission. ROS (All review of systems completed. Pertinent positives noted.  Otherwise All other systems reviewed and negative.)     HPI:  28-year-old white male who presented to [facility] on 1/14/2022 with complaints of buttocks pain. Daniel Mcgarry has a past medical history of lifetime non-smoker, recent left lower extremity DVT on Eliquis, diabetes mellitus, hypertension, urinary retention.  Per report he presented to the emergency department via EMS from [address] with new decubitus ulcer. Loyd Pabon believes it was present for multiple weeks however was not well documented prior to hospital encounter. Lisa Cuenca reports that it has been draining for approximately 1 week. Loyd Pabon reported he was having worsening pain fever and sweats and chills over the last week.  In the emergency department he was seen on 1/8/2022 ultrasound of leg demonstrated acute DVT he was started on Eliquis.  Of note he was also hospitalized from 12/7/2021 to 12/20/2021 for COVID-19 pneumonia. Lisa Cuenca returned to his nursing facility on room air.  In the emergency department he was positive for lactic acidosis. Austin Frohlich revealed a decubitus ulcer with air in the perineum.  Patient was fluid resuscitated per sepsis protocol and given Vanco and Anand Mcneill surgery was consulted for debridement and concern for necrotizing fasciitis.  Initially patient was admitted to stepdown postprocedure he was transferred to the ICU for hypotension. Lisa Cuenca did undergo emergent debridement of abscess on 1/14 with Dr. Baldemar Claier was initially on vasopressors which have been weaned off. Please see Assessment and Plan for further details on hospital course. Please do not hesitate to contact me for any questions/comments/clarifications.     Medications:  Reviewed    Scheduled Meds:   phosphorus replacement protocol   Other RX Placeholder    albumin human  25 g IntraVENous Once    apixaban  5 mg Oral BID    potassium phosphate IVPB  30 mmol IntraVENous Once    piperacillin-tazobactam  3,375 mg IntraVENous 30 Min Pre-Op    famotidine  20 mg Oral BID    sodium hypochlorite   Irrigation Daily    amLODIPine  10 mg Oral Daily    aspirin  81 mg Oral Daily    hydrALAZINE  100 mg Oral Q8H    [Held by provider] hydroCHLOROthiazide  50 mg Oral Daily    losartan  100 mg Oral Daily    metoprolol succinate  50 mg Oral Daily    sodium chloride flush  5-40 mL IntraVENous 2 times per day    insulin lispro  0-18 Units SubCUTAneous Q4H    calcium replacement protocol   Other RX Placeholder    piperacillin-tazobactam  3,375 mg IntraVENous Q8H    tamsulosin  0.4 mg Oral Daily    clindamycin (CLEOCIN) IV  600 mg IntraVENous Q8H     Continuous Infusions:   sodium chloride      sodium chloride      dextrose      sodium chloride      sodium chloride       PRN Meds: potassium chloride **OR** potassium alternative oral replacement **OR** potassium chloride, sodium chloride, sodium chloride flush, sodium chloride, ondansetron **OR** ondansetron, glucose, dextrose, glucagon (rDNA), dextrose, HYDROcodone 5 mg - acetaminophen, sodium chloride, fentanNYL, sodium chloride    PHYSICAL EXAMINATION:  Blood pressure (!) 155/70, pulse 55, temperature 97.5 °F (36.4 °C), temperature source Axillary, resp. rate 17, height 5' 6\" (1.676 m), weight 190 lb 14.7 oz (86.6 kg), SpO2 94 %. General appearance: No apparent distress, appears stated age and cooperative. HEENT: Pupils equal, round, and reactive to light. Conjunctivae/corneas clear. Neck: Supple, with full range of motion. No jugular venous distention. Trachea midline. Respiratory:  Normal respiratory effort. Clear to auscultation, bilaterally without Rales/Wheezes/Rhonchi. Cardiovascular: Regular rate and rhythm with normal S1/S2 without murmurs, rubs or gallops. Abdomen: Soft, colostomy in place  Musculoskeletal: passive and active ROM x 4 extremities. Skin: unable to examine the back as patient is in discomfort from surgery, will reexamine tomorrow  Neurologic:  Neurovascularly intact without any focal sensory/motor deficits.  Cranial nerves: II-XII intact, grossly non-focal.  Psychiatric: Alert and oriented, thought content appropriate, normal insight  Capillary Refill: Brisk,< 3 seconds   Peripheral Pulses: +2 palpable, equal bilaterally     Diagnostic Data: (All radiographs, EKGs, PFTs, and imaging are personally viewed and interpreted unless otherwise noted). Recent Labs     01/20/22  0445   WBC 15.1*   HGB 7.6*   HCT 24.2*        Recent Labs     01/18/22  0525 01/18/22  0525 01/18/22  0745 01/19/22  0500 01/20/22  0445   *  --   --  146* 144   K 2.7*   < > 3.2* 3.2* 3.0*   *  --   --  116* 113*   CO2 20*  --   --  25 23   BUN 28*  --   --  20 13   CREATININE 0.7  --   --  1.0 0.7   CALCIUM 6.5*  --   --  7.6* 7.2*   PHOS  --   --   --   --  1.9*    < > = values in this interval not displayed. Recent Labs     01/18/22  0525 01/19/22  0500 01/20/22  0445   AST 17 22 16   ALT 33 39 30   BILIDIR <0.2 <0.2 <0.2   BILITOT 0.3 0.5 0.3   ALKPHOS 77 99 81     Recent Labs     01/18/22  0815 01/19/22  0500 01/20/22  0445   INR 2.16* 1.37* 1.26*     No results for input(s): Skippy Gault in the last 72 hours. Microbiology:    Blood culture #1:   Lab Results   Component Value Date    BC No growth-preliminary No growth  01/14/2022       Blood culture #2:No results found for: Andrzej Cho    Organism:  Lab Results   Component Value Date    ORG Pseudomonas aeruginosa 01/14/2022    ORG mixed anaerobic growth 01/14/2022         Lab Results   Component Value Date    LABGRAM  01/14/2022     Few segmented neutrophils observed. No epithelial cells observed. Many gram positive cocci occurring singly and in pairs. Many gram negative bacilli. Moderate large gram positive bacilli.         MRSA culture only:No results found for: [facility]    Urine culture:   Lab Results   Component Value Date    LABURIN No growth-preliminary No growth  01/15/2022       Respiratory culture: No results found for: CULTRESP    Aerobic and Anaerobic :  Lab Results   Component Value Date    LABAERO  01/14/2022     Culture also yielded light growth of Enterococcus species, Streptococcus species (Viridans group), Enteric gram negative bacilli, Staphylococcus species (coagulase negative), and gram positive bacilli most consistent with Corynebacterium species. If a true mixed infection is suspected, then broad spectrum empiric antibiotic therapy is indicated. LABAERO light growth  01/14/2022     Lab Results   Component Value Date    LABANAE  01/14/2022     Culture yielded heavy mixed anaerobic growth, including gram positive bacilli and multiple colony types of both gram negative bacill and gram positive cocci. If a true mixed aerobic and anaerobic infection is suspected, then broad spectrum empiric antibiotic therapy is indicated and should include coverage for anaerobic organisms. Urinalysis:      Lab Results   Component Value Date    NITRU NEGATIVE 01/15/2022    WBCUA 5-9 01/15/2022    BACTERIA FEW 01/15/2022    RBCUA 10-15 01/15/2022    BLOODU MODERATE 01/15/2022    SPECGRAV 1.015 01/15/2022    GLUCOSEU NEGATIVE 01/14/2022       Imaging Quick Reads from Previous 7 Days:  CT ABDOMEN PELVIS WO CONTRAST Additional Contrast? None    Result Date: 1/14/2022   1. Decubitus ulcer overlying the sacrum new since previous CT scan of the pelvis dated 7 December 2021. 2. Air   in the perineum, new since previous study dated 7 December 2021. 3. Dale catheter in place. Air in the urinary bladder. 4. Changes of ankylosing spondylitis involving the lower thoracic, lumbar spine and sacroiliac iliac joints. Deformity at the thoracolumbar junction. 5. Calcification along the gallbladder wall. 6. Bilateral inguinal lymph nodes. 7. Areas of atelectasis or scarring at both lung bases. **This report has been created using voice recognition software. It may contain minor errors which are inherent in voice recognition technology. ** Final report electronically signed by DR Francia Severin on 1/14/2022 6:49 PM    XR CHEST PORTABLE    Result Date: 1/15/2022  1. Right IJ central line tip in the right atrium. This can be retracted by 3 cm. 2. Bilateral patchy interstitial opacities which may reflect infiltrate. 3. Borderline cardiomegaly.  This document has been electronically signed by: Ashley Gomez MD on 01/15/2022 12:24 AM    XR CHEST PORTABLE    Result Date: 1/14/2022  1. Interval improvement since previous plain radiographs dated 11 December 2021 with reduction in the bilateral upper lobe infiltrates. . 2. Borderline cardiomegaly. 3. Thoracic spondylosis. **This report has been created using voice recognition software. It may contain minor errors which are inherent in voice recognition technology. ** Final report electronically signed by DR Sophia Syed on 1/14/2022 5:31 PM      Electronically signed by Jenniffer Charlton.  Ata Oropeza M.D. on 1/20/2022  PGY-2 Internal Medicine Resident

## 2022-01-20 NOTE — CARE COORDINATION
1/20/22, 1:15 PM EST    DISCHARGE PLANNING EVALUATION    Met with Tameka Mccord today. Told him we are still waiting for [facility] to make a decision on if they are able to accept. He told SW that he does not care where he goes as long as they take good care of him. He asked that SW call his friend Meron Sheffield. Terese Herrera is his boss at the [employer] in [facility]. Spoke with Terese Herrera and Scooter's spouse Ilya perry. Ilya perry is the admissions coordinator at [facility].  She told SW that they are not able to accept there as they don't have the staff to take care of his wound. Terese Herrera and Ilya perry are willing to help Tameka Mccord apply online for Medicaid. They have Riki's permission to help. Called Stefania Suarez at [facility] [place] [address] and she told SW that the biggest concern was the [facility] application. She will call SW back if they are able to accept. Update 2:47pm: Spoke with Colby from [facility]. They are unable to accept at this time. Called Terese Herrera and Herve Solorio wants them to help come up with facilities). Their next choices are [facility]. [facility], [facility] and [address]. Called and made referral to [facility] at [address]. Update 4:22pm: Spoke with Stacy at [facility]. She reports that the patient is too high acuity for them to take care of at discharge. AZAM called Dante Mckoy who is over admissions for several [facility] facilities. She spoke with Pritesh Choi at [facility] and they will review Riki's information and get back with AZAM in the morning.

## 2022-01-21 LAB
ALBUMIN SERPL-MCNC: 2.2 G/DL (ref 3.5–5.1)
ALP BLD-CCNC: 70 U/L (ref 38–126)
ALT SERPL-CCNC: 24 U/L (ref 11–66)
ANION GAP SERPL CALCULATED.3IONS-SCNC: 7 MEQ/L (ref 8–16)
AST SERPL-CCNC: 13 U/L (ref 5–40)
BILIRUB SERPL-MCNC: 0.4 MG/DL (ref 0.3–1.2)
BILIRUBIN DIRECT: < 0.2 MG/DL (ref 0–0.3)
BUN BLDV-MCNC: 11 MG/DL (ref 7–22)
CALCIUM IONIZED: 1.12 MMOL/L (ref 1.12–1.32)
CALCIUM SERPL-MCNC: 7.6 MG/DL (ref 8.5–10.5)
CHLORIDE BLD-SCNC: 114 MEQ/L (ref 98–111)
CO2: 21 MEQ/L (ref 23–33)
CREAT SERPL-MCNC: 0.8 MG/DL (ref 0.4–1.2)
ERYTHROCYTE [DISTWIDTH] IN BLOOD BY AUTOMATED COUNT: 14.7 % (ref 11.5–14.5)
ERYTHROCYTE [DISTWIDTH] IN BLOOD BY AUTOMATED COUNT: 51.7 FL (ref 35–45)
GFR SERPL CREATININE-BSD FRML MDRD: > 90 ML/MIN/1.73M2
GLUCOSE BLD-MCNC: 103 MG/DL (ref 70–108)
GLUCOSE BLD-MCNC: 109 MG/DL (ref 70–108)
GLUCOSE BLD-MCNC: 123 MG/DL (ref 70–108)
GLUCOSE BLD-MCNC: 127 MG/DL (ref 70–108)
GLUCOSE BLD-MCNC: 130 MG/DL (ref 70–108)
GLUCOSE BLD-MCNC: 99 MG/DL (ref 70–108)
HCT VFR BLD CALC: 24 % (ref 42–52)
HEMOGLOBIN: 7.4 GM/DL (ref 14–18)
INR BLD: 1.46 (ref 0.85–1.13)
MCH RBC QN AUTO: 29.6 PG (ref 26–33)
MCHC RBC AUTO-ENTMCNC: 30.8 GM/DL (ref 32.2–35.5)
MCV RBC AUTO: 96 FL (ref 80–94)
PHOSPHORUS: 2 MG/DL (ref 2.4–4.7)
PLATELET # BLD: 248 THOU/MM3 (ref 130–400)
PMV BLD AUTO: 10.6 FL (ref 9.4–12.4)
POTASSIUM REFLEX MAGNESIUM: 3.6 MEQ/L (ref 3.5–5.2)
POTASSIUM SERPL-SCNC: 3.4 MEQ/L (ref 3.5–5.2)
RBC # BLD: 2.5 MILL/MM3 (ref 4.7–6.1)
SODIUM BLD-SCNC: 142 MEQ/L (ref 135–145)
TOTAL PROTEIN: 5 G/DL (ref 6.1–8)
WBC # BLD: 11.7 THOU/MM3 (ref 4.8–10.8)

## 2022-01-21 PROCEDURE — 6370000000 HC RX 637 (ALT 250 FOR IP): Performed by: STUDENT IN AN ORGANIZED HEALTH CARE EDUCATION/TRAINING PROGRAM

## 2022-01-21 PROCEDURE — 2580000003 HC RX 258: Performed by: STUDENT IN AN ORGANIZED HEALTH CARE EDUCATION/TRAINING PROGRAM

## 2022-01-21 PROCEDURE — 2580000003 HC RX 258: Performed by: SURGERY

## 2022-01-21 PROCEDURE — 85610 PROTHROMBIN TIME: CPT

## 2022-01-21 PROCEDURE — 2500000003 HC RX 250 WO HCPCS: Performed by: SURGERY

## 2022-01-21 PROCEDURE — 2060000000 HC ICU INTERMEDIATE R&B

## 2022-01-21 PROCEDURE — 99233 SBSQ HOSP IP/OBS HIGH 50: CPT | Performed by: INTERNAL MEDICINE

## 2022-01-21 PROCEDURE — 82948 REAGENT STRIP/BLOOD GLUCOSE: CPT

## 2022-01-21 PROCEDURE — 80053 COMPREHEN METABOLIC PANEL: CPT

## 2022-01-21 PROCEDURE — 6370000000 HC RX 637 (ALT 250 FOR IP): Performed by: SURGERY

## 2022-01-21 PROCEDURE — 82248 BILIRUBIN DIRECT: CPT

## 2022-01-21 PROCEDURE — 6360000002 HC RX W HCPCS: Performed by: SURGERY

## 2022-01-21 PROCEDURE — 82330 ASSAY OF CALCIUM: CPT

## 2022-01-21 PROCEDURE — 84132 ASSAY OF SERUM POTASSIUM: CPT

## 2022-01-21 PROCEDURE — 84100 ASSAY OF PHOSPHORUS: CPT

## 2022-01-21 PROCEDURE — 99024 POSTOP FOLLOW-UP VISIT: CPT | Performed by: SURGERY

## 2022-01-21 PROCEDURE — 97110 THERAPEUTIC EXERCISES: CPT

## 2022-01-21 PROCEDURE — 2500000003 HC RX 250 WO HCPCS: Performed by: STUDENT IN AN ORGANIZED HEALTH CARE EDUCATION/TRAINING PROGRAM

## 2022-01-21 PROCEDURE — 85027 COMPLETE CBC AUTOMATED: CPT

## 2022-01-21 RX ORDER — POTASSIUM CHLORIDE 7.45 MG/ML
10 INJECTION INTRAVENOUS ONCE
Status: CANCELLED | OUTPATIENT
Start: 2022-01-21 | End: 2022-01-21

## 2022-01-21 RX ADMIN — CLINDAMYCIN PHOSPHATE 600 MG: 600 INJECTION, SOLUTION INTRAVENOUS at 03:41

## 2022-01-21 RX ADMIN — METOPROLOL SUCCINATE 50 MG: 50 TABLET, FILM COATED, EXTENDED RELEASE ORAL at 08:20

## 2022-01-21 RX ADMIN — PIPERACILLIN AND TAZOBACTAM 3375 MG: 3; .375 INJECTION, POWDER, LYOPHILIZED, FOR SOLUTION INTRAVENOUS at 06:29

## 2022-01-21 RX ADMIN — CLINDAMYCIN PHOSPHATE 600 MG: 600 INJECTION, SOLUTION INTRAVENOUS at 13:38

## 2022-01-21 RX ADMIN — APIXABAN 5 MG: 5 TABLET, FILM COATED ORAL at 08:20

## 2022-01-21 RX ADMIN — FENTANYL CITRATE 50 MCG: 0.05 INJECTION, SOLUTION INTRAMUSCULAR; INTRAVENOUS at 03:30

## 2022-01-21 RX ADMIN — APIXABAN 5 MG: 5 TABLET, FILM COATED ORAL at 20:53

## 2022-01-21 RX ADMIN — SODIUM CHLORIDE, PRESERVATIVE FREE 10 ML: 5 INJECTION INTRAVENOUS at 08:34

## 2022-01-21 RX ADMIN — LOSARTAN POTASSIUM 100 MG: 100 TABLET, FILM COATED ORAL at 08:20

## 2022-01-21 RX ADMIN — FAMOTIDINE 20 MG: 20 TABLET ORAL at 20:52

## 2022-01-21 RX ADMIN — PIPERACILLIN AND TAZOBACTAM 3375 MG: 3; .375 INJECTION, POWDER, LYOPHILIZED, FOR SOLUTION INTRAVENOUS at 22:00

## 2022-01-21 RX ADMIN — FENTANYL CITRATE 50 MCG: 0.05 INJECTION, SOLUTION INTRAMUSCULAR; INTRAVENOUS at 15:45

## 2022-01-21 RX ADMIN — HYDRALAZINE HYDROCHLORIDE 100 MG: 50 TABLET ORAL at 00:16

## 2022-01-21 RX ADMIN — PIPERACILLIN AND TAZOBACTAM 3375 MG: 3; .375 INJECTION, POWDER, LYOPHILIZED, FOR SOLUTION INTRAVENOUS at 14:13

## 2022-01-21 RX ADMIN — FENTANYL CITRATE 50 MCG: 0.05 INJECTION, SOLUTION INTRAMUSCULAR; INTRAVENOUS at 05:58

## 2022-01-21 RX ADMIN — DAKIN'S SOLUTION 0.125% (QUARTER STRENGTH): 0.12 SOLUTION at 08:20

## 2022-01-21 RX ADMIN — SODIUM CHLORIDE, PRESERVATIVE FREE 10 ML: 5 INJECTION INTRAVENOUS at 20:57

## 2022-01-21 RX ADMIN — SODIUM CHLORIDE 25 ML: 9 INJECTION, SOLUTION INTRAVENOUS at 20:59

## 2022-01-21 RX ADMIN — ASPIRIN 81 MG: 81 TABLET, COATED ORAL at 08:20

## 2022-01-21 RX ADMIN — CLINDAMYCIN PHOSPHATE 600 MG: 600 INJECTION, SOLUTION INTRAVENOUS at 21:00

## 2022-01-21 RX ADMIN — AMLODIPINE BESYLATE 10 MG: 10 TABLET ORAL at 08:20

## 2022-01-21 RX ADMIN — FAMOTIDINE 20 MG: 20 TABLET ORAL at 08:20

## 2022-01-21 RX ADMIN — POTASSIUM PHOSPHATE, MONOBASIC AND POTASSIUM PHOSPHATE, DIBASIC 20 MMOL: 224; 236 INJECTION, SOLUTION, CONCENTRATE INTRAVENOUS at 13:40

## 2022-01-21 RX ADMIN — TAMSULOSIN HYDROCHLORIDE 0.4 MG: 0.4 CAPSULE ORAL at 08:20

## 2022-01-21 ASSESSMENT — PAIN DESCRIPTION - DESCRIPTORS: DESCRIPTORS: ACHING

## 2022-01-21 ASSESSMENT — PAIN - FUNCTIONAL ASSESSMENT: PAIN_FUNCTIONAL_ASSESSMENT: PREVENTS OR INTERFERES SOME ACTIVE ACTIVITIES AND ADLS

## 2022-01-21 ASSESSMENT — PAIN DESCRIPTION - LOCATION: LOCATION: BUTTOCKS;HIP

## 2022-01-21 ASSESSMENT — PAIN SCALES - GENERAL
PAINLEVEL_OUTOF10: 5
PAINLEVEL_OUTOF10: 0
PAINLEVEL_OUTOF10: 10
PAINLEVEL_OUTOF10: 0
PAINLEVEL_OUTOF10: 7
PAINLEVEL_OUTOF10: 2
PAINLEVEL_OUTOF10: 7

## 2022-01-21 ASSESSMENT — PAIN DESCRIPTION - ORIENTATION: ORIENTATION: LEFT

## 2022-01-21 ASSESSMENT — PAIN DESCRIPTION - PAIN TYPE: TYPE: ACUTE PAIN

## 2022-01-21 ASSESSMENT — PAIN DESCRIPTION - PROGRESSION: CLINICAL_PROGRESSION: NOT CHANGED

## 2022-01-21 ASSESSMENT — PAIN DESCRIPTION - ONSET: ONSET: ON-GOING

## 2022-01-21 ASSESSMENT — PAIN DESCRIPTION - FREQUENCY: FREQUENCY: CONTINUOUS

## 2022-01-21 NOTE — PROGRESS NOTES
[place] [place] [id number] Continence Nurse  Consult Note       Jasbir Cano  AGE: 77 y.o. GENDER: male  : 1955  TODAY'S DATE:  2022    Subjective:     Reason for [facility] Evaluation and Assessment: new diverting loop colostomy      Jasbir Cano is a 77 y.o. male referred by:   [x] Physician/PA/APRN  [] Nursing  [] Other:     Wound Identification:  Wound Type: pressure  Contributing Factors: chronic pressure and decreased mobility    Objective:     Stuart Risk Score: Stuart Scale Score: 11    Assessment:     Encounter: Present to patient room for new colostomy and evaluation of debrided sacral pressure injury. Patient in bed upon arrival. Assessment and photo to follow. S/p Excisional debridement of perirectal abscess and stage 4 sacral decubitus on , as well as laparoscopic creation of diverting transverse loop colostomy on  with Dr Rosario Barron. Upon arrival, patient initially refusing pouching system change. Patient states he is tired, has not slept in 3 days, and wishes to be left alone. Concern voiced by this RN regarding assessment of stoma due to dark color noted in pouching. Patient permitted removal of one piece pouching system. Cleansed stoma with warm wash cloth, pat dry. Stoma red, moist, and flush. Peristomal skin pink and intact. Stoma located mid abdomen, above umbillicus. Mucocutaneous junction intact with sutures in place. Dark color observed due to dark stool. Stoma appears healthy. Stoma linear shaped, approx 2cm x 6cm. Coloplast 2 piece flange cut to fit to size, protective ring applied to inner edge of flange, centered over stoma. Pouch applied. Barrier extenders placed on outer edge of flange. Applied hands lightly over system to activate hydrocolloid. Answered questions and concerns. Will plan to assess sacral wound, change pouching system and educate early next week. Additional pouching systems in room. Will continue to follow. Bed in low, call light in reach.       Ostomy type:

## 2022-01-21 NOTE — PROGRESS NOTES
[address] [id number]  INPATIENT PHYSICAL THERAPY  DAILY NOTE  LEIGH ANN ZULETAS 4A - 4A-05/005-A      Time In: 1617  Time Out: 1443  Timed Code Treatment Minutes: 9 Minutes  Minutes: 9          Date: 2022  Patient Name: Ottoniel Dawson,  Gender:  male        MRN: [id number]  : 1955  (77 y.o.)     Referring Practitioner: Winifred Parsons MD  Diagnosis: sepsis  Additional Pertinent Hx: Per EMR Elba Grossman is a 77 y.o. male who presents to [facility] ED 2022 with buttock pain. Patient is a lifetime nonsmoker with a PMH significant for recent LLE DVT on Eliquis, IDDM, Primary hypertension, urinary retention. Patient arrived by EMS from [facility] with new decubitus ulcer. Patient believes this was present for multiple weeks however this was not well documented on his previous hospital encounters. He reports he had the area drained approximately one week ago by wound team.  Patient reports worsening pain accompanied by fever sweats and chills over the last week. Of note patient was seen in the ED on 2022 and ultrasound of the left leg demonstrated acute DVT affecting the peroneal and anterior tibial veins. Initiated on therapy for suspected DVT of left lower extremity with Eliquis 5 mg daily and has been on anticoagulation with Eliquis since that time. Prior to this patient was hospitalized from 2021 to 2021 for COVID-19 pneumonia. Patient received the usual therapy of Decadron and baricitinib and was discharged to a skilled nursing facility at [facility] for rehab on room air. Patient is a full code. \"     Prior Level of Function:  Lives With: Alone  Type of Home: Facility  Home Layout: One level  Home Access: Level entry        ADL Assistance: Independent  Homemaking Assistance: Independent  Ambulation Assistance: Independent  Transfer Assistance: Independent  Active : Yes  Additional Comments: Pt admitted 2021 d/ COVID.  Pt was from home and was indep with ADLs, IADLS, mobility, and transfers. At discharge, pt was CGA for mobility & transfers. Pt was discharged to an ECF. At the [facility] ([place], [place]) pt reported that he required A for ADLs, IALDs, and did not ambulate much. Pt plans to return home. Restrictions/Precautions:  Restrictions/Precautions: General Precautions,Fall Risk  Position Activity Restriction  Other position/activity restrictions: colostomy, buttock wound       SUBJECTIVE: nursing ok'd therapy if pt was agreeable- pt in bed on arrival he voiced his frustration of not being able to sleep because there is always someone coming in- pt declined therapy due to stating that he had just gotten repositioned and feels comfortable- pt was thankful of the therapist yesterday reported that it did feel good to sit up however was tired after and didn't get a good night sleep     PAIN: pt reported just soreness but reported that best he has felt all day    Vitals: Vitals not assessed per clinical judgement, see nursing flowsheet    OBJECTIVE:  Bed Mobility:  Not Tested- pt declined any repositioning he reported that he had just been repositioned 30-45 min prior to me coming in- we discussed importance of pressure relief every 2 hours for optimal healing and he stated that he has been doing this when staff comes in to roll and reposition him     Exercise:  Patient was guided in 1 set(s) 10 reps of exercise to both lower extremities. Ankle pumps, Quad sets and encouraged any LE  or UE exercises throughout the day not only will help with strength but that it will help with circulation and help with healing process he voiced understanding . Exercises were completed for increased independence with functional mobility.     Functional Outcome Measures: Completed  AM-PAC Inpatient Mobility without Stair Climbing Raw Score : 7  AM-PAC Inpatient without Stair Climbing T-Scale Score : 28.66    ASSESSMENT:  Assessment: Pt cont to demonstrate generalized weakness and would benefit from cont therapy   Activity Tolerance:  Patient tolerance of  treatment: fair. He did decline sitting edge of bed this date      Equipment Recommendations:Equipment Needed: No  Discharge Recommendations: Subacte/Skilled Nursing Facility and LTACH  Plan: Times per week: 5x GM  Current Treatment Recommendations: Strengthening,Neuromuscular Re-education,Home Exercise Program,Safety Education & Training,Balance Training,Endurance Training,Patient/Caregiver Education & Training,Equipment Evaluation, Education, & procurement,Positioning    Patient Education  Patient Education: Plan of Care, discussed importance of pressure relief at least every 2 hours     Goals:  Patient goals : walk again  Short term goals  Time Frame for Short term goals: by discharge  Short term goal 1: Pt will tolerate 15 min of sitting EOB to complete functional mobility to improve core strength and progress with transfers. Short term goal 2: Pt will demo supine to and from sit transfers with min A x1 for safety to progress with overall mobility. Short term goal 3: PT to assess transfers/gait when appropriate. Short term goal 4: Pt will tolerate 10-20 reps of ther ex to increase overall mobility. Long term goals  Time Frame for Long term goals : NA due to short ELOS    Following session, patient left in safe position with all fall risk precautions in place.

## 2022-01-21 NOTE — CARE COORDINATION
1/21/22, 1:31 PM EST    DISCHARGE ON GOING EVALUATION    Natali Cevallos day: 7  Location: -05/005-A Reason for admit: Sepsis [facility]) [A41.9]  Septic shock ([facility] [address] [A41.9, R65.21]   Procedure:   1/14 Excisional debridement and drainage of perirectal horseshoe abscess of deep ischiorectal space total tissue removed 6 x 6 x 4 cm; Excisional debridement of skin subcutaneous tissue muscle and fascia of stage IV sacral decubitus measuring 15 x 12 x 6 cm; Tissue sent for tissue culture as well as pathology        1/19 EXPLORATORY LAPAROSCOPY, TRANSVERSE LOOP COLOSTOMY     Barriers to Discharge: PT/OT, General Surgery, ID following. Pain and nausea control, diabetes management, Eliquis, Asa, IV Cleocin. Dysphagia diet pureed, ostomy functioning. PCP: DIONTE Henriquez - CNP  Readmission Risk Score: 23.8 ( )%  Patient Goals/Plan/Treatment Preferences: SW working to find ECF placement. Refer to SW note.

## 2022-01-21 NOTE — CARE COORDINATION
1/21/22, 12:17 PM EST    DISCHARGE PLANNING EVALUATION    Spoke with Carole from [facility].  She reports [facility] no longer has any beds available. Rekha Albright is now looking at [facility]. Update 2:50pm: Have [facility]. They will look at him on Monday and make a decision about their ability to accept at that time.

## 2022-01-21 NOTE — PROGRESS NOTES
Internal Medicine Hospitalist Progress Note    Patient:  Adriel Woods    Unit/Bed:4A-05/005-A  YOB: 1955  MRN: [id number]   PCP: DIONTE Lloyd CNP  Date of Admission: 1/14/2022  Date of Service: 1/21/2022  Chief Complaint:- Buttocks Pain    Assessment and Plan    ESSMENT AND PLAN  # Necrotizing Pressure Injury Soft Tissue Infection  Status post emergent excisional debridement and drainage of bilateral abscess of deep ischial rectal space and excisional debridement of skin subcutaneous tissue muscle and fascia. Plan   Continue with Zosyn and Cleocin    Wound care with Dakins   Will not require wound vac per ID   Further recs as per ID    # Acute Left Sided Peroneal and Anterior Tibial LE DVT  V/L Duplex LLE U/S on 1/8/2022. Was treated with Eliquis however this was disrupted due to surgery. Eliquis resumed on 1/20/2022 after OK from general surgery/no more planned surgeries  Plan  · Continue with Eliquis BID    # Primary Hypertension  Complicated by patient refusing his meds, but BP remaining stable. Plan  · CCM    # NIDDM2  BG controlled. Plan  · Continue high-dose SSI. Hypoglycemia parameters in place. # Hyperlipidemia  Plan  Continue statin therapy    # History of COVID-19  Noted. # Urinary Retention  Plan  · Continue Flomax    Disposition Planning: Needs ECF placement but hasn't been accepted anywhere. [facility] to see if they can accept him, but won't know until Monday 1/24/2022. Subjective (Past 24 hour events)     Reports trouble sleeping last night due to frequent BP checks and people \"bothering\" him. Refusing medication from RN. ROS (All review of systems completed. Pertinent positives noted.  Otherwise All other systems reviewed and negative.)     HPI     14-year-old white male who presented to [facility] on 1/14/2022 with complaints of buttocks pain. [facility] has a past medical history of lifetime non-smoker, recent left lower extremity DVT on Eliquis, diabetes mellitus, hypertension, urinary retention.  Per report he presented to the emergency department via EMS from [address] with new decubitus ulcer. Devyn Baez believes it was present for multiple weeks however was not well documented prior to hospital encounter. Danay Rosenberg reports that it has been draining for approximately 1 week. Devyn Baez reported he was having worsening pain fever and sweats and chills over the last week.  In the emergency department he was seen on 1/8/2022 ultrasound of leg demonstrated acute DVT he was started on Eliquis.  Of note he was also hospitalized from 12/7/2021 to 12/20/2021 for COVID-19 pneumonia. Danay Rosenberg returned to his nursing facility on room air.  In the emergency department he was positive for lactic acidosis. Ballard Began revealed a decubitus ulcer with air in the perineum.  Patient was fluid resuscitated per sepsis protocol and given Vanco and Cinthia Mailman surgery was consulted for debridement and concern for necrotizing fasciitis.  Initially patient was admitted to stepdown postprocedure he was transferred to the ICU for hypotension. Danay Rosenberg did undergo emergent debridement of abscess on 1/14 with Dr. Person Pool was initially on vasopressors which have been weaned off. Please see Assessment and Plan for further details on hospital course.     Medications:  Reviewed    Scheduled Meds:   phosphorus replacement protocol   Other RX Placeholder    apixaban  5 mg Oral BID    piperacillin-tazobactam  3,375 mg IntraVENous 30 Min Pre-Op    famotidine  20 mg Oral BID    sodium hypochlorite   Irrigation Daily    amLODIPine  10 mg Oral Daily    aspirin  81 mg Oral Daily    hydrALAZINE  100 mg Oral Q8H    [Held by provider] hydroCHLOROthiazide  50 mg Oral Daily    losartan  100 mg Oral Daily    metoprolol succinate  50 mg Oral Daily    sodium chloride flush  5-40 mL IntraVENous 2 times per day    insulin lispro  0-18 Units SubCUTAneous Q4H    calcium replacement protocol   Other RX Placeholder    piperacillin-tazobactam  3,375 mg IntraVENous Q8H    tamsulosin  0.4 mg Oral Daily    clindamycin (CLEOCIN) IV  600 mg IntraVENous Q8H     Continuous Infusions:   sodium chloride      sodium chloride      dextrose      sodium chloride      sodium chloride       PRN Meds: potassium chloride **OR** potassium alternative oral replacement **OR** potassium chloride, sodium chloride, sodium chloride flush, sodium chloride, ondansetron **OR** ondansetron, glucose, dextrose, glucagon (rDNA), dextrose, HYDROcodone 5 mg - acetaminophen, sodium chloride, fentanNYL, sodium chloride    Physical Examination   Blood pressure (!) 151/59, pulse 65, temperature 98.1 °F (36.7 °C), temperature source Oral, resp. rate 18, height 5' 6\" (1.676 m), weight 190 lb 14.7 oz (86.6 kg), SpO2 94 %. General appearance: No apparent distress, appears stated age and cooperative. HEENT: Pupils equal, round, and reactive to light. Conjunctivae/corneas clear. Neck: Supple, with full range of motion. No jugular venous distention. Trachea midline. Respiratory:  Normal respiratory effort. Clear to auscultation, bilaterally without Rales/Wheezes/Rhonchi. Cardiovascular: Regular rate and rhythm with normal S1/S2 without murmurs, rubs or gallops. Abdomen: Soft, colostomy in place  Musculoskeletal: passive and active ROM x 4 extremities. Skin: unable to examine the back as patient is in discomfort from surgery, will reexamine tomorrow  Neurologic:  Neurovascularly intact without any focal sensory/motor deficits. Cranial nerves: II-XII intact, grossly non-focal.  Psychiatric: Alert and oriented. Poor insight. Capillary Refill: Brisk,< 3 seconds   Peripheral Pulses: +2 palpable, equal bilaterally     Diagnostic Data: (All radiographs, EKGs, PFTs, and imaging are personally viewed and interpreted unless otherwise noted).       Recent Labs     01/20/22  0445 01/21/22  0625   WBC 15.1* 11.7*   HGB 7. 6* 7.4*   HCT 24.2* 24.0*    248     Recent Labs     01/18/22  0745 01/19/22  0500 01/20/22  0445   NA  --  146* 144   K 3.2* 3.2* 3.0*   CL  --  116* 113*   CO2  --  25 23   BUN  --  20 13   CREATININE  --  1.0 0.7   CALCIUM  --  7.6* 7.2*   PHOS  --   --  1.9*     Recent Labs     01/19/22  0500 01/20/22  0445   AST 22 16   ALT 39 30   BILIDIR <0.2 <0.2   BILITOT 0.5 0.3   ALKPHOS 99 81     Recent Labs     01/18/22  0815 01/19/22  0500 01/20/22  0445   INR 2.16* 1.37* 1.26*     No results for input(s): Erle Keepers in the last 72 hours. Microbiology:    Blood culture #1:   Lab Results   Component Value Date    BC No growth-preliminary No growth  01/14/2022       Blood culture #2:No results found for: Lisa Jackson    Organism:  Lab Results   Component Value Date    ORG Pseudomonas aeruginosa 01/14/2022    ORG mixed anaerobic growth 01/14/2022         Lab Results   Component Value Date    LABGRAM  01/14/2022     Few segmented neutrophils observed. No epithelial cells observed. Many gram positive cocci occurring singly and in pairs. Many gram negative bacilli. Moderate large gram positive bacilli. MRSA culture only:No results found for: [facility]    Urine culture:   Lab Results   Component Value Date    LABURIN No growth-preliminary No growth  01/15/2022       Respiratory culture: No results found for: CULTRESP    Aerobic and Anaerobic :  Lab Results   Component Value Date    LABAERO  01/14/2022     Culture also yielded light growth of Enterococcus species, Streptococcus species (Viridans group), Enteric gram negative bacilli, Staphylococcus species (coagulase negative), and gram positive bacilli most consistent with Corynebacterium species. If a true mixed infection is suspected, then broad spectrum empiric antibiotic therapy is indicated.      LABAERO light growth  01/14/2022     Lab Results   Component Value Date    LABANAE  01/14/2022     Culture yielded heavy mixed anaerobic growth, including gram positive bacilli and multiple colony types of both gram negative bacill and gram positive cocci. If a true mixed aerobic and anaerobic infection is suspected, then broad spectrum empiric antibiotic therapy is indicated and should include coverage for anaerobic organisms. Urinalysis:      Lab Results   Component Value Date    NITRU NEGATIVE 01/15/2022    WBCUA 5-9 01/15/2022    BACTERIA FEW 01/15/2022    RBCUA 10-15 01/15/2022    BLOODU MODERATE 01/15/2022    SPECGRAV 1.015 01/15/2022    GLUCOSEU NEGATIVE 01/14/2022       Imaging Quick Reads from Previous 7 Days:  CT ABDOMEN PELVIS WO CONTRAST Additional Contrast? None    Result Date: 1/14/2022   1. Decubitus ulcer overlying the sacrum new since previous CT scan of the pelvis dated 7 December 2021. 2. Air   in the perineum, new since previous study dated 7 December 2021. 3. Dale catheter in place. Air in the urinary bladder. 4. Changes of ankylosing spondylitis involving the lower thoracic, lumbar spine and sacroiliac iliac joints. Deformity at the thoracolumbar junction. 5. Calcification along the gallbladder wall. 6. Bilateral inguinal lymph nodes. 7. Areas of atelectasis or scarring at both lung bases. **This report has been created using voice recognition software. It may contain minor errors which are inherent in voice recognition technology. ** Final report electronically signed by DR Sena Cordoba on 1/14/2022 6:49 PM    XR CHEST PORTABLE    Result Date: 1/15/2022  1. Right IJ central line tip in the right atrium. This can be retracted by 3 cm. 2. Bilateral patchy interstitial opacities which may reflect infiltrate. 3. Borderline cardiomegaly. This document has been electronically signed by: Ba Jackson MD on 01/15/2022 12:24 AM    XR CHEST PORTABLE    Result Date: 1/14/2022  1. Interval improvement since previous plain radiographs dated 11 December 2021 with reduction in the bilateral upper lobe infiltrates. . 2. Borderline cardiomegaly.  3. Thoracic spondylosis. **This report has been created using voice recognition software. It may contain minor errors which are inherent in voice recognition technology. ** Final report electronically signed by DR Shonna Mackey on 1/14/2022 5:31 PM      Electronically signed by Ivory Cooper.  Dany Krishnamurthy M.D. on 1/21/2022  PGY-2 Internal Medicine Resident

## 2022-01-21 NOTE — PROGRESS NOTES
dakins moist to dry dressing changed completed. Patient tolerated. Penrose drains intact, with serosanguinous drainage.

## 2022-01-21 NOTE — PROGRESS NOTES
[address] [facility] MISSED TREATMENT NOTE  [facility] NEUROSCIENCES 4A  4A-05/005-A      Date: 2022  Patient Name: Musa Aguilar        CSN: [id number]   : 1955  (77 y.o.)  Gender: male   Referring Practitioner: Bertin Slaughter MD  Diagnosis: Sepsis         REASON FOR MISSED TREATMENT: Patient Refused Encouragement given for session with no success

## 2022-01-21 NOTE — PROGRESS NOTES
Progress note: Infectious diseases    Patient - Lincoln Graves,  Age - 77 y.o.    - 1955      Room Number - 4A-05/005-A   MRN -  [id number]   Acct # - [de-identified]  Date of Admission -  2022  4:05 PM    SUBJECTIVE:   No new issues. OBJECTIVE   VITALS    height is 5' 6\" (1.676 m) and weight is 190 lb 14.7 oz (86.6 kg). His oral temperature is 98.2 °F (36.8 °C). His blood pressure is 150/65 (abnormal) and his pulse is 77. His respiration is 18 and oxygen saturation is 95%. Wt Readings from Last 3 Encounters:   01/15/22 190 lb 14.7 oz (86.6 kg)   21 195 lb 8 oz (88.7 kg)   18 223 lb 11.2 oz (101.5 kg)       I/O (24 Hours)    Intake/Output Summary (Last 24 hours) at 2022 1048  Last data filed at 2022 0445  Gross per 24 hour   Intake 240 ml   Output 700 ml   Net -460 ml       General Appearance  Awake, alert, oriented,  not  In acute distress  HEENT - normocephalic, atraumatic, pale  conjunctiva,  anicteric sclera  Neck - Supple, no mass  Lungs -  Bilateral   air entry, no rhonchi, no wheeze  Cardiovascular - Heart sounds are normal.     Abdomen - soft, colostomy functioning, soft abdomen  Skin - No bruising or bleeding  Extremities - foam dressing to heel  Dressed coccyx wound.      MEDICATIONS:      potassium phosphate IVPB  20 mmol IntraVENous Once    phosphorus replacement protocol   Other RX Placeholder    apixaban  5 mg Oral BID    piperacillin-tazobactam  3,375 mg IntraVENous 30 Min Pre-Op    famotidine  20 mg Oral BID    sodium hypochlorite   Irrigation Daily    amLODIPine  10 mg Oral Daily    aspirin  81 mg Oral Daily    hydrALAZINE  100 mg Oral Q8H    [Held by provider] hydroCHLOROthiazide  50 mg Oral Daily    losartan  100 mg Oral Daily    metoprolol succinate  50 mg Oral Daily    sodium chloride flush  5-40 mL IntraVENous 2 times per day    insulin lispro  0-18 Units SubCUTAneous Q4H    calcium replacement protocol   Other RX Placeholder    piperacillin-tazobactam  3,375 mg IntraVENous Q8H    tamsulosin  0.4 mg Oral Daily    clindamycin (CLEOCIN) IV  600 mg IntraVENous Q8H      sodium chloride      sodium chloride      dextrose      sodium chloride      sodium chloride       potassium chloride **OR** potassium alternative oral replacement **OR** potassium chloride, sodium chloride, sodium chloride flush, sodium chloride, ondansetron **OR** ondansetron, glucose, dextrose, glucagon (rDNA), dextrose, HYDROcodone 5 mg - acetaminophen, sodium chloride, fentanNYL, sodium chloride      LABS:     CBC:   Recent Labs     01/20/22  0445 01/21/22  0625   WBC 15.1* 11.7*   HGB 7.6* 7.4*    248     BMP:    Recent Labs     01/19/22  0500 01/20/22  0445 01/21/22  0625   * 144 142   K 3.2* 3.0* 3.4*   * 113* 114*   CO2 25 23 21*   BUN 20 13 11   CREATININE 1.0 0.7 0.8   GLUCOSE 107 107 99     Calcium:  Recent Labs     01/21/22  0625   CALCIUM 7.6*     Ionized Calcium:No results for input(s): IONCA in the last 72 hours. Magnesium:  Recent Labs     01/20/22  0445   MG 1.9     Phosphorus:  Recent Labs     01/21/22  0625   PHOS 2.0*     BNP:No results for input(s): BNP in the last 72 hours. Glucose:  Recent Labs     01/21/22  0002 01/21/22  0337 01/21/22  0712   POCGLU 123* 109* 103    INR:   Recent Labs     01/19/22  0500 01/20/22  0445 01/21/22  0625   INR 1.37* 1.26* 1.46*     Hepatic:   Recent Labs     01/19/22  0500 01/20/22  0445 01/21/22  0625   ALKPHOS 99 81 70   ALT 39 30 24   AST 22 16 13   PROT 5.1* 5.1* 5.0*   BILITOT 0.5 0.3 0.4   BILIDIR <0.2 <0.2 <0.2   LABALBU 2.0* 1.8* 2.2*         Problem list of patient:     Patient Active Problem List   Diagnosis Code    Cellulitis and abscess of neck L03.221, L02.11    Essential hypertension I10    Leukocytosis D72.829    Obesity (BMI 30-39. 9) E66.9    Hyperlipidemia E78.5    COVID-19 virus detected U07.1    COVID-19 U07.1    Acute respiratory failure with hypoxia ([facility]) J96.01    Necrotizing soft tissue infection M79.89    Pressure injury of sacral region, stage 4 ([facility]) L89.154    Sepsis ([facility]) A41.9    Septic shock ([facility]) A41.9, R65.21    Horseshoe abscess of ischiorectal space K61.31    Necrotizing fasciitis of pelvic region and thigh ([facility]) M72.6    Pressure injury, unstageable, with infection ([facility]) L89.95, L08.9    Shock ([address] R57.9         ASSESSMENT/PLAN   Necrotizing wound infection related to pressure injury: he had debridement of the wound  Deconditioning due to recent COVID -19 infection  Continue dakins soaked dressing   Discharge to ECF on Monday, will transition to oral antibiotic  Rodrigo Carrera MD, MD, FACP 1/21/2022 10:48 AM

## 2022-01-21 NOTE — DISCHARGE INSTR - COC
Continuity of Care Form    Patient Name: Leonel Araujo   :  1955  MRN:  [id number]    Admit date:  2022  Discharge date:  2022    Code Status Order: Full Code   Advance Directives:      Admitting Physician:  Pantera Li MD  PCP: DIONTE Rene CNP    Discharging Nurse: [facility] [place] [facility]/Room#: 4A-05/005-A  Discharging Unit Phone Number: [phone]    Emergency Contact:   Extended Emergency Contact Information  Primary Emergency Contact: Nic Alexander 62 Phone: [phone]  Relation: Brother/Sister   needed? No  Secondary Emergency Contact: Karlie Rios  Mobile Phone: [phone]  Relation: Other   needed? No    Past Surgical History:  Past Surgical History:   Procedure Laterality Date    ENDOSCOPY, COLON, DIAGNOSTIC      swallowing difficulties    LAPAROSCOPY N/A 2022    EXPLORATORY LAPAROSCOPY, TRANSVERSE LOOP COLOSTOMY performed by Noreen Goyal MD at [facility]  Aug 29, 2013    Neck Abcess Incision and Drainage (Dr. Melissa nKox, [facility])    RECTAL SURGERY N/A 2022    FULL THICKNESS DEBRIDEMENT OF SACRAL DECUBITUS ULCER, [id number] CM [facility]       Immunization History:   Immunization History   Administered Date(s) Administered    COVID-19, Wolfgangck Dottie, Primary or Immunocompromised, PF, 100mcg/0.5mL 2021    Influenza, Quadv, adjuvanted, 65 yrs +, IM, PF (Fluad) 10/03/2020    Tetanus 2008    Tetanus Toxoid, absorbed 2008       Active Problems:  Patient Active Problem List   Diagnosis Code    Cellulitis and abscess of neck L03.221, L02.11    Essential hypertension I10    Leukocytosis D72.829    Obesity (BMI 30-39. 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    Necrotizing fasciitis of pelvic region and thigh ([address] M72.6    Pressure injury, unstageable, with infection ([address] L89.95, L08.9    Shock ([address] R57.9       Isolation/Infection:   Isolation            No Isolation          Patient Infection Status       Infection Onset Added Last Indicated Last Indicated By Review Planned Expiration Resolved Resolved By    None active    Resolved    COVID-19 21 SARS-CoV-2 NAAT (Rapid)   21     S/S x 4 weeks, worsening , + - 78%    COVID-19 (Rule Out) 21 SARS-CoV-2 NAAT (Rapid) (Ordered)   21 Rule-Out Test Resulted            Nurse Assessment:  Last Vital Signs: /63   Pulse 90   Temp 99.1 °F (37.3 °C) (Oral)   Resp 18   Ht 5' 6\" (1.676 m)   Wt 190 lb 14.7 oz (86.6 kg)   SpO2 96%   BMI 30.81 kg/m²     Last documented pain score (0-10 scale): Pain Level: 2  Last Weight:   Wt Readings from Last 1 Encounters:   01/15/22 190 lb 14.7 oz (86.6 kg)     Mental Status:  oriented, alert, coherent, logical, thought processes intact, and able to concentrate and follow conversation    IV Access:  - None    Nursing Mobility/ADLs:  Walking   Dependent  Transfer  Dependent  Bathing  Dependent  Dressing  Dependent  Toileting  Dependent  Feeding  Independent  Med Admin  Assisted  Med Delivery   crushed and prefers mixed with applesauce    Wound Care Documentation and Therapy:  Incision 13 Neck Right (Active)   Number of days: 3066        Elimination:  Continence: Bowel: Yes  Bladder: Yes  Urinary Catheter:  Insertion Date: 2021    Colostomy/Ileostomy/Ileal Conduit: Yes  Colostomy  Transverse-Stomal Appliance: 1 piece  Colostomy  Transverse-Stoma  Assessment: Red  Colostomy  Transverse-Peristomal Assessment: Clean,Intact  Colostomy  Transverse-Stool Appearance: Loose  Colostomy  Transverse-Stool Color: Green,Brown  Colostomy  Transverse-Stool Amount: Small  Colostomy  Transverse-Output (mL): 50 ml    Date of Last BM: 1/27/2022    Intake/Output Summary (Last 24 hours) at 1/21/2022 1135  Last data filed at 1/21/2022 0445  Gross per 24 hour   Intake 240 ml   Output 700 ml   Net -460 ml     I/O last 3 completed shifts: In: 240 [P.O.:240]  Out: 2333 [Urine:1225; Stool:150]    Safety Concerns: At Risk for Falls and Aspiration Risk    Impairments/Disabilities:      None    Nutrition Therapy:  Current Nutrition Therapy:   - Oral Diet:  Dysphagia 2 mechanically altered    Routes of Feeding: Oral  Liquids: Thin Liquids  Daily Fluid Restriction: no  Last Modified Barium Swallow with Video (Video Swallowing Test): not done    Treatments at the Time of Hospital Discharge:   Respiratory Treatments: none  Oxygen Therapy:  is not on home oxygen therapy. Ventilator:    - No ventilator support    Rehab Therapies: Physical Therapy, Occupational Therapy, and Speech/Language Therapy  Weight Bearing Status/Restrictions: No weight bearing restirctions  Other Medical Equipment (for information only, NOT a DME order):  wheelchair, bedside commode, and hospital bed  Other Treatments: Pack the wound with Dakins soaked gauze and changed BID and as needed    Patient's personal belongings (please select all that are sent with patient):  None    RN SIGNATURE:  Electronically signed by Jessica Jc RN on 1/27/22 at 11:52 AM EST    CASE MANAGEMENT/SOCIAL WORK SECTION    Inpatient Status Date: 1/14/2022    Readmission Risk Assessment Score:  Readmission Risk              Risk of Unplanned Readmission:  28           Discharging to Facility/ Agency   Name: [address] [place], [address] [address]    Dialysis Facility (if applicable)   Name:  Address:  Dialysis Schedule:  Phone:  Fax:    / signature: Electronically signed by Thea Officer. PALMA Rodriguez on 1/25/22 at 8:18 AM EST    PHYSICIAN SECTION    Prognosis: Fair    Condition at Discharge: Stable    Rehab Potential (if transferring to Rehab): Fair    Recommended Labs or Other Treatments After Discharge: BMP, CBC, Mg, Ca in 3 days    Physician Certification: I certify the above information and transfer of Regi Hicks  is necessary for the continuing treatment of the diagnosis listed and that he requires [facility] for greater 30 days.      Update Admission H&P: No change in H&P    PHYSICIAN SIGNATURE:  Electronically signed by Leola Guzman MD on 1/27/22 at 11:57 AM EST

## 2022-01-21 NOTE — PROGRESS NOTES
Progress note: Infectious diseases    Patient - Adriel Woods,  Age - 77 y.o.    - 1955      Room Number - 4A-05/005-A   MRN -  [id number]   Acct # - [de-identified]  Date of Admission -  2022  4:05 PM    SUBJECTIVE:   The patient states that he is feeling better because he was able to get some more rest.   Denies fevers, chills, dysphagia, odynophagia, chest pain, shortness of breath, cough, abdominal pain, and nausea. Denies surgical site pain. States that he would like to sleep more. Otherwise, no new specific complaints or concerns. OBJECTIVE   VITALS    height is 5' 6\" (1.676 m) and weight is 190 lb 14.7 oz (86.6 kg). His oral temperature is 98.2 °F (36.8 °C). His blood pressure is 150/65 (abnormal) and his pulse is 77. His respiration is 18 and oxygen saturation is 95%. Wt Readings from Last 3 Encounters:   01/15/22 190 lb 14.7 oz (86.6 kg)   21 195 lb 8 oz (88.7 kg)   18 223 lb 11.2 oz (101.5 kg)       I/O (24 Hours)    Intake/Output Summary (Last 24 hours) at 2022 0955  Last data filed at 2022 0445  Gross per 24 hour   Intake 240 ml   Output 700 ml   Net -460 ml       Physical Exam  Constitutional:       General: He is not in acute distress. Appearance: Normal appearance. He is not toxic-appearing. Comments: The patient was sleepy but was easy to awaken. Awake, alert, and in no acute distress. Capable of answering questions and following commands. HENT:      Head: Normocephalic and atraumatic. Mouth/Throat:      Mouth: Mucous membranes are dry. Pharynx: Oropharynx is clear. Eyes:      General: No scleral icterus. Pupils: Pupils are equal, round, and reactive to light. Cardiovascular:      Rate and Rhythm: Normal rate and regular rhythm. Pulses: Normal pulses. Heart sounds: Normal heart sounds.    Pulmonary:      Effort: Pulmonary effort is normal. No respiratory distress. Breath sounds: Normal breath sounds. No wheezing or rales. Comments: But examination limited. Abdominal:      General: Abdomen is flat. There is no distension. Palpations: Abdomen is soft. Tenderness: There is abdominal tenderness. There is no guarding. Comments: Questionable mild generalized tenderness. Sometimes affirmed generalized tenderness and at other times did not. Ostomy noted with no obvious signs of ischemia or infection. Otherwise benign exam.    Musculoskeletal:         General: Normal range of motion. Right lower leg: No edema. Left lower leg: No edema. Skin:     Capillary Refill: Capillary refill takes less than 2 seconds. Neurological:      Mental Status: He is alert.    Psychiatric:         Mood and Affect: Mood normal.         Behavior: Behavior normal.         MEDICATIONS:      potassium phosphate IVPB  20 mmol IntraVENous Once    phosphorus replacement protocol   Other RX Placeholder    apixaban  5 mg Oral BID    piperacillin-tazobactam  3,375 mg IntraVENous 30 Min Pre-Op    famotidine  20 mg Oral BID    sodium hypochlorite   Irrigation Daily    amLODIPine  10 mg Oral Daily    aspirin  81 mg Oral Daily    hydrALAZINE  100 mg Oral Q8H    [Held by provider] hydroCHLOROthiazide  50 mg Oral Daily    losartan  100 mg Oral Daily    metoprolol succinate  50 mg Oral Daily    sodium chloride flush  5-40 mL IntraVENous 2 times per day    insulin lispro  0-18 Units SubCUTAneous Q4H    calcium replacement protocol   Other RX Placeholder    piperacillin-tazobactam  3,375 mg IntraVENous Q8H    tamsulosin  0.4 mg Oral Daily    clindamycin (CLEOCIN) IV  600 mg IntraVENous Q8H      sodium chloride      sodium chloride      dextrose      sodium chloride      sodium chloride       potassium chloride **OR** potassium alternative oral replacement **OR** potassium chloride, sodium chloride, sodium chloride flush, sodium chloride, ondansetron **OR** ondansetron, glucose, dextrose, glucagon (rDNA), dextrose, HYDROcodone 5 mg - acetaminophen, sodium chloride, fentanNYL, sodium chloride      LABS:     CBC:   Recent Labs     01/20/22  0445 01/21/22  0625   WBC 15.1* 11.7*   HGB 7.6* 7.4*    248     BMP:    Recent Labs     01/19/22  0500 01/20/22  0445 01/21/22  0625   * 144 142   K 3.2* 3.0* 3.4*   * 113* 114*   CO2 25 23 21*   BUN 20 13 11   CREATININE 1.0 0.7 0.8   GLUCOSE 107 107 99     Calcium:  Recent Labs     01/21/22  0625   CALCIUM 7.6*     Ionized Calcium:No results for input(s): IONCA in the last 72 hours. Magnesium:  Recent Labs     01/20/22  0445   MG 1.9     Phosphorus:  Recent Labs     01/21/22  0625   PHOS 2.0*     BNP:No results for input(s): BNP in the last 72 hours. Glucose:  Recent Labs     01/21/22  0002 01/21/22  0337 01/21/22  0712   POCGLU 123* 109* 103    INR:   Recent Labs     01/19/22  0500 01/20/22  0445 01/21/22  0625   INR 1.37* 1.26* 1.46*     Hepatic:   Recent Labs     01/19/22  0500 01/20/22  0445 01/21/22  0625   ALKPHOS 99 81 70   ALT 39 30 24   AST 22 16 13   PROT 5.1* 5.1* 5.0*   BILITOT 0.5 0.3 0.4   BILIDIR <0.2 <0.2 <0.2   LABALBU 2.0* 1.8* 2.2*         Problem list of patient:     Patient Active Problem List   Diagnosis Code    Cellulitis and abscess of neck L03.221, L02.11    Essential hypertension I10    Leukocytosis D72.829    Obesity (BMI 30-39. 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 Diverting transverse loop colostomy on 1/19/2022 to prevent fecal contamination of the wound. On Zosyn since 1/14/2022. On Vancomycin from 1/14/2022 to 1/15/2022. On Clindamycin since 1/15/2022. Currently on Zosyn and Clindamycin. Zosyn for gram negative and anaerobic coverage. Clindamycin for MRSA coverage and antitoxin effect. Deconditioning due to recent COVID -19 infection  Continue current treatment. Discussed with nursing staff and Audrey Hwang MD, MPH, [facility] 1/21/2022 9:55 AM   Supervised by Dr. Michael Tanner. Patient was seen and examined face-to-face by me  The chart, progress notes, labs and radiographs were reviewed. Case discussed with the nurse. Questions and concerns were addressed. I agree with the progress note.

## 2022-01-21 NOTE — PROGRESS NOTES
Patient very agitated, and verbally aggressive toward staff. Pt refusing medications at this time, refusing to eat lunch, and shouting at staff to get out of his room because he hasn't \"slept in 2 days\". This RN will allow patient to get an afternoon nap and attempt to give patient his medications this afternoon.

## 2022-01-21 NOTE — PROGRESS NOTES
Comprehensive Nutrition Assessment    Type and Reason for Visit:  Reassess (suppl, new colostomy diet education)    Nutrition Recommendations/Plan:   -Consider MVI. -ONS initiated: Glucerna TID and Julius BID. -Continue current diet, further recommendations per SLP. -Encouraged small frequent meals.  -Colostomy diet recommendations provided/handout 1/21/22. Nutrition Assessment:    Pt. with minimal improvement from a nutritional standpoint AEB diet initiation after surgery however very minimal oral intake, 1-25% of meals. Remains at risk for further nutritional compromise r/t  Laparoscopy with diverting colostomy 1/19/22, variable oral intake in the last 1 month with unplanned weight loss, admit with sepsis, nectrotizing fascitis, s/p excisional debridement perirectal horseshoe abscess-stage IV sacral decubitis, dysphagia, acute DVT, recent covid-19, increased nutrient needs for wound healing, and underlying medical condition (hx: DB, HTN, HLD, covid 12/7/21). Malnutrition Assessment:  Malnutrition Status: At risk for malnutrition (Comment)    Context:  Acute Illness     Findings of the 6 clinical characteristics of malnutrition:  Energy Intake:  Mild decrease in energy intake (Comment)  Weight Loss:  No significant weight loss (Note 2.4% loss in the last 1 month)     Body Fat Loss:  No significant body fat loss     Muscle Mass Loss:  No significant muscle mass loss    Fluid Accumulation:  1 - Mild  (perineal edema)   Strength:  Not Performed    Estimated Daily Nutrient Needs:  Energy (kcal):  9202-3520 kcals (15-18 kcals/kg/day); Weight Used for Energy Requirements:   (87 kg)     Protein (g):  98 grams or more (1.5 grams/kg IBW/day); Weight Used for Protein Requirements:   (65 kg)        Fluid (ml/day):  per MD;     Nutrition Related Findings:   Patient seen. Reports he hasn't ate much today, reports early satiety and if it's too much gets nauseated.   Discussed trying small frequent meals and ONS use-he was agreeable. 150 mls colostomy output. 1/21/22: Sodium 142, Potassium 3.4, BUN 11, Creatinine 0.8, Mg 1.9, Glucose 99, Hgb 7.4. Rx; cleocin, hydrodiuril on hold, humalog, zosyn, norco, zofran. Wounds:   (s/p I+D sacral decubitus of perirectal horseshoe abscess 1/14/22, left heel incisional wound, s/p laparoscopy with diverting transverse loop colostomy 1/19/22)       Current Nutrition Therapies:    ADULT ORAL NUTRITION SUPPLEMENT; Breakfast, Lunch, Dinner; Diabetic Oral Supplement  ADULT ORAL NUTRITION SUPPLEMENT; Breakfast, Dinner; Wound Healing Oral Supplement  ADULT DIET; Dysphagia - Pureed    Anthropometric Measures:  · Height: 5' 6\" (167.6 cm)  · Current Body Weight: 190 lb 14.7 oz (86.6 kg) (1/15/22 perineal edema)   · Admission Body Weight: 195 lb (88.5 kg) (1/14/22 perineal edema)    · Usual Body Weight:  (Per pt~ 213#. Per EMR: 12/7/21: 195#8oz)     · Ideal Body Weight: 142 lbs; % Ideal Body Weight 134.4 %   · BMI: 30.8  · Adjusted Body Weight:  ; No Adjustment   · BMI Categories: Obese Class 1 (BMI 30.0-34. 9)       Nutrition Diagnosis:   · Increased nutrient needs related to increase demand for energy/nutrients as evidenced by wounds    Nutrition Interventions:   Food and/or Nutrient Delivery:  Continue Current Diet,Start Oral Nutrition Supplement  Nutrition Education/Counseling:  Education completed (Encouraged small frequent meals and ONS use. Colostomy diet handout provided as well 1/21/22)   Coordination of Nutrition Care:  Continue to monitor while inpatient    Goals:  Pt will consume 75% or more of meals to assist in wound healing during LOS.        Nutrition Monitoring and Evaluation:   Behavioral-Environmental Outcomes:  None Identified   Food/Nutrient Intake Outcomes:  Diet Advancement/Tolerance,Food and Nutrient Intake,Supplement Intake  Physical Signs/Symptoms Outcomes:  Biochemical Data,Chewing or Swallowing,GI Status,Fluid Status or Edema,Nutrition Focused Physical Findings,Skin,Weight     Discharge Planning:     Too soon to determine     Electronically signed by Horace Nicholas RD, LD on 1/21/22 at 2:50 PM EST    Contact: [phone]

## 2022-01-21 NOTE — PROGRESS NOTES
[facility] -  Tad Soto MD M.D. FACS  Daily Progress Note    Pt Name: Regi Hicks  Medical Record Number: [id number]  Date of Birth 1955   Today's Date: 1/21/2022    ASSESSMENT:     1. POD # 7 , #2  2. 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 Eliquis restarted  2. Will be a long healing process with his large sacral decubitus and his open perineal wounds  3.  working with trying to find a discharge location when patient is medically stable  4. Ostomy is functioning is tolerating a diet White count normal afebrile      SUBJECTIVE:     Eric Chin is stable postop after his diverting loop colostomy done yesterday. OBJECTIVE:     Patient Vitals for the past 24 hrs:   BP Temp Temp src Pulse Resp SpO2   01/21/22 0800 (!) 150/65 98.2 °F (36.8 °C) Oral 77 -- 95 %   01/21/22 0345 (!) 151/59 98.1 °F (36.7 °C) Oral 65 18 94 %   01/20/22 2309 (!) 152/52 97.5 °F (36.4 °C) Oral 57 17 95 %   01/20/22 2030 (!) 172/74 96.4 °F (35.8 °C) Oral 57 16 96 %   01/20/22 1519 (!) 120/47 98 °F (36.7 °C) Oral 59 18 96 %   01/20/22 1400 136/82 96.8 °F (36 °C) Oral 62 16 96 %   01/20/22 1144 136/63 98.5 °F (36.9 °C) Oral 59 18 95 %         Intake/Output Summary (Last 24 hours) at 1/21/2022 0812  Last data filed at 1/21/2022 0445  Gross per 24 hour   Intake 240 ml   Output 700 ml   Net -460 ml       In: 240 [P.O.:240]  Out: 700 [Urine:550]    I/O last 3 completed shifts:   In: 240 [P.O.:240]  Out: 6111 [Urine:1225; Stool:150]     Date 01/21/22 0000 - 01/21/22 2359   Shift 4546-8477 1560-5104 9491-8862 24 Hour Total   INTAKE   Shift Total(mL/kg)       OUTPUT   Urine(mL/kg/hr) 550(0.8)   550   Stool(mL/kg) 50(0.6)   50(0.6)   Shift Total(mL/kg) 600(6.9)   600(6.9)   Weight (kg) 86.6 86.6 86.6 86.6       Wt Readings from Last 3 Encounters:   01/15/22 190 lb 14.7 oz (86.6 kg)   12/20/21 195 lb 8 oz (88.7 kg)   01/02/18 223 lb 11.2 oz (101.5 kg)        Body mass index is 30.81 kg/m². Diet: ADULT DIET; Regular  ADULT ORAL NUTRITION SUPPLEMENT; Breakfast, Lunch, Dinner; Diabetic Oral Supplement  ADULT ORAL NUTRITION SUPPLEMENT; Breakfast, Dinner;  Wound Healing Oral Supplement        MEDS:     Scheduled Meds:   phosphorus replacement protocol   Other RX Placeholder    apixaban  5 mg Oral BID    piperacillin-tazobactam  3,375 mg IntraVENous 30 Min Pre-Op    famotidine  20 mg Oral BID    sodium hypochlorite   Irrigation Daily    amLODIPine  10 mg Oral Daily    aspirin  81 mg Oral Daily    hydrALAZINE  100 mg Oral Q8H    [Held by provider] hydroCHLOROthiazide  50 mg Oral Daily    losartan  100 mg Oral Daily    metoprolol succinate  50 mg Oral Daily    sodium chloride flush  5-40 mL IntraVENous 2 times per day    insulin lispro  0-18 Units SubCUTAneous Q4H    calcium replacement protocol   Other RX Placeholder    piperacillin-tazobactam  3,375 mg IntraVENous Q8H    tamsulosin  0.4 mg Oral Daily    clindamycin (CLEOCIN) IV  600 mg IntraVENous Q8H     Continuous Infusions:   sodium chloride      sodium chloride      dextrose      sodium chloride      sodium chloride       PRN Meds:potassium chloride, 40 mEq, PRN   Or  potassium alternative oral replacement, 40 mEq, PRN   Or  potassium chloride, 10 mEq, PRN  sodium chloride, , PRN  sodium chloride flush, 5-40 mL, PRN  sodium chloride, 25 mL, PRN  ondansetron, 4 mg, Q8H PRN   Or  ondansetron, 4 mg, Q6H PRN  glucose, 15 g, PRN  dextrose, 12.5 g, PRN  glucagon (rDNA), 1 mg, PRN  dextrose, 100 mL/hr, PRN  HYDROcodone 5 mg - acetaminophen, 1 tablet, Q4H PRN  sodium chloride, , PRN  fentanNYL, 50 mcg, Q1H PRN  sodium chloride, , PRN          PHYSICAL EXAM:     CONSTITUTIONAL: Sleeping but awakens    WOUND/INCISION: Upper midline loop transverse colostomy with bag in place , 2 small laparoscopy incisions with skin glue intact . Loop drains in place little bloody  drainage at this time  he has Dakin's 4 x 4's in his sacral decubitus debridement site        LABS:     CBC:   Recent Labs     01/20/22  0445 01/21/22  0625   WBC 15.1* 11.7*   RBC 2.63* 2.50*   HGB 7.6* 7.4*   HCT 24.2* 24.0*   MCV 92.0 96.0*   MCH 28.9 29.6   MCHC 31.4* 30.8*    248   MPV 10.3 10.6      Last 3 CMP:   Recent Labs     01/19/22  0500 01/20/22  0445   * 144   K 3.2* 3.0*   * 113*   CO2 25 23   BUN 20 13   CREATININE 1.0 0.7   GLUCOSE 107 107   CALCIUM 7.6* 7.2*   PROT 5.1* 5.1*   LABALBU 2.0* 1.8*   BILITOT 0.5 0.3   ALKPHOS 99 81   AST 22 16   ALT 39 30      Troponin: No results for input(s): TROPONINI in the last 72 hours. Calcium:   Lab Results   Component Value Date    CALCIUM 7.2 01/20/2022    CALCIUM 7.6 01/19/2022    CALCIUM 6.5 01/18/2022      Ionized Calcium: No results found for: IONCA   Lipids: No results for input(s): CHOL, HDL in the last 72 hours. Invalid input(s): LDLCALCU  INR:   Recent Labs     01/19/22  0500 01/20/22  0445 01/21/22  0625   INR 1.37* 1.26* 1.46*     Lactic Acid: No results found for: LACTA               DVT prophylaxis: [] Lovenox                                 [] SCDs                                 [] SQ Heparin                                 [] Encourage ambulation, low risk for DVT, no chemical or mechanical prophylaxis necessary              [] Already on Anticoagulation      RADIOLOGY:      CT ABDOMEN PELVIS WO CONTRAST Additional Contrast? None    Result Date: 1/14/2022   1.  Decubitus ulcer overlying the sacrum new since previous CT scan of the pelvis dated 7 December 2021. 2. Air   in the perineum, new since previous study dated 7 December 2021. 3. Dale catheter in place. Air in the urinary bladder. 4. Changes of ankylosing spondylitis involving the lower thoracic, lumbar spine and sacroiliac iliac joints. Deformity at the thoracolumbar junction. 5. Calcification along the gallbladder wall. 6. Bilateral inguinal lymph nodes. 7. Areas of atelectasis or scarring at both lung bases. **This report has been created using voice recognition software. It may contain minor errors which are inherent in voice recognition technology. ** Final report electronically signed by DR Sena Cordoba on 1/14/2022 6:49 PM    XR CHEST PORTABLE    Result Date: 1/15/2022  1. Right IJ central line tip in the right atrium. This can be retracted by 3 cm. 2. Bilateral patchy interstitial opacities which may reflect infiltrate. 3. Borderline cardiomegaly. This document has been electronically signed by: Ba Jackson MD on 01/15/2022 12:24 AM    XR CHEST PORTABLE    Result Date: 1/14/2022  1. Interval improvement since previous plain radiographs dated 11 December 2021 with reduction in the bilateral upper lobe infiltrates. . 2. Borderline cardiomegaly. 3. Thoracic spondylosis. **This report has been created using voice recognition software. It may contain minor errors which are inherent in voice recognition technology. ** Final report electronically signed by DR Sena Cordoba on 1/14/2022 5:31 PM    VL DUP LOWER EXTREMITY VENOUS LEFT    Result Date: 1/8/2022  Acute thrombus in the peroneal and anterior tibial veins. Final report electronically signed by Dr. Mariposa West on 1/8/2022 3:22 PM        Tad Soto MD, MELIZABETH.  FACS  Electronically signed 1/21/2022 at 8:12 AM

## 2022-01-22 LAB
ALBUMIN SERPL-MCNC: 2.4 G/DL (ref 3.5–5.1)
ALP BLD-CCNC: 68 U/L (ref 38–126)
ALT SERPL-CCNC: 24 U/L (ref 11–66)
ANION GAP SERPL CALCULATED.3IONS-SCNC: 8 MEQ/L (ref 8–16)
AST SERPL-CCNC: 14 U/L (ref 5–40)
BILIRUB SERPL-MCNC: 0.4 MG/DL (ref 0.3–1.2)
BILIRUBIN DIRECT: < 0.2 MG/DL (ref 0–0.3)
BUN BLDV-MCNC: 9 MG/DL (ref 7–22)
CALCIUM IONIZED: 1.17 MMOL/L (ref 1.12–1.32)
CALCIUM SERPL-MCNC: 7.4 MG/DL (ref 8.5–10.5)
CHLORIDE BLD-SCNC: 113 MEQ/L (ref 98–111)
CO2: 22 MEQ/L (ref 23–33)
CREAT SERPL-MCNC: 0.8 MG/DL (ref 0.4–1.2)
ERYTHROCYTE [DISTWIDTH] IN BLOOD BY AUTOMATED COUNT: 14.8 % (ref 11.5–14.5)
ERYTHROCYTE [DISTWIDTH] IN BLOOD BY AUTOMATED COUNT: 49.2 FL (ref 35–45)
GFR SERPL CREATININE-BSD FRML MDRD: > 90 ML/MIN/1.73M2
GLUCOSE BLD-MCNC: 101 MG/DL (ref 70–108)
GLUCOSE BLD-MCNC: 105 MG/DL (ref 70–108)
GLUCOSE BLD-MCNC: 115 MG/DL (ref 70–108)
GLUCOSE BLD-MCNC: 121 MG/DL (ref 70–108)
GLUCOSE BLD-MCNC: 93 MG/DL (ref 70–108)
HCT VFR BLD CALC: 22.5 % (ref 42–52)
HEMOGLOBIN: 7.2 GM/DL (ref 14–18)
INR BLD: 1.79 (ref 0.85–1.13)
MCH RBC QN AUTO: 29.8 PG (ref 26–33)
MCHC RBC AUTO-ENTMCNC: 32 GM/DL (ref 32.2–35.5)
MCV RBC AUTO: 93 FL (ref 80–94)
PHOSPHORUS: 2 MG/DL (ref 2.4–4.7)
PLATELET # BLD: 240 THOU/MM3 (ref 130–400)
PMV BLD AUTO: 10.1 FL (ref 9.4–12.4)
POTASSIUM SERPL-SCNC: 3.7 MEQ/L (ref 3.5–5.2)
RBC # BLD: 2.42 MILL/MM3 (ref 4.7–6.1)
SODIUM BLD-SCNC: 143 MEQ/L (ref 135–145)
TOTAL PROTEIN: 4.7 G/DL (ref 6.1–8)
WBC # BLD: 11.2 THOU/MM3 (ref 4.8–10.8)

## 2022-01-22 PROCEDURE — 97535 SELF CARE MNGMENT TRAINING: CPT

## 2022-01-22 PROCEDURE — 80053 COMPREHEN METABOLIC PANEL: CPT

## 2022-01-22 PROCEDURE — 97530 THERAPEUTIC ACTIVITIES: CPT

## 2022-01-22 PROCEDURE — 6360000002 HC RX W HCPCS: Performed by: SURGERY

## 2022-01-22 PROCEDURE — 6370000000 HC RX 637 (ALT 250 FOR IP): Performed by: SURGERY

## 2022-01-22 PROCEDURE — 2500000003 HC RX 250 WO HCPCS: Performed by: INTERNAL MEDICINE

## 2022-01-22 PROCEDURE — 84100 ASSAY OF PHOSPHORUS: CPT

## 2022-01-22 PROCEDURE — 2500000003 HC RX 250 WO HCPCS: Performed by: SURGERY

## 2022-01-22 PROCEDURE — 6370000000 HC RX 637 (ALT 250 FOR IP): Performed by: STUDENT IN AN ORGANIZED HEALTH CARE EDUCATION/TRAINING PROGRAM

## 2022-01-22 PROCEDURE — 85610 PROTHROMBIN TIME: CPT

## 2022-01-22 PROCEDURE — 2580000003 HC RX 258: Performed by: SURGERY

## 2022-01-22 PROCEDURE — 2060000000 HC ICU INTERMEDIATE R&B

## 2022-01-22 PROCEDURE — 82330 ASSAY OF CALCIUM: CPT

## 2022-01-22 PROCEDURE — 2580000003 HC RX 258: Performed by: INTERNAL MEDICINE

## 2022-01-22 PROCEDURE — 82948 REAGENT STRIP/BLOOD GLUCOSE: CPT

## 2022-01-22 PROCEDURE — 85027 COMPLETE CBC AUTOMATED: CPT

## 2022-01-22 PROCEDURE — 99233 SBSQ HOSP IP/OBS HIGH 50: CPT | Performed by: INTERNAL MEDICINE

## 2022-01-22 PROCEDURE — 82248 BILIRUBIN DIRECT: CPT

## 2022-01-22 RX ADMIN — HYDROCHLOROTHIAZIDE 50 MG: 25 TABLET ORAL at 13:25

## 2022-01-22 RX ADMIN — PIPERACILLIN AND TAZOBACTAM 3375 MG: 3; .375 INJECTION, POWDER, LYOPHILIZED, FOR SOLUTION INTRAVENOUS at 06:22

## 2022-01-22 RX ADMIN — CLINDAMYCIN PHOSPHATE 600 MG: 600 INJECTION, SOLUTION INTRAVENOUS at 13:14

## 2022-01-22 RX ADMIN — SODIUM PHOSPHATE, MONOBASIC, MONOHYDRATE AND SODIUM PHOSPHATE, DIBASIC, ANHYDROUS 13 MMOL: 276; 142 INJECTION, SOLUTION INTRAVENOUS at 16:38

## 2022-01-22 RX ADMIN — METOPROLOL SUCCINATE 50 MG: 50 TABLET, FILM COATED, EXTENDED RELEASE ORAL at 08:56

## 2022-01-22 RX ADMIN — APIXABAN 5 MG: 5 TABLET, FILM COATED ORAL at 08:57

## 2022-01-22 RX ADMIN — AMLODIPINE BESYLATE 10 MG: 10 TABLET ORAL at 08:57

## 2022-01-22 RX ADMIN — HYDRALAZINE HYDROCHLORIDE 100 MG: 50 TABLET ORAL at 00:15

## 2022-01-22 RX ADMIN — FENTANYL CITRATE 50 MCG: 0.05 INJECTION, SOLUTION INTRAMUSCULAR; INTRAVENOUS at 05:40

## 2022-01-22 RX ADMIN — SODIUM CHLORIDE, PRESERVATIVE FREE 10 ML: 5 INJECTION INTRAVENOUS at 20:27

## 2022-01-22 RX ADMIN — PIPERACILLIN AND TAZOBACTAM 3375 MG: 3; .375 INJECTION, POWDER, LYOPHILIZED, FOR SOLUTION INTRAVENOUS at 21:17

## 2022-01-22 RX ADMIN — HYDRALAZINE HYDROCHLORIDE 100 MG: 50 TABLET ORAL at 08:56

## 2022-01-22 RX ADMIN — FENTANYL CITRATE 50 MCG: 0.05 INJECTION, SOLUTION INTRAMUSCULAR; INTRAVENOUS at 11:09

## 2022-01-22 RX ADMIN — TAMSULOSIN HYDROCHLORIDE 0.4 MG: 0.4 CAPSULE ORAL at 08:56

## 2022-01-22 RX ADMIN — SODIUM CHLORIDE 25 ML: 9 INJECTION, SOLUTION INTRAVENOUS at 05:21

## 2022-01-22 RX ADMIN — DAKIN'S SOLUTION 0.125% (QUARTER STRENGTH): 0.12 SOLUTION at 15:49

## 2022-01-22 RX ADMIN — APIXABAN 5 MG: 5 TABLET, FILM COATED ORAL at 20:26

## 2022-01-22 RX ADMIN — CLINDAMYCIN PHOSPHATE 600 MG: 600 INJECTION, SOLUTION INTRAVENOUS at 05:22

## 2022-01-22 RX ADMIN — PIPERACILLIN AND TAZOBACTAM 3375 MG: 3; .375 INJECTION, POWDER, LYOPHILIZED, FOR SOLUTION INTRAVENOUS at 13:15

## 2022-01-22 RX ADMIN — FAMOTIDINE 20 MG: 20 TABLET ORAL at 08:57

## 2022-01-22 RX ADMIN — FAMOTIDINE 20 MG: 20 TABLET ORAL at 20:26

## 2022-01-22 RX ADMIN — ASPIRIN 81 MG: 81 TABLET, COATED ORAL at 08:57

## 2022-01-22 RX ADMIN — HYDRALAZINE HYDROCHLORIDE 100 MG: 50 TABLET ORAL at 16:38

## 2022-01-22 RX ADMIN — LOSARTAN POTASSIUM 100 MG: 100 TABLET, FILM COATED ORAL at 08:57

## 2022-01-22 RX ADMIN — CLINDAMYCIN PHOSPHATE 600 MG: 600 INJECTION, SOLUTION INTRAVENOUS at 20:31

## 2022-01-22 ASSESSMENT — PAIN DESCRIPTION - LOCATION: LOCATION: BUTTOCKS

## 2022-01-22 ASSESSMENT — PAIN SCALES - GENERAL
PAINLEVEL_OUTOF10: 0
PAINLEVEL_OUTOF10: 0
PAINLEVEL_OUTOF10: 2
PAINLEVEL_OUTOF10: 7
PAINLEVEL_OUTOF10: 3
PAINLEVEL_OUTOF10: 3
PAINLEVEL_OUTOF10: 0
PAINLEVEL_OUTOF10: 0

## 2022-01-22 ASSESSMENT — PAIN DESCRIPTION - FREQUENCY: FREQUENCY: CONTINUOUS

## 2022-01-22 ASSESSMENT — PAIN DESCRIPTION - PROGRESSION: CLINICAL_PROGRESSION: NOT CHANGED

## 2022-01-22 ASSESSMENT — PAIN DESCRIPTION - PAIN TYPE: TYPE: SURGICAL PAIN

## 2022-01-22 ASSESSMENT — PAIN DESCRIPTION - DESCRIPTORS: DESCRIPTORS: BURNING

## 2022-01-22 ASSESSMENT — PAIN - FUNCTIONAL ASSESSMENT: PAIN_FUNCTIONAL_ASSESSMENT: PREVENTS OR INTERFERES SOME ACTIVE ACTIVITIES AND ADLS

## 2022-01-22 ASSESSMENT — PAIN DESCRIPTION - ONSET: ONSET: ON-GOING

## 2022-01-22 NOTE — PROGRESS NOTES
Hospitalist Progress Note      Patient:  Mayi Mckenzie    Unit/Bed:4A-05/005-A  YOB: 1955  MRN: [id number]   Acct: [de-identified]   PCP: DIONTE Arceo - CNP  Date of Admission: 1/14/2022    Assessment/Plan:    1. Necrotizing soft tissue infection   -Status post emergent excisional debridement and drainage of bilateral abscess of deep ischial rectal space and excisional debridement of skin subcutaneous tissue muscle and fascia.   -Continue IV antibiotics per ID recommendations. Awaiting cultures. Duration to be determined per ID.   -Status post diverting colostomy on 1/19/2022 1/22: Continue abx as per ID recommendations. 3. Acute DVT   -Was previously on Eliquis but that had to be stopped due to recent surgery. IR was consulted for IVC placement but recommended no emergent need for it at this point. Will discuss with surgery, in regards to when it would be okay to restart anticoagulation. 1/22: Eliquis has been resumed    5. Essential HTN   - Holding home BP medications for now due to Borderline BP, but will restart now as BP is significantly high.    1/22: Will resume home HCTZ    6. DMII   - Continue ISS. BG controlled. Will monitor and adjust accordingly. 7. Acute on chronic blood loss anemia   - No hgb available today. Will recheck tomorrow morning    1/22: Hgb stable. Continue to monitor    8. HLD    9. Hx of COVID 19  10. Urinary retention    Chief Complaint: Back pain    Initial H and P:-      60-year-old white male who presented to [facility] on 1/14/2022 with complaints of buttocks pain. He has a past medical history of lifetime non-smoker, recent left lower extremity DVT on Eliquis, diabetes mellitus, hypertension, urinary retention. Per report he presented to the emergency department via EMS from [facility] with new decubitus ulcer.   Patient believes it was present for multiple weeks however was not well documented prior to hospital encounter. He reports that it has been draining for approximately 1 week. Patient reported he was having worsening pain fever and sweats and chills over the last week. In the emergency department he was seen on 1/8/2022 ultrasound of leg demonstrated acute DVT he was started on Eliquis. Of note he was also hospitalized from 12/7/2021 to 12/20/2021 for COVID-19 pneumonia. He returned to his nursing facility on room air. In the emergency department he was positive for lactic acidosis. Imaging revealed a decubitus ulcer with air in the perineum. Patient was fluid resuscitated per sepsis protocol and given Vanco and Zosyn. General surgery was consulted for debridement and concern for necrotizing fasciitis. Initially patient was admitted to stepdown postprocedure he was transferred to the ICU for hypotension. He did undergo emergent debridement of abscess on 1/14 with Dr. Sarwat Tapia. He was initially on vasopressors which have been weaned off. Subjective (past 24 hours):     Feels well. Had good sleep overnight. No NVD. No fevers. Tolerating diet. Past medical history, family history, social history and allergies reviewed again and is unchanged since admission. ROS (All review of systems completed. Pertinent positives noted.  Otherwise All other systems reviewed and negative.)     Medications:  Reviewed    Infusion Medications    sodium chloride      sodium chloride 25 mL (01/22/22 0521)    dextrose      sodium chloride      sodium chloride       Scheduled Medications    phosphorus replacement protocol   Other RX Placeholder    apixaban  5 mg Oral BID    piperacillin-tazobactam  3,375 mg IntraVENous 30 Min Pre-Op    famotidine  20 mg Oral BID    sodium hypochlorite   Irrigation Daily    amLODIPine  10 mg Oral Daily    aspirin  81 mg Oral Daily    hydrALAZINE  100 mg Oral Q8H    hydroCHLOROthiazide  50 mg Oral Daily    losartan  100 mg Oral Daily    metoprolol succinate  50 mg Oral Daily    sodium chloride flush  5-40 mL IntraVENous 2 times per day    insulin lispro  0-18 Units SubCUTAneous Q4H    calcium replacement protocol   Other RX Placeholder    piperacillin-tazobactam  3,375 mg IntraVENous Q8H    tamsulosin  0.4 mg Oral Daily    clindamycin (CLEOCIN) IV  600 mg IntraVENous Q8H     PRN Meds: potassium chloride **OR** potassium alternative oral replacement **OR** potassium chloride, sodium chloride, sodium chloride flush, sodium chloride, ondansetron **OR** ondansetron, glucose, dextrose, glucagon (rDNA), dextrose, HYDROcodone 5 mg - acetaminophen, sodium chloride, fentanNYL, sodium chloride      Intake/Output Summary (Last 24 hours) at 1/22/2022 0846  Last data filed at 1/22/2022 3223  Gross per 24 hour   Intake 1322.38 ml   Output 1425 ml   Net -102.62 ml       Diet:  ADULT ORAL NUTRITION SUPPLEMENT; Breakfast, Lunch, Dinner; Diabetic Oral Supplement  ADULT ORAL NUTRITION SUPPLEMENT; Breakfast, Dinner; Wound Healing Oral Supplement  ADULT DIET; Dysphagia - Pureed    Exam:  BP (!) 144/52   Pulse 59   Temp 98.3 °F (36.8 °C) (Oral)   Resp 16   Ht 5' 6\" (1.676 m)   Wt 191 lb 8 oz (86.9 kg)   SpO2 97%   BMI 30.91 kg/m²   General appearance: No apparent distress, appears stated age and cooperative. HEENT: Pupils equal, round, and reactive to light. Conjunctivae/corneas clear. Neck: Supple, with full range of motion. No jugular venous distention. Trachea midline. Respiratory:  Normal respiratory effort. Clear to auscultation, bilaterally without Rales/Wheezes/Rhonchi. Cardiovascular: Regular rate and rhythm with normal S1/S2 without murmurs, rubs or gallops. Abdomen: Soft, colostomy in place  Musculoskeletal: passive and active ROM x 4 extremities. Skin: large unstageable coccyx ulcer  Neurologic:  Neurovascularly intact without any focal sensory/motor deficits.  Cranial nerves: II-XII intact, grossly non-focal.  Psychiatric: Alert and oriented, thought content appropriate, normal insight  Capillary Refill: Brisk,< 3 seconds   Peripheral Pulses: +2 palpable, equal bilaterally     Labs:   Recent Labs     01/20/22  0445 01/21/22  0625 01/22/22  0520   WBC 15.1* 11.7* 11.2*   HGB 7.6* 7.4* 7.2*   HCT 24.2* 24.0* 22.5*    248 240     Recent Labs     01/20/22  0445 01/20/22  0445 01/21/22  0625 01/21/22  1825 01/22/22  0520     --  142  --  143   K 3.0*   < > 3.4* 3.6 3.7   *  --  114*  --  113*   CO2 23  --  21*  --  22*   BUN 13  --  11  --  9   CREATININE 0.7  --  0.8  --  0.8   CALCIUM 7.2*  --  7.6*  --  7.4*   PHOS 1.9*  --  2.0*  --  2.0*    < > = values in this interval not displayed. Recent Labs     01/20/22 0445 01/21/22  0625 01/22/22  0520   AST 16 13 14   ALT 30 24 24   BILIDIR <0.2 <0.2 <0.2   BILITOT 0.3 0.4 0.4   ALKPHOS 81 70 68     Recent Labs     01/20/22 0445 01/21/22  0625 01/22/22  0520   INR 1.26* 1.46* 1.79*     No results for input(s): Kerri Quintana in the last 72 hours. Microbiology:    Blood culture #1:   Lab Results   Component Value Date    BC No growth-preliminary No growth  01/14/2022       Blood culture #2:No results found for: [place]    Organism:  Lab Results   Component Value Date    ORG Pseudomonas aeruginosa 01/14/2022    ORG mixed anaerobic growth 01/14/2022         Lab Results   Component Value Date    LABGRAM  01/14/2022     Few segmented neutrophils observed. No epithelial cells observed. Many gram positive cocci occurring singly and in pairs. Many gram negative bacilli. Moderate large gram positive bacilli.         MRSA culture only:No results found for: [address] [place] [address]    Urine culture:   Lab Results   Component Value Date    LABURIN No growth-preliminary No growth  01/15/2022       Respiratory culture: No results found for: CULTRESP    Aerobic and Anaerobic :  Lab Results   Component Value Date    LABAERO  01/14/2022     Culture also yielded light growth of Enterococcus species, Streptococcus species (Viridans group), Enteric gram negative bacilli, Staphylococcus species (coagulase negative), and gram positive bacilli most consistent with Corynebacterium species. If a true mixed infection is suspected, then broad spectrum empiric antibiotic therapy is indicated. LABAERO light growth  01/14/2022     Lab Results   Component Value Date    LABANAE  01/14/2022     Culture yielded heavy mixed anaerobic growth, including gram positive bacilli and multiple colony types of both gram negative bacill and gram positive cocci. If a true mixed aerobic and anaerobic infection is suspected, then broad spectrum empiric antibiotic therapy is indicated and should include coverage for anaerobic organisms. Urinalysis:      Lab Results   Component Value Date    NITRU NEGATIVE 01/15/2022    WBCUA 5-9 01/15/2022    BACTERIA FEW 01/15/2022    RBCUA 10-15 01/15/2022    BLOODU MODERATE 01/15/2022    SPECGRAV 1.015 01/15/2022    GLUCOSEU NEGATIVE 01/14/2022       Radiology:  XR CHEST PORTABLE   Final Result   1. Right IJ central line tip in the right atrium. This can be retracted by    3 cm. 2. Bilateral patchy interstitial opacities which may reflect infiltrate. 3. Borderline cardiomegaly. This document has been electronically signed by: Noa Pate MD on    01/15/2022 12:24 AM      CT ABDOMEN PELVIS WO CONTRAST Additional Contrast? None   Final Result       1. Decubitus ulcer overlying the sacrum new since previous CT scan of the pelvis dated 7 December 2021.   2. Air   in the perineum, new since previous study dated 7 December 2021.   3. Dale catheter in place. Air in the urinary bladder. 4. Changes of ankylosing spondylitis involving the lower thoracic, lumbar spine and sacroiliac iliac joints. Deformity at the thoracolumbar junction. 5. Calcification along the gallbladder wall. 6. Bilateral inguinal lymph nodes. 7. Areas of atelectasis or scarring at both lung bases. **This report has been created using voice recognition software. It may contain minor errors which are inherent in voice recognition technology. **      Final report electronically signed by DR Natalie Flores on 1/14/2022 6:49 PM      XR CHEST PORTABLE   Final Result   1. Interval improvement since previous plain radiographs dated 11 December 2021 with reduction in the bilateral upper lobe infiltrates. .   2. Borderline cardiomegaly. 3. Thoracic spondylosis. **This report has been created using voice recognition software. It may contain minor errors which are inherent in voice recognition technology. **      Final report electronically signed by DR Natalie Flores on 1/14/2022 5:31 PM        CT ABDOMEN PELVIS WO CONTRAST Additional Contrast? None    Result Date: 1/14/2022  PROCEDURE: CT ABDOMEN PELVIS WO CONTRAST CLINICAL INFORMATION: buttock wound . COMPARISON: CTA abdomen and pelvis dated 7 December 2021. . TECHNIQUE: Axial 5 mm CT images were obtained through the abdomen and pelvis. No contrast was given. Coronal and sagittal reconstructions were obtained. All CT scans at this facility use dose modulation, iterative reconstruction, and/or weight-based dosing when appropriate to reduce radiation dose to as low as reasonably achievable. FINDINGS: There are areas of atelectasis or scarring at both lung bases. The base of the heart is within appropriate limits. The liver is grossly normal. The spleen is normal. The adrenal glands and pancreas are grossly normal. There is calcification along the gallbladder wall. .  There is no hydronephrosis or stones of either kidney. No contour deforming renal masses are noted. There is a small hiatal hernia. No abnormalities of the small bowel loops are noted. The IVC and aorta are of normal caliber. There is no adenopathy. There is a Dale catheter in place. There is  air within the urinary bladder. The prostate gland measures 4.1 x 3.4 cm in size. . There is no pelvic free fluid. The colon is grossly normal. There is bilateral inguinal lymph nodes in place. There are possible changes of ankylosing spondylitis involving the lumbar spine and sacroiliac joints bilaterally. There is deformity at the thoracolumbar junction There is a decubitus ulcer overlying the sacrum. There is is air within the perineum suggestive off inflammatory process. .      1. Decubitus ulcer overlying the sacrum new since previous CT scan of the pelvis dated 7 December 2021. 2. Air   in the perineum, new since previous study dated 7 December 2021. 3. Dale catheter in place. Air in the urinary bladder. 4. Changes of ankylosing spondylitis involving the lower thoracic, lumbar spine and sacroiliac iliac joints. Deformity at the thoracolumbar junction. 5. Calcification along the gallbladder wall. 6. Bilateral inguinal lymph nodes. 7. Areas of atelectasis or scarring at both lung bases. **This report has been created using voice recognition software. It may contain minor errors which are inherent in voice recognition technology. ** Final report electronically signed by DR Julián Morales on 1/14/2022 6:49 PM    XR CHEST PORTABLE    Result Date: 1/15/2022  1 view chest x-ray Comparison: None Findings: Right IJ central line tip in the right atrium. Bilateral patchy interstitial opacities. No pleural effusion or pneumothorax. Heart is borderline enlarged. No pulmonary vascular congestion. No acute fracture. 1. Right IJ central line tip in the right atrium. This can be retracted by 3 cm. 2. Bilateral patchy interstitial opacities which may reflect infiltrate. 3. Borderline cardiomegaly. This document has been electronically signed by: Maci Brooks MD on 01/15/2022 12:24 AM    XR CHEST PORTABLE    Result Date: 1/14/2022  PROCEDURE: XR CHEST PORTABLE CLINICAL INFORMATION: pre-op. COMPARISON: Chest x-ray dated 11 December 2021. TECHNIQUE: AP upright view of the chest. FINDINGS: There is borderline cardiomegaly. .The mediastinum is not widened. There are bilateral infiltrates, significantly improved since previous study dated 11 December 2021. There are no effusions. . The pulmonary vascularity is normal. There is thoracic spondylosis. 1. Interval improvement since previous plain radiographs dated 11 December 2021 with reduction in the bilateral upper lobe infiltrates. . 2. Borderline cardiomegaly. 3. Thoracic spondylosis. **This report has been created using voice recognition software. It may contain minor errors which are inherent in voice recognition technology. ** Final report electronically signed by DR Mary Baker on 1/14/2022 5:31 PM      Electronically signed by Toni English MD on 1/22/2022 at 8:46 AM

## 2022-01-22 NOTE — PROGRESS NOTES
[address]  Occupational Therapy  Daily Note  Time:   Time In: 9617  Time Out: 1110  Timed Code Treatment Minutes: 30 Minutes  Minutes: 30          Date: 2022  Patient Name: Lincoln Graves,   Gender: male      Room: [facility]005-A  MRN: [id number]  : 1955  (77 y.o.)  Referring Practitioner: Adam Pereira MD  Diagnosis: Sepsis  Additional Pertinent Hx: Lincoln Graves is a 77 y.o. male who presents to [facility] ED 2022 with buttock pain. Patient is a lifetime nonsmoker with a PMH significant for recent LLE DVT on Eliquis, IDDM, Primary hypertension, urinary retention. Patient arrived by EMS from [facility] with new decubitus ulcer. Patient believes this was present for multiple weeks however this was not well documented on his previous hospital encounters. He reports he had the area drained approximately one week ago by wound team.  Patient reports worsening pain accompanied by fever sweats and chills over the last week. Of note patient was seen in the ED on 2022 and ultrasound of the left leg demonstrated acute DVT affecting the peroneal and anterior tibial veins. Initiated on therapy for suspected DVT of left lower extremity with Eliquis 5 mg daily and has been on anticoagulation with Eliquis since that time. Prior to this patient was hospitalized from 2021 to 2021 for COVID-19 pneumonia. Patient received the usual therapy of Decadron and baricitinib and was discharged to a skilled nursing facility at [facility] for rehab on room air.  sx  EXPLORATORY LAPAROSCOPY, DIVERTING COLOSTOMY    Restrictions/Precautions:  Restrictions/Precautions: General Precautions,Fall Risk  Position Activity Restriction  Other position/activity restrictions: colostomy, buttock wound     SUBJECTIVE: pt laying in bed upon arrival ,pt agreeable to OT session, RN gave verbal approval for session    PAIN: 610: buttocks    Vitals: Vitals not assessed per clinical judgement, see nursing flowsheet    COGNITION: WFL    ADL:   Grooming: Minimal Assistance. with applying deodorant to R arm  Bathing: Moderate Assistance. due to decreased sitting balance, and requiring assist with UB  Upper Extremity Dressing: Moderate Assistance. with donning gown due to requirign 1 UE to hold onto bed. BALANCE:  Sitting Balance:  Contact Guard Assistance. pt sat for 10 minutes with LUE support on the EOB during grooming routine. Spoke with OTR regarding sitting goal due to size of wound on buttom    BED MOBILITY:  Rolling to Right: Stand By Assistance with HOB lowered  Supine to Sit: Maximum Assistance with HOB elevated, and assist with trunk  Sit to Supine: Moderate Assistance with BLE*    ASSESSMENT:     Activity Tolerance:  Patient tolerance of  treatment: fair. Discharge Recommendations: Continue to assess pending progress  Equipment Recommendations: Equipment Needed:  (continue to assess)  Plan: Times per week: 5x  Times per day: Daily  Current Treatment Recommendations: Strengthening,Safety Education & Training,Patient/Caregiver Education & Training,Self-Care / ADL,Functional Mobility Training,Endurance Training    Patient Education  Patient Education: ADL's    Goals  Short term goals  Time Frame for Short term goals: by discharge  Short term goal 1: pt will tolerate sitting EOB with min A x1 for 5 minutes to increase indep with completing ADLs  Short term goal 2: pt will complete UB ADL with set up only to increase indep with grooming and dressing tasks. Short term goal 3: pt will complete BUE mod resisitve exercises with min cuing for technique to increase strength and endurance required for ADLs  Short term goal 4: OTR to assess functional transfers and functional mobility when appropriate    Following session, patient left in safe position with all fall risk precautions in place.

## 2022-01-23 LAB
ALBUMIN SERPL-MCNC: 2.4 G/DL (ref 3.5–5.1)
ALP BLD-CCNC: 71 U/L (ref 38–126)
ALT SERPL-CCNC: 24 U/L (ref 11–66)
ANION GAP SERPL CALCULATED.3IONS-SCNC: 11 MEQ/L (ref 8–16)
AST SERPL-CCNC: 15 U/L (ref 5–40)
BILIRUB SERPL-MCNC: 0.4 MG/DL (ref 0.3–1.2)
BILIRUBIN DIRECT: < 0.2 MG/DL (ref 0–0.3)
BUN BLDV-MCNC: 8 MG/DL (ref 7–22)
CALCIUM SERPL-MCNC: 7.6 MG/DL (ref 8.5–10.5)
CHLORIDE BLD-SCNC: 108 MEQ/L (ref 98–111)
CO2: 20 MEQ/L (ref 23–33)
CREAT SERPL-MCNC: 0.7 MG/DL (ref 0.4–1.2)
ERYTHROCYTE [DISTWIDTH] IN BLOOD BY AUTOMATED COUNT: 15.1 % (ref 11.5–14.5)
ERYTHROCYTE [DISTWIDTH] IN BLOOD BY AUTOMATED COUNT: 49.7 FL (ref 35–45)
GFR SERPL CREATININE-BSD FRML MDRD: > 90 ML/MIN/1.73M2
GLUCOSE BLD-MCNC: 105 MG/DL (ref 70–108)
GLUCOSE BLD-MCNC: 108 MG/DL (ref 70–108)
GLUCOSE BLD-MCNC: 111 MG/DL (ref 70–108)
GLUCOSE BLD-MCNC: 112 MG/DL (ref 70–108)
GLUCOSE BLD-MCNC: 90 MG/DL (ref 70–108)
HCT VFR BLD CALC: 24.2 % (ref 42–52)
HEMOGLOBIN: 7.7 GM/DL (ref 14–18)
MCH RBC QN AUTO: 29.8 PG (ref 26–33)
MCHC RBC AUTO-ENTMCNC: 31.8 GM/DL (ref 32.2–35.5)
MCV RBC AUTO: 93.8 FL (ref 80–94)
PLATELET # BLD: 244 THOU/MM3 (ref 130–400)
PMV BLD AUTO: 10.8 FL (ref 9.4–12.4)
POTASSIUM SERPL-SCNC: 3.5 MEQ/L (ref 3.5–5.2)
RBC # BLD: 2.58 MILL/MM3 (ref 4.7–6.1)
SODIUM BLD-SCNC: 139 MEQ/L (ref 135–145)
TOTAL PROTEIN: 5.3 G/DL (ref 6.1–8)
WBC # BLD: 11.7 THOU/MM3 (ref 4.8–10.8)

## 2022-01-23 PROCEDURE — 2500000003 HC RX 250 WO HCPCS: Performed by: SURGERY

## 2022-01-23 PROCEDURE — 6370000000 HC RX 637 (ALT 250 FOR IP): Performed by: SURGERY

## 2022-01-23 PROCEDURE — 6370000000 HC RX 637 (ALT 250 FOR IP): Performed by: STUDENT IN AN ORGANIZED HEALTH CARE EDUCATION/TRAINING PROGRAM

## 2022-01-23 PROCEDURE — 82248 BILIRUBIN DIRECT: CPT

## 2022-01-23 PROCEDURE — 82948 REAGENT STRIP/BLOOD GLUCOSE: CPT

## 2022-01-23 PROCEDURE — 2580000003 HC RX 258: Performed by: SURGERY

## 2022-01-23 PROCEDURE — 6360000002 HC RX W HCPCS: Performed by: SURGERY

## 2022-01-23 PROCEDURE — 99233 SBSQ HOSP IP/OBS HIGH 50: CPT | Performed by: INTERNAL MEDICINE

## 2022-01-23 PROCEDURE — 2060000000 HC ICU INTERMEDIATE R&B

## 2022-01-23 PROCEDURE — 80053 COMPREHEN METABOLIC PANEL: CPT

## 2022-01-23 PROCEDURE — 99024 POSTOP FOLLOW-UP VISIT: CPT | Performed by: SURGERY

## 2022-01-23 PROCEDURE — 85027 COMPLETE CBC AUTOMATED: CPT

## 2022-01-23 RX ADMIN — DAKIN'S SOLUTION 0.125% (QUARTER STRENGTH): 0.12 SOLUTION at 09:22

## 2022-01-23 RX ADMIN — ONDANSETRON 4 MG: 4 TABLET, ORALLY DISINTEGRATING ORAL at 09:15

## 2022-01-23 RX ADMIN — AMLODIPINE BESYLATE 10 MG: 10 TABLET ORAL at 09:21

## 2022-01-23 RX ADMIN — SODIUM CHLORIDE, PRESERVATIVE FREE 10 ML: 5 INJECTION INTRAVENOUS at 09:21

## 2022-01-23 RX ADMIN — ASPIRIN 81 MG: 81 TABLET, COATED ORAL at 09:22

## 2022-01-23 RX ADMIN — CLINDAMYCIN PHOSPHATE 600 MG: 600 INJECTION, SOLUTION INTRAVENOUS at 05:00

## 2022-01-23 RX ADMIN — HYDROCODONE BITARTRATE AND ACETAMINOPHEN 1 TABLET: 5; 325 TABLET ORAL at 17:45

## 2022-01-23 RX ADMIN — FENTANYL CITRATE 50 MCG: 0.05 INJECTION, SOLUTION INTRAMUSCULAR; INTRAVENOUS at 16:38

## 2022-01-23 RX ADMIN — HYDRALAZINE HYDROCHLORIDE 100 MG: 50 TABLET ORAL at 09:22

## 2022-01-23 RX ADMIN — HYDRALAZINE HYDROCHLORIDE 100 MG: 50 TABLET ORAL at 16:23

## 2022-01-23 RX ADMIN — FENTANYL CITRATE 50 MCG: 0.05 INJECTION, SOLUTION INTRAMUSCULAR; INTRAVENOUS at 05:11

## 2022-01-23 RX ADMIN — CLINDAMYCIN PHOSPHATE 600 MG: 600 INJECTION, SOLUTION INTRAVENOUS at 20:06

## 2022-01-23 RX ADMIN — HYDROCHLOROTHIAZIDE 50 MG: 25 TABLET ORAL at 09:22

## 2022-01-23 RX ADMIN — CLINDAMYCIN PHOSPHATE 600 MG: 600 INJECTION, SOLUTION INTRAVENOUS at 12:08

## 2022-01-23 RX ADMIN — APIXABAN 5 MG: 5 TABLET, FILM COATED ORAL at 21:17

## 2022-01-23 RX ADMIN — SODIUM CHLORIDE, PRESERVATIVE FREE 10 ML: 5 INJECTION INTRAVENOUS at 20:10

## 2022-01-23 RX ADMIN — TAMSULOSIN HYDROCHLORIDE 0.4 MG: 0.4 CAPSULE ORAL at 09:21

## 2022-01-23 RX ADMIN — LOSARTAN POTASSIUM 100 MG: 100 TABLET, FILM COATED ORAL at 09:21

## 2022-01-23 RX ADMIN — APIXABAN 5 MG: 5 TABLET, FILM COATED ORAL at 09:22

## 2022-01-23 RX ADMIN — HYDRALAZINE HYDROCHLORIDE 100 MG: 50 TABLET ORAL at 00:59

## 2022-01-23 RX ADMIN — METOPROLOL SUCCINATE 50 MG: 50 TABLET, FILM COATED, EXTENDED RELEASE ORAL at 09:21

## 2022-01-23 RX ADMIN — FAMOTIDINE 20 MG: 20 TABLET ORAL at 09:21

## 2022-01-23 RX ADMIN — PIPERACILLIN AND TAZOBACTAM 3375 MG: 3; .375 INJECTION, POWDER, LYOPHILIZED, FOR SOLUTION INTRAVENOUS at 05:30

## 2022-01-23 RX ADMIN — PIPERACILLIN AND TAZOBACTAM 3375 MG: 3; .375 INJECTION, POWDER, LYOPHILIZED, FOR SOLUTION INTRAVENOUS at 21:18

## 2022-01-23 RX ADMIN — PIPERACILLIN AND TAZOBACTAM 3375 MG: 3; .375 INJECTION, POWDER, LYOPHILIZED, FOR SOLUTION INTRAVENOUS at 13:53

## 2022-01-23 ASSESSMENT — PAIN SCALES - GENERAL
PAINLEVEL_OUTOF10: 6
PAINLEVEL_OUTOF10: 7
PAINLEVEL_OUTOF10: 0

## 2022-01-23 NOTE — PROGRESS NOTES
Dressing changed. Wound drainage saturated old dressing and onto chucks pad underneath. See assessment for description. Gauze and dakins solution used to pack wound. Abd pad and medapore tape used to cover.

## 2022-01-23 NOTE — PROGRESS NOTES
ulcer. Patient believes it was present for multiple weeks however was not well documented prior to hospital encounter. He reports that it has been draining for approximately 1 week. Patient reported he was having worsening pain fever and sweats and chills over the last week. In the emergency department he was seen on 1/8/2022 ultrasound of leg demonstrated acute DVT he was started on Eliquis. Of note he was also hospitalized from 12/7/2021 to 12/20/2021 for COVID-19 pneumonia. He returned to his nursing facility on room air. In the emergency department he was positive for lactic acidosis. Imaging revealed a decubitus ulcer with air in the perineum. Patient was fluid resuscitated per sepsis protocol and given Vanco and Zosyn. General surgery was consulted for debridement and concern for necrotizing fasciitis. Initially patient was admitted to stepdown postprocedure he was transferred to the ICU for hypotension. He did undergo emergent debridement of abscess on 1/14 with Dr. Mila Ly. He was initially on vasopressors which have been weaned off. Subjective (past 24 hours):     Reports feeling fine. Has a lot of pillows in the room. Doesn't like the food here. Past medical history, family history, social history and allergies reviewed again and is unchanged since admission. ROS (All review of systems completed. Pertinent positives noted.  Otherwise All other systems reviewed and negative.)     Medications:  Reviewed    Infusion Medications    sodium chloride      sodium chloride 25 mL (01/22/22 0521)    dextrose      sodium chloride      sodium chloride       Scheduled Medications    insulin lispro  0-18 Units SubCUTAneous 4x Daily AC & HS    phosphorus replacement protocol   Other RX Placeholder    apixaban  5 mg Oral BID    piperacillin-tazobactam  3,375 mg IntraVENous 30 Min Pre-Op    famotidine  20 mg Oral BID    sodium hypochlorite   Irrigation Daily    amLODIPine  10 mg Oral Daily  aspirin  81 mg Oral Daily    hydrALAZINE  100 mg Oral Q8H    hydroCHLOROthiazide  50 mg Oral Daily    losartan  100 mg Oral Daily    metoprolol succinate  50 mg Oral Daily    sodium chloride flush  5-40 mL IntraVENous 2 times per day    calcium replacement protocol   Other RX Placeholder    piperacillin-tazobactam  3,375 mg IntraVENous Q8H    tamsulosin  0.4 mg Oral Daily    clindamycin (CLEOCIN) IV  600 mg IntraVENous Q8H     PRN Meds: potassium chloride **OR** potassium alternative oral replacement **OR** potassium chloride, sodium chloride, sodium chloride flush, sodium chloride, ondansetron **OR** ondansetron, glucose, dextrose, glucagon (rDNA), dextrose, HYDROcodone 5 mg - acetaminophen, sodium chloride, fentanNYL, sodium chloride      Intake/Output Summary (Last 24 hours) at 1/23/2022 0759  Last data filed at 1/23/2022 5751  Gross per 24 hour   Intake --   Output 2000 ml   Net -2000 ml       Diet:  ADULT ORAL NUTRITION SUPPLEMENT; Breakfast, Lunch, Dinner; Diabetic Oral Supplement  ADULT ORAL NUTRITION SUPPLEMENT; Breakfast, Dinner; Wound Healing Oral Supplement  ADULT DIET; Dysphagia - Pureed    Exam:  BP (!) 157/70   Pulse 59   Temp 98.6 °F (37 °C) (Oral)   Resp 18   Ht 5' 6\" (1.676 m)   Wt 191 lb 8 oz (86.9 kg)   SpO2 96%   BMI 30.91 kg/m²   General appearance: No apparent distress, appears stated age and cooperative. HEENT: Pupils equal, round, and reactive to light. Conjunctivae/corneas clear. Neck: Supple, with full range of motion. No jugular venous distention. Trachea midline. Respiratory:  Normal respiratory effort. Clear to auscultation, bilaterally without Rales/Wheezes/Rhonchi. Cardiovascular: Regular rate and rhythm with normal S1/S2 without murmurs, rubs or gallops. Abdomen: Soft, colostomy in place  Musculoskeletal: passive and active ROM x 4 extremities. Skin: large unstageable coccyx ulcer  Neurologic:  Neurovascularly intact without any focal sensory/motor deficits. Cranial nerves: II-XII intact, grossly non-focal.  Psychiatric: Alert and oriented, thought content appropriate, normal insight  Capillary Refill: Brisk,< 3 seconds   Peripheral Pulses: +2 palpable, equal bilaterally     Labs:   Recent Labs     01/21/22  0625 01/22/22  0520 01/23/22  0504   WBC 11.7* 11.2* 11.7*   HGB 7.4* 7.2* 7.7*   HCT 24.0* 22.5* 24.2*    240 244     Recent Labs     01/21/22  0625 01/21/22  1825 01/22/22  0520 01/23/22  0504     --  143 139   K 3.4* 3.6 3.7 3.5   *  --  113* 108   CO2 21*  --  22* 20*   BUN 11  --  9 8   CREATININE 0.8  --  0.8 0.7   CALCIUM 7.6*  --  7.4* 7.6*   PHOS 2.0*  --  2.0*  --      Recent Labs     01/21/22  0625 01/22/22  0520 01/23/22  0504   AST 13 14 15   ALT 24 24 24   BILIDIR <0.2 <0.2 <0.2   BILITOT 0.4 0.4 0.4   ALKPHOS 70 68 71     Recent Labs     01/21/22  0625 01/22/22  0520   INR 1.46* 1.79*     No results for input(s): Shannon Divine in the last 72 hours. Microbiology:    Blood culture #1:   Lab Results   Component Value Date    BC No growth-preliminary No growth  01/14/2022       Blood culture #2:No results found for: Ronta Ek    Organism:  Lab Results   Component Value Date    ORG Pseudomonas aeruginosa 01/14/2022    ORG mixed anaerobic growth 01/14/2022         Lab Results   Component Value Date    LABGRAM  01/14/2022     Few segmented neutrophils observed. No epithelial cells observed. Many gram positive cocci occurring singly and in pairs. Many gram negative bacilli. Moderate large gram positive bacilli.         MRSA culture only:No results found for: [facility]    Urine culture:   Lab Results   Component Value Date    LABURIN No growth-preliminary No growth  01/15/2022       Respiratory culture: No results found for: CULTRESP    Aerobic and Anaerobic :  Lab Results   Component Value Date    LABAERO  01/14/2022     Culture also yielded light growth of Enterococcus species, Streptococcus species (Viridans group), Enteric gram negative bacilli, Staphylococcus species (coagulase negative), and gram positive bacilli most consistent with Corynebacterium species. If a true mixed infection is suspected, then broad spectrum empiric antibiotic therapy is indicated. LABAERO light growth  01/14/2022     Lab Results   Component Value Date    LABANAE  01/14/2022     Culture yielded heavy mixed anaerobic growth, including gram positive bacilli and multiple colony types of both gram negative bacill and gram positive cocci. If a true mixed aerobic and anaerobic infection is suspected, then broad spectrum empiric antibiotic therapy is indicated and should include coverage for anaerobic organisms. Urinalysis:      Lab Results   Component Value Date    NITRU NEGATIVE 01/15/2022    WBCUA 5-9 01/15/2022    BACTERIA FEW 01/15/2022    RBCUA 10-15 01/15/2022    BLOODU MODERATE 01/15/2022    SPECGRAV 1.015 01/15/2022    GLUCOSEU NEGATIVE 01/14/2022       Radiology:  XR CHEST PORTABLE   Final Result   1. Right IJ central line tip in the right atrium. This can be retracted by    3 cm. 2. Bilateral patchy interstitial opacities which may reflect infiltrate. 3. Borderline cardiomegaly. This document has been electronically signed by: Steven Mckeon MD on    01/15/2022 12:24 AM      CT ABDOMEN PELVIS WO CONTRAST Additional Contrast? None   Final Result       1. Decubitus ulcer overlying the sacrum new since previous CT scan of the pelvis dated 7 December 2021.   2. Air   in the perineum, new since previous study dated 7 December 2021.   3. Dale catheter in place. Air in the urinary bladder. 4. Changes of ankylosing spondylitis involving the lower thoracic, lumbar spine and sacroiliac iliac joints. Deformity at the thoracolumbar junction. 5. Calcification along the gallbladder wall. 6. Bilateral inguinal lymph nodes. 7. Areas of atelectasis or scarring at both lung bases.                **This report has been created using voice recognition software. It may contain minor errors which are inherent in voice recognition technology. **      Final report electronically signed by DR Sena Cordoba on 1/14/2022 6:49 PM      XR CHEST PORTABLE   Final Result   1. Interval improvement since previous plain radiographs dated 11 December 2021 with reduction in the bilateral upper lobe infiltrates. .   2. Borderline cardiomegaly. 3. Thoracic spondylosis. **This report has been created using voice recognition software. It may contain minor errors which are inherent in voice recognition technology. **      Final report electronically signed by DR Sena Cordoba on 1/14/2022 5:31 PM        CT ABDOMEN PELVIS WO CONTRAST Additional Contrast? None    Result Date: 1/14/2022  PROCEDURE: CT ABDOMEN PELVIS WO CONTRAST CLINICAL INFORMATION: buttock wound . COMPARISON: CTA abdomen and pelvis dated 7 December 2021. . TECHNIQUE: Axial 5 mm CT images were obtained through the abdomen and pelvis. No contrast was given. Coronal and sagittal reconstructions were obtained. All CT scans at this facility use dose modulation, iterative reconstruction, and/or weight-based dosing when appropriate to reduce radiation dose to as low as reasonably achievable. FINDINGS: There are areas of atelectasis or scarring at both lung bases. The base of the heart is within appropriate limits. The liver is grossly normal. The spleen is normal. The adrenal glands and pancreas are grossly normal. There is calcification along the gallbladder wall. .  There is no hydronephrosis or stones of either kidney. No contour deforming renal masses are noted. There is a small hiatal hernia. No abnormalities of the small bowel loops are noted. The IVC and aorta are of normal caliber. There is no adenopathy. There is a Dale catheter in place. There is  air within the urinary bladder. The prostate gland measures 4.1 x 3.4 cm in size. . There is no pelvic free fluid.   The colon is grossly normal. There is bilateral inguinal lymph nodes in place. There are possible changes of ankylosing spondylitis involving the lumbar spine and sacroiliac joints bilaterally. There is deformity at the thoracolumbar junction There is a decubitus ulcer overlying the sacrum. There is is air within the perineum suggestive off inflammatory process. .      1. Decubitus ulcer overlying the sacrum new since previous CT scan of the pelvis dated 7 December 2021. 2. Air   in the perineum, new since previous study dated 7 December 2021. 3. Dale catheter in place. Air in the urinary bladder. 4. Changes of ankylosing spondylitis involving the lower thoracic, lumbar spine and sacroiliac iliac joints. Deformity at the thoracolumbar junction. 5. Calcification along the gallbladder wall. 6. Bilateral inguinal lymph nodes. 7. Areas of atelectasis or scarring at both lung bases. **This report has been created using voice recognition software. It may contain minor errors which are inherent in voice recognition technology. ** Final report electronically signed by DR Dave Teresa on 1/14/2022 6:49 PM    XR CHEST PORTABLE    Result Date: 1/15/2022  1 view chest x-ray Comparison: None Findings: Right IJ central line tip in the right atrium. Bilateral patchy interstitial opacities. No pleural effusion or pneumothorax. Heart is borderline enlarged. No pulmonary vascular congestion. No acute fracture. 1. Right IJ central line tip in the right atrium. This can be retracted by 3 cm. 2. Bilateral patchy interstitial opacities which may reflect infiltrate. 3. Borderline cardiomegaly. This document has been electronically signed by: Med Zhong MD on 01/15/2022 12:24 AM    XR CHEST PORTABLE    Result Date: 1/14/2022  PROCEDURE: XR CHEST PORTABLE CLINICAL INFORMATION: pre-op. COMPARISON: Chest x-ray dated 11 December 2021. TECHNIQUE: AP upright view of the chest. FINDINGS: There is borderline cardiomegaly. .The mediastinum is not widened.   There are bilateral infiltrates, significantly improved since previous study dated 11 December 2021. There are no effusions. . The pulmonary vascularity is normal. There is thoracic spondylosis. 1. Interval improvement since previous plain radiographs dated 11 December 2021 with reduction in the bilateral upper lobe infiltrates. . 2. Borderline cardiomegaly. 3. Thoracic spondylosis. **This report has been created using voice recognition software. It may contain minor errors which are inherent in voice recognition technology. ** Final report electronically signed by DR Daria Skaggs on 1/14/2022 5:31 PM      Electronically signed by Sammy Jean MD on 1/23/2022 at 7:59 AM

## 2022-01-23 NOTE — PROGRESS NOTES
Karen Ulloa MD M.D. FACS  Daily Progress Note    Pt Name: Regi Hicks  Medical Record Number: [id number]  Date of Birth 1955   Today's Date: 1/23/2022    ASSESSMENT:     1. POD # 7 , #2  2. HD # [place] [place] [address] Problems    Diagnosis Date Noted    Shock [facility]) [R57.9]     Necrotizing soft tissue infection [M79.89] 01/14/2022    Pressure injury of sacral region, stage 4 ([facility] [address] [L89.154] 01/14/2022    Sepsis ([facility] [address] [A41.9] 01/14/2022    Septic shock (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) [M72.6] 01/14/2022       Chief Complaint:  Chief Complaint   Patient presents with    Wound Check           PLAN:     1. Eliquis r   2. Will be a long healing process with his large sacral decubitus and his open perineal wounds  3.  working with trying to find a discharge location when patient is medically stable  4.  Ostomy is functioning is tolerating a diet White count normal afebrile      SUBJECTIVE:     Awanda Breeding is stable and ostomy functioning, only complaint is leg cramps      OBJECTIVE:     Patient Vitals for the past 24 hrs:   BP Temp Temp src Pulse Resp SpO2   01/23/22 0907 134/60 98.2 °F (36.8 °C) Oral 62 18 96 %   01/23/22 0408 (!) 157/70 98.6 °F (37 °C) Oral 59 18 96 %   01/23/22 0058 (!) 143/48 99.7 °F (37.6 °C) Oral 58 18 --   01/22/22 2015 (!) 161/72 99.9 °F (37.7 °C) Oral 59 16 95 %   01/22/22 1630 (!) 151/67 98 °F (36.7 °C) Oral 56 16 96 %   01/22/22 1550 (!) 157/69 98.1 °F (36.7 °C) Oral 88 16 100 %   01/22/22 1315 (!) 155/78 98.6 °F (37 °C) Oral 59 16 96 %         Intake/Output Summary (Last 24 hours) at 1/23/2022 1154  Last data filed at 1/23/2022 6756  Gross per 24 hour   Intake --   Output 2000 ml   Net -2000 ml       In: -   Out: 2000 [Urine:2000]    I/O last 3 completed shifts:  In: -   Out: 2900 [Urine:2900]     Date 01/23/22 0000 - 01/23/22 2359   Shift 2588-7591 0764-5217 1259-3174 24 Hour Total   INTAKE   Shift Total(mL/kg)       OUTPUT   Urine(mL/kg/hr) 2174(4.3)   2000   Shift Total(mL/kg) 4245(01)   6625(83)   Weight (kg) 86.9 86.9 86.9 86.9       Wt Readings from Last 3 Encounters:   01/22/22 191 lb 8 oz (86.9 kg)   12/20/21 195 lb 8 oz (88.7 kg)   01/02/18 223 lb 11.2 oz (101.5 kg)        Body mass index is 30.91 kg/m². Diet: ADULT ORAL NUTRITION SUPPLEMENT; Breakfast, Lunch, Dinner; Diabetic Oral Supplement  ADULT ORAL NUTRITION SUPPLEMENT; Breakfast, Dinner; Wound Healing Oral Supplement  ADULT DIET;  Dysphagia - Pureed        MEDS:     Scheduled Meds:   insulin lispro  0-18 Units SubCUTAneous 4x Daily AC & HS    phosphorus replacement protocol   Other RX Placeholder    apixaban  5 mg Oral BID    piperacillin-tazobactam  3,375 mg IntraVENous 30 Min Pre-Op    famotidine  20 mg Oral BID    sodium hypochlorite   Irrigation Daily    amLODIPine  10 mg Oral Daily    aspirin  81 mg Oral Daily    hydrALAZINE  100 mg Oral Q8H    hydroCHLOROthiazide  50 mg Oral Daily    losartan  100 mg Oral Daily    metoprolol succinate  50 mg Oral Daily    sodium chloride flush  5-40 mL IntraVENous 2 times per day    calcium replacement protocol   Other RX Placeholder    piperacillin-tazobactam  3,375 mg IntraVENous Q8H    tamsulosin  0.4 mg Oral Daily    clindamycin (CLEOCIN) IV  600 mg IntraVENous Q8H     Continuous Infusions:   sodium chloride      sodium chloride 25 mL (01/22/22 0521)    dextrose      sodium chloride      sodium chloride       PRN Meds:potassium chloride, 40 mEq, PRN   Or  potassium alternative oral replacement, 40 mEq, PRN   Or  potassium chloride, 10 mEq, PRN  sodium chloride, , PRN  sodium chloride flush, 5-40 mL, PRN  sodium chloride, 25 mL, PRN  ondansetron, 4 mg, Q8H PRN   Or  ondansetron, 4 mg, Q6H PRN  glucose, 15 g, PRN  dextrose, 12.5 g, PRN  glucagon (rDNA), 1 mg, PRN  dextrose, 100 mL/hr, PRN  HYDROcodone 5 mg - acetaminophen, 1 tablet, Q4H PRN  sodium chloride, , PRN  fentanNYL, 50 mcg, Q1H PRN  sodium chloride, , PRN          PHYSICAL EXAM:     CONSTITUTIONAL: Sleeping but awakens  PULM: unlabored respirations    WOUND/INCISION: Upper midline loop transverse colostomy with bag in place , 2 small laparoscopy incisions with skin glue intact . Loop drains in place little bloody  drainage at this time  he has Dakin's 4 x 4's in his sacral decubitus debride drainage of moderate amoutn of blood tingued pus. LABS:     CBC:   Recent Labs     01/21/22  0625 01/22/22  0520 01/23/22  0504   WBC 11.7* 11.2* 11.7*   RBC 2.50* 2.42* 2.58*   HGB 7.4* 7.2* 7.7*   HCT 24.0* 22.5* 24.2*   MCV 96.0* 93.0 93.8   MCH 29.6 29.8 29.8   MCHC 30.8* 32.0* 31.8*    240 244   MPV 10.6 10.1 10.8      Last 3 CMP:   Recent Labs     01/21/22  0625 01/21/22  1825 01/22/22  0520 01/23/22  0504     --  143 139   K 3.4* 3.6 3.7 3.5   *  --  113* 108   CO2 21*  --  22* 20*   BUN 11  --  9 8   CREATININE 0.8  --  0.8 0.7   GLUCOSE 99  --  93 90   CALCIUM 7.6*  --  7.4* 7.6*   PROT 5.0*  --  4.7* 5.3*   LABALBU 2.2*  --  2.4* 2.4*   BILITOT 0.4  --  0.4 0.4   ALKPHOS 70  --  68 71   AST 13  --  14 15   ALT 24  --  24 24      Troponin: No results for input(s): TROPONINI in the last 72 hours. Calcium:   Lab Results   Component Value Date    CALCIUM 7.6 01/23/2022    CALCIUM 7.4 01/22/2022    CALCIUM 7.6 01/21/2022      Ionized Calcium: No results found for: IONCA   Lipids: No results for input(s): CHOL, HDL in the last 72 hours.     Invalid input(s): LDLCALCU  INR:   Recent Labs     01/21/22  0625 01/22/22  0520   INR 1.46* 1.79*     Lactic Acid: No results found for: LACTA               DVT prophylaxis: [] Lovenox                                 [] SCDs                                 [] SQ Heparin                                 [] Encourage ambulation, low risk for DVT, no chemical or mechanical prophylaxis necessary              [] Already on Anticoagulation      RADIOLOGY:      CT ABDOMEN PELVIS WO CONTRAST Additional Contrast? None    Result Date: 1/14/2022   1. Decubitus ulcer overlying the sacrum new since previous CT scan of the pelvis dated 7 December 2021. 2. Air   in the perineum, new since previous study dated 7 December 2021. 3. Dale catheter in place. Air in the urinary bladder. 4. Changes of ankylosing spondylitis involving the lower thoracic, lumbar spine and sacroiliac iliac joints. Deformity at the thoracolumbar junction. 5. Calcification along the gallbladder wall. 6. Bilateral inguinal lymph nodes. 7. Areas of atelectasis or scarring at both lung bases. **This report has been created using voice recognition software. It may contain minor errors which are inherent in voice recognition technology. ** Final report electronically signed by DR Sena Cordoba on 1/14/2022 6:49 PM    XR CHEST PORTABLE    Result Date: 1/15/2022  1. Right IJ central line tip in the right atrium. This can be retracted by 3 cm. 2. Bilateral patchy interstitial opacities which may reflect infiltrate. 3. Borderline cardiomegaly. This document has been electronically signed by: Ba Jackson MD on 01/15/2022 12:24 AM    XR CHEST PORTABLE    Result Date: 1/14/2022  1. Interval improvement since previous plain radiographs dated 11 December 2021 with reduction in the bilateral upper lobe infiltrates. . 2. Borderline cardiomegaly. 3. Thoracic spondylosis. **This report has been created using voice recognition software. It may contain minor errors which are inherent in voice recognition technology. ** Final report electronically signed by DR Sena Cordoba on 1/14/2022 5:31 PM    VL DUP LOWER EXTREMITY VENOUS LEFT    Result Date: 1/8/2022  Acute thrombus in the peroneal and anterior tibial veins.  Final report electronically signed by Dr. Mariposa West on 1/8/2022 3:22 Meena Jorgensen MD,    Electronically signed 1/23/2022 at 11:54 AM

## 2022-01-24 LAB
ALBUMIN SERPL-MCNC: 2.5 G/DL (ref 3.5–5.1)
ALP BLD-CCNC: 74 U/L (ref 38–126)
ALT SERPL-CCNC: 25 U/L (ref 11–66)
ANION GAP SERPL CALCULATED.3IONS-SCNC: 10 MEQ/L (ref 8–16)
ANION GAP SERPL CALCULATED.3IONS-SCNC: 9 MEQ/L (ref 8–16)
AST SERPL-CCNC: 13 U/L (ref 5–40)
BILIRUB SERPL-MCNC: 0.5 MG/DL (ref 0.3–1.2)
BILIRUBIN DIRECT: < 0.2 MG/DL (ref 0–0.3)
BUN BLDV-MCNC: 8 MG/DL (ref 7–22)
BUN BLDV-MCNC: 8 MG/DL (ref 7–22)
CALCIUM SERPL-MCNC: 7.7 MG/DL (ref 8.5–10.5)
CALCIUM SERPL-MCNC: 7.8 MG/DL (ref 8.5–10.5)
CHLORIDE BLD-SCNC: 100 MEQ/L (ref 98–111)
CHLORIDE BLD-SCNC: 103 MEQ/L (ref 98–111)
CO2: 20 MEQ/L (ref 23–33)
CO2: 22 MEQ/L (ref 23–33)
CREAT SERPL-MCNC: 0.7 MG/DL (ref 0.4–1.2)
CREAT SERPL-MCNC: 0.7 MG/DL (ref 0.4–1.2)
EKG ATRIAL RATE: 61 BPM
EKG P AXIS: 44 DEGREES
EKG P-R INTERVAL: 206 MS
EKG Q-T INTERVAL: 482 MS
EKG QRS DURATION: 90 MS
EKG QTC CALCULATION (BAZETT): 485 MS
EKG R AXIS: 29 DEGREES
EKG T AXIS: 102 DEGREES
EKG VENTRICULAR RATE: 61 BPM
ERYTHROCYTE [DISTWIDTH] IN BLOOD BY AUTOMATED COUNT: 15.5 % (ref 11.5–14.5)
ERYTHROCYTE [DISTWIDTH] IN BLOOD BY AUTOMATED COUNT: 47.9 FL (ref 35–45)
GFR SERPL CREATININE-BSD FRML MDRD: > 90 ML/MIN/1.73M2
GFR SERPL CREATININE-BSD FRML MDRD: > 90 ML/MIN/1.73M2
GLUCOSE BLD-MCNC: 101 MG/DL (ref 70–108)
GLUCOSE BLD-MCNC: 102 MG/DL (ref 70–108)
GLUCOSE BLD-MCNC: 103 MG/DL (ref 70–108)
GLUCOSE BLD-MCNC: 119 MG/DL (ref 70–108)
GLUCOSE BLD-MCNC: 122 MG/DL (ref 70–108)
GLUCOSE BLD-MCNC: 97 MG/DL (ref 70–108)
HCT VFR BLD CALC: 24.7 % (ref 42–52)
HEMOGLOBIN: 8 GM/DL (ref 14–18)
MAGNESIUM: 1.8 MG/DL (ref 1.6–2.4)
MCH RBC QN AUTO: 29.7 PG (ref 26–33)
MCHC RBC AUTO-ENTMCNC: 32.4 GM/DL (ref 32.2–35.5)
MCV RBC AUTO: 91.8 FL (ref 80–94)
PHOSPHORUS: 2.2 MG/DL (ref 2.4–4.7)
PHOSPHORUS: 2.4 MG/DL (ref 2.4–4.7)
PLATELET # BLD: 221 THOU/MM3 (ref 130–400)
PMV BLD AUTO: 10 FL (ref 9.4–12.4)
POTASSIUM SERPL-SCNC: 3.6 MEQ/L (ref 3.5–5.2)
POTASSIUM SERPL-SCNC: 3.7 MEQ/L (ref 3.5–5.2)
RBC # BLD: 2.69 MILL/MM3 (ref 4.7–6.1)
SODIUM BLD-SCNC: 130 MEQ/L (ref 135–145)
SODIUM BLD-SCNC: 134 MEQ/L (ref 135–145)
TOTAL PROTEIN: 5.4 G/DL (ref 6.1–8)
WBC # BLD: 10 THOU/MM3 (ref 4.8–10.8)

## 2022-01-24 PROCEDURE — 6370000000 HC RX 637 (ALT 250 FOR IP): Performed by: SURGERY

## 2022-01-24 PROCEDURE — 82948 REAGENT STRIP/BLOOD GLUCOSE: CPT

## 2022-01-24 PROCEDURE — 6370000000 HC RX 637 (ALT 250 FOR IP): Performed by: STUDENT IN AN ORGANIZED HEALTH CARE EDUCATION/TRAINING PROGRAM

## 2022-01-24 PROCEDURE — 2580000003 HC RX 258: Performed by: SURGERY

## 2022-01-24 PROCEDURE — 82248 BILIRUBIN DIRECT: CPT

## 2022-01-24 PROCEDURE — 84100 ASSAY OF PHOSPHORUS: CPT

## 2022-01-24 PROCEDURE — 83735 ASSAY OF MAGNESIUM: CPT

## 2022-01-24 PROCEDURE — 97110 THERAPEUTIC EXERCISES: CPT

## 2022-01-24 PROCEDURE — 6360000002 HC RX W HCPCS: Performed by: SURGERY

## 2022-01-24 PROCEDURE — 2500000003 HC RX 250 WO HCPCS: Performed by: SURGERY

## 2022-01-24 PROCEDURE — 99232 SBSQ HOSP IP/OBS MODERATE 35: CPT | Performed by: INTERNAL MEDICINE

## 2022-01-24 PROCEDURE — 1200000003 HC TELEMETRY R&B

## 2022-01-24 PROCEDURE — 92526 ORAL FUNCTION THERAPY: CPT

## 2022-01-24 PROCEDURE — 6370000000 HC RX 637 (ALT 250 FOR IP): Performed by: INTERNAL MEDICINE

## 2022-01-24 PROCEDURE — 93005 ELECTROCARDIOGRAM TRACING: CPT | Performed by: STUDENT IN AN ORGANIZED HEALTH CARE EDUCATION/TRAINING PROGRAM

## 2022-01-24 PROCEDURE — 85027 COMPLETE CBC AUTOMATED: CPT

## 2022-01-24 PROCEDURE — 93010 ELECTROCARDIOGRAM REPORT: CPT | Performed by: INTERNAL MEDICINE

## 2022-01-24 PROCEDURE — 80053 COMPREHEN METABOLIC PANEL: CPT

## 2022-01-24 RX ORDER — AMOXICILLIN AND CLAVULANATE POTASSIUM 875; 125 MG/1; MG/1
1 TABLET, FILM COATED ORAL EVERY 12 HOURS SCHEDULED
Status: DISCONTINUED | OUTPATIENT
Start: 2022-01-24 | End: 2022-01-27 | Stop reason: HOSPADM

## 2022-01-24 RX ADMIN — HYDRALAZINE HYDROCHLORIDE 100 MG: 50 TABLET ORAL at 09:06

## 2022-01-24 RX ADMIN — HYDRALAZINE HYDROCHLORIDE 100 MG: 50 TABLET ORAL at 00:29

## 2022-01-24 RX ADMIN — DAKIN'S SOLUTION 0.125% (QUARTER STRENGTH): 0.12 SOLUTION at 09:18

## 2022-01-24 RX ADMIN — LOSARTAN POTASSIUM 100 MG: 100 TABLET, FILM COATED ORAL at 09:07

## 2022-01-24 RX ADMIN — SODIUM CHLORIDE, PRESERVATIVE FREE 10 ML: 5 INJECTION INTRAVENOUS at 09:19

## 2022-01-24 RX ADMIN — APIXABAN 5 MG: 5 TABLET, FILM COATED ORAL at 09:06

## 2022-01-24 RX ADMIN — FENTANYL CITRATE 50 MCG: 0.05 INJECTION, SOLUTION INTRAMUSCULAR; INTRAVENOUS at 05:07

## 2022-01-24 RX ADMIN — AMOXICILLIN AND CLAVULANATE POTASSIUM 1 TABLET: 875; 125 TABLET, FILM COATED ORAL at 12:36

## 2022-01-24 RX ADMIN — AMLODIPINE BESYLATE 10 MG: 10 TABLET ORAL at 09:06

## 2022-01-24 RX ADMIN — HYDROCODONE BITARTRATE AND ACETAMINOPHEN 1 TABLET: 5; 325 TABLET ORAL at 06:56

## 2022-01-24 RX ADMIN — TAMSULOSIN HYDROCHLORIDE 0.4 MG: 0.4 CAPSULE ORAL at 09:06

## 2022-01-24 RX ADMIN — HYDROCHLOROTHIAZIDE 50 MG: 25 TABLET ORAL at 09:07

## 2022-01-24 RX ADMIN — CLINDAMYCIN PHOSPHATE 600 MG: 600 INJECTION, SOLUTION INTRAVENOUS at 04:14

## 2022-01-24 RX ADMIN — PIPERACILLIN AND TAZOBACTAM 3375 MG: 3; .375 INJECTION, POWDER, LYOPHILIZED, FOR SOLUTION INTRAVENOUS at 04:48

## 2022-01-24 ASSESSMENT — PAIN SCALES - GENERAL
PAINLEVEL_OUTOF10: 5
PAINLEVEL_OUTOF10: 0
PAINLEVEL_OUTOF10: 4
PAINLEVEL_OUTOF10: 0

## 2022-01-24 NOTE — PROGRESS NOTES
Progress note: Infectious diseases    Patient - Myrna Cowden,  Age - 77 y.o.    - 1955      Room Number - 4A-05/005-A   MRN -  [id number]   Acct # - [de-identified]  Date of Admission -  2022  4:05 PM    SUBJECTIVE:   No new issues. plan for ECF. OBJECTIVE   VITALS    height is 5' 6\" (1.676 m) and weight is 191 lb 8 oz (86.9 kg). His oral temperature is 97.9 °F (36.6 °C). His blood pressure is 134/63 and his pulse is 52. His respiration is 18 and oxygen saturation is 98%. Wt Readings from Last 3 Encounters:   22 191 lb 8 oz (86.9 kg)   21 195 lb 8 oz (88.7 kg)   18 223 lb 11.2 oz (101.5 kg)       I/O (24 Hours)    Intake/Output Summary (Last 24 hours) at 2022 1034  Last data filed at 2022 0415  Gross per 24 hour   Intake 300 ml   Output 2500 ml   Net -2200 ml       General Appearance  Awake, alert, oriented,  not  In acute distress  HEENT - normocephalic, atraumatic, pale  conjunctiva,  anicteric sclera  Neck - Supple, no mass  Lungs -  Bilateral   air entry, no rhonchi, no wheeze  Cardiovascular - Heart sounds are normal.     Abdomen - soft, colostomy functioning, soft abdomen  Skin - No bruising or bleeding  Extremities - foam dressing to heel  Dressed coccyx wound.      MEDICATIONS:      insulin lispro  0-18 Units SubCUTAneous 4x Daily AC & HS    phosphorus replacement protocol   Other RX Placeholder    apixaban  5 mg Oral BID    piperacillin-tazobactam  3,375 mg IntraVENous 30 Min Pre-Op    famotidine  20 mg Oral BID    sodium hypochlorite   Irrigation Daily    amLODIPine  10 mg Oral Daily    aspirin  81 mg Oral Daily    hydrALAZINE  100 mg Oral Q8H    hydroCHLOROthiazide  50 mg Oral Daily    losartan  100 mg Oral Daily    metoprolol succinate  50 mg Oral Daily    sodium chloride flush  5-40 mL IntraVENous 2 times per day    calcium replacement protocol   Other RX [place]    piperacillin-tazobactam  3,375 mg IntraVENous Q8H    tamsulosin  0.4 mg Oral Daily    clindamycin (CLEOCIN) IV  600 mg IntraVENous Q8H      sodium chloride      sodium chloride 25 mL (01/22/22 0521)    dextrose      sodium chloride      sodium chloride       potassium chloride **OR** potassium alternative oral replacement **OR** potassium chloride, sodium chloride, sodium chloride flush, sodium chloride, ondansetron **OR** ondansetron, glucose, dextrose, glucagon (rDNA), dextrose, HYDROcodone 5 mg - acetaminophen, sodium chloride, fentanNYL, sodium chloride      LABS:     CBC:   Recent Labs     01/22/22  0520 01/23/22  0504 01/24/22  0617   WBC 11.2* 11.7* 10.0   HGB 7.2* 7.7* 8.0*    244 221     BMP:    Recent Labs     01/22/22  0520 01/23/22  0504 01/24/22  0617    139 134*   K 3.7 3.5 3.6   * 108 103   CO2 22* 20* 22*   BUN 9 8 8   CREATININE 0.8 0.7 0.7   GLUCOSE 93 90 97     Calcium:  Recent Labs     01/24/22  0617   CALCIUM 7.7*     Ionized Calcium:No results for input(s): IONCA in the last 72 hours. Magnesium:  Recent Labs     01/24/22  0930   MG 1.8     Phosphorus:  Recent Labs     01/24/22  0930   PHOS 2.4     BNP:No results for input(s): BNP in the last 72 hours. Glucose:  Recent Labs     01/23/22  1614 01/23/22  1949 01/24/22  0620   POCGLU 111* 112* 101    INR:   Recent Labs     01/22/22  0520   INR 1.79*     Hepatic:   Recent Labs     01/22/22  0520 01/23/22  0504 01/24/22  0617   ALKPHOS 68 71 74   ALT 24 24 25   AST 14 15 13   PROT 4.7* 5.3* 5.4*   BILITOT 0.4 0.4 0.5   BILIDIR <0.2 <0.2 <0.2   LABALBU 2.4* 2.4* 2.5*         Problem list of patient:     Patient Active Problem List   Diagnosis Code    Cellulitis and abscess of neck L03.221, L02.11    Essential hypertension I10    Leukocytosis D72.829    Obesity (BMI 30-39. 9) E66.9    Hyperlipidemia E78.5    COVID-19 virus detected U07.1    COVID-19 U07.1    Acute respiratory failure with hypoxia (HCC) J96.01    Necrotizing soft tissue infection M79.89    Pressure injury of sacral region, stage 4 ([facility]) L89.154    Sepsis ([facility]) A41.9    Septic shock ([facility]) A41.9, R65.21    Horseshoe abscess of ischiorectal space K61.31    Necrotizing fasciitis of pelvic region and thigh ([address] M72.6    Pressure injury, unstageable, with infection ([facility]) L89.95, L08.9    Shock ([address] R57.9         ASSESSMENT/PLAN   Necrotizing wound infection related to pressure injury: he had debridement of the wound  Deconditioning due to recent COVID -19 infection  Continue dakins soaked dressing   Ok with discharge plan  Follow up at the wound clinic in 2-3 wks.   Will complete 2 wks of antibiotic  Andres Abreu MD, MD, FACP 1/24/2022 10:34 AM

## 2022-01-24 NOTE — PROGRESS NOTES
[address] [address]  INPATIENT PHYSICAL THERAPY  DAILY NOTE  [facility] ORTHOPEDICS 7K - 7K-28/028-A     Time In: 1043  Time Out: 1425  Timed Code Treatment Minutes: 20 Minutes  Minutes: 20          Date: 2022  Patient Name: Jessica Bess,  Gender:  male        MRN: [id number]  : 1955  (77 y.o.)     Referring Practitioner: Blanquita Goncalves MD  Diagnosis: sepsis  Additional Pertinent Hx: Per EMR Alphonse Gamez is a 77 y.o. male who presents to [facility] ED 2022 with buttock pain. Patient is a lifetime nonsmoker with a PMH significant for recent LLE DVT on Eliquis, IDDM, Primary hypertension, urinary retention. Patient arrived by EMS from [facility] with new decubitus ulcer. Patient believes this was present for multiple weeks however this was not well documented on his previous hospital encounters. He reports he had the area drained approximately one week ago by wound team.  Patient reports worsening pain accompanied by fever sweats and chills over the last week. Of note patient was seen in the ED on 2022 and ultrasound of the left leg demonstrated acute DVT affecting the peroneal and anterior tibial veins. Initiated on therapy for suspected DVT of left lower extremity with Eliquis 5 mg daily and has been on anticoagulation with Eliquis since that time. Prior to this patient was hospitalized from 2021 to 2021 for COVID-19 pneumonia. Patient received the usual therapy of Decadron and baricitinib and was discharged to a skilled nursing facility at [facility] for rehab on room air. Patient is a full code. \"     Prior Level of Function:  Lives With: Alone  Type of Home: Facility  Home Layout: One level  Home Access: Level entry        ADL Assistance: Independent  Homemaking Assistance: Independent  Ambulation Assistance: Independent  Transfer Assistance: Independent  Active : Yes  Additional Comments: Pt admitted 2021 d/ COVID.  Pt was from home and was indep with ADLs, IADLS, mobility, and transfers. At discharge, pt was CGA for mobility & transfers. Pt was discharged to an ECF. At the [facility] pt reported that he required A for ADLs, IALDs, and did not ambulate much. Pt plans to return home. Restrictions/Precautions:  Restrictions/Precautions: General Precautions,Fall Risk  Position Activity Restriction  Other position/activity restrictions: colostomy, buttock wound      SUBJECTIVE: RN approved session and advised that pt might just do exercises. Pt alert upon arrival to room and agrees to bed exercises only at this time. PAIN: denies    Vitals: Vitals not assessed per clinical judgement, see nursing flowsheet    OBJECTIVE:  Bed Mobility:  Not Tested    Transfers:  Not Tested    Ambulation:  Not tested    Exercise:  Patient was guided in 1 set(s) 10 reps of exercise to both lower extremities. Ankle pumps, Glut sets, Quad sets, Heelslides, Short arc quads and Hip abduction/adduction. Exercises were completed for increased independence with functional mobility. Functional Outcome Measures: Not completed       ASSESSMENT:  Assessment: Pt limiting mobility due to his large sacral wound and \"just being tired. \" Pt is able to tolerate LE exercises in the bed with minimal difficulty noted. Recommend continued skilled PT to encourage mobility as pt agrees to it. Activity Tolerance:  Patient tolerance of  treatment: good.        Equipment Recommendations:Equipment Needed: No  Discharge Recommendations: Continue to assess pending progress and Subacte/Skilled Nursing Facility  Plan: Times per week: 5x GM  Current Treatment Recommendations: Strengthening,Neuromuscular Re-education,Home Exercise Program,Safety Education & Training,Balance Training,Endurance Training,Patient/Caregiver Education & Training,Equipment Evaluation, Education, & procurement,Positioning    Patient Education  Patient Education: Plan of Care, Home Exercise Program, [employer] and Wellness Education    Goals:  Patient goals : walk again  Short term goals  Time Frame for Short term goals: by discharge  Short term goal 1: Pt will tolerate 15 min of sitting EOB to complete functional mobility to improve core strength and progress with transfers. Short term goal 2: Pt will demo supine to and from sit transfers with min A x1 for safety to progress with overall mobility. Short term goal 3: PT to assess transfers/gait when appropriate. Short term goal 4: Pt will tolerate 10-20 reps of ther ex to increase overall mobility. Long term goals  Time Frame for Long term goals : NA due to short ELOS    Following session, patient left in safe position with all fall risk precautions in place. Irl Sessions.  Beverly Black [place] [address]

## 2022-01-24 NOTE — PROGRESS NOTES
[facility] SPEECH THERAPY  [facility] NEUROSCIENCES 4A  DAILY NOTE    TIME   SLP Individual Minutes  Time In: 7394  Time Out: 8043  Minutes: 14  Timed Code Treatment Minutes: 0 Minutes       Date: 2022  Patient Name: Lilia Saha      CSN: [id number]   : 1955  (77 y.o.)  Gender: male   Referring Physician: Golden Rey MD   Diagnosis: Sepsis   Secondary Diagnosis: Dysphagia   Precautions: fall risk, aspiration precautions   Current Diet: Pureed diet and thin liquids   Swallowing Strategies: Full Upright Position, Small Bite/Sip, Multiple Swallow, Pulmonary Monitoring, Alternate Solids and Liquids, Limit Distractions and Monitor for Fatigue    Date of Last MBS/FEES: FEES 22    Pain:  No pain reported. Subjective:  Session approved by RN, Apple Nieves. Patient seen upright in bed. Alert and cooperative. Short-Term Goals:  SHORT TERM GOAL #1:  Goal 1: Patient will consume puree diet with thin liquids without s/s of aspiration with use of compensatory strategies (alternate solids/liquids, double swallow) in order to safely maintain adequate hydration and nutrition  INTERVENTIONS: Skilled dietary analysis completed with PO trials of puree with thin liquids. Oral phase highly unremarkable with good bolus control of puree and viscous bolus. Suspected adequate AP transit of material with timely initiation of swallow. No overt s/s of aspiration this date, however, unable to fully validate presence of pharyngeal phase deficits without formal instrumentation/supportive imaging. ST with direct instruction of compensatory swallowing strategies with good implementation to follow. Recommendations to continue puree diet with thin liquids with repeat FEES to be completed ; see below for additional information. SHORT TERM GOAL #2:  Goal 2: Patient will complete oral and pharyngeal exercises x15 in order to improve overall oral mastication + pharyngeal constricition.   INTERVENTIONS: External Lingual Circles: x10 each direction; decreased ROM in clockwise rotation, decompensation with fatigue   Lingual Protrusion with 5 Second Hold: good tolerance    SHORT TERM GOAL #3:  Goal 3: Patient will complete repeat instrumental evaluation (if patient requests soft and hard solids) in order to determine readiness for diet advancement  INTERVENTIONS: Patient with request for diet advancement due to dislike of puree texture/taste. ST with recommendations to complete repeat FEES evaluation prior to diet advancement as previous FEES unable to fully evaluate patient consumption of soft and hard solids; patient in agreement with plan. Recommendations to continue puree diet with thin liquids with repeat FEES to be completed 1/26/2022 to determine patient readiness for diet advancement. SHORT TERM GOAL #4:  Goal 4: Patient will demosntrate understanding of compensatory strategies (alternate solids/liquids, double swallow) in order to improve overall safety of the swallow  INTERVENTIONS: See STG 1.     Long-Term Goals:  No established LTG's given short ELOS        EDUCATION:  Learner: Patient  Education:  Reviewed results and recommendations of this evaluation, Reviewed diet and strategies and Reviewed ST goals and Plan of Care  Evaluation of Education: Verbalizes understanding, Needs further instruction and Family not present    ASSESSMENT/PLAN:  Activity Tolerance:  Patient tolerance of  treatment: good. Assessment/Plan: Patient progressing toward established goals. Continues to require skilled care of licensed speech pathologist to progress toward achievement of established goals and plan of care. .     Plan for Next Session: Repeat FEES 1/26/2022      Brennan Morales M.S., [facility]

## 2022-01-24 NOTE — PROGRESS NOTES
[address]  Occupational Therapy  Daily Note  Time:   Time In: 3230  Time Out: 1558  Timed Code Treatment Minutes: 15 Minutes  Minutes: 15          Date: 2022  Patient Name: Kp Vega,   Gender: male      Room: [facility]005-A  MRN: [id number]  : 1955  (77 y.o.)  Referring Practitioner: Erika García MD  Diagnosis: Sepsis  Additional Pertinent Hx: Kp Vega is a 77 y.o. male who presents to [facility] ED 2022 with buttock pain. Patient is a lifetime nonsmoker with a PMH significant for recent LLE DVT on Eliquis, IDDM, Primary hypertension, urinary retention. Patient arrived by EMS from [facility] with new decubitus ulcer. Patient believes this was present for multiple weeks however this was not well documented on his previous hospital encounters. He reports he had the area drained approximately one week ago by wound team.  Patient reports worsening pain accompanied by fever sweats and chills over the last week. Of note patient was seen in the ED on 2022 and ultrasound of the left leg demonstrated acute DVT affecting the peroneal and anterior tibial veins. Initiated on therapy for suspected DVT of left lower extremity with Eliquis 5 mg daily and has been on anticoagulation with Eliquis since that time. Prior to this patient was hospitalized from 2021 to 2021 for COVID-19 pneumonia. Patient received the usual therapy of Decadron and baricitinib and was discharged to a skilled nursing facility at [facility] for rehab on room air. sx  EXPLORATORY LAPAROSCOPY, DIVERTING COLOSTOMY    Restrictions/Precautions:  Restrictions/Precautions: General Precautions,Fall Risk  Position Activity Restriction  Other position/activity restrictions: colostomy, buttock wound \    SUBJECTIVE: RN approved session; states patient is going to get transferred to the 7th floor soon.  Patient side lying in bed upon OT arrival and agreeable to tx with MIN encouragement due to thinking he already had OT with [facility] in purpose/benefits with patient agreeable. Did not want to move from side lying position due to pain relief on buttock. Agreeable to UB exer. PAIN: 4/10: buttock    Vitals: Vitals not assessed per clinical judgement, see nursing flowsheet    COGNITION: WFL and Slow Processing    ADL:   No ADL's completed this session. Rometta Favre BALANCE:  not completed this date    BED MOBILITY:  Not Tested    TRANSFERS:  Not completed this date        ADDITIONAL ACTIVITIES:  Completed 1 set x 10 reps of UB exer with orange (min resist.) resistance band to increase UB strength for functional transfers with rest breaks and stated he was getting fatigued at end of UB therex. ASSESSMENT:  Assessment: patient progressing well toward established goals. Activity Tolerance:  Patient tolerance of  treatment: fair. Pain in buttock, de-conditioned        Discharge Recommendations: Continue to assess pending progress and Patient would benefit from continued OT at discharge  Equipment Recommendations: Equipment Needed:  (continue to assess)  Plan: Times per week: 5x  Times per day: Daily  Current Treatment Recommendations: Strengthening,Safety Education & Training,Patient/Caregiver Education & Training,Self-Care / ADL,Functional Mobility Training,Endurance Training    Patient Education  Patient Education: Role of OT, Plan of Care, Home Exercise Program and Importance of Increasing Activity    Goals  Short term goals  Time Frame for Short term goals: by discharge  Short term goal 1: pt will tolerate sitting EOB with min A x1 for 5 minutes to increase indep with completing ADLs  Short term goal 2: pt will complete UB ADL with set up only to increase indep with grooming and dressing tasks.   Short term goal 3: pt will complete BUE mod resisitve exercises with min cuing for technique to increase strength and endurance required for ADLs  Short term goal 4: OTR to assess functional transfers

## 2022-01-24 NOTE — CARE COORDINATION
1/24/22, 10:30 AM EST    DISCHARGE PLANNING EVALUATION    Spoke with Charles at [address] [place] [address]. They have not decided if they can accept. She will call back as soon as she knows either way. Update 2:08pm: Received a message from Charles at [address] [place] [address]. They can't accept Naila Stanley. Called his friend Nae Mejia to ask where SW should look next. After discussing options he agreed to SW calling facilities in [facility] and [place] [id number]. Rhys Walton at [facility]. They are in network and have beds. SW made a referral.  Called and left a message for [address]. Spoke with Dora from [facility] and she reports that [employer] and [facility] [facility] are not taking new patients.

## 2022-01-24 NOTE — PROGRESS NOTES
Hospitalist Progress Note      Patient:  [facility]    Unit/Bed:4A-05/005-A  YOB: 1955  MRN: [id number]   Acct: [de-identified]   PCP: DIONTE Garcia - CNP  Date of Admission: 1/14/2022    Assessment/Plan:    1. Necrotizing soft tissue infection   -Status post emergent excisional debridement and drainage of bilateral abscess of deep ischial rectal space and excisional debridement of skin subcutaneous tissue muscle and fascia.   -Continue IV antibiotics per ID recommendations. Awaiting cultures. Duration to be determined per ID.   -Status post diverting colostomy on 1/19/2022 1/22: Continue abx as per ID recommendations. 1/23: As above  1/24: As above    3. Acute DVT   -Was previously on Eliquis but that had to be stopped due to recent surgery. IR was consulted for IVC placement but recommended no emergent need for it at this point. Will discuss with surgery, in regards to when it would be okay to restart anticoagulation. 1/22: Eliquis has been resumed    5. Essential HTN   - Holding home BP medications for now due to Borderline BP, but will restart now as BP is significantly high.    1/22: Will resume home HCTZ    6. DMII   - Continue ISS. BG controlled. Will monitor and adjust accordingly. 7. Acute on chronic blood loss anemia   - No hgb available today. Will recheck tomorrow morning    1/22: Hgb stable. Continue to monitor    8. HLD    9. Hx of COVID 19  10. Urinary retention    Disposition: Still unable to find SNF that will accept. Patient is stable and does meet requirements for stepdown level care. Will be transferred to med/surg. Chief Complaint: Back pain    Initial H and P:-      66-year-old white male who presented to [facility] on 1/14/2022 with complaints of buttocks pain.   He has a past medical history of lifetime non-smoker, recent left lower extremity DVT on Eliquis, diabetes mellitus, hypertension, urinary retention. Per report he presented to the emergency department via EMS from [facility] with new decubitus ulcer. Patient believes it was present for multiple weeks however was not well documented prior to hospital encounter. He reports that it has been draining for approximately 1 week. Patient reported he was having worsening pain fever and sweats and chills over the last week. In the emergency department he was seen on 1/8/2022 ultrasound of leg demonstrated acute DVT he was started on Eliquis. Of note he was also hospitalized from 12/7/2021 to 12/20/2021 for COVID-19 pneumonia. He returned to his nursing facility on room air. In the emergency department he was positive for lactic acidosis. Imaging revealed a decubitus ulcer with air in the perineum. Patient was fluid resuscitated per sepsis protocol and given Vanco and Zosyn. General surgery was consulted for debridement and concern for necrotizing fasciitis. Initially patient was admitted to stepdown postprocedure he was transferred to the ICU for hypotension. He did undergo emergent debridement of abscess on 1/14 with Dr. John Reinoso. He was initially on vasopressors which have been weaned off. Subjective (past 24 hours):     Reports feeling fine. Still complaining about the food. Otherwise no other complaints. Past medical history, family history, social history and allergies reviewed again and is unchanged since admission. ROS (All review of systems completed. Pertinent positives noted.  Otherwise All other systems reviewed and negative.)     Medications:  Reviewed    Infusion Medications    sodium chloride      sodium chloride 25 mL (01/22/22 0521)    dextrose      sodium chloride      sodium chloride       Scheduled Medications    insulin lispro  0-18 Units SubCUTAneous 4x Daily AC & HS    phosphorus replacement protocol   Other RX Placeholder    apixaban  5 mg Oral BID    piperacillin-tazobactam 3,375 mg IntraVENous 30 Min Pre-Op    famotidine  20 mg Oral BID    sodium hypochlorite   Irrigation Daily    amLODIPine  10 mg Oral Daily    aspirin  81 mg Oral Daily    hydrALAZINE  100 mg Oral Q8H    hydroCHLOROthiazide  50 mg Oral Daily    losartan  100 mg Oral Daily    metoprolol succinate  50 mg Oral Daily    sodium chloride flush  5-40 mL IntraVENous 2 times per day    calcium replacement protocol   Other RX Placeholder    piperacillin-tazobactam  3,375 mg IntraVENous Q8H    tamsulosin  0.4 mg Oral Daily    clindamycin (CLEOCIN) IV  600 mg IntraVENous Q8H     PRN Meds: potassium chloride **OR** potassium alternative oral replacement **OR** potassium chloride, sodium chloride, sodium chloride flush, sodium chloride, ondansetron **OR** ondansetron, glucose, dextrose, glucagon (rDNA), dextrose, HYDROcodone 5 mg - acetaminophen, sodium chloride, fentanNYL, sodium chloride      Intake/Output Summary (Last 24 hours) at 1/24/2022 1033  Last data filed at 1/24/2022 0415  Gross per 24 hour   Intake 300 ml   Output 2500 ml   Net -2200 ml       Diet:  ADULT ORAL NUTRITION SUPPLEMENT; Breakfast, Lunch, Dinner; Diabetic Oral Supplement  ADULT ORAL NUTRITION SUPPLEMENT; Breakfast, Dinner; Wound Healing Oral Supplement  ADULT DIET; Dysphagia - Pureed    Exam:  /63   Pulse 52   Temp 97.9 °F (36.6 °C) (Oral)   Resp 18   Ht 5' 6\" (1.676 m)   Wt 191 lb 8 oz (86.9 kg)   SpO2 98%   BMI 30.91 kg/m²   General appearance: No apparent distress, appears stated age and cooperative. HEENT: Pupils equal, round, and reactive to light. Conjunctivae/corneas clear. Neck: Supple, with full range of motion. No jugular venous distention. Trachea midline. Respiratory:  Normal respiratory effort. Clear to auscultation, bilaterally without Rales/Wheezes/Rhonchi. Cardiovascular: Regular rate and rhythm with normal S1/S2 without murmurs, rubs or gallops.   Abdomen: Soft, colostomy in place  Musculoskeletal: passive and active ROM x 4 extremities. Skin: large unstageable coccyx ulcer  Neurologic:  Neurovascularly intact without any focal sensory/motor deficits. Cranial nerves: II-XII intact, grossly non-focal.  Psychiatric: Alert and oriented, thought content appropriate, normal insight  Capillary Refill: Brisk,< 3 seconds   Peripheral Pulses: +2 palpable, equal bilaterally     Labs:   Recent Labs     01/22/22  0520 01/23/22  0504 01/24/22  0617   WBC 11.2* 11.7* 10.0   HGB 7.2* 7.7* 8.0*   HCT 22.5* 24.2* 24.7*    244 221     Recent Labs     01/22/22  0520 01/23/22  0504 01/24/22  0617 01/24/22  0930    139 134*  --    K 3.7 3.5 3.6  --    * 108 103  --    CO2 22* 20* 22*  --    BUN 9 8 8  --    CREATININE 0.8 0.7 0.7  --    CALCIUM 7.4* 7.6* 7.7*  --    PHOS 2.0*  --   --  2.4     Recent Labs     01/22/22  0520 01/23/22  0504 01/24/22  0617   AST 14 15 13   ALT 24 24 25   BILIDIR <0.2 <0.2 <0.2   BILITOT 0.4 0.4 0.5   ALKPHOS 68 71 74     Recent Labs     01/22/22  0520   INR 1.79*     No results for input(s): Ethelene Soulier in the last 72 hours. Microbiology:    Blood culture #1:   Lab Results   Component Value Date    BC No growth-preliminary No growth  01/14/2022       Blood culture #2:No results found for: Mil Ramos    Organism:  Lab Results   Component Value Date    ORG Pseudomonas aeruginosa 01/14/2022    ORG mixed anaerobic growth 01/14/2022         Lab Results   Component Value Date    LABGRAM  01/14/2022     Few segmented neutrophils observed. No epithelial cells observed. Many gram positive cocci occurring singly and in pairs. Many gram negative bacilli. Moderate large gram positive bacilli.         MRSA culture only:No results found for: [facility]    Urine culture:   Lab Results   Component Value Date    LABURIN No growth-preliminary No growth  01/15/2022       Respiratory culture: No results found for: CULTRESP    Aerobic and Anaerobic :  Lab Results   Component Value Date    LABAERO  01/14/2022 Culture also yielded light growth of Enterococcus species, Streptococcus species (Viridans group), Enteric gram negative bacilli, Staphylococcus species (coagulase negative), and gram positive bacilli most consistent with Corynebacterium species. If a true mixed infection is suspected, then broad spectrum empiric antibiotic therapy is indicated. LABAERO light growth  01/14/2022     Lab Results   Component Value Date    LABANAE  01/14/2022     Culture yielded heavy mixed anaerobic growth, including gram positive bacilli and multiple colony types of both gram negative bacill and gram positive cocci. If a true mixed aerobic and anaerobic infection is suspected, then broad spectrum empiric antibiotic therapy is indicated and should include coverage for anaerobic organisms. Urinalysis:      Lab Results   Component Value Date    NITRU NEGATIVE 01/15/2022    WBCUA 5-9 01/15/2022    BACTERIA FEW 01/15/2022    RBCUA 10-15 01/15/2022    BLOODU MODERATE 01/15/2022    SPECGRAV 1.015 01/15/2022    GLUCOSEU NEGATIVE 01/14/2022       Radiology:  XR CHEST PORTABLE   Final Result   1. Right IJ central line tip in the right atrium. This can be retracted by    3 cm. 2. Bilateral patchy interstitial opacities which may reflect infiltrate. 3. Borderline cardiomegaly. This document has been electronically signed by: Steven Mckeon MD on    01/15/2022 12:24 AM      CT ABDOMEN PELVIS WO CONTRAST Additional Contrast? None   Final Result       1. Decubitus ulcer overlying the sacrum new since previous CT scan of the pelvis dated 7 December 2021.   2. Air   in the perineum, new since previous study dated 7 December 2021.   3. Dale catheter in place. Air in the urinary bladder. 4. Changes of ankylosing spondylitis involving the lower thoracic, lumbar spine and sacroiliac iliac joints. Deformity at the thoracolumbar junction. 5. Calcification along the gallbladder wall. 6. Bilateral inguinal lymph nodes.    7. Areas of atelectasis or scarring at both lung bases. **This report has been created using voice recognition software. It may contain minor errors which are inherent in voice recognition technology. **      Final report electronically signed by DR Amina Omalley on 1/14/2022 6:49 PM      XR CHEST PORTABLE   Final Result   1. Interval improvement since previous plain radiographs dated 11 December 2021 with reduction in the bilateral upper lobe infiltrates. .   2. Borderline cardiomegaly. 3. Thoracic spondylosis. **This report has been created using voice recognition software. It may contain minor errors which are inherent in voice recognition technology. **      Final report electronically signed by DR Amina Omalley on 1/14/2022 5:31 PM        CT ABDOMEN PELVIS WO CONTRAST Additional Contrast? None    Result Date: 1/14/2022  PROCEDURE: CT ABDOMEN PELVIS WO CONTRAST CLINICAL INFORMATION: buttock wound . COMPARISON: CTA abdomen and pelvis dated 7 December 2021. . TECHNIQUE: Axial 5 mm CT images were obtained through the abdomen and pelvis. No contrast was given. Coronal and sagittal reconstructions were obtained. All CT scans at this facility use dose modulation, iterative reconstruction, and/or weight-based dosing when appropriate to reduce radiation dose to as low as reasonably achievable. FINDINGS: There are areas of atelectasis or scarring at both lung bases. The base of the heart is within appropriate limits. The liver is grossly normal. The spleen is normal. The adrenal glands and pancreas are grossly normal. There is calcification along the gallbladder wall. .  There is no hydronephrosis or stones of either kidney. No contour deforming renal masses are noted. There is a small hiatal hernia. No abnormalities of the small bowel loops are noted. The IVC and aorta are of normal caliber. There is no adenopathy. There is a Dale catheter in place. There is  air within the urinary bladder.  The prostate gland measures 4.1 x 3.4 cm in size. . There is no pelvic free fluid. The colon is grossly normal. There is bilateral inguinal lymph nodes in place. There are possible changes of ankylosing spondylitis involving the lumbar spine and sacroiliac joints bilaterally. There is deformity at the thoracolumbar junction There is a decubitus ulcer overlying the sacrum. There is is air within the perineum suggestive off inflammatory process. .      1. Decubitus ulcer overlying the sacrum new since previous CT scan of the pelvis dated 7 December 2021. 2. Air   in the perineum, new since previous study dated 7 December 2021. 3. Dale catheter in place. Air in the urinary bladder. 4. Changes of ankylosing spondylitis involving the lower thoracic, lumbar spine and sacroiliac iliac joints. Deformity at the thoracolumbar junction. 5. Calcification along the gallbladder wall. 6. Bilateral inguinal lymph nodes. 7. Areas of atelectasis or scarring at both lung bases. **This report has been created using voice recognition software. It may contain minor errors which are inherent in voice recognition technology. ** Final report electronically signed by DR Phoebe King on 1/14/2022 6:49 PM    XR CHEST PORTABLE    Result Date: 1/15/2022  1 view chest x-ray Comparison: None Findings: Right IJ central line tip in the right atrium. Bilateral patchy interstitial opacities. No pleural effusion or pneumothorax. Heart is borderline enlarged. No pulmonary vascular congestion. No acute fracture. 1. Right IJ central line tip in the right atrium. This can be retracted by 3 cm. 2. Bilateral patchy interstitial opacities which may reflect infiltrate. 3. Borderline cardiomegaly. This document has been electronically signed by: Padilla Kennedy MD on 01/15/2022 12:24 AM    XR CHEST PORTABLE    Result Date: 1/14/2022  PROCEDURE: XR CHEST PORTABLE CLINICAL INFORMATION: pre-op. COMPARISON: Chest x-ray dated 11 December 2021.  TECHNIQUE: AP upright view of the chest. FINDINGS: There is borderline cardiomegaly. .The mediastinum is not widened. There are bilateral infiltrates, significantly improved since previous study dated 11 December 2021. There are no effusions. . The pulmonary vascularity is normal. There is thoracic spondylosis. 1. Interval improvement since previous plain radiographs dated 11 December 2021 with reduction in the bilateral upper lobe infiltrates. . 2. Borderline cardiomegaly. 3. Thoracic spondylosis. **This report has been created using voice recognition software. It may contain minor errors which are inherent in voice recognition technology. ** Final report electronically signed by DR Korin Hernandez on 1/14/2022 5:31 PM      Electronically signed by Jodie Armstrong MD on 1/24/2022 at 10:33 AM

## 2022-01-24 NOTE — CARE COORDINATION
1/24/22, 2:24 PM EST    DISCHARGE ON GOING EVALUATION    Isaac Killian day: 10  Location: -05/005-A Reason for admit: Sepsis [facility]) [A41.9]  Septic shock ([facility] [address] [A41.9, R65.21]   Procedure:   1/14 Excisional debridement and drainage of perirectal horseshoe abscess of deep ischiorectal space total tissue removed 6 x 6 x 4 cm; Excisional debridement of skin subcutaneous tissue muscle and fascia of stage IV sacral decubitus measuring 15 x 12 x 6 cm; Tissue sent for tissue culture as well as pathology        1/19 EXPLORATORY LAPAROSCOPY, TRANSVERSE LOOP COLOSTOMY   Barriers to Discharge: ID following. Dressing changes BID with Dakins solution. Eliquis, oral augmentin. PCP: DIONTE Rene CNP  Readmission Risk Score: 24.4 ( )%  Patient Goals/Plan/Treatment Preferences: From [facility]. SW following for new ECF placement. Refer to [facility] note.

## 2022-01-25 LAB
ALBUMIN SERPL-MCNC: 2.8 G/DL (ref 3.5–5.1)
ALP BLD-CCNC: 85 U/L (ref 38–126)
ALT SERPL-CCNC: 26 U/L (ref 11–66)
ANION GAP SERPL CALCULATED.3IONS-SCNC: 11 MEQ/L (ref 8–16)
AST SERPL-CCNC: 13 U/L (ref 5–40)
BILIRUB SERPL-MCNC: 0.5 MG/DL (ref 0.3–1.2)
BILIRUBIN DIRECT: < 0.2 MG/DL (ref 0–0.3)
BUN BLDV-MCNC: 8 MG/DL (ref 7–22)
CALCIUM SERPL-MCNC: 8 MG/DL (ref 8.5–10.5)
CHLORIDE BLD-SCNC: 102 MEQ/L (ref 98–111)
CO2: 21 MEQ/L (ref 23–33)
CREAT SERPL-MCNC: 0.7 MG/DL (ref 0.4–1.2)
ERYTHROCYTE [DISTWIDTH] IN BLOOD BY AUTOMATED COUNT: 16 % (ref 11.5–14.5)
ERYTHROCYTE [DISTWIDTH] IN BLOOD BY AUTOMATED COUNT: 48.7 FL (ref 35–45)
GFR SERPL CREATININE-BSD FRML MDRD: > 90 ML/MIN/1.73M2
GLUCOSE BLD-MCNC: 106 MG/DL (ref 70–108)
GLUCOSE BLD-MCNC: 108 MG/DL (ref 70–108)
GLUCOSE BLD-MCNC: 132 MG/DL (ref 70–108)
GLUCOSE BLD-MCNC: 92 MG/DL (ref 70–108)
GLUCOSE BLD-MCNC: 96 MG/DL (ref 70–108)
HCT VFR BLD CALC: 27.4 % (ref 42–52)
HEMOGLOBIN: 9.1 GM/DL (ref 14–18)
MCH RBC QN AUTO: 29.9 PG (ref 26–33)
MCHC RBC AUTO-ENTMCNC: 33.2 GM/DL (ref 32.2–35.5)
MCV RBC AUTO: 90.1 FL (ref 80–94)
PLATELET # BLD: 254 THOU/MM3 (ref 130–400)
PMV BLD AUTO: 10.4 FL (ref 9.4–12.4)
POTASSIUM SERPL-SCNC: 3.8 MEQ/L (ref 3.5–5.2)
RBC # BLD: 3.04 MILL/MM3 (ref 4.7–6.1)
SODIUM BLD-SCNC: 134 MEQ/L (ref 135–145)
TOTAL PROTEIN: 5.8 G/DL (ref 6.1–8)
WBC # BLD: 11.7 THOU/MM3 (ref 4.8–10.8)

## 2022-01-25 PROCEDURE — 6360000002 HC RX W HCPCS: Performed by: SURGERY

## 2022-01-25 PROCEDURE — 6370000000 HC RX 637 (ALT 250 FOR IP): Performed by: INTERNAL MEDICINE

## 2022-01-25 PROCEDURE — 6370000000 HC RX 637 (ALT 250 FOR IP): Performed by: SURGERY

## 2022-01-25 PROCEDURE — 80053 COMPREHEN METABOLIC PANEL: CPT

## 2022-01-25 PROCEDURE — 2580000003 HC RX 258: Performed by: SURGERY

## 2022-01-25 PROCEDURE — 99024 POSTOP FOLLOW-UP VISIT: CPT | Performed by: SURGERY

## 2022-01-25 PROCEDURE — 97110 THERAPEUTIC EXERCISES: CPT

## 2022-01-25 PROCEDURE — 85027 COMPLETE CBC AUTOMATED: CPT

## 2022-01-25 PROCEDURE — 6370000000 HC RX 637 (ALT 250 FOR IP): Performed by: STUDENT IN AN ORGANIZED HEALTH CARE EDUCATION/TRAINING PROGRAM

## 2022-01-25 PROCEDURE — 82248 BILIRUBIN DIRECT: CPT

## 2022-01-25 PROCEDURE — 99232 SBSQ HOSP IP/OBS MODERATE 35: CPT | Performed by: INTERNAL MEDICINE

## 2022-01-25 PROCEDURE — 82948 REAGENT STRIP/BLOOD GLUCOSE: CPT

## 2022-01-25 PROCEDURE — 1200000003 HC TELEMETRY R&B

## 2022-01-25 RX ORDER — SODIUM HYPOCHLORITE 1.25 MG/ML
SOLUTION TOPICAL 2 TIMES DAILY
Status: DISCONTINUED | OUTPATIENT
Start: 2022-01-25 | End: 2022-01-27 | Stop reason: HOSPADM

## 2022-01-25 RX ORDER — OXYCODONE AND ACETAMINOPHEN 7.5; 325 MG/1; MG/1
1 TABLET ORAL 2 TIMES DAILY PRN
Status: DISCONTINUED | OUTPATIENT
Start: 2022-01-25 | End: 2022-01-27 | Stop reason: HOSPADM

## 2022-01-25 RX ADMIN — TAMSULOSIN HYDROCHLORIDE 0.4 MG: 0.4 CAPSULE ORAL at 10:28

## 2022-01-25 RX ADMIN — DAKIN'S SOLUTION 0.125% (QUARTER STRENGTH): 0.12 SOLUTION at 21:30

## 2022-01-25 RX ADMIN — HYDRALAZINE HYDROCHLORIDE 100 MG: 50 TABLET ORAL at 10:31

## 2022-01-25 RX ADMIN — APIXABAN 5 MG: 5 TABLET, FILM COATED ORAL at 10:29

## 2022-01-25 RX ADMIN — HYDROCHLOROTHIAZIDE 50 MG: 25 TABLET ORAL at 10:29

## 2022-01-25 RX ADMIN — AMOXICILLIN AND CLAVULANATE POTASSIUM 1 TABLET: 875; 125 TABLET, FILM COATED ORAL at 02:36

## 2022-01-25 RX ADMIN — LOSARTAN POTASSIUM 100 MG: 100 TABLET, FILM COATED ORAL at 10:28

## 2022-01-25 RX ADMIN — ASPIRIN 81 MG: 81 TABLET, COATED ORAL at 10:35

## 2022-01-25 RX ADMIN — APIXABAN 5 MG: 5 TABLET, FILM COATED ORAL at 02:36

## 2022-01-25 RX ADMIN — HYDRALAZINE HYDROCHLORIDE 100 MG: 50 TABLET ORAL at 02:36

## 2022-01-25 RX ADMIN — AMOXICILLIN AND CLAVULANATE POTASSIUM 1 TABLET: 875; 125 TABLET, FILM COATED ORAL at 10:28

## 2022-01-25 RX ADMIN — FAMOTIDINE 20 MG: 20 TABLET ORAL at 21:28

## 2022-01-25 RX ADMIN — SODIUM CHLORIDE, PRESERVATIVE FREE 10 ML: 5 INJECTION INTRAVENOUS at 10:32

## 2022-01-25 RX ADMIN — AMOXICILLIN AND CLAVULANATE POTASSIUM 1 TABLET: 875; 125 TABLET, FILM COATED ORAL at 21:28

## 2022-01-25 RX ADMIN — DAKIN'S SOLUTION 0.125% (QUARTER STRENGTH): 0.12 SOLUTION at 10:32

## 2022-01-25 RX ADMIN — APIXABAN 5 MG: 5 TABLET, FILM COATED ORAL at 21:28

## 2022-01-25 RX ADMIN — FAMOTIDINE 20 MG: 20 TABLET ORAL at 10:28

## 2022-01-25 RX ADMIN — AMLODIPINE BESYLATE 10 MG: 10 TABLET ORAL at 10:28

## 2022-01-25 RX ADMIN — FENTANYL CITRATE 50 MCG: 0.05 INJECTION, SOLUTION INTRAMUSCULAR; INTRAVENOUS at 07:11

## 2022-01-25 RX ADMIN — OXYCODONE HYDROCHLORIDE AND ACETAMINOPHEN 1 TABLET: 7.5; 325 TABLET ORAL at 21:28

## 2022-01-25 RX ADMIN — METOPROLOL SUCCINATE 50 MG: 50 TABLET, FILM COATED, EXTENDED RELEASE ORAL at 10:28

## 2022-01-25 RX ADMIN — FENTANYL CITRATE 50 MCG: 0.05 INJECTION, SOLUTION INTRAMUSCULAR; INTRAVENOUS at 05:48

## 2022-01-25 ASSESSMENT — PAIN SCALES - GENERAL
PAINLEVEL_OUTOF10: 8
PAINLEVEL_OUTOF10: 0
PAINLEVEL_OUTOF10: 0
PAINLEVEL_OUTOF10: 8
PAINLEVEL_OUTOF10: 0

## 2022-01-25 NOTE — PROGRESS NOTES
Progress note: Infectious diseases    Patient - Leonel Araujo,  Age - 77 y.o.    - 1955      Room Number - 7K-28/028-A   MRN -  [id number]   Acct # - [de-identified]  Date of Admission -  2022  4:05 PM    SUBJECTIVE:   No new issues. Awaiting placement of patient. OBJECTIVE   VITALS    height is 5' 6\" (1.676 m) and weight is 191 lb 8 oz (86.9 kg). His oral temperature is 97.3 °F (36.3 °C). His blood pressure is 131/60 and his pulse is 74. His respiration is 20 and oxygen saturation is 96%. Wt Readings from Last 3 Encounters:   22 191 lb 8 oz (86.9 kg)   21 195 lb 8 oz (88.7 kg)   18 223 lb 11.2 oz (101.5 kg)       I/O (24 Hours)    Intake/Output Summary (Last 24 hours) at 2022 1424  Last data filed at 2022 1308  Gross per 24 hour   Intake 660 ml   Output 1625 ml   Net -965 ml       General Appearance  Awake, alert, oriented,  not  In acute distress  HEENT - normocephalic, atraumatic, pale  conjunctiva,  anicteric sclera  Neck - Supple, no mass  Lungs -  Bilateral   air entry, no rhonchi, no wheeze  Cardiovascular - Heart sounds are normal.     Abdomen - soft, colostomy functioning, soft abdomen  Skin - No bruising or bleeding  Extremities - foam dressing to heel  Dressed coccyx wound.      MEDICATIONS:      amoxicillin-clavulanate  1 tablet Oral 2 times per day    insulin lispro  0-18 Units SubCUTAneous 4x Daily AC & HS    phosphorus replacement protocol   Other RX Placeholder    apixaban  5 mg Oral BID    famotidine  20 mg Oral BID    sodium hypochlorite   Irrigation Daily    amLODIPine  10 mg Oral Daily    aspirin  81 mg Oral Daily    hydrALAZINE  100 mg Oral Q8H    hydroCHLOROthiazide  50 mg Oral Daily    losartan  100 mg Oral Daily    metoprolol succinate  50 mg Oral Daily    sodium chloride flush  5-40 mL IntraVENous 2 times per day    calcium replacement protocol Other RX Placeholder    tamsulosin  0.4 mg Oral Daily      sodium chloride 25 mL (01/22/22 0521)    dextrose       potassium chloride **OR** potassium alternative oral replacement **OR** potassium chloride, sodium chloride flush, sodium chloride, ondansetron **OR** ondansetron, glucose, dextrose, glucagon (rDNA), dextrose, HYDROcodone 5 mg - acetaminophen, fentanNYL      LABS:     CBC:   Recent Labs     01/23/22  0504 01/24/22  0617 01/25/22  0715   WBC 11.7* 10.0 11.7*   HGB 7.7* 8.0* 9.1*    221 254     BMP:    Recent Labs     01/24/22  0617 01/24/22  1430 01/25/22  0715   * 130* 134*   K 3.6 3.7 3.8    100 102   CO2 22* 20* 21*   BUN 8 8 8   CREATININE 0.7 0.7 0.7   GLUCOSE 97 119* 92     Calcium:  Recent Labs     01/25/22  0715   CALCIUM 8.0*     Ionized Calcium:No results for input(s): IONCA in the last 72 hours. Magnesium:  Recent Labs     01/24/22  0930   MG 1.8     Phosphorus:  Recent Labs     01/24/22  1430   PHOS 2.2*     BNP:No results for input(s): BNP in the last 72 hours. Glucose:  Recent Labs     01/24/22  2052 01/25/22  0717 01/25/22  1110   POCGLU 102 96 132*    INR:   No results for input(s): INR in the last 72 hours. Hepatic:   Recent Labs     01/23/22  0504 01/24/22  0617 01/25/22  0715   ALKPHOS 71 74 85   ALT 24 25 26   AST 15 13 13   PROT 5.3* 5.4* 5.8*   BILITOT 0.4 0.5 0.5   BILIDIR <0.2 <0.2 <0.2   LABALBU 2.4* 2.5* 2.8*         Problem list of patient:     Patient Active Problem List   Diagnosis Code    Cellulitis and abscess of neck L03.221, L02.11    Essential hypertension I10    Leukocytosis D72.829    Obesity (BMI 30-39. 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

## 2022-01-25 NOTE — PROGRESS NOTES
Hospitalist Progress Note      Patient:  Vero Espana    Unit/Bed:7K-28/028-A  YOB: 1955  MRN: [id number]   Acct: [de-identified]   PCP: DIONTE Gonzales - CNP  Date of Admission: 1/14/2022    Assessment/Plan:    1. Necrotizing soft tissue infection   -Status post emergent excisional debridement and drainage of bilateral abscess of deep ischial rectal space and excisional debridement of skin subcutaneous tissue muscle and fascia.   -Continue IV antibiotics per ID recommendations. Awaiting cultures. Duration to be determined per ID.   -Status post diverting colostomy on 1/19/2022 1/22: Continue abx as per ID recommendations. 1/23: As above  1/24: As above  1/25: Abx deescalated to Augmentin    3. Acute DVT   -Was previously on Eliquis but that had to be stopped due to recent surgery. IR was consulted for IVC placement but recommended no emergent need for it at this point. Will discuss with surgery, in regards to when it would be okay to restart anticoagulation. 1/22: Eliquis has been resumed    5. Essential HTN   - Holding home BP medications for now due to Borderline BP, but will restart now as BP is significantly high.    1/22: Will resume home HCTZ    6. DMII   - Continue ISS. BG controlled. Will monitor and adjust accordingly. 7. Acute on chronic blood loss anemia   - No hgb available today. Will recheck tomorrow morning    1/22: Hgb stable. Continue to monitor    8. HLD    9. Hx of COVID 19  10. Urinary retention    Disposition: Still unable to find SNF that will accept. Patient is refusing therapy and unable to move/do ADLs. Spoke with case management and SW who will continue to find placement for this patient. Chief Complaint: Back pain    Initial H and P:-      54-year-old white male who presented to [facility] on 1/14/2022 with complaints of buttocks pain.   He has a past medical history of lifetime non-smoker, recent left lower extremity DVT on Eliquis, diabetes mellitus, hypertension, urinary retention. Per report he presented to the emergency department via EMS from [facility] with new decubitus ulcer. Patient believes it was present for multiple weeks however was not well documented prior to hospital encounter. He reports that it has been draining for approximately 1 week. Patient reported he was having worsening pain fever and sweats and chills over the last week. In the emergency department he was seen on 1/8/2022 ultrasound of leg demonstrated acute DVT he was started on Eliquis. Of note he was also hospitalized from 12/7/2021 to 12/20/2021 for COVID-19 pneumonia. He returned to his nursing facility on room air. In the emergency department he was positive for lactic acidosis. Imaging revealed a decubitus ulcer with air in the perineum. Patient was fluid resuscitated per sepsis protocol and given Vanco and Zosyn. General surgery was consulted for debridement and concern for necrotizing fasciitis. Initially patient was admitted to stepdown postprocedure he was transferred to the ICU for hypotension. He did undergo emergent debridement of abscess on 1/14 with Dr. Peri Colin. He was initially on vasopressors which have been weaned off. Subjective (past 24 hours):     Reports feeling fine. Still complaining about the food. Otherwise no other complaints. Past medical history, family history, social history and allergies reviewed again and is unchanged since admission. ROS (All review of systems completed. Pertinent positives noted.  Otherwise All other systems reviewed and negative.)     Medications:  Reviewed    Infusion Medications    sodium chloride 25 mL (01/22/22 0521)    dextrose       Scheduled Medications    amoxicillin-clavulanate  1 tablet Oral 2 times per day    insulin lispro  0-18 Units SubCUTAneous 4x Daily AC & HS    phosphorus replacement protocol   Other RX Placeholder    apixaban  5 mg Oral BID    famotidine  20 mg Oral BID    sodium hypochlorite   Irrigation Daily    amLODIPine  10 mg Oral Daily    aspirin  81 mg Oral Daily    hydrALAZINE  100 mg Oral Q8H    hydroCHLOROthiazide  50 mg Oral Daily    losartan  100 mg Oral Daily    metoprolol succinate  50 mg Oral Daily    sodium chloride flush  5-40 mL IntraVENous 2 times per day    calcium replacement protocol   Other RX Placeholder    tamsulosin  0.4 mg Oral Daily     PRN Meds: potassium chloride **OR** potassium alternative oral replacement **OR** potassium chloride, sodium chloride flush, sodium chloride, ondansetron **OR** ondansetron, glucose, dextrose, glucagon (rDNA), dextrose, HYDROcodone 5 mg - acetaminophen, fentanNYL      Intake/Output Summary (Last 24 hours) at 1/25/2022 1332  Last data filed at 1/25/2022 1308  Gross per 24 hour   Intake 900 ml   Output 2150 ml   Net -1250 ml       Diet:  ADULT ORAL NUTRITION SUPPLEMENT; Breakfast, Lunch, Dinner; Diabetic Oral Supplement  ADULT DIET; Dysphagia - Pureed    Exam:  /60   Pulse 74   Temp 97.3 °F (36.3 °C) (Oral)   Resp 20   Ht 5' 6\" (1.676 m)   Wt 191 lb 8 oz (86.9 kg)   SpO2 96%   BMI 30.91 kg/m²   General appearance: No apparent distress, appears stated age and cooperative. HEENT: Pupils equal, round, and reactive to light. Conjunctivae/corneas clear. Neck: Supple, with full range of motion. No jugular venous distention. Trachea midline. Respiratory:  Normal respiratory effort. Clear to auscultation, bilaterally without Rales/Wheezes/Rhonchi. Cardiovascular: Regular rate and rhythm with normal S1/S2 without murmurs, rubs or gallops. Abdomen: Soft, colostomy in place  Musculoskeletal: passive and active ROM x 4 extremities. Skin: large unstageable coccyx ulcer  Neurologic:  Neurovascularly intact without any focal sensory/motor deficits.  Cranial nerves: II-XII intact, grossly non-focal.  Psychiatric: Alert and oriented, thought content appropriate, normal insight  Capillary Refill: Brisk,< 3 seconds   Peripheral Pulses: +2 palpable, equal bilaterally     Labs:   Recent Labs     01/23/22  0504 01/24/22  0617 01/25/22  0715   WBC 11.7* 10.0 11.7*   HGB 7.7* 8.0* 9.1*   HCT 24.2* 24.7* 27.4*    221 254     Recent Labs     01/24/22  0617 01/24/22  0930 01/24/22  1430 01/25/22  0715   *  --  130* 134*   K 3.6  --  3.7 3.8     --  100 102   CO2 22*  --  20* 21*   BUN 8  --  8 8   CREATININE 0.7  --  0.7 0.7   CALCIUM 7.7*  --  7.8* 8.0*   PHOS  --  2.4 2.2*  --      Recent Labs     01/23/22  0504 01/24/22  0617 01/25/22  0715   AST 15 13 13   ALT 24 25 26   BILIDIR <0.2 <0.2 <0.2   BILITOT 0.4 0.5 0.5   ALKPHOS 71 74 85     No results for input(s): INR in the last 72 hours. No results for input(s): Erynonnbro Brittonane in the last 72 hours. Microbiology:    Blood culture #1:   Lab Results   Component Value Date    BC No growth-preliminary No growth  01/14/2022       Blood culture #2:No results found for: Pamla Reusing    Organism:  Lab Results   Component Value Date    ORG Pseudomonas aeruginosa 01/14/2022    ORG mixed anaerobic growth 01/14/2022         Lab Results   Component Value Date    LABGRAM  01/14/2022     Few segmented neutrophils observed. No epithelial cells observed. Many gram positive cocci occurring singly and in pairs. Many gram negative bacilli. Moderate large gram positive bacilli.         MRSA culture only:No results found for: [facility]    Urine culture:   Lab Results   Component Value Date    LABURIN No growth-preliminary No growth  01/15/2022       Respiratory culture: No results found for: CULTRESP    Aerobic and Anaerobic :  Lab Results   Component Value Date    LABAERO  01/14/2022     Culture also yielded light growth of Enterococcus species, Streptococcus species (Viridans group), Enteric gram negative bacilli, Staphylococcus species (coagulase negative), and gram positive bacilli most consistent with Corynebacterium species. If a true mixed infection is suspected, then broad spectrum empiric antibiotic therapy is indicated. LABAERO light growth  01/14/2022     Lab Results   Component Value Date    LABANAE  01/14/2022     Culture yielded heavy mixed anaerobic growth, including gram positive bacilli and multiple colony types of both gram negative bacill and gram positive cocci. If a true mixed aerobic and anaerobic infection is suspected, then broad spectrum empiric antibiotic therapy is indicated and should include coverage for anaerobic organisms. Urinalysis:      Lab Results   Component Value Date    NITRU NEGATIVE 01/15/2022    WBCUA 5-9 01/15/2022    BACTERIA FEW 01/15/2022    RBCUA 10-15 01/15/2022    BLOODU MODERATE 01/15/2022    SPECGRAV 1.015 01/15/2022    GLUCOSEU NEGATIVE 01/14/2022       Radiology:  XR CHEST PORTABLE   Final Result   1. Right IJ central line tip in the right atrium. This can be retracted by    3 cm. 2. Bilateral patchy interstitial opacities which may reflect infiltrate. 3. Borderline cardiomegaly. This document has been electronically signed by: Ashley Gomez MD on    01/15/2022 12:24 AM      CT ABDOMEN PELVIS WO CONTRAST Additional Contrast? None   Final Result       1. Decubitus ulcer overlying the sacrum new since previous CT scan of the pelvis dated 7 December 2021.   2. Air   in the perineum, new since previous study dated 7 December 2021.   3. Dale catheter in place. Air in the urinary bladder. 4. Changes of ankylosing spondylitis involving the lower thoracic, lumbar spine and sacroiliac iliac joints. Deformity at the thoracolumbar junction. 5. Calcification along the gallbladder wall. 6. Bilateral inguinal lymph nodes. 7. Areas of atelectasis or scarring at both lung bases. **This report has been created using voice recognition software. It may contain minor errors which are inherent in voice recognition technology. **      Final report spine and sacroiliac joints bilaterally. There is deformity at the thoracolumbar junction There is a decubitus ulcer overlying the sacrum. There is is air within the perineum suggestive off inflammatory process. .      1. Decubitus ulcer overlying the sacrum new since previous CT scan of the pelvis dated 7 December 2021. 2. Air   in the perineum, new since previous study dated 7 December 2021. 3. Dale catheter in place. Air in the urinary bladder. 4. Changes of ankylosing spondylitis involving the lower thoracic, lumbar spine and sacroiliac iliac joints. Deformity at the thoracolumbar junction. 5. Calcification along the gallbladder wall. 6. Bilateral inguinal lymph nodes. 7. Areas of atelectasis or scarring at both lung bases. **This report has been created using voice recognition software. It may contain minor errors which are inherent in voice recognition technology. ** Final report electronically signed by DR Julián Morales on 1/14/2022 6:49 PM    XR CHEST PORTABLE    Result Date: 1/15/2022  1 view chest x-ray Comparison: None Findings: Right IJ central line tip in the right atrium. Bilateral patchy interstitial opacities. No pleural effusion or pneumothorax. Heart is borderline enlarged. No pulmonary vascular congestion. No acute fracture. 1. Right IJ central line tip in the right atrium. This can be retracted by 3 cm. 2. Bilateral patchy interstitial opacities which may reflect infiltrate. 3. Borderline cardiomegaly. This document has been electronically signed by: Maci Brooks MD on 01/15/2022 12:24 AM    XR CHEST PORTABLE    Result Date: 1/14/2022  PROCEDURE: XR CHEST PORTABLE CLINICAL INFORMATION: pre-op. COMPARISON: Chest x-ray dated 11 December 2021. TECHNIQUE: AP upright view of the chest. FINDINGS: There is borderline cardiomegaly. .The mediastinum is not widened. There are bilateral infiltrates, significantly improved since previous study dated 11 December 2021. There are no effusions. . The pulmonary vascularity is normal. There is thoracic spondylosis. 1. Interval improvement since previous plain radiographs dated 11 December 2021 with reduction in the bilateral upper lobe infiltrates. . 2. Borderline cardiomegaly. 3. Thoracic spondylosis. **This report has been created using voice recognition software. It may contain minor errors which are inherent in voice recognition technology. ** Final report electronically signed by DR Sherita Escamilla on 1/14/2022 5:31 PM      Electronically signed by Heide Knox MD on 1/25/2022 at 1:32 PM

## 2022-01-25 NOTE — PROGRESS NOTES
Order received for CVC removal per Dr Darian De La Cruz . Patient placed in bed. Transparent dressing removed. Skin accessed appears clean and dry. Sutures removed, area cleaned with chloro prep, bio patch applied, sterile gauze placed over bio patch, CVC removed with green tip intact, pressure held with sterile gauze for four minutes. New transparent dressing applied. Educated the patient on remaining in bed for one hour, dressing stays on for 24 hours, signs and symptoms of infection and notify primary nurse if any blood or oozing. Naa Arce RN notified.

## 2022-01-25 NOTE — PROGRESS NOTES
[facility]  INPATIENT PHYSICAL THERAPY  DAILY NOTE  [facility] ORTHOPEDICS 7K - 7K-28/028-A    Time In: 9252  Time Out: 1156  Timed Code Treatment Minutes: 10 Minutes  Minutes: 10          Date: 2022  Patient Name: Bisi Streeter,  Gender:  male        MRN: [id number]  : 1955  (77 y.o.)     Referring Practitioner: Clarice Moreno MD  Diagnosis: sepsis  Additional Pertinent Hx: Per EMR Kayce Christiano Moreno is a 77 y.o. male who presents to [facility] ED 2022 with buttock pain. Patient is a lifetime nonsmoker with a PMH significant for recent LLE DVT on Eliquis, IDDM, Primary hypertension, urinary retention. Patient arrived by EMS from [facility] with new decubitus ulcer. Patient believes this was present for multiple weeks however this was not well documented on his previous hospital encounters. He reports he had the area drained approximately one week ago by wound team.  Patient reports worsening pain accompanied by fever sweats and chills over the last week. Of note patient was seen in the ED on 2022 and ultrasound of the left leg demonstrated acute DVT affecting the peroneal and anterior tibial veins. Initiated on therapy for suspected DVT of left lower extremity with Eliquis 5 mg daily and has been on anticoagulation with Eliquis since that time. Prior to this patient was hospitalized from 2021 to 2021 for COVID-19 pneumonia. Patient received the usual therapy of Decadron and baricitinib and was discharged to a skilled nursing facility at [facility] for rehab on room air. Patient is a full code. \"     Prior Level of Function:  Lives With: Alone  Type of Home: Facility  Home Layout: One level  Home Access: Level entry        ADL Assistance: Independent  Homemaking Assistance: Independent  Ambulation Assistance: Independent  Transfer Assistance: Independent  Active : Yes  Additional Comments: Pt admitted 2021 d/ COVID.  Pt was from home and was indep with ADLs, IADLS, mobility, and transfers. At discharge, pt was CGA for mobility & transfers. Pt was discharged to an ECF. At the [facility] pt reported that he required A for ADLs, IALDs, and did not ambulate much. Pt plans to return home. Restrictions/Precautions:  Restrictions/Precautions: General Precautions,Fall Risk  Position Activity Restriction  Other position/activity restrictions: colostomy, buttock wound     SUBJECTIVE: RN approved session. Pt lying in bed and with max encouragement agreed to only perform bed exercises at this time due to just getting comfortable. PAIN: denies pain at this time    Vitals: Vitals not assessed per clinical judgement, see nursing flowsheet    OBJECTIVE:          Exercise:  Patient was guided in 1 set(s) 10 reps of exercise to both lower extremities. Ankle pumps, Glut sets, Quad sets, Heelslides, Short arc quads, Hip abduction/adduction and Hooklying hip abduction/adduction. Exercises were completed for increased independence with functional mobility. Functional Outcome Measures: Not completed       ASSESSMENT:  Assessment: Patient progressing toward established goals. Activity Tolerance:  Patient tolerance of  treatment: fair.       Equipment Recommendations:Equipment Needed: No  Discharge Recommendations: Subacte/Skilled Nursing Facility  Plan: Times per week: 5x GM  Current Treatment Recommendations: Strengthening,Neuromuscular Re-education,Home Exercise Program,Safety Education & Training,Balance Training,Endurance Training,Patient/Caregiver Education & Training,Equipment Evaluation, Education, & procurement,Positioning    Patient Education  Patient Education: Plan of Care, Verbal Exercise Instruction,  - Patient Verbalized Understanding    Goals:  Patient goals : walk again  Short term goals  Time Frame for Short term goals: by discharge  Short term goal 1: Pt will tolerate 15 min of sitting EOB to complete functional mobility to improve core strength and progress with

## 2022-01-25 NOTE — PROGRESS NOTES
Comprehensive Nutrition Assessment    Type and Reason for Visit:  Reassess (diet/suppl)    Nutrition Recommendations/Plan:   -Further diet recommendations per SLP, pt not eating d/t not liking pureed foods, note SLP planning FEES 1/26/22 for possible diet upgrade.  -Continue Glucerna TID. Stopped Julius-pt dislikes and not drinking.  -Encouraged small frequent meals.  -Colostomy diet recommendations provided/handout 1/21/22. Nutrition Assessment:    Pt. with minimal improvement from a nutritional standpoint AEB very poor oral intake, 1-25% d/t not liking pureed diet. Remains at risk for further nutritional compromise r/t  Laparoscopy with diverting colostomy 1/19/22, variable oral intake in the last 1 month with unplanned weight loss, admit with sepsis, nectrotizing fascitis, s/p excisional debridement perirectal horseshoe abscess-stage IV sacral decubitis, dysphagia, acute DVT, recent covid-19, increased nutrient needs for wound healing, and underlying medical condition (hx: DB, HTN, HLD, covid 12/7/21).       Malnutrition Assessment:  Malnutrition Status: At risk for malnutrition (Comment)    Context:  Acute Illness     Findings of the 6 clinical characteristics of malnutrition:  Energy Intake:  Mild decrease in energy intake (Comment)  Weight Loss:  No significant weight loss (Note 2.4% loss in the last 1 month)     Body Fat Loss:  No significant body fat loss     Muscle Mass Loss:  No significant muscle mass loss    Fluid Accumulation:  1 - Mild  (perineal edema)   Strength:  Not Performed    Estimated Daily Nutrient Needs:  Energy (kcal):  3556-6184 kcals (15-18 kcals/kg/day); Weight Used for Energy Requirements:   (87 kg)     Protein (g):  98 grams or more (1.5 grams/kg IBW/day); Weight Used for Protein Requirements:   (65 kg)        Fluid (ml/day):  per MD;       Nutrition Related Findings:    Patient seen, reports he just can't tolerate pureed foods, they make him nauseated.  Meal intake 25% or less.  He states he is a picky eater as well so this complicates offerings. He really feels if his diet was upgraded he would eat more,explained to him SLP planning FEES tomorrow for possible diet upgrade recommendations. He is drinking glucerna shakes, not drinking Julius-dislikes. 300 mls colostomy output. 1/25/22: Sodium 134, Hgb 9.1, Potassium 3.8, BUN 8, Creatinine 0.7, Glucose 92. Rx: augmentin, hydrodiuril, humalog, norco, fentanyl, zofran prn. Wounds:   (s/p I+D sacral decubitus of perirectal horseshoe abscess 1/14/22, left heel incisional wound, s/p laparoscopy with diverting transverse loop colostomy 1/19/22)       Current Nutrition Therapies:    ADULT ORAL NUTRITION SUPPLEMENT; Breakfast, Lunch, Dinner; Diabetic Oral Supplement  ADULT DIET; Dysphagia - Pureed    Anthropometric Measures:  · Height: 5' 6\" (167.6 cm)  · Current Body Weight: 191 lb 8 oz (86.9 kg) (1/22/22 +1 UE edema)   · Admission Body Weight: 195 lb (88.5 kg) (1/14/22 perineal edema)    · Usual Body Weight:  (Per pt~ 213#. Per EMR: 12/7/21: 195#8oz)     · Ideal Body Weight: 142 lbs; % Ideal Body Weight 134.4 %   · BMI: 30.9  · Adjusted Body Weight:  ; No Adjustment   · BMI Categories: Obese Class 1 (BMI 30.0-34. 9)       Nutrition Diagnosis:   · Increased nutrient needs related to increase demand for energy/nutrients as evidenced by wounds      Nutrition Interventions:   Food and/or Nutrient Delivery:  Continue Current Diet,Modify Oral Nutrition Supplement  Nutrition Education/Counseling:  Education completed (Encouraged small frequent meals and ONS use. Colostomy diet handout provided as well 1/21/22)   Coordination of Nutrition Care:  Continue to monitor while inpatient    Goals:  Pt will consume 75% or more of meals to assist in wound healing during LOS.        Nutrition Monitoring and Evaluation:   Behavioral-Environmental Outcomes:  None Identified   Food/Nutrient Intake Outcomes:  Diet Advancement/Tolerance,Food and Nutrient Intake,Supplement Intake  Physical Signs/Symptoms Outcomes:  Biochemical Data,Chewing or Swallowing,GI Status,Fluid Status or Edema,Nutrition Focused Physical Findings,Skin,Weight     Discharge Planning:     Too soon to determine     Electronically signed by Hebert Oviedo RD, LD on 1/25/22 at 12:39 PM EST    Contact: [phone]

## 2022-01-25 NOTE — PROGRESS NOTES
Pt dressing changed- packed with dakins soaked gauze, covered in ABDs.  removed drains at this time.

## 2022-01-25 NOTE — PROGRESS NOTES
[facility]  Occupational Therapy  Daily Note  Time:   Time In: 912  Time Out: 18  Timed Code Treatment Minutes: 13 Minutes  Minutes: 13          Date: 2022  Patient Name: Anne Cook,   Gender: male      Room: [facility]28/028-A  MRN: [id number]  : 1955  (77 y.o.)  Referring Practitioner: Mere Diaz MD  Diagnosis: Sepsis  Additional Pertinent Hx: Anne Cook is a 77 y.o. male who presents to [facility] ED 2022 with buttock pain. Patient is a lifetime nonsmoker with a PMH significant for recent LLE DVT on Eliquis, IDDM, Primary hypertension, urinary retention. Patient arrived by EMS from [facility] with new decubitus ulcer. Patient believes this was present for multiple weeks however this was not well documented on his previous hospital encounters. He reports he had the area drained approximately one week ago by wound team.  Patient reports worsening pain accompanied by fever sweats and chills over the last week. Of note patient was seen in the ED on 2022 and ultrasound of the left leg demonstrated acute DVT affecting the peroneal and anterior tibial veins. Initiated on therapy for suspected DVT of left lower extremity with Eliquis 5 mg daily and has been on anticoagulation with Eliquis since that time. Prior to this patient was hospitalized from 2021 to 2021 for COVID-19 pneumonia. Patient received the usual therapy of Decadron and baricitinib and was discharged to a skilled nursing facility at [facility] for rehab on room air. sx  EXPLORATORY LAPAROSCOPY, DIVERTING COLOSTOMY    Restrictions/Precautions:  Restrictions/Precautions: General Precautions,Fall Risk  Position Activity Restriction  Other position/activity restrictions: colostomy, buttock wound     SUBJECTIVE: RN approved session.  Pt side-lying on his R side upon OT arrival. Pt requiring mod cues for encouragement to participate in UE exercises while laying in bed, as pt refusing all ADL needs and attempts to get out of bed. Pt reporting he did not sleep well last night and is just getting to sleep, however pt was pleasant during session to complete exercises. PAIN: denies pain, pt reports he recently received pain medication which has helped buttock pain    Vitals: Vitals not assessed per clinical judgement, see nursing flowsheet    COGNITION: WFL    ADL:   No ADL's completed this session. Katty Michelle BALANCE:  not completed this session. BED MOBILITY:  Rolling to Right: Stand By Assistance, with head of bed flat, with rail Pt requesting assist to fix his sheet, pt rolled further onto his R side during this     TRANSFERS:  Not tested    FUNCTIONAL MOBILITY:  Not tested     ADDITIONAL ACTIVITIES:  Pt completed BUE light resistive exercises and ROM exercises 1 set x 10 reps to promote BUE strength and mobility required for functional transfers. Pt required min cues to take deep breaths during the exercises. Pt required rest breaks between each exercise due to fatigue and decreased endurance. ASSESSMENT:     Activity Tolerance:  Patient tolerance of  treatment: fair.  Pt de-conditioned, mild shortness of breath during exercises and increased fatigue after exercises in bed      Discharge Recommendations: Continue to assess pending progress, Subacute/skilled nursing facility and Patient would benefit from continued OT at discharge  Equipment Recommendations: Equipment Needed:  (continue to assess)  Plan: Times per week: 5x  Times per day: Daily  Current Treatment Recommendations: Strengthening,Safety Education & Training,Patient/Caregiver Education & Training,Self-Care / ADL,Functional Mobility Training,Endurance Training    Patient Education  Patient Education: Role of OT, Plan of Care, Home Exercise Program, Reviewed Prior Education and Importance of Increasing Activity    Goals  Short term goals  Time Frame for Short term goals: by discharge  Short term goal 1: pt will tolerate sitting EOB with min A x1 for 5 minutes to increase indep with completing ADLs  Short term goal 2: pt will complete UB ADL with set up only to increase indep with grooming and dressing tasks. Short term goal 3: pt will complete BUE mod resisitve exercises with min cuing for technique to increase strength and endurance required for ADLs  Short term goal 4: OTR to assess functional transfers and functional mobility when appropriate    Following session, patient left in safe position with all fall risk precautions in place.

## 2022-01-25 NOTE — PROGRESS NOTES
[facility] Surgery -  Haydee Fermin MD M.D. FACS  Daily Progress Note    Pt Name: Lara Pack  Medical Record Number: [id number]  Date of Birth 1955   Today's Date: 1/25/2022    ASSESSMENT:     1. POD # 11 , #4  2. 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 Loop drain removed at bedside just recommend outside dressing till while those openings to close  2. Sacral decubitus visualized today is clean slowly granulating in but is a very large defect and skin to take a long time to heal  3. Ostomy is functioning is tolerating a diet White count normal afebrile  4. From a disposition standpoint he can be disposition to an ECF at any time just continuing local wound care till heals      SUBJECTIVE:     Gae Spikes is stable postop      OBJECTIVE:     Patient Vitals for the past 24 hrs:   BP Temp Temp src Pulse Resp SpO2   01/25/22 0515 130/60 -- -- 72 20 95 %   01/24/22 2345 134/63 97.7 °F (36.5 °C) -- 65 20 95 %   01/24/22 2044 (!) 143/63 98.1 °F (36.7 °C) Oral 65 18 97 %   01/24/22 1532 (!) 108/53 98 °F (36.7 °C) Oral 63 18 98 %   01/24/22 1209 131/60 97.9 °F (36.6 °C) Oral 59 18 95 %   01/24/22 0742 134/63 97.9 °F (36.6 °C) Oral 52 18 98 %         Intake/Output Summary (Last 24 hours) at 1/25/2022 0806  Last data filed at 1/25/2022 0114  Gross per 24 hour   Intake 420 ml   Output 1400 ml   Net -980 ml       In: 420 [P.O.:420]  Out: 1400 [Urine:1100]    I/O last 3 completed shifts:   In: 5 [P.O.:420]  Out: 3300 [Urine:2900; Stool:400]     Date 01/25/22 0000 - 01/25/22 2359   Shift 6578-7817 6466-4293 5838-1917 24 Hour Total   INTAKE   Shift Total(mL/kg)       OUTPUT   Stool(mL/kg) 150(1.7)   150(1.7)   Shift Total(mL/kg) 150(1.7)   150(1.7)   Weight (kg) 86.9 86.9 86.9 86.9       Wt Readings from Last 3 Encounters:   01/22/22 191 lb 8 oz (86.9 kg)   12/20/21 195 lb 8 oz (88.7 kg)   01/02/18 223 lb 11.2 oz (101.5 kg)        Body mass index is 30.91 kg/m². Diet: ADULT ORAL NUTRITION SUPPLEMENT; Breakfast, Lunch, Dinner; Diabetic Oral Supplement  ADULT ORAL NUTRITION SUPPLEMENT; Breakfast, Dinner; Wound Healing Oral Supplement  ADULT DIET;  Dysphagia - Pureed        MEDS:     Scheduled Meds:   amoxicillin-clavulanate  1 tablet Oral 2 times per day    insulin lispro  0-18 Units SubCUTAneous 4x Daily AC & HS    phosphorus replacement protocol   Other RX Placeholder    apixaban  5 mg Oral BID    famotidine  20 mg Oral BID    sodium hypochlorite   Irrigation Daily    amLODIPine  10 mg Oral Daily    aspirin  81 mg Oral Daily    hydrALAZINE  100 mg Oral Q8H    hydroCHLOROthiazide  50 mg Oral Daily    losartan  100 mg Oral Daily    metoprolol succinate  50 mg Oral Daily    sodium chloride flush  5-40 mL IntraVENous 2 times per day    calcium replacement protocol   Other RX Placeholder    tamsulosin  0.4 mg Oral Daily     Continuous Infusions:   sodium chloride 25 mL (01/22/22 0521)    dextrose       PRN Meds:potassium chloride, 40 mEq, PRN   Or  potassium alternative oral replacement, 40 mEq, PRN   Or  potassium chloride, 10 mEq, PRN  sodium chloride flush, 5-40 mL, PRN  sodium chloride, 25 mL, PRN  ondansetron, 4 mg, Q8H PRN   Or  ondansetron, 4 mg, Q6H PRN  glucose, 15 g, PRN  dextrose, 12.5 g, PRN  glucagon (rDNA), 1 mg, PRN  dextrose, 100 mL/hr, PRN  HYDROcodone 5 mg - acetaminophen, 1 tablet, Q4H PRN  fentanNYL, 50 mcg, Q1H PRN          PHYSICAL EXAM:     CONSTITUTIONAL: Sleeping but awakens    WOUND/INCISION: Upper midline loop transverse colostomy with bag in place , 2 small laparoscopy incisions with skin glue intact . Loop drains removed at bedside this morning. Wounds are clean sacral decubitus defect is large and down to the sacrum but no necrotic areas or areas needing redebridement is noted        LABS:     CBC:   Recent Labs     01/23/22  0504 01/24/22  0617   WBC 11.7* 10.0   RBC 2.58* 2.69*   HGB 7.7* 8.0*   HCT 24.2* 24.7*   MCV 93.8 91.8   MCH 29.8 29.7   MCHC 31.8* 32.4    221   MPV 10.8 10.0      Last 3 CMP:   Recent Labs     01/23/22  0504 01/24/22  0617 01/24/22  1430    134* 130*   K 3.5 3.6 3.7    103 100   CO2 20* 22* 20*   BUN 8 8 8   CREATININE 0.7 0.7 0.7   GLUCOSE 90 97 119*   CALCIUM 7.6* 7.7* 7.8*   PROT 5.3* 5.4*  --    LABALBU 2.4* 2.5*  --    BILITOT 0.4 0.5  --    ALKPHOS 71 74  --    AST 15 13  --    ALT 24 25  --       Troponin: No results for input(s): TROPONINI in the last 72 hours. Calcium:   Lab Results   Component Value Date    CALCIUM 7.8 01/24/2022    CALCIUM 7.7 01/24/2022    CALCIUM 7.6 01/23/2022      Ionized Calcium: No results found for: IONCA   Lipids: No results for input(s): CHOL, HDL in the last 72 hours. Invalid input(s): LDLCALCU  INR:   No results for input(s): INR in the last 72 hours. Lactic Acid: No results found for: LACTA               DVT prophylaxis: [] Lovenox                                 [] SCDs                                 [] SQ Heparin                                 [] Encourage ambulation, low risk for DVT, no chemical or mechanical prophylaxis necessary              [] Already on Anticoagulation      RADIOLOGY:      CT ABDOMEN PELVIS WO CONTRAST Additional Contrast? None    Result Date: 1/14/2022   1. Decubitus ulcer overlying the sacrum new since previous CT scan of the pelvis dated 7 December 2021. 2. Air   in the perineum, new since previous study dated 7 December 2021. 3. Dale catheter in place.

## 2022-01-25 NOTE — FLOWSHEET NOTE
Pt was alone at the time of the visit. He was reading the bible and said that he was a strong believer [facility] the [facility]. He was dealing with horseshoe abscess of ischiorectal space. He wanted prayer to cope and heal. Prayer was appreciated. 01/25/22 1514   Encounter Summary   Services provided to: Patient   Referral/Consult From: Sumi   Continue Visiting Yes  (1/25 )   Complexity of Encounter Low   Length of Encounter 15 minutes   Routine   Type Follow up   Assessment Calm; Approachable   Intervention [place];Prayer;Nurtured hope   Outcome Acceptance;Expressed gratitude;Encouraged; Hopeful;Receptive   e

## 2022-01-26 LAB
ABSOLUTE RETIC #: 241 THOU/MM3 (ref 20–115)
ANION GAP SERPL CALCULATED.3IONS-SCNC: 12 MEQ/L (ref 8–16)
BASOPHILS # BLD: 0.8 %
BASOPHILS ABSOLUTE: 0.1 THOU/MM3 (ref 0–0.1)
BUN BLDV-MCNC: 13 MG/DL (ref 7–22)
CALCIUM SERPL-MCNC: 8.2 MG/DL (ref 8.5–10.5)
CHLORIDE BLD-SCNC: 103 MEQ/L (ref 98–111)
CO2: 16 MEQ/L (ref 23–33)
CREAT SERPL-MCNC: 0.9 MG/DL (ref 0.4–1.2)
EOSINOPHIL # BLD: 1.1 %
EOSINOPHILS ABSOLUTE: 0.1 THOU/MM3 (ref 0–0.4)
ERYTHROCYTE [DISTWIDTH] IN BLOOD BY AUTOMATED COUNT: 16.2 % (ref 11.5–14.5)
ERYTHROCYTE [DISTWIDTH] IN BLOOD BY AUTOMATED COUNT: 53.7 FL (ref 35–45)
FOLATE: 5.1 NG/ML (ref 4.8–24.2)
GFR SERPL CREATININE-BSD FRML MDRD: 84 ML/MIN/1.73M2
GLUCOSE BLD-MCNC: 107 MG/DL (ref 70–108)
GLUCOSE BLD-MCNC: 113 MG/DL (ref 70–108)
GLUCOSE BLD-MCNC: 119 MG/DL (ref 70–108)
GLUCOSE BLD-MCNC: 123 MG/DL (ref 70–108)
GLUCOSE BLD-MCNC: 128 MG/DL (ref 70–108)
HCT VFR BLD CALC: 31.3 % (ref 42–52)
HEMOGLOBIN: 10 GM/DL (ref 14–18)
IMMATURE GRANS (ABS): 0.18 THOU/MM3 (ref 0–0.07)
IMMATURE GRANULOCYTES: 1.4 %
IMMATURE RETIC FRACT: 15.9 % (ref 2.3–13.4)
IRON SATURATION: 24 % (ref 20–50)
IRON: 34 UG/DL (ref 65–195)
LYMPHOCYTES # BLD: 20.2 %
LYMPHOCYTES ABSOLUTE: 2.7 THOU/MM3 (ref 1–4.8)
MCH RBC QN AUTO: 30.5 PG (ref 26–33)
MCHC RBC AUTO-ENTMCNC: 31.9 GM/DL (ref 32.2–35.5)
MCV RBC AUTO: 95.4 FL (ref 80–94)
MONOCYTES # BLD: 9 %
MONOCYTES ABSOLUTE: 1.2 THOU/MM3 (ref 0.4–1.3)
NUCLEATED RED BLOOD CELLS: 0 /100 WBC
PLATELET # BLD: 280 THOU/MM3 (ref 130–400)
PMV BLD AUTO: 10.6 FL (ref 9.4–12.4)
POTASSIUM SERPL-SCNC: 4.2 MEQ/L (ref 3.5–5.2)
RBC # BLD: 3.28 MILL/MM3 (ref 4.7–6.1)
RETIC HEMOGLOBIN: 33.9 PG (ref 28.2–35.7)
RETICULOCYTE ABSOLUTE COUNT: 7.7 % (ref 0.5–2)
SEG NEUTROPHILS: 67.5 %
SEGMENTED NEUTROPHILS ABSOLUTE COUNT: 9 THOU/MM3 (ref 1.8–7.7)
SODIUM BLD-SCNC: 131 MEQ/L (ref 135–145)
TOTAL IRON BINDING CAPACITY: 140 UG/DL (ref 171–450)
VITAMIN B-12: 954 PG/ML (ref 211–911)
WBC # BLD: 13.3 THOU/MM3 (ref 4.8–10.8)

## 2022-01-26 PROCEDURE — 80048 BASIC METABOLIC PNL TOTAL CA: CPT

## 2022-01-26 PROCEDURE — 85025 COMPLETE CBC W/AUTO DIFF WBC: CPT

## 2022-01-26 PROCEDURE — 83540 ASSAY OF IRON: CPT

## 2022-01-26 PROCEDURE — 6370000000 HC RX 637 (ALT 250 FOR IP): Performed by: STUDENT IN AN ORGANIZED HEALTH CARE EDUCATION/TRAINING PROGRAM

## 2022-01-26 PROCEDURE — 97110 THERAPEUTIC EXERCISES: CPT

## 2022-01-26 PROCEDURE — 6370000000 HC RX 637 (ALT 250 FOR IP): Performed by: SURGERY

## 2022-01-26 PROCEDURE — 92612 ENDOSCOPY SWALLOW (FEES) VID: CPT

## 2022-01-26 PROCEDURE — 82746 ASSAY OF FOLIC ACID SERUM: CPT

## 2022-01-26 PROCEDURE — 1200000003 HC TELEMETRY R&B

## 2022-01-26 PROCEDURE — 97530 THERAPEUTIC ACTIVITIES: CPT

## 2022-01-26 PROCEDURE — 82948 REAGENT STRIP/BLOOD GLUCOSE: CPT

## 2022-01-26 PROCEDURE — 85046 RETICYTE/HGB CONCENTRATE: CPT

## 2022-01-26 PROCEDURE — 82607 VITAMIN B-12: CPT

## 2022-01-26 PROCEDURE — 36415 COLL VENOUS BLD VENIPUNCTURE: CPT

## 2022-01-26 PROCEDURE — 6370000000 HC RX 637 (ALT 250 FOR IP): Performed by: INTERNAL MEDICINE

## 2022-01-26 PROCEDURE — 99233 SBSQ HOSP IP/OBS HIGH 50: CPT | Performed by: HOSPITALIST

## 2022-01-26 PROCEDURE — 83550 IRON BINDING TEST: CPT

## 2022-01-26 RX ADMIN — AMOXICILLIN AND CLAVULANATE POTASSIUM 1 TABLET: 875; 125 TABLET, FILM COATED ORAL at 22:13

## 2022-01-26 RX ADMIN — METOPROLOL SUCCINATE 50 MG: 50 TABLET, FILM COATED, EXTENDED RELEASE ORAL at 09:16

## 2022-01-26 RX ADMIN — OXYCODONE HYDROCHLORIDE AND ACETAMINOPHEN 1 TABLET: 7.5; 325 TABLET ORAL at 19:38

## 2022-01-26 RX ADMIN — ASPIRIN 81 MG: 81 TABLET, COATED ORAL at 09:16

## 2022-01-26 RX ADMIN — FAMOTIDINE 20 MG: 20 TABLET ORAL at 22:13

## 2022-01-26 RX ADMIN — DAKIN'S SOLUTION 0.125% (QUARTER STRENGTH): 0.12 SOLUTION at 15:37

## 2022-01-26 RX ADMIN — APIXABAN 5 MG: 5 TABLET, FILM COATED ORAL at 09:16

## 2022-01-26 RX ADMIN — HYDRALAZINE HYDROCHLORIDE 100 MG: 50 TABLET ORAL at 15:31

## 2022-01-26 RX ADMIN — AMLODIPINE BESYLATE 10 MG: 10 TABLET ORAL at 09:16

## 2022-01-26 RX ADMIN — AMOXICILLIN AND CLAVULANATE POTASSIUM 1 TABLET: 875; 125 TABLET, FILM COATED ORAL at 09:16

## 2022-01-26 RX ADMIN — HYDROCHLOROTHIAZIDE 50 MG: 25 TABLET ORAL at 09:16

## 2022-01-26 RX ADMIN — FAMOTIDINE 20 MG: 20 TABLET ORAL at 09:16

## 2022-01-26 RX ADMIN — TAMSULOSIN HYDROCHLORIDE 0.4 MG: 0.4 CAPSULE ORAL at 09:16

## 2022-01-26 ASSESSMENT — PAIN SCALES - GENERAL: PAINLEVEL_OUTOF10: 8

## 2022-01-26 NOTE — PROGRESS NOTES
[address] [address]  INPATIENT PHYSICAL THERAPY  DAILY NOTE  [facility] ORTHOPEDICS 7K - 7K-28/028-A    Time In: 3340  Time Out: 1252  Timed Code Treatment Minutes: 24 Minutes  Minutes: 24          Date: 2022  Patient Name: Brady Iyer,  Gender:  male        MRN: [id number]  : 1955  (77 y.o.)     Referring Practitioner: Soto Gomez MD  Diagnosis: sepsis  Additional Pertinent Hx: Per EMR Guthrie Jose Gayle is a 77 y.o. male who presents to [facility] ED 2022 with buttock pain. Patient is a lifetime nonsmoker with a PMH significant for recent LLE DVT on Eliquis, IDDM, Primary hypertension, urinary retention. Patient arrived by EMS from [facility] with new decubitus ulcer. Patient believes this was present for multiple weeks however this was not well documented on his previous hospital encounters. He reports he had the area drained approximately one week ago by wound team.  Patient reports worsening pain accompanied by fever sweats and chills over the last week. Of note patient was seen in the ED on 2022 and ultrasound of the left leg demonstrated acute DVT affecting the peroneal and anterior tibial veins. Initiated on therapy for suspected DVT of left lower extremity with Eliquis 5 mg daily and has been on anticoagulation with Eliquis since that time. Prior to this patient was hospitalized from 2021 to 2021 for COVID-19 pneumonia. Patient received the usual therapy of Decadron and baricitinib and was discharged to a skilled nursing facility at [facility] for rehab on room air. Patient is a full code. \"     Prior Level of Function:  Lives With: Alone  Type of Home: Facility  Home Layout: One level  Home Access: Level entry        ADL Assistance: Independent  Homemaking Assistance: Independent  Ambulation Assistance: Independent  Transfer Assistance: Independent  Active : Yes  Additional Comments: Pt admitted 2021 d/ COVID.  Pt was from home and was indep with ADLs, IADLS, mobility, and transfers. At discharge, pt was CGA for mobility & transfers. Pt was discharged to an ECF. At the [facility] pt reported that he required A for ADLs, IALDs, and did not ambulate much. Pt plans to return home. Restrictions/Precautions:  Restrictions/Precautions: General Precautions,Fall Risk  Position Activity Restriction  Other position/activity restrictions: colostomy, buttock wound     SUBJECTIVE: RN approved session. Pt resting in bed upon arrival, pleasant and agreeable to therapy. PAIN: 8/10: buttocks     Vitals: Vitals not assessed per clinical judgement, see nursing flowsheet    OBJECTIVE:  Bed Mobility:  Rolling to Left: Stand By Assistance, with head of bed flat, with rail   Rolling to Right: Stand By Assistance, with head of bed flat, with rail   Supine to Sit: Moderate Assistance, At trunk. Pt pushing from bedrail, HOB flat  Sit to Supine: Minimal Assistance, Of L LE     Transfers:  Not Tested    Ambulation:  Not Tested    Balance:  Pt sat up at EOB ~ 18 minutes at [facility] and then progressing to Stand by Assist. Pt initially relied on BUE support on bed to maintain balance, with weight shifts and repositioning pt able to sit without UE support. Pt did some functional reaching within ILIANA, able to reach with each UE from shoulder to thigh level. Pt fatigued by end of sitting trial, but was also limited due to pain wounds     Exercise:  Patient was guided in 1 set(s) 10 reps of exercise to both lower extremities. Ankle pumps, Heelslides, Seated marches, Seated heel/toe raises, Long arc quads and Scapular retraction. Exercises were completed for increased independence with functional mobility. Functional Outcome Measures: Completed  AM-PAC Inpatient Mobility without Stair Climbing Raw Score : 7  AM-PAC Inpatient without Stair Climbing T-Scale Score : 28.66    ASSESSMENT:  Assessment: Patient progressing toward established goals. and Pt tolerated session fairly well. Limited by pain, decreased strength, decreased endurance. Will require continued therapy at discharge. Activity Tolerance:  Patient tolerance of  treatment: good. Equipment Recommendations:Equipment Needed: No  Discharge Recommendations: Continue to assess pending progress and Patient would benefit from continued PT at discharge  Plan: Times per week: 5x GM  Current Treatment Recommendations: Strengthening,Neuromuscular Re-education,Home Exercise Program,Safety Education & Training,Balance Training,Endurance Training,Patient/Caregiver Education & Training,Equipment Evaluation, Education, & procurement,Positioning    Patient Education  Patient Education: Plan of Care, Bed Mobility    Goals:  Patient goals : walk again  Short term goals  Time Frame for Short term goals: by discharge  Short term goal 1: Pt will tolerate 15 min of sitting EOB to complete functional mobility to improve core strength and progress with transfers. Short term goal 2: Pt will demo supine to and from sit transfers with min A x1 for safety to progress with overall mobility. Short term goal 3: PT to assess transfers/gait when appropriate. Short term goal 4: Pt will tolerate 10-20 reps of ther ex to increase overall mobility. Long term goals  Time Frame for Long term goals : NA due to short ELOS    Following session, patient left in safe position with all fall risk precautions in place.

## 2022-01-26 NOTE — CARE COORDINATION
1/26/22, 11:40 AM EST    DISCHARGE PLANNING EVALUATION      Spoke with [facility] admissions. Facility can accept tomorrow, 1/27.

## 2022-01-26 NOTE — PROGRESS NOTES
Progress note: Infectious diseases    Patient - Arun Dunn,  Age - 77 y.o.    - 1955      Room Number - 0P-77/220-X   MRN -  [id number]   Acct # - [de-identified]  Date of Admission -  2022  4:05 PM    SUBJECTIVE:   No new complaints. OBJECTIVE   VITALS    height is 5' 6\" (1.676 m) and weight is 191 lb 8 oz (86.9 kg). His oral temperature is 97.5 °F (36.4 °C). His blood pressure is 105/56 (abnormal) and his pulse is 62. His respiration is 16 and oxygen saturation is 98%. Wt Readings from Last 3 Encounters:   22 191 lb 8 oz (86.9 kg)   21 195 lb 8 oz (88.7 kg)   18 223 lb 11.2 oz (101.5 kg)       I/O (24 Hours)    Intake/Output Summary (Last 24 hours) at 2022 1448  Last data filed at 2022 1426  Gross per 24 hour   Intake 540 ml   Output 750 ml   Net -210 ml       General Appearance  Awake, alert, oriented,  not  In acute distress  HEENT - normocephalic, atraumatic, pale  conjunctiva,  anicteric sclera  Neck - Supple, no mass  Lungs -  Bilateral   air entry, no rhonchi, no wheeze  Cardiovascular - Heart sounds are normal.     Abdomen - soft, colostomy functioning, soft abdomen  Skin - No bruising or bleeding  Extremities - foam dressing to heel  Dressed coccyx wound.      MEDICATIONS:      sodium hypochlorite   Irrigation BID    amoxicillin-clavulanate  1 tablet Oral 2 times per day    insulin lispro  0-18 Units SubCUTAneous 4x Daily AC & HS    phosphorus replacement protocol   Other RX Placeholder    apixaban  5 mg Oral BID    famotidine  20 mg Oral BID    amLODIPine  10 mg Oral Daily    aspirin  81 mg Oral Daily    hydrALAZINE  100 mg Oral Q8H    hydroCHLOROthiazide  50 mg Oral Daily    losartan  100 mg Oral Daily    metoprolol succinate  50 mg Oral Daily    sodium chloride flush  5-40 mL IntraVENous 2 times per day    calcium replacement protocol   Other RX Placeholder    tamsulosin  0.4 mg Oral Daily      sodium chloride 25 mL (01/22/22 0521)    dextrose       oxyCODONE-acetaminophen, potassium chloride **OR** potassium alternative oral replacement **OR** potassium chloride, sodium chloride flush, sodium chloride, ondansetron **OR** ondansetron, glucose, dextrose, glucagon (rDNA), dextrose, HYDROcodone 5 mg - acetaminophen, fentanNYL      LABS:     CBC:   Recent Labs     01/24/22  0617 01/25/22  0715 01/26/22  1258   WBC 10.0 11.7* 13.3*   HGB 8.0* 9.1* 10.0*    254 280     BMP:    Recent Labs     01/24/22  1430 01/25/22  0715 01/26/22  1258   * 134* 131*   K 3.7 3.8 4.2    102 103   CO2 20* 21* 16*   BUN 8 8 13   CREATININE 0.7 0.7 0.9   GLUCOSE 119* 92 123*     Calcium:  Recent Labs     01/26/22  1258   CALCIUM 8.2*      Recent Labs     01/24/22  0930   MG 1.8     Phosphorus:  Recent Labs     01/24/22  1430   PHOS 2.2*     BNP:No results for input(s): BNP in the last 72 hours. Glucose:  Recent Labs     01/25/22  2057 01/26/22  0603 01/26/22  1056   POCGLU 106 119* 128*    INR:   No results for input(s): INR in the last 72 hours. Hepatic:   Recent Labs     01/24/22  0617 01/25/22  0715   ALKPHOS 74 85   ALT 25 26   AST 13 13   PROT 5.4* 5.8*   BILITOT 0.5 0.5   BILIDIR <0.2 <0.2   LABALBU 2.5* 2.8*         Problem list of patient:     Patient Active Problem List   Diagnosis Code    Cellulitis and abscess of neck L03.221, L02.11    Essential hypertension I10    Leukocytosis D72.829    Obesity (BMI 30-39. 9) E66.9    Hyperlipidemia E78.5    COVID-19 virus detected U07.1    COVID-19 U07.1    Acute respiratory failure with hypoxia (HCC) J96.01    Necrotizing soft tissue infection M79.89    Pressure injury of sacral region, stage 4 (HCC) L89.154    Sepsis (HCC) A41.9    Septic shock (HCC) A41.9, R65.21    Horseshoe abscess of ischiorectal space K61.31    Necrotizing fasciitis of pelvic region and thigh ([facility] [address] M72.6    Pressure injury, unstageable, with infection ([address] L89.95, L08.9    Shock ([address] R57.9         ASSESSMENT/PLAN   Necrotizing wound infection related to pressure injury: he had debridement of the wound  Deconditioning due to recent COVID -19 infection  Continue dakins soaked dressing   Awaiting placement       Jalil Overton MD, MD, FACP 1/26/2022 2:48 PM

## 2022-01-26 NOTE — PROGRESS NOTES
[facility] Continence Nurse  Consult Note       Arun Dunn  AGE: 77 y.o. GENDER: male  : 1955  TODAY'S DATE:  22    Subjective:     Reason for [facility] Evaluation and Assessment: new diverting loop colostomy      Arun Dunn is a 77 y.o. male referred by:   [x] Physician/PA/APRN  [] Nursing  [] Other:     Wound Identification:  Wound Type: pressure  Contributing Factors: chronic pressure and decreased mobility    Objective:     Stuart Risk Score: Stuart Scale Score: 11    Assessment:     Encounter: Present to patient room for new colostomy and evaluation of debrided sacral pressure injury. Patient in bed upon arrival. Assessment and photo to follow. S/p Excisional debridement of perirectal abscess and stage 4 sacral decubitus on , as well as laparoscopic creation of diverting transverse loop colostomy on  with Dr Mary Davila. Patient permitted removal of one piece pouching system. Cleansed stoma with warm wash cloth, pat dry. Stoma red, moist, and flush. Peristomal skin pink and intact. Stoma located mid abdomen, above umbillicus. Mucocutaneous junction intact with sutures in place. Stool brown and soft. Stoma appears healthy. Stomal lumen is pointing to 6 oclock area contributing in some irritation to distal ama stomal area. Stoma linear shaped, approx 2cm x 6cm. Coloplast 2 piece flange cut to fit to size, paste applied to inner edge of flange, centered over stoma. Pouch applied. Barrier extenders placed on outer edge of flange. Applied hands lightly over system to activate hydrocolloid. Answered questions and concerns. Asked pt if nurse could assess sacral wound. Pt refuses d/t dressing just being changed at 0700 this morning. Will continue to follow. Bed in low, call light in reach. 22    Ostomy type: Transverse colostomy  Ostomy location: Mid abdomen  Pouching system removed:  One peice  Stoma color: Red  Stoma size: 18-45mm  Stoma description: Moist, flush  Ama stomal skin: Intact, slight denudement to 5-7 oclock area  Mucocutaneous junction: Intact, sutures present  Stool color: Dark brown/black  Stool consistency: Thin  Stool amount: Moderate      1/21/22    Ostomy type: Transverse colostomy  Ostomy location: Mid abdomen  Pouching system removed: One peice  Stoma color: Red  Stoma size: Approx 2cm x 6cm  Stoma description: Moist, flush  Ama stomal skin: Intact  Mucocutaneous junction: Intact, sutures present  Stool color: Dark brown/black  Stool consistency: Thin  Stool amount: Moderate    Response to treatment:  Well tolerated by patient. Plan:     Treatment Recommendations:   Continue twice weekly pouching system changes with once weekly sacral wound assessment.     Specialty Bed Required :   [x] Low Air Loss   [] Pressure Redistribution  [] Fluid Immersion- Dolphin  [] Bariatric  [] RotoProne   [] Other:     Discharge Plan:  Placement for patient upon discharge: to [facility]  Patient appropriate for Outpatient [address] [facility] [address]: Yes for colostomy education    [facility] [place] [address] [place], [address]  Phone: [phone]  Fax: [phone]

## 2022-01-26 NOTE — CARE COORDINATION
1/26/22, 7:24 AM EST    DISCHARGE BARRIERS        Patient transferred to [facility]. Report given to unit Yesenia Lord, regarding discharge plan for this patient.

## 2022-01-26 NOTE — PROGRESS NOTES
[facility]  Fiberoptic Endoscopic Evaluation of Swallowing  [facility] ORTHOPEDICS 7K      SLP Individual Minutes  Time In: 4886  Time Out: 9843  Minutes: 30  Timed Code Treatment Minutes: 0 Minutes       Date: 2022  Patient Name: Regi Hicks       CSN: [id number]   : 1955  (77 y.o.)  Gender: male   Referring Physician:  Dr. Tawny Ortiz   Diagnosis: Sepsis   Secondary Diagnosis:  Dysphagia   Date of Last MBS/FEES:  FEES 2022  Diet:  Puree diet with thin liquids     History of Present Illness/Injury: Patient admitted to [facility] with above dx. See physician H&P for full report. FEES completed 22 recommending puree diet with thin liquids. Patient with request for diet advancement due to dislike of puree texture/taste. ST with recommendations to complete repeat FEES evaluation prior to diet advancement as previous FEES unable to fully evaluate patient consumption of soft and hard solids; patient in agreement with plan.    Please see medical history questionnaire for information related to prior medical history, allergies and medications    Reason for Study: Rule out pharyngeal dysfunction  Prior Swallowing Evaluation/Results: FEES completed 22 recommending puree diet with thin liquids   Pain:  0/10    Current Diet: Puree diet with thin liquids     Respiratory Status: Independent    Behavioral Observation:alert and oriented    Cognition: Exam not limited by cognition    ORAL MECHANISM EXAM:  Facial Symmetry WFL    Labial/Facial WFL    Lingual WFL    Oral Mucosa WFL    Mandibular Movement WFL    Sensation WFL    Cough/Reflexes Not Tested      FEES PROCEDURE DETAILS:  Procedure performed by: Yayo Kelley M.S. [id number] [place] [id number]  Feeder/Assistant: Olman Cano Student Intern   Scope/Serial Number: Chip Tip Scope - Red #2  Topical Anesthetic Used: No  Patient Positioning: Bed    Procedure explained and education provided to patient including purpose, benefits and Head Turn Not Attempted    Spoon Presentations Not Attempted    Volitional Cough Not Attempted    Spontaneous Cough Not Attempted           DIAGNOSTIC IMPRESSIONS:  Patient presents with mild oral dysphagia and mild-moderate pharyngeal dysphagia as evidence by the findings outlined above. Patient seen in semi-fowlers positioning, primarily resting on left side d/t wound. Patient consumed PO trials of thin liquids; timely swallow, no penetration or aspiration. PO trials of puree and soft solid (peaches) completed; appropriate oral mastication, adequate bolus formation, timely swallow, no penetration or aspiration- mild residue remaining within the valleculae and diffusely throughout pharyngeal cavity. Residue did clear to trace amounts provided verbal cue to initiate \"double swallow\" and utilize thin liquid wash. PO trials of moist hard solid (cracker dipped with pudding); appropriate oral mastication, timely swallow, no penetration or aspiration; however, increased residue to moderate amounts noted with hard solids. Overall improved oral mastication and textural breakdown noted compared to initial FEES completed 01/18; however, ongoing reduced pharyngeal constriction noted. ST recommending diet advancement to minced and moist diet with thin liquids, double swallow + liquid wash. Patient would benefit from skilled ST services to target dysphagia. Education provided to patient; patient verbalized understanding. NILSA Wright updated.    *post evaluation, patient without respiratory distress upon leaving room; NILSA Li notified re: clinical findings and recommendations from the assessment; verbal receptiveness noted       Diet Recommendations:  Minced and moist diet with thin liquids   Strategies:  Full Upright Position, Small Bite/Sip, Multiple Swallow and Monitor for Fatigue   Rehabilitation Potential: good    EDUCATION:  Learner: Patient  Education:  Reviewed results and recommendations of this evaluation, Reviewed diet and strategies, Reviewed signs, symptoms and risks of aspiration, Reviewed ST goals and Plan of Care, Reviewed recommendations for follow-up and Education Related to Potential Risks and Complications Due to Impairment/Illness/Injury  Evaluation of Education: Verbalizes understanding, Demonstrates with assistance and Family not present    PLAN:  Skilled SLP intervention on acute care 3-5 x per week or until goals met and/or pt plateaus in function. Specific interventions for next session may include: dysphagia tx. PATIENT GOAL:    Did not state. Will further assess during treatment. SHORT TERM GOALS:  Short-term Goals  Timeframe for Short-term Goals: 2 weeks  Goal 1: Patient will consume minced and moist diet with thin liquids without s/s of aspiration with use of compensatory strategies (alternate solids/liquids, double swallow) in order to safely maintain adequate hydration and nutrition  Goal 2: Patient will complete oral and pharyngeal exercises x25 in order to improve overall oral mastication + pharyngeal constricition.   Goal 3: Patient will demosntrate understanding of compensatory strategies (alternate solids/liquids, double swallow) in order to improve overall safety of the swallow  Goal 4: Patient will complete PO trials of advanced textures (as clinically appropriate) without s/s of aspiration + independent use of compensatory strategies (double swallow) in order to safely advance patient diet      LONG TERM GOALS:  No LTGs due to short [facility] CHERYL Sánchez

## 2022-01-26 NOTE — PROGRESS NOTES
Hospitalist Progress Note      Patient:  Sowmya Sheth    Unit/Bed:7K-28/028-A  YOB: 1955  MRN: [id number]   Acct: [de-identified]   PCP: Riesa Bernheim, APRN - CNP  Date of Admission: 1/14/2022    Assessment/Plan:    1. Necrotizing soft tissue infection   -Status post emergent excisional debridement and drainage of bilateral abscess of deep ischial rectal space and excisional debridement of skin subcutaneous tissue muscle and fascia. Status post diverting colostomy on 1/19/2022. Cultures will be polymicrobial  -Continue IV antibiotics per ID recommendations; - Abx deescalated to Augmentin. Duration to be determined per ID.       3. Query Acute VTE L peroneal and Anterior tibial v. (not considered DVT)  Per d/w pt about R/B/A/C, given superficial nature of VTEs w/ subsequent low/negligible risk of Pe/propagation; decision was made to d/c [address]; placed pt on LMWH for DVT prophylaxis only    5. Hx of Essential HTN   - well-controlled (if not soft) on all resumed home meds; held HCTZ given hypoNa. Monitor    6. Hx of DMII   - BS controlled on SSI. Switched to SSI#1 from 3. Will monitor and adjust accordingly. 7. Acute on chronic normocytic anemia  Hgb stable. Continue to monitor. Anemia W/U sent. 8. Hx of  Urinary retention: randolph in place; on tamsulosin; urology F/U OP    9. HypoNa: Mild, Na 131; pt on HCTZ which held for now; encouraged hydration; will reassess repobse; consider trial fo IVF. Disposition: medically stable awaiting placement    Chief Complaint: Back pain    Initial H and P:-      78-year-old white male who presented to [facility] on 1/14/2022 with complaints of buttocks pain. He has a past medical history of lifetime non-smoker, recent left lower extremity DVT on Eliquis, diabetes mellitus, hypertension, urinary retention. Per report he presented to the emergency department via EMS from [facility] with new decubitus ulcer. Patient believes it was present for multiple weeks however was not well documented prior to hospital encounter. He reports that it has been draining for approximately 1 week. Patient reported he was having worsening pain fever and sweats and chills over the last week. In the emergency department he was seen on 1/8/2022 ultrasound of leg demonstrated acute DVT he was started on Eliquis. Of note he was also hospitalized from 12/7/2021 to 12/20/2021 for COVID-19 pneumonia. He returned to his nursing facility on room air. In the emergency department he was positive for lactic acidosis. Imaging revealed a decubitus ulcer with air in the perineum. Patient was fluid resuscitated per sepsis protocol and given Vanco and Zosyn. General surgery was consulted for debridement and concern for necrotizing fasciitis. Initially patient was admitted to stepdown postprocedure he was transferred to the ICU for hypotension. He did undergo emergent debridement of abscess on 1/14 with Dr. Shalini Noland. He was initially on vasopressors which have been weaned off. Subjective (past 24 hours):     Reports feeling well. Denies any new issues/compliants overnight. .    Past medical history, family history, social history and allergies reviewed again and is unchanged since admission. ROS (All review of systems completed. Pertinent positives noted.  Otherwise All other systems reviewed and negative.)     Medications:  Reviewed    Infusion Medications    sodium chloride 25 mL (01/22/22 0521)    dextrose       Scheduled Medications    sodium hypochlorite   Irrigation BID    amoxicillin-clavulanate  1 tablet Oral 2 times per day    insulin lispro  0-18 Units SubCUTAneous 4x Daily AC & HS    phosphorus replacement protocol   Other RX Placeholder    apixaban  5 mg Oral BID    famotidine  20 mg Oral BID    amLODIPine  10 mg Oral Daily    aspirin  81 mg Oral Daily    hydrALAZINE  100 mg Oral Q8H    hydroCHLOROthiazide  50 mg Oral Daily    losartan  100 mg Oral Daily    metoprolol succinate  50 mg Oral Daily    sodium chloride flush  5-40 mL IntraVENous 2 times per day    calcium replacement protocol   Other RX Placeholder    tamsulosin  0.4 mg Oral Daily     PRN Meds: oxyCODONE-acetaminophen, potassium chloride **OR** potassium alternative oral replacement **OR** potassium chloride, sodium chloride flush, sodium chloride, ondansetron **OR** ondansetron, glucose, dextrose, glucagon (rDNA), dextrose, HYDROcodone 5 mg - acetaminophen, fentanNYL      Intake/Output Summary (Last 24 hours) at 1/26/2022 5068  Last data filed at 1/26/2022 0506  Gross per 24 hour   Intake 900 ml   Output 1100 ml   Net -200 ml       Diet:  ADULT ORAL NUTRITION SUPPLEMENT; Breakfast, Lunch, Dinner; Diabetic Oral Supplement  ADULT DIET; Dysphagia - Pureed    Exam:  BP (!) 105/56   Pulse 62   Temp 97.5 °F (36.4 °C) (Oral)   Resp 16   Ht 5' 6\" (1.676 m)   Wt 191 lb 8 oz (86.9 kg)   SpO2 98%   BMI 30.91 kg/m²   General appearance: No apparent distress, appears stated age and cooperative. HEENT: Pupils equal, round, and reactive to light. Conjunctivae/corneas clear. Neck: Supple, with full range of motion. No jugular venous distention. Trachea midline. Respiratory:  Normal respiratory effort. Clear to auscultation, bilaterally without Rales/Wheezes/Rhonchi. Cardiovascular: Regular rate and rhythm with normal S1/S2 without murmurs, rubs or gallops. Abdomen: Soft, colostomy in place  Musculoskeletal: passive and active ROM x 4 extremities. Skin: large unstageable coccyx ulcer  Neurologic:  Neurovascularly intact without any focal sensory/motor deficits.  Cranial nerves: II-XII intact, grossly non-focal.  Psychiatric: Alert and oriented, thought content appropriate, normal insight  Capillary Refill: Brisk,< 3 seconds   Peripheral Pulses: +2 palpable, equal bilaterally     Labs:   Recent Labs     01/24/22  0617 01/25/22  0715 WBC 10.0 11.7*   HGB 8.0* 9.1*   HCT 24.7* 27.4*    254     Recent Labs     01/24/22  0617 01/24/22  0930 01/24/22  1430 01/25/22  0715   *  --  130* 134*   K 3.6  --  3.7 3.8     --  100 102   CO2 22*  --  20* 21*   BUN 8  --  8 8   CREATININE 0.7  --  0.7 0.7   CALCIUM 7.7*  --  7.8* 8.0*   PHOS  --  2.4 2.2*  --      Recent Labs     01/24/22  0617 01/25/22  0715   AST 13 13   ALT 25 26   BILIDIR <0.2 <0.2   BILITOT 0.5 0.5   ALKPHOS 74 85     No results for input(s): INR in the last 72 hours. No results for input(s): [employer] in the last 72 hours. Microbiology:    Blood culture #1:   Lab Results   Component Value Date    BC No growth-preliminary No growth  01/14/2022       Blood culture #2:No results found for: Ovi Jones    Organism:  Lab Results   Component Value Date    ORG Pseudomonas aeruginosa 01/14/2022    ORG mixed anaerobic growth 01/14/2022         Lab Results   Component Value Date    LABGRAM  01/14/2022     Few segmented neutrophils observed. No epithelial cells observed. Many gram positive cocci occurring singly and in pairs. Many gram negative bacilli. Moderate large gram positive bacilli. MRSA culture only:No results found for: [facility]    Urine culture:   Lab Results   Component Value Date    LABURIN No growth-preliminary No growth  01/15/2022       Respiratory culture: No results found for: CULTRESP    Aerobic and Anaerobic :  Lab Results   Component Value Date    LABAERO  01/14/2022     Culture also yielded light growth of Enterococcus species, Streptococcus species (Viridans group), Enteric gram negative bacilli, Staphylococcus species (coagulase negative), and gram positive bacilli most consistent with Corynebacterium species. If a true mixed infection is suspected, then broad spectrum empiric antibiotic therapy is indicated.      LABAERO light growth  01/14/2022     Lab Results   Component Value Date    LABANAE  01/14/2022     Culture yielded heavy mixed anaerobic growth, including gram positive bacilli and multiple colony types of both gram negative bacill and gram positive cocci. If a true mixed aerobic and anaerobic infection is suspected, then broad spectrum empiric antibiotic therapy is indicated and should include coverage for anaerobic organisms. Urinalysis:      Lab Results   Component Value Date    NITRU NEGATIVE 01/15/2022    WBCUA 5-9 01/15/2022    BACTERIA FEW 01/15/2022    RBCUA 10-15 01/15/2022    BLOODU MODERATE 01/15/2022    SPECGRAV 1.015 01/15/2022    GLUCOSEU NEGATIVE 01/14/2022       Radiology:  XR CHEST PORTABLE   Final Result   1. Right IJ central line tip in the right atrium. This can be retracted by    3 cm. 2. Bilateral patchy interstitial opacities which may reflect infiltrate. 3. Borderline cardiomegaly. This document has been electronically signed by: Shannen oH MD on    01/15/2022 12:24 AM      CT ABDOMEN PELVIS WO CONTRAST Additional Contrast? None   Final Result       1. Decubitus ulcer overlying the sacrum new since previous CT scan of the pelvis dated 7 December 2021.   2. Air   in the perineum, new since previous study dated 7 December 2021.   3. Dale catheter in place. Air in the urinary bladder. 4. Changes of ankylosing spondylitis involving the lower thoracic, lumbar spine and sacroiliac iliac joints. Deformity at the thoracolumbar junction. 5. Calcification along the gallbladder wall. 6. Bilateral inguinal lymph nodes. 7. Areas of atelectasis or scarring at both lung bases. **This report has been created using voice recognition software. It may contain minor errors which are inherent in voice recognition technology. **      Final report electronically signed by DR João Benson on 1/14/2022 6:49 PM      XR CHEST PORTABLE   Final Result   1. Interval improvement since previous plain radiographs dated 11 December 2021 with reduction in the bilateral upper lobe infiltrates. .   2. Borderline cardiomegaly. 3. Thoracic spondylosis. **This report has been created using voice recognition software. It may contain minor errors which are inherent in voice recognition technology. **      Final report electronically signed by DR Natalie Flores on 1/14/2022 5:31 PM        CT ABDOMEN PELVIS WO CONTRAST Additional Contrast? None    Result Date: 1/14/2022  PROCEDURE: CT ABDOMEN PELVIS WO CONTRAST CLINICAL INFORMATION: buttock wound . COMPARISON: CTA abdomen and pelvis dated 7 December 2021. . TECHNIQUE: Axial 5 mm CT images were obtained through the abdomen and pelvis. No contrast was given. Coronal and sagittal reconstructions were obtained. All CT scans at this facility use dose modulation, iterative reconstruction, and/or weight-based dosing when appropriate to reduce radiation dose to as low as reasonably achievable. FINDINGS: There are areas of atelectasis or scarring at both lung bases. The base of the heart is within appropriate limits. The liver is grossly normal. The spleen is normal. The adrenal glands and pancreas are grossly normal. There is calcification along the gallbladder wall. .  There is no hydronephrosis or stones of either kidney. No contour deforming renal masses are noted. There is a small hiatal hernia. No abnormalities of the small bowel loops are noted. The IVC and aorta are of normal caliber. There is no adenopathy. There is a Dale catheter in place. There is  air within the urinary bladder. The prostate gland measures 4.1 x 3.4 cm in size. . There is no pelvic free fluid. The colon is grossly normal. There is bilateral inguinal lymph nodes in place. There are possible changes of ankylosing spondylitis involving the lumbar spine and sacroiliac joints bilaterally. There is deformity at the thoracolumbar junction There is a decubitus ulcer overlying the sacrum. There is is air within the perineum suggestive off inflammatory process. .      1. Decubitus ulcer overlying the sacrum new since previous CT scan of the pelvis dated 7 December 2021. 2. Air   in the perineum, new since previous study dated 7 December 2021. 3. Dale catheter in place. Air in the urinary bladder. 4. Changes of ankylosing spondylitis involving the lower thoracic, lumbar spine and sacroiliac iliac joints. Deformity at the thoracolumbar junction. 5. Calcification along the gallbladder wall. 6. Bilateral inguinal lymph nodes. 7. Areas of atelectasis or scarring at both lung bases. **This report has been created using voice recognition software. It may contain minor errors which are inherent in voice recognition technology. ** Final report electronically signed by DR Odilia Hudson on 1/14/2022 6:49 PM    XR CHEST PORTABLE    Result Date: 1/15/2022  1 view chest x-ray Comparison: None Findings: Right IJ central line tip in the right atrium. Bilateral patchy interstitial opacities. No pleural effusion or pneumothorax. Heart is borderline enlarged. No pulmonary vascular congestion. No acute fracture. 1. Right IJ central line tip in the right atrium. This can be retracted by 3 cm. 2. Bilateral patchy interstitial opacities which may reflect infiltrate. 3. Borderline cardiomegaly. This document has been electronically signed by: Buffy Carranza MD on 01/15/2022 12:24 AM    XR CHEST PORTABLE    Result Date: 1/14/2022  PROCEDURE: XR CHEST PORTABLE CLINICAL INFORMATION: pre-op. COMPARISON: Chest x-ray dated 11 December 2021. TECHNIQUE: AP upright view of the chest. FINDINGS: There is borderline cardiomegaly. .The mediastinum is not widened. There are bilateral infiltrates, significantly improved since previous study dated 11 December 2021. There are no effusions. . The pulmonary vascularity is normal. There is thoracic spondylosis. 1. Interval improvement since previous plain radiographs dated 11 December 2021 with reduction in the bilateral upper lobe infiltrates. . 2. Borderline cardiomegaly. 3. Thoracic spondylosis. **This report has been created using voice recognition software. It may contain minor errors which are inherent in voice recognition technology. ** Final report electronically signed by DR Yaquelin El on 1/14/2022 5:31 PM  \  With RN in room, patient was updated about and agreed upon the treatment plan, all the questions and concerns were addressed. More than 30 min spent on counseling.   Electronically signed by Baljit Alonzo MD on 1/26/2022 at 9:21 AM

## 2022-01-26 NOTE — CARE COORDINATION
[address] day: 60  2Y65  Procedure:   1/26 FEES study:ST recommends diet advancement to minced and moist diet with thin liquids. Barriers to Discharge: Continue dakins soaked dressing changes BID, po pain meds before dressing changes, ID and hospitalsit follows. Glucerna TID     Patient Goals/Plan/Treatment Preferences: await precert for ECF; Presley Cavazos.

## 2022-01-27 VITALS
HEART RATE: 98 BPM | RESPIRATION RATE: 18 BRPM | DIASTOLIC BLOOD PRESSURE: 58 MMHG | BODY MASS INDEX: 30.78 KG/M2 | SYSTOLIC BLOOD PRESSURE: 120 MMHG | WEIGHT: 191.5 LBS | OXYGEN SATURATION: 98 % | HEIGHT: 66 IN | TEMPERATURE: 98 F

## 2022-01-27 LAB
ALBUMIN SERPL-MCNC: 2.5 G/DL (ref 3.5–5.1)
ALP BLD-CCNC: 85 U/L (ref 38–126)
ALT SERPL-CCNC: 25 U/L (ref 11–66)
ANION GAP SERPL CALCULATED.3IONS-SCNC: 10 MEQ/L (ref 8–16)
AST SERPL-CCNC: 13 U/L (ref 5–40)
BASOPHILS # BLD: 0.9 %
BASOPHILS ABSOLUTE: 0.1 THOU/MM3 (ref 0–0.1)
BILIRUB SERPL-MCNC: 0.5 MG/DL (ref 0.3–1.2)
BILIRUBIN DIRECT: < 0.2 MG/DL (ref 0–0.3)
BUN BLDV-MCNC: 14 MG/DL (ref 7–22)
CALCIUM SERPL-MCNC: 8 MG/DL (ref 8.5–10.5)
CHLORIDE BLD-SCNC: 103 MEQ/L (ref 98–111)
CO2: 21 MEQ/L (ref 23–33)
CREAT SERPL-MCNC: 1.1 MG/DL (ref 0.4–1.2)
EOSINOPHIL # BLD: 1.6 %
EOSINOPHILS ABSOLUTE: 0.2 THOU/MM3 (ref 0–0.4)
ERYTHROCYTE [DISTWIDTH] IN BLOOD BY AUTOMATED COUNT: 16.2 % (ref 11.5–14.5)
ERYTHROCYTE [DISTWIDTH] IN BLOOD BY AUTOMATED COUNT: 54.7 FL (ref 35–45)
GFR SERPL CREATININE-BSD FRML MDRD: 67 ML/MIN/1.73M2
GLUCOSE BLD-MCNC: 112 MG/DL (ref 70–108)
GLUCOSE BLD-MCNC: 138 MG/DL (ref 70–108)
GLUCOSE BLD-MCNC: 95 MG/DL (ref 70–108)
HCT VFR BLD CALC: 29.4 % (ref 42–52)
HEMOGLOBIN: 9.2 GM/DL (ref 14–18)
IMMATURE GRANS (ABS): 0.14 THOU/MM3 (ref 0–0.07)
IMMATURE GRANULOCYTES: 1.5 %
LYMPHOCYTES # BLD: 21.6 %
LYMPHOCYTES ABSOLUTE: 2.1 THOU/MM3 (ref 1–4.8)
MCH RBC QN AUTO: 29.9 PG (ref 26–33)
MCHC RBC AUTO-ENTMCNC: 31.3 GM/DL (ref 32.2–35.5)
MCV RBC AUTO: 95.5 FL (ref 80–94)
MONOCYTES # BLD: 11.1 %
MONOCYTES ABSOLUTE: 1.1 THOU/MM3 (ref 0.4–1.3)
NUCLEATED RED BLOOD CELLS: 0 /100 WBC
PLATELET # BLD: 227 THOU/MM3 (ref 130–400)
PMV BLD AUTO: 10.6 FL (ref 9.4–12.4)
POTASSIUM SERPL-SCNC: 3.6 MEQ/L (ref 3.5–5.2)
RBC # BLD: 3.08 MILL/MM3 (ref 4.7–6.1)
SARS-COV-2, NAAT: NOT  DETECTED
SEG NEUTROPHILS: 63.3 %
SEGMENTED NEUTROPHILS ABSOLUTE COUNT: 6.1 THOU/MM3 (ref 1.8–7.7)
SODIUM BLD-SCNC: 134 MEQ/L (ref 135–145)
TOTAL PROTEIN: 5.8 G/DL (ref 6.1–8)
WBC # BLD: 9.6 THOU/MM3 (ref 4.8–10.8)

## 2022-01-27 PROCEDURE — 6370000000 HC RX 637 (ALT 250 FOR IP): Performed by: INTERNAL MEDICINE

## 2022-01-27 PROCEDURE — 97110 THERAPEUTIC EXERCISES: CPT

## 2022-01-27 PROCEDURE — 80053 COMPREHEN METABOLIC PANEL: CPT

## 2022-01-27 PROCEDURE — 6370000000 HC RX 637 (ALT 250 FOR IP): Performed by: SURGERY

## 2022-01-27 PROCEDURE — 6360000002 HC RX W HCPCS: Performed by: HOSPITALIST

## 2022-01-27 PROCEDURE — 82248 BILIRUBIN DIRECT: CPT

## 2022-01-27 PROCEDURE — 87635 SARS-COV-2 COVID-19 AMP PRB: CPT

## 2022-01-27 PROCEDURE — 92526 ORAL FUNCTION THERAPY: CPT

## 2022-01-27 PROCEDURE — 36415 COLL VENOUS BLD VENIPUNCTURE: CPT

## 2022-01-27 PROCEDURE — 85025 COMPLETE CBC W/AUTO DIFF WBC: CPT

## 2022-01-27 PROCEDURE — 82948 REAGENT STRIP/BLOOD GLUCOSE: CPT

## 2022-01-27 PROCEDURE — 6370000000 HC RX 637 (ALT 250 FOR IP): Performed by: STUDENT IN AN ORGANIZED HEALTH CARE EDUCATION/TRAINING PROGRAM

## 2022-01-27 PROCEDURE — 97530 THERAPEUTIC ACTIVITIES: CPT

## 2022-01-27 PROCEDURE — 99239 HOSP IP/OBS DSCHRG MGMT >30: CPT | Performed by: HOSPITALIST

## 2022-01-27 RX ORDER — FOLIC ACID 1 MG/1
1 TABLET ORAL DAILY
Qty: 90 TABLET | Refills: 1 | Status: SHIPPED | OUTPATIENT
Start: 2022-01-27 | End: 2022-04-04

## 2022-01-27 RX ORDER — AMOXICILLIN AND CLAVULANATE POTASSIUM 875; 125 MG/1; MG/1
1 TABLET, FILM COATED ORAL EVERY 12 HOURS SCHEDULED
Qty: 3 TABLET | Refills: 0 | Status: SHIPPED | OUTPATIENT
Start: 2022-01-27 | End: 2022-01-29

## 2022-01-27 RX ORDER — LANOLIN ALCOHOL/MO/W.PET/CERES
325 CREAM (GRAM) TOPICAL
Qty: 270 TABLET | Refills: 1 | Status: SHIPPED | OUTPATIENT
Start: 2022-01-27 | End: 2022-04-04

## 2022-01-27 RX ADMIN — ENOXAPARIN SODIUM 40 MG: 40 INJECTION SUBCUTANEOUS at 12:55

## 2022-01-27 RX ADMIN — ASPIRIN 81 MG: 81 TABLET, COATED ORAL at 08:41

## 2022-01-27 RX ADMIN — OXYCODONE HYDROCHLORIDE AND ACETAMINOPHEN 1 TABLET: 7.5; 325 TABLET ORAL at 04:44

## 2022-01-27 RX ADMIN — DAKIN'S SOLUTION 0.125% (QUARTER STRENGTH): 0.12 SOLUTION at 08:42

## 2022-01-27 RX ADMIN — OXYCODONE HYDROCHLORIDE AND ACETAMINOPHEN 1 TABLET: 7.5; 325 TABLET ORAL at 12:55

## 2022-01-27 RX ADMIN — AMLODIPINE BESYLATE 10 MG: 10 TABLET ORAL at 08:42

## 2022-01-27 RX ADMIN — FAMOTIDINE 20 MG: 20 TABLET ORAL at 08:40

## 2022-01-27 RX ADMIN — AMOXICILLIN AND CLAVULANATE POTASSIUM 1 TABLET: 875; 125 TABLET, FILM COATED ORAL at 08:40

## 2022-01-27 RX ADMIN — METOPROLOL SUCCINATE 50 MG: 50 TABLET, FILM COATED, EXTENDED RELEASE ORAL at 08:41

## 2022-01-27 RX ADMIN — TAMSULOSIN HYDROCHLORIDE 0.4 MG: 0.4 CAPSULE ORAL at 08:40

## 2022-01-27 ASSESSMENT — PAIN SCALES - GENERAL: PAINLEVEL_OUTOF10: 6

## 2022-01-27 NOTE — CARE COORDINATION
1/27/22, 9:19 AM EST    DISCHARGE PLANNING EVALUATION      Updated [facility], [facility] need neg covid test prior to discharge. Transport scheduled for 1:30 pm.    1/27/22, 9:20 AM EST    Patient goals/plan/ treatment preferences discussed by  and . Patient goals/plan/ treatment preferences reviewed with patient/ family. Patient/ family verbalize understanding of discharge plan and are in agreement with goal/plan/treatment preferences. Understanding was demonstrated using the teach back method. AVS provided by RN at time of discharge, which includes all necessary medical information pertaining to the patients current course of illness, treatment, post-discharge goals of care, and treatment preferences.     Services After Discharge  Services At/After Discharge: [place] [facility], [facility].)   IMM Letter  IMM Letter given to Patient/Family/Significant other/Guardian/POA/by[de-identified] Staff  IMM Letter date given[de-identified] 01/15/22  IMM Letter time given[de-identified] 2051

## 2022-01-27 NOTE — PROGRESS NOTES
Report called to [facility] BROWN DEER at [facility]. Updated on patient and [facility] transport scheduled for 1330.

## 2022-01-27 NOTE — PROGRESS NOTES
[address] [address]  INPATIENT PHYSICAL THERAPY  DAILY NOTE  [facility] ORTHOPEDICS 7K - 7K-28/028-A    Time In: 5940  Time Out: 1327  Timed Code Treatment Minutes: 30 Minutes  Minutes: 30          Date: 2022  Patient Name: Musa Aguilar,  Gender:  male        MRN: [id number]  : 1955  (77 y.o.)     Referring Practitioner: Bertin Slaughter MD  Diagnosis: sepsis  Additional Pertinent Hx: Per EMR Shelby Swartz is a 77 y.o. male who presents to [facility] ED 2022 with buttock pain. Patient is a lifetime nonsmoker with a PMH significant for recent LLE DVT on Eliquis, IDDM, Primary hypertension, urinary retention. Patient arrived by EMS from [facility] with new decubitus ulcer. Patient believes this was present for multiple weeks however this was not well documented on his previous hospital encounters. He reports he had the area drained approximately one week ago by wound team.  Patient reports worsening pain accompanied by fever sweats and chills over the last week. Of note patient was seen in the ED on 2022 and ultrasound of the left leg demonstrated acute DVT affecting the peroneal and anterior tibial veins. Initiated on therapy for suspected DVT of left lower extremity with Eliquis 5 mg daily and has been on anticoagulation with Eliquis since that time. Prior to this patient was hospitalized from 2021 to 2021 for COVID-19 pneumonia. Patient received the usual therapy of Decadron and baricitinib and was discharged to a skilled nursing facility at [facility] for rehab on room air. Patient is a full code. \"     Prior Level of Function:  Lives With: Alone  Type of Home: Facility  Home Layout: One level  Home Access: Level entry        ADL Assistance: Independent  Homemaking Assistance: Independent  Ambulation Assistance: Independent  Transfer Assistance: Independent  Active : Yes  Additional Comments: Pt admitted 2021 d/ COVID.  Pt was from home and was indep with ADLs, IADLS, mobility, and transfers. At discharge, pt was CGA for mobility & transfers. Pt was discharged to an ECF. At the [facility] pt reported that he required A for ADLs, IALDs, and did not ambulate much. Pt plans to return home. Restrictions/Precautions:  Restrictions/Precautions: General Precautions,Fall Risk  Position Activity Restriction  Other position/activity restrictions: colostomy, buttock wound     SUBJECTIVE: RN approved therapy session. Patient supine in bed upon arrival. Patient pleasant and agreeable to therapy. PAIN:  Patient notes having discomfort in R buttock but did not rate at this time. Vitals: Vitals not assessed per clinical judgement, see nursing flowsheet    OBJECTIVE:  Bed Mobility:  Supine to Sit: Moderate Assistance, with head of bed raised  Sit to Supine: Minimal Assistance, with head of bed flat     Transfers:  Not Tested    Ambulation:  Not Tested    Balance:  Static Sitting Balance:  Stand By Assistance, Minimal Assistance, Patient performed static sit at EOB ~ 10 minutes while performing seated therex. Patient ranged from SBA-Isi to prevent posterior lean when becoming fatigued. Patient requesting to lay back down due to increased fatigue. Exercise:  Patient was guided in 1 set(s) 10 reps of exercise to both lower extremities. Heelslides, Hip abduction/adduction, Seated marches, Seated hamstring curls, Long arc quads, Seated abduction/adduction and graded lifts offs with patient able to clear buttocks. .  Exercises were completed for increased independence with functional mobility. Functional Outcome Measures: Completed  AM-PAC Inpatient Mobility without Stair Climbing Raw Score : 8  AM-PAC Inpatient without Stair Climbing T-Scale Score : 30.65    ASSESSMENT:  Assessment: Patient progressing toward established goals. Activity Tolerance:  Patient tolerance of  treatment: good. Patient limited by decreased strength and decreased endurance.      Equipment Recommendations:Equipment Needed: No  Discharge Recommendations: Continue to assess pending progress and Patient would benefit from continued PT at discharge  Plan: Times per week: 5x GM  Current Treatment Recommendations: Strengthening,Neuromuscular Re-education,Home Exercise Program,Safety Education & Ver[employer] Bride Training,Patient/Caregiver Education & Training,Equipment Evaluation, Education, & procurement,Positioning    Patient Education  Patient Education: Plan of Care, [employer] Mobility, Verbal Exercise Instruction    Goals:  Patient goals : walk again  Short term goals  Time Frame for Short term goals: by discharge  Short term goal 1: Pt will tolerate 15 min of sitting EOB to complete functional mobility to improve core strength and progress with transfers. Short term goal 2: Pt will demo supine to and from sit transfers with min A x1 for safety to progress with overall mobility. Short term goal 3: PT to assess transfers/gait when appropriate. Short term goal 4: Pt will tolerate 10-20 reps of ther ex to increase overall mobility. Long term goals  Time Frame for Long term goals : NA due to short ELOS    Following session, patient left in safe position with all fall risk precautions in place.

## 2022-01-27 NOTE — PROGRESS NOTES
[address] [address]  INPATIENT SPEECH THERAPY  [facility]  DAILY NOTE    TIME   SLP Individual Minutes  Time In: 2386  Time Out: 1469  Minutes: 15  Timed Code Treatment Minutes: 0 Minutes       Date: 2022  Patient Name: Wenceslao Leyden      CSN: [id number]   : 1955  (77 y.o.)  Gender: male   Referring Physician:  Kendra Cevallos MD   Diagnosis: Sepsis  Secondary Diagnosis: Dysphagia  Precautions: Aspiration, Fall risk   Current Diet: Minced and moist diet with thin liquids   Swallowing Strategies: Full Upright Position, Small Bite/Sip, Multiple Swallow and Monitor for Fatigue             Date of Last MBS/FEES: FEES: 2022    Pain:  No pain reported. Subjective:  NILSA Chen with call to ST Acute reporting, \"patient upset with dinner tray last evening reporting 'I want solids, I thought I did good on that test.'\" Patient seen at bedside this date, alert and pleasant. No family present. Short-Term Goals:  SHORT TERM GOAL #1:  Goal 1: Patient will consume minced and moist diet with thin liquids without s/s of aspiration with use of compensatory strategies (alternate solids/liquids, double swallow) in order to safely maintain adequate hydration and nutrition  INTERVENTIONS: ST provided extensive education discussing rational to support minced and moist diet with thin liquids, explaining foods permitted and not permitted. Patient reporting \"The turkey and green beans were all mush last night, now breakfast was good today. I had ground sausage and scrambled eggs. \" Patient agreeable to PO trials of crushed cracker + ice cream; appropriate oral mastication, adequate bolus formation, timely swallow, no s/s of aspiration. Thin liquids also consumed via straw; no s/s of aspiration. Recommend continued minced and moist diet with thin liquids.      SHORT TERM GOAL #2:  Goal 2: Patient will complete oral and pharyngeal exercises x25 in order to improve overall oral mastication + pharyngeal constricition. INTERVENTIONS: ST outlined/explained HEP to complete x10 effortful swallows/x3 daily; patient verbalized understanding. Would highly benefit from ongoing education. SHORT TERM GOAL #3:  Goal 3: Patient will demosntrate understanding of compensatory strategies (alternate solids/liquids, double swallow) in order to improve overall safety of the swallow  INTERVENTIONS: Skilled education provided to patient discussing importance to continue double swallow and alternate solids/liquids to assist with clearing residue and improving pharyngeal constriction to clear residue/material. Patient verbalized understanding and agreement; would benefit from ongoing education     SHORT TERM GOAL #4:  Goal 4: Patient will complete PO trials of advanced textures (as clinically appropriate) without s/s of aspiration + independent use of compensatory strategies (double swallow) in order to safely advance patient diet  INTERVENTIONS: Not appropriate at this time d/t recent diet advancement 01/26; recommend continued advanced trials following ongoing dysphagia tx       Long-Term Goals: No LTGs due to short ELOS              EDUCATION:  Learner: Patient  Education:  Reviewed results and recommendations of this evaluation, Reviewed diet and strategies, Reviewed signs, symptoms and risks of aspiration, Reviewed ST goals and Plan of Care, Reviewed recommendations for follow-up and Education Related to Potential Risks and Complications Due to Impairment/Illness/Injury  Evaluation of Education: Verbalizes understanding, Demonstrates with assistance, Needs further instruction and Family not present    ASSESSMENT/PLAN:  Activity Tolerance:  Patient tolerance of  treatment: good. Assessment/Plan: Patient progressing toward established goals. Continues to require skilled care of licensed speech pathologist to progress toward achievement of established goals and plan of care. .     Plan for Next Session: PO trials     Bethel Ray Domo Cash M.S. [address] [facility] [address]

## 2022-01-27 NOTE — PROGRESS NOTES
Patient discharged to [facility]. Patient transported with [facility]. Patient belongings packed and sent with patient.

## 2022-01-27 NOTE — DISCHARGE SUMMARY
Later was informed that precert was approved. Pt remained clinically stable and was discharged to SNF in stable conditions after cleared by consulting services. Attached below is the updated progress note from today:                                                     Hospitalist Progress Note      Patient:  Lara Pack    Unit/Bed:7K-28/028-A  YOB: 1955  MRN: [id number]   Acct: [de-identified]   PCP: DIONTE Henriquez - CNP  Date of Admission: 1/14/2022    Assessment/Plan:    1. Necrotizing soft tissue infection   -Status post emergent excisional debridement and drainage of bilateral abscess of deep ischial rectal space and excisional debridement of skin subcutaneous tissue muscle and fascia. Status post diverting colostomy on 1/19/2022. Cultures will be polymicrobial  -Continue IV antibiotics per ID recommendations; - Abx deescalated to Augmentin. Duration to be determined per ID.    1/27: Augmentin x 5 days at discharge       3. Query Acute VTE L peroneal and Anterior tibial v. (not considered DVT)  Per d/w pt about R/B/A/C, given superficial nature of VTEs w/ subsequent low/negligible risk of Pe/propagation; decision was made to d/c [facility]; placed pt on LMWH for DVT prophylaxis only    5. Hx of Essential HTN   - well-controlled (if not soft) on all resumed home meds; held HCTZ given hypoNa. Monitor    1/27: BP controlled, at times soft w/ SBP in 90s. Also d/c'ed home amlodipine at [facility]; continue w/ hydralazine, ARB and BB only. Pt and PCP need to monitor BP with antihypertensive regimen adjustment as needed      6. Hx of DMII   - BS controlled on SSI. Switched to SSI#1 from 3. Will monitor and adjust accordingly. 1/27: BS well-controlled w/o any SSI required; did not resume home MDI; continue w/ SSI only for now. Pt and PCP to monitor BS and adjust regimen accordingly. 7. Acute on chronic normocytic anemia  Hgb stable. Continue to monitor.  Anemia W/U c/w BRIANNA; will place on replacement. PCP to consider GI referral for bidirectional endoscopy as warranted. Also added folic acid replacement given, folate level at lower NL limit      8. Hx of  Urinary retention: randolph in place; on tamsulosin; urology F/U OP    9. HypoNa: Mild, Na 131; pt on HCTZ which held for now; encouraged hydration; will reassess repobse; consider trial fo IVF.    1/27: much improved. Na 134 today. D/c'ed home HCTZ at discharge; encouraged hydration w/ repeat BMP in 3 days    Disposition: medically stable awaiting placement    Chief Complaint: Back pain    Initial H and P:-      80-year-old white male who presented to [facility] on 1/14/2022 with complaints of buttocks pain. He has a past medical history of lifetime non-smoker, recent left lower extremity DVT on Eliquis, diabetes mellitus, hypertension, urinary retention. Per report he presented to the emergency department via EMS from [facility] with new decubitus ulcer. Patient believes it was present for multiple weeks however was not well documented prior to hospital encounter. He reports that it has been draining for approximately 1 week. Patient reported he was having worsening pain fever and sweats and chills over the last week. In the emergency department he was seen on 1/8/2022 ultrasound of leg demonstrated acute DVT he was started on Eliquis. Of note he was also hospitalized from 12/7/2021 to 12/20/2021 for COVID-19 pneumonia. He returned to his nursing facility on room air. In the emergency department he was positive for lactic acidosis. Imaging revealed a decubitus ulcer with air in the perineum. Patient was fluid resuscitated per sepsis protocol and given Vanco and Zosyn. General surgery was consulted for debridement and concern for necrotizing fasciitis. Initially patient was admitted to stepdown postprocedure he was transferred to the ICU for hypotension.   He did undergo emergent debridement of abscess on 1/14 with Dr. Sarwat Tapia. He was initially on vasopressors which have been weaned off. Subjective (past 24 hours):     Reports feeling well. Denies any new issues/compliants overnight. .    Past medical history, family history, social history and allergies reviewed again and is unchanged since admission. ROS (All review of systems completed. Pertinent positives noted. Otherwise All other systems reviewed and negative.)     Medications:  Reviewed    Infusion Medications    sodium chloride 25 mL (01/22/22 0521)    dextrose       Scheduled Medications    enoxaparin  40 mg SubCUTAneous Q24H    insulin lispro  0-6 Units SubCUTAneous TID WC    insulin lispro  0-3 Units SubCUTAneous Nightly    sodium hypochlorite   Irrigation BID    amoxicillin-clavulanate  1 tablet Oral 2 times per day    phosphorus replacement protocol   Other RX Placeholder    famotidine  20 mg Oral BID    amLODIPine  10 mg Oral Daily    aspirin  81 mg Oral Daily    hydrALAZINE  100 mg Oral Q8H    [Held by provider] hydroCHLOROthiazide  50 mg Oral Daily    losartan  100 mg Oral Daily    metoprolol succinate  50 mg Oral Daily    sodium chloride flush  5-40 mL IntraVENous 2 times per day    calcium replacement protocol   Other RX Placeholder    tamsulosin  0.4 mg Oral Daily     PRN Meds: oxyCODONE-acetaminophen, potassium chloride **OR** potassium alternative oral replacement **OR** potassium chloride, sodium chloride flush, sodium chloride, ondansetron **OR** ondansetron, glucose, dextrose, glucagon (rDNA), dextrose, HYDROcodone 5 mg - acetaminophen, fentanNYL      Intake/Output Summary (Last 24 hours) at 1/27/2022 1203  Last data filed at 1/26/2022 2157  Gross per 24 hour   Intake 340 ml   Output 600 ml   Net -260 ml       Diet:  ADULT ORAL NUTRITION SUPPLEMENT; Breakfast, Lunch, Dinner; Diabetic Oral Supplement  ADULT DIET;  Dysphagia - Minced and Moist    Exam:  BP (!) 120/58   Pulse 98   Temp 98 °F (36.7 °C) (Oral)   Resp 18   Ht 5' 6\" (1.676 m)   Wt 191 lb 8 oz (86.9 kg)   SpO2 98%   BMI 30.91 kg/m²   General appearance: No apparent distress, appears stated age and cooperative. HEENT: Pupils equal, round, and reactive to light. Conjunctivae/corneas clear. Neck: Supple, with full range of motion. No jugular venous distention. Trachea midline. Respiratory:  Normal respiratory effort. Clear to auscultation, bilaterally without Rales/Wheezes/Rhonchi. Cardiovascular: Regular rate and rhythm with normal S1/S2 without murmurs, rubs or gallops. Abdomen: Soft, colostomy in place  Musculoskeletal: passive and active ROM x 4 extremities. Skin: large unstageable coccyx ulcer  Neurologic:  Neurovascularly intact without any focal sensory/motor deficits. Cranial nerves: II-XII intact, grossly non-focal.  Psychiatric: Alert and oriented, thought content appropriate, normal insight  Capillary Refill: Brisk,< 3 seconds   Peripheral Pulses: +2 palpable, equal bilaterally     Labs:   Recent Labs     01/25/22  0715 01/26/22  1258 01/27/22 0419   WBC 11.7* 13.3* 9.6   HGB 9.1* 10.0* 9.2*   HCT 27.4* 31.3* 29.4*    280 227     Recent Labs     01/24/22  1430 01/24/22  1430 01/25/22  0715 01/26/22  1258 01/27/22  0419   *   < > 134* 131* 134*   K 3.7   < > 3.8 4.2 3.6      < > 102 103 103   CO2 20*   < > 21* 16* 21*   BUN 8   < > 8 13 14   CREATININE 0.7   < > 0.7 0.9 1.1   CALCIUM 7.8*   < > 8.0* 8.2* 8.0*   PHOS 2.2*  --   --   --   --     < > = values in this interval not displayed. Recent Labs     01/25/22  0715 01/27/22  0419   AST 13 13   ALT 26 25   BILIDIR <0.2 <0.2   BILITOT 0.5 0.5   ALKPHOS 85 85     No results for input(s): INR in the last 72 hours. No results for input(s): Payan Tino in the last 72 hours.     Microbiology:    Blood culture #1:   Lab Results   Component Value Date    BC No growth-preliminary No growth  01/14/2022       Blood culture #2:No results found for: BLOODCULT2    Organism:  Lab Results Component Value Date    ORG Pseudomonas aeruginosa 01/14/2022    ORG mixed anaerobic growth 01/14/2022         Lab Results   Component Value Date    LABGRAM  01/14/2022     Few segmented neutrophils observed. No epithelial cells observed. Many gram positive cocci occurring singly and in pairs. Many gram negative bacilli. Moderate large gram positive bacilli. MRSA culture only:No results found for: [facility]    Urine culture:   Lab Results   Component Value Date    LABURIN No growth-preliminary No growth  01/15/2022       Respiratory culture: No results found for: CULTRESP    Aerobic and Anaerobic :  Lab Results   Component Value Date    LABAERO  01/14/2022     Culture also yielded light growth of Enterococcus species, Streptococcus species (Viridans group), Enteric gram negative bacilli, Staphylococcus species (coagulase negative), and gram positive bacilli most consistent with Corynebacterium species. If a true mixed infection is suspected, then broad spectrum empiric antibiotic therapy is indicated. LABAERO light growth  01/14/2022     Lab Results   Component Value Date    LABANAE  01/14/2022     Culture yielded heavy mixed anaerobic growth, including gram positive bacilli and multiple colony types of both gram negative bacill and gram positive cocci. If a true mixed aerobic and anaerobic infection is suspected, then broad spectrum empiric antibiotic therapy is indicated and should include coverage for anaerobic organisms. Urinalysis:      Lab Results   Component Value Date    NITRU NEGATIVE 01/15/2022    WBCUA 5-9 01/15/2022    BACTERIA FEW 01/15/2022    RBCUA 10-15 01/15/2022    BLOODU MODERATE 01/15/2022    SPECGRAV 1.015 01/15/2022    GLUCOSEU NEGATIVE 01/14/2022       Radiology:  XR CHEST PORTABLE   Final Result   1. Right IJ central line tip in the right atrium. This can be retracted by    3 cm. 2. Bilateral patchy interstitial opacities which may reflect infiltrate.    3. Borderline cardiomegaly. This document has been electronically signed by: Erica De Anda MD on    01/15/2022 12:24 AM      CT ABDOMEN PELVIS WO CONTRAST Additional Contrast? None   Final Result       1. Decubitus ulcer overlying the sacrum new since previous CT scan of the pelvis dated 7 December 2021.   2. Air   in the perineum, new since previous study dated 7 December 2021.   3. Dale catheter in place. Air in the urinary bladder. 4. Changes of ankylosing spondylitis involving the lower thoracic, lumbar spine and sacroiliac iliac joints. Deformity at the thoracolumbar junction. 5. Calcification along the gallbladder wall. 6. Bilateral inguinal lymph nodes. 7. Areas of atelectasis or scarring at both lung bases. **This report has been created using voice recognition software. It may contain minor errors which are inherent in voice recognition technology. **      Final report electronically signed by DR Beth Martinez on 1/14/2022 6:49 PM      XR CHEST PORTABLE   Final Result   1. Interval improvement since previous plain radiographs dated 11 December 2021 with reduction in the bilateral upper lobe infiltrates. .   2. Borderline cardiomegaly. 3. Thoracic spondylosis. **This report has been created using voice recognition software. It may contain minor errors which are inherent in voice recognition technology. **      Final report electronically signed by DR Beth Martinez on 1/14/2022 5:31 PM        CT ABDOMEN PELVIS WO CONTRAST Additional Contrast? None    Result Date: 1/14/2022  PROCEDURE: CT ABDOMEN PELVIS WO CONTRAST CLINICAL INFORMATION: buttock wound . COMPARISON: CTA abdomen and pelvis dated 7 December 2021. . TECHNIQUE: Axial 5 mm CT images were obtained through the abdomen and pelvis. No contrast was given. Coronal and sagittal reconstructions were obtained.  All CT scans at this facility use dose modulation, iterative reconstruction, and/or weight-based dosing when appropriate to reduce radiation dose to as low as reasonably achievable. FINDINGS: There are areas of atelectasis or scarring at both lung bases. The base of the heart is within appropriate limits. The liver is grossly normal. The spleen is normal. The adrenal glands and pancreas are grossly normal. There is calcification along the gallbladder wall. .  There is no hydronephrosis or stones of either kidney. No contour deforming renal masses are noted. There is a small hiatal hernia. No abnormalities of the small bowel loops are noted. The IVC and aorta are of normal caliber. There is no adenopathy. There is a Dale catheter in place. There is  air within the urinary bladder. The prostate gland measures 4.1 x 3.4 cm in size. . There is no pelvic free fluid. The colon is grossly normal. There is bilateral inguinal lymph nodes in place. There are possible changes of ankylosing spondylitis involving the lumbar spine and sacroiliac joints bilaterally. There is deformity at the thoracolumbar junction There is a decubitus ulcer overlying the sacrum. There is is air within the perineum suggestive off inflammatory process. .      1. Decubitus ulcer overlying the sacrum new since previous CT scan of the pelvis dated 7 December 2021. 2. Air   in the perineum, new since previous study dated 7 December 2021. 3. Dale catheter in place. Air in the urinary bladder. 4. Changes of ankylosing spondylitis involving the lower thoracic, lumbar spine and sacroiliac iliac joints. Deformity at the thoracolumbar junction. 5. Calcification along the gallbladder wall. 6. Bilateral inguinal lymph nodes. 7. Areas of atelectasis or scarring at both lung bases. **This report has been created using voice recognition software. It may contain minor errors which are inherent in voice recognition technology. ** Final report electronically signed by DR Pao Stanton on 1/14/2022 6:49 PM    XR CHEST PORTABLE    Result Date: 1/15/2022  1 view chest x-ray Comparison: None

## 2022-01-27 NOTE — PROGRESS NOTES
Hospitalist Progress Note      Patient:  [facility]    Unit/Bed:7K-28/028-A  YOB: 1955  MRN: [id number]   Acct: [de-identified]   PCP: DIONTE Garcia CNP  Date of Admission: 1/14/2022    Assessment/Plan:    1. Necrotizing soft tissue infection   -Status post emergent excisional debridement and drainage of bilateral abscess of deep ischial rectal space and excisional debridement of skin subcutaneous tissue muscle and fascia. Status post diverting colostomy on 1/19/2022. Cultures will be polymicrobial  -Continue IV antibiotics per ID recommendations; - Abx deescalated to Augmentin. Duration to be determined per ID.    1/27: Augmentin x 5 days at discharge       3. Query Acute VTE L peroneal and Anterior tibial v. (not considered DVT)  Per d/w pt about R/B/A/C, given superficial nature of VTEs w/ subsequent low/negligible risk of Pe/propagation; decision was made to d/c [facility]; placed pt on LMWH for DVT prophylaxis only    5. Hx of Essential HTN   - well-controlled (if not soft) on all resumed home meds; held HCTZ given hypoNa. Monitor    1/27: BP controlled, at times soft w/ SBP in 90s. Also d/c'ed home amlodipine at [facility]; continue w/ hydralazine, ARB and BB only. Pt and PCP need to monitor BP with antihypertensive regimen adjustment as needed      6. Hx of DMII   - BS controlled on SSI. Switched to SSI#1 from 3. Will monitor and adjust accordingly. 1/27: BS well-controlled w/o any SSI required; did not resume home MDI; continue w/ SSI only for now. Pt and PCP to monitor BS and adjust regimen accordingly. 7. Acute on chronic normocytic anemia  Hgb stable. Continue to monitor. Anemia W/U c/w BRIANNA; will place on replacement. PCP to consider GI referral for bidirectional endoscopy as warranted. Also added folic acid replacement given, folate level at lower NL limit      8.  Hx of  Urinary retention: randolph in place; on tamsulosin; urology F/U OP    9. HypoNa: Mild, Na 131; pt on HCTZ which held for now; encouraged hydration; will reassess repobse; consider trial fo IVF.    1/27: much improved. Na 134 today. D/c'ed home HCTZ at discharge; encouraged hydration w/ repeat BMP in 3 days    Disposition: medically stable awaiting placement    Chief Complaint: Back pain    Initial H and P:-      59-year-old white male who presented to [facility] on 1/14/2022 with complaints of buttocks pain. He has a past medical history of lifetime non-smoker, recent left lower extremity DVT on Eliquis, diabetes mellitus, hypertension, urinary retention. Per report he presented to the emergency department via EMS from [facility] with new decubitus ulcer. Patient believes it was present for multiple weeks however was not well documented prior to hospital encounter. He reports that it has been draining for approximately 1 week. Patient reported he was having worsening pain fever and sweats and chills over the last week. In the emergency department he was seen on 1/8/2022 ultrasound of leg demonstrated acute DVT he was started on Eliquis. Of note he was also hospitalized from 12/7/2021 to 12/20/2021 for COVID-19 pneumonia. He returned to his nursing facility on room air. In the emergency department he was positive for lactic acidosis. Imaging revealed a decubitus ulcer with air in the perineum. Patient was fluid resuscitated per sepsis protocol and given Vanco and Zosyn. General surgery was consulted for debridement and concern for necrotizing fasciitis. Initially patient was admitted to stepdown postprocedure he was transferred to the ICU for hypotension. He did undergo emergent debridement of abscess on 1/14 with Dr. Gene Hines. He was initially on vasopressors which have been weaned off. Subjective (past 24 hours):     Reports feeling well. Denies any new issues/compliants overnight. .    Past medical history, family history, social history and allergies reviewed again and is unchanged since admission. ROS (All review of systems completed. Pertinent positives noted. Otherwise All other systems reviewed and negative.)     Medications:  Reviewed    Infusion Medications    sodium chloride 25 mL (01/22/22 0521)    dextrose       Scheduled Medications    enoxaparin  40 mg SubCUTAneous Q24H    insulin lispro  0-6 Units SubCUTAneous TID WC    insulin lispro  0-3 Units SubCUTAneous Nightly    sodium hypochlorite   Irrigation BID    amoxicillin-clavulanate  1 tablet Oral 2 times per day    phosphorus replacement protocol   Other RX Placeholder    famotidine  20 mg Oral BID    amLODIPine  10 mg Oral Daily    aspirin  81 mg Oral Daily    hydrALAZINE  100 mg Oral Q8H    [Held by provider] hydroCHLOROthiazide  50 mg Oral Daily    losartan  100 mg Oral Daily    metoprolol succinate  50 mg Oral Daily    sodium chloride flush  5-40 mL IntraVENous 2 times per day    calcium replacement protocol   Other RX Placeholder    tamsulosin  0.4 mg Oral Daily     PRN Meds: oxyCODONE-acetaminophen, potassium chloride **OR** potassium alternative oral replacement **OR** potassium chloride, sodium chloride flush, sodium chloride, ondansetron **OR** ondansetron, glucose, dextrose, glucagon (rDNA), dextrose, HYDROcodone 5 mg - acetaminophen, fentanNYL      Intake/Output Summary (Last 24 hours) at 1/27/2022 0142  Last data filed at 1/26/2022 2157  Gross per 24 hour   Intake 340 ml   Output 600 ml   Net -260 ml       Diet:  ADULT ORAL NUTRITION SUPPLEMENT; Breakfast, Lunch, Dinner; Diabetic Oral Supplement  ADULT DIET; Dysphagia - Minced and Moist    Exam:  BP (!) 112/51   Pulse 65   Temp 98 °F (36.7 °C) (Oral)   Resp 18   Ht 5' 6\" (1.676 m)   Wt 191 lb 8 oz (86.9 kg)   SpO2 96%   BMI 30.91 kg/m²   General appearance: No apparent distress, appears stated age and cooperative. HEENT: Pupils equal, round, and reactive to light.  Conjunctivae/corneas clear.  Neck: Supple, with full range of motion. No jugular venous distention. Trachea midline. Respiratory:  Normal respiratory effort. Clear to auscultation, bilaterally without Rales/Wheezes/Rhonchi. Cardiovascular: Regular rate and rhythm with normal S1/S2 without murmurs, rubs or gallops. Abdomen: Soft, colostomy in place  Musculoskeletal: passive and active ROM x 4 extremities. Skin: large unstageable coccyx ulcer  Neurologic:  Neurovascularly intact without any focal sensory/motor deficits. Cranial nerves: II-XII intact, grossly non-focal.  Psychiatric: Alert and oriented, thought content appropriate, normal insight  Capillary Refill: Brisk,< 3 seconds   Peripheral Pulses: +2 palpable, equal bilaterally     Labs:   Recent Labs     01/25/22  0715 01/26/22  1258 01/27/22  0419   WBC 11.7* 13.3* 9.6   HGB 9.1* 10.0* 9.2*   HCT 27.4* 31.3* 29.4*    280 227     Recent Labs     01/24/22  0930 01/24/22  1430 01/24/22  1430 01/25/22  0715 01/26/22  1258 01/27/22  0419   NA  --  130*   < > 134* 131* 134*   K  --  3.7   < > 3.8 4.2 3.6   CL  --  100   < > 102 103 103   CO2  --  20*   < > 21* 16* 21*   BUN  --  8   < > 8 13 14   CREATININE  --  0.7   < > 0.7 0.9 1.1   CALCIUM  --  7.8*   < > 8.0* 8.2* 8.0*   PHOS 2.4 2.2*  --   --   --   --     < > = values in this interval not displayed. Recent Labs     01/25/22  0715 01/27/22 0419   AST 13 13   ALT 26 25   BILIDIR <0.2 <0.2   BILITOT 0.5 0.5   ALKPHOS 85 85     No results for input(s): INR in the last 72 hours. No results for input(s): Carolina Councilman in the last 72 hours.     Microbiology:    Blood culture #1:   Lab Results   Component Value Date    BC No growth-preliminary No growth  01/14/2022       Blood culture #2:No results found for: Cuco Abbot    Organism:  Lab Results   Component Value Date    ORG Pseudomonas aeruginosa 01/14/2022    ORG mixed anaerobic growth 01/14/2022         Lab Results   Component Value Date    LABGRAM  01/14/2022 Few segmented neutrophils observed. No epithelial cells observed. Many gram positive cocci occurring singly and in pairs. Many gram negative bacilli. Moderate large gram positive bacilli. MRSA culture only:No results found for: [facility]    Urine culture:   Lab Results   Component Value Date    LABURIN No growth-preliminary No growth  01/15/2022       Respiratory culture: No results found for: CULTRESP    Aerobic and Anaerobic :  Lab Results   Component Value Date    LABAERO  01/14/2022     Culture also yielded light growth of Enterococcus species, Streptococcus species (Viridans group), Enteric gram negative bacilli, Staphylococcus species (coagulase negative), and gram positive bacilli most consistent with Corynebacterium species. If a true mixed infection is suspected, then broad spectrum empiric antibiotic therapy is indicated. LABAERO light growth  01/14/2022     Lab Results   Component Value Date    LABANAE  01/14/2022     Culture yielded heavy mixed anaerobic growth, including gram positive bacilli and multiple colony types of both gram negative bacill and gram positive cocci. If a true mixed aerobic and anaerobic infection is suspected, then broad spectrum empiric antibiotic therapy is indicated and should include coverage for anaerobic organisms. Urinalysis:      Lab Results   Component Value Date    NITRU NEGATIVE 01/15/2022    WBCUA 5-9 01/15/2022    BACTERIA FEW 01/15/2022    RBCUA 10-15 01/15/2022    BLOODU MODERATE 01/15/2022    SPECGRAV 1.015 01/15/2022    GLUCOSEU NEGATIVE 01/14/2022       Radiology:  XR CHEST PORTABLE   Final Result   1. Right IJ central line tip in the right atrium. This can be retracted by    3 cm. 2. Bilateral patchy interstitial opacities which may reflect infiltrate. 3. Borderline cardiomegaly.       This document has been electronically signed by: Gilbert Vasques MD on    01/15/2022 12:24 AM      CT ABDOMEN PELVIS WO CONTRAST Additional Contrast? None   Final Result       1. Decubitus ulcer overlying the sacrum new since previous CT scan of the pelvis dated 7 December 2021.   2. Air   in the perineum, new since previous study dated 7 December 2021.   3. Dale catheter in place. Air in the urinary bladder. 4. Changes of ankylosing spondylitis involving the lower thoracic, lumbar spine and sacroiliac iliac joints. Deformity at the thoracolumbar junction. 5. Calcification along the gallbladder wall. 6. Bilateral inguinal lymph nodes. 7. Areas of atelectasis or scarring at both lung bases. **This report has been created using voice recognition software. It may contain minor errors which are inherent in voice recognition technology. **      Final report electronically signed by DR Génesis Carlos on 1/14/2022 6:49 PM      XR CHEST PORTABLE   Final Result   1. Interval improvement since previous plain radiographs dated 11 December 2021 with reduction in the bilateral upper lobe infiltrates. .   2. Borderline cardiomegaly. 3. Thoracic spondylosis. **This report has been created using voice recognition software. It may contain minor errors which are inherent in voice recognition technology. **      Final report electronically signed by DR Génesis Carlos on 1/14/2022 5:31 PM        CT ABDOMEN PELVIS WO CONTRAST Additional Contrast? None    Result Date: 1/14/2022  PROCEDURE: CT ABDOMEN PELVIS WO CONTRAST CLINICAL INFORMATION: buttock wound . COMPARISON: CTA abdomen and pelvis dated 7 December 2021. . TECHNIQUE: Axial 5 mm CT images were obtained through the abdomen and pelvis. No contrast was given. Coronal and sagittal reconstructions were obtained. All CT scans at this facility use dose modulation, iterative reconstruction, and/or weight-based dosing when appropriate to reduce radiation dose to as low as reasonably achievable. FINDINGS: There are areas of atelectasis or scarring at both lung bases. The base of the heart is within appropriate limits. The liver is grossly normal. The spleen is normal. The adrenal glands and pancreas are grossly normal. There is calcification along the gallbladder wall. .  There is no hydronephrosis or stones of either kidney. No contour deforming renal masses are noted. There is a small hiatal hernia. No abnormalities of the small bowel loops are noted. The IVC and aorta are of normal caliber. There is no adenopathy. There is a Dale catheter in place. There is  air within the urinary bladder. The prostate gland measures 4.1 x 3.4 cm in size. . There is no pelvic free fluid. The colon is grossly normal. There is bilateral inguinal lymph nodes in place. There are possible changes of ankylosing spondylitis involving the lumbar spine and sacroiliac joints bilaterally. There is deformity at the thoracolumbar junction There is a decubitus ulcer overlying the sacrum. There is is air within the perineum suggestive off inflammatory process. .      1. Decubitus ulcer overlying the sacrum new since previous CT scan of the pelvis dated 7 December 2021. 2. Air   in the perineum, new since previous study dated 7 December 2021. 3. Dale catheter in place. Air in the urinary bladder. 4. Changes of ankylosing spondylitis involving the lower thoracic, lumbar spine and sacroiliac iliac joints. Deformity at the thoracolumbar junction. 5. Calcification along the gallbladder wall. 6. Bilateral inguinal lymph nodes. 7. Areas of atelectasis or scarring at both lung bases. **This report has been created using voice recognition software. It may contain minor errors which are inherent in voice recognition technology. ** Final report electronically signed by DR Escobar Garza on 1/14/2022 6:49 PM    XR CHEST PORTABLE    Result Date: 1/15/2022  1 view chest x-ray Comparison: None Findings: Right IJ central line tip in the right atrium. Bilateral patchy interstitial opacities. No pleural effusion or pneumothorax. Heart is borderline enlarged.  No pulmonary vascular congestion. No acute fracture. 1. Right IJ central line tip in the right atrium. This can be retracted by 3 cm. 2. Bilateral patchy interstitial opacities which may reflect infiltrate. 3. Borderline cardiomegaly. This document has been electronically signed by: Angel Ramirez MD on 01/15/2022 12:24 AM    XR CHEST PORTABLE    Result Date: 1/14/2022  PROCEDURE: XR CHEST PORTABLE CLINICAL INFORMATION: pre-op. COMPARISON: Chest x-ray dated 11 December 2021. TECHNIQUE: AP upright view of the chest. FINDINGS: There is borderline cardiomegaly. .The mediastinum is not widened. There are bilateral infiltrates, significantly improved since previous study dated 11 December 2021. There are no effusions. . The pulmonary vascularity is normal. There is thoracic spondylosis. 1. Interval improvement since previous plain radiographs dated 11 December 2021 with reduction in the bilateral upper lobe infiltrates. . 2. Borderline cardiomegaly. 3. Thoracic spondylosis. **This report has been created using voice recognition software. It may contain minor errors which are inherent in voice recognition technology. ** Final report electronically signed by DR Keon Farmer on 1/14/2022 5:31 PM  \  With RN in room, patient was updated about and agreed upon the treatment plan, all the questions and concerns were addressed. More than 30 min spent on counseling.   Electronically signed by Adriana Taylor MD on 1/27/2022 at 8:03 AM

## 2022-01-27 NOTE — PROGRESS NOTES
Progress note: Infectious diseases    Patient - Sowmya Sheth,  Age - 77 y.o.    - 1955      Room Number - 7K-28/028-A   MRN -  [id number]   Acct # - [de-identified]  Date of Admission -  2022  4:05 PM    SUBJECTIVE:   No new issues. OBJECTIVE   VITALS    height is 5' 6\" (1.676 m) and weight is 191 lb 8 oz (86.9 kg). His oral temperature is 98 °F (36.7 °C). His blood pressure is 120/58 (abnormal) and his pulse is 98. His respiration is 18 and oxygen saturation is 98%. Wt Readings from Last 3 Encounters:   22 191 lb 8 oz (86.9 kg)   21 195 lb 8 oz (88.7 kg)   18 223 lb 11.2 oz (101.5 kg)       I/O (24 Hours)    Intake/Output Summary (Last 24 hours) at 2022 4692  Last data filed at 2022 2157  Gross per 24 hour   Intake 340 ml   Output 600 ml   Net -260 ml       General Appearance  Awake, alert, oriented,  not  In acute distress  HEENT - normocephalic, atraumatic, pale  conjunctiva,  anicteric sclera  Neck - Supple, no mass  Lungs -  Bilateral   air entry, no rhonchi, no wheeze  Cardiovascular - Heart sounds are normal.     Abdomen - soft, colostomy functioning, soft abdomen  Skin - No bruising or bleeding  Extremities -no rash  Dressed coccyx wound.      MEDICATIONS:      enoxaparin  40 mg SubCUTAneous Q24H    insulin lispro  0-6 Units SubCUTAneous TID     insulin lispro  0-3 Units SubCUTAneous Nightly    sodium hypochlorite   Irrigation BID    amoxicillin-clavulanate  1 tablet Oral 2 times per day    phosphorus replacement protocol   Other RX Placeholder    famotidine  20 mg Oral BID    amLODIPine  10 mg Oral Daily    aspirin  81 mg Oral Daily    hydrALAZINE  100 mg Oral Q8H    [Held by provider] hydroCHLOROthiazide  50 mg Oral Daily    losartan  100 mg Oral Daily    metoprolol succinate  50 mg Oral Daily    sodium chloride flush  5-40 mL IntraVENous 2 times per day  calcium replacement protocol   Other RX Placeholder    tamsulosin  0.4 mg Oral Daily      sodium chloride 25 mL (01/22/22 0521)    dextrose       oxyCODONE-acetaminophen, potassium chloride **OR** potassium alternative oral replacement **OR** potassium chloride, sodium chloride flush, sodium chloride, ondansetron **OR** ondansetron, glucose, dextrose, glucagon (rDNA), dextrose, HYDROcodone 5 mg - acetaminophen, fentanNYL      LABS:     CBC:   Recent Labs     01/25/22  0715 01/26/22  1258 01/27/22  0419   WBC 11.7* 13.3* 9.6   HGB 9.1* 10.0* 9.2*    280 227     BMP:    Recent Labs     01/25/22  0715 01/26/22  1258 01/27/22  0419   * 131* 134*   K 3.8 4.2 3.6    103 103   CO2 21* 16* 21*   BUN 8 13 14   CREATININE 0.7 0.9 1.1   GLUCOSE 92 123* 95     Calcium:  Recent Labs     01/27/22  0419   CALCIUM 8.0*      No results for input(s): MG in the last 72 hours. Phosphorus:  Recent Labs     01/24/22  1430   PHOS 2.2*     BNP:No results for input(s): BNP in the last 72 hours. Glucose:  Recent Labs     01/26/22  1655 01/26/22  2017 01/27/22  0612   POCGLU 107 113* 112*    INR:   No results for input(s): INR in the last 72 hours. Hepatic:   Recent Labs     01/25/22  0715 01/27/22  0419   ALKPHOS 85 85   ALT 26 25   AST 13 13   PROT 5.8* 5.8*   BILITOT 0.5 0.5   BILIDIR <0.2 <0.2   LABALBU 2.8* 2.5*         Problem list of patient:     Patient Active Problem List   Diagnosis Code    Cellulitis and abscess of neck L03.221, L02.11    Essential hypertension I10    Leukocytosis D72.829    Obesity (BMI 30-39. 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

## 2022-02-08 ENCOUNTER — TELEPHONE (OUTPATIENT)
Dept: WOUND CARE | Age: 67
End: 2022-02-08

## 2022-02-08 NOTE — TELEPHONE ENCOUNTER
----- Message from Phylicia Ramos sent at 2/8/2022  8:45 AM EST -----  [facility] APPT FOR 2/9 @ 10:45.  SAID HE CAN'T MAKE IT AND NEEDS TO R/S

## 2022-02-09 ENCOUNTER — HOSPITAL ENCOUNTER (OUTPATIENT)
Dept: WOUND CARE | Age: 67
Discharge: HOME | End: 2022-02-09
Payer: MEDICARE

## 2022-02-09 VITALS
DIASTOLIC BLOOD PRESSURE: 61 MMHG | HEART RATE: 76 BPM | SYSTOLIC BLOOD PRESSURE: 134 MMHG | TEMPERATURE: 98.1 F | RESPIRATION RATE: 18 BRPM

## 2022-02-09 DIAGNOSIS — Z93.3 COLOSTOMY IN PLACE (HCC): ICD-10-CM

## 2022-02-09 DIAGNOSIS — L89.154 PRESSURE INJURY OF SACRAL REGION, STAGE 4 (HCC): Primary | ICD-10-CM

## 2022-02-09 PROCEDURE — 99212 OFFICE O/P EST SF 10 MIN: CPT | Performed by: NURSE PRACTITIONER

## 2022-02-09 PROCEDURE — 99213 OFFICE O/P EST LOW 20 MIN: CPT

## 2022-02-09 NOTE — PROGRESS NOTES
Virtual Visit Wound Care  Clinic Level of Care   NAME:  Mayi Mckenzie  YOB: 1955 GENDER: male  MEDICAL RECORD NUMBER:  [id number]   DATE:  2/9/2022     Patient Type Points   No documentation completed by nursing staff. []   0   Nursing staff documented in the navigator for an ESTABLISHED patient including Episode, Patient ID, Chief Complaint, Travel Screen, Allergies, Latex Allergy, Home Medication, History, Psychosocial Screen, C-SSRS Screen, Fall Risk, Nutritional Screen, Advanced Directive, Education and Plan of Care, and Discharge Instructions. The Functional Screening tab is only required if the patient's status changes. []   1   Nursing staff documented in the navigator for a NEW patient including Patient ID, Chief Complaint, Travel Screen, Allergies, Latex Allergy, Home Medication, History, Psychosocial Screen, C-SSRS Screen, Fall Risk, Nutritional Screen, Advanced Directive, Functional Screen, Education and Plan of Care, and Discharge Instructions. []   2   Nursing staff documented in the navigator for a CONSULT patient including Episode, Patient ID, Chief Complaint, Travel Screen, Allergies, Latex Allergy, Home Medication, History, Psychosocial Screen, C-SSRS Screen, Fall Risk, Nutritional Screen, Advanced Directive, Functional Screen, Education and Plan of Care, and Discharge Instructions. [x]   2     Wound Description Points   Unable to obtain image of Wound. For example, patient/caregiver is instructed not to remove dressing, is unable to correctly position smart phone, no smart phone is available, patient is unable to maintain connectivity or the patient's wound is healed. []   0   1-3 wound images annotated. Images of the wound(s) is obtained and annotated along with completed description in [address] [place] [address]. [x]   1   4-5 wound images annotated. Images of the wound(s) is obtained and annotated along with completed description in [address] [place] [address]. []   2   Greater than 6 wound images annotated.  Images of the wound(s) is obtained and annotated along with completed description in [address] [place] [address]. []   3     Education Points   No Education completed by nursing staff.    []   0   Patient/caregiver is educated on 1-4 topics. Nursing staff identifies learner, confirms understanding of information (verbal, demonstration, written) and documents details. May include Discharge Instructions/AVS, available documents in My Chart, or Web-based learning.  []   1   Patient/caregiver is educated on 5-9 topics. Nursing staff identifies learner, confirms understanding of information (verbal, demonstration, written) and documents details. May include Discharge Instructions/AVS, available documents in My Chart, or Web-based learning. [x]   2   Patient/caregiver is educated on 10 or more topics. Nursing staff identifies learner, confirms understanding of information (verbal, demonstration, written) and documents details. May include Discharge Instructions/AVS, available documents in My Chart, or Web-based learning. []   3     Follow-up Virtual Visit Points   No contact with outside resources made. []   0   Nursing staff contacts 1-2 outside resource. For example, telephone call made to home health, primary care provider, pharmacy, or DME. May include filling out forms and writing letters, arranging transportation, communication with insurance , vendors, etc.  Discharge, instructions and/or After Visit Summary given to patient/caregiver and instructions completed. [x]   1   Nursing staff contacts 3-4 outside resource. For example, telephone calls made to home health, primary care provider, pharmacy, or DME. May include filling out forms and writing letters, arranging transportation, communication with insurance , vendors, etc.  Discharge, instructions and/or After Visit Summary given to patient/caregiver and instructions completed. []   2   Nursing contacts 5 or more outside resource.  For example, telephone calls made to home health, primary care providers, pharmacy, or DME. May include filling out forms and writing letters, arranging transportation, communication with insurance , vendors, etc.  Discharge, instructions and/or After Visit Summary given to patient/caregiver and instructions completed.    []   3     Is this the Patient's First Visit to the [facility]  Yes    Is this Patient Established @ [facility]  Yes             Virtual Visit Clinical Level of Care Wound Care      Points  1-3  Level 1 []     Points  4-5  Level 2 []     Points  6-7  Level 3 [x]     Points  8-9  Level 4 []     Points  10-11  Level 5 []       Electronically signed by Roderick Chairez RN on 2/9/2022 at

## 2022-02-09 NOTE — PLAN OF CARE
Problem: Wound:  Goal: Will show signs of wound healing; wound closure and no evidence of infection  Description: Will show signs of wound healing; wound closure and no evidence of infection  Outcome: Ongoing  Note: New patient seen virtually today for hospital follow up of coccyx wound. Measurements and assessments per nursing home staff. New orders faxed to facility. Follow up next week in person. Wound may need debridement at next visit. See AVS for discharge instructions. Care plan reviewed with patient and nursing home staff. Patient and nursing home staff verbalize understanding of the plan of care and contribute to goal setting.

## 2022-02-09 NOTE — PROGRESS NOTES
Virtual Visit Via [employer]   Progress Note and Procedure Note    [address] RECORD NUMBER:  [id number]  AGE: 77 y.o. GENDER: male  : 1955  EPISODE DATE:  2022    Subjective:     Chief Complaint   Patient presents with    Wound Check        Consent obtained to communicate via Virtual Visit:  Yes     HISTORY of PRESENT ILLNESS HPI     Brady Iyer is a 77 y.o. male who presents today via Virtual Visit wound/ulcer evaluation. History of Wound Context: Patient was hospitalized -21due to Covid-19 at which time he developed coccyx wound, discharged to [facility]. While at [facility] he developed worsening pain to wound for which he was sent to the emergency department. 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 He then underwent creation of diverting transverse loop colostomy 22-nurse denies any concerns regarding current pouching system. He is currently a resident of [facility] in [place] [address]. Current wound care includes Dakin's moistened guaze dressings, changed daily. The nurse reports small to moderate amounts of drainage, no concerns noted regarding wound care as ordered. Offloading devices in use include low air loss mattress and chair cushion. Good appetite reported, is not currently taking in any protein supplements due to dislike for taste. History of DM, nurse reports that finger stick blood glucose levels have been well controlled. No further needs or concerns identified.     Ulcer Identification:  Ulcer Type: pressure    Contributing Factors: diabetes, chronic pressure, decreased mobility, shear force and Covid-19    Acute Wound: N/A not an acute wound    PAST MEDICAL HISTORY        Diagnosis Date    Diabetes mellitus ([facility] [address]     Hyperlipidemia     Hypertension        PAST SURGICAL HISTORY    Past Surgical History:   Procedure Laterality Date    ENDOSCOPY, COLON, DIAGNOSTIC      swallowing difficulties    LAPAROSCOPY N/A 1/19/2022    EXPLORATORY LAPAROSCOPY, TRANSVERSE LOOP COLOSTOMY performed by Yaw Metz MD at [place] [address]  Aug 29, 2013    Neck Abcess Incision and Drainage (Dr. Ministerio Pimentel, [facility])    RECTAL SURGERY N/A 1/14/2022    FULL THICKNESS DEBRIDEMENT OF SACRAL DECUBITUS ULCER, [id number] CM performed by Yaw Metz MD at [address] [place] [address] History   Problem Relation Age of Onset    Heart Disease Father     Other Mother         alzheimers       SOCIAL HISTORY    Social History     Tobacco Use    Smoking status: Never Smoker    Smokeless tobacco: Never Used   Substance Use Topics    Alcohol use: No    Drug use: No       ALLERGIES    No Known Allergies    MEDICATIONS    Current Outpatient Medications on File Prior to Encounter   Medication Sig Dispense Refill    insulin lispro (HUMALOG) 100 UNIT/ML injection vial Inject 0-6 Units into the skin 3 times daily (with meals) 10 mL 3    folic acid (FOLVITE) 1 MG tablet Take 1 tablet by mouth daily 90 tablet 1    ferrous sulfate (FE TABS 325) 325 (65 Fe) MG EC tablet Take 1 tablet by mouth 3 times daily (with meals) 270 tablet 1    metoprolol succinate (TOPROL XL) 50 MG extended release tablet Take 1 tablet by mouth daily 30 tablet 3    famotidine (PEPCID) 20 MG tablet Take 1 tablet by mouth 2 times daily 60 tablet 3    hydrALAZINE (APRESOLINE) 100 MG tablet Take 1 tablet by mouth every 8 hours 90 tablet 0    losartan (COZAAR) 100 MG tablet Take 1 tablet by mouth daily 30 tablet 0    simethicone (MYLICON) 80 MG chewable tablet Take 1 tablet by mouth every 6 Serosanguinous;Brown;Daley 01/27/22 0554   Odor Mild 01/27/22 0554   Ama-incision Assessment Blanchable erythema 01/25/22 2231   Number of days: 25       Incision 01/14/22 Heel Left (Active)   Dressing Status Clean;Dry; Intact 01/25/22 1445   Dressing/Treatment Open to air 01/23/22 1836   Incision Length (cm) 3 01/15/22 0000   Incision Width (cm) 3.5 cm 01/15/22 0000   Incision Assessment Dry;Dusky 01/18/22 0400   Drainage Amount None 01/18/22 0400   Odor None 01/18/22 0400   Ama-incision Assessment Blanchable erythema 01/18/22 0400   Number of days: 25       Incision 01/19/22 Abdomen Medial;Upper (Active)   Dressing Status Clean;Dry; Intact 01/25/22 1445   Dressing/Treatment ABD pad;Gauze dressing/dressing sponge 01/25/22 1445   Closure Retention sutures 01/19/22 1100   Drainage Amount Moderate 01/21/22 0445   Drainage Description Serosanguinous 01/21/22 0445   Odor None 01/19/22 1216   Number of days: 21       Incision 62/05/64 Umbilicus (Active)   Dressing Status Clean;Dry; Intact 01/25/22 1445   Incision Cleansed Cleansed with saline 01/19/22 1059   Dressing/Treatment Open to air 01/25/22 1445   Closure Surgical glue 01/25/22 1445   Margins Approximated 01/19/22 1059   Drainage Amount None 01/19/22 1216   Odor None 01/19/22 1216   Number of days: 21       Diabetic/Pressure/Non Pressure Ulcers only:  Ulcer: Pressure ulcer, Stage 4       Plan:     Please see attached Discharge Instructions    Based upon this virtual visit it is required to have an in-person visit of this time.    -Stage IV pressure injury, sacrum- Wound bed appears to have fair amount of devitalized/necrotic tissue to proximal portion. Will plan on in person visit for better evaluation and possible debridement. Continue off loading mattress and chair cushion. Turn and reposition frequently. Limit time spent up in the chair to 1 hour intervals with meals. Avoid sliding against furniture and linens with transfers.   Continue use of transfer assist device. Consult facility dietician for recommendations for protein supplements that are available at their facility. Continue good control of DM.      -No indications of infection at today's visit. Call clinic or seek emergency care should these occur.     -Colostomy- No issues reported by facility staff, recommend calling clinic with any pouching problems or concerns. Written patient dismissal instructions available to Patient/POA Wenceslao Leyden AGE: 77 y.o. GENDER: male  : 1955 being evaluated by a Virtual Visit (video visit) encounter to address concerns as mentioned above. A caregiver was present when appropriate. Due to this being a TeleHealth encounter (During IXYDR-82 public health emergency), evaluation of the following organ systems was limited: Vitals/Constitutional/EENT/Resp/CV/GI//MS/Neuro/Skin/Heme-Lymph-Imm. Pursuant to the emergency declaration under the [address] [place] [address], [address] [place] [address] authority and the [employer] Act, this Virtual Visit was conducted with patient's (and/or legal guardian's) consent, to reduce the patient's risk of exposure to COVID-19 and provide necessary medical care. The patient (and/or legal guardian) has also been advised to contact this office for worsening conditions or problems, and seek emergency medical treatment and/or call 911 if deemed necessary. Services were provided through a video synchronous discussion virtually to substitute for in-person clinic visit. Patient was located at their individual homes. Provider was located in [place] [address].     Electronically signed by DIONTE Alcocer CNP on 2022 at 11:31 AM

## 2022-02-09 NOTE — PROGRESS NOTES
Cody Myron, was evaluated through a synchronous (real-time) audio-video encounter. The patient (or guardian if applicable) is aware that this is a billable service, which includes applicable co-pays. This Virtual Visit was conducted with patient's (and/or legal guardian's) consent. The visit was conducted pursuant to the emergency declaration under the [address] [place] [address] and the [employer] and [employer] Act. Patient identification was verified, and a caregiver was present when appropriate. The patient was located at home in a state where the provider was licensed to provide care. Total time spent for this encounter: 22 minutes    --Edward Moseley RN on 2/9/2022 at  1102  An electronic signature was used to authenticate this note.

## 2022-02-17 ENCOUNTER — HOSPITAL ENCOUNTER (OUTPATIENT)
Dept: WOUND CARE | Age: 67
Discharge: HOME | End: 2022-02-17
Payer: MEDICARE

## 2022-02-17 VITALS
RESPIRATION RATE: 18 BRPM | DIASTOLIC BLOOD PRESSURE: 73 MMHG | SYSTOLIC BLOOD PRESSURE: 145 MMHG | OXYGEN SATURATION: 99 % | TEMPERATURE: 96.3 F | HEART RATE: 94 BPM

## 2022-02-17 DIAGNOSIS — T81.89XD NON-HEALING SURGICAL WOUND, SUBSEQUENT ENCOUNTER: ICD-10-CM

## 2022-02-17 DIAGNOSIS — T81.89XA NON-HEALING SURGICAL WOUND, INITIAL ENCOUNTER: Primary | ICD-10-CM

## 2022-02-17 DIAGNOSIS — L89.154 PRESSURE INJURY OF SACRAL REGION, STAGE 4 (HCC): ICD-10-CM

## 2022-02-17 PROCEDURE — 11042 DBRDMT SUBQ TIS 1ST 20SQCM/<: CPT

## 2022-02-17 PROCEDURE — 17250 CHEM CAUT OF GRANLTJ TISSUE: CPT

## 2022-02-17 PROCEDURE — 99214 OFFICE O/P EST MOD 30 MIN: CPT | Performed by: NURSE PRACTITIONER

## 2022-02-17 PROCEDURE — 11042 DBRDMT SUBQ TIS 1ST 20SQCM/<: CPT | Performed by: NURSE PRACTITIONER

## 2022-02-17 PROCEDURE — 11045 DBRDMT SUBQ TISS EACH ADDL: CPT

## 2022-02-17 PROCEDURE — 97597 DBRDMT OPN WND 1ST 20 CM/<: CPT

## 2022-02-17 PROCEDURE — 6370000000 HC RX 637 (ALT 250 FOR IP): Performed by: NURSE PRACTITIONER

## 2022-02-17 RX ORDER — CLOBETASOL PROPIONATE 0.5 MG/G
OINTMENT TOPICAL ONCE
Status: CANCELLED | OUTPATIENT
Start: 2022-02-17 | End: 2022-02-17

## 2022-02-17 RX ORDER — LIDOCAINE 50 MG/G
OINTMENT TOPICAL ONCE
Status: CANCELLED | OUTPATIENT
Start: 2022-02-17 | End: 2022-02-17

## 2022-02-17 RX ORDER — LIDOCAINE 40 MG/G
CREAM TOPICAL ONCE
Status: CANCELLED | OUTPATIENT
Start: 2022-02-17 | End: 2022-02-17

## 2022-02-17 RX ORDER — BACITRACIN ZINC AND POLYMYXIN B SULFATE 500; 1000 [USP'U]/G; [USP'U]/G
OINTMENT TOPICAL ONCE
Status: CANCELLED | OUTPATIENT
Start: 2022-02-17 | End: 2022-02-17

## 2022-02-17 RX ORDER — GENTAMICIN SULFATE 1 MG/G
OINTMENT TOPICAL ONCE
Status: CANCELLED | OUTPATIENT
Start: 2022-02-17 | End: 2022-02-17

## 2022-02-17 RX ORDER — BACITRACIN, NEOMYCIN, POLYMYXIN B 400; 3.5; 5 [USP'U]/G; MG/G; [USP'U]/G
OINTMENT TOPICAL ONCE
Status: CANCELLED | OUTPATIENT
Start: 2022-02-17 | End: 2022-02-17

## 2022-02-17 RX ORDER — LIDOCAINE HYDROCHLORIDE 20 MG/ML
JELLY TOPICAL ONCE
Status: COMPLETED | OUTPATIENT
Start: 2022-02-17 | End: 2022-02-17

## 2022-02-17 RX ORDER — LIDOCAINE HYDROCHLORIDE 20 MG/ML
JELLY TOPICAL ONCE
Status: CANCELLED | OUTPATIENT
Start: 2022-02-17 | End: 2022-02-17

## 2022-02-17 RX ORDER — LIDOCAINE HYDROCHLORIDE 40 MG/ML
SOLUTION TOPICAL ONCE
Status: CANCELLED | OUTPATIENT
Start: 2022-02-17 | End: 2022-02-17

## 2022-02-17 RX ORDER — GINSENG 100 MG
CAPSULE ORAL ONCE
Status: CANCELLED | OUTPATIENT
Start: 2022-02-17 | End: 2022-02-17

## 2022-02-17 RX ORDER — BETAMETHASONE DIPROPIONATE 0.05 %
OINTMENT (GRAM) TOPICAL ONCE
Status: CANCELLED | OUTPATIENT
Start: 2022-02-17 | End: 2022-02-17

## 2022-02-17 RX ADMIN — LIDOCAINE HYDROCHLORIDE: 20 JELLY TOPICAL at 15:39

## 2022-02-17 RX ADMIN — SILVER NITRATE APPLICATORS 1 EACH: 25; 75 STICK TOPICAL at 15:49

## 2022-02-17 ASSESSMENT — PAIN SCALES - GENERAL: PAINLEVEL_OUTOF10: 4

## 2022-02-17 NOTE — PROGRESS NOTES
[facility]  Consult and Procedure Note      [facility] RECORD NUMBER:  [id number]  AGE: 77 y.o. GENDER: male  : 1955  EPISODE DATE:  2022    SUBJECTIVE:     Chief Complaint   Patient presents with    Wound Check     coccyx      HISTORY OF PRESENT ILLNESS      Delvin Ramirez is a 77 y.o. male who presents today for coccyx wound re-evaluation. Patient was hospitalized -21due to Covid-19 at which time he developed coccyx wound, discharged to [facility]. While at [facility] he developed worsening pain to wound for which he was sent to the emergency department. 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 He then underwent creation of diverting transverse loop colostomy 22-nurse denies any concerns regarding current pouching system. He is currently a resident of [facility] in [place] [address]. Current wound care includes Dakin's moistened guaze dressings, changed daily. Patient presents today with gauze pad only in place. Patient states that they stopped using Dakin's several weeks ago. Offloading devices in use include low air loss mattress and chair cushion. Good appetite reported, dietitian referral was placed last visit to assist with protein needs. Patient states he is still waiting on consult.   No further needs or concerns identified    PAST MEDICAL HISTORY             Diagnosis Date    Diabetes mellitus ([address]     Hyperlipidemia     Hypertension        PAST SURGICAL HISTORY     Past Surgical History:   Procedure Laterality Date    ENDOSCOPY, COLON, DIAGNOSTIC      swallowing difficulties    LAPAROSCOPY N/A 1/19/2022    EXPLORATORY LAPAROSCOPY, TRANSVERSE LOOP COLOSTOMY performed by Darell Salazar MD at [facility]  Aug 29, 2013    Neck Abcess Incision and Drainage (Dr. Gwen Kirkland, [facility])    RECTAL SURGERY N/A 1/14/2022    FULL THICKNESS DEBRIDEMENT OF SACRAL DECUBITUS ULCER, [id number] CM performed by Darell Salazar MD at [address] History   Problem Relation Age of Onset    Heart Disease Father     Other Mother         alzheimers       SOCIAL HISTORY     Social History     Tobacco Use    Smoking status: Never Smoker    Smokeless tobacco: Never Used   Vaping Use    Vaping Use: Never used   Substance Use Topics    Alcohol use: No    Drug use: No       ALLERGIES     No Known Allergies    MEDICATIONS     Current Outpatient Medications on File Prior to Encounter   Medication Sig Dispense Refill    insulin lispro (HUMALOG) 100 UNIT/ML injection vial Inject 0-6 Units into the skin 3 times daily (with meals) 10 mL 3    folic acid (FOLVITE) 1 MG tablet Take 1 tablet by mouth daily 90 tablet 1    ferrous sulfate (FE TABS 325) 325 (65 Fe) MG EC tablet Take 1 tablet by mouth 3 times daily (with meals) 270 tablet 1    metoprolol succinate (TOPROL XL) 50 MG extended release tablet Take 1 tablet by mouth daily 30 tablet 3    famotidine (PEPCID) 20 MG tablet Take 1 tablet by mouth 2 times daily 60 tablet 3    hydrALAZINE (APRESOLINE) 100 MG tablet Take 1 tablet by mouth every 8 hours 90 tablet 0    losartan (COZAAR) 100 MG tablet Take 1 tablet by mouth daily 30 tablet 0    simethicone (MYLICON) 80 MG chewable tablet Take 1 tablet by mouth every 6 hours as needed for Flatulence (abdominal cramping) 30 tablet 0    ascorbic acid (VITAMIN C) 500 MG tablet Take 1 tablet by mouth daily 30 tablet 0    Vitamin D (CHOLECALCIFEROL) 50 MCG (2000 UT) TABS tablet Take 1 tablet by mouth daily 30 tablet 0    zinc sulfate (ZINCATE) 220 (50 Zn) MG capsule Take 1 capsule by mouth daily 30 capsule 0    tamsulosin (FLOMAX) 0.4 MG capsule Take 1 capsule by mouth daily 30 capsule 3    aspirin 81 MG tablet Take 81 mg by mouth daily. No current facility-administered medications on file prior to encounter. REVIEW OF SYSTEMS:     Constitutional: Denies fever, chills, night sweats, fatigue, weight loss/gain, loss of appetite   Head: Denies headache,  dizziness, loss of consciousness  Mouth/throat: Denies ulceration, dental caries , dysphagia  Respiratory: Denies shortness of breath, cough, wheezing, dyspnea with exertion  Cardiovascular:Denies chest pain, palpitations, edema  Gastrointestinal: +ostomy Denies nausea, vomiting, constipation, diarrhea, abdominal pain   DONAVON: +catheter Denies dysuria, frequency, urgency, hematuria  Musculoskeletal: +generalized muscle weakness Endocrine: Denies polyuria, polydipsia, cold or heat intolerance  Hematology: Denies easy brusing or bleeding, hx of clotting disorder  Dermatology: +wound Denies skin rash, eczema, pruritis    PHYSICAL EXAM:     BP (!) 145/73   Pulse 94   Temp 96.3 °F (35.7 °C) (Infrared)   Resp 18   SpO2 99%   Wt Readings from Last 3 Encounters:   01/22/22 191 lb 8 oz (86.9 kg)   12/20/21 195 lb 8 oz (88.7 kg)   01/02/18 223 lb 11.2 oz (101.5 kg)     General:  Awake, alert, no apparent distress. Appears stated age  [de-identified]: conjuctivae are clear without exudate or hemorrhage, anicteric sclera, moist oral mucosa. Chest:  Respirations regular, non-labored. Chest rise and fall equal bilaterally. Neurological: Awake, alert, oriented x4  Psychiatric:  Appropriate mood and affect  Extremities: non-traumatic in appearance. Skin:  Warm and dry    Wounds:     Sacral wound bed granular with slough and fibrotic tissue, probes to bone. Significant tunneling and undermining noted. Epibole present. Left buttock proximal wound bed granular with slough. Large amounts of serosanguinous drainage noted.       Left buttock distal wound bed granular with slough. Large amounts of serosanguinous drainage noted. Right ischial wound bed granular with purple discoloration. Large amounts of serosanguinous drainage noted. This wound communicates with sacral wound. Right perineal wound bed granular with slough. Large amounts of serosanguinous drainage noted. This wound communicates with sacral wound. Incision 08/29/13 Neck Right (Active)   Number of days: 3093       Wound 02/17/22 Sacrum (Active)   Wound Image   02/17/22 1519   Wound Etiology Pressure Unstageable 02/17/22 1519   Dressing Status Intact; Old drainage noted 02/17/22 1519   Wound Cleansed Cleansed with saline 02/17/22 1519   Wound Length (cm) 13 cm 02/17/22 1519   Wound Width (cm) 5.5 cm 02/17/22 1519   Wound Depth (cm) 2.2 cm 02/17/22 1519   Wound Surface Area (cm^2) 71.5 cm^2 02/17/22 1519   Wound Volume (cm^3) 157.3 cm^3 02/17/22 1519   Distance Tunneling (cm) 3 cm 02/17/22 1519   Tunneling Position ___ O'Clock 6 02/17/22 1519   Wound Assessment Granulation tissue;Fibrinous; Slough;Pale granulation tissue 02/17/22 1519   Drainage Amount Large 02/17/22 1519   Drainage Description Serosanguinous; Yellow;Green 02/17/22 1519   Odor None 02/17/22 1519   Ama-wound Assessment Hypopigmented;Blanchable erythema;Dry/flaky; Intact 02/17/22 1519   Margins Epibole (rolled edges) 02/17/22 1519   Wound Thickness Description not for Pressure Injury Full thickness 02/17/22 1519   Number of days: 0       Wound 02/17/22 Buttocks Left;Proximal (Active)   Wound Image   02/17/22 1519   Wound Etiology Non-Healing Surgical 02/17/22 1519   Dressing Status Intact; Old drainage noted 02/17/22 1519   Wound Cleansed Cleansed with saline 02/17/22 1519   Wound Length (cm) 1 cm 02/17/22 1519   Wound Width (cm) 3.5 cm 02/17/22 1519   Wound Depth (cm) 0.2 cm 02/17/22 1519   Wound Surface Area (cm^2) 3.5 cm^2 02/17/22 1519   Wound Volume (cm^3) 0.7 cm^3 02/17/22 1519   Wound Assessment Pale granulation tissue;Slough 02/17/22 1519   Drainage Amount Large 02/17/22 1519   Drainage Description Serosanguinous; Yellow;Green 02/17/22 1519   Odor None 02/17/22 1519   Ama-wound Assessment Intact;Dry/flaky; Blanchable erythema 02/17/22 1519   Margins Attached edges 02/17/22 1519   Wound Thickness Description not for Pressure Injury Full thickness 02/17/22 1519   Number of days: 0       Wound 02/17/22 Buttocks Left;Distal (Active)   Wound Image   02/17/22 1519   Wound Etiology Non-Healing Surgical 02/17/22 1519   Dressing Status Intact; Old drainage noted 02/17/22 1519   Wound Cleansed Cleansed with saline 02/17/22 1519   Wound Length (cm) 3 cm 02/17/22 1519   Wound Width (cm) 1 cm 02/17/22 1519   Wound Depth (cm) 4.9 cm 02/17/22 1519   Wound Surface Area (cm^2) 3 cm^2 02/17/22 1519   Wound Volume (cm^3) 14.7 cm^3 02/17/22 1519   Wound Assessment Granulation tissue;Slough 02/17/22 1519   Drainage Amount Large 02/17/22 1519   Drainage Description Serosanguinous; Yellow;Green 02/17/22 1519   Odor None 02/17/22 1519   Ama-wound Assessment Intact; Blanchable erythema;Dry/flaky 02/17/22 1519   Margins Attached edges 02/17/22 1519   Wound Thickness Description not for Pressure Injury Full thickness 02/17/22 1519   Number of days: 0       Wound 02/17/22 Ischium Right (Active)   Wound Image   02/17/22 1519   Wound Etiology Non-Healing Surgical 02/17/22 1519   Dressing Status Intact; Old drainage noted 02/17/22 1519   Wound Cleansed Cleansed with saline 02/17/22 1519   Wound Length (cm) 6.4 cm 02/17/22 1519   Wound Width (cm) 1.5 cm 02/17/22 1519   Wound Depth (cm) 4.3 cm 02/17/22 1519   Wound Surface Area (cm^2) 9.6 cm^2 02/17/22 1519   Wound Volume (cm^3) 41.28 cm^3 02/17/22 1519   Wound Assessment Granulation tissue; Purple/maroon 02/17/22 1519   Drainage Amount Large 02/17/22 1519   Drainage Description Serosanguinous; Yellow;Green 02/17/22 1519   Odor None 02/17/22 1519   Ama-wound Assessment Intact; Blanchable erythema;Dry/flaky 02/17/22 1519   Margins Attached edges; Unattached edges 02/17/22 1519   Wound Thickness Description not for Pressure Injury Full thickness 02/17/22 1519   Number of days: 0       Wound 02/17/22 Perineum Right (Active)   Wound Image   02/17/22 1519   Wound Etiology Non-Healing Surgical 02/17/22 1519   Dressing Status Intact; Old drainage noted 02/17/22 1519   Wound Cleansed Cleansed with saline 02/17/22 1519   Wound Length (cm) 2.3 cm 02/17/22 1519   Wound Width (cm) 1 cm 02/17/22 1519   Wound Depth (cm) 2.7 cm 02/17/22 1519   Wound Surface Area (cm^2) 2.3 cm^2 02/17/22 1519   Wound Volume (cm^3) 6.21 cm^3 02/17/22 1519   Wound Assessment Pale granulation tissue;Slough 02/17/22 1519   Drainage Amount Large 02/17/22 1519   Drainage Description Serosanguinous; Yellow;Green 02/17/22 1519   Odor None 02/17/22 1519   Ama-wound Assessment Intact;Dry/flaky; Blanchable erythema 02/17/22 1519   Margins Attached edges; Unattached edges 02/17/22 1519   Wound Thickness Description not for Pressure Injury Full thickness 02/17/22 1519   Number of days: 0         LABS/IMAGING     UA: No results for input(s): Castro Avers, COLORU, CLARITYU, MUCUS, PROTEINU, BLOODU, RBCUA, WBCUA, BACTERIA, NITRU, GLUCOSEU, BILIRUBINUR, UROBILINOGEN, KETUA, LABCAST, LABCASTTY, AMORPHOS in the last 72 hours.     Invalid input(s): CRYSTALS  Micro:   Lab Results   Component Value Date    BC No growth-preliminary No growth  01/14/2022       ASSESSMENT     -Stage IV pressure injury, sacrum   -nonhealing, surgical wound-left buttock, right ischium, right perineum    Ulcer Identification:  Ulcer Type: pressure and non-healing surgical  Contributing Factors: chronic pressure, decreased mobility and Covid-19 infection, necrotizing soft tissue infection    Depth of Diabetic/Pressure/Non Pressure Ulcers or Wound:  Pressure ulcer, Stage 4- sacrum  Wound, full thickness- left buttocks, right ischium, right perineum     Patient Active Problem List Diagnosis Code    Essential hypertension I10    Obesity (BMI 30-39. 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 Performed by: [address] [place] [address]    Consent obtained: Yes    Time out taken:  Yes    Pain control: lidocaine gel    Debridement:Excisional Debridement    Using curette, forceps and # 10 blade scalpel the wound(s)/ulcer(s) was/were sharply debrided down through and including the removal of epidermis, dermis and subcutaneous tissue. Devitalized Tissue Debrided:  fibrin, biofilm, slough, necrotic/eschar and exudate    Pre Debridement Measurements:  Are located in the Wound/Ulcer Documentation Flow Sheet    Wound/Ulcer #: 1    Post Debridement Measurements:    Wound/Ulcer Descriptions are listed under Physical Exam above. Wound/Ulcer Descriptions are Pre Debridement except measurements    Percent of Wound/Ulcer Debrided: 50%    Total Surface Area Debrided:  35.75 sq cm     Bleeding:  Minimal    Hemostasis Achieved:  by silver nitrate stick    Procedural Pain:  0  / 10     Post Procedural Pain:  0 / 10     Response to treatment:  Well tolerated by patient. PLAN     Patient examined and evaluated.     All available lab work, radiology studies, and progress notes pertaining to Anne Cook reviewed prior to or during patient visit today.      -Stage IV pressure injury, sacrum, non-healing surgical wound-right ischial and buttock - Debridement of devitalized tissue/slough of sacral wound completed today to promote healing, patient tolerated well. Small area of bleeding noted at completion, silver nitrate applied, no further bleeding noted. Wound will have dark or grey drainage for several days post application, this is normal.  Wound is very complex with several areas of tunneling  and communication to right sided ischial and perineal wounds making for challenging dressing application. Recommend use of Dakin's moistened gauze dressings, making sure to pack into tunneled areas, change twice a day due to significant amounts of drainage. Apply zinc oxide cream to periwound skin to protect from moisture and drainage. Detailed instructions provided to patient and facility. Advised patient to have nurses at facility call with any questions or concerns.    -Nonhealing surgical wound, left buttock, distal and proximal- Start silver alginate to wound bed followed by dry gauze dressings.      -No indications of infection at today's visit. Education provided on signs and symptoms of infection. Call clinic or seek emergency care should these occur. Educated Anne Cook on the importance of offloading wound(s) for wound healing/prevention. Pressure reduction techniques reviewed. Continue off loading mattress and chair cushion. Turn and reposition frequently. Limit time spent up in the chair to 1 hour intervals with meals. Avoid sliding against furniture and linens with transfers. Continue use of transfer assist device. Continue facility dietician  recommendations for protein supplements that are available at their facility. Continue good control of DM. Patient verbalized understanding. Follow up 2-3 weeks via virtual visit.   Call clinic with any needs or concerns prior to scheduled visit. All questions and concerns addressed prior to discharge from today's visit. Please see attached Discharge Instructions    Written patient dismissal instructions given to patient and signed by patient or POA. I spent a total of 40 minutes reviewing previous notes, test results and face to face with Lilia Saha  on 2/17/2022. Greater than 50% of this time was spent on counseling, reviewing and discussing test results, answering questions, instructions on treatment and/or medications ordered, and coordinating the care based on my plan and assessment as noted. Discharge Instructions         Discharge Instructions       Visit Discharge/Physician Orders: offload pressure to the buttocks by changing positions frequently  - Do not sit more than 1 hour at a time  - silver nitrate applied to coccyx wound due to bleeding there will be some gray drainage for a few days this is normal  - lidocaine jelly applied to wounds today and saline moistened gauze packing dressing may be changed tomorrow morning  - increase protein in the diet to promote healing    Home Care: [facility]    Wound Location: coccyx, bilateral buttocks, perineum    Dressing orders:     1) Gather wound care supplies and arrange on clean table. 2) Wash your hands with soap and water or use alcohol based hand  for 20 seconds (sing \"Happy Birthday\" twice). 3) Cleanse wounds with normal saline or wound cleanser and gauze. Pat dry with clean gauze. 4) coccyx, right buttock x2 - Apply zinc to the lico wound. moisten roll gauze (kerlix) with quarter strength dakins, wring out excess and pack gently into the wounds. The right buttock wounds communicate use 1 continuous piece of packing for these wounds. Cover with ABD pads and hytape. Change twice daily    Left buttock wounds x2- Apply alginate and a gauze to the area and tape into place with hytape.  Change twice a day    Keep all dressings clean & dry. Do not shower, take baths or get wound wet, unless otherwise instructed by your Wound Care doctor. Follow up visit:  3 Weeks 3/10/22 at 1pm virtual visit with nursing staff    Keep next scheduled appointment. Please give 24 hour notice if unable to keep appointment. [phone]    If you experience any of the following, please call the Wound Care Service during business hours: Monday through Friday 8:00 am - 4:30 pm  ([phone]). *Increase in pain   *Temperature over 101   *Increase in drainage from your wound or a foul odor   *Uncontrolled swelling   *Need for compression bandage changes due to slippage, breakthrough drainage    If you need medical attention outside of business hours, please contact your Primary Care Doctor or go to the nearest emergency room.                    Electronically signed by DIONTE Hsu CNP on 2/17/2022 at 4:22 PM

## 2022-02-17 NOTE — PLAN OF CARE
Problem: Wound:  Goal: Will show signs of wound healing; wound closure and no evidence of infection  Description: Will show signs of wound healing; wound closure and no evidence of infection  Outcome: Ongoing  Pt. Seen today for multiple wounds see AVS for new orders. Follow up in 3 weeks for a virtual visit. Care plan reviewed with patient. Patient verbalize understanding of the plan of care and contribute to goal setting.

## 2022-03-10 ENCOUNTER — HOSPITAL ENCOUNTER (OUTPATIENT)
Dept: WOUND CARE | Age: 67
Discharge: HOME | End: 2022-03-10
Payer: MEDICARE

## 2022-03-10 DIAGNOSIS — L89.154 PRESSURE INJURY OF SACRAL REGION, STAGE 4 (HCC): ICD-10-CM

## 2022-03-10 DIAGNOSIS — T81.89XD NON-HEALING SURGICAL WOUND, SUBSEQUENT ENCOUNTER: Primary | ICD-10-CM

## 2022-03-10 PROCEDURE — 99212 OFFICE O/P EST SF 10 MIN: CPT | Performed by: NURSE PRACTITIONER

## 2022-03-10 PROCEDURE — 99212 OFFICE O/P EST SF 10 MIN: CPT

## 2022-03-10 RX ORDER — GINSENG 100 MG
CAPSULE ORAL ONCE
Status: CANCELLED | OUTPATIENT
Start: 2022-03-10 | End: 2022-03-10

## 2022-03-10 RX ORDER — BACITRACIN ZINC AND POLYMYXIN B SULFATE 500; 1000 [USP'U]/G; [USP'U]/G
OINTMENT TOPICAL ONCE
Status: CANCELLED | OUTPATIENT
Start: 2022-03-10 | End: 2022-03-10

## 2022-03-10 RX ORDER — LIDOCAINE HYDROCHLORIDE 20 MG/ML
JELLY TOPICAL ONCE
Status: CANCELLED | OUTPATIENT
Start: 2022-03-10 | End: 2022-03-10

## 2022-03-10 RX ORDER — BACITRACIN, NEOMYCIN, POLYMYXIN B 400; 3.5; 5 [USP'U]/G; MG/G; [USP'U]/G
OINTMENT TOPICAL ONCE
Status: CANCELLED | OUTPATIENT
Start: 2022-03-10 | End: 2022-03-10

## 2022-03-10 RX ORDER — LIDOCAINE HYDROCHLORIDE 40 MG/ML
SOLUTION TOPICAL ONCE
Status: CANCELLED | OUTPATIENT
Start: 2022-03-10 | End: 2022-03-10

## 2022-03-10 RX ORDER — BETAMETHASONE DIPROPIONATE 0.05 %
OINTMENT (GRAM) TOPICAL ONCE
Status: CANCELLED | OUTPATIENT
Start: 2022-03-10 | End: 2022-03-10

## 2022-03-10 RX ORDER — GENTAMICIN SULFATE 1 MG/G
OINTMENT TOPICAL ONCE
Status: CANCELLED | OUTPATIENT
Start: 2022-03-10 | End: 2022-03-10

## 2022-03-10 RX ORDER — LIDOCAINE 40 MG/G
CREAM TOPICAL ONCE
Status: CANCELLED | OUTPATIENT
Start: 2022-03-10 | End: 2022-03-10

## 2022-03-10 RX ORDER — CLOBETASOL PROPIONATE 0.5 MG/G
OINTMENT TOPICAL ONCE
Status: CANCELLED | OUTPATIENT
Start: 2022-03-10 | End: 2022-03-10

## 2022-03-10 RX ORDER — LIDOCAINE 50 MG/G
OINTMENT TOPICAL ONCE
Status: CANCELLED | OUTPATIENT
Start: 2022-03-10 | End: 2022-03-10

## 2022-03-10 NOTE — PROGRESS NOTES
Virtual Visit Wound Care  Clinic Level of Care   NAME:  Scarlett Thompson  YOB: 1955 GENDER: male  MEDICAL RECORD NUMBER:  [id number]   DATE:  3/10/2022     Patient Type Points   No documentation completed by nursing staff. []   0   Nursing staff documented in the navigator for an ESTABLISHED patient including Episode, Patient ID, Chief Complaint, Travel Screen, Allergies, Latex Allergy, Home Medication, History, Psychosocial Screen, C-SSRS Screen, Fall Risk, Nutritional Screen, Advanced Directive, Education and Plan of Care, and Discharge Instructions. The Functional Screening tab is only required if the patient's status changes. [x]   1   Nursing staff documented in the navigator for a NEW patient including Patient ID, Chief Complaint, Travel Screen, Allergies, Latex Allergy, Home Medication, History, Psychosocial Screen, C-SSRS Screen, Fall Risk, Nutritional Screen, Advanced Directive, Functional Screen, Education and Plan of Care, and Discharge Instructions. []   2   Nursing staff documented in the navigator for a CONSULT patient including Episode, Patient ID, Chief Complaint, Travel Screen, Allergies, Latex Allergy, Home Medication, History, Psychosocial Screen, C-SSRS Screen, Fall Risk, Nutritional Screen, Advanced Directive, Functional Screen, Education and Plan of Care, and Discharge Instructions. []   2     Wound Description Points   Unable to obtain image of Wound. For example, patient/caregiver is instructed not to remove dressing, is unable to correctly position smart phone, no smart phone is available, patient is unable to maintain connectivity or the patient's wound is healed. []   0   1-3 wound images annotated. Images of the wound(s) is obtained and annotated along with completed description in [facility]. [x]   1   4-5 wound images annotated. Images of the wound(s) is obtained and annotated along with completed description in [facility]. []   2   Greater than 6 wound images annotated.  Images of the wound(s) is obtained and annotated along with completed description in [address] [place] [address]. []   3     Education Points   No Education completed by nursing staff.    []   0   Patient/caregiver is educated on 1-4 topics. Nursing staff identifies learner, confirms understanding of information (verbal, demonstration, written) and documents details. May include Discharge Instructions/AVS, available documents in My Chart, or Web-based learning. [x]   1   Patient/caregiver is educated on 5-9 topics. Nursing staff identifies learner, confirms understanding of information (verbal, demonstration, written) and documents details. May include Discharge Instructions/AVS, available documents in My Chart, or Web-based learning. []   2   Patient/caregiver is educated on 10 or more topics. Nursing staff identifies learner, confirms understanding of information (verbal, demonstration, written) and documents details. May include Discharge Instructions/AVS, available documents in My Chart, or Web-based learning. []   3     Follow-up Virtual Visit Points   No contact with outside resources made. []   0   Nursing staff contacts 1-2 outside resource. For example, telephone call made to home health, primary care provider, pharmacy, or DME. May include filling out forms and writing letters, arranging transportation, communication with insurance , vendors, etc.  Discharge, instructions and/or After Visit Summary given to patient/caregiver and instructions completed. [x]   1   Nursing staff contacts 3-4 outside resource. For example, telephone calls made to home health, primary care provider, pharmacy, or DME. May include filling out forms and writing letters, arranging transportation, communication with insurance , vendors, etc.  Discharge, instructions and/or After Visit Summary given to patient/caregiver and instructions completed. []   2   Nursing contacts 5 or more outside resource.  For example, telephone calls made to home health, primary care providers, pharmacy, or DME. May include filling out forms and writing letters, arranging transportation, communication with insurance , vendors, etc.  Discharge, instructions and/or After Visit Summary given to patient/caregiver and instructions completed.    []   3     Is this the Patient's First Visit to the [address] [place] [address] Road  No    Is this Patient Established @ [facility]  Yes             Virtual Visit Clinical Level of Care Wound Care      Points  1-3  Level 1 []     Points  4-5  Level 2 [x]     Points  6-7  Level 3 []     Points  8-9  Level 4 []     Points  10-11  Level 5 []       Electronically signed by Nafisa Maldonado LPN on 3/58/6332 at @NO

## 2022-03-10 NOTE — PLAN OF CARE
Problem: Wound:  Goal: Will show signs of wound healing; wound closure and no evidence of infection  Description: Will show signs of wound healing; wound closure and no evidence of infection  Outcome: Ongoing   Patient seen for coccyx and bilateral buttock wounds via virtual visit. Wound shows signs of proper closure and healing. No s/s of infection noted. Follow up in 3 weeks via virtual visit. See AVS for order changes. Care plan reviewed with patient and caregiver. Patient and caregiver verbalize understanding of the plan of care and contribute to goal setting.

## 2022-03-10 NOTE — PROGRESS NOTES
Anne Cook, was evaluated through a synchronous (real-time) audio-video encounter. The patient (or guardian if applicable) is aware that this is a billable service, which includes applicable co-pays. This Virtual Visit was conducted with patient's (and/or legal guardian's) consent. The visit was conducted pursuant to the emergency declaration under the [address] [place] [address] and the [employer] and [employer] General Act. Patient identification was verified, and a caregiver was present when appropriate. The patient was located at home in a state where the provider was licensed to provide care. Total time spent for this encounter: 15 minutes    --Yasmany Shaw LPN on 3/99/2683 at 2:54 PM    An electronic signature was used to authenticate this note.

## 2022-03-10 NOTE — PROGRESS NOTES
Virtual Visit Via [employer]   Progress Note and Procedure Note    [address] RECORD NUMBER:  [id number]  AGE: 77 y.o. GENDER: male  : 1955  EPISODE DATE:  3/10/2022    Subjective:     Chief Complaint   Patient presents with    Wound Check     buttock        Consent obtained to communicate via Virtual Visit:  Yes     HISTORY of PRESENT ILLNESS HPI     Ottoniel Dawson is a 77 y.o. male who presents today via Virtual Visit wound/ulcer evaluation. History of Wound Context: Patient was hospitalized -21due to Covid-19 at which time he developed coccyx wound, discharged to [facility].  While at [facility] he developed worsening pain to wound for which he was sent to the emergency department.   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 Pat Figueroa then underwent creation of diverting transverse loop colostomy 22-nurse denies any concerns regarding current pouching system. [place] is currently a resident of [facility]. in [address] [place] [address] wound care includes Dakin's moistened guaze dressings, changed twice daily.  Offloading devices in use include low air loss mattress and chair cushion.  Good appetite reported, patient states he does not like protein supplements at facility, does not take. He denies pain to wound.   No further needs or concerns identified    Ulcer Identification:  Ulcer Type: pressure    Contributing Factors: chronic pressure, decreased mobility and Covid-19 infection, necrotizing soft tissue infection    Acute Wound: N/A not an acute wound    PAST MEDICAL HISTORY        Diagnosis Date    Diabetes mellitus ([facility] [address]     Hyperlipidemia     Hypertension        PAST SURGICAL HISTORY    Past Surgical History:   Procedure Laterality Date    ENDOSCOPY, COLON, DIAGNOSTIC      swallowing difficulties    LAPAROSCOPY N/A 1/19/2022    EXPLORATORY LAPAROSCOPY, TRANSVERSE LOOP COLOSTOMY performed by Mere Diaz MD at [address] [place] [address]  Aug 29, 2013    Neck Abcess Incision and Drainage (Dr. Joycelyn Ellis, [facility])    RECTAL SURGERY N/A 1/14/2022    FULL THICKNESS DEBRIDEMENT OF SACRAL DECUBITUS ULCER, [id number] CM performed by Mere Diaz MD at [address] [place] [address] History   Problem Relation Age of Onset    Heart Disease Father     Other Mother         alzheimers       SOCIAL HISTORY    Social History     Tobacco Use    Smoking status: Never Smoker    Smokeless tobacco: Never Used   Vaping Use    Vaping Use: Never used   Substance Use Topics    Alcohol use: No    Drug use: No       ALLERGIES    No Known Allergies    MEDICATIONS    Current Outpatient Medications on File Prior to Encounter   Medication Sig Dispense Refill    insulin lispro (HUMALOG) 100 UNIT/ML injection vial Inject 0-6 Units into the skin 3 times daily (with meals) 10 mL 3    folic acid (FOLVITE) 1 MG tablet Take 1 tablet by mouth daily 90 tablet 1    ferrous sulfate (FE TABS 325) 325 (65 Fe) MG EC tablet Take 1 tablet by mouth 3 times daily (with meals) 270 tablet 1    metoprolol succinate (TOPROL XL) 50 MG extended release tablet Take 1 tablet by mouth daily 30 tablet 3    famotidine (PEPCID) 20 MG tablet Take 1 tablet by mouth 2 times daily 60 tablet 3    hydrALAZINE (APRESOLINE) 100 MG tablet Take 1 tablet by mouth every 8 hours 90 tablet 0    losartan (COZAAR) 100 MG tablet Take 1 tablet by mouth daily 30 tablet 0    simethicone (MYLICON) 80 MG chewable tablet Take 1 tablet by mouth every 6 hours as needed for Flatulence (abdominal cramping) 30 tablet 0    ascorbic acid (VITAMIN C) 500 MG tablet Take 1 tablet by mouth daily 30 tablet 0    Vitamin D (CHOLECALCIFEROL) 50 MCG (2000 UT) TABS tablet Take 1 tablet by mouth daily 30 tablet 0    zinc sulfate (ZINCATE) 220 (50 Zn) MG capsule Take 1 capsule by mouth daily 30 capsule 0    tamsulosin (FLOMAX) 0.4 MG capsule Take 1 capsule by mouth daily 30 capsule 3    aspirin 81 MG tablet Take 81 mg by mouth daily. No current facility-administered medications on file prior to encounter. REVIEW OF SYSTEMS    Pertinent items are noted in HPI. Objective:     PHYSICAL EXAM    General Appearance: alert and oriented to person, place and time, in no acute distress    Sacral and buttock wound bed granular. Serosanguinous drainage noted. Measurements unavailable at time of visit today. Assessment:      Problem List Items Addressed This Visit     Pressure injury of sacral region, stage 4 [facility])    Relevant Orders    Initiate Outpatient Wound Care Protocol    Nonhealing surgical wound - Primary    Relevant Orders    Initiate Outpatient Wound Care Protocol          Performed by: DIONTE Pimentel CNP    Wound/Ulcer #: 1 and 2    Incision 08/29/13 Neck Right (Active)   Number of days: 0139       Wound 02/17/22 Sacrum (Active)   Wound Image   02/17/22 1519   Wound Etiology Pressure Unstageable 02/17/22 1519   Dressing Status Intact; Old drainage noted 02/17/22 1519   Wound Cleansed Cleansed with saline 02/17/22 1519   Wound Length (cm) 13 cm 02/17/22 1519   Wound Width (cm) 5.5 cm 02/17/22 1519   Wound Depth (cm) 2.2 cm 02/17/22 1519   Wound Surface Area (cm^2) 71.5 cm^2 02/17/22 1519   Wound Volume (cm^3) 157.3 cm^3 02/17/22 1519   Distance Tunneling (cm) 3 cm 02/17/22 1519   Tunneling Position ___ O'Clock 6 02/17/22 1519   Wound Assessment Granulation tissue;Fibrinous; Slough;Pale granulation tissue 02/17/22 1519   Drainage Amount Large 02/17/22 1519   Drainage Description Serosanguinous; Yellow;Green 02/17/22 1519   Odor None 02/17/22 1519   Ama-wound Assessment Hypopigmented;Blanchable erythema;Dry/flaky; Intact 02/17/22 1519   Margins Epibole (rolled edges) 02/17/22 1519   Wound Thickness Description not for Pressure Injury Full thickness 02/17/22 1519   Number of days: 20       Wound 02/17/22 Buttocks Left;Proximal (Active)   Wound Image   02/17/22 1519   Wound Etiology Non-Healing Surgical 02/17/22 1519   Dressing Status Intact; Old drainage noted 02/17/22 1519   Wound Cleansed Cleansed with saline 02/17/22 1519   Wound Length (cm) 1 cm 02/17/22 1519   Wound Width (cm) 3.5 cm 02/17/22 1519   Wound Depth (cm) 0.2 cm 02/17/22 1519   Wound Surface Area (cm^2) 3.5 cm^2 02/17/22 1519   Wound Volume (cm^3) 0.7 cm^3 02/17/22 1519   Wound Assessment Pale granulation tissue;Slough 02/17/22 1519   Drainage Amount Large 02/17/22 1519   Drainage Description Serosanguinous; Yellow;Green 02/17/22 1519   Odor None 02/17/22 1519   Ama-wound Assessment Intact;Dry/flaky; Blanchable erythema 02/17/22 1519   Margins Attached edges 02/17/22 1519   Wound Thickness Description not for Pressure Injury Full thickness 02/17/22 1519   Number of days: 20       Wound 02/17/22 Buttocks Left;Distal (Active)   Wound Image   02/17/22 1519   Wound Etiology Non-Healing Surgical 02/17/22 1519   Dressing Status Intact; Old drainage noted 02/17/22 1519   Wound Cleansed Cleansed with saline 02/17/22 1519   Wound Length (cm) 3 cm 02/17/22 1519   Wound Width (cm) 1 cm 02/17/22 1519   Wound Depth (cm) 4.9 cm 02/17/22 1519   Wound Surface Area (cm^2) 3 cm^2 02/17/22 1519   Wound Volume (cm^3) 14.7 cm^3 02/17/22 1519   Wound Assessment Granulation tissue;Slough 02/17/22 1519   Drainage Amount Large 02/17/22 1519   Drainage Description Serosanguinous; Yellow;Green 02/17/22 1519   Odor None 02/17/22 1519   Ama-wound Assessment Intact; Blanchable erythema;Dry/flaky 02/17/22 1519   Margins Attached edges 02/17/22 1519   Wound Thickness Description not for Pressure Injury Full thickness 02/17/22 1519   Number of days: 20       Wound 02/17/22 Ischium Right (Active)   Wound Image   02/17/22 1519   Wound Etiology Non-Healing Surgical 02/17/22 1519   Dressing Status Intact; Old drainage noted 02/17/22 1519   Wound Cleansed Cleansed with saline 02/17/22 1519   Wound Length (cm) 6.4 cm 02/17/22 1519   Wound Width (cm) 1.5 cm 02/17/22 1519   Wound Depth (cm) 4.3 cm 02/17/22 1519   Wound Surface Area (cm^2) 9.6 cm^2 02/17/22 1519   Wound Volume (cm^3) 41.28 cm^3 02/17/22 1519   Wound Assessment Granulation tissue; Purple/maroon 02/17/22 1519   Drainage Amount Large 02/17/22 1519   Drainage Description Serosanguinous; Yellow;Green 02/17/22 1519   Odor None 02/17/22 1519   Ama-wound Assessment Intact; Blanchable erythema;Dry/flaky 02/17/22 1519   Margins Attached edges; Unattached edges 02/17/22 1519   Wound Thickness Description not for Pressure Injury Full thickness 02/17/22 1519   Number of days: 20       Wound 02/17/22 Perineum Right (Active)   Wound Image   02/17/22 1519   Wound Etiology Non-Healing Surgical 02/17/22 1519   Dressing Status Intact; Old drainage noted 02/17/22 1519   Wound Cleansed Cleansed with saline 02/17/22 1519   Wound Length (cm) 2.3 cm 02/17/22 1519   Wound Width (cm) 1 cm 02/17/22 1519   Wound Depth (cm) 2.7 cm 02/17/22 1519   Wound Surface Area (cm^2) 2.3 cm^2 02/17/22 1519   Wound Volume (cm^3) 6.21 cm^3 02/17/22 1519   Wound Assessment Pale granulation tissue;Slough 02/17/22 1519   Drainage Amount Large 02/17/22 1519   Drainage Description Serosanguinous; Yellow;Green 02/17/22 1519   Odor None 02/17/22 1519   Ama-wound Assessment Intact;Dry/flaky; Blanchable erythema 02/17/22 1519   Margins Attached edges; Unattached edges 02/17/22 1519   Wound Thickness Description not for Pressure Injury Full thickness 02/17/22 1519   Number of days: 20          Diabetic/Pressure/Non Pressure Ulcers only:  Ulcer: Pressure ulcer, Stage 4       Plan:     Please see attached Discharge Instructions    Based upon this virtual visit it is not required to have an in-person visit of this time.    -Stage IV pressure injury, sacrum; nonhealing, surgical wound-left buttock, right ischium, right perineum - Wound appearance improved today with good granulation noted. Patient and nurse report good compliance with offloading pressure. Encouraged him to continue offloading techniques. Encouraged well balanced diet with good protein intake. Wound  Care orders as follows:  coccyx, right buttock x2 - Apply zinc to the lico wound. moisten roll gauze (kerlix) with quarter strength dakins, wring out excess and pack gently into the wounds. The right buttock wounds communicate use 1 continuous piece of packing for these wounds. Cover with ABD pads and hytape. Change twice daily     Left buttock wounds x2- Apply alginate and a gauze to the area and tape into place with hytape. Change twice a day    -No indications of infection noted at today's visit. Advised to call clinic or seek emergency care should this occur.    -Follow up via virtual visit in 3 weeks with facility staff. Call with any questions or concerns prior to scheduled visit. All questions and concerns addressed prior to completion of visit today. Written patient dismissal instructions available to Patient/POA       Discharge Instructions       Visit Discharge/Physician Orders:  - Fax most recent wound measurements to wound clinic at [phone]  - Offload pressure to the buttocks by changing positions frequently  - Do not sit more than 1 hour at a time  - Increase protein in the diet to promote healing     Home Care: [facility] fax# [phone]     Wound Location: coccyx, bilateral buttocks, perineum     Dressing orders:      1) Gather wound care supplies and arrange on clean table.      2) Wash your hands with soap and water or use alcohol based hand  for 20 seconds (sing \"Happy Birthday\" twice).      3) Cleanse wounds with normal saline or wound cleanser and gauze. Pat dry with clean gauze.    4) coccyx, right buttock x2 - Apply zinc to the lico wound. moisten roll gauze (kerlix) with quarter strength dakins, wring out excess and pack gently into the wounds. The right buttock wounds communicate use 1 continuous piece of packing for these wounds. Cover with ABD pads and hytape. Change twice daily     Left buttock wounds x2- Apply alginate and a gauze to the area and tape into place with hytape. Change twice a day     Keep all dressings clean & dry.     Do not shower, take baths or get wound wet, unless otherwise instructed by your Wound Care doctor.      Follow up visit:  3 Weeks 2022  at 1:30pm virtual visit with nursing staff     Keep next scheduled appointment. Please give 24 hour notice if unable to keep appointment. [phone]     If you experience any of the following, please call the Wound Care Service during business hours: Monday through Friday 8:00 am - 4:30 pm  ([phone]). *Increase in pain              *Temperature over 101              *Increase in drainage from your wound or a foul odor              *Uncontrolled swelling              *Need for compression bandage changes due to slippage, breakthrough drainage     If you need medical attention outside of business hours, please contact your Primary Care Doctor or go to the nearest emergency room.               Elaina Rogel AGE: 77 y.o. GENDER: male  : 1955 being evaluated by a Virtual Visit (video visit) encounter to address concerns as mentioned above. A caregiver was present when appropriate. Due to this being a TeleHealth encounter (During [place] [address] public health emergency), evaluation of the following organ systems was limited: Vitals/Constitutional/EENT/Resp/CV/GI//MS/Neuro/Skin/Heme-Lymph-Imm.   Pursuant to the emergency declaration under the [address] [facility] waiver authority and the Coronavirus Preparedness and Response Supplemental Appropriations Act, this Virtual Visit was conducted with patient's (and/or legal guardian's) consent, to reduce the patient's risk of exposure to COVID-19 and provide necessary medical care. The patient (and/or legal guardian) has also been advised to contact this office for worsening conditions or problems, and seek emergency medical treatment and/or call 911 if deemed necessary. Services were provided through a video synchronous discussion virtually to substitute for in-person clinic visit. Patient was located at their individual homes. Provider was located in [place] [address].     Electronically signed by DIONTE Cain CNP on 3/10/2022 at 2:03 PM

## 2022-03-14 ENCOUNTER — TELEPHONE (OUTPATIENT)
Dept: WOUND CARE | Age: 67
End: 2022-03-14

## 2022-03-14 NOTE — TELEPHONE ENCOUNTER
Spoke with ANU Youngblood at facility and clarified with her that we still do need most recent measurements of wound and she requested that we send discharge orders to them. Most recent discharge summary faxed to [facility]. Radha Edmond states they will fax measurements.

## 2022-03-14 NOTE — TELEPHONE ENCOUNTER
----- Message from Rhiannon Hart sent at 3/14/2022  7:41 AM EDT -----  Lorenzo  [phone] RAOUL @ [facility] ON WHAT LOUIE REQUESTED. RECENT MEASUREMENTS? ANYTHING ELSE?

## 2022-03-31 ENCOUNTER — HOSPITAL ENCOUNTER (OUTPATIENT)
Dept: WOUND CARE | Age: 67
Discharge: HOME | End: 2022-03-31
Payer: MEDICARE

## 2022-03-31 VITALS — HEIGHT: 66 IN | BODY MASS INDEX: 30.7 KG/M2 | WEIGHT: 191 LBS

## 2022-03-31 DIAGNOSIS — L89.154 PRESSURE INJURY OF SACRAL REGION, STAGE 4 (HCC): Primary | ICD-10-CM

## 2022-03-31 DIAGNOSIS — T81.89XD NON-HEALING SURGICAL WOUND, SUBSEQUENT ENCOUNTER: ICD-10-CM

## 2022-03-31 PROCEDURE — 99212 OFFICE O/P EST SF 10 MIN: CPT | Performed by: NURSE PRACTITIONER

## 2022-03-31 PROCEDURE — 99212 OFFICE O/P EST SF 10 MIN: CPT

## 2022-03-31 RX ORDER — GENTAMICIN SULFATE 1 MG/G
OINTMENT TOPICAL ONCE
Status: CANCELLED | OUTPATIENT
Start: 2022-03-31 | End: 2022-03-31

## 2022-03-31 RX ORDER — GINSENG 100 MG
CAPSULE ORAL ONCE
Status: CANCELLED | OUTPATIENT
Start: 2022-03-31 | End: 2022-03-31

## 2022-03-31 RX ORDER — LIDOCAINE 40 MG/G
CREAM TOPICAL ONCE
Status: CANCELLED | OUTPATIENT
Start: 2022-03-31 | End: 2022-03-31

## 2022-03-31 RX ORDER — BACITRACIN ZINC AND POLYMYXIN B SULFATE 500; 1000 [USP'U]/G; [USP'U]/G
OINTMENT TOPICAL ONCE
Status: CANCELLED | OUTPATIENT
Start: 2022-03-31 | End: 2022-03-31

## 2022-03-31 RX ORDER — BETAMETHASONE DIPROPIONATE 0.05 %
OINTMENT (GRAM) TOPICAL ONCE
Status: CANCELLED | OUTPATIENT
Start: 2022-03-31 | End: 2022-03-31

## 2022-03-31 RX ORDER — LIDOCAINE HYDROCHLORIDE 40 MG/ML
SOLUTION TOPICAL ONCE
Status: CANCELLED | OUTPATIENT
Start: 2022-03-31 | End: 2022-03-31

## 2022-03-31 RX ORDER — LIDOCAINE HYDROCHLORIDE 20 MG/ML
JELLY TOPICAL ONCE
Status: CANCELLED | OUTPATIENT
Start: 2022-03-31 | End: 2022-03-31

## 2022-03-31 RX ORDER — CLOBETASOL PROPIONATE 0.5 MG/G
OINTMENT TOPICAL ONCE
Status: CANCELLED | OUTPATIENT
Start: 2022-03-31 | End: 2022-03-31

## 2022-03-31 RX ORDER — LIDOCAINE 50 MG/G
OINTMENT TOPICAL ONCE
Status: CANCELLED | OUTPATIENT
Start: 2022-03-31 | End: 2022-03-31

## 2022-03-31 RX ORDER — BACITRACIN, NEOMYCIN, POLYMYXIN B 400; 3.5; 5 [USP'U]/G; MG/G; [USP'U]/G
OINTMENT TOPICAL ONCE
Status: CANCELLED | OUTPATIENT
Start: 2022-03-31 | End: 2022-03-31

## 2022-03-31 NOTE — PROGRESS NOTES
Virtual Visit Wound Care  Clinic Level of Care   NAME:  Kurt Baer  YOB: 1955 GENDER: male  MEDICAL RECORD NUMBER:  [id number]   DATE:  3/31/2022     Patient Type Points   No documentation completed by nursing staff. []   0   Nursing staff documented in the navigator for an ESTABLISHED patient including Episode, Patient ID, Chief Complaint, Travel Screen, Allergies, Latex Allergy, Home Medication, History, Psychosocial Screen, C-SSRS Screen, Fall Risk, Nutritional Screen, Advanced Directive, Education and Plan of Care, and Discharge Instructions. The Functional Screening tab is only required if the patient's status changes. [x]   1   Nursing staff documented in the navigator for a NEW patient including Patient ID, Chief Complaint, Travel Screen, Allergies, Latex Allergy, Home Medication, History, Psychosocial Screen, C-SSRS Screen, Fall Risk, Nutritional Screen, Advanced Directive, Functional Screen, Education and Plan of Care, and Discharge Instructions. []   2   Nursing staff documented in the navigator for a CONSULT patient including Episode, Patient ID, Chief Complaint, Travel Screen, Allergies, Latex Allergy, Home Medication, History, Psychosocial Screen, C-SSRS Screen, Fall Risk, Nutritional Screen, Advanced Directive, Functional Screen, Education and Plan of Care, and Discharge Instructions. []   2     Wound Description Points   Unable to obtain image of Wound. For example, patient/caregiver is instructed not to remove dressing, is unable to correctly position smart phone, no smart phone is available, patient is unable to maintain connectivity or the patient's wound is healed. []   0   1-3 wound images annotated. Images of the wound(s) is obtained and annotated along with completed description in [facility]. [x]   1   4-5 wound images annotated. Images of the wound(s) is obtained and annotated along with completed description in [facility]. []   2   Greater than 6 wound images annotated.  Images of the wound(s) is obtained and annotated along with completed description in [address] [place] [address]. []   3     Education Points   No Education completed by nursing staff.    []   0   Patient/caregiver is educated on 1-4 topics. Nursing staff identifies learner, confirms understanding of information (verbal, demonstration, written) and documents details. May include Discharge Instructions/AVS, available documents in My Chart, or Web-based learning. [x]   1   Patient/caregiver is educated on 5-9 topics. Nursing staff identifies learner, confirms understanding of information (verbal, demonstration, written) and documents details. May include Discharge Instructions/AVS, available documents in My Chart, or Web-based learning. []   2   Patient/caregiver is educated on 10 or more topics. Nursing staff identifies learner, confirms understanding of information (verbal, demonstration, written) and documents details. May include Discharge Instructions/AVS, available documents in My Chart, or Web-based learning. []   3     Follow-up Virtual Visit Points   No contact with outside resources made. []   0   Nursing staff contacts 1-2 outside resource. For example, telephone call made to home health, primary care provider, pharmacy, or DME. May include filling out forms and writing letters, arranging transportation, communication with insurance , vendors, etc.  Discharge, instructions and/or After Visit Summary given to patient/caregiver and instructions completed. [x]   1   Nursing staff contacts 3-4 outside resource. For example, telephone calls made to home health, primary care provider, pharmacy, or DME. May include filling out forms and writing letters, arranging transportation, communication with insurance , vendors, etc.  Discharge, instructions and/or After Visit Summary given to patient/caregiver and instructions completed. []   2   Nursing contacts 5 or more outside resource.  For example, telephone calls made to home health, primary care providers, pharmacy, or DME. May include filling out forms and writing letters, arranging transportation, communication with insurance , vendors, etc.  Discharge, instructions and/or After Visit Summary given to patient/caregiver and instructions completed.    []   3     Is this the Patient's First Visit to the [address] [place] [address] Road  No    Is this Patient Established @ [facility]  Yes             Virtual Visit Clinical Level of Care Wound Care      Points  1-3  Level 1 []     Points  4-5  Level 2 [x]     Points  6-7  Level 3 []     Points  8-9  Level 4 []     Points  10-11  Level 5 []       Electronically signed by Jovani Fowler LPN on 2/95/7900 at @NO

## 2022-03-31 NOTE — PROGRESS NOTES
Virtual Visit Via [employer]   Progress Note and Procedure Note    [facility] RECORD NUMBER:  [id number]  AGE: 77 y.o. GENDER: male  : 1955  EPISODE DATE:  3/31/2022    Subjective:     Chief Complaint   Patient presents with    Wound Check        Consent obtained to communicate via Virtual Visit:  Yes     HISTORY of PRESENT ILLNESS HPI     Regina Trujillo is a 77 y.o. male who presents today via Virtual Visit wound/ulcer evaluation. History of Wound Context: Patient was hospitalized -21due to Covid-19 at which time he developed coccyx wound, discharged to [facility].  While at [facility] he developed worsening pain to wound for which he was sent to the emergency department.   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 Duane Stroud then underwent creation of diverting transverse loop colostomy 22-nurse denies any concerns regarding current pouching system. [facility] is currently a resident of [facility]. in [address] [place] [address] wound care includes Dakin's moistened guaze dressings, changed twice daily.  Offloading devices in use include low air loss mattress and chair cushion.  Good appetite reported, patient states he does not like protein supplements at facility, does not take. He denies pain to wound.   No further needs or concerns identified    Ulcer Identification:  Ulcer Type: pressure    Contributing Factors: chronic pressure, decreased mobility and Covid-19 infection, necrotizing soft tissue infection    Acute Wound: N/A not an acute wound    PAST MEDICAL HISTORY        Diagnosis Date    Diabetes mellitus ([facility]     Hyperlipidemia     Hypertension        PAST SURGICAL HISTORY    Past Surgical History:   Procedure Laterality Date    ENDOSCOPY, COLON, DIAGNOSTIC      swallowing difficulties    LAPAROSCOPY N/A 1/19/2022    EXPLORATORY LAPAROSCOPY, TRANSVERSE LOOP COLOSTOMY performed by Hunter Jensen MD at [facility]  Aug 29, 2013    Neck Abcess Incision and Drainage (Dr. Jordi Elizondo, [facility])    RECTAL SURGERY N/A 1/14/2022    FULL THICKNESS DEBRIDEMENT OF SACRAL DECUBITUS ULCER, [id number] CM performed by Hunter Jensen MD at [address] [place] [address] History   Problem Relation Age of Onset    Heart Disease Father     Other Mother         alzheimers       SOCIAL HISTORY    Social History     Tobacco Use    Smoking status: Never Smoker    Smokeless tobacco: Never Used   Vaping Use    Vaping Use: Never used   Substance Use Topics    Alcohol use: No    Drug use: No       ALLERGIES    No Known Allergies    MEDICATIONS    Current Outpatient Medications on File Prior to Encounter   Medication Sig Dispense Refill    insulin lispro (HUMALOG) 100 UNIT/ML injection vial Inject 0-6 Units into the skin 3 times daily (with meals) 10 mL 3    folic acid (FOLVITE) 1 MG tablet Take 1 tablet by mouth daily 90 tablet 1    ferrous sulfate (FE TABS 325) 325 (65 Fe) MG EC tablet Take 1 tablet by mouth 3 times daily (with meals) 270 tablet 1    metoprolol succinate (TOPROL XL) 50 MG extended release tablet Take 1 tablet by mouth daily 30 tablet 3    famotidine (PEPCID) 20 MG tablet Take 1 tablet by mouth 2 times daily 60 tablet 3    hydrALAZINE (APRESOLINE) 100 MG tablet Take 1 tablet by mouth every 8 hours 90 tablet 0    losartan (COZAAR) 100 MG tablet Take 1 tablet by mouth daily 30 tablet 0    simethicone (MYLICON) 80 MG chewable tablet Take 1 tablet by mouth every 6 hours as needed for Flatulence (abdominal cramping) 30 tablet 0    ascorbic acid (VITAMIN C) 500 MG tablet Take 1 tablet by mouth daily 30 tablet 0    Vitamin D (CHOLECALCIFEROL) 50 MCG (2000 UT) TABS tablet Take 1 tablet by mouth daily 30 tablet 0    zinc sulfate (ZINCATE) 220 (50 Zn) MG capsule Take 1 capsule by mouth daily 30 capsule 0    tamsulosin (FLOMAX) 0.4 MG capsule Take 1 capsule by mouth daily 30 capsule 3    aspirin 81 MG tablet Take 81 mg by mouth daily. No current facility-administered medications on file prior to encounter. REVIEW OF SYSTEMS    Pertinent items are noted in HPI. Objective:     PHYSICAL EXAM    General Appearance: alert and oriented to person, place and time, in no acute distress    Sacral  wound bed granular. Small amounts of serosanguinous drainage noted. Sacrum:        Length 3.5 cm x width 1.5 cm x depth 0.2 cm    Assessment:      Problem List Items Addressed This Visit     * (Principal) Pressure injury of sacral region, stage 4 ([facility] [address] - Primary    Nonhealing surgical wound          Performed by: DIONTE Zuleta - CNP      Diabetic/Pressure/Non Pressure Ulcers only:  Ulcer: Pressure ulcer, Stage 4       Plan:     Please see attached Discharge Instructions    Based upon this virtual visit it is not required to have an in-person visit of this time.    -Healing stage IV pressure injury, sacrum - Wound appearance significantly improved today, measuring smaller with good granulation noted. Patient and nurse report good compliance with offloading pressure and frequent ambulation. Encouraged him to continue offloading techniques. Encouraged well balanced diet with good protein intake. Will continue Dakin's moistened gauze dressings, change daily and as needed. Nonhealing surgical wound-left buttock x2, right ischium, right perineum= Have healed. Discontinue previously ordered wound care.      -No indications of infection noted at today's visit.   Advised to call clinic or seek emergency care should this occur.    -Follow up via virtual visit in 3 weeks with home health. Call with any questions or concerns prior to scheduled visit. All questions and concerns addressed prior to completion of visit today. Written patient dismissal instructions available to Patient/POA       Discharge Instructions       Visit Discharge/Physician Orders:  - Fax most recent wound measurements to wound clinic at [phone]  - Offload pressure to the buttocks by changing positions frequently  - Do not sit more than 1 hour at a time  - Increase protein in the diet to promote [employer] [address] [place] [address] fax# [phone]     Wound Location: coccyx, bilateral buttocks, perineum     Dressing orders:      1) Gather wound care supplies and arrange on clean table.      2) Wash your hands with soap and water or use alcohol based hand  for 20 seconds (sing \"Happy Birthday\" twice).    3) Cleanse wounds with normal saline or wound cleanser and gauze. Pat dry with clean gauze.    4) coccyx, right buttock x2 - Apply zinc to the lico wound. moisten roll gauze (kerlix) with quarter strength dakins, wring out excess and pack gently into the wounds. The right buttock wounds communicate use 1 continuous piece of packing for these wounds. Cover with ABD pads and hytape. Change twice daily     Left buttock wounds x2- Apply alginate and a gauze to the area and tape into place with hytape. Change twice a day     Keep all dressings clean & dry.     Do not shower, take baths or get wound wet, unless otherwise instructed by your Wound Care doctor.      Follow up visit:  3 Weeks 03/31/2022  at 1:30pm virtual visit with nursing staff     Keep next scheduled appointment. Please give 24 hour notice if unable to keep appointment. [phone]     If you experience any of the following, please call the Wound Care Service during business hours: Monday through Friday 8:00 am - 4:30 pm  ([phone]).               *Increase in pain              *Temperature over 101              *Increase in drainage from your wound or a foul odor              *Uncontrolled swelling              *Need for compression bandage changes due to slippage, breakthrough drainage     If you need medical attention outside of business hours, please contact your Primary Care Doctor or go to the nearest emergency room.            Brian Joseph AGE: 77 y.o. GENDER: male  : 1955 being evaluated by a Virtual Visit (video visit) encounter to address concerns as mentioned above. A caregiver was present when appropriate. Due to this being a TeleHealth encounter (During GHYYV-36 public health emergency), evaluation of the following organ systems was limited: Vitals/Constitutional/EENT/Resp/CV/GI//MS/Neuro/Skin/Heme-Lymph-Imm. Pursuant to the emergency declaration under the [address] [place] [address], [address] [place] [address] authority and the [employer] Act, this Virtual Visit was conducted with patient's (and/or legal guardian's) consent, to reduce the patient's risk of exposure to COVID-19 and provide necessary medical care. The patient (and/or legal guardian) has also been advised to contact this office for worsening conditions or problems, and seek emergency medical treatment and/or call 911 if deemed necessary. Services were provided through a video synchronous discussion virtually to substitute for in-person clinic visit. Patient was located at their individual homes. Provider was located in [place] [address].     Electronically signed by DIONTE De León CNP on 3/31/2022 at 1:40 PM

## 2022-03-31 NOTE — PROGRESS NOTES
Trevor Nevarez, was evaluated through a synchronous (real-time) audio-video encounter. The patient (or guardian if applicable) is aware that this is a billable service, which includes applicable co-pays. This Virtual Visit was conducted with patient's (and/or legal guardian's) consent. The visit was conducted pursuant to the emergency declaration under the [address] [place] [address] and the [employer] Act. Patient identification was verified, and a caregiver was present when appropriate. The patient was located at home in a state where the provider was licensed to provide care. Total time spent for this encounter: 15 min    --Kulwinder Monsivais LPN on 4/23/8638 at 3:89 PM    An electronic signature was used to authenticate this note.

## 2022-03-31 NOTE — PROGRESS NOTES
Arslan Joy R.N. has reviewed and agrees with Ingrid Dasilva LPN's shift   Assessment.     Jeremías Kennedy RN  3/31/2022

## 2022-03-31 NOTE — PLAN OF CARE
Problem: Wound:  Goal: Will show signs of wound healing; wound closure and no evidence of infection  Description: Will show signs of wound healing; wound closure and no evidence of infection  Outcome: Ongoing   Patient seen for coccyx wound. Wound shows signs of proper closure and healing. No s/s of infection noted. Follow up in 3 weeks. See AVS for order changes. Care plan reviewed with patient and caregiver. Patient and caregiver verbalize understanding of the plan of care and contribute to goal setting.

## 2022-04-04 ENCOUNTER — OFFICE VISIT (OUTPATIENT)
Dept: FAMILY MEDICINE CLINIC | Age: 67
End: 2022-04-04
Payer: MEDICARE

## 2022-04-04 VITALS
SYSTOLIC BLOOD PRESSURE: 160 MMHG | DIASTOLIC BLOOD PRESSURE: 90 MMHG | OXYGEN SATURATION: 95 % | BODY MASS INDEX: 29.42 KG/M2 | WEIGHT: 182.3 LBS | HEART RATE: 105 BPM | TEMPERATURE: 98 F | RESPIRATION RATE: 20 BRPM

## 2022-04-04 DIAGNOSIS — R65.21 SEPTIC SHOCK (HCC): ICD-10-CM

## 2022-04-04 DIAGNOSIS — M79.89 NECROTIZING SOFT TISSUE INFECTION: ICD-10-CM

## 2022-04-04 DIAGNOSIS — N40.1 BENIGN PROSTATIC HYPERPLASIA WITH URINARY HESITANCY: ICD-10-CM

## 2022-04-04 DIAGNOSIS — I10 ESSENTIAL HYPERTENSION: ICD-10-CM

## 2022-04-04 DIAGNOSIS — Z93.3 COLOSTOMY PRESENT (HCC): ICD-10-CM

## 2022-04-04 DIAGNOSIS — E78.2 MIXED HYPERLIPIDEMIA: ICD-10-CM

## 2022-04-04 DIAGNOSIS — A41.9 SEPTIC SHOCK (HCC): ICD-10-CM

## 2022-04-04 DIAGNOSIS — R73.01 IFG (IMPAIRED FASTING GLUCOSE): ICD-10-CM

## 2022-04-04 DIAGNOSIS — L89.154 PRESSURE INJURY OF SACRAL REGION, STAGE 4 (HCC): Primary | ICD-10-CM

## 2022-04-04 DIAGNOSIS — R39.11 BENIGN PROSTATIC HYPERPLASIA WITH URINARY HESITANCY: ICD-10-CM

## 2022-04-04 PROCEDURE — 99215 OFFICE O/P EST HI 40 MIN: CPT | Performed by: NURSE PRACTITIONER

## 2022-04-04 RX ORDER — BUMETANIDE 1 MG/1
1 TABLET ORAL
COMMUNITY
Start: 2022-03-28 | End: 2022-04-04

## 2022-04-04 RX ORDER — TAMSULOSIN HYDROCHLORIDE 0.4 MG/1
0.4 CAPSULE ORAL DAILY
Qty: 90 CAPSULE | Refills: 3 | Status: SHIPPED | OUTPATIENT
Start: 2022-04-04

## 2022-04-04 RX ORDER — LOSARTAN POTASSIUM 100 MG/1
100 TABLET ORAL DAILY
Qty: 90 TABLET | Refills: 3 | Status: SHIPPED | OUTPATIENT
Start: 2022-04-04

## 2022-04-04 RX ORDER — METOPROLOL SUCCINATE 50 MG/1
50 TABLET, EXTENDED RELEASE ORAL DAILY
Qty: 90 TABLET | Refills: 3 | Status: SHIPPED | OUTPATIENT
Start: 2022-04-04

## 2022-04-04 SDOH — ECONOMIC STABILITY: FOOD INSECURITY: WITHIN THE PAST 12 MONTHS, THE FOOD YOU BOUGHT JUST DIDN'T LAST AND YOU DIDN'T HAVE MONEY TO GET MORE.: NEVER TRUE

## 2022-04-04 SDOH — ECONOMIC STABILITY: FOOD INSECURITY: WITHIN THE PAST 12 MONTHS, YOU WORRIED THAT YOUR FOOD WOULD RUN OUT BEFORE YOU GOT MONEY TO BUY MORE.: NEVER TRUE

## 2022-04-04 ASSESSMENT — ENCOUNTER SYMPTOMS
COUGH: 0
ABDOMINAL PAIN: 0
NAUSEA: 0

## 2022-04-04 ASSESSMENT — PATIENT HEALTH QUESTIONNAIRE - PHQ9
2. FEELING DOWN, DEPRESSED OR HOPELESS: 0
SUM OF ALL RESPONSES TO PHQ QUESTIONS 1-9: 0
1. LITTLE INTEREST OR PLEASURE IN DOING THINGS: 0
SUM OF ALL RESPONSES TO PHQ9 QUESTIONS 1 & 2: 0

## 2022-04-04 ASSESSMENT — SOCIAL DETERMINANTS OF HEALTH (SDOH): HOW HARD IS IT FOR YOU TO PAY FOR THE VERY BASICS LIKE FOOD, HOUSING, MEDICAL CARE, AND HEATING?: NOT HARD AT ALL

## 2022-04-04 NOTE — PROGRESS NOTES
Subjective:      Patient ID: Rahul Miller is a 77 y.o. male. HPI: Follow Up    Chief Complaint   Patient presents with    Follow-Up from [address] [facility] [place] 4/1/22    Discuss Medications       Was discharged 4/1/22 from [facility] from [facility] due to hospitalization in January 2021 which was in response to [facility] stay from December 2021 hospitalization for COVID and Respiratory Failure. Living independent. Dwight Hayes is coming for wound changes. Wound healing up. Following with [address] 4/21/22. Has a colostomy due to emergency surgery for coccyx wound. Will have this reverse likely in Summer 2022. Date of Admission: 1/14/2022  Date of Discharge: 2/1/2022    Assessment/Plan:     1. Necrotizing soft tissue infection              -Status post emergent excisional debridement and drainage of bilateral abscess of deep ischial rectal space and excisional debridement of skin subcutaneous tissue muscle and fascia. Status post diverting colostomy on 1/19/2022. Cultures will be polymicrobial  -Continue IV antibiotics per ID recommendations; - Abx deescalated to Augmentin. Duration to be determined per ID.     1/27: Augmentin x 5 days at discharge                   3. Query Acute VTE L peroneal and Anterior tibial v. (not considered DVT)  Per d/w pt about R/B/A/C, given superficial nature of VTEs w/ subsequent low/negligible risk of Pe/propagation; decision was made to d/c [address]; placed pt on LMWH for DVT prophylaxis only     5. Hx of Essential HTN              - well-controlled (if not soft) on all resumed home meds; held HCTZ given hypoNa. Monitor     1/27: BP controlled, at times soft w/ SBP in 90s. Also d/c'ed home amlodipine at [facility]; continue w/ hydralazine, ARB and BB only. Pt and PCP need to monitor BP with antihypertensive regimen adjustment as needed      6. Hx of DMII              - BS controlled on SSI. Switched to SSI#1 from 3.  Will monitor and adjust accordingly.      1/27: BS well-controlled w/o any SSI required; did not resume home MDI; continue w/ SSI only for now. Pt and PCP to monitor BS and adjust regimen accordingly.     7. Acute on chronic normocytic anemia  Hgb stable. Continue to monitor. Anemia W/U c/w BRIANNA; will place on replacement. PCP to consider GI referral for bidirectional endoscopy as warranted. Also added folic acid replacement given, folate level at lower NL limit        8. Hx of  Urinary retention: randolph in place; on tamsulosin; urology F/U OP     9. HypoNa: Mild, Na 131; pt on HCTZ which held for now; encouraged hydration; will reassess repobse; consider trial fo IVF.     1/27: much improved. Na 134 today.  D/c'ed home HCTZ at discharge; encouraged hydration w/ repeat BMP in 3 days     Disposition: medically stable awaiting placement     Chief Complaint: Back pain     Initial H and P:-       45-year-old white male who presented to [facility] on 1/14/2022 with complaints of buttocks pain. Jared Vargas has a past medical history of lifetime non-smoker, recent left lower extremity DVT on Eliquis, diabetes mellitus, hypertension, urinary retention.  Per report he presented to the emergency department via EMS from [address] with new decubitus ulcer.  Patient believes it was present for multiple weeks however was not well documented prior to hospital encounter. Jared Vargas reports that it has been draining for approximately 1 week. [facility] reported he was having worsening pain fever and sweats and chills over the last week.  In the emergency department he was seen on 1/8/2022 ultrasound of leg demonstrated acute DVT he was started on Eliquis.  Of note he was also hospitalized from 12/7/2021 to 12/20/2021 for COVID-19 pneumonia. Jared Vargas returned to his nursing facility on room air.  In the emergency department he was positive for lactic acidosis.  Imaging revealed a decubitus ulcer with air in the perineum.  Patient was fluid resuscitated per sepsis protocol and given Renuka Suarez surgery was consulted for debridement and concern for necrotizing fasciitis.  Initially patient was admitted to stepdown postprocedure he was transferred to the ICU for hypotension. Jannie Oropeza did undergo emergent debridement of abscess on 1/14 with Dr. Brian Delacruz was initially on vasopressors which have been weaned off. Patient Active Problem List   Diagnosis    Essential hypertension    Obesity (BMI 30-39. 9)    Hyperlipidemia    COVID-19 virus detected    COVID-19    Acute respiratory failure with hypoxia (HCC)    Necrotizing soft tissue infection    Pressure injury of sacral region, stage 4 (HCC)    Sepsis ([address]    Septic shock (HCC)    Horseshoe abscess of ischiorectal space    Necrotizing fasciitis of pelvic region and thigh ([address]    Pressure injury, unstageable, with infection ([address]    Shock ([address]    Colostomy in place [facility])    Nonhealing surgical wound       Wt Readings from Last 3 Encounters:   04/04/22 182 lb 4.8 oz (82.7 kg)   03/31/22 191 lb (86.6 kg)   01/22/22 191 lb 8 oz (86.9 kg)       Colonoscopy - 2016  PNEUMO - 2022    Denies CP, SOB or chest tightness. Physical deconditioned. BP elevated. Was wnl in ECF. On Losartan 100 mg and Toprol 50 mg XL    BP Readings from Last 3 Encounters:   04/04/22 (!) 160/90   02/17/22 (!) 145/73   02/09/22 134/61       A1C elevated in Hospital.  Due for repeat.   Not on any DM medications    Lab Results   Component Value Date    LABA1C 8.1 (H) 01/14/2022     No results found for: EAG    No components found for: CHLPL  Lab Results   Component Value Date    TRIG 481 (H) 11/04/2013     Lab Results   Component Value Date    HDL 36 11/04/2013     Lab Results   Component Value Date    LDLCALC SEE BELOW 11/04/2013     No results found for: LABVLDL      Chemistry        Component Value Date/Time     (L) 01/27/2022 0419    K 3.6 01/27/2022 0419    K 3.6 01/21/2022 1825     01/27/2022 0419    CO2 21 (L) 01/27/2022 0419    BUN 14 01/27/2022 0419    CREATININE 1.1 01/27/2022 0419        Component Value Date/Time    CALCIUM 8.0 (L) 01/27/2022 0419    ALKPHOS 85 01/27/2022 0419    AST 13 01/27/2022 0419    ALT 25 01/27/2022 0419    BILITOT 0.5 01/27/2022 0419            Lab Results   Component Value Date    TSH 0.227 (L) 01/16/2022       Lab Results   Component Value Date    WBC 9.6 01/27/2022    HGB 9.2 (L) 01/27/2022    HCT 29.4 (L) 01/27/2022    MCV 95.5 (H) 01/27/2022     01/27/2022         Health Maintenance   Topic Date Due    Hepatitis C screen  Never done    Depression Screen  Never done    Diabetic microalbuminuria test  Never done    Diabetic retinal exam  Never done    DTaP/Tdap/Td vaccine (1 - Tdap) Never done    Shingles Vaccine (1 of 2) Never done    Lipid screen  11/04/2014    Diabetic foot exam  01/11/2017    COVID-19 Vaccine (2 - Moderna 3-dose series) 09/24/2021    Annual Wellness Visit (AWV)  Never done    A1C test (Diabetic or Prediabetic)  01/14/2023    Potassium monitoring  01/27/2023    Creatinine monitoring  01/27/2023    Colorectal Cancer Screen  01/11/2026    Flu vaccine  Completed    Pneumococcal 65+ years Vaccine  Completed    Hepatitis A vaccine  Aged Out    Hepatitis B vaccine  Aged Out    Hib vaccine  Aged Out    Meningococcal (ACWY) vaccine  Aged [employer] History   Administered Date(s) Administered    COVID-19, Isrealey File, Primary or Immunocompromised, PF, 100mcg/0.5mL 08/27/2021    COVID-19, Pfizer Purple top, DILUTE for use, 12+ yrs, 30mcg/0.3mL dose 08/27/2021    Influenza Virus Vaccine 09/01/2021    Influenza, Quadv, adjuvanted, 65 yrs +, IM, PF (Fluad) 10/03/2020    PPD Test 01/27/2022    Pneumococcal Polysaccharide ([id number]) 02/01/2022    Tetanus 01/01/2008    Tetanus Toxoid, absorbed 01/01/2008       Review of Systems   Constitutional: Negative for chills and fever. HENT: Negative.     Respiratory: Negative for cough. Gastrointestinal: Negative for abdominal pain and nausea. Musculoskeletal: Negative for arthralgias, gait problem and myalgias. Skin: Negative for rash. Neurological: Negative for dizziness and light-headedness. Psychiatric/Behavioral: Negative. Objective:   Physical Exam  Constitutional:       General: He is not in acute distress. Appearance: He is well-developed. HENT:      Right Ear: Tympanic membrane normal.      Left Ear: Tympanic membrane normal.      Nose: Nose normal.   Cardiovascular:      Rate and Rhythm: Normal rate and regular rhythm. Pulses: Normal pulses. Heart sounds: Normal heart sounds, S1 normal and S2 normal. No murmur heard. No S3 sounds. Pulmonary:      Effort: Pulmonary effort is normal.      Breath sounds: Normal breath sounds. No decreased breath sounds, wheezing or rhonchi. Abdominal:      General: Bowel sounds are normal.      Palpations: Abdomen is soft. Tenderness: There is no abdominal tenderness. Comments: Colostomy in place. Neurological:      Mental Status: He is alert and oriented to person, place, and time. Psychiatric:         Behavior: Behavior normal.         Assessment:       Diagnosis Orders   1. Pressure injury of sacral region, stage 4 ([address]     2. Necrotizing soft tissue infection     3. Septic shock ([address]     4. Colostomy present ([address]     5. Essential hypertension  metoprolol succinate (TOPROL XL) 50 MG extended release tablet    losartan (COZAAR) 100 MG tablet   6. Benign prostatic hyperplasia with urinary hesitancy  tamsulosin (FLOMAX) 0.4 MG capsule   7. Mixed hyperlipidemia  Lipid Panel   8.  IFG (impaired fasting glucose)  Hemoglobin A1C            Plan:       Hospitalization reviewed - medications reviewed  Chronic conditions stable  Refills as above   - d/c bumex  Continue to monitor BP  Labs to monitor  Get labs from [facility] drawn 4/1/22  Immunizations denied  Follow up with [employer]  RTO in 3 jacob      Servando Ramsey received counseling on the following healthy behaviors: nutrition and exercise    Patient given educational materials on Hyperlipidemia    I have instructed Servando Ramsey to complete a self tracking handout on Weights and instructed them to bring it with them to his next appointment. Discussed use, benefit, and side effects of prescribed medications. Barriers to medication compliance addressed. All patient questions answered. Pt voiced understanding.

## 2022-04-04 NOTE — PROGRESS NOTES
[address] [facility] [phone], they will be faxing recent labs done 3/28/22 to the office today.

## 2022-04-05 ENCOUNTER — TELEPHONE (OUTPATIENT)
Dept: FAMILY MEDICINE CLINIC | Age: 67
End: 2022-04-05

## 2022-04-05 NOTE — TELEPHONE ENCOUNTER
Taylor Garcia with [facility] called office stating they received the form back regarding pt's A1c, but it did not include the A1c goals. She asked that you complete that portion and send fax back. Any questions call her back at [phone] ext [phone]. Please advise.

## 2022-04-05 NOTE — TELEPHONE ENCOUNTER
Form was signed by Dr. Colleen Rodas. Form pulled and placed on your desk for review and completion. Queta Engel was notified and voiced understanding.

## 2022-04-18 ENCOUNTER — TELEPHONE (OUTPATIENT)
Dept: WOUND CARE | Age: 67
End: 2022-04-18

## 2022-04-18 NOTE — TELEPHONE ENCOUNTER
Patient virtual visit for 4/21 changed to in person. Provider notified. Patient verbalizes understanding.

## 2022-04-21 ENCOUNTER — HOSPITAL ENCOUNTER (OUTPATIENT)
Dept: WOUND CARE | Age: 67
Discharge: HOME | End: 2022-04-21
Payer: MEDICARE

## 2022-04-21 VITALS
HEART RATE: 75 BPM | TEMPERATURE: 98.1 F | RESPIRATION RATE: 18 BRPM | OXYGEN SATURATION: 93 % | SYSTOLIC BLOOD PRESSURE: 160 MMHG | DIASTOLIC BLOOD PRESSURE: 78 MMHG

## 2022-04-21 DIAGNOSIS — L89.154 PRESSURE INJURY OF SACRAL REGION, STAGE 4 (HCC): ICD-10-CM

## 2022-04-21 DIAGNOSIS — T81.89XD NON-HEALING SURGICAL WOUND, SUBSEQUENT ENCOUNTER: Primary | ICD-10-CM

## 2022-04-21 PROCEDURE — 99213 OFFICE O/P EST LOW 20 MIN: CPT | Performed by: NURSE PRACTITIONER

## 2022-04-21 PROCEDURE — 99213 OFFICE O/P EST LOW 20 MIN: CPT

## 2022-04-21 RX ORDER — LIDOCAINE HYDROCHLORIDE 40 MG/ML
SOLUTION TOPICAL ONCE
Status: CANCELLED | OUTPATIENT
Start: 2022-04-21 | End: 2022-04-21

## 2022-04-21 RX ORDER — GENTAMICIN SULFATE 1 MG/G
OINTMENT TOPICAL ONCE
Status: CANCELLED | OUTPATIENT
Start: 2022-04-21 | End: 2022-04-21

## 2022-04-21 RX ORDER — LIDOCAINE 50 MG/G
OINTMENT TOPICAL ONCE
Status: CANCELLED | OUTPATIENT
Start: 2022-04-21 | End: 2022-04-21

## 2022-04-21 RX ORDER — LIDOCAINE 40 MG/G
CREAM TOPICAL ONCE
Status: CANCELLED | OUTPATIENT
Start: 2022-04-21 | End: 2022-04-21

## 2022-04-21 RX ORDER — BACITRACIN ZINC AND POLYMYXIN B SULFATE 500; 1000 [USP'U]/G; [USP'U]/G
OINTMENT TOPICAL ONCE
Status: CANCELLED | OUTPATIENT
Start: 2022-04-21 | End: 2022-04-21

## 2022-04-21 RX ORDER — LIDOCAINE HYDROCHLORIDE 20 MG/ML
JELLY TOPICAL ONCE
Status: CANCELLED | OUTPATIENT
Start: 2022-04-21 | End: 2022-04-21

## 2022-04-21 RX ORDER — GINSENG 100 MG
CAPSULE ORAL ONCE
Status: CANCELLED | OUTPATIENT
Start: 2022-04-21 | End: 2022-04-21

## 2022-04-21 RX ORDER — BACITRACIN, NEOMYCIN, POLYMYXIN B 400; 3.5; 5 [USP'U]/G; MG/G; [USP'U]/G
OINTMENT TOPICAL ONCE
Status: CANCELLED | OUTPATIENT
Start: 2022-04-21 | End: 2022-04-21

## 2022-04-21 RX ORDER — BETAMETHASONE DIPROPIONATE 0.05 %
OINTMENT (GRAM) TOPICAL ONCE
Status: CANCELLED | OUTPATIENT
Start: 2022-04-21 | End: 2022-04-21

## 2022-04-21 RX ORDER — CLOBETASOL PROPIONATE 0.5 MG/G
OINTMENT TOPICAL ONCE
Status: CANCELLED | OUTPATIENT
Start: 2022-04-21 | End: 2022-04-21

## 2022-04-21 NOTE — PLAN OF CARE
Problem: Wound:  Goal: Will show signs of wound healing; wound closure and no evidence of infection  Description: Will show signs of wound healing; wound closure and no evidence of infection  Outcome: Progressing     Patient presents to wound clinic for sacral wound. No s/s of infection noted. See avs for discharge instructions. Follow up visit: 3 weeks on Thursdays May 12th at 1:00 pm     Care plan reviewed with patient. Patient verbalize understanding of the plan of care and contribute to goal setting.

## 2022-04-21 NOTE — PROGRESS NOTES
[facility]  Consult and Procedure Note      [facility] RECORD NUMBER:  [id number]  AGE: 77 y.o. GENDER: male  : 1955  EPISODE DATE:  2022    SUBJECTIVE:     Chief Complaint   Patient presents with    Wound Check     coccyx         HISTORY OF PRESENT ILLNESS      Annette Song is a 77 y.o. male who presents today for re-evaluation of coccyx wounds.   Patient was hospitalized -21due to Covid-19 at which time he developed coccyx wound, discharged to [facility].  While at [facility] he developed worsening pain to wound for which he was sent to the emergency department.   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 Julio C Chakraborty then underwent creation of diverting transverse loop colostomy-plan for reversal approximately 4 weeks after healing of wound.   Current wound care includes Dakin's moistened guaze dressings, changed daily.  He is current with [address] denies pain to wound.  No further needs or concerns identified     PAST MEDICAL HISTORY             Diagnosis Date    Diabetes mellitus ([address]     Hyperlipidemia     Hypertension        PAST SURGICAL HISTORY     Past Surgical History:   Procedure Laterality Date    ENDOSCOPY, COLON, DIAGNOSTIC      swallowing difficulties    LAPAROSCOPY N/A 2022    EXPLORATORY LAPAROSCOPY, TRANSVERSE LOOP COLOSTOMY performed by Omega Saha MD at [facility]  Aug 29, 2013    Neck Abcess Incision and Drainage (Dr. James Eagle, [facility])    RECTAL SURGERY N/A 2022    FULL THICKNESS DEBRIDEMENT OF SACRAL DECUBITUS ULCER, [id number] CM performed by Omega Saha MD at [address] [place] [address] Pkw[address] HISTORY     Family History   Problem Relation Age of Onset    Heart Disease Father     Other Mother         alzheimers       SOCIAL HISTORY     Social History     Tobacco Use    Smoking status: Never Smoker    Smokeless tobacco: Never Used   Vaping Use    Vaping Use: Never used   Substance Use Topics    Alcohol use: No    Drug use: No       ALLERGIES     No Known Allergies    MEDICATIONS     Current Outpatient Medications on File Prior to Encounter   Medication Sig Dispense Refill    metoprolol succinate (TOPROL XL) 50 MG extended release tablet Take 1 tablet by mouth daily 90 tablet 3    losartan (COZAAR) 100 MG tablet Take 1 tablet by mouth daily 90 tablet 3    tamsulosin (FLOMAX) 0.4 MG capsule Take 1 capsule by mouth daily 90 capsule 3     No current facility-administered medications on file prior to encounter. REVIEW OF SYSTEMS:     Constitutional: Denies fever, chills, night sweats, fatigue, weight loss/gain, loss of appetite   Head: Denies headache,  dizziness, loss of consciousness  Mouth/throat: Denies ulceration, dental caries , dysphagia  Respiratory: Denies shortness of breath, cough, wheezing, dyspnea with exertion  Cardiovascular:Denies chest pain, palpitations, edema  Gastrointestinal: +ostomy Denies nausea, vomiting, constipation, diarrhea, abdominal pain   DONAVON:  Denies dysuria, frequency, urgency, hematuria  Musculoskeletal: +generalized muscle weakness-improving Endocrine: Denies polyuria, polydipsia, cold or heat intolerance  Hematology: Denies easy brusing or bleeding, hx of clotting disorder  Dermatology: +wound Denies skin rash, eczema, pruritis    PHYSICAL EXAM:     BP (!) 160/78   Pulse 75   Temp 98.1 °F (36.7 °C) (Infrared)   Resp 18   SpO2 93%   Wt Readings from Last 3 Encounters:   04/04/22 182 lb 4.8 oz (82.7 kg)   03/31/22 191 lb (86.6 kg)   01/22/22 191 lb 8 oz (86.9 kg)     General:  Awake, alert, no apparent distress.   Appears stated age  [de-identified]: conjuctivae are clear without exudate or hemorrhage, anicteric sclera, moist oral mucosa. Chest:  Respirations regular, non-labored. Chest rise and fall equal bilaterally. Neurological: Awake, alert, oriented x4  Psychiatric:  Appropriate mood and affect  Extremities: non-traumatic in appearance. Skin:  Warm and dry     Wounds:     Sacral wound bed granular with epithelialization. Small to moderate amounts of serosanguinous drainage noted. Periwound hyperpigmented. Incision 08/29/13 Neck Right (Active)   Number of days: 3156       Wound 02/17/22 Sacrum (Active)   Wound Image   04/21/22 1355   Wound Etiology Pressure Unstageable 03/31/22 1454   Dressing Status Intact; Old drainage noted 04/21/22 1355   Wound Cleansed Cleansed with saline 04/21/22 1355   Dressing/Treatment Triad hydro/zinc oxide-based hydrophilic paste 60/36/78 7138   Offloading for Diabetic Foot Ulcers Offloading not required 04/21/22 1355   Wound Length (cm) 8 cm 04/21/22 1355   Wound Width (cm) 0.3 cm 04/21/22 1355   Wound Depth (cm) 0.05 cm 04/21/22 1355   Wound Surface Area (cm^2) 2.4 cm^2 04/21/22 1355   Change in Wound Size % (l*w) 96.64 04/21/22 1355   Wound Volume (cm^3) 0.12 cm^3 04/21/22 1355   Wound Healing % 100 04/21/22 1355   Distance Tunneling (cm) 0 cm 03/31/22 1454   Tunneling Position ___ O'Clock 0 03/31/22 1454   Wound Assessment Epithelialization;Granulation tissue 04/21/22 1355   Drainage Amount Moderate 04/21/22 1355   Drainage Description Serous; Yellow;Green 04/21/22 1355   Odor None 04/21/22 1355   Ama-wound Assessment Blanchable erythema; Hyperpigmented;Dry/flaky 04/21/22 1355   Margins Attached edges 04/21/22 1355   Wound Thickness Description not for Pressure Injury Full thickness 04/21/22 1355   Number of days: 62         LABS/IMAGING     UA: No results for input(s): [facility] [address], CLARITYU, MUCUS, PROTEINU, BLOODU, RBCUA, WBCUA, BACTERIA, NITRU, GLUCOSEU, [facility], [address], LABCAST, LABCASTTY, AMORPHOS in the last 72 hours. Invalid input(s): CRYSTALS  Micro:   Lab Results   Component Value Date    BC No growth-preliminary No growth  01/14/2022         ASSESSMENT     -Stage IV pressure injury, sacrum      Ulcer Identification:  Ulcer Type: pressure and non-healing surgical  Contributing Factors: chronic pressure, decreased mobility and Covid-19 infection, necrotizing soft tissue infection     Depth of Diabetic/Pressure/Non Pressure Ulcers or Wound:  Pressure ulcer, Stage 4- sacrum     Patient Active Problem List   Diagnosis Code    Essential hypertension I10    Obesity (BMI 30-39. 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 All available lab work, radiology studies, and progress notes pertaining to Aliza Michael reviewed prior to or during patient visit today.    -Healing stage IV pressure ulcer-sacrum- Significantly improved today, has almost completely healed. Stop use of Dakin's moistened gauze. Start use of Triad paste, apply small amount to wound and periwound skin daily and as needed to help promote healing.      -No indications of infection to wound today. Education provided on signs and symptoms of infection. Call clinic or seek emergency care should these occur. Educated Aliza Michael on the importance of offloading wound(s) for wound healing/prevention.  Pressure reduction techniques reviewed. Patient verbalized understanding. Follow up 2-3 weeks. Call clinic with any needs or concerns prior to scheduled visit. All questions and concerns addressed prior to discharge from today's visit. Please see attached Discharge Instructions    Written patient dismissal instructions given to patient and signed by patient or POA.            Treatment:   Orders Placed This Encounter   Procedures    Initiate Outpatient Wound Care Protocol     Cleanse wound with saline    If wound contains bioburden or contamination cleanse with wound cleanser or antimicrobial solution     For normal periwound tissue without irritation nor maceration, apply topical skin protectant    For periwound tissue with irritation and/or maceration, apply zinc based product, topical steroid cream/ointment, or equivalent     For wounds with dry firm black eschar and/or without exudate, apply betadine and leave open to air      For wounds with scant/small to no exudate or drainage, apply wound gel, hydrocolloid, polymer, or equivalent and cover with secondary dressing/foam      For wounds with moderate/large exudate or drainage, apply alginate, hydrofiber, polymer, or equivalent and cover with secondary dressing/foam    For wounds with nonviable tissue requiring removal, apply chemical or mechanical debrider and cover with secondary dressing/foam    For wounds with tunneling, dead space, or cavity, fill or pack with strip/gauze/kerlex to fit and cover with secondary dressing/foam    For wounds with adequate granulation or epithelization, apply wound gel, hydrocolloid, polymer, collagen, or transparent film, and cover secondary dry dressing/foam    For wounds that need additional secondary dressing to help pad or control additional drainage/exudates, add foam, absorbent pad or hydrocolloid    For wounds with suspected or known infection, apply antimicrobial mesh and/or antimicrobial alginate/hydrofiber, or antimicrobial solution moistened gauze/kerlex, or equivalent and cover with secondary dressing/foam    Compression Management needed for edema control, apply multilayer compression or tubular garment or equivalent    Offloading Management needed for pressure relief, apply offloading shoe/boot or equivalent     Standing Status:   Standing     Number of Occurrences:   1     I spent a total of 25 minutes reviewing previous notes, test results and face to face with Sascha Baez  on 4/21/2022. Greater than 50% of this time was spent on counseling, reviewing and discussing test results, answering questions, instructions on treatment and/or medications ordered, and coordinating the care based on my plan and assessment as noted. Discharge Instructions         Discharge Instructions       Visit Discharge/Physician Orders:  - Offload pressure to the buttocks by changing positions frequently  - Do not sit more than 1 hour at a time  - Increase protein in the diet to promote healing     Home Care: [facility]      Wound Location: coccyx     Dressing orders:      1) Gather wound care supplies and arrange on clean table.      2) Wash your hands with soap and water or use alcohol based hand  for 20 seconds (sing \"Happy Birthday\" twice).    3) Cleanse wounds with normal saline or wound cleanser and gauze. Pat dry with clean gauze.    4) coccyx - Apply triad cream to wound daily and as needed.      Keep all dressings clean & dry.      Do not shower, take baths or get wound wet, unless otherwise instructed by your Wound Care doctor.      Follow up visit: 3 weeks on Thursdays May 12th at 1:00 pm      Keep next scheduled appointment. Please give 24 hour notice if unable to keep appointment. [phone]     If you experience any of the following, please call the Wound Care Service during business hours: Monday through Friday 8:00 am - 4:30 pm  ([phone]).               *Increase in pain              *Temperature over 101              *Increase in drainage from your wound or a foul odor              *Uncontrolled swelling              *Need for compression bandage changes due to slippage, breakthrough drainage     If you need medical attention outside of business hours, please contact your Primary Care Doctor or go to the nearest emergency room.         Electronically signed by DIONTE Gutierres CNP on 4/21/2022 at 3:48 PM

## 2022-05-06 ENCOUNTER — TELEPHONE (OUTPATIENT)
Dept: WOUND CARE | Age: 67
End: 2022-05-06

## 2022-05-06 NOTE — TELEPHONE ENCOUNTER
----- Message from Arpit Lagos sent at 5/6/2022 11:42 AM EDT -----  [facility] [phone] [address] [place] [address] TO CXL HIS APPT 5/12, HE IS LEAVING TO GO TO [place] AND WILL NOT BE BACK UNTIL AROUND July 4. STATES HE IS COMPLETELY HEALED AND NEEDS TO KNOW HOW LONG TO CONTINUE USING THE TRIAD CREAM DAILY?

## 2022-05-12 ENCOUNTER — HOSPITAL ENCOUNTER (OUTPATIENT)
Dept: WOUND CARE | Age: 67
Discharge: HOME | End: 2022-05-12

## 2022-05-19 ENCOUNTER — TELEPHONE (OUTPATIENT)
Dept: FAMILY MEDICINE CLINIC | Age: 67
End: 2022-05-19

## 2022-05-19 NOTE — TELEPHONE ENCOUNTER
Patient had COVID in December and is questioning if it is ok to get his 2nd COVID vaccine now.   Please advise

## 2022-10-06 ENCOUNTER — TELEPHONE (OUTPATIENT)
Dept: FAMILY MEDICINE CLINIC | Age: 67
End: 2022-10-06

## 2022-10-06 DIAGNOSIS — Z93.3 COLOSTOMY PRESENT (HCC): ICD-10-CM

## 2022-10-06 DIAGNOSIS — K59.1 FUNCTIONAL DIARRHEA: Primary | ICD-10-CM

## 2022-10-06 RX ORDER — DIPHENOXYLATE HYDROCHLORIDE AND ATROPINE SULFATE 2.5; .025 MG/1; MG/1
1 TABLET ORAL 4 TIMES DAILY PRN
Qty: 60 TABLET | Refills: 1 | Status: SHIPPED | OUTPATIENT
Start: 2022-10-06 | End: 2022-11-05

## 2022-10-06 NOTE — TELEPHONE ENCOUNTER
The patient called in and stated that he has a colostomy and has had diarrhea for the past 2 days. He has been taking imodium and it is not happening. He is asking for something to be sent into [facility]. He stated that he feels fine and is staying hydrated. Please advise.       If no call back the patient will check with the pharmacy after 2pm

## 2023-03-11 DIAGNOSIS — N40.1 BENIGN PROSTATIC HYPERPLASIA WITH URINARY HESITANCY: ICD-10-CM

## 2023-03-11 DIAGNOSIS — I10 ESSENTIAL HYPERTENSION: ICD-10-CM

## 2023-03-11 DIAGNOSIS — R39.11 BENIGN PROSTATIC HYPERPLASIA WITH URINARY HESITANCY: ICD-10-CM

## 2023-03-13 RX ORDER — METOPROLOL SUCCINATE 50 MG/1
TABLET, EXTENDED RELEASE ORAL
Qty: 90 TABLET | Refills: 3 | Status: SHIPPED | OUTPATIENT
Start: 2023-03-13

## 2023-03-13 RX ORDER — LOSARTAN POTASSIUM 100 MG/1
TABLET ORAL
Qty: 90 TABLET | Refills: 3 | Status: SHIPPED | OUTPATIENT
Start: 2023-03-13

## 2023-03-13 RX ORDER — TAMSULOSIN HYDROCHLORIDE 0.4 MG/1
CAPSULE ORAL
Qty: 90 CAPSULE | Refills: 3 | Status: SHIPPED | OUTPATIENT
Start: 2023-03-13

## 2023-05-15 ENCOUNTER — OFFICE VISIT (OUTPATIENT)
Dept: FAMILY MEDICINE CLINIC | Age: 68
End: 2023-05-15
Payer: COMMERCIAL

## 2023-05-15 VITALS
WEIGHT: 213.3 LBS | BODY MASS INDEX: 34.28 KG/M2 | DIASTOLIC BLOOD PRESSURE: 96 MMHG | RESPIRATION RATE: 18 BRPM | SYSTOLIC BLOOD PRESSURE: 228 MMHG | HEIGHT: 66 IN | HEART RATE: 72 BPM

## 2023-05-15 DIAGNOSIS — N18.31 STAGE 3A CHRONIC KIDNEY DISEASE (HCC): ICD-10-CM

## 2023-05-15 DIAGNOSIS — I48.0 PAROXYSMAL ATRIAL FIBRILLATION (HCC): ICD-10-CM

## 2023-05-15 DIAGNOSIS — R39.11 BENIGN PROSTATIC HYPERPLASIA WITH URINARY HESITANCY: ICD-10-CM

## 2023-05-15 DIAGNOSIS — Z93.3 COLOSTOMY PRESENT (HCC): ICD-10-CM

## 2023-05-15 DIAGNOSIS — E11.9 TYPE 2 DIABETES MELLITUS WITHOUT COMPLICATION, WITHOUT LONG-TERM CURRENT USE OF INSULIN (HCC): ICD-10-CM

## 2023-05-15 DIAGNOSIS — L89.154 PRESSURE INJURY OF SACRAL REGION, STAGE 4 (HCC): ICD-10-CM

## 2023-05-15 DIAGNOSIS — Z00.00 INITIAL MEDICARE ANNUAL WELLNESS VISIT: Primary | ICD-10-CM

## 2023-05-15 DIAGNOSIS — E78.2 MIXED HYPERLIPIDEMIA: ICD-10-CM

## 2023-05-15 DIAGNOSIS — R73.01 IFG (IMPAIRED FASTING GLUCOSE): ICD-10-CM

## 2023-05-15 DIAGNOSIS — I16.0 ASYMPTOMATIC HYPERTENSIVE URGENCY: ICD-10-CM

## 2023-05-15 DIAGNOSIS — I10 ESSENTIAL HYPERTENSION: ICD-10-CM

## 2023-05-15 DIAGNOSIS — N40.1 BENIGN PROSTATIC HYPERPLASIA WITH URINARY HESITANCY: ICD-10-CM

## 2023-05-15 PROBLEM — I48.91 UNSPECIFIED ATRIAL FIBRILLATION (HCC): Status: ACTIVE | Noted: 2022-01-27

## 2023-05-15 PROBLEM — R26.2 DIFFICULTY IN WALKING, NOT ELSEWHERE CLASSIFIED: Status: ACTIVE | Noted: 2022-01-27

## 2023-05-15 PROBLEM — M62.81 MUSCLE WEAKNESS (GENERALIZED): Status: ACTIVE | Noted: 2022-01-27

## 2023-05-15 PROBLEM — N18.30 CHRONIC KIDNEY DISEASE, STAGE 3 UNSPECIFIED (HCC): Status: ACTIVE | Noted: 2022-01-27

## 2023-05-15 PROBLEM — Z86.16 PERSONAL HISTORY OF COVID-19: Status: ACTIVE | Noted: 2022-01-27

## 2023-05-15 PROBLEM — N17.9 ACUTE KIDNEY FAILURE, UNSPECIFIED (HCC): Status: ACTIVE | Noted: 2022-01-27

## 2023-05-15 PROBLEM — R13.13 DYSPHAGIA, PHARYNGEAL PHASE: Status: ACTIVE | Noted: 2022-01-27

## 2023-05-15 PROBLEM — N40.0 BENIGN PROSTATIC HYPERPLASIA WITHOUT LOWER URINARY TRACT SYMPTOMS: Status: ACTIVE | Noted: 2022-02-10

## 2023-05-15 PROBLEM — N31.1: Status: ACTIVE | Noted: 2022-02-15

## 2023-05-15 PROBLEM — R53.1 WEAKNESS: Status: ACTIVE | Noted: 2022-01-27

## 2023-05-15 PROCEDURE — G0438 PPPS, INITIAL VISIT: HCPCS | Performed by: NURSE PRACTITIONER

## 2023-05-15 PROCEDURE — 3077F SYST BP >= 140 MM HG: CPT | Performed by: NURSE PRACTITIONER

## 2023-05-15 PROCEDURE — 3079F DIAST BP 80-89 MM HG: CPT | Performed by: NURSE PRACTITIONER

## 2023-05-15 PROCEDURE — 1123F ACP DISCUSS/DSCN MKR DOCD: CPT | Performed by: NURSE PRACTITIONER

## 2023-05-15 PROCEDURE — 99214 OFFICE O/P EST MOD 30 MIN: CPT | Performed by: NURSE PRACTITIONER

## 2023-05-15 RX ORDER — AMLODIPINE AND OLMESARTAN MEDOXOMIL 10; 40 MG/1; MG/1
1 TABLET ORAL DAILY
Qty: 90 TABLET | Refills: 3 | Status: SHIPPED | OUTPATIENT
Start: 2023-05-15

## 2023-05-15 SDOH — ECONOMIC STABILITY: FOOD INSECURITY: WITHIN THE PAST 12 MONTHS, YOU WORRIED THAT YOUR FOOD WOULD RUN OUT BEFORE YOU GOT MONEY TO BUY MORE.: NEVER TRUE

## 2023-05-15 SDOH — ECONOMIC STABILITY: INCOME INSECURITY: HOW HARD IS IT FOR YOU TO PAY FOR THE VERY BASICS LIKE FOOD, HOUSING, MEDICAL CARE, AND HEATING?: NOT HARD AT ALL

## 2023-05-15 SDOH — ECONOMIC STABILITY: HOUSING INSECURITY
IN THE LAST 12 MONTHS, WAS THERE A TIME WHEN YOU DID NOT HAVE A STEADY PLACE TO SLEEP OR SLEPT IN A SHELTER (INCLUDING NOW)?: NO

## 2023-05-15 SDOH — ECONOMIC STABILITY: FOOD INSECURITY: WITHIN THE PAST 12 MONTHS, THE FOOD YOU BOUGHT JUST DIDN'T LAST AND YOU DIDN'T HAVE MONEY TO GET MORE.: NEVER TRUE

## 2023-05-15 ASSESSMENT — LIFESTYLE VARIABLES: HOW OFTEN DO YOU HAVE A DRINK CONTAINING ALCOHOL: NEVER

## 2023-05-15 ASSESSMENT — ENCOUNTER SYMPTOMS
NAUSEA: 0
COUGH: 0
ABDOMINAL PAIN: 0

## 2023-05-15 ASSESSMENT — PATIENT HEALTH QUESTIONNAIRE - PHQ9
SUM OF ALL RESPONSES TO PHQ QUESTIONS 1-9: 0
SUM OF ALL RESPONSES TO PHQ QUESTIONS 1-9: 0
SUM OF ALL RESPONSES TO PHQ9 QUESTIONS 1 & 2: 0
SUM OF ALL RESPONSES TO PHQ QUESTIONS 1-9: 0
2. FEELING DOWN, DEPRESSED OR HOPELESS: 0
1. LITTLE INTEREST OR PLEASURE IN DOING THINGS: 0
SUM OF ALL RESPONSES TO PHQ QUESTIONS 1-9: 0

## 2023-05-15 NOTE — PATIENT INSTRUCTIONS
go to all appointments, and call your doctor if you are having problems. It's also a good idea to know your test results and keep a list of the medicines you take. How can you care for yourself at home? Avoid loud noises whenever possible. This helps keep your hearing from getting worse. Always wear hearing protection around loud noises. Wear a hearing aid as directed. See a professional who can help you pick a hearing aid that fits you. Have hearing tests as your doctor suggests. They can show whether your hearing has changed. Your hearing aid may need to be adjusted. Use other devices as needed. These may include:  Telephone amplifiers and hearing aids that can connect to a television, stereo, radio, or microphone. Devices that use lights or vibrations. These alert you to the doorbell, a ringing telephone, or a baby monitor. Television closed-captioning. This shows the words at the bottom of the screen. Most new TVs can do this. TTY (text telephone). This lets you type messages back and forth on the telephone instead of talking or listening. These devices are also called TDD. When messages are typed on the keyboard, they are sent over the phone line to a receiving TTY. The message is shown on a monitor. Use text messaging, social media, and email if it is hard for you to communicate by telephone. Try to learn a listening technique called speechreading. It is not lipreading. You pay attention to people's gestures, expressions, posture, and tone of voice. These clues can help you understand what a person is saying. Face the person you are talking to, and have them face you. Make sure the lighting is good. You need to see the other person's face clearly. Think about counseling if you need help to adjust to your hearing loss. When should you call for help?   Watch closely for changes in your health, and be sure to contact your doctor if:    You think your hearing is getting worse.     You have new

## 2023-05-15 NOTE — PROGRESS NOTES
Medicare Annual Wellness Visit    Arik Conner is here for Medicare AWV    Assessment & Plan   Initial Medicare annual wellness visit  Asymptomatic hypertensive urgency  Essential hypertension  -     amLODIPine-olmesartan (MELLISSA) 10-40 MG per tablet; Take 1 tablet by mouth daily, Disp-90 tablet, R-3Normal  Mixed hyperlipidemia  -     CBC; Future  -     Lipid Panel; Future  -     TSH with Reflex; Future  IFG (impaired fasting glucose)  -     Comprehensive Metabolic Panel; Future  Benign prostatic hyperplasia with urinary hesitancy  Colostomy present (HCC)  Pressure injury of sacral region, stage 4 (HCC)  Paroxysmal atrial fibrillation (HCC)  Type 2 diabetes mellitus without complication, without long-term current use of insulin (HCC)  -     Microalbumin / creatinine urine ratio; Future  -     Hemoglobin A1C; Future  Stage 3a chronic kidney disease ([facility]    Recommendations for Preventive Services Due: see orders and patient instructions/AVS.  Recommended screening schedule for the next 5-10 years is provided to the patient in written form: see Patient Instructions/AVS.     Return in about 1 week (around 5/22/2023). Subjective     Patient's complete Health Risk Assessment and screening values have been reviewed and are found in Flowsheets. The following problems were reviewed today and where indicated follow up appointments were made and/or referrals ordered. Positive Risk Factor Screenings with Interventions:                 Weight and Activity:  Physical Activity: Sufficiently Active    Days of Exercise per Week: 5 days    Minutes of Exercise per Session: 30 min     On average, how many days per week do you engage in moderate to strenuous exercise (like a brisk walk)?: 5 days  Have you lost any weight without trying in the past 3 months?: No  Body mass index is 34.43 kg/m².  (!) Abnormal  Obesity Interventions:  Patient declines any further evaluation or treatment        Dentist Screen:  Have you seen the

## 2023-05-15 NOTE — PROGRESS NOTES
Subjective:      Patient ID: José Jules is a 79 y.o. male. HPI: Follow Up    Chief Complaint   Patient presents with    Medicare AW       Patient Active Problem List   Diagnosis    Essential hypertension    Obesity (BMI 30-39. 9)    Hyperlipidemia    COVID-19 virus detected    COVID-19    Acute respiratory failure with hypoxia (HCC)    Necrotizing soft tissue infection    Pressure injury of sacral region, stage 4 (HCC)    Sepsis ([address]    Septic shock (HCC)    Horseshoe abscess of ischiorectal space    Necrotizing fasciitis of pelvic region and thigh ([address]    Shock ([address]    Colostomy in place [facility])    Nonhealing surgical wound    Acute kidney failure, unspecified ([address]    Benign prostatic hyperplasia without lower urinary tract symptoms    Chronic kidney disease, stage 3 unspecified (HCC)    Difficulty in walking, not elsewhere classified    Dysphagia, pharyngeal phase    Muscle weakness (generalized)    Personal history of COVID-19    Reflex neuropathic bladder, not elsewhere classified    Type 2 diabetes mellitus without complications ([address]    Unspecified atrial fibrillation ([address]    Weakness       Colonoscopy - 2016  PNEUMO - 2022    [facility] - Dr. Tyra Mccarthy    Colostomy since Summer 2022. States this can be reversed at anytime and will be contacting [facility] regarding this. Was hosptialized due to Necrotizing soft tissue infection in rectal area that required colostomy to heal.     Denies CP, SOB or chest tightness. Strength is back to where he wants it at    [facility] 12/2021:  Conclusions      Summary   Ejection fraction is visually estimated at 60%. Overall left ventricular function is normal.    Wt Readings from Last 3 Encounters:   05/15/23 213 lb 4.8 oz (96.8 kg)   04/04/22 182 lb 4.8 oz (82.7 kg)   03/31/22 191 lb (86.6 kg)         BP elevated. Denies HA. Feels well. On Losartan 100 mg and Toprol 50 mg XL. States home BP in 140/150s.      BP Readings from Last 3 Encounters:

## 2023-07-27 ENCOUNTER — TELEPHONE (OUTPATIENT)
Dept: FAMILY MEDICINE CLINIC | Age: 68
End: 2023-07-27

## 2023-07-27 ENCOUNTER — APPOINTMENT (OUTPATIENT)
Dept: CT IMAGING | Age: 68
DRG: 304 | End: 2023-07-27
Payer: MEDICARE

## 2023-07-27 ENCOUNTER — HOSPITAL ENCOUNTER (INPATIENT)
Age: 68
LOS: 5 days | Discharge: NURSING, REHAB OR LONG-TERM CARE | DRG: 304 | End: 2023-08-01
Attending: EMERGENCY MEDICINE | Admitting: INTERNAL MEDICINE
Payer: MEDICARE

## 2023-07-27 ENCOUNTER — APPOINTMENT (OUTPATIENT)
Dept: GENERAL RADIOLOGY | Age: 68
DRG: 304 | End: 2023-07-27
Payer: MEDICARE

## 2023-07-27 DIAGNOSIS — I16.0 HYPERTENSIVE URGENCY: ICD-10-CM

## 2023-07-27 DIAGNOSIS — H53.2 DIPLOPIA: ICD-10-CM

## 2023-07-27 DIAGNOSIS — H49.22 LEFT ABDUCENS NERVE PALSY: Primary | ICD-10-CM

## 2023-07-27 PROBLEM — R77.8 TROPONIN LEVEL ELEVATED: Status: ACTIVE | Noted: 2023-07-27

## 2023-07-27 PROBLEM — R79.89 TROPONIN LEVEL ELEVATED: Status: ACTIVE | Noted: 2023-07-27

## 2023-07-27 PROBLEM — I16.1 HYPERTENSIVE EMERGENCY: Status: ACTIVE | Noted: 2023-07-27

## 2023-07-27 LAB
ALBUMIN SERPL BCG-MCNC: 4.3 G/DL (ref 3.5–5.1)
ALP SERPL-CCNC: 117 U/L (ref 38–126)
ALT SERPL W/O P-5'-P-CCNC: 24 U/L (ref 11–66)
ANION GAP SERPL CALC-SCNC: 12 MEQ/L (ref 8–16)
APTT PPP: 35.2 SECONDS (ref 22–38)
AST SERPL-CCNC: 24 U/L (ref 5–40)
BASOPHILS ABSOLUTE: 0.1 THOU/MM3 (ref 0–0.1)
BASOPHILS NFR BLD AUTO: 0.9 %
BILIRUB CONJ SERPL-MCNC: < 0.2 MG/DL (ref 0–0.3)
BILIRUB SERPL-MCNC: 0.6 MG/DL (ref 0.3–1.2)
BUN SERPL-MCNC: 15 MG/DL (ref 7–22)
CALCIUM SERPL-MCNC: 9.4 MG/DL (ref 8.5–10.5)
CHLORIDE SERPL-SCNC: 106 MEQ/L (ref 98–111)
CO2 SERPL-SCNC: 23 MEQ/L (ref 23–33)
CREAT SERPL-MCNC: 1.1 MG/DL (ref 0.4–1.2)
DEPRECATED MEAN GLUCOSE BLD GHB EST-ACNC: 123 MG/DL (ref 70–126)
DEPRECATED RDW RBC AUTO: 39.8 FL (ref 35–45)
EOSINOPHIL NFR BLD AUTO: 1 %
EOSINOPHILS ABSOLUTE: 0.1 THOU/MM3 (ref 0–0.4)
ERYTHROCYTE [DISTWIDTH] IN BLOOD BY AUTOMATED COUNT: 12.7 % (ref 11.5–14.5)
GFR SERPL CREATININE-BSD FRML MDRD: > 60 ML/MIN/1.73M2
GLUCOSE BLD STRIP.AUTO-MCNC: 119 MG/DL (ref 70–108)
GLUCOSE SERPL-MCNC: 114 MG/DL (ref 70–108)
HBA1C MFR BLD HPLC: 6.1 % (ref 4.4–6.4)
HCT VFR BLD AUTO: 46.9 % (ref 42–52)
HGB BLD-MCNC: 15.6 GM/DL (ref 14–18)
IMM GRANULOCYTES # BLD AUTO: 0.02 THOU/MM3 (ref 0–0.07)
IMM GRANULOCYTES NFR BLD AUTO: 0.3 %
INR PPP: 1.02 (ref 0.85–1.13)
LYMPHOCYTES ABSOLUTE: 1.2 THOU/MM3 (ref 1–4.8)
LYMPHOCYTES NFR BLD AUTO: 17.8 %
MCH RBC QN AUTO: 28.8 PG (ref 26–33)
MCHC RBC AUTO-ENTMCNC: 33.3 GM/DL (ref 32.2–35.5)
MCV RBC AUTO: 86.7 FL (ref 80–94)
MONOCYTES ABSOLUTE: 0.5 THOU/MM3 (ref 0.4–1.3)
MONOCYTES NFR BLD AUTO: 7.6 %
MRSA DNA SPEC QL NAA+PROBE: NEGATIVE
NEUTROPHILS NFR BLD AUTO: 72.4 %
NRBC BLD AUTO-RTO: 0 /100 WBC
NT-PROBNP SERPL IA-MCNC: 238.7 PG/ML (ref 0–124)
OSMOLALITY SERPL CALC.SUM OF ELEC: 283 MOSMOL/KG (ref 275–300)
PLATELET # BLD AUTO: 143 THOU/MM3 (ref 130–400)
PMV BLD AUTO: 11 FL (ref 9.4–12.4)
POTASSIUM SERPL-SCNC: 3.7 MEQ/L (ref 3.5–5.2)
PROT SERPL-MCNC: 7.8 G/DL (ref 6.1–8)
RBC # BLD AUTO: 5.41 MILL/MM3 (ref 4.7–6.1)
SEGMENTED NEUTROPHILS ABSOLUTE COUNT: 5 THOU/MM3 (ref 1.8–7.7)
SODIUM SERPL-SCNC: 141 MEQ/L (ref 135–145)
T4 FREE SERPL-MCNC: 1.47 NG/DL (ref 0.93–1.76)
TROPONIN, HIGH SENSITIVITY: 33 NG/L (ref 0–12)
TROPONIN, HIGH SENSITIVITY: 35 NG/L (ref 0–12)
TROPONIN, HIGH SENSITIVITY: 36 NG/L (ref 0–12)
TSH SERPL DL<=0.005 MIU/L-ACNC: 0.51 UIU/ML (ref 0.4–4.2)
TSH SERPL DL<=0.005 MIU/L-ACNC: 0.65 UIU/ML (ref 0.4–4.2)
WBC # BLD AUTO: 6.9 THOU/MM3 (ref 4.8–10.8)

## 2023-07-27 PROCEDURE — 6370000000 HC RX 637 (ALT 250 FOR IP): Performed by: INTERNAL MEDICINE

## 2023-07-27 PROCEDURE — 84443 ASSAY THYROID STIM HORMONE: CPT

## 2023-07-27 PROCEDURE — 85610 PROTHROMBIN TIME: CPT

## 2023-07-27 PROCEDURE — 70496 CT ANGIOGRAPHY HEAD: CPT

## 2023-07-27 PROCEDURE — 93010 ELECTROCARDIOGRAM REPORT: CPT | Performed by: INTERNAL MEDICINE

## 2023-07-27 PROCEDURE — 99214 OFFICE O/P EST MOD 30 MIN: CPT | Performed by: NURSE PRACTITIONER

## 2023-07-27 PROCEDURE — 96374 THER/PROPH/DIAG INJ IV PUSH: CPT

## 2023-07-27 PROCEDURE — 99223 1ST HOSP IP/OBS HIGH 75: CPT | Performed by: INTERNAL MEDICINE

## 2023-07-27 PROCEDURE — 99285 EMERGENCY DEPT VISIT HI MDM: CPT

## 2023-07-27 PROCEDURE — 2580000003 HC RX 258: Performed by: INTERNAL MEDICINE

## 2023-07-27 PROCEDURE — 2580000003 HC RX 258: Performed by: STUDENT IN AN ORGANIZED HEALTH CARE EDUCATION/TRAINING PROGRAM

## 2023-07-27 PROCEDURE — 85730 THROMBOPLASTIN TIME PARTIAL: CPT

## 2023-07-27 PROCEDURE — 2500000003 HC RX 250 WO HCPCS: Performed by: INTERNAL MEDICINE

## 2023-07-27 PROCEDURE — 85025 COMPLETE CBC W/AUTO DIFF WBC: CPT

## 2023-07-27 PROCEDURE — 6360000004 HC RX CONTRAST MEDICATION: Performed by: EMERGENCY MEDICINE

## 2023-07-27 PROCEDURE — 93005 ELECTROCARDIOGRAM TRACING: CPT | Performed by: STUDENT IN AN ORGANIZED HEALTH CARE EDUCATION/TRAINING PROGRAM

## 2023-07-27 PROCEDURE — 6360000002 HC RX W HCPCS: Performed by: STUDENT IN AN ORGANIZED HEALTH CARE EDUCATION/TRAINING PROGRAM

## 2023-07-27 PROCEDURE — 70498 CT ANGIOGRAPHY NECK: CPT

## 2023-07-27 PROCEDURE — 2500000003 HC RX 250 WO HCPCS: Performed by: STUDENT IN AN ORGANIZED HEALTH CARE EDUCATION/TRAINING PROGRAM

## 2023-07-27 PROCEDURE — 70450 CT HEAD/BRAIN W/O DYE: CPT

## 2023-07-27 PROCEDURE — 82948 REAGENT STRIP/BLOOD GLUCOSE: CPT

## 2023-07-27 PROCEDURE — 71045 X-RAY EXAM CHEST 1 VIEW: CPT

## 2023-07-27 PROCEDURE — 84484 ASSAY OF TROPONIN QUANT: CPT

## 2023-07-27 PROCEDURE — 80053 COMPREHEN METABOLIC PANEL: CPT

## 2023-07-27 PROCEDURE — 2060000000 HC ICU INTERMEDIATE R&B

## 2023-07-27 PROCEDURE — 93005 ELECTROCARDIOGRAM TRACING: CPT | Performed by: NURSE PRACTITIONER

## 2023-07-27 PROCEDURE — 82248 BILIRUBIN DIRECT: CPT

## 2023-07-27 PROCEDURE — 83036 HEMOGLOBIN GLYCOSYLATED A1C: CPT

## 2023-07-27 PROCEDURE — 87641 MR-STAPH DNA AMP PROBE: CPT

## 2023-07-27 PROCEDURE — 83880 ASSAY OF NATRIURETIC PEPTIDE: CPT

## 2023-07-27 PROCEDURE — 87070 CULTURE OTHR SPECIMN AEROBIC: CPT

## 2023-07-27 PROCEDURE — 36415 COLL VENOUS BLD VENIPUNCTURE: CPT

## 2023-07-27 PROCEDURE — 84439 ASSAY OF FREE THYROXINE: CPT

## 2023-07-27 RX ORDER — LOSARTAN POTASSIUM 100 MG/1
100 TABLET ORAL DAILY
Status: ON HOLD | COMMUNITY
Start: 2023-06-01 | End: 2023-08-01 | Stop reason: HOSPADM

## 2023-07-27 RX ORDER — ONDANSETRON 2 MG/ML
4 INJECTION INTRAMUSCULAR; INTRAVENOUS EVERY 6 HOURS PRN
Status: DISCONTINUED | OUTPATIENT
Start: 2023-07-27 | End: 2023-08-01 | Stop reason: HOSPADM

## 2023-07-27 RX ORDER — ATORVASTATIN CALCIUM 40 MG/1
40 TABLET, FILM COATED ORAL NIGHTLY
Status: DISCONTINUED | OUTPATIENT
Start: 2023-07-27 | End: 2023-07-28

## 2023-07-27 RX ORDER — SODIUM CHLORIDE 0.9 % (FLUSH) 0.9 %
5-40 SYRINGE (ML) INJECTION EVERY 12 HOURS SCHEDULED
Status: DISCONTINUED | OUTPATIENT
Start: 2023-07-27 | End: 2023-08-01 | Stop reason: HOSPADM

## 2023-07-27 RX ORDER — ACETAMINOPHEN 325 MG/1
650 TABLET ORAL EVERY 6 HOURS PRN
Status: DISCONTINUED | OUTPATIENT
Start: 2023-07-27 | End: 2023-08-01 | Stop reason: HOSPADM

## 2023-07-27 RX ORDER — SODIUM CHLORIDE 0.9 % (FLUSH) 0.9 %
5-40 SYRINGE (ML) INJECTION PRN
Status: DISCONTINUED | OUTPATIENT
Start: 2023-07-27 | End: 2023-08-01 | Stop reason: HOSPADM

## 2023-07-27 RX ORDER — TAMSULOSIN HYDROCHLORIDE 0.4 MG/1
0.4 CAPSULE ORAL DAILY
Status: DISCONTINUED | OUTPATIENT
Start: 2023-07-28 | End: 2023-08-01 | Stop reason: HOSPADM

## 2023-07-27 RX ORDER — AMLODIPINE BESYLATE 10 MG/1
10 TABLET ORAL DAILY
Status: DISCONTINUED | OUTPATIENT
Start: 2023-07-28 | End: 2023-07-28

## 2023-07-27 RX ORDER — SODIUM CHLORIDE 9 MG/ML
INJECTION, SOLUTION INTRAVENOUS PRN
Status: DISCONTINUED | OUTPATIENT
Start: 2023-07-27 | End: 2023-08-01 | Stop reason: HOSPADM

## 2023-07-27 RX ORDER — ENOXAPARIN SODIUM 100 MG/ML
40 INJECTION SUBCUTANEOUS DAILY
Status: DISCONTINUED | OUTPATIENT
Start: 2023-07-27 | End: 2023-08-01 | Stop reason: HOSPADM

## 2023-07-27 RX ORDER — ACETAMINOPHEN 650 MG/1
650 SUPPOSITORY RECTAL EVERY 6 HOURS PRN
Status: DISCONTINUED | OUTPATIENT
Start: 2023-07-27 | End: 2023-08-01 | Stop reason: HOSPADM

## 2023-07-27 RX ORDER — VALSARTAN 160 MG/1
160 TABLET ORAL 2 TIMES DAILY
Status: DISCONTINUED | OUTPATIENT
Start: 2023-07-27 | End: 2023-08-01 | Stop reason: HOSPADM

## 2023-07-27 RX ORDER — AMLODIPINE AND OLMESARTAN MEDOXOMIL 10; 40 MG/1; MG/1
1 TABLET ORAL DAILY
Status: DISCONTINUED | OUTPATIENT
Start: 2023-07-27 | End: 2023-07-27 | Stop reason: CLARIF

## 2023-07-27 RX ORDER — POLYETHYLENE GLYCOL 3350 17 G/17G
17 POWDER, FOR SOLUTION ORAL DAILY PRN
Status: DISCONTINUED | OUTPATIENT
Start: 2023-07-27 | End: 2023-08-01 | Stop reason: HOSPADM

## 2023-07-27 RX ORDER — METOPROLOL SUCCINATE 50 MG/1
50 TABLET, EXTENDED RELEASE ORAL DAILY
Status: DISCONTINUED | OUTPATIENT
Start: 2023-07-28 | End: 2023-08-01

## 2023-07-27 RX ORDER — LOSARTAN POTASSIUM 100 MG/1
100 TABLET ORAL DAILY
Status: DISCONTINUED | OUTPATIENT
Start: 2023-07-28 | End: 2023-07-27

## 2023-07-27 RX ORDER — HYDRALAZINE HYDROCHLORIDE 20 MG/ML
10 INJECTION INTRAMUSCULAR; INTRAVENOUS ONCE
Status: COMPLETED | OUTPATIENT
Start: 2023-07-27 | End: 2023-07-27

## 2023-07-27 RX ORDER — ONDANSETRON 4 MG/1
4 TABLET, ORALLY DISINTEGRATING ORAL EVERY 8 HOURS PRN
Status: DISCONTINUED | OUTPATIENT
Start: 2023-07-27 | End: 2023-08-01 | Stop reason: HOSPADM

## 2023-07-27 RX ADMIN — HYDRALAZINE HYDROCHLORIDE 10 MG: 20 INJECTION INTRAMUSCULAR; INTRAVENOUS at 12:24

## 2023-07-27 RX ADMIN — SODIUM CHLORIDE 5 MG/HR: 9 INJECTION, SOLUTION INTRAVENOUS at 18:16

## 2023-07-27 RX ADMIN — SODIUM CHLORIDE 5 MG/HR: 9 INJECTION, SOLUTION INTRAVENOUS at 21:41

## 2023-07-27 RX ADMIN — ATORVASTATIN CALCIUM 40 MG: 40 TABLET, FILM COATED ORAL at 23:14

## 2023-07-27 RX ADMIN — IOPAMIDOL 80 ML: 755 INJECTION, SOLUTION INTRAVENOUS at 13:57

## 2023-07-27 RX ADMIN — ENOXAPARIN SODIUM 40 MG: 100 INJECTION SUBCUTANEOUS at 20:47

## 2023-07-27 RX ADMIN — VALSARTAN 160 MG: 160 TABLET, FILM COATED ORAL at 21:15

## 2023-07-27 ASSESSMENT — ENCOUNTER SYMPTOMS
BLOOD IN STOOL: 0
VOMITING: 0
ABDOMINAL PAIN: 0
SHORTNESS OF BREATH: 0
SORE THROAT: 0
COUGH: 0
NAUSEA: 0
FACIAL SWELLING: 0
ANAL BLEEDING: 0
BACK PAIN: 0
CHOKING: 0
COLOR CHANGE: 0
ABDOMINAL DISTENTION: 0
RHINORRHEA: 0
DIARRHEA: 0
APNEA: 0
CHEST TIGHTNESS: 0
CONSTIPATION: 0

## 2023-07-27 ASSESSMENT — PAIN SCALES - GENERAL
PAINLEVEL_OUTOF10: 0
PAINLEVEL_OUTOF10: 0

## 2023-07-27 ASSESSMENT — PAIN - FUNCTIONAL ASSESSMENT
PAIN_FUNCTIONAL_ASSESSMENT: NONE - DENIES PAIN
PAIN_FUNCTIONAL_ASSESSMENT: NONE - DENIES PAIN

## 2023-07-27 NOTE — TELEPHONE ENCOUNTER
Patient calling with c/o double vision in right eye x 2-3 days. He contacted his eye doctor and was told to call PCP. Offered and declines appt due to transportation issues. Discussed [facility] for evaluation. He voices understanding and will find a ride and go there for evaluation.

## 2023-07-27 NOTE — PROGRESS NOTES
Pt admitted to  4K23 from ED. Complaints: None. IV none infusing into the forearm right, condition patent and no redness. IV site free of s/s of infection or infiltration. Vital signs obtained. Assessment and data collection initiated. Two nurse skin assessment performed by Cornel Austin RN and Nidhi RN. Oriented to room. Policies and procedures for [facility] explained. All questions answered with no further questions at this time. Fall prevention and safety brochure discussed with patient. Bed alarm on. Call light in reach.

## 2023-07-27 NOTE — ED NOTES
Patient resting in bed. Respirations easy and unlabored. No distress noted. Call light within reach.        Daisy Block RN  07/27/23 [id number]

## 2023-07-27 NOTE — ED NOTES
Pt transported from ED to [facility] on cart in stable condition. Writer spoke to Ismael Barragan prior to transport.       Blanca Marin  07/27/23 2581

## 2023-07-27 NOTE — ED TRIAGE NOTES
Pt presents to [facility] for c/o blurred vision in the right eye x 3 days.

## 2023-07-27 NOTE — H&P
Hospitalist - History & Physical      Patient: Katie Kamaraing    Unit/Bed:22/022A  YOB: 1955  MRN: [id number]   Acct: [de-identified]   PCP: DIONTE Barbosa CNP    Chief Complaint:  double vision    Assessment and Plan:    # HTN emergency: 2/2 subtherapeutic. Hx uncontrol HTN (on Amlodipine-Olmesartan, Metoprolol). Seen in PCP office 1 month ago with SBP 200s. Amlodipine-Olmesartan was added during last PCP visit. Reported has been worsening vision last 3 days. Symptom has been progressive worsening today when he was driving. /80. Received Hydralazine 10 mg IV in ED. CT head and CTA head and neck (7/27/23) wnl.  - Plan: Start Nicardipine gtt with goal SBP reduce 25% in 24 hrs (-180). Order Brain MRI. # Tropinin elevated: likely 2/2 HTN emergency. Asymptomatic for chest pain. EKG (7/27/23) sinus bradycardia with 1st av block secondary to BB    # Right Internal Carotid artery stenosis: 40% stenosis on CTA head & neck (7/27/23). Pending MRI brain, Lipid panel, A1C    # Hyperglycemia: never diagnosis with DM. Check A1C. Will initiate anti glycemic agent if indicate    # Enlarge right and left lobes of thyroid gland: incidental finding on CTA head and neck. Check TSH and free T4 giving patient uncontrol HTN    # BPH: resume Flomax. Date of Service: Pt seen/examined on 07/27/23  and Admitted to Inpatient with expected LOS greater than two midnights due to medical therapy. Chief Complaint:  right eye double vision    HPI:  Katie Osuna is a 76 y.o., , male who  has a past medical history of Diabetes mellitus ([address]), Hyperlipidemia, and Hypertension. that is hospitalized for right eye double vision. Patient reports having uncontrolled hypertension. He has been seeing by PCP 1 month ago with SBP 210s during office visit. His regimen was added on Amlodipine-Olmesartan. Over last 3 days, his vision was progressive worsening.  Today, patient develop double vision from Rate >60 >60 ml/min/1.73m2   Osmolality    Collection Time: 07/27/23 12:56 PM   Result Value Ref Range    Osmolality Calc 283.0 275.0 - 300.0 mOsmol/kg       IMAGING:  CTA HEAD W WO CONTRAST    Result Date: 7/27/2023  PROCEDURE: CTA HEAD W WO CONTRAST, CTA NECK W WO CONTRAST CLINICAL INFORMATION: left abducens palsy; diplopia. COMPARISON: CT scan of the brain obtained on the same day. . TECHNIQUE: 1 mm axial images were obtained through the head and neck after the fast bolus administration of contrast. A noncontrast localizer was obtained. 3-D reconstructions were performed on a dedicated 3-D workstation. These include multiplanar MPR images and multiplanar MIP images. Centerline reconstructions were obtained of the carotid systems. Isovue intravenous contrast was given. All carotid artery measurements are performed utilizing Nascet criteria. All CT scans at this facility use dose modulation, iterative reconstruction, and/or weight-based dosing when appropriate to reduce radiation dose to as low as reasonably achievable. FINDINGS: CTA NECK: Aortic arch and branches: There is atherosclerotic calcification in the aortic arch. There is normal origin of the brachiocephalic, left common carotid and left subclavian arteries. Right common carotid artery/ICA: There is calcified plaque involving the right carotid bifurcation. There is 40% stenosis at the origin of the right internal carotid artery. There is no significant hemodynamic stenosis in the right common carotid artery. Left common carotid artery/ICA: There is calcified plaque involving the left carotid bifurcation. There is no significant hemodynamic stenosis in the left common and internal carotid arteries. Vertebral arteries: There is a larger left and smaller right vertebral arteries with antegrade flow bilaterally. CTA HEAD: Internal carotid arteries: There is antegrade flow in the internal carotid arteries bilaterally. . Middle cerebral arteries:  There is

## 2023-07-27 NOTE — ED NOTES
Into see pt and help Zoe Salazar, pt's bp done manually because it would not register using automatic machine. Pt assessment is agreeable to findings that Zoe Salazar assessment. EKG report handed to [facility] and he shortly went and assessed pt as well.      Shahid Engel RN  07/27/23 5835

## 2023-07-27 NOTE — ED TRIAGE NOTES
Pt transferred to [facility] ED via [facility] for further evaluation and stroke rule out. Pt  verbalized understanding and agreeable to plan.

## 2023-07-27 NOTE — ED PROVIDER NOTES
[address] [facility] EMERGENCY DEPT    Pt Name: Jesus Love  MRN: [id number]  [address] [facility] 1955  Date of evaluation: 7/27/2023  Resident Physician: Regino Merchant MD  Attending Physician: Tea Lomeli, [address] [facility]       Chief Complaint   Patient presents with    Blurred Vision    Diplopia     HISTORY OF PRESENT ILLNESS   Jesus Love is a 76 y.o. male with PMHx of non-insulin-dependent type 2 diabetes  who presents to the emergency department  from urgent care , as a walk in to the ED lob for evaluation of diplopia. Patient reports sudden onset painless diplopia which began 3 days ago. Patient reports that the diplopia improves if he closes 1 eye; symptoms are only present when both eyes are open. He denies headache, fevers, chills, weakness, numbness, slurred speech or facial droop. The patient has no other acute complaints at this time. PASTMEDICAL HISTORY     Past Medical History:   Diagnosis Date    Diabetes mellitus ([address])     Hyperlipidemia     Hypertension        Patient Active Problem List   Diagnosis Code    Essential hypertension I10    Obesity (BMI 30-39. 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) Z93.3    Nonhealing surgical wound T81.89XA    Acute kidney failure, unspecified ([address]) N17.9    Benign prostatic hyperplasia without lower urinary tract symptoms N40.0    Chronic kidney disease, stage 3 unspecified (HCC) N18.30    Difficulty in walking, not elsewhere classified R26.2    Dysphagia, pharyngeal phase R13.13    Muscle weakness (generalized) M62.81    Personal history of See Ed Course (if available)                 Decision Rules/Clinical Scores utilized:              External Documentation Reviewed:         Previous patient encounter documents & history available on EMR was reviewed              See Formal Diagnostic Results above for the lab and radiology tests and orders. Please see ED course for further reassessments, treatments, MDM, and final disposition. Case discussed with specialties other than Emergency Medicine: Neurology and Hospitalist         Shared Decision-Making was performed, disposition discussed with the patient/family and questions answered. Social determinants of health impacting treatment or disposition:           Code Status:  Not addressed during this ED visit      Summary of Patient Presentation:      The patient presents with a left cranial nerve VI palsy. CT shows an area of ischemic change in the thalamus. Suspect the patient likely had a stroke 3 days ago. Code stroke not activated symptoms began way outside of the window of intervention. Patient arrived with a blood pressure in the 220s. Will require aggressive blood pressure management. Neurology paged. ED Course as of 07/27/23 1548   Thu Jul 27, 2023   1456 Received report from radiology. We were able to get the patient successful CT scan however due to the kyphosis of the cervical spine. We are unable to fit him in the MRI [TM]   1519 CT HEAD WO CONTRAST  IMPRESSION:     1. Probable ischemic changes in the white matter. 2. Otherwise negative noncontrast CT scan of the brain. . [TM]   1 CT HEAD WO CONTRAST  There is an area of white matter concerning for stroke. Thalamic on the left. Suspect this might be the cause of patient's symptoms.  [TM]      ED Course User Index  [TM] Douglas Terry MD     ED Medications administered this visit:  (None if blank)  Medications   hydrALAZINE (APRESOLINE) injection 10 mg (10 mg IntraVENous Given 7/27/23 1224)   iopamidol

## 2023-07-27 NOTE — ED PROVIDER NOTES
[address] [facility]  Urgent Care Encounter       CHIEF COMPLAINT       Chief Complaint   Patient presents with    Blurred Vision       Nurses Notes reviewed and I agree except as noted in the HPI. HISTORY OF PRESENT ILLNESS   Lance George is a 76 y.o. male who presents to the [facility] ambulatory care center for evaluation of diplopia. He reports this started roughly 2 to 3 days ago. He does report it is only in his right eye. He denies dizziness, lightheadedness, or other visual disturbances. Denies dyspnea or chest pain. Denies headache. Denies weakness, confusion, or slurred speech. He is noted to have difficulty tracking my finger in the visual assessment. This is worse on the left. He was able to track mildly on the right. Does have a history of hypertension. Denies history of CVA or MI. The history is provided by the patient. No  was used. REVIEW OF SYSTEMS     Review of Systems   Constitutional:  Negative for activity change, appetite change, chills, fatigue and fever. HENT:  Negative for ear discharge, ear pain, rhinorrhea and sore throat. Eyes:  Positive for visual disturbance (double vision). Respiratory:  Negative for cough, chest tightness and shortness of breath. Cardiovascular:  Negative for chest pain. Gastrointestinal:  Negative for diarrhea, nausea and vomiting. Genitourinary:  Negative for dysuria. Skin:  Negative for rash. Allergic/Immunologic: Negative for environmental allergies and food allergies. Neurological:  Negative for dizziness and headaches. PAST MEDICAL HISTORY         Diagnosis Date    Diabetes mellitus ([address] [facility])     Hyperlipidemia     Hypertension        SURGICALHISTORY     Patient  has a past surgical history that includes other surgical history (Aug 29, 2013); Endoscopy, colon, diagnostic; Rectal surgery (N/A, 1/14/2022); and laparoscopy (N/A, 1/19/2022).     CURRENT MEDICATIONS       Previous Medications

## 2023-07-27 NOTE — FLOWSHEET NOTE
07/27/23 1828   Safe Environment   Safety Measures Other (comment)  (VN attempted to complete admission at this time)     VN called into patients room and introduced myself and role. Patient did not answer. Video activated for safety check. . Patient resting comfortably in bed. . Staff at bedside at this time.

## 2023-07-27 NOTE — ED TRIAGE NOTES
Presents to ED with c/o double vision in right eye that started 3 days ago. Patient was transferred here from urgent care. Patient denies symptoms at this time. Alert and oriented. Respirations easy and unlabored.

## 2023-07-27 NOTE — ED NOTES
ED to inpatient nurses report    Chief Complaint   Patient presents with    Blurred Vision    Diplopia      Present to ED from home  LOC: alert and orientated to name, place, date  Vital signs   Vitals:    07/27/23 1226 07/27/23 1237 07/27/23 1334 07/27/23 1555   BP: (!) 214/72 (!) 196/73 (!) 185/59 (!) 196/72   Pulse: 53 58 53 56   Resp:  12 10 13   Temp:       TempSrc:       SpO2: 97% 99% 99% 99%   Weight:       Height:          Oxygen Baseline ra    Current needs required ra Bipap/Cpap No  LDAs:   Peripheral IV 07/27/23 Right Forearm (Active)   Site Assessment Clean, dry & intact 07/27/23 1556     Mobility: Requires assistance * 1  Pending ED orders: none  Present condition: stable    Electronically signed by Dinorah Hernandez RN on 7/27/2023 at [address] PM        Dinorah Hernandez RN  07/27/23 1702

## 2023-07-28 ENCOUNTER — APPOINTMENT (OUTPATIENT)
Dept: MRI IMAGING | Age: 68
DRG: 304 | End: 2023-07-28
Payer: MEDICARE

## 2023-07-28 LAB
AMPHETAMINES UR QL SCN: NEGATIVE
ANION GAP SERPL CALC-SCNC: 13 MEQ/L (ref 8–16)
BACTERIA: ABNORMAL
BARBITURATES UR QL SCN: NEGATIVE
BENZODIAZ UR QL SCN: NEGATIVE
BILIRUB UR QL STRIP: NEGATIVE
BUN SERPL-MCNC: 18 MG/DL (ref 7–22)
BZE UR QL SCN: NEGATIVE
CALCIUM SERPL-MCNC: 9 MG/DL (ref 8.5–10.5)
CANNABINOIDS UR QL SCN: NEGATIVE
CASTS #/AREA URNS LPF: ABNORMAL /LPF
CASTS #/AREA URNS LPF: ABNORMAL /LPF
CHARACTER UR: ABNORMAL
CHARCOAL URNS QL MICRO: ABNORMAL
CHLORIDE SERPL-SCNC: 107 MEQ/L (ref 98–111)
CHOLEST SERPL-MCNC: 210 MG/DL (ref 100–199)
CO2 SERPL-SCNC: 22 MEQ/L (ref 23–33)
COLOR UR: YELLOW
CREAT SERPL-MCNC: 1.1 MG/DL (ref 0.4–1.2)
CRYSTALS URNS QL MICRO: ABNORMAL
DEPRECATED RDW RBC AUTO: 41.1 FL (ref 35–45)
EPITHELIAL CELLS, UA: ABNORMAL /HPF
ERYTHROCYTE [DISTWIDTH] IN BLOOD BY AUTOMATED COUNT: 12.8 % (ref 11.5–14.5)
FENTANYL: NEGATIVE
GFR SERPL CREATININE-BSD FRML MDRD: > 60 ML/MIN/1.73M2
GLUCOSE SERPL-MCNC: 99 MG/DL (ref 70–108)
GLUCOSE UR QL STRIP.AUTO: NEGATIVE MG/DL
HCT VFR BLD AUTO: 44.9 % (ref 42–52)
HDLC SERPL-MCNC: 33 MG/DL
HGB BLD-MCNC: 14.7 GM/DL (ref 14–18)
HGB UR QL STRIP.AUTO: ABNORMAL
KETONES UR QL STRIP.AUTO: ABNORMAL
LDLC SERPL CALC-MCNC: 125 MG/DL
LEUKOCYTE ESTERASE UR QL STRIP.AUTO: ABNORMAL
LV EF: 60 %
LVEF MODALITY: NORMAL
MAGNESIUM SERPL-MCNC: 2.4 MG/DL (ref 1.6–2.4)
MCH RBC QN AUTO: 28.8 PG (ref 26–33)
MCHC RBC AUTO-ENTMCNC: 32.7 GM/DL (ref 32.2–35.5)
MCV RBC AUTO: 87.9 FL (ref 80–94)
NITRITE UR QL STRIP.AUTO: POSITIVE
OPIATES UR QL SCN: NEGATIVE
OXYCODONE: NEGATIVE
PCP UR QL SCN: NEGATIVE
PH UR STRIP.AUTO: 5.5 [PH] (ref 5–9)
PLATELET # BLD AUTO: 145 THOU/MM3 (ref 130–400)
PMV BLD AUTO: 10.9 FL (ref 9.4–12.4)
POTASSIUM SERPL-SCNC: 3.5 MEQ/L (ref 3.5–5.2)
POTASSIUM SERPL-SCNC: 3.7 MEQ/L (ref 3.5–5.2)
PROT UR STRIP.AUTO-MCNC: 30 MG/DL
RBC # BLD AUTO: 5.11 MILL/MM3 (ref 4.7–6.1)
RBC #/AREA URNS HPF: ABNORMAL /HPF
RENAL EPI CELLS #/AREA URNS HPF: ABNORMAL /[HPF]
SODIUM SERPL-SCNC: 142 MEQ/L (ref 135–145)
SPECIFIC GRAVITY UA: > 1.03 (ref 1–1.03)
TRIGL SERPL-MCNC: 260 MG/DL (ref 0–199)
UROBILINOGEN, URINE: 0.2 EU/DL (ref 0–1)
WBC # BLD AUTO: 7.7 THOU/MM3 (ref 4.8–10.8)
WBC #/AREA URNS HPF: > 200 /HPF
YEAST LIKE FUNGI URNS QL MICRO: ABNORMAL

## 2023-07-28 PROCEDURE — 6370000000 HC RX 637 (ALT 250 FOR IP): Performed by: NURSE PRACTITIONER

## 2023-07-28 PROCEDURE — 2060000000 HC ICU INTERMEDIATE R&B

## 2023-07-28 PROCEDURE — 82088 ASSAY OF ALDOSTERONE: CPT

## 2023-07-28 PROCEDURE — 80048 BASIC METABOLIC PNL TOTAL CA: CPT

## 2023-07-28 PROCEDURE — 81001 URINALYSIS AUTO W/SCOPE: CPT

## 2023-07-28 PROCEDURE — 6370000000 HC RX 637 (ALT 250 FOR IP): Performed by: STUDENT IN AN ORGANIZED HEALTH CARE EDUCATION/TRAINING PROGRAM

## 2023-07-28 PROCEDURE — 93306 TTE W/DOPPLER COMPLETE: CPT

## 2023-07-28 PROCEDURE — 2500000003 HC RX 250 WO HCPCS: Performed by: INTERNAL MEDICINE

## 2023-07-28 PROCEDURE — 99233 SBSQ HOSP IP/OBS HIGH 50: CPT | Performed by: INTERNAL MEDICINE

## 2023-07-28 PROCEDURE — 84244 ASSAY OF RENIN: CPT

## 2023-07-28 PROCEDURE — 85027 COMPLETE CBC AUTOMATED: CPT

## 2023-07-28 PROCEDURE — 36415 COLL VENOUS BLD VENIPUNCTURE: CPT

## 2023-07-28 PROCEDURE — 70551 MRI BRAIN STEM W/O DYE: CPT

## 2023-07-28 PROCEDURE — 80061 LIPID PANEL: CPT

## 2023-07-28 PROCEDURE — 99222 1ST HOSP IP/OBS MODERATE 55: CPT | Performed by: NURSE PRACTITIONER

## 2023-07-28 PROCEDURE — 6360000002 HC RX W HCPCS: Performed by: STUDENT IN AN ORGANIZED HEALTH CARE EDUCATION/TRAINING PROGRAM

## 2023-07-28 PROCEDURE — 2580000003 HC RX 258: Performed by: INTERNAL MEDICINE

## 2023-07-28 PROCEDURE — 83735 ASSAY OF MAGNESIUM: CPT

## 2023-07-28 PROCEDURE — 84132 ASSAY OF SERUM POTASSIUM: CPT

## 2023-07-28 PROCEDURE — 80307 DRUG TEST PRSMV CHEM ANLYZR: CPT

## 2023-07-28 PROCEDURE — 6370000000 HC RX 637 (ALT 250 FOR IP): Performed by: INTERNAL MEDICINE

## 2023-07-28 RX ORDER — FUROSEMIDE 20 MG/1
20 TABLET ORAL DAILY
Status: DISCONTINUED | OUTPATIENT
Start: 2023-07-28 | End: 2023-07-28

## 2023-07-28 RX ORDER — ATORVASTATIN CALCIUM 80 MG/1
80 TABLET, FILM COATED ORAL NIGHTLY
Status: DISCONTINUED | OUTPATIENT
Start: 2023-07-28 | End: 2023-08-01 | Stop reason: HOSPADM

## 2023-07-28 RX ORDER — NIFEDIPINE 30 MG/1
60 TABLET, EXTENDED RELEASE ORAL DAILY
Status: DISCONTINUED | OUTPATIENT
Start: 2023-07-28 | End: 2023-07-29

## 2023-07-28 RX ORDER — ASPIRIN 81 MG/1
81 TABLET ORAL DAILY
Status: DISCONTINUED | OUTPATIENT
Start: 2023-07-28 | End: 2023-08-01 | Stop reason: HOSPADM

## 2023-07-28 RX ORDER — CHLORTHALIDONE 25 MG/1
25 TABLET ORAL DAILY
Status: DISCONTINUED | OUTPATIENT
Start: 2023-07-28 | End: 2023-07-30

## 2023-07-28 RX ORDER — POTASSIUM CHLORIDE 20 MEQ/1
40 TABLET, EXTENDED RELEASE ORAL ONCE
Status: COMPLETED | OUTPATIENT
Start: 2023-07-28 | End: 2023-07-28

## 2023-07-28 RX ADMIN — ASPIRIN 81 MG: 81 TABLET, COATED ORAL at 13:13

## 2023-07-28 RX ADMIN — SODIUM CHLORIDE 7.5 MG/HR: 9 INJECTION, SOLUTION INTRAVENOUS at 13:04

## 2023-07-28 RX ADMIN — VALSARTAN 160 MG: 160 TABLET, FILM COATED ORAL at 20:11

## 2023-07-28 RX ADMIN — ATORVASTATIN CALCIUM 80 MG: 80 TABLET, FILM COATED ORAL at 20:19

## 2023-07-28 RX ADMIN — CHLORTHALIDONE 25 MG: 25 TABLET ORAL at 12:37

## 2023-07-28 RX ADMIN — TAMSULOSIN HYDROCHLORIDE 0.4 MG: 0.4 CAPSULE ORAL at 08:14

## 2023-07-28 RX ADMIN — VALSARTAN 160 MG: 160 TABLET, FILM COATED ORAL at 08:14

## 2023-07-28 RX ADMIN — FUROSEMIDE 20 MG: 20 TABLET ORAL at 10:46

## 2023-07-28 RX ADMIN — SODIUM CHLORIDE 5 MG/HR: 9 INJECTION, SOLUTION INTRAVENOUS at 22:21

## 2023-07-28 RX ADMIN — SODIUM CHLORIDE 7.5 MG/HR: 9 INJECTION, SOLUTION INTRAVENOUS at 06:48

## 2023-07-28 RX ADMIN — SODIUM CHLORIDE 5 MG/HR: 9 INJECTION, SOLUTION INTRAVENOUS at 17:14

## 2023-07-28 RX ADMIN — ENOXAPARIN SODIUM 40 MG: 100 INJECTION SUBCUTANEOUS at 20:12

## 2023-07-28 RX ADMIN — SODIUM CHLORIDE 10 MG/HR: 9 INJECTION, SOLUTION INTRAVENOUS at 09:57

## 2023-07-28 RX ADMIN — NIFEDIPINE 60 MG: 30 TABLET, FILM COATED, EXTENDED RELEASE ORAL at 14:48

## 2023-07-28 RX ADMIN — POTASSIUM CHLORIDE 40 MEQ: 1500 TABLET, EXTENDED RELEASE ORAL at 14:48

## 2023-07-28 ASSESSMENT — PAIN SCALES - GENERAL
PAINLEVEL_OUTOF10: 0
PAINLEVEL_OUTOF10: 0

## 2023-07-28 ASSESSMENT — ENCOUNTER SYMPTOMS: DIPLOPIA: RIGHT

## 2023-07-28 ASSESSMENT — VISUAL ACUITY: VA_NORMAL: 1

## 2023-07-28 NOTE — PROGRESS NOTES
Hospitalist Progress Note    Patient:  Armin Stewart      Unit/Bed:4K-23/023-A    YOB: 1955    MRN: [id number]       Acct: [de-identified]     PCP: DIONTE Angulo CNP    Date of Admission: 7/27/2023      Assessment/Plan:    # HTN emergency: 2/2 subtherapeutic. Hx uncontrol HTN (on Amlodipine-Olmesartan, Metoprolol). Seen in PCP office 1 month ago with SBP 200s. Amlodipine-Olmesartan was added during last PCP visit. Reported has been worsening vision last 3 days. Symptom has been progressive worsening today when he was driving. /80. Received Hydralazine 10 mg IV in ED. UDS was negative  7/27: Start Nicardipine gtt with goal SBP reduce 25% in 24 hrs (-180).   7/28: Cont wean of Nicardipine. Resume Valdartan 160 mg daily. Hold Metoprolol due to bradycardia. Start Procardia 60 daily, Chlorthalidone 25 daily. # Stroke like symptom: worsening vision then progressive double vision on 7/27/23. CT head negative. CTA head and neck showed mild right ICA. Unable to obtain brain MRI due to body habitus. Neurology was consulted by ED provider. 7/28: pending limited brain MRI     # Right Internal Carotid artery stenosis: 40% stenosis on CTA head & neck (7/27/23). Lipid panel showed hyperlipidemia. Started Lipitor    # Left NC VI palsy: unknown etiology. No trauma history. Unable to obtain brain MRI due to body habitus. Right eye patch was added for diplopia    # Hyperlipidemia: elevated Cholesterol, Triglyceride. Start Lipitor 80 daily. Follow up with PCP as OP. Monitor liver function with PCP     # Tropinin elevated: likely 2/2 HTN emergency. Asymptomatic for chest pain. EKG (7/27/23) sinus bradycardia with 1st av block secondary to BB. No recent echo. Murmur on PE. Pending Echo    # Hyperglycemia: never diagnosis with DM. A1C 6.1. Encourage lifestyle modification. # Enlarge right and left lobes of thyroid gland: incidental finding on CTA head and neck. TSH and free T4 wnl.      # 07/28/2023 12:19 AM    BACTERIA NONE SEEN 07/28/2023 12:19 AM    RBCUA 50-75 07/28/2023 12:19 AM    BLOODU MODERATE 07/28/2023 12:19 AM    SPECGRAV >1.030 07/28/2023 12:19 AM    GLUCOSEU NEGATIVE 01/14/2022 08:40 PM       Radiology:  CT HEAD WO CONTRAST   Final Result       1. Probable ischemic changes in the white matter. 2. Otherwise negative noncontrast CT scan of the brain. .               **This report has been created using voice recognition software. It may contain minor errors which are inherent in voice recognition technology. **      Final report electronically signed by DR Nas Black on 7/27/2023 3:11 PM      CTA HEAD W WO CONTRAST   Final Result       1. Calcified plaque involving the right carotid bifurcation. 40% stenosis at the origin of the right internal carotid artery. No significant hemodynamic stenosis in the right common carotid artery. 2. Calcified plaque involving the left carotid bifurcation. No significant hemodynamic stenosis in the left common and internal carotid arteries. 3. There is antegrade flow in the right and left vertebral arteries. 4. Negative CTA of the brain. 5. Enlarged right and left lobes of the thyroid gland. 6. Cardiomegaly. 7. Cervical spondylosis. 8. Diffusion MRI scan of the brain would be helpful for more complete evaluation in this patient. **This report has been created using voice recognition software. It may contain minor errors which are inherent in voice recognition technology. **      Final report electronically signed by DR Nas Black on 7/27/2023 3:39 PM      CTA [address]   Final Result       1. Calcified plaque involving the right carotid bifurcation. 40% stenosis at the origin of the right internal carotid artery. No significant hemodynamic stenosis in the right common carotid artery. 2. Calcified plaque involving the left carotid bifurcation.  No significant hemodynamic stenosis in the left common and internal carotid

## 2023-07-28 NOTE — FLOWSHEET NOTE
07/28/23 1344   Safe Environment   Safety Measures Other (comment)  (Virtual safety round)     Virtual RN called into patient room. Virtual RN receives consent to turn on camera. Patient observed out of bed to chair at time of virtual visit. Patient responds appropriately to virtual nurse when prompted. No acute distress noted. Patient denies having any immediate needs at this time. Education reviewed with patient at this time . Patient call light, bedside table, and phone within patient reach. Virtual to continue to round and educate as appropriate.

## 2023-07-28 NOTE — CARE COORDINATION
Case Management Assessment  Initial Evaluation    Date/Time of Evaluation: 7/28/2023 11:46 AM  Assessment Completed by: Janes Pizano RN    If patient is discharged prior to next notation, then this note serves as note for discharge by case management. Patient Name: Candace Hawk                   YOB: 1955  Diagnosis: Diplopia [H53.2]  Hypertensive urgency [I16.0]  Hypertensive emergency [I16.1]  Left abducens nerve palsy [H49.22]                   Date / Time: 7/27/2023 10:33 AM  Location: [address] [place] [address]     Patient Admission Status: Inpatient   Readmission Risk Low 0-14, Mod 15-19), High > 20: Readmission Risk Score: 8.7    Current PCP: DIONTE Matute CNP  PCP verified by CM? Yes    Chart Reviewed: Yes      History Provided by: Patient  Patient Orientation: Alert and Oriented    Patient Cognition: Alert    Hospitalization in the last 30 days (Readmission):  No    If yes, Readmission Assessment in CM Navigator will be completed. Advance Directives:      Code Status: Full Code   Patient's Primary Decision Maker is: Named in [address] [address]    Primary Decision Maker: Manuelito Lin - Brother/Sister - [phone]    Secondary Decision Maker: Gayla Warrenming - Other - [phone]    Discharge Planning:    Patient lives with: Alone Type of Home: Apartment  Primary Care Giver: Self  Patient Support Systems include: Family Members, Friends/Neighbors, Other (Comment) ([employer] [place] [employer])   Current Financial resources: Medicare, Medicaid  Current community resources: Other (Comment) ([facility] [employer])  Current services prior to admission: None            Current DME:              Type of Home Care services:  None    ADLS  Prior functional level: Independent in ADLs/IADLs  Current functional level:  Independent in ADLs/IADLs    Family can provide assistance at DC: No  Would you like Case Management to discuss the discharge plan with any other family carotid bifurcation. 40% stenosis at the origin of the right internal carotid artery. No significant hemodynamic stenosis in the right common carotid artery. 2. Calcified plaque involving the left carotid bifurcation. No significant hemodynamic stenosis in the left common and internal carotid arteries. 3. There is antegrade flow in the right and left vertebral arteries. 4. Negative CTA of the brain. 5. Enlarged right and left lobes of the thyroid gland. 6. Cardiomegaly. 7. Cervical spondylosis. 8. Diffusion MRI scan of the brain would be helpful for more complete evaluation in this patient. 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 He lives at home alone, he drives but will need assistance with a ride home. States if he is discharged this weekend he states his friend can pick him up. If discharged next week, he will call [facility] or will need a cab ride home. States he can afford to pay. Denies any other discharge needs. Spoke with Hospitalist, would like therapy to eval.   Transportation/Food Security/Housekeeping Addressed: No issues identified.      Eliezer Jenkins RN  Case Management Department

## 2023-07-28 NOTE — PLAN OF CARE
Problem: Discharge Planning  Goal: Discharge to home or other facility with appropriate resources  Outcome: Progressing  Flowsheets (Taken 7/27/2023 2226)  Discharge to home or other facility with appropriate resources:   Identify barriers to discharge with patient and caregiver   Arrange for needed discharge resources and transportation as appropriate   Identify discharge learning needs (meds, wound care, etc)   Refer to discharge planning if patient needs post-hospital services based on physician order or complex needs related to functional status, cognitive ability or social support system     Problem: Neurosensory - Adult  Goal: Achieves stable or improved neurological status  Outcome: Progressing  Flowsheets (Taken 7/27/2023 2226)  Achieves stable or improved neurological status:   Assess for and report changes in neurological status   Monitor temperature, glucose, and sodium.  Initiate appropriate interventions as ordered  Goal: Achieves maximal functionality and self care  Outcome: Progressing  Flowsheets (Taken 7/27/2023 2226)  Achieves maximal functionality and self care:   Monitor swallowing and airway patency with patient fatigue and changes in neurological status   Encourage and assist patient to increase activity and self care with guidance from physical therapy/occupational therapy   Encourage visually impaired, hearing impaired and aphasic patients to use assistive/communication devices     Problem: Respiratory - Adult  Goal: Achieves optimal ventilation and oxygenation  Outcome: Progressing  Flowsheets (Taken 7/27/2023 2226)  Achieves optimal ventilation and oxygenation:   Assess for changes in respiratory status   Assess for changes in mentation and behavior   Oxygen supplementation based on oxygen saturation or arterial blood gases   Position to facilitate oxygenation and minimize respiratory effort     Problem: Cardiovascular - Adult  Goal: Maintains optimal cardiac output and hemodynamic stability  Outcome: Progressing  Flowsheets (Taken 7/27/2023 2226)  Maintains optimal cardiac output and hemodynamic stability:   Monitor blood pressure and heart rate   Assess for signs of decreased cardiac output   Administer vasoactive medications as ordered     Problem: Musculoskeletal - Adult  Goal: Return mobility to safest level of function  Outcome: Progressing  Flowsheets (Taken 7/27/2023 2226)  Return Mobility to Safest Level of Function:   Assess patient stability and activity tolerance for standing, transferring and ambulating with or without assistive devices   Assist with transfers and ambulation using safe patient handling equipment as needed   Obtain physical therapy/occupational therapy consults as needed  Goal: Return ADL status to a safe level of function  Outcome: Progressing  Flowsheets (Taken 7/27/2023 2226)  Return ADL Status to a Safe Level of Function:   Administer medication as ordered   Assess activities of daily living deficits and provide assistive devices as needed   Assist and instruct patient to increase activity and self care as tolerated   Obtain physical therapy/occupational therapy consults as needed     Problem: Skin/Tissue Integrity  Goal: Absence of new skin breakdown  Description: 1. Monitor for areas of redness and/or skin breakdown  2. Assess vascular access sites hourly  3. Every 4-6 hours minimum:  Change oxygen saturation probe site  4. Every 4-6 hours:  If on nasal continuous positive airway pressure, respiratory therapy assess nares and determine need for appliance change or resting period. Outcome: Progressing  Note: No signs of new skin breakdown. Skin is warm and dry. Multiple wounds noted see LDA  Mucous membranes pink and moist.  Assistance with turns/ambulation provided PRN. Will continue to monitor.        Problem: Skin/Tissue Integrity - Adult  Goal: Skin integrity remains intact  Outcome: Progressing  Flowsheets (Taken 7/27/2023 2226)  Skin Integrity

## 2023-07-28 NOTE — PROGRESS NOTES
[address] [facility] Wound Ostomy Continence Nurse  Progress Note       Carol Benson  AGE: 76 y.o. GENDER: male  : 1955  UNIT: 4K-23/023-A  TODAY'S DATE:  2023  ADMISSION DATE: 2023 10:33 AM  Subjective:     Reason for [facility] Evaluation and Assessment: patient has chronic ostomy bag, old scaring buttocks but left knees x 2 purple non blanches, buttocks and coccyx purple but slow to aramis      Carol Benson is a 76 y.o. male referred by:   [] Physician/PA/APRN  [x] Nursing  [] Other:     Wound Identification:  Wound Type: pressure  Contributing Factors: chronic pressure    Objective:     Stuart Risk Score: Stuart Scale Score: 23    Assessment:     Encounter: Present to patient room. Patient in chair upon arrival. Assessment and photo to follow. Provider in room. BLE assessed. Patient found not to have any open or nonblanchable wounds. Chronic scarring present. Patient has documented healed coccyx/buttock wound. Recommend to continue to monitor area. Continue to turn every 2 hours and PRN, offload with pillows, ensure patient is on low air loss support surface SafetyTat, SPR+, Surendra, Bariatric bed), utilize moisture wicking underpads, limit use of depends, and if applicable, use waffle cushion when in chair. Call as needed. Plan:     Treatment Recommendations:   Buttock/coccyx- Cleanse wound with normal saline or wound cleanser and gauze. Pat dry with clean gauze. Apply Triad barrier cream to wounds twice daily and PRN. If cream becomes soiled, wipe off top layer and reapply.        Specialty Bed Required :   [x] Low Air Loss   [x] Pressure Redistribution  [] Fluid Immersion- Dolphin  [] Bariatric  [] RotoProne   [] Other:     Discharge Plan:  Placement for patient upon discharge: from home alone  Patient appropriate for [address]: No

## 2023-07-28 NOTE — PROGRESS NOTES
This Virtual RN completed admission requirements. Pt resting comfortably in bed, call light within reach. Educated patient to use call light and to notify bedside nurse if there is a change in condition or if any help is needed. Understanding verbalized. No questions or concerns voiced at this time.

## 2023-07-29 LAB
ANION GAP SERPL CALC-SCNC: 9 MEQ/L (ref 8–16)
BUN SERPL-MCNC: 23 MG/DL (ref 7–22)
CALCIUM SERPL-MCNC: 8.6 MG/DL (ref 8.5–10.5)
CHLORIDE SERPL-SCNC: 109 MEQ/L (ref 98–111)
CO2 SERPL-SCNC: 25 MEQ/L (ref 23–33)
CREAT SERPL-MCNC: 1.3 MG/DL (ref 0.4–1.2)
CREAT SERPL-MCNC: 1.4 MG/DL (ref 0.4–1.2)
CREAT UR-MCNC: 142.8 MG/DL
DEPRECATED RDW RBC AUTO: 41.6 FL (ref 35–45)
ERYTHROCYTE [DISTWIDTH] IN BLOOD BY AUTOMATED COUNT: 12.8 % (ref 11.5–14.5)
GFR SERPL CREATININE-BSD FRML MDRD: 55 ML/MIN/1.73M2
GFR SERPL CREATININE-BSD FRML MDRD: 60 ML/MIN/1.73M2
GLUCOSE SERPL-MCNC: 106 MG/DL (ref 70–108)
HCT VFR BLD AUTO: 42 % (ref 42–52)
HGB BLD-MCNC: 13.5 GM/DL (ref 14–18)
MCH RBC QN AUTO: 28.5 PG (ref 26–33)
MCHC RBC AUTO-ENTMCNC: 32.1 GM/DL (ref 32.2–35.5)
MCV RBC AUTO: 88.6 FL (ref 80–94)
MICROALBUMIN UR-MCNC: 6.69 MG/DL
MICROALBUMIN/CREAT RATIO PNL UR: 47 MG/G (ref 0–30)
PLATELET # BLD AUTO: 135 THOU/MM3 (ref 130–400)
PMV BLD AUTO: 10.7 FL (ref 9.4–12.4)
POTASSIUM SERPL-SCNC: 3.7 MEQ/L (ref 3.5–5.2)
RBC # BLD AUTO: 4.74 MILL/MM3 (ref 4.7–6.1)
SODIUM SERPL-SCNC: 143 MEQ/L (ref 135–145)
WBC # BLD AUTO: 8.3 THOU/MM3 (ref 4.8–10.8)

## 2023-07-29 PROCEDURE — 92523 SPEECH SOUND LANG COMPREHEN: CPT

## 2023-07-29 PROCEDURE — 85027 COMPLETE CBC AUTOMATED: CPT

## 2023-07-29 PROCEDURE — 99232 SBSQ HOSP IP/OBS MODERATE 35: CPT

## 2023-07-29 PROCEDURE — 36415 COLL VENOUS BLD VENIPUNCTURE: CPT

## 2023-07-29 PROCEDURE — 6370000000 HC RX 637 (ALT 250 FOR IP)

## 2023-07-29 PROCEDURE — 6370000000 HC RX 637 (ALT 250 FOR IP): Performed by: NURSE PRACTITIONER

## 2023-07-29 PROCEDURE — 94761 N-INVAS EAR/PLS OXIMETRY MLT: CPT

## 2023-07-29 PROCEDURE — 2580000003 HC RX 258: Performed by: STUDENT IN AN ORGANIZED HEALTH CARE EDUCATION/TRAINING PROGRAM

## 2023-07-29 PROCEDURE — 6370000000 HC RX 637 (ALT 250 FOR IP): Performed by: STUDENT IN AN ORGANIZED HEALTH CARE EDUCATION/TRAINING PROGRAM

## 2023-07-29 PROCEDURE — 6370000000 HC RX 637 (ALT 250 FOR IP): Performed by: INTERNAL MEDICINE

## 2023-07-29 PROCEDURE — 82565 ASSAY OF CREATININE: CPT

## 2023-07-29 PROCEDURE — 80048 BASIC METABOLIC PNL TOTAL CA: CPT

## 2023-07-29 PROCEDURE — 2060000000 HC ICU INTERMEDIATE R&B

## 2023-07-29 PROCEDURE — 2500000003 HC RX 250 WO HCPCS: Performed by: INTERNAL MEDICINE

## 2023-07-29 PROCEDURE — 92610 EVALUATE SWALLOWING FUNCTION: CPT

## 2023-07-29 PROCEDURE — 2580000003 HC RX 258: Performed by: INTERNAL MEDICINE

## 2023-07-29 PROCEDURE — 82043 UR ALBUMIN QUANTITATIVE: CPT

## 2023-07-29 PROCEDURE — 99233 SBSQ HOSP IP/OBS HIGH 50: CPT | Performed by: INTERNAL MEDICINE

## 2023-07-29 RX ORDER — NIFEDIPINE 30 MG/1
90 TABLET, EXTENDED RELEASE ORAL DAILY
Status: DISCONTINUED | OUTPATIENT
Start: 2023-07-29 | End: 2023-08-01 | Stop reason: HOSPADM

## 2023-07-29 RX ORDER — HYDRALAZINE HYDROCHLORIDE 25 MG/1
25 TABLET, FILM COATED ORAL EVERY 8 HOURS SCHEDULED
Status: DISCONTINUED | OUTPATIENT
Start: 2023-07-29 | End: 2023-07-30

## 2023-07-29 RX ORDER — CLOPIDOGREL BISULFATE 75 MG/1
75 TABLET ORAL DAILY
Status: DISCONTINUED | OUTPATIENT
Start: 2023-07-29 | End: 2023-08-01 | Stop reason: HOSPADM

## 2023-07-29 RX ADMIN — SODIUM CHLORIDE 5 MG/HR: 9 INJECTION, SOLUTION INTRAVENOUS at 03:32

## 2023-07-29 RX ADMIN — HYDRALAZINE HYDROCHLORIDE 25 MG: 25 TABLET, FILM COATED ORAL at 08:15

## 2023-07-29 RX ADMIN — SODIUM CHLORIDE, PRESERVATIVE FREE 10 ML: 5 INJECTION INTRAVENOUS at 21:54

## 2023-07-29 RX ADMIN — TAMSULOSIN HYDROCHLORIDE 0.4 MG: 0.4 CAPSULE ORAL at 08:14

## 2023-07-29 RX ADMIN — VALSARTAN 160 MG: 160 TABLET, FILM COATED ORAL at 21:54

## 2023-07-29 RX ADMIN — HYDRALAZINE HYDROCHLORIDE 25 MG: 25 TABLET, FILM COATED ORAL at 21:54

## 2023-07-29 RX ADMIN — ASPIRIN 81 MG: 81 TABLET, COATED ORAL at 08:14

## 2023-07-29 RX ADMIN — NIFEDIPINE 90 MG: 30 TABLET, FILM COATED, EXTENDED RELEASE ORAL at 08:15

## 2023-07-29 RX ADMIN — HYDRALAZINE HYDROCHLORIDE 25 MG: 25 TABLET, FILM COATED ORAL at 13:46

## 2023-07-29 RX ADMIN — SODIUM CHLORIDE 10 MG/HR: 9 INJECTION, SOLUTION INTRAVENOUS at 09:04

## 2023-07-29 RX ADMIN — ATORVASTATIN CALCIUM 80 MG: 80 TABLET, FILM COATED ORAL at 21:54

## 2023-07-29 RX ADMIN — VALSARTAN 160 MG: 160 TABLET, FILM COATED ORAL at 08:14

## 2023-07-29 RX ADMIN — CLOPIDOGREL BISULFATE 75 MG: 75 TABLET ORAL at 08:16

## 2023-07-29 RX ADMIN — SODIUM CHLORIDE 10 MG/HR: 9 INJECTION, SOLUTION INTRAVENOUS at 11:53

## 2023-07-29 RX ADMIN — CHLORTHALIDONE 25 MG: 25 TABLET ORAL at 08:14

## 2023-07-29 ASSESSMENT — ENCOUNTER SYMPTOMS
TROUBLE SWALLOWING: 0
DIPLOPIA: BILATERAL
COUGH: 0
ABDOMINAL PAIN: 0
NAUSEA: 0
PHOTOPHOBIA: 0
RHINORRHEA: 0
SORE THROAT: 0
SHORTNESS OF BREATH: 0
VOMITING: 0

## 2023-07-29 ASSESSMENT — PAIN SCALES - GENERAL
PAINLEVEL_OUTOF10: 0

## 2023-07-29 NOTE — PROGRESS NOTES
Attending Statement    Patient seen and examined independently 7/29 and case reviewed with resident physician. I have reviewed the aspects of the note including subjective, objective, assessment and plan. See below for my changes/additions. Agree. Transitioned off the cardene drip today, can maintain off as long as remains <180. Titrating oral meds - room to go on the hydralazine. Monitor renal function closely, suspect will be improved s/p better BP. Appreciate Neuro recs - 3 weeks dapt then monotherapy after. Refrain from driving until cleared by driving sim. No recommendations for left eye gaze palsy. Will ensure he follows up with Ophtho outpatient. PT/OT/SLP johann. Jorden aMrtinez,           Hospitalist Progress Note    Patient:  Selvin Gusman      Unit/Bed:4-23/023-A    YOB: 1955    MRN: [id number]       Acct: [de-identified]     PCP: DIONTE Aguiar CNP    Date of Admission: 7/27/2023      Assessment/Plan:    # HTN emergency: 2/2 subtherapeutic. Hx uncontrol HTN (on Amlodipine-Olmesartan, Metoprolol). Seen in PCP office 1 month ago with SBP 200s. Amlodipine-Olmesartan was added during last PCP visit. Reported has been worsening vision last 3 days. Symptom has been progressive worsening today when he was driving. /80. Received Hydralazine 10 mg IV in ED. UDS was negative. Cardene gtt was started. 7/29: wean off Cardene gtt. On Hydralazine 25 TID, procardia 90 od, Valsartan 160 BID. [id number] od. # Subacute lacunar infarcts in the left frontal corona radiata and right frontal lobe anterior to the right lateral ventricle: worsening vision then progressive double vision on 7/27/23. CT head negative. CTA head and neck showed mild right ICA. Unable to obtain brain MRI due to body habitus.  Limited MRI (7/29/23) showed Subacute lacunar infarcts in the left frontal corona radiata and right frontal lobe anterior to the right lateral ventricle, Old lacunar range of motion. No jugular venous distention. Trachea midline. Respiratory:  Normal respiratory effort. Clear to auscultation, bilaterally without Rales/Wheezes/Rhonchi. Cardiovascular: Regular rate and rhythm with normal S1/S2 without murmurs, rubs or gallops. Abdomen: Soft, non-tender, non-distended with normal bowel sounds. Musculoskeletal: passive and active ROM x 4 extremities. Skin: Skin color, texture, turgor normal.  No rashes or lesions. Neurologic:  Neurovascularly intact without any focal sensory/motor deficits. Cranial nerves: II-XII intact, grossly non-focal.  Psychiatric: Alert and oriented, thought content appropriate, normal insight  Capillary Refill: Brisk,< 3 seconds   Peripheral Pulses: +2 palpable, equal bilaterally       Labs:   Recent Labs     07/27/23  1256 07/28/23  0326 07/29/23  0429   WBC 6.9 7.7 8.3   HGB 15.6 14.7 13.5*   HCT 46.9 44.9 42.0    145 135       Recent Labs     07/27/23  1256 07/28/23  0326 07/28/23  1604 07/29/23  0429    142  --  143   K 3.7 3.5 3.7 3.7    107  --  109   CO2 23 22*  --  25   BUN 15 18  --  23*   CREATININE 1.1 1.1  --  1.4*   CALCIUM 9.4 9.0  --  8.6       Recent Labs     07/27/23  1256   AST 24   ALT 24   BILIDIR <0.2   BILITOT 0.6   ALKPHOS 117       Recent Labs     07/27/23  1256   INR 1.02       No results for input(s): Gisel Louis in the last 72 hours. Microbiology:      Urinalysis:      Lab Results   Component Value Date/Time    NITRU POSITIVE 07/28/2023 12:19 AM    WBCUA > 200 07/28/2023 12:19 AM    BACTERIA NONE SEEN 07/28/2023 12:19 AM    RBCUA 50-75 07/28/2023 12:19 AM    BLOODU MODERATE 07/28/2023 12:19 AM    SPECGRAV >1.030 07/28/2023 12:19 AM    GLUCOSEU NEGATIVE 01/14/2022 08:40 PM       Radiology:  MRI LIMITED BRAIN   Final Result   1. Subacute lacunar infarcts in the left frontal corona radiata and right    frontal lobe anterior to the right lateral ventricle.    2. Old lacunar infarcts in the left frontal

## 2023-07-29 NOTE — PROGRESS NOTES
Patient has no c/o any pain or discomfort - just having to get up to urinate - stated that they gave him something to make him urinate earlier - noted that his Bps continue to go down and then right back up when we lower it - will keep at 5 and continue to monitor. BP checks are on at intervals of 30 minutes on the screen for the nurse to assess.

## 2023-07-29 NOTE — PLAN OF CARE
Problem: Discharge Planning  Goal: Discharge to home or other facility with appropriate resources  Outcome: Progressing  Flowsheets (Taken 7/27/2023 2226 by Pilo Corral, RN)  Discharge to home or other facility with appropriate resources:   Identify barriers to discharge with patient and caregiver   Arrange for needed discharge resources and transportation as appropriate   Identify discharge learning needs (meds, wound care, etc)   Refer to discharge planning if patient needs post-hospital services based on physician order or complex needs related to functional status, cognitive ability or social support system  Note: Plan to discharge home     Problem: Neurosensory - Adult  Goal: Achieves stable or improved neurological status  Outcome: Progressing  Note: NIH scale q shift  Goal: Achieves maximal functionality and self care  Outcome: Progressing  Flowsheets (Taken 7/28/2023 0805 by Ashlee Del Angel RN)  Achieves maximal functionality and self care: Monitor swallowing and airway patency with patient fatigue and changes in neurological status     Problem: Respiratory - Adult  Goal: Achieves optimal ventilation and oxygenation  Outcome: Progressing  Flowsheets (Taken 7/28/2023 0805 by Ashlee Del Angel RN)  Achieves optimal ventilation and oxygenation: Assess for changes in respiratory status  Note: Pt on RA     Problem: Cardiovascular - Adult  Goal: Maintains optimal cardiac output and hemodynamic stability  Outcome: Progressing  Flowsheets (Taken 7/28/2023 0805 by Ashlee Del Angel RN)  Maintains optimal cardiac output and hemodynamic stability: Monitor blood pressure and heart rate     Problem: Musculoskeletal - Adult  Goal: Return mobility to safest level of function  Outcome: Progressing  Flowsheets (Taken 7/27/2023 2226 by Pilo Corral RN)  Return Mobility to Safest Level of Function:   Assess patient stability and activity tolerance for standing, transferring and ambulating with or without assistive adequate nutritional intake  Outcome: Progressing  Flowsheets (Taken 7/28/2023 0805 by Caprice Mills RN)  Maintains adequate nutritional intake: Monitor percentage of each meal consumed  Goal: Establish and maintain optimal ostomy function  Outcome: Progressing  Flowsheets (Taken 7/28/2023 0805 by Caprice Mills RN)  Establish and maintain optimal ostomy function: Monitor output from ostomies     Problem: Safety - Adult  Goal: Free from fall injury  Outcome: Progressing  Flowsheets (Taken 7/29/2023 1537)  Free From Fall Injury: Instruct family/caregiver on patient safety     Problem: Pain  Goal: Verbalizes/displays adequate comfort level or baseline comfort level  Outcome: Progressing  Flowsheets (Taken 7/27/2023 2228 by Bianca Ruiz RN)  Verbalizes/displays adequate comfort level or baseline comfort level:   Encourage patient to monitor pain and request assistance   Assess pain using appropriate pain scale   Administer analgesics based on type and severity of pain and evaluate response   Implement non-pharmacological measures as appropriate and evaluate response  Note: Pt verbalizes understanding of pain scale     Problem: Hematologic - Adult  Goal: Maintains hematologic stability  Outcome: Progressing  Flowsheets (Taken 7/29/2023 1537)  Maintains hematologic stability:   Assess for signs and symptoms of bleeding or hemorrhage   Monitor labs for bleeding or clotting disorders   Administer blood products/factors as ordered     Problem: Chronic Conditions and Co-morbidities  Goal: Patient's chronic conditions and co-morbidity symptoms are monitored and maintained or improved  Outcome: Progressing  Flowsheets (Taken 7/29/2023 1537)  Care Plan - Patient's Chronic Conditions and Co-Morbidity Symptoms are Monitored and Maintained or Improved:   Monitor and assess patient's chronic conditions and comorbid symptoms for stability, deterioration, or improvement   Collaborate with multidisciplinary team to address

## 2023-07-29 NOTE — FLOWSHEET NOTE
07/29/23 1324   Safe Environment   Safety Measures   (Virtual Nurse Safety Round Complete)     Ha placed to patients room, Bedside RN answered and stated they are going on a walk. Patient is safe.

## 2023-07-29 NOTE — PLAN OF CARE
Problem: Discharge Planning  Goal: Discharge to home or other facility with appropriate resources  Outcome: Progressing     Problem: Neurosensory - Adult  Goal: Achieves stable or improved neurological status  Outcome: Progressing  Flowsheets (Taken 7/28/2023 0805 by Ryder William RN)  Achieves stable or improved neurological status: Assess for and report changes in neurological status     Problem: Neurosensory - Adult  Goal: Achieves maximal functionality and self care  Outcome: Progressing  Flowsheets (Taken 7/28/2023 0805 by Ryder William RN)  Achieves maximal functionality and self care: Monitor swallowing and airway patency with patient fatigue and changes in neurological status     Problem: Respiratory - Adult  Goal: Achieves optimal ventilation and oxygenation  Outcome: Progressing  Flowsheets (Taken 7/28/2023 0805 by Ryder William RN)  Achieves optimal ventilation and oxygenation: Assess for changes in respiratory status     Problem: Cardiovascular - Adult  Goal: Maintains optimal cardiac output and hemodynamic stability  Outcome: Progressing  Flowsheets (Taken 7/28/2023 0805 by Ryder William RN)  Maintains optimal cardiac output and hemodynamic stability: Monitor blood pressure and heart rate     Problem: Musculoskeletal - Adult  Goal: Return mobility to safest level of function  Outcome: Progressing     Problem: Musculoskeletal - Adult  Goal: Return ADL status to a safe level of function  Outcome: Progressing     Problem: Skin/Tissue Integrity  Goal: Absence of new skin breakdown  Description: 1. Monitor for areas of redness and/or skin breakdown  2. Assess vascular access sites hourly  3. Every 4-6 hours minimum:  Change oxygen saturation probe site  4. Every 4-6 hours:  If on nasal continuous positive airway pressure, respiratory therapy assess nares and determine need for appliance change or resting period.   Outcome: Progressing     Problem: Skin/Tissue Integrity - Adult  Goal: Skin integrity remains intact  Outcome: Progressing  Flowsheets (Taken 7/28/2023 1105 by Ioana Bonilla, RN)  Skin Integrity Remains Intact: Monitor for areas of redness and/or skin breakdown     Problem: Skin/Tissue Integrity - Adult  Goal: Incisions, wounds, or drain sites healing without S/S of infection  Outcome: Progressing  Flowsheets (Taken 7/28/2023 1105 by Ioana Bonilla, RN)  Incisions, Wounds, or Drain Sites Healing Without Sign and Symptoms of Infection: ADMISSION and DAILY: Assess and document risk factors for pressure ulcer development     Problem: Gastrointestinal - Adult  Goal: Maintains adequate nutritional intake  Outcome: Progressing  Flowsheets (Taken 7/28/2023 0805 by Ioana Bonilla, RN)  Maintains adequate nutritional intake: Monitor percentage of each meal consumed     Problem: Gastrointestinal - Adult  Goal: Establish and maintain optimal ostomy function  Outcome: Progressing  Flowsheets (Taken 7/28/2023 0805 by Ioana Bonilla, RN)  Establish and maintain optimal ostomy function: Monitor output from ostomies     Problem: Safety - Adult  Goal: Free from fall injury  Outcome: Progressing     Problem: Pain  Goal: Verbalizes/displays adequate comfort level or baseline comfort level  Outcome: Progressing     Problem: Hematologic - Adult  Goal: Maintains hematologic stability  Outcome: Progressing     Problem: Chronic Conditions and Co-morbidities  Goal: Patient's chronic conditions and co-morbidity symptoms are monitored and maintained or improved  Outcome: Progressing

## 2023-07-29 NOTE — PROGRESS NOTES
Unable to decrease drip to 2.5mg d/t the order states no lower than 3mg - charge nurse informed and we both looked at the order. Patient will continue to be monitored for his Bps and earlier in the day they had decreased then had to increase the drip - will continue to monitor. Darrell Wallace made aware of the critical MRI results.

## 2023-07-29 NOTE — PROGRESS NOTES
[facility]. THERAPY  STRZ ICU STEPDOWN TELEMETRY 4K  Speech - Language - Cognitive Evaluation + Clinical Swallow Evaluation    SLP Individual Minutes  Time In: 4024  Time Out: 8107  Minutes: 16  Timed Code Treatment Minutes: 0 Minutes     Speech, Language, Cognitive Evaluation: 8  Clinical Swallow Evaluation: 8    Date: 2023  Patient Name: Mary Kenney      CSN: [id number]   : 1955  (76 y.o.)  Gender: male   Referring Physician:        Vinicius Trejo DO     Diagnosis: Hypertensive emergency  Precautions: fall risk, aspiration  History of Present Illness/Injury: Patient presents to [facility] with above dx. Per physician H+P:\"Riki Alfaro is a 76 y.o., , male who  has a past medical history of Diabetes mellitus ([address] [facility]), Hyperlipidemia, and Hypertension. that is hospitalized for right eye double vision. Patient reports having uncontrolled hypertension. He has been seeing by PCP 1 month ago with SBP 210s during office visit. His regimen was added on Amlodipine-Olmesartan. Over last 3 days, his vision was progressive worsening. Today, patient develop double vision from the right eye when he was driving. He managed to drive with opening left eye and closing right eye. Denied any eyes pain, headaches, nausea, vomiting, Upper or lowers extremities weakness, numbness, tingling. Denied tobacco use, alcohol use. Live at home\"  ST consulted to further assess swallow integrity and cognition s/t suspected CVA and assist in development of POC. Past Medical History:   Diagnosis Date    Diabetes mellitus ([address] )     Hyperlipidemia     Hypertension        Pain: No pain reported. Subjective:  Patient seen with RN approval. Patient pleasant and cooperative with assessment. No family members present.      SOCIAL HISTORY:   Living Arrangements: home alone  Work History: Retired  Education Level: High school grad  Driving Status: Active   Finance Management:

## 2023-07-30 LAB
ALDOST SERPL-MCNC: 6.5 NG/DL
ANION GAP SERPL CALC-SCNC: 10 MEQ/L (ref 8–16)
BACTERIA SPEC AEROBE CULT: NORMAL
BUN SERPL-MCNC: 22 MG/DL (ref 7–22)
CALCIUM SERPL-MCNC: 8.8 MG/DL (ref 8.5–10.5)
CHLORIDE SERPL-SCNC: 109 MEQ/L (ref 98–111)
CO2 SERPL-SCNC: 23 MEQ/L (ref 23–33)
CREAT SERPL-MCNC: 1.2 MG/DL (ref 0.4–1.2)
DEPRECATED RDW RBC AUTO: 42 FL (ref 35–45)
ERYTHROCYTE [DISTWIDTH] IN BLOOD BY AUTOMATED COUNT: 13 % (ref 11.5–14.5)
GFR SERPL CREATININE-BSD FRML MDRD: > 60 ML/MIN/1.73M2
GLUCOSE SERPL-MCNC: 109 MG/DL (ref 70–108)
HCT VFR BLD AUTO: 43.6 % (ref 42–52)
HGB BLD-MCNC: 14.1 GM/DL (ref 14–18)
MCH RBC QN AUTO: 28.6 PG (ref 26–33)
MCHC RBC AUTO-ENTMCNC: 32.3 GM/DL (ref 32.2–35.5)
MCV RBC AUTO: 88.4 FL (ref 80–94)
PLATELET # BLD AUTO: 137 THOU/MM3 (ref 130–400)
PMV BLD AUTO: 10.7 FL (ref 9.4–12.4)
POTASSIUM SERPL-SCNC: 3.9 MEQ/L (ref 3.5–5.2)
RBC # BLD AUTO: 4.93 MILL/MM3 (ref 4.7–6.1)
SODIUM SERPL-SCNC: 142 MEQ/L (ref 135–145)
WBC # BLD AUTO: 8.2 THOU/MM3 (ref 4.8–10.8)

## 2023-07-30 PROCEDURE — 36415 COLL VENOUS BLD VENIPUNCTURE: CPT

## 2023-07-30 PROCEDURE — 6370000000 HC RX 637 (ALT 250 FOR IP): Performed by: NURSE PRACTITIONER

## 2023-07-30 PROCEDURE — 6370000000 HC RX 637 (ALT 250 FOR IP): Performed by: INTERNAL MEDICINE

## 2023-07-30 PROCEDURE — 6370000000 HC RX 637 (ALT 250 FOR IP): Performed by: STUDENT IN AN ORGANIZED HEALTH CARE EDUCATION/TRAINING PROGRAM

## 2023-07-30 PROCEDURE — 2060000000 HC ICU INTERMEDIATE R&B

## 2023-07-30 PROCEDURE — 99232 SBSQ HOSP IP/OBS MODERATE 35: CPT | Performed by: INTERNAL MEDICINE

## 2023-07-30 PROCEDURE — 85027 COMPLETE CBC AUTOMATED: CPT

## 2023-07-30 PROCEDURE — 80048 BASIC METABOLIC PNL TOTAL CA: CPT

## 2023-07-30 PROCEDURE — 2580000003 HC RX 258: Performed by: STUDENT IN AN ORGANIZED HEALTH CARE EDUCATION/TRAINING PROGRAM

## 2023-07-30 PROCEDURE — 6370000000 HC RX 637 (ALT 250 FOR IP)

## 2023-07-30 RX ORDER — CHLORTHALIDONE 25 MG/1
50 TABLET ORAL DAILY
Status: DISCONTINUED | OUTPATIENT
Start: 2023-07-31 | End: 2023-07-30

## 2023-07-30 RX ORDER — HYDRALAZINE HYDROCHLORIDE 25 MG/1
25 TABLET, FILM COATED ORAL ONCE
Status: COMPLETED | OUTPATIENT
Start: 2023-07-30 | End: 2023-07-30

## 2023-07-30 RX ORDER — CHLORTHALIDONE 25 MG/1
25 TABLET ORAL DAILY
Status: DISCONTINUED | OUTPATIENT
Start: 2023-07-31 | End: 2023-08-01 | Stop reason: HOSPADM

## 2023-07-30 RX ORDER — HYDRALAZINE HYDROCHLORIDE 50 MG/1
50 TABLET, FILM COATED ORAL EVERY 8 HOURS SCHEDULED
Status: DISCONTINUED | OUTPATIENT
Start: 2023-07-30 | End: 2023-08-01 | Stop reason: HOSPADM

## 2023-07-30 RX ORDER — CHLORTHALIDONE 25 MG/1
25 TABLET ORAL ONCE
Status: DISCONTINUED | OUTPATIENT
Start: 2023-07-30 | End: 2023-07-30

## 2023-07-30 RX ADMIN — SODIUM CHLORIDE, PRESERVATIVE FREE 10 ML: 5 INJECTION INTRAVENOUS at 08:48

## 2023-07-30 RX ADMIN — VALSARTAN 160 MG: 160 TABLET, FILM COATED ORAL at 08:48

## 2023-07-30 RX ADMIN — HYDRALAZINE HYDROCHLORIDE 50 MG: 50 TABLET, FILM COATED ORAL at 20:11

## 2023-07-30 RX ADMIN — NIFEDIPINE 90 MG: 30 TABLET, FILM COATED, EXTENDED RELEASE ORAL at 08:48

## 2023-07-30 RX ADMIN — ASPIRIN 81 MG: 81 TABLET, COATED ORAL at 08:48

## 2023-07-30 RX ADMIN — ATORVASTATIN CALCIUM 80 MG: 80 TABLET, FILM COATED ORAL at 20:11

## 2023-07-30 RX ADMIN — HYDRALAZINE HYDROCHLORIDE 25 MG: 25 TABLET, FILM COATED ORAL at 06:11

## 2023-07-30 RX ADMIN — CHLORTHALIDONE 25 MG: 25 TABLET ORAL at 08:48

## 2023-07-30 RX ADMIN — HYDRALAZINE HYDROCHLORIDE 50 MG: 50 TABLET, FILM COATED ORAL at 14:36

## 2023-07-30 RX ADMIN — HYDRALAZINE HYDROCHLORIDE 25 MG: 50 TABLET, FILM COATED ORAL at 07:00

## 2023-07-30 RX ADMIN — CLOPIDOGREL BISULFATE 75 MG: 75 TABLET ORAL at 08:48

## 2023-07-30 RX ADMIN — TAMSULOSIN HYDROCHLORIDE 0.4 MG: 0.4 CAPSULE ORAL at 08:48

## 2023-07-30 RX ADMIN — VALSARTAN 160 MG: 160 TABLET, FILM COATED ORAL at 20:11

## 2023-07-30 RX ADMIN — SODIUM CHLORIDE, PRESERVATIVE FREE 10 ML: 5 INJECTION INTRAVENOUS at 20:12

## 2023-07-30 NOTE — PLAN OF CARE
Problem: Discharge Planning  Goal: Discharge to home or other facility with appropriate resources  Outcome: Progressing  Flowsheets (Taken 7/27/2023 2226 by Tim Martin, RN)  Discharge to home or other facility with appropriate resources:   Identify barriers to discharge with patient and caregiver   Arrange for needed discharge resources and transportation as appropriate   Identify discharge learning needs (meds, wound care, etc)   Refer to discharge planning if patient needs post-hospital services based on physician order or complex needs related to functional status, cognitive ability or social support system  Note: Pt plan to discharge home     Problem: Neurosensory - Adult  Goal: Achieves stable or improved neurological status  Outcome: Progressing  Flowsheets (Taken 7/29/2023 2154 by Laura Yee, RN)  Achieves stable or improved neurological status: Assess for and report changes in neurological status  Note: NIH assessed qshift  Goal: Achieves maximal functionality and self care  Outcome: Progressing  Flowsheets (Taken 7/29/2023 2154 by Laura Yee, RN)  Achieves maximal functionality and self care: Monitor swallowing and airway patency with patient fatigue and changes in neurological status     Problem: Respiratory - Adult  Goal: Achieves optimal ventilation and oxygenation  Outcome: Progressing  Flowsheets (Taken 7/28/2023 0805 by Dennys Marks, RN)  Achieves optimal ventilation and oxygenation: Assess for changes in respiratory status  Note: Pt on RA, lungs clear to diminished     Problem: Cardiovascular - Adult  Goal: Maintains optimal cardiac output and hemodynamic stability  Outcome: Progressing  Flowsheets (Taken 7/29/2023 2154 by Laura Yee, RN)  Maintains optimal cardiac output and hemodynamic stability: Monitor blood pressure and heart rate  Note: Pt off Cardene gtt     Problem: Musculoskeletal - Adult  Goal: Return mobility to safest level of function  Outcome: Progressing  Flowsheets (Taken conditions and comorbid symptoms for stability, deterioration, or improvement   Collaborate with multidisciplinary team to address chronic and comorbid conditions and prevent exacerbation or deterioration   Update acute care plan with appropriate goals if chronic or comorbid symptoms are exacerbated and prevent overall improvement and discharge  Care plan reviewed with patient. Patient verbalized understanding of the plan of care and contributed to goal setting.

## 2023-07-30 NOTE — FLOWSHEET NOTE
07/30/23 0813   Safe Environment   Safety Measures Other (comment)  (Virtual Safety Round Completed)     Virtual RN called into patient room. Patient does not respond to verbal prompts. Virtual RN increases output volume and informs patient that camera will be turned on at this time to complete safety check. Camera turned on and pt observed to be awake in chair,adjusting left eye patch, no acute distress noted. No safety concerns noted at this time. Virtual to continue to round and educate as appropriate.

## 2023-07-30 NOTE — PROGRESS NOTES
Hospitalist Progress Note    Patient:  Mayi Krishnan      Unit/Bed:4K-23/023-A    YOB: 1955    MRN: [id number]       Acct: [de-identified]     PCP: DIONTE Rayo CNP    Date of Admission: 7/27/2023      Assessment/Plan:    # HTN emergency: 2/2 subtherapeutic. Hx uncontrol HTN (on Amlodipine-Olmesartan, Metoprolol). Seen in PCP office 1 month ago with SBP 200s. Amlodipine-Olmesartan was added during last PCP visit. Reported has been worsening vision last 3 days. Symptom has been progressive worsening today when he was driving. /80. Received Hydralazine 10 mg IV in ED. UDS was negative. Cardene gtt was started. 7/29: wean off Cardene gtt. On Hydralazine 25 TID, procardia 90 od, Valsartan 160 BID. [id number] od.  7/30: antihypertensive meds: Hydralazine 50 TID, procardia 90 od, Valsartan 160 BID. Chlorthalidone 25 od. BP doing well on this regimen, continuing to monitor. Uncontrolled BP. # Subacute lacunar infarcts in the left frontal corona radiata and right frontal lobe anterior to the right lateral ventricle: worsening vision then progressive double vision on 7/27/23. CT head negative. CTA head and neck showed mild right ICA. Unable to obtain brain MRI due to body habitus. Limited MRI (7/29/23) showed Subacute lacunar infarcts in the left frontal corona radiata and right frontal lobe anterior to the right lateral ventricle, Old lacunar infarcts in the left frontal lobe, Mild atrophy. Nonspecific periventricular and frontoparietal white matter signal abnormality may relate to chronic small vessel ischemic disease  - ASA 81 mg daily, Plavix 7/29 x21 days, and Lipitor 80 mg.   - Speech eval  - PT/OT consult  - Neurology follow. Appreciated reg  - Event monitor on discharge. - Hold Lovenox SQ. Place SCDs     # Right Internal Carotid artery stenosis: 40% stenosis on CTA head & neck (7/27/23). Lipid panel showed hyperlipidemia.  Started Lipitor    # Left NC VI palsy: unknown

## 2023-07-31 PROCEDURE — 97530 THERAPEUTIC ACTIVITIES: CPT

## 2023-07-31 PROCEDURE — 6370000000 HC RX 637 (ALT 250 FOR IP): Performed by: STUDENT IN AN ORGANIZED HEALTH CARE EDUCATION/TRAINING PROGRAM

## 2023-07-31 PROCEDURE — 6370000000 HC RX 637 (ALT 250 FOR IP): Performed by: INTERNAL MEDICINE

## 2023-07-31 PROCEDURE — 2580000003 HC RX 258: Performed by: STUDENT IN AN ORGANIZED HEALTH CARE EDUCATION/TRAINING PROGRAM

## 2023-07-31 PROCEDURE — 97110 THERAPEUTIC EXERCISES: CPT

## 2023-07-31 PROCEDURE — 97162 PT EVAL MOD COMPLEX 30 MIN: CPT

## 2023-07-31 PROCEDURE — 97535 SELF CARE MNGMENT TRAINING: CPT

## 2023-07-31 PROCEDURE — 97166 OT EVAL MOD COMPLEX 45 MIN: CPT

## 2023-07-31 PROCEDURE — 6370000000 HC RX 637 (ALT 250 FOR IP): Performed by: NURSE PRACTITIONER

## 2023-07-31 PROCEDURE — 1200000000 HC SEMI PRIVATE

## 2023-07-31 PROCEDURE — 6370000000 HC RX 637 (ALT 250 FOR IP)

## 2023-07-31 RX ORDER — HYDRALAZINE HYDROCHLORIDE 20 MG/ML
5 INJECTION INTRAMUSCULAR; INTRAVENOUS EVERY 6 HOURS PRN
Status: DISCONTINUED | OUTPATIENT
Start: 2023-07-31 | End: 2023-08-01 | Stop reason: HOSPADM

## 2023-07-31 RX ORDER — CARVEDILOL 6.25 MG/1
6.25 TABLET ORAL 2 TIMES DAILY WITH MEALS
Status: DISCONTINUED | OUTPATIENT
Start: 2023-07-31 | End: 2023-08-01 | Stop reason: HOSPADM

## 2023-07-31 RX ADMIN — VALSARTAN 160 MG: 160 TABLET, FILM COATED ORAL at 08:41

## 2023-07-31 RX ADMIN — HYDRALAZINE HYDROCHLORIDE 50 MG: 50 TABLET, FILM COATED ORAL at 04:47

## 2023-07-31 RX ADMIN — ASPIRIN 81 MG: 81 TABLET, COATED ORAL at 08:41

## 2023-07-31 RX ADMIN — SODIUM CHLORIDE, PRESERVATIVE FREE 10 ML: 5 INJECTION INTRAVENOUS at 08:42

## 2023-07-31 RX ADMIN — VALSARTAN 160 MG: 160 TABLET, FILM COATED ORAL at 21:41

## 2023-07-31 RX ADMIN — HYDRALAZINE HYDROCHLORIDE 50 MG: 50 TABLET, FILM COATED ORAL at 21:41

## 2023-07-31 RX ADMIN — SODIUM CHLORIDE, PRESERVATIVE FREE 10 ML: 5 INJECTION INTRAVENOUS at 21:41

## 2023-07-31 RX ADMIN — NIFEDIPINE 90 MG: 30 TABLET, FILM COATED, EXTENDED RELEASE ORAL at 08:41

## 2023-07-31 RX ADMIN — CHLORTHALIDONE 25 MG: 25 TABLET ORAL at 08:41

## 2023-07-31 RX ADMIN — CLOPIDOGREL BISULFATE 75 MG: 75 TABLET ORAL at 08:41

## 2023-07-31 RX ADMIN — CARVEDILOL 6.25 MG: 6.25 TABLET, FILM COATED ORAL at 15:28

## 2023-07-31 RX ADMIN — HYDRALAZINE HYDROCHLORIDE 50 MG: 50 TABLET, FILM COATED ORAL at 13:10

## 2023-07-31 RX ADMIN — ATORVASTATIN CALCIUM 80 MG: 80 TABLET, FILM COATED ORAL at 21:41

## 2023-07-31 RX ADMIN — TAMSULOSIN HYDROCHLORIDE 0.4 MG: 0.4 CAPSULE ORAL at 08:41

## 2023-07-31 ASSESSMENT — PAIN SCALES - GENERAL: PAINLEVEL_OUTOF10: 0

## 2023-07-31 NOTE — PROGRESS NOTES
Hospitalist Progress Note    Patient:  Catracho Singer      Unit/Bed:4K-23/023-A    YOB: 1955    MRN: [id number]       Acct: [de-identified]     PCP: DIONTE Kenyon CNP    Date of Admission: 7/27/2023      Assessment/Plan:    # HTN emergency: 2/2 subtherapeutic. Hx uncontrol HTN (on Amlodipine-Olmesartan, Metoprolol). Seen in PCP office 1 month ago with SBP 200s. Amlodipine-Olmesartan was added during last PCP visit. Reported has been worsening vision last 3 days. Symptom has been progressive worsening today when he was driving. /80. Received Hydralazine 10 mg IV in ED. UDS was negative. Cardene gtt was started until 7/29 7/31:  Trial of Coreg 6.25 mg BID with plan to wean off Hydralazine if possible, [id number] od., Procardia 90 od, Valsartan 160 BID. Hydralazine 50 BID. # Subacute lacunar infarcts in the left frontal corona radiata and right frontal lobe anterior to the right lateral ventricle: worsening vision then progressive double vision on 7/27/23. CT head negative. CTA head and neck showed mild right ICA. Unable to obtain brain MRI due to body habitus. Limited MRI (7/29/23) showed Subacute lacunar infarcts in the left frontal corona radiata and right frontal lobe anterior to the right lateral ventricle, Old lacunar infarcts in the left frontal lobe, Mild atrophy. Nonspecific periventricular and frontoparietal white matter signal abnormality may relate to chronic small vessel ischemic disease  - ASA daily, Plavix, and Lipitor 80  - Speech eval  - PT/OT consult recommend IPR/SNF  - Neurology follow. Appreciated reg  - Hold Lovenox SQ. Place SCDs     # Right Internal Carotid artery stenosis: 40% stenosis on CTA head & neck (7/27/23). Lipid panel showed hyperlipidemia. Started Lipitor    # Left NC VI palsy: unknown etiology. No trauma history. Unable to obtain brain MRI due to body habitus.  Right eye patch was added for diplopia    # Hyperlipidemia: elevated Cholesterol, carotid bifurcation. 40% stenosis at the origin of the right internal carotid artery. No significant hemodynamic stenosis in the right common carotid artery. 2. Calcified plaque involving the left carotid bifurcation. No significant hemodynamic stenosis in the left common and internal carotid arteries. 3. There is antegrade flow in the right and left vertebral arteries. 4. Negative CTA of the brain. 5. Enlarged right and left lobes of the thyroid gland. 6. Cardiomegaly. 7. Cervical spondylosis. 8. Diffusion MRI scan of the brain would be helpful for more complete evaluation in this patient. **This report has been created using voice recognition software. It may contain minor errors which are inherent in voice recognition technology. **      Final report electronically signed by DR Mariza Nava on 7/27/2023 3:39 PM      CTA [address] [place] [address]   Final Result       1. Calcified plaque involving the right carotid bifurcation. 40% stenosis at the origin of the right internal carotid artery. No significant hemodynamic stenosis in the right common carotid artery. 2. Calcified plaque involving the left carotid bifurcation. No significant hemodynamic stenosis in the left common and internal carotid arteries. 3. There is antegrade flow in the right and left vertebral arteries. 4. Negative CTA of the brain. 5. Enlarged right and left lobes of the thyroid gland. 6. Cardiomegaly. 7. Cervical spondylosis. 8. Diffusion MRI scan of the brain would be helpful for more complete evaluation in this patient. **This report has been created using voice recognition software. It may contain minor errors which are inherent in voice recognition technology. **      Final report electronically signed by DR Mariza Nava on 7/27/2023 3:39 PM      XR CHEST PORTABLE   Final Result   1. Mild enlargement of the cardiomediastinal silhouette. No other acute cardiopulmonary findings are noted.

## 2023-07-31 NOTE — DISCHARGE INSTR - COC
Continuity of Care Form    Patient Name: Jesus Love   :  1955  MRN:  [id number]    Admit date:  2023  Discharge date:  2023    Code Status Order: Full Code   Advance Directives:     Admitting Physician:  Falguni Johnson DO  PCP: Jose Armando Arias, APRN - CNP  Discharging Nurse: DR JEREMIE DA SILVA [facility] Unit/Room#: 3K-18/0-A  Discharging Unit Phone Number: [phone]    Emergency Contact:   Extended Emergency Contact Information  Primary Emergency Contact: Gabriel Otoole & [facility] Phone: [phone]  Relation: Brother/Sister   needed? No  Secondary Emergency Contact: Queta Greene  Mobile Phone: [phone]  Relation: Other   needed? No    Past Surgical History:  Past Surgical History:   Procedure Laterality Date    ENDOSCOPY, COLON, DIAGNOSTIC      swallowing difficulties    LAPAROSCOPY N/A 2022    EXPLORATORY LAPAROSCOPY, TRANSVERSE LOOP COLOSTOMY performed by Sue Martínez MD at [address] [place] [address]  Aug 29, 2013    Neck Abcess Incision and Drainage (Dr. Magaly Barajas, [facility])    RECTAL SURGERY N/A 2022    FULL THICKNESS DEBRIDEMENT OF SACRAL DECUBITUS ULCER, [id number] CM performed by Sue Martínez MD at [address] [place] [address]       Immunization History:   Immunization History   Administered Date(s) Administered    COVID-19, MODERNA BLUE border, Primary or Immunocompromised, (age 12y+), IM, 100 mcg/0.5mL 2021    COVID-19, PFIZER PURPLE top, DILUTE for use, (age 15 y+), 30mcg/0.3mL 2021    Influenza Virus Vaccine 2021    Influenza, FLUAD, (age 72 y+), Adjuvanted, 0.5mL 10/03/2020    PPD Test 2022    Pneumococcal, PPSV23, PNEUMOVAX 21, (age 2y+), SC/IM, 0.5mL 2022    Tetanus 2008    Tetanus Toxoid, absorbed 2008       Active Problems:  Patient Active Problem List   Diagnosis Code    Essential hypertension I10    Obesity (BMI 30-39. 9) E66.9    Hyperlipidemia E78.5    COVID-19 virus detected U07.1    COVID-19 U07.1 8           Discharging to Facility/ Agency   Name: [facility] [address]  Address: [address] [place] [address]  Phone: [phone]  Fax: [phone]    Dialysis Facility (if applicable)   Name:  Address:  Dialysis Schedule:  Phone:  Fax:    / signature: Electronically signed by PALMA Figueroa on 7/31/23 at 3:19 PM EDT    PHYSICIAN SECTION    Prognosis: Good    Condition at Discharge: Stable    Rehab Potential (if transferring to Rehab): Good    Recommended Labs or Other Treatments After Discharge: Cardiac event monitor for 2 weeks    Physician Certification: I certify the above information and transfer of Marlyne Riding  is necessary for the continuing treatment of the diagnosis listed and that he requires [address] for less 30 days.      Update Admission H&P: No change in H&P    PHYSICIAN SIGNATURE:  Electronically signed by Neeraj Brown DO on 8/1/23 at 11:15 AM EDT

## 2023-07-31 NOTE — PROGRESS NOTES
Spoke with Mary Torrez, [facility] explained that I ran benefits for Physical Medicine and found that the patient has limited benefits for Physical Medicine as outlined below. Appears that patient does not have inpatient rehabilitation benefits. Date of Service Jul 31, 2023  Transaction ID [id number]  Transaction Time Jul 31, 12:02 PM  Customer ID [id number]  Shiraz ALLEN    [id number] [facility]    Member Status    Active Coverage  Date of Birth    Jun 24, 1955  Gender    Male  Relationship to [address] Medicare Certificate of Coverage  Member ID:  [id number]  Group Number:  [id number]  Group Name:  Marzena Hamm [place] Medicare Number:  Claude Kung Date:  Jul 1, 2023  HU[employer] LOGO  Payer: Marzena [place]ramone    Other or Additional Payer Information    No additional payer information provided. Provider Information  Requesting Provider    Name: Sivakumar    Category: Requesting Provider    NPI: [id number]    Primary Care Provider    Name: Tc Santamariaer    Category: Primary Care Provider    [facility], [address] [place] [address] Street: [phone]  Primary Care Provider    Name: Tc Santamariaer    Category: Primary Care Provider    Type: Primary Care Provider    [address] [facility] [address]    [facility], [address] [facility]    Contact Information  P: [phone]  Primary Care Provider    Name: Tc Santamariaer    Category: Primary Care Provider    Type: Primary Care Provider    [facility], [facility]    Contact Information  P: [phone]  Primary Care Provider    Name: [employer]    Category: Primary Care Provider    Type: Group    Payer ID: [id number]    Care Reminders  No reminders for this member.     FILTER BY NETWORK      Plan Maximums and Deductibles    Health Benefit Plan Coverage - 30  Active Coverage  Insurance Type: Health Maintenance Organization (HMO) - Medicare Risk    Plan / Product: Ethan Garay

## 2023-07-31 NOTE — PROGRESS NOTES
Sivakumar  INPATIENT PHYSICAL THERAPY  EVALUATION  [facility] ICU STEPDOWN TELEMETRY 4K - 4K-23/023-A    Time In: 1058  Time Out: 1122  Timed Code Treatment Minutes: 10 Minutes  Minutes: 24          Date: 2023  Patient Name: Mayi Krishnan,  Gender:  male        MRN: [id number]  : 1955  (76 y.o.)      Referring Practitioner: Junior Robb DO  Diagnosis: Junior Robb DO  Additional Pertinent Hx: Mayi Krishnan is a 76 y.o. male with a history of pre diabetes mellitus, Hyperlipidemia, and Hypertension who presented with blurred vision for the past 3 days. Patient reports that he has a long standing of hypertension he was recently started on a new blood pressure medication. 3 days prior to admission he got into his car to drive and noticed that he was having double vision vision in his right eye. He was also having some mild discomfort in his left eye; it felt like something was in his eye. He was applying cold washcloths with improvement of his discomfort. He denied any current discomfort. He reports that the double vision is better if he covers one eye but is unchanged since admission. He denied any headache, dizziness, slurred speech, difficulty swallowing, numbness, weakness or difficulty walking. In the ER his blood pressure was 200/79 he was started on a Cardene drip. MRI shows subacute lacunar infarcts in the left frontal corona radiata and right frontal lobe anterior to the right lateral ventricle. Restrictions/Precautions:  Restrictions/Precautions: Fall Risk, General Precautions    Subjective:  Chart Reviewed: Yes  Patient assessed for rehabilitation services?: Yes  Family / Caregiver Present: No  Subjective: RN approved session, pt is seated in recliner, L eye patch in place. Pt is oriented, demonstrates lack of insight to current impairments, stating he is completely fine, and stating he can just go home with 24/7 assist, however, doesn't have that assistance available.   Pt uses in too far in front; multiple safety cues and posture cues, pt stating several times, \"I'm fine\"; second trial with RW, ambulates additional 8 feet with RW with slight improvement in balance    Exercise:  Patient was guided in 1 set(s) 10 reps of exercise to both lower extremities. Ankle pumps, Heelslides, Hip abduction/adduction, and Long arc quads. Exercises were completed for increased independence with functional mobility. Functional Outcome Measures: Completed  AM-PAC Inpatient Mobility without Stair Climbing Raw Score : 15  AM-PAC Inpatient without Stair Climbing T-Scale Score : 43.03    ASSESSMENT:  Activity Tolerance:  Patient tolerance of  treatment: good. Treatment Initiated: Treatment and education initiated within context of evaluation. Evaluation time included review of current medical information, gathering information related to past medical, social and functional history, completion of standardized testing, formal and informal observation of tasks, assessment of data and development of plan of care and goals. Treatment time included skilled education and facilitation of tasks to increase safety and independence with functional mobility for improved independence and quality of life. Assessment: Body Structures, Functions, Activity Limitations Requiring Skilled Therapeutic Intervention: Decreased functional mobility , Decreased safe awareness, Decreased coordination, Decreased balance, Decreased strength, Decreased vision/visual deficit  Assessment: Mary Kenney is a 76 y.o. male that presents with CVA. Pt demonstrates a decrease in baseline by way of bed mobility, transfers and ambulation secondary to decreased activity tolerance, strength, fatigue, and balance deficits. Pt will benefit from skilled PT services throughout admission and beyond hospital discharge for improvements in functional mobility and in order to decrease fall risk and return pt to OF.      Therapy

## 2023-07-31 NOTE — CARE COORDINATION
7/31/23, 1:32 PM EDT  DISCHARGE PLANNING EVALUATION    SW notified that patient is wanting referral to [facility] of [address] SW spoke with patient about discharge planning. He reported that he cannot go to [facility] and would like referral to [address] [place] [address]. SW made referral to Wil Grigsby at [address] [place] [address]. 7/31/23, 3:06 PM EDT  DISCHARGE PLANNING EVALUATION    SW received a call from Wil Grigsby at [address] [place] [address] and she reported that they will accept patient at discharge. Wil Grigsby reported that     AZAM notified patient that [address] [place] [address] can accept at discharge but will not be able to accommodate long term care. Patient confirmed with AZAM that he would only need short term rehab. SW asked for precert to be started.

## 2023-07-31 NOTE — CARE COORDINATION
7/31/23, 6:57 AM EDT    DISCHARGE ON [address] [facility] day: 4  Location: [facility]23/023A Reason for admit: Diplopia [H53.2]  Hypertensive urgency [I16.0]  Hypertensive emergency [I16.1]  Left abducens nerve palsy [H49.22]   Procedure:   7/27 CXR: Mild enlargement of the cardiomediastinal silhouette. No other acute cardiopulmonary findings are noted. 7/27 CT head: Probable ischemic changes in the white matter. 7/27 CTA head/neck:   1. Calcified plaque involving the right carotid bifurcation. 40% stenosis at the origin of the right internal carotid artery. No significant hemodynamic stenosis in the right common carotid artery. 2. Calcified plaque involving the left carotid bifurcation. No significant hemodynamic stenosis in the left common and internal carotid arteries. 3. There is antegrade flow in the right and left vertebral arteries. 4. Negative CTA of the brain. 5. Enlarged right and left lobes of the thyroid gland. 6. Cardiomegaly. 7. Cervical spondylosis. 8. Diffusion MRI scan of the brain would be helpful for more complete evaluation in this patient. 7/28 MRI limited brain:   1. Subacute lacunar infarcts in the left frontal corona radiata and right   frontal lobe anterior to the right lateral ventricle. 2. Old lacunar infarcts in the left frontal lobe. 3. Mild atrophy. Nonspecific periventricular and frontoparietal white   matter signal abnormality may relate to chronic small vessel ischemic   disease. 7/28 ECHO: EF 60%. Grade 1 diastolic dysfunction. Left atrium mildly dilated. Barriers to Discharge: Hospitalist following. PT/OT. /72. PT/OT recommending IPR vs SNF. PCP: DIONTE Escobar CNP  Readmission Risk Score: 7.8%  Patient Goals/Plan/Treatment Preferences: Lives alone. Phone call to IPR admissions, PM&R consulted. Tanner from IPR reports pt does not have benefits for IPR. Spoke with pt, he is agreeable to SNF.  Prefers The [address] [place] [address]

## 2023-07-31 NOTE — PROGRESS NOTES
[facility] OCCUPATIONAL THERAPY  [facility] ICU STEPDOWN TELEMETRY 4K  EVALUATION    Time:   Time In: 901  Time Out: 935  Timed Code Treatment Minutes: 24 Minutes  Minutes: 34          Date: 2023  Patient Name: Lara Owusu,   Gender: male      MRN: [id number]  : 1955  (76 y.o.)  Referring Practitioner: Frances Aragon DO  Diagnosis: hypertensive emergency  Additional Pertinent Hx: per chart review; Lara Owusu is a 76 y.o., , male who  has a past medical history of Diabetes mellitus ([address]), Hyperlipidemia, and Hypertension. that is hospitalized for right eye double vision. Patient reports having uncontrolled hypertension. He has been seeing by PCP 1 month ago with SBP 210s during office visit. His regimen was added on Amlodipine-Olmesartan. Over last 3 days, his vision was progressive worsening. Today, patient develop double vision from the right eye when he was driving. He managed to drive with opening left eye and closing right eye. Denied any eyes pain, headaches, nausea, vomiting, Upper or lowers extremities weakness, numbness, tingling    Restrictions/Precautions:  Restrictions/Precautions: Fall Risk, General Precautions  Position Activity Restriction  Other position/activity restrictions: hx colostomy, possible L neglect    Subjective  Chart Reviewed: Yes, Orders, Progress Notes, History and Physical, Imaging  Patient assessed for rehabilitation services?: Yes  Family / Caregiver Present: No    Subjective: RN approved session, patient seated up in bed upon OT arrival and agreeable to eval. patient A & O x 4.     Pain: 0/10:     Vitals: Vitals not assessed per clinical judgement, see nursing flowsheet    Social/Functional History:  Lives With: Alone  Type of Home: Apartment  Home Layout: Multi-level (lives on first floor)  Home Access: Level entry  Home Equipment: Cane, quad, Sock aid   Bathroom Shower/Tub: Tub/Shower unit  Bathroom Toilet: Standard  Bathroom

## 2023-08-01 VITALS
DIASTOLIC BLOOD PRESSURE: 67 MMHG | WEIGHT: 205.2 LBS | HEART RATE: 64 BPM | HEIGHT: 66 IN | TEMPERATURE: 97.8 F | RESPIRATION RATE: 16 BRPM | BODY MASS INDEX: 32.98 KG/M2 | SYSTOLIC BLOOD PRESSURE: 154 MMHG | OXYGEN SATURATION: 94 %

## 2023-08-01 PROBLEM — I63.9 CEREBROVASCULAR ACCIDENT (CVA) DUE TO THROMBOSIS (HCC): Status: ACTIVE | Noted: 2023-08-01

## 2023-08-01 PROCEDURE — 2580000003 HC RX 258: Performed by: STUDENT IN AN ORGANIZED HEALTH CARE EDUCATION/TRAINING PROGRAM

## 2023-08-01 PROCEDURE — 93270 REMOTE 30 DAY ECG REV/REPORT: CPT

## 2023-08-01 PROCEDURE — 97530 THERAPEUTIC ACTIVITIES: CPT

## 2023-08-01 PROCEDURE — 6370000000 HC RX 637 (ALT 250 FOR IP)

## 2023-08-01 PROCEDURE — 99232 SBSQ HOSP IP/OBS MODERATE 35: CPT | Performed by: INTERNAL MEDICINE

## 2023-08-01 PROCEDURE — 6370000000 HC RX 637 (ALT 250 FOR IP): Performed by: INTERNAL MEDICINE

## 2023-08-01 PROCEDURE — 97535 SELF CARE MNGMENT TRAINING: CPT

## 2023-08-01 PROCEDURE — 6370000000 HC RX 637 (ALT 250 FOR IP): Performed by: STUDENT IN AN ORGANIZED HEALTH CARE EDUCATION/TRAINING PROGRAM

## 2023-08-01 PROCEDURE — 97116 GAIT TRAINING THERAPY: CPT

## 2023-08-01 PROCEDURE — 6370000000 HC RX 637 (ALT 250 FOR IP): Performed by: NURSE PRACTITIONER

## 2023-08-01 PROCEDURE — 99239 HOSP IP/OBS DSCHRG MGMT >30: CPT | Performed by: INTERNAL MEDICINE

## 2023-08-01 PROCEDURE — 6360000002 HC RX W HCPCS: Performed by: STUDENT IN AN ORGANIZED HEALTH CARE EDUCATION/TRAINING PROGRAM

## 2023-08-01 RX ORDER — VALSARTAN 160 MG/1
160 TABLET ORAL 2 TIMES DAILY
Qty: 30 TABLET | Refills: 3 | Status: ON HOLD | DISCHARGE
Start: 2023-08-01 | End: 2023-08-16 | Stop reason: HOSPADM

## 2023-08-01 RX ORDER — CLOPIDOGREL BISULFATE 75 MG/1
75 TABLET ORAL DAILY
Qty: 17 TABLET | Refills: 0 | Status: ON HOLD | DISCHARGE
Start: 2023-08-02 | End: 2023-08-16 | Stop reason: SDUPTHER

## 2023-08-01 RX ORDER — ASPIRIN 81 MG/1
81 TABLET ORAL DAILY
Qty: 30 TABLET | Refills: 3 | DISCHARGE
Start: 2023-08-02

## 2023-08-01 RX ORDER — CHLORTHALIDONE 25 MG/1
25 TABLET ORAL DAILY
Qty: 30 TABLET | Refills: 3 | Status: ON HOLD | DISCHARGE
Start: 2023-08-02 | End: 2023-08-16 | Stop reason: HOSPADM

## 2023-08-01 RX ORDER — NIFEDIPINE 30 MG/1
90 TABLET, EXTENDED RELEASE ORAL DAILY
Qty: 30 TABLET | Refills: 3 | Status: ON HOLD | DISCHARGE
Start: 2023-08-02 | End: 2023-08-16 | Stop reason: HOSPADM

## 2023-08-01 RX ORDER — CARVEDILOL 6.25 MG/1
6.25 TABLET ORAL 2 TIMES DAILY WITH MEALS
Qty: 60 TABLET | Refills: 3 | Status: ON HOLD | DISCHARGE
Start: 2023-08-01 | End: 2023-08-16 | Stop reason: HOSPADM

## 2023-08-01 RX ORDER — ATORVASTATIN CALCIUM 80 MG/1
80 TABLET, FILM COATED ORAL NIGHTLY
Qty: 30 TABLET | Refills: 3 | DISCHARGE
Start: 2023-08-01

## 2023-08-01 RX ORDER — HYDRALAZINE HYDROCHLORIDE 50 MG/1
50 TABLET, FILM COATED ORAL EVERY 8 HOURS SCHEDULED
Qty: 90 TABLET | Refills: 3 | Status: ON HOLD | DISCHARGE
Start: 2023-08-01 | End: 2023-08-16 | Stop reason: HOSPADM

## 2023-08-01 RX ADMIN — CHLORTHALIDONE 25 MG: 25 TABLET ORAL at 09:02

## 2023-08-01 RX ADMIN — CLOPIDOGREL BISULFATE 75 MG: 75 TABLET ORAL at 09:01

## 2023-08-01 RX ADMIN — NIFEDIPINE 90 MG: 30 TABLET, FILM COATED, EXTENDED RELEASE ORAL at 09:01

## 2023-08-01 RX ADMIN — VALSARTAN 160 MG: 160 TABLET, FILM COATED ORAL at 09:02

## 2023-08-01 RX ADMIN — TAMSULOSIN HYDROCHLORIDE 0.4 MG: 0.4 CAPSULE ORAL at 09:02

## 2023-08-01 RX ADMIN — HYDRALAZINE HYDROCHLORIDE 5 MG: 20 INJECTION INTRAMUSCULAR; INTRAVENOUS at 04:10

## 2023-08-01 RX ADMIN — ASPIRIN 81 MG: 81 TABLET, COATED ORAL at 09:01

## 2023-08-01 RX ADMIN — CARVEDILOL 6.25 MG: 6.25 TABLET, FILM COATED ORAL at 09:02

## 2023-08-01 RX ADMIN — HYDRALAZINE HYDROCHLORIDE 50 MG: 50 TABLET, FILM COATED ORAL at 05:45

## 2023-08-01 RX ADMIN — SODIUM CHLORIDE, PRESERVATIVE FREE 10 ML: 5 INJECTION INTRAVENOUS at 09:05

## 2023-08-01 NOTE — PROGRESS NOTES
[address]  Occupational Therapy  Daily Note  Time:   Time In: 3933  Time Out: 1144  Timed Code Treatment Minutes: 25 Minutes  Minutes: 25          Date: 2023  Patient Name: Elisabet Cortez,   Gender: male      Room: -15/015-A  MRN: [id number]  : 1955  (76 y.o.)  Referring Practitioner: Pinky Schultz DO  Diagnosis: hypertensive emergency  Additional Pertinent Hx: per chart review; Elisabet Cortez is a 76 y.o., , male who  has a past medical history of Diabetes mellitus ([address]), Hyperlipidemia, and Hypertension. that is hospitalized for right eye double vision. Patient reports having uncontrolled hypertension. He has been seeing by PCP 1 month ago with SBP 210s during office visit. His regimen was added on Amlodipine-Olmesartan. Over last 3 days, his vision was progressive worsening. Today, patient develop double vision from the right eye when he was driving. He managed to drive with opening left eye and closing right eye. Denied any eyes pain, headaches, nausea, vomiting, Upper or lowers extremities weakness, numbness, tingling    Restrictions/Precautions:  Restrictions/Precautions: Fall Risk, General Precautions  Position Activity Restriction  Other position/activity restrictions: hx colostomy, possible L neglect     SUBJECTIVE: RN approved session, patient seated up in recliner and agreeable to tx upon OT arrival. Patient A & O x 4. PAIN: 0/10:     Vitals: Vitals not assessed per clinical judgement, see nursing flowsheet    COGNITION: Decreased Insight    ADL:   Grooming: Stand By Assistance. Standing at sink with use of 2 w/w to complete oral care  Toilet Transfer: Stand By Assistance and [employer]. With use of 2 w/w and grab bars to complete task . BALANCE:  Sitting Balance:  Independent. Standing Balance: Stand By Assistance, [employer].  With use of 2 w/w    BED MOBILITY:  Not Tested    TRANSFERS:  Sit to Stand:  Stand By

## 2023-08-01 NOTE — CARE COORDINATION
8/1/23, 9:43 AM EDT    Patient goals/plan/ treatment preferences discussed by  and . Patient goals/plan/ treatment preferences reviewed with patient/ family. Patient/ family verbalize understanding of discharge plan and are in agreement with goal/plan/treatment preferences. Understanding was demonstrated using the teach back method. AVS provided by RN at time of discharge, which includes all necessary medical information pertaining to the patients current course of illness, treatment, post-discharge goals of care, and treatment preferences. Services At/After Discharge: [facility] and In [facility] [place]       IMM Letter  IMM Letter given to Patient/Family/Significant other/Guardian/POA/by[de-identified] Susie Ellison RN Case Mgr. IMM Letter date given[de-identified] 08/01/23  IMM Letter time given[de-identified] 0711     Spoke with provider, pt ready for discharge today. Call to [facility], transport set for 12:30pm. AZAM faxed transport paperwork and copy placed on chart. AZAM updated nurse on transport time. Therapy in working with pt this morning, nurse will updated pt on transport time. Call to [facility] with [address], updated on transport time. 11:20 AM EDT  Spoke with nursing pt needing holter monitor, may not be completed by transport time. Call to [facility], transport time changed to 1:30pm, notified nurse, nurse will update pt. Call to [facility] with [address] and updated on time change. AZAM faxed AVS and MAR.

## 2023-08-01 NOTE — PLAN OF CARE
Problem: Discharge Planning  Goal: Discharge to home or other facility with appropriate resources  Outcome: Progressing     Problem: Neurosensory - Adult  Goal: Achieves stable or improved neurological status  Outcome: Progressing  Goal: Achieves maximal functionality and self care  Outcome: Progressing     Problem: Respiratory - Adult  Goal: Achieves optimal ventilation and oxygenation  Outcome: Progressing     Problem: Cardiovascular - Adult  Goal: Maintains optimal cardiac output and hemodynamic stability  Outcome: Progressing     Problem: Musculoskeletal - Adult  Goal: Return mobility to safest level of function  Outcome: Progressing  Goal: Return ADL status to a safe level of function  Outcome: Progressing     Problem: Skin/Tissue Integrity  Goal: Absence of new skin breakdown  Outcome: Progressing     Problem: Skin/Tissue Integrity - Adult  Goal: Skin integrity remains intact  Outcome: Progressing  Goal: Incisions, wounds, or drain sites healing without S/S of infection  Outcome: Progressing     Problem: Gastrointestinal - Adult  Goal: Maintains adequate nutritional intake  Outcome: Progressing  Goal: Establish and maintain optimal ostomy function  Outcome: Progressing     Problem: Safety - Adult  Goal: Free from fall injury  Outcome: Progressing     Problem: Pain  Goal: Verbalizes/displays adequate comfort level or baseline comfort level  Outcome: Progressing     Problem: Hematologic - Adult  Goal: Maintains hematologic stability  Outcome: Progressing     Problem: Chronic Conditions and Co-morbidities  Goal: Patient's chronic conditions and co-morbidity symptoms are monitored and maintained or improved  Outcome: Progressing     Problem: Skin/Tissue Integrity - Adult  Goal: Oral mucous membranes remain intact     Problem: Genitourinary - Adult  Goal: Absence of urinary retention   Patient was with leaking ostomy that was changed at shift change, alert, oriented and consumed 80% of PM meal. No pain, no

## 2023-08-01 NOTE — CARE COORDINATION
8/1/23, 7:47 AM EDT  DISCHARGE PLANNING EVALUATION    SW received a call from Wil Grigsby at [address] [place] [address] that patient's precert has been approved. 8/1/23, 7:47 AM EDT  DISCHARGE BARRIERS      Patient transferred to . Report given to unit [facility], regarding discharge plan for this patient.

## 2023-08-01 NOTE — PROCEDURES
14 Day Cardiac event monitor applied.  Patient being discharged to The [address] [place] [address]

## 2023-08-01 NOTE — CARE COORDINATION
8/1/23, 9:39 AM EDT    IMM Letter  IMM Letter given to Patient/Family/Significant other/Guardian/POA/by[de-identified] Suzan Nickerson RN Case Mgr. IMM Letter date given[de-identified] 08/01/23  IMM Letter time given[de-identified] 4689     Pt verbalized understanding and gave permission for possible discharge within 4 hours of receiving IMM.

## 2023-08-01 NOTE — PROGRESS NOTES
[facility]  INPATIENT PHYSICAL THERAPY  DAILY NOTE  [facility] ONC MED 5K - 5K-15/015-A    Time In: 0940  Time Out: 1013  Timed Code Treatment Minutes: 33 Minutes  Minutes: 33          Date: 2023  Patient Name: Bradley Szymanski,  Gender:  male        MRN: [id number]  : 1955  (76 y.o.)     Referring Practitioner: Steffen Smith DO  Diagnosis: Steffen Smith DO  Additional Pertinent Hx: Bradley Szymanski is a 76 y.o. male with a history of pre diabetes mellitus, Hyperlipidemia, and Hypertension who presented with blurred vision for the past 3 days. Patient reports that he has a long standing of hypertension he was recently started on a new blood pressure medication. 3 days prior to admission he got into his car to drive and noticed that he was having double vision vision in his right eye. He was also having some mild discomfort in his left eye; it felt like something was in his eye. He was applying cold washcloths with improvement of his discomfort. He denied any current discomfort. He reports that the double vision is better if he covers one eye but is unchanged since admission. He denied any headache, dizziness, slurred speech, difficulty swallowing, numbness, weakness or difficulty walking. In the ER his blood pressure was 200/79 he was started on a Cardene drip. MRI shows subacute lacunar infarcts in the left frontal corona radiata and right frontal lobe anterior to the right lateral ventricle. Prior Level of Function:  Lives With: Alone  Type of Home: Apartment  Home Layout: Multi-level (lives on first floor)  Home Access: Level entry  Home Equipment: Cane, quad, Sock aid   Bathroom Shower/Tub: Tub/Shower unit  Bathroom Toilet: Standard  Bathroom Accessibility: Accessible    ADL Assistance: Independent  Homemaking Assistance: Independent  Ambulation Assistance: Independent  Transfer Assistance: Independent  Active : Yes  Additional Comments: used cane prior to admit.  has no social

## 2023-08-02 ENCOUNTER — TELEPHONE (OUTPATIENT)
Dept: SURGERY | Age: 68
End: 2023-08-02

## 2023-08-02 PROBLEM — E11.22 TYPE 2 DIABETES MELLITUS WITH CHRONIC KIDNEY DISEASE (HCC): Status: ACTIVE | Noted: 2023-08-02

## 2023-08-02 LAB — RENIN PLAS-CCNC: 0.8 NG/ML/HR

## 2023-08-02 NOTE — TELEPHONE ENCOUNTER
Patient just released from the hospital for uncontrolled hypertension and subacute lacunar infarcts. Patient currently at [facility]. Placed on Plavix.   Per Dr. Jose Young patient needs to complete all work up from Dr. Ananda Diaz and Dr. Lobo De La Paz before considering to see if he is a candidate for colostomy reversal.

## 2023-08-02 NOTE — DISCHARGE SUMMARY
Resident Discharge Summary (Hospitalist)      Patient Identification:   Pallavi Bonner   : 1955  MRN: [id number]   Account: [de-identified]   Patient's PCP: DIONTE Morrell CNP    Admit Date: 2023   Discharge Date: 2023      Admitting Physician: Yelitza Carballo,   Discharge Physician: Michael Waterman,        Discharge Diagnoses:  HTN emergency  2/2 subtherapeutic treatment. Hx uncontrol HTN (on Amlodipine-Olmesartan, Metoprolol). Seen in PCP office 1 month ago with SBP 200s. Amlodipine-Olmesartan was added during last PCP visit. Reported has been worsening vision last 3 days. Symptom has been progressive worsening today when he was driving. /80. Received Hydralazine 10 mg IV in ED. UDS was negative. Cardene gtt on admission and weaned off on . Patient discharged on Coreg 6.25 mg BID, Chlorthalidone 25 mg daily, Procardia 90 mg , Valsartan 160 mg BID, and Hydralazine 50 mg TID for antihypertensive regime. Subacute lacunar infarcts in the left frontal corona radiata and right frontal lobe anterior to the right lateral ventricle  Worsening vision then progressive double vision on 23. CT head negative. CTA head and neck showed mild right ICA. Unable to obtain brain MRI due to body habitus. Limited MRI (23) showed Subacute lacunar infarcts in the left frontal corona radiata and right frontal lobe anterior to the right lateral ventricle, Old lacunar infarcts in the left frontal lobe, Mild atrophy. Nonspecific periventricular and frontoparietal white matter signal abnormality may relate to chronic small vessel ischemic disease. Patient to continue ASA, Plavix (for 21 days), and Lipitor 80 mg. Patient discharged to SNF for PT/OT. Right Internal Carotid artery stenosis  40% stenosis on CTA head & neck (23). Lipid panel showed hyperlipidemia. Patient to continue Lipitor. Left NC VI palsy  Unknown etiology. No trauma history.  Unable to obtain brain MRI due

## 2023-08-04 LAB
EKG ATRIAL RATE: 59 BPM
EKG ATRIAL RATE: 64 BPM
EKG P AXIS: 50 DEGREES
EKG P AXIS: 86 DEGREES
EKG P-R INTERVAL: 206 MS
EKG P-R INTERVAL: 228 MS
EKG Q-T INTERVAL: 418 MS
EKG Q-T INTERVAL: 424 MS
EKG QRS DURATION: 100 MS
EKG QRS DURATION: 94 MS
EKG QTC CALCULATION (BAZETT): 419 MS
EKG QTC CALCULATION (BAZETT): 431 MS
EKG R AXIS: 13 DEGREES
EKG R AXIS: 18 DEGREES
EKG T AXIS: 82 DEGREES
EKG T AXIS: 84 DEGREES
EKG VENTRICULAR RATE: 59 BPM
EKG VENTRICULAR RATE: 64 BPM

## 2023-08-07 ENCOUNTER — APPOINTMENT (OUTPATIENT)
Dept: CT IMAGING | Age: 68
DRG: 064 | End: 2023-08-07
Payer: MEDICARE

## 2023-08-07 ENCOUNTER — APPOINTMENT (OUTPATIENT)
Dept: ULTRASOUND IMAGING | Age: 68
DRG: 064 | End: 2023-08-07
Payer: MEDICARE

## 2023-08-07 ENCOUNTER — APPOINTMENT (OUTPATIENT)
Dept: GENERAL RADIOLOGY | Age: 68
DRG: 064 | End: 2023-08-07
Payer: MEDICARE

## 2023-08-07 ENCOUNTER — APPOINTMENT (OUTPATIENT)
Dept: MRI IMAGING | Age: 68
DRG: 064 | End: 2023-08-07
Payer: MEDICARE

## 2023-08-07 ENCOUNTER — HOSPITAL ENCOUNTER (INPATIENT)
Age: 68
LOS: 9 days | Discharge: NURSING, REHAB OR LONG-TERM CARE | DRG: 064 | End: 2023-08-16
Attending: EMERGENCY MEDICINE | Admitting: STUDENT IN AN ORGANIZED HEALTH CARE EDUCATION/TRAINING PROGRAM
Payer: MEDICARE

## 2023-08-07 DIAGNOSIS — N19 UREMIA: ICD-10-CM

## 2023-08-07 DIAGNOSIS — R41.82 ALTERED MENTAL STATUS, UNSPECIFIED ALTERED MENTAL STATUS TYPE: Primary | ICD-10-CM

## 2023-08-07 DIAGNOSIS — R33.9 URINARY RETENTION: ICD-10-CM

## 2023-08-07 DIAGNOSIS — A41.9 SEPSIS, DUE TO UNSPECIFIED ORGANISM, UNSPECIFIED WHETHER ACUTE ORGAN DYSFUNCTION PRESENT (HCC): ICD-10-CM

## 2023-08-07 DIAGNOSIS — T68.XXXA HYPOTHERMIA, INITIAL ENCOUNTER: ICD-10-CM

## 2023-08-07 DIAGNOSIS — R06.6 HICCUPS: ICD-10-CM

## 2023-08-07 DIAGNOSIS — R77.8 TROPONIN LEVEL ELEVATED: ICD-10-CM

## 2023-08-07 DIAGNOSIS — N17.9 AKI (ACUTE KIDNEY INJURY) (HCC): ICD-10-CM

## 2023-08-07 LAB
ALBUMIN SERPL BCG-MCNC: 4.1 G/DL (ref 3.5–5.1)
ALP SERPL-CCNC: 131 U/L (ref 38–126)
ALT SERPL W/O P-5'-P-CCNC: 17 U/L (ref 11–66)
AMPHETAMINES UR QL SCN: NEGATIVE
ANION GAP SERPL CALC-SCNC: 20 MEQ/L (ref 8–16)
ANION GAP SERPL CALC-SCNC: 21 MEQ/L (ref 8–16)
ANION GAP SERPL CALC-SCNC: 22 MEQ/L (ref 8–16)
AST SERPL-CCNC: 9 U/L (ref 5–40)
B-OH-BUTYR SERPL-MSCNC: 1.82 MG/DL (ref 0.2–2.81)
BACTERIA: NORMAL
BARBITURATES UR QL SCN: NEGATIVE
BASE EXCESS BLDA CALC-SCNC: -10.3 MMOL/L (ref -2–3)
BASOPHILS ABSOLUTE: 0 THOU/MM3 (ref 0–0.1)
BASOPHILS NFR BLD AUTO: 0.3 %
BENZODIAZ UR QL SCN: NEGATIVE
BILIRUB SERPL-MCNC: 0.4 MG/DL (ref 0.3–1.2)
BILIRUB UR QL STRIP: NEGATIVE
BUN SERPL-MCNC: 156 MG/DL (ref 7–22)
BUN SERPL-MCNC: 158 MG/DL (ref 7–22)
BUN SERPL-MCNC: 161 MG/DL (ref 7–22)
BZE UR QL SCN: NEGATIVE
CA-I BLD ISE-SCNC: 0.93 MMOL/L (ref 1.12–1.32)
CA-I BLD ISE-SCNC: 0.98 MMOL/L (ref 1.12–1.32)
CALCIUM SERPL-MCNC: 8.6 MG/DL (ref 8.5–10.5)
CALCIUM SERPL-MCNC: 8.7 MG/DL (ref 8.5–10.5)
CALCIUM SERPL-MCNC: 9 MG/DL (ref 8.5–10.5)
CANNABINOIDS UR QL SCN: NEGATIVE
CASTS #/AREA URNS LPF: NORMAL /LPF
CASTS #/AREA URNS LPF: NORMAL /LPF
CHARACTER UR: CLEAR
CHARCOAL URNS QL MICRO: NORMAL
CHLORIDE 24H UR-SRATE: < 20 MEQ/L
CHLORIDE SERPL-SCNC: 100 MEQ/L (ref 98–111)
CHLORIDE SERPL-SCNC: 96 MEQ/L (ref 98–111)
CHLORIDE SERPL-SCNC: 97 MEQ/L (ref 98–111)
CHP ED QC CHECK: NORMAL
CK SERPL-CCNC: 95 U/L (ref 55–170)
CO2 SERPL-SCNC: 13 MEQ/L (ref 23–33)
CO2 SERPL-SCNC: 13 MEQ/L (ref 23–33)
CO2 SERPL-SCNC: 15 MEQ/L (ref 23–33)
COLLECTED BY:: ABNORMAL
COLOR UR: YELLOW
CREAT SERPL-MCNC: 5.5 MG/DL (ref 0.4–1.2)
CREAT SERPL-MCNC: 5.6 MG/DL (ref 0.4–1.2)
CREAT SERPL-MCNC: 6 MG/DL (ref 0.4–1.2)
CREAT UR-MCNC: 238 MG/DL
CRYSTALS URNS QL MICRO: NORMAL
DEPRECATED RDW RBC AUTO: 41.8 FL (ref 35–45)
DEVICE: ABNORMAL
EOSINOPHIL NFR BLD AUTO: 0.1 %
EOSINOPHILS ABSOLUTE: 0 THOU/MM3 (ref 0–0.4)
EPITHELIAL CELLS, UA: NORMAL /HPF
ERYTHROCYTE [DISTWIDTH] IN BLOOD BY AUTOMATED COUNT: 13.4 % (ref 11.5–14.5)
FENTANYL: NEGATIVE
FLUAV RNA RESP QL NAA+PROBE: NOT DETECTED
FLUAV RNA RESP QL NAA+PROBE: NOT DETECTED
FLUBV RNA RESP QL NAA+PROBE: NOT DETECTED
FLUBV RNA RESP QL NAA+PROBE: NOT DETECTED
GFR SERPL CREATININE-BSD FRML MDRD: 10 ML/MIN/1.73M2
GFR SERPL CREATININE-BSD FRML MDRD: 10 ML/MIN/1.73M2
GFR SERPL CREATININE-BSD FRML MDRD: 11 ML/MIN/1.73M2
GLUCOSE BLD STRIP.AUTO-MCNC: 120 MG/DL (ref 70–108)
GLUCOSE BLD STRIP.AUTO-MCNC: 124 MG/DL (ref 70–108)
GLUCOSE BLD STRIP.AUTO-MCNC: 188 MG/DL (ref 70–108)
GLUCOSE BLD-MCNC: 188 MG/DL
GLUCOSE SERPL-MCNC: 122 MG/DL (ref 70–108)
GLUCOSE SERPL-MCNC: 149 MG/DL (ref 70–108)
GLUCOSE SERPL-MCNC: 160 MG/DL (ref 70–108)
GLUCOSE UR QL STRIP.AUTO: NEGATIVE MG/DL
HCO3 BLDA-SCNC: 12 MMOL/L (ref 23–28)
HCT VFR BLD AUTO: 47.6 % (ref 42–52)
HGB BLD-MCNC: 15.9 GM/DL (ref 14–18)
HGB UR QL STRIP.AUTO: NEGATIVE
IMM GRANULOCYTES # BLD AUTO: 0.06 THOU/MM3 (ref 0–0.07)
IMM GRANULOCYTES NFR BLD AUTO: 0.5 %
KETONES UR QL STRIP.AUTO: NEGATIVE
LACTATE SERPL-SCNC: 1.3 MMOL/L (ref 0.5–2)
LACTATE SERPL-SCNC: 3.7 MMOL/L (ref 0.5–2)
LACTIC ACID, SEPSIS: 1.5 MMOL/L (ref 0.5–1.9)
LACTIC ACID, SEPSIS: 1.9 MMOL/L (ref 0.5–1.9)
LEUKOCYTE ESTERASE UR QL STRIP.AUTO: NEGATIVE
LYMPHOCYTES ABSOLUTE: 0.9 THOU/MM3 (ref 1–4.8)
LYMPHOCYTES NFR BLD AUTO: 7.1 %
MAGNESIUM SERPL-MCNC: 3.1 MG/DL (ref 1.6–2.4)
MAGNESIUM SERPL-MCNC: 3.1 MG/DL (ref 1.6–2.4)
MCH RBC QN AUTO: 28.8 PG (ref 26–33)
MCHC RBC AUTO-ENTMCNC: 33.4 GM/DL (ref 32.2–35.5)
MCV RBC AUTO: 86.1 FL (ref 80–94)
MONOCYTES ABSOLUTE: 1.3 THOU/MM3 (ref 0.4–1.3)
MONOCYTES NFR BLD AUTO: 9.5 %
MRSA DNA SPEC QL NAA+PROBE: NEGATIVE
NEUTROPHILS NFR BLD AUTO: 82.5 %
NITRITE UR QL STRIP.AUTO: NEGATIVE
NRBC BLD AUTO-RTO: 0 /100 WBC
OPIATES UR QL SCN: NEGATIVE
OSMOLALITY SERPL CALC.SUM OF ELEC: 319 MOSMOL/KG (ref 275–300)
OSMOLALITY SERPL CALC.SUM OF ELEC: 319.6 MOSMOL/KG (ref 275–300)
OSMOLALITY SERPL CALC.SUM OF ELEC: 320.4 MOSMOL/KG (ref 275–300)
OXYCODONE, OPI5M: NEGATIVE
PCO2 TEMP ADJ BLDMV: 20 MMHG (ref 41–51)
PCP UR QL SCN: NEGATIVE
PH BLDMV: 7.38 [PH] (ref 7.31–7.41)
PH UR STRIP.AUTO: 5 [PH] (ref 5–9)
PHOSPHATE SERPL-MCNC: 9.2 MG/DL (ref 2.4–4.7)
PHOSPHATE SERPL-MCNC: 9.6 MG/DL (ref 2.4–4.7)
PLATELET # BLD AUTO: 250 THOU/MM3 (ref 130–400)
PMV BLD AUTO: 12 FL (ref 9.4–12.4)
PO2 BLDMV: 131 MMHG (ref 25–40)
POTASSIUM SERPL-SCNC: 4.2 MEQ/L (ref 3.5–5.2)
POTASSIUM SERPL-SCNC: 4.2 MEQ/L (ref 3.5–5.2)
POTASSIUM SERPL-SCNC: 4.3 MEQ/L (ref 3.5–5.2)
POTASSIUM UR-SCNC: 30.2 MEQ/L
PROT SERPL-MCNC: 8 G/DL (ref 6.1–8)
PROT UR STRIP.AUTO-MCNC: NEGATIVE MG/DL
RBC # BLD AUTO: 5.53 MILL/MM3 (ref 4.7–6.1)
RBC #/AREA URNS HPF: NORMAL /HPF
RENAL EPI CELLS #/AREA URNS HPF: NORMAL /[HPF]
SAO2 % BLDMV: 99 %
SARS-COV-2 RNA RESP QL NAA+PROBE: NOT DETECTED
SARS-COV-2 RNA RESP QL NAA+PROBE: NOT DETECTED
SEGMENTED NEUTROPHILS ABSOLUTE COUNT: 11 THOU/MM3 (ref 1.8–7.7)
SODIUM SERPL-SCNC: 131 MEQ/L (ref 135–145)
SODIUM SERPL-SCNC: 133 MEQ/L (ref 135–145)
SODIUM SERPL-SCNC: 133 MEQ/L (ref 135–145)
SODIUM UR-SCNC: < 20 MEQ/L
SPECIFIC GRAVITY UA: 1.01 (ref 1–1.03)
TROPONIN, HIGH SENSITIVITY: 72 NG/L (ref 0–12)
TROPONIN, HIGH SENSITIVITY: 75 NG/L (ref 0–12)
TSH SERPL DL<=0.005 MIU/L-ACNC: 1.37 UIU/ML (ref 0.4–4.2)
UROBILINOGEN, URINE: 0.2 EU/DL (ref 0–1)
WBC # BLD AUTO: 13.3 THOU/MM3 (ref 4.8–10.8)
WBC #/AREA URNS HPF: NORMAL /HPF
YEAST LIKE FUNGI URNS QL MICRO: NORMAL

## 2023-08-07 PROCEDURE — 80053 COMPREHEN METABOLIC PANEL: CPT

## 2023-08-07 PROCEDURE — 84443 ASSAY THYROID STIM HORMONE: CPT

## 2023-08-07 PROCEDURE — 82803 BLOOD GASES ANY COMBINATION: CPT

## 2023-08-07 PROCEDURE — 2500000003 HC RX 250 WO HCPCS: Performed by: INTERNAL MEDICINE

## 2023-08-07 PROCEDURE — 94761 N-INVAS EAR/PLS OXIMETRY MLT: CPT

## 2023-08-07 PROCEDURE — 76770 US EXAM ABDO BACK WALL COMP: CPT

## 2023-08-07 PROCEDURE — 6360000002 HC RX W HCPCS: Performed by: INTERNAL MEDICINE

## 2023-08-07 PROCEDURE — 36415 COLL VENOUS BLD VENIPUNCTURE: CPT

## 2023-08-07 PROCEDURE — 83735 ASSAY OF MAGNESIUM: CPT

## 2023-08-07 PROCEDURE — 82330 ASSAY OF CALCIUM: CPT

## 2023-08-07 PROCEDURE — 82948 REAGENT STRIP/BLOOD GLUCOSE: CPT

## 2023-08-07 PROCEDURE — 70551 MRI BRAIN STEM W/O DYE: CPT

## 2023-08-07 PROCEDURE — 2580000003 HC RX 258

## 2023-08-07 PROCEDURE — 2140000000 HC CCU INTERMEDIATE R&B

## 2023-08-07 PROCEDURE — 6370000000 HC RX 637 (ALT 250 FOR IP)

## 2023-08-07 PROCEDURE — 84100 ASSAY OF PHOSPHORUS: CPT

## 2023-08-07 PROCEDURE — 2580000003 HC RX 258: Performed by: EMERGENCY MEDICINE

## 2023-08-07 PROCEDURE — 87641 MR-STAPH DNA AMP PROBE: CPT

## 2023-08-07 PROCEDURE — 82570 ASSAY OF URINE CREATININE: CPT

## 2023-08-07 PROCEDURE — 87086 URINE CULTURE/COLONY COUNT: CPT

## 2023-08-07 PROCEDURE — 84133 ASSAY OF URINE POTASSIUM: CPT

## 2023-08-07 PROCEDURE — 99223 1ST HOSP IP/OBS HIGH 75: CPT | Performed by: STUDENT IN AN ORGANIZED HEALTH CARE EDUCATION/TRAINING PROGRAM

## 2023-08-07 PROCEDURE — 87040 BLOOD CULTURE FOR BACTERIA: CPT

## 2023-08-07 PROCEDURE — 2580000003 HC RX 258: Performed by: INTERNAL MEDICINE

## 2023-08-07 PROCEDURE — 82436 ASSAY OF URINE CHLORIDE: CPT

## 2023-08-07 PROCEDURE — 6360000002 HC RX W HCPCS

## 2023-08-07 PROCEDURE — 74176 CT ABD & PELVIS W/O CONTRAST: CPT

## 2023-08-07 PROCEDURE — 84300 ASSAY OF URINE SODIUM: CPT

## 2023-08-07 PROCEDURE — 99222 1ST HOSP IP/OBS MODERATE 55: CPT | Performed by: INTERNAL MEDICINE

## 2023-08-07 PROCEDURE — 6360000002 HC RX W HCPCS: Performed by: EMERGENCY MEDICINE

## 2023-08-07 PROCEDURE — 83605 ASSAY OF LACTIC ACID: CPT

## 2023-08-07 PROCEDURE — 93005 ELECTROCARDIOGRAM TRACING: CPT | Performed by: STUDENT IN AN ORGANIZED HEALTH CARE EDUCATION/TRAINING PROGRAM

## 2023-08-07 PROCEDURE — 82010 KETONE BODYS QUAN: CPT

## 2023-08-07 PROCEDURE — 70450 CT HEAD/BRAIN W/O DYE: CPT

## 2023-08-07 PROCEDURE — 71045 X-RAY EXAM CHEST 1 VIEW: CPT

## 2023-08-07 PROCEDURE — 87636 SARSCOV2 & INF A&B AMP PRB: CPT

## 2023-08-07 PROCEDURE — 80307 DRUG TEST PRSMV CHEM ANLYZR: CPT

## 2023-08-07 PROCEDURE — 85025 COMPLETE CBC W/AUTO DIFF WBC: CPT

## 2023-08-07 PROCEDURE — 99285 EMERGENCY DEPT VISIT HI MDM: CPT

## 2023-08-07 PROCEDURE — 81001 URINALYSIS AUTO W/SCOPE: CPT

## 2023-08-07 PROCEDURE — 84484 ASSAY OF TROPONIN QUANT: CPT

## 2023-08-07 PROCEDURE — 93010 ELECTROCARDIOGRAM REPORT: CPT | Performed by: INTERNAL MEDICINE

## 2023-08-07 PROCEDURE — 51798 US URINE CAPACITY MEASURE: CPT

## 2023-08-07 PROCEDURE — 82550 ASSAY OF CK (CPK): CPT

## 2023-08-07 RX ORDER — CHLORTHALIDONE 25 MG/1
25 TABLET ORAL DAILY
Status: DISCONTINUED | OUTPATIENT
Start: 2023-08-07 | End: 2023-08-16

## 2023-08-07 RX ORDER — SODIUM CHLORIDE 9 MG/ML
INJECTION, SOLUTION INTRAVENOUS PRN
Status: DISCONTINUED | OUTPATIENT
Start: 2023-08-07 | End: 2023-08-16 | Stop reason: HOSPADM

## 2023-08-07 RX ORDER — ACETAMINOPHEN 325 MG/1
650 TABLET ORAL EVERY 6 HOURS PRN
Status: DISCONTINUED | OUTPATIENT
Start: 2023-08-07 | End: 2023-08-16 | Stop reason: HOSPADM

## 2023-08-07 RX ORDER — NIFEDIPINE 30 MG/1
90 TABLET, EXTENDED RELEASE ORAL DAILY
Status: DISCONTINUED | OUTPATIENT
Start: 2023-08-07 | End: 2023-08-09

## 2023-08-07 RX ORDER — INSULIN LISPRO 100 [IU]/ML
0-4 INJECTION, SOLUTION INTRAVENOUS; SUBCUTANEOUS NIGHTLY
Status: DISCONTINUED | OUTPATIENT
Start: 2023-08-07 | End: 2023-08-13

## 2023-08-07 RX ORDER — CALCIUM GLUCONATE 20 MG/ML
2000 INJECTION, SOLUTION INTRAVENOUS ONCE
Status: COMPLETED | OUTPATIENT
Start: 2023-08-07 | End: 2023-08-08

## 2023-08-07 RX ORDER — VALSARTAN 160 MG/1
160 TABLET ORAL 2 TIMES DAILY
Status: DISCONTINUED | OUTPATIENT
Start: 2023-08-07 | End: 2023-08-16

## 2023-08-07 RX ORDER — CLOPIDOGREL BISULFATE 75 MG/1
75 TABLET ORAL DAILY
Status: DISCONTINUED | OUTPATIENT
Start: 2023-08-07 | End: 2023-08-16 | Stop reason: HOSPADM

## 2023-08-07 RX ORDER — ONDANSETRON 2 MG/ML
4 INJECTION INTRAMUSCULAR; INTRAVENOUS EVERY 6 HOURS PRN
Status: DISCONTINUED | OUTPATIENT
Start: 2023-08-07 | End: 2023-08-16 | Stop reason: HOSPADM

## 2023-08-07 RX ORDER — SODIUM CHLORIDE 0.9 % (FLUSH) 0.9 %
5-40 SYRINGE (ML) INJECTION PRN
Status: DISCONTINUED | OUTPATIENT
Start: 2023-08-07 | End: 2023-08-16 | Stop reason: HOSPADM

## 2023-08-07 RX ORDER — ONDANSETRON 2 MG/ML
INJECTION INTRAMUSCULAR; INTRAVENOUS
Status: COMPLETED
Start: 2023-08-07 | End: 2023-08-07

## 2023-08-07 RX ORDER — CALCIUM GLUCONATE 10 MG/ML
1000 INJECTION, SOLUTION INTRAVENOUS ONCE
Status: COMPLETED | OUTPATIENT
Start: 2023-08-07 | End: 2023-08-07

## 2023-08-07 RX ORDER — TAMSULOSIN HYDROCHLORIDE 0.4 MG/1
0.4 CAPSULE ORAL DAILY
Status: DISCONTINUED | OUTPATIENT
Start: 2023-08-07 | End: 2023-08-10

## 2023-08-07 RX ORDER — SODIUM CHLORIDE, SODIUM LACTATE, POTASSIUM CHLORIDE, AND CALCIUM CHLORIDE .6; .31; .03; .02 G/100ML; G/100ML; G/100ML; G/100ML
250 INJECTION, SOLUTION INTRAVENOUS ONCE
Status: DISCONTINUED | OUTPATIENT
Start: 2023-08-07 | End: 2023-08-16 | Stop reason: HOSPADM

## 2023-08-07 RX ORDER — SODIUM CHLORIDE 9 MG/ML
INJECTION, SOLUTION INTRAVENOUS CONTINUOUS
Status: DISCONTINUED | OUTPATIENT
Start: 2023-08-07 | End: 2023-08-07

## 2023-08-07 RX ORDER — POLYETHYLENE GLYCOL 3350 17 G/17G
17 POWDER, FOR SOLUTION ORAL DAILY PRN
Status: DISCONTINUED | OUTPATIENT
Start: 2023-08-07 | End: 2023-08-16 | Stop reason: HOSPADM

## 2023-08-07 RX ORDER — ATORVASTATIN CALCIUM 80 MG/1
80 TABLET, FILM COATED ORAL NIGHTLY
Status: DISCONTINUED | OUTPATIENT
Start: 2023-08-07 | End: 2023-08-16 | Stop reason: HOSPADM

## 2023-08-07 RX ORDER — ONDANSETRON 4 MG/1
4 TABLET, ORALLY DISINTEGRATING ORAL EVERY 8 HOURS PRN
Status: DISCONTINUED | OUTPATIENT
Start: 2023-08-07 | End: 2023-08-16 | Stop reason: HOSPADM

## 2023-08-07 RX ORDER — DEXTROSE MONOHYDRATE 100 MG/ML
INJECTION, SOLUTION INTRAVENOUS CONTINUOUS PRN
Status: DISCONTINUED | OUTPATIENT
Start: 2023-08-07 | End: 2023-08-16 | Stop reason: HOSPADM

## 2023-08-07 RX ORDER — IBUPROFEN 600 MG/1
1 TABLET ORAL PRN
Status: DISCONTINUED | OUTPATIENT
Start: 2023-08-07 | End: 2023-08-16 | Stop reason: HOSPADM

## 2023-08-07 RX ORDER — INSULIN LISPRO 100 [IU]/ML
0-4 INJECTION, SOLUTION INTRAVENOUS; SUBCUTANEOUS
Status: DISCONTINUED | OUTPATIENT
Start: 2023-08-07 | End: 2023-08-13

## 2023-08-07 RX ORDER — HYDRALAZINE HYDROCHLORIDE 50 MG/1
50 TABLET, FILM COATED ORAL EVERY 8 HOURS SCHEDULED
Status: DISCONTINUED | OUTPATIENT
Start: 2023-08-07 | End: 2023-08-12

## 2023-08-07 RX ORDER — HEPARIN SODIUM 5000 [USP'U]/ML
5000 INJECTION, SOLUTION INTRAVENOUS; SUBCUTANEOUS EVERY 8 HOURS SCHEDULED
Status: DISCONTINUED | OUTPATIENT
Start: 2023-08-07 | End: 2023-08-16 | Stop reason: HOSPADM

## 2023-08-07 RX ORDER — CARVEDILOL 6.25 MG/1
6.25 TABLET ORAL 2 TIMES DAILY WITH MEALS
Status: DISCONTINUED | OUTPATIENT
Start: 2023-08-07 | End: 2023-08-16

## 2023-08-07 RX ORDER — ASPIRIN 81 MG/1
81 TABLET ORAL DAILY
Status: DISCONTINUED | OUTPATIENT
Start: 2023-08-07 | End: 2023-08-08

## 2023-08-07 RX ORDER — SODIUM CHLORIDE 0.9 % (FLUSH) 0.9 %
5-40 SYRINGE (ML) INJECTION EVERY 12 HOURS SCHEDULED
Status: DISCONTINUED | OUTPATIENT
Start: 2023-08-07 | End: 2023-08-16 | Stop reason: HOSPADM

## 2023-08-07 RX ORDER — ACETAMINOPHEN 650 MG/1
650 SUPPOSITORY RECTAL EVERY 6 HOURS PRN
Status: DISCONTINUED | OUTPATIENT
Start: 2023-08-07 | End: 2023-08-16 | Stop reason: HOSPADM

## 2023-08-07 RX ADMIN — ONDANSETRON 4 MG: 2 INJECTION INTRAMUSCULAR; INTRAVENOUS at 11:32

## 2023-08-07 RX ADMIN — CALCIUM GLUCONATE 1000 MG: 10 INJECTION, SOLUTION INTRAVENOUS at 13:10

## 2023-08-07 RX ADMIN — VANCOMYCIN HYDROCHLORIDE 2000 MG: 1 INJECTION, POWDER, LYOPHILIZED, FOR SOLUTION INTRAVENOUS at 15:42

## 2023-08-07 RX ADMIN — HEPARIN SODIUM 5000 UNITS: 5000 INJECTION INTRAVENOUS; SUBCUTANEOUS at 22:11

## 2023-08-07 RX ADMIN — PIPERACILLIN SODIUM AND TAZOBACTAM SODIUM 3375 MG: 3; .375 INJECTION, SOLUTION INTRAVENOUS at 18:28

## 2023-08-07 RX ADMIN — PIPERACILLIN AND TAZOBACTAM 4500 MG: 4; .5 INJECTION, POWDER, LYOPHILIZED, FOR SOLUTION INTRAVENOUS at 11:22

## 2023-08-07 RX ADMIN — AZITHROMYCIN MONOHYDRATE 500 MG: 500 INJECTION, POWDER, LYOPHILIZED, FOR SOLUTION INTRAVENOUS at 10:41

## 2023-08-07 RX ADMIN — CALCIUM GLUCONATE 2000 MG: 20 INJECTION, SOLUTION INTRAVENOUS at 22:40

## 2023-08-07 RX ADMIN — SODIUM CHLORIDE: 9 INJECTION, SOLUTION INTRAVENOUS at 10:43

## 2023-08-07 RX ADMIN — SODIUM BICARBONATE: 84 INJECTION, SOLUTION INTRAVENOUS at 13:16

## 2023-08-07 RX ADMIN — SODIUM BICARBONATE: 84 INJECTION, SOLUTION INTRAVENOUS at 20:46

## 2023-08-07 ASSESSMENT — PAIN - FUNCTIONAL ASSESSMENT: PAIN_FUNCTIONAL_ASSESSMENT: NONE - DENIES PAIN

## 2023-08-07 NOTE — ED PROVIDER NOTES
[facility] DEPT  EMERGENCY DEPARTMENT ENCOUNTER          Pt Name: Giovanny Dunn  MRN: [id number]  [address] [facility] 1955  Date of evaluation: 8/7/2023  Physician: Annabelle Cortez MD  Supervising Attending Physician: Anant Paniagua MD      CHIEF COMPLAINT       Chief Complaint   Patient presents with    Altered Mental Status         HISTORY OF PRESENT ILLNESS    HPI  The HPI is limited secondary to the patient's altered mental status. Giovanny Dunn is a 76 y.o. 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 There was also reports from the nursing home that he was hypotensive. Upon arrival here, his rectal temperature was 94.2 he was placed on a [facility]. He is not interactive, only making mumbling noises. He is forcing his eyes shut. He has the hiccups. Otherwise unable to elicit any information from the patient regarding how he is feeling.       REVIEW OF SYSTEMS   Review of Systems   Unable to perform ROS: Mental status change       PAST MEDICAL AND SURGICAL HISTORY     Past Medical History:   Diagnosis Date    Cerebrovascular accident (CVA) due to thrombosis ([address]) 8/1/2023    Diabetes mellitus ([address])     Hyperlipidemia     Hypertension      Past Surgical History:   Procedure Laterality Date    ENDOSCOPY, COLON, DIAGNOSTIC      swallowing difficulties    LAPAROSCOPY N/A 1/19/2022    EXPLORATORY LAPAROSCOPY, TRANSVERSE LOOP COLOSTOMY performed by Sher Mcmahon MD at [address]  Aug 29, 2013    Neck Abcess Incision and Drainage (Dr. Dwayne Clemons, [facility])    RECTAL SURGERY N/A 1/14/2022    FULL THICKNESS DEBRIDEMENT OF SACRAL DECUBITUS ULCER, [id number] CM performed by Sher Mcmahon MD at [facility]

## 2023-08-07 NOTE — ED PROVIDER NOTES
[facility]  EMERGENCY MEDICINE ATTENDING ATTESTATION      Evaluation of Vernell Weinstein. Case discussed and care plan developed with resident physician. I agree with the resident physician documentation and plan as documented by him, except if my documentation differs. Patient seen, interviewed and examined by me. I reviewed the medical, surgical, family and social history, medications and allergies. I have reviewed and interpreted all available lab, radiology and ekg results available at the moment. I have reviewed the nursing documentation. Brief H&P   Patient brought in by EMS from nursing home with very limited information available. It appears that this patient was found to be hypotensive this morning. It is unclear what the patient's baseline mentation is but arrived by EMS with complaint of altered mental status. Patient has history of recent Fourniers gangrene and colostomy placement. Physical exam is notable for chronically ill appearing, hypothermic, response to painful stimuli, is able to say yes when he has if he has pain but does not answer any additional questions. No visible infected decubitus ulcer, no current concerning findings in the groin area. Medical Decision Making   MDM:   Undifferentiated altered mental status  Pneumonia  Rule out UTI  Hiccups  Hypothermia  Sepsis without septic shock  DARINEL  Plan:   IV line, labs  IV fluids  EKG  Imaging  Start antibiotics  Observation in the ED while awaiting results  Patient will need to be admitted    Please see the resident physician completed note for final disposition except as documented on this attestation. I have reviewed and interpreted all available lab, radiology and ekg results available at the moment. Diagnosis, treatment and disposition plans were discussed and agreed upon by patient. This transcription was electronically signed.  It was dictated by use of voice recognition

## 2023-08-07 NOTE — ED NOTES
Patient resting in bed, respirations even and unlabored at this time. Patient coughing occasionally. Covid/flu swab sent at this time.       Dennis Ku RN  08/07/23 8704

## 2023-08-07 NOTE — H&P
EKG Emergency    Collection Time: 08/07/23  7:47 AM   Result Value Ref Range    Ventricular Rate 61 BPM    Atrial Rate 61 BPM    P-R Interval 220 ms    QRS Duration 96 ms    Q-T Interval 438 ms    QTc Calculation (Bazett) 440 ms    P Axis 24 degrees    R Axis 22 degrees    T Axis 41 degrees   CBC with Auto Differential    Collection Time: 08/07/23  8:00 AM   Result Value Ref Range    WBC 13.3 (H) 4.8 - 10.8 thou/mm3    RBC 5.53 4.70 - 6.10 mill/mm3    Hemoglobin 15.9 14.0 - 18.0 gm/dl    Hematocrit 47.6 42.0 - 52.0 %    MCV 86.1 80.0 - 94.0 fL    MCH 28.8 26.0 - 33.0 pg    MCHC 33.4 32.2 - 35.5 gm/dl    RDW-CV 13.4 11.5 - 14.5 %    RDW-SD 41.8 35.0 - 45.0 fL    Platelets 688 585 - 713 thou/mm3    MPV 12.0 9.4 - 12.4 fL    Seg Neutrophils 82.5 %    Lymphocytes 7.1 %    Monocytes 9.5 %    Eosinophils 0.1 %    Basophils 0.3 %    Immature Granulocytes 0.5 %    Segs Absolute 11.0 (H) 1.8 - 7.7 thou/mm3    Lymphocytes Absolute 0.9 (L) 1.0 - 4.8 thou/mm3    Monocytes Absolute 1.3 0.4 - 1.3 thou/mm3    Eosinophils Absolute 0.0 0.0 - 0.4 thou/mm3    Basophils Absolute 0.0 0.0 - 0.1 thou/mm3    Immature Grans (Abs) 0.06 0.00 - 0.07 thou/mm3    nRBC 0 /100 wbc   Comprehensive Metabolic Panel    Collection Time: 08/07/23  8:00 AM   Result Value Ref Range    Glucose 149 (H) 70 - 108 mg/dL    Creatinine 5.6 (HH) 0.4 - 1.2 mg/dL     (H) 7 - 22 mg/dL    Sodium 133 (L) 135 - 145 meq/L    Potassium 4.2 3.5 - 5.2 meq/L    Chloride 96 (L) 98 - 111 meq/L    CO2 15 (L) 23 - 33 meq/L    Calcium 9.0 8.5 - 10.5 mg/dL    AST 9 5 - 40 U/L    Alkaline Phosphatase 131 (H) 38 - 126 U/L    Total Protein 8.0 6.1 - 8.0 g/dL    Albumin 4.1 3.5 - 5.1 g/dL    Total Bilirubin 0.4 0.3 - 1.2 mg/dL    ALT 17 11 - 66 U/L   COVID-19 & Influenza Combo    Collection Time: 08/07/23  8:00 AM    Specimen: Nasopharyngeal Swab   Result Value Ref Range    SARS-CoV-2 RNA, RT PCR NOT DETECTED NOT DETECTED    INFLUENZA A NOT DETECTED NOT DETECTED    INFLUENZA

## 2023-08-07 NOTE — ED NOTES
Attempted to straight cath patient for urine sample at this time. No urine returned from straight cath. External catheter applied at this time to collect urine sample.       Olga Jj RN  08/07/23 4781

## 2023-08-07 NOTE — ED TRIAGE NOTES
Patient presents to ED VIA [facility] from The [address] [place] [address]. EMS reports nursing home called for hypotension and altered mental status. Upon arrival, patient is periodically opening eyes to touch, but is unable to speak to staff at this time. Patient is 98% O2 on room air at this time. Respirations even and unlabored at this time. EKG obtained.  Marilyn rosa applied for temp of 94.2 rectal upon arrival.

## 2023-08-07 NOTE — ED NOTES
Patient medicated per MAR at this time. Patient sitting up in bed, respirations even and unlabored at this time.       Clint Roman RN  08/07/23 8739

## 2023-08-07 NOTE — ED NOTES
Attempting to contact rodriguez for patient HX and MRI screening completion at this time.      Wilian Julien RN  08/07/23 6869

## 2023-08-08 LAB
ANION GAP SERPL CALC-SCNC: 15 MEQ/L (ref 8–16)
ANION GAP SERPL CALC-SCNC: 15 MEQ/L (ref 8–16)
ANION GAP SERPL CALC-SCNC: 18 MEQ/L (ref 8–16)
BASE EXCESS BLDA CALC-SCNC: -5.4 MMOL/L (ref -2–3)
BUN SERPL-MCNC: 119 MG/DL (ref 7–22)
BUN SERPL-MCNC: 130 MG/DL (ref 7–22)
BUN SERPL-MCNC: 147 MG/DL (ref 7–22)
CA-I BLD ISE-SCNC: 1.03 MMOL/L (ref 1.12–1.32)
CA-I BLD ISE-SCNC: 1.05 MMOL/L (ref 1.12–1.32)
CA-I BLD ISE-SCNC: 1.06 MMOL/L (ref 1.12–1.32)
CA-I BLD ISE-SCNC: 1.14 MMOL/L (ref 1.12–1.32)
CALCIUM SERPL-MCNC: 8.3 MG/DL (ref 8.5–10.5)
CALCIUM SERPL-MCNC: 8.4 MG/DL (ref 8.5–10.5)
CALCIUM SERPL-MCNC: 8.5 MG/DL (ref 8.5–10.5)
CHLORIDE SERPL-SCNC: 101 MEQ/L (ref 98–111)
CHLORIDE SERPL-SCNC: 106 MEQ/L (ref 98–111)
CHLORIDE SERPL-SCNC: 108 MEQ/L (ref 98–111)
CO2 SERPL-SCNC: 16 MEQ/L (ref 23–33)
CO2 SERPL-SCNC: 18 MEQ/L (ref 23–33)
CO2 SERPL-SCNC: 18 MEQ/L (ref 23–33)
COLLECTED BY:: ABNORMAL
CREAT SERPL-MCNC: 3.2 MG/DL (ref 0.4–1.2)
CREAT SERPL-MCNC: 3.9 MG/DL (ref 0.4–1.2)
CREAT SERPL-MCNC: 4.6 MG/DL (ref 0.4–1.2)
CRP SERPL-MCNC: 2.65 MG/DL (ref 0–1)
DEPRECATED RDW RBC AUTO: 41.5 FL (ref 35–45)
DEVICE: ABNORMAL
ERYTHROCYTE [DISTWIDTH] IN BLOOD BY AUTOMATED COUNT: 13.2 % (ref 11.5–14.5)
ERYTHROCYTE [SEDIMENTATION RATE] IN BLOOD BY WESTERGREN METHOD: 23 MM/HR (ref 0–10)
GFR SERPL CREATININE-BSD FRML MDRD: 13 ML/MIN/1.73M2
GFR SERPL CREATININE-BSD FRML MDRD: 16 ML/MIN/1.73M2
GFR SERPL CREATININE-BSD FRML MDRD: 20 ML/MIN/1.73M2
GLUCOSE BLD STRIP.AUTO-MCNC: 104 MG/DL (ref 70–108)
GLUCOSE BLD STRIP.AUTO-MCNC: 104 MG/DL (ref 70–108)
GLUCOSE BLD STRIP.AUTO-MCNC: 117 MG/DL (ref 70–108)
GLUCOSE BLD STRIP.AUTO-MCNC: 96 MG/DL (ref 70–108)
GLUCOSE SERPL-MCNC: 103 MG/DL (ref 70–108)
GLUCOSE SERPL-MCNC: 115 MG/DL (ref 70–108)
GLUCOSE SERPL-MCNC: 125 MG/DL (ref 70–108)
HCO3 BLDA-SCNC: 18 MMOL/L (ref 23–28)
HCT VFR BLD AUTO: 39.9 % (ref 42–52)
HCT VFR BLD AUTO: 41.5 % (ref 42–52)
HGB BLD-MCNC: 13.3 GM/DL (ref 14–18)
HGB BLD-MCNC: 14.1 GM/DL (ref 14–18)
MAGNESIUM SERPL-MCNC: 2.9 MG/DL (ref 1.6–2.4)
MAGNESIUM SERPL-MCNC: 3 MG/DL (ref 1.6–2.4)
MAGNESIUM SERPL-MCNC: 3 MG/DL (ref 1.6–2.4)
MCH RBC QN AUTO: 29.3 PG (ref 26–33)
MCHC RBC AUTO-ENTMCNC: 34 GM/DL (ref 32.2–35.5)
MCV RBC AUTO: 86.3 FL (ref 80–94)
PCO2 TEMP ADJ BLDMV: 26 MMHG (ref 41–51)
PH BLDMV: 7.44 [PH] (ref 7.31–7.41)
PH BLDV: 7.47 [PH] (ref 7.31–7.41)
PHOSPHATE SERPL-MCNC: 4.6 MG/DL (ref 2.4–4.7)
PHOSPHATE SERPL-MCNC: 5.3 MG/DL (ref 2.4–4.7)
PHOSPHATE SERPL-MCNC: 7.4 MG/DL (ref 2.4–4.7)
PLATELET # BLD AUTO: 195 THOU/MM3 (ref 130–400)
PMV BLD AUTO: 11.8 FL (ref 9.4–12.4)
PO2 BLDMV: 181 MMHG (ref 25–40)
POTASSIUM SERPL-SCNC: 3.8 MEQ/L (ref 3.5–5.2)
POTASSIUM SERPL-SCNC: 3.9 MEQ/L (ref 3.5–5.2)
POTASSIUM SERPL-SCNC: 3.9 MEQ/L (ref 3.5–5.2)
RBC # BLD AUTO: 4.81 MILL/MM3 (ref 4.7–6.1)
SAO2 % BLDMV: 100 %
SITE: ABNORMAL
SODIUM SERPL-SCNC: 135 MEQ/L (ref 135–145)
SODIUM SERPL-SCNC: 139 MEQ/L (ref 135–145)
SODIUM SERPL-SCNC: 141 MEQ/L (ref 135–145)
WBC # BLD AUTO: 10.9 THOU/MM3 (ref 4.8–10.8)

## 2023-08-08 PROCEDURE — 82330 ASSAY OF CALCIUM: CPT

## 2023-08-08 PROCEDURE — 6370000000 HC RX 637 (ALT 250 FOR IP)

## 2023-08-08 PROCEDURE — 86140 C-REACTIVE PROTEIN: CPT

## 2023-08-08 PROCEDURE — 82948 REAGENT STRIP/BLOOD GLUCOSE: CPT

## 2023-08-08 PROCEDURE — 80048 BASIC METABOLIC PNL TOTAL CA: CPT

## 2023-08-08 PROCEDURE — 82800 BLOOD PH: CPT

## 2023-08-08 PROCEDURE — 97163 PT EVAL HIGH COMPLEX 45 MIN: CPT

## 2023-08-08 PROCEDURE — 2140000000 HC CCU INTERMEDIATE R&B

## 2023-08-08 PROCEDURE — 85014 HEMATOCRIT: CPT

## 2023-08-08 PROCEDURE — 99233 SBSQ HOSP IP/OBS HIGH 50: CPT | Performed by: INTERNAL MEDICINE

## 2023-08-08 PROCEDURE — 2500000003 HC RX 250 WO HCPCS: Performed by: INTERNAL MEDICINE

## 2023-08-08 PROCEDURE — 99232 SBSQ HOSP IP/OBS MODERATE 35: CPT | Performed by: INTERNAL MEDICINE

## 2023-08-08 PROCEDURE — 92610 EVALUATE SWALLOWING FUNCTION: CPT

## 2023-08-08 PROCEDURE — 2580000003 HC RX 258: Performed by: INTERNAL MEDICINE

## 2023-08-08 PROCEDURE — 99223 1ST HOSP IP/OBS HIGH 75: CPT | Performed by: INTERNAL MEDICINE

## 2023-08-08 PROCEDURE — 85018 HEMOGLOBIN: CPT

## 2023-08-08 PROCEDURE — 84100 ASSAY OF PHOSPHORUS: CPT

## 2023-08-08 PROCEDURE — 6360000002 HC RX W HCPCS

## 2023-08-08 PROCEDURE — 2580000003 HC RX 258

## 2023-08-08 PROCEDURE — 82803 BLOOD GASES ANY COMBINATION: CPT

## 2023-08-08 PROCEDURE — 85027 COMPLETE CBC AUTOMATED: CPT

## 2023-08-08 PROCEDURE — 85651 RBC SED RATE NONAUTOMATED: CPT

## 2023-08-08 PROCEDURE — 51702 INSERT TEMP BLADDER CATH: CPT

## 2023-08-08 PROCEDURE — 36415 COLL VENOUS BLD VENIPUNCTURE: CPT

## 2023-08-08 PROCEDURE — 97530 THERAPEUTIC ACTIVITIES: CPT

## 2023-08-08 PROCEDURE — C9113 INJ PANTOPRAZOLE SODIUM, VIA: HCPCS

## 2023-08-08 PROCEDURE — 83735 ASSAY OF MAGNESIUM: CPT

## 2023-08-08 RX ORDER — PANTOPRAZOLE SODIUM 40 MG/10ML
40 INJECTION, POWDER, LYOPHILIZED, FOR SOLUTION INTRAVENOUS 2 TIMES DAILY
Status: DISCONTINUED | OUTPATIENT
Start: 2023-08-08 | End: 2023-08-10

## 2023-08-08 RX ORDER — SODIUM CHLORIDE, SODIUM LACTATE, POTASSIUM CHLORIDE, CALCIUM CHLORIDE 600; 310; 30; 20 MG/100ML; MG/100ML; MG/100ML; MG/100ML
INJECTION, SOLUTION INTRAVENOUS CONTINUOUS
Status: DISCONTINUED | OUTPATIENT
Start: 2023-08-08 | End: 2023-08-10

## 2023-08-08 RX ORDER — ASPIRIN 81 MG/1
81 TABLET, CHEWABLE ORAL DAILY
Status: DISCONTINUED | OUTPATIENT
Start: 2023-08-08 | End: 2023-08-16 | Stop reason: HOSPADM

## 2023-08-08 RX ORDER — ASPIRIN 300 MG/1
300 SUPPOSITORY RECTAL DAILY
Status: DISCONTINUED | OUTPATIENT
Start: 2023-08-08 | End: 2023-08-16 | Stop reason: HOSPADM

## 2023-08-08 RX ADMIN — PANTOPRAZOLE SODIUM 40 MG: 40 INJECTION, POWDER, FOR SOLUTION INTRAVENOUS at 11:59

## 2023-08-08 RX ADMIN — HEPARIN SODIUM 5000 UNITS: 5000 INJECTION INTRAVENOUS; SUBCUTANEOUS at 06:04

## 2023-08-08 RX ADMIN — CLOPIDOGREL BISULFATE 75 MG: 75 TABLET ORAL at 13:40

## 2023-08-08 RX ADMIN — SODIUM CHLORIDE, PRESERVATIVE FREE 10 ML: 5 INJECTION INTRAVENOUS at 19:40

## 2023-08-08 RX ADMIN — SODIUM CHLORIDE, POTASSIUM CHLORIDE, SODIUM LACTATE AND CALCIUM CHLORIDE: 600; 310; 30; 20 INJECTION, SOLUTION INTRAVENOUS at 08:08

## 2023-08-08 RX ADMIN — PANTOPRAZOLE SODIUM 40 MG: 40 INJECTION, POWDER, FOR SOLUTION INTRAVENOUS at 19:40

## 2023-08-08 RX ADMIN — HEPARIN SODIUM 5000 UNITS: 5000 INJECTION INTRAVENOUS; SUBCUTANEOUS at 21:28

## 2023-08-08 RX ADMIN — HEPARIN SODIUM 5000 UNITS: 5000 INJECTION INTRAVENOUS; SUBCUTANEOUS at 13:40

## 2023-08-08 RX ADMIN — SODIUM CHLORIDE, POTASSIUM CHLORIDE, SODIUM LACTATE AND CALCIUM CHLORIDE: 600; 310; 30; 20 INJECTION, SOLUTION INTRAVENOUS at 14:42

## 2023-08-08 RX ADMIN — ASPIRIN 300 MG: 300 SUPPOSITORY RECTAL at 11:59

## 2023-08-08 RX ADMIN — SODIUM CHLORIDE, POTASSIUM CHLORIDE, SODIUM LACTATE AND CALCIUM CHLORIDE: 600; 310; 30; 20 INJECTION, SOLUTION INTRAVENOUS at 21:27

## 2023-08-08 RX ADMIN — ATORVASTATIN CALCIUM 80 MG: 80 TABLET, FILM COATED ORAL at 19:34

## 2023-08-08 RX ADMIN — PIPERACILLIN SODIUM AND TAZOBACTAM SODIUM 3375 MG: 3; .375 INJECTION, SOLUTION INTRAVENOUS at 06:33

## 2023-08-08 RX ADMIN — SODIUM BICARBONATE: 84 INJECTION, SOLUTION INTRAVENOUS at 03:36

## 2023-08-08 NOTE — CONSULTS
[facility] [address] [place] [address]    Patient's Name/Date of Birth: Avery Montiel / 1955 (55 y.o.)    Date: August 8, 2023     Referring Provider: Brunilda Coles DO    CHIEF COMPLAINT: AMS    Consulted for: Embolic Stroke vs Septic Stroke. Possible LYLY      HPI: This is a pleasant 76 y.o. male presents with hypotension and altered mental status from nursing home. Patient was last hospitalized 7/27 through 8/1 for subacute infarcts. Cardiology has been consulted and the patient to rule out septic emboli versus thromboembolic disease with possible LYLY evaluation. Echo:   Echo 7/27/23  Summary   Normal left ventricle size and systolic function. Ejection fraction was   estimated at 60 %. There were no regional left ventricular wall motion   abnormalities and wall thickness was within normal limits. Doppler parameters were consistent with abnormal left ventricular   relaxation (grade 1 diastolic dysfunction). The left atrium is Mildly dilated.        All labs, EKG's, diagnostic testing and images as well as cardiac cath, stress testing were reviewed during this encounter    Past Medical History:   Diagnosis Date    Cerebrovascular accident (CVA) due to thrombosis ([address]) 8/1/2023    Diabetes mellitus ([address])     Hyperlipidemia     Hypertension      Past Surgical History:   Procedure Laterality Date    ENDOSCOPY, COLON, DIAGNOSTIC      swallowing difficulties    LAPAROSCOPY N/A 1/19/2022    EXPLORATORY LAPAROSCOPY, TRANSVERSE LOOP COLOSTOMY performed by Nehemiah Goldmann, MD at [address]  Aug 29, 2013    Neck Abcess Incision and Drainage ([facility], [facility])    RECTAL SURGERY N/A 1/14/2022    FULL THICKNESS DEBRIDEMENT OF SACRAL DECUBITUS ULCER, [id number] CM performed by Nehemiah Goldmann, MD at [address] [place] [address] Facility-Administered Medications   Medication Dose Route Frequency Provider Last Rate Last Admin    lactated ringers IV soln infusion   IntraVENous Continuous
Plan:  Stage 3 DARINEL  - Likely prerenal likely secondary to hypotension or sepsis or ATN. - Pt Cr is 6.0, baseline ~1. . Urine electrolytes support prerenal diagnosis. Continue to monitor Cr. Will treat with aggressive fluids and will consider dialysis for tomorrow if no improvement overnight  - Continue NS  - follow up renal ultrasound   - continue to monitor I&Os  2. Uremic encephalopathy   3. Hypotension   4. Sepsis   5. New ischemic stroke vs hx of lacunar infarct     Thank you for the consult. Please feel free to call me if you have any questions. Parisa Harmon, DO  [facility]    This report has been created using voice recognition software. It may contain minor errors which are inherent in voice recognition technology.
assistance. Outpatient neurology follow-up with Dr. Jimmy Eric in 2-4 weeks. Cardiac event monitor placed upon previous discharge. Neurology following. Please call with any questions or concerns. This patient was seen and evaluated with Dr. Thelma Chaidez and he is in agreement with the assessment and plan.     Electronically signed by Art Amaro PA-C on 8/8/23 at [address] PM EDT

## 2023-08-08 NOTE — DISCHARGE INSTR - COC
Continuity of Care Form    Patient Name: Carol Benson   :  1955  MRN:  [id number]    Admit date:  2023  Discharge date:  23    Code Status Order: Full Code   Advance Directives:     Admitting Physician:  No admitting provider for patient encounter. PCP: Nancy Gerardo, APRN - CNP    Discharging Nurse: Andrew Barrera Unit/Room#: 3B-32/032-A  Discharging Unit Phone Number: [phone]    Emergency Contact:   Extended Emergency Contact Information  Primary Emergency Contact: Gabriel Ball & [facility] Phone: [phone]  Relation: Brother/Sister   needed? No  Secondary Emergency Contact: Nora Chris  Mobile Phone: [phone]  Relation: Other   needed? No    Past Surgical History:  Past Surgical History:   Procedure Laterality Date    ENDOSCOPY, COLON, DIAGNOSTIC      swallowing difficulties    LAPAROSCOPY N/A 2022    EXPLORATORY LAPAROSCOPY, TRANSVERSE LOOP COLOSTOMY performed by Opal Edwards MD at [address]  Aug 29, 2013    [facility]    RECTAL SURGERY N/A 2022    FULL THICKNESS DEBRIDEMENT OF SACRAL DECUBITUS ULCER, 73v30e6 CM performed by Opal Edwards MD at [address] [place] [address]       Immunization History:   Immunization History   Administered Date(s) Administered    COVID-19, MODERNA BLUE border, Primary or Immunocompromised, (age 12y+), IM, 100 mcg/0.5mL 2021    COVID-19, PFIZER PURPLE top, DILUTE for use, (age 15 y+), 30mcg/0.3mL 2021    Influenza Virus Vaccine 2021    Influenza, FLUAD, (age 72 y+), Adjuvanted, 0.5mL 10/03/2020    PPD Test 2022    Pneumococcal, PPSV23, PNEUMOVAX 21, (age 2y+), SC/IM, 0.5mL 2022    Tetanus 2008    Tetanus Toxoid, absorbed 2008       Active Problems:  Patient Active Problem List   Diagnosis Code    Essential hypertension I10    Obesity (BMI 30-39. 9) E66.9    Hyperlipidemia E78.5    COVID-19 virus

## 2023-08-09 PROBLEM — T68.XXXA HYPOTHERMIA: Status: ACTIVE | Noted: 2023-08-09

## 2023-08-09 PROBLEM — G93.41 METABOLIC ENCEPHALOPATHY: Status: ACTIVE | Noted: 2023-08-09

## 2023-08-09 LAB
ANION GAP SERPL CALC-SCNC: 11 MEQ/L (ref 8–16)
BACTERIA UR CULT: NORMAL
BUN SERPL-MCNC: 98 MG/DL (ref 7–22)
CA-I BLD ISE-SCNC: 1.13 MMOL/L (ref 1.12–1.32)
CALCIUM SERPL-MCNC: 8.2 MG/DL (ref 8.5–10.5)
CHLORIDE SERPL-SCNC: 110 MEQ/L (ref 98–111)
CO2 SERPL-SCNC: 21 MEQ/L (ref 23–33)
CREAT SERPL-MCNC: 2.5 MG/DL (ref 0.4–1.2)
DEPRECATED RDW RBC AUTO: 43.5 FL (ref 35–45)
ERYTHROCYTE [DISTWIDTH] IN BLOOD BY AUTOMATED COUNT: 13.5 % (ref 11.5–14.5)
GFR SERPL CREATININE-BSD FRML MDRD: 27 ML/MIN/1.73M2
GLUCOSE BLD STRIP.AUTO-MCNC: 100 MG/DL (ref 70–108)
GLUCOSE BLD STRIP.AUTO-MCNC: 109 MG/DL (ref 70–108)
GLUCOSE BLD STRIP.AUTO-MCNC: 89 MG/DL (ref 70–108)
GLUCOSE BLD STRIP.AUTO-MCNC: 91 MG/DL (ref 70–108)
GLUCOSE SERPL-MCNC: 89 MG/DL (ref 70–108)
HCT VFR BLD AUTO: 37.4 % (ref 42–52)
HGB BLD-MCNC: 12.2 GM/DL (ref 14–18)
LV EF: 58 %
LVEF MODALITY: NORMAL
MAGNESIUM SERPL-MCNC: 2.9 MG/DL (ref 1.6–2.4)
MCH RBC QN AUTO: 28.8 PG (ref 26–33)
MCHC RBC AUTO-ENTMCNC: 32.6 GM/DL (ref 32.2–35.5)
MCV RBC AUTO: 88.4 FL (ref 80–94)
PHOSPHATE SERPL-MCNC: 3.8 MG/DL (ref 2.4–4.7)
PLATELET # BLD AUTO: 169 THOU/MM3 (ref 130–400)
PMV BLD AUTO: 12.1 FL (ref 9.4–12.4)
POTASSIUM SERPL-SCNC: 3.9 MEQ/L (ref 3.5–5.2)
RBC # BLD AUTO: 4.23 MILL/MM3 (ref 4.7–6.1)
SODIUM SERPL-SCNC: 142 MEQ/L (ref 135–145)
WBC # BLD AUTO: 7.5 THOU/MM3 (ref 4.8–10.8)

## 2023-08-09 PROCEDURE — 83735 ASSAY OF MAGNESIUM: CPT

## 2023-08-09 PROCEDURE — 7100000010 HC PHASE II RECOVERY - FIRST 15 MIN: Performed by: INTERNAL MEDICINE

## 2023-08-09 PROCEDURE — 2140000000 HC CCU INTERMEDIATE R&B

## 2023-08-09 PROCEDURE — 2580000003 HC RX 258

## 2023-08-09 PROCEDURE — 84100 ASSAY OF PHOSPHORUS: CPT

## 2023-08-09 PROCEDURE — 2580000003 HC RX 258: Performed by: INTERNAL MEDICINE

## 2023-08-09 PROCEDURE — 99232 SBSQ HOSP IP/OBS MODERATE 35: CPT | Performed by: INTERNAL MEDICINE

## 2023-08-09 PROCEDURE — 6360000002 HC RX W HCPCS

## 2023-08-09 PROCEDURE — 93325 DOPPLER ECHO COLOR FLOW MAPG: CPT

## 2023-08-09 PROCEDURE — 2709999900 HC NON-CHARGEABLE SUPPLY: Performed by: INTERNAL MEDICINE

## 2023-08-09 PROCEDURE — C9113 INJ PANTOPRAZOLE SODIUM, VIA: HCPCS

## 2023-08-09 PROCEDURE — 6360000002 HC RX W HCPCS: Performed by: INTERNAL MEDICINE

## 2023-08-09 PROCEDURE — 82948 REAGENT STRIP/BLOOD GLUCOSE: CPT

## 2023-08-09 PROCEDURE — 85027 COMPLETE CBC AUTOMATED: CPT

## 2023-08-09 PROCEDURE — 82330 ASSAY OF CALCIUM: CPT

## 2023-08-09 PROCEDURE — 2500000003 HC RX 250 WO HCPCS

## 2023-08-09 PROCEDURE — 36415 COLL VENOUS BLD VENIPUNCTURE: CPT

## 2023-08-09 PROCEDURE — 6370000000 HC RX 637 (ALT 250 FOR IP)

## 2023-08-09 PROCEDURE — 93320 DOPPLER ECHO COMPLETE: CPT

## 2023-08-09 PROCEDURE — 99152 MOD SED SAME PHYS/QHP 5/>YRS: CPT | Performed by: INTERNAL MEDICINE

## 2023-08-09 PROCEDURE — 92526 ORAL FUNCTION THERAPY: CPT

## 2023-08-09 PROCEDURE — 93312 ECHO TRANSESOPHAGEAL: CPT | Performed by: INTERNAL MEDICINE

## 2023-08-09 PROCEDURE — 80048 BASIC METABOLIC PNL TOTAL CA: CPT

## 2023-08-09 PROCEDURE — 99233 SBSQ HOSP IP/OBS HIGH 50: CPT | Performed by: INTERNAL MEDICINE

## 2023-08-09 PROCEDURE — B24BZZ4 ULTRASONOGRAPHY OF HEART WITH AORTA, TRANSESOPHAGEAL: ICD-10-PCS | Performed by: INTERNAL MEDICINE

## 2023-08-09 PROCEDURE — 93312 ECHO TRANSESOPHAGEAL: CPT

## 2023-08-09 PROCEDURE — 99153 MOD SED SAME PHYS/QHP EA: CPT | Performed by: INTERNAL MEDICINE

## 2023-08-09 RX ORDER — MIDAZOLAM HYDROCHLORIDE 1 MG/ML
INJECTION INTRAMUSCULAR; INTRAVENOUS PRN
Status: DISCONTINUED | OUTPATIENT
Start: 2023-08-09 | End: 2023-08-09 | Stop reason: ALTCHOICE

## 2023-08-09 RX ORDER — MINERAL OIL AND WHITE PETROLATUM 150; 830 MG/G; MG/G
OINTMENT OPHTHALMIC PRN
Status: DISCONTINUED | OUTPATIENT
Start: 2023-08-09 | End: 2023-08-16 | Stop reason: HOSPADM

## 2023-08-09 RX ORDER — HYDRALAZINE HYDROCHLORIDE 20 MG/ML
10 INJECTION INTRAMUSCULAR; INTRAVENOUS EVERY 6 HOURS PRN
Status: DISCONTINUED | OUTPATIENT
Start: 2023-08-09 | End: 2023-08-10

## 2023-08-09 RX ORDER — HYDRALAZINE HYDROCHLORIDE 20 MG/ML
10 INJECTION INTRAMUSCULAR; INTRAVENOUS ONCE
Status: COMPLETED | OUTPATIENT
Start: 2023-08-09 | End: 2023-08-09

## 2023-08-09 RX ORDER — FENTANYL CITRATE 50 UG/ML
INJECTION, SOLUTION INTRAMUSCULAR; INTRAVENOUS PRN
Status: DISCONTINUED | OUTPATIENT
Start: 2023-08-09 | End: 2023-08-09 | Stop reason: ALTCHOICE

## 2023-08-09 RX ORDER — NIFEDIPINE 30 MG/1
30 TABLET, EXTENDED RELEASE ORAL DAILY
Status: DISCONTINUED | OUTPATIENT
Start: 2023-08-09 | End: 2023-08-09

## 2023-08-09 RX ADMIN — HEPARIN SODIUM 5000 UNITS: 5000 INJECTION INTRAVENOUS; SUBCUTANEOUS at 05:59

## 2023-08-09 RX ADMIN — SODIUM CHLORIDE, POTASSIUM CHLORIDE, SODIUM LACTATE AND CALCIUM CHLORIDE: 600; 310; 30; 20 INJECTION, SOLUTION INTRAVENOUS at 22:50

## 2023-08-09 RX ADMIN — PANTOPRAZOLE SODIUM 40 MG: 40 INJECTION, POWDER, FOR SOLUTION INTRAVENOUS at 19:35

## 2023-08-09 RX ADMIN — PANTOPRAZOLE SODIUM 40 MG: 40 INJECTION, POWDER, FOR SOLUTION INTRAVENOUS at 10:59

## 2023-08-09 RX ADMIN — SODIUM CHLORIDE 2.5 MG/HR: 9 INJECTION, SOLUTION INTRAVENOUS at 22:58

## 2023-08-09 RX ADMIN — HEPARIN SODIUM 5000 UNITS: 5000 INJECTION INTRAVENOUS; SUBCUTANEOUS at 13:38

## 2023-08-09 RX ADMIN — HYDRALAZINE HYDROCHLORIDE 10 MG: 20 INJECTION INTRAMUSCULAR; INTRAVENOUS at 19:35

## 2023-08-09 RX ADMIN — ASPIRIN 300 MG: 300 SUPPOSITORY RECTAL at 10:59

## 2023-08-09 RX ADMIN — ATORVASTATIN CALCIUM 80 MG: 80 TABLET, FILM COATED ORAL at 19:35

## 2023-08-09 RX ADMIN — SODIUM CHLORIDE, POTASSIUM CHLORIDE, SODIUM LACTATE AND CALCIUM CHLORIDE: 600; 310; 30; 20 INJECTION, SOLUTION INTRAVENOUS at 12:34

## 2023-08-09 RX ADMIN — SODIUM CHLORIDE, POTASSIUM CHLORIDE, SODIUM LACTATE AND CALCIUM CHLORIDE: 600; 310; 30; 20 INJECTION, SOLUTION INTRAVENOUS at 03:56

## 2023-08-09 RX ADMIN — HEPARIN SODIUM 5000 UNITS: 5000 INJECTION INTRAVENOUS; SUBCUTANEOUS at 21:35

## 2023-08-09 RX ADMIN — SODIUM CHLORIDE, PRESERVATIVE FREE 10 ML: 5 INJECTION INTRAVENOUS at 19:36

## 2023-08-09 ASSESSMENT — PAIN - FUNCTIONAL ASSESSMENT: PAIN_FUNCTIONAL_ASSESSMENT: NONE - DENIES PAIN

## 2023-08-10 ENCOUNTER — APPOINTMENT (OUTPATIENT)
Dept: GENERAL RADIOLOGY | Age: 68
DRG: 064 | End: 2023-08-10
Payer: MEDICARE

## 2023-08-10 LAB
ANION GAP SERPL CALC-SCNC: 10 MEQ/L (ref 8–16)
BUN SERPL-MCNC: 59 MG/DL (ref 7–22)
CALCIUM SERPL-MCNC: 8.4 MG/DL (ref 8.5–10.5)
CEA SERPL-MCNC: 2.9 NG/ML (ref 0–5)
CHLORIDE SERPL-SCNC: 115 MEQ/L (ref 98–111)
CO2 SERPL-SCNC: 22 MEQ/L (ref 23–33)
CREAT SERPL-MCNC: 1.8 MG/DL (ref 0.4–1.2)
DEPRECATED RDW RBC AUTO: 43.7 FL (ref 35–45)
ERYTHROCYTE [DISTWIDTH] IN BLOOD BY AUTOMATED COUNT: 13.2 % (ref 11.5–14.5)
GFR SERPL CREATININE-BSD FRML MDRD: 40 ML/MIN/1.73M2
GLUCOSE BLD STRIP.AUTO-MCNC: 118 MG/DL (ref 70–108)
GLUCOSE BLD STRIP.AUTO-MCNC: 126 MG/DL (ref 70–108)
GLUCOSE BLD STRIP.AUTO-MCNC: 167 MG/DL (ref 70–108)
GLUCOSE BLD STRIP.AUTO-MCNC: 94 MG/DL (ref 70–108)
GLUCOSE SERPL-MCNC: 86 MG/DL (ref 70–108)
HCT VFR BLD AUTO: 38.6 % (ref 42–52)
HGB BLD-MCNC: 12.4 GM/DL (ref 14–18)
MAGNESIUM SERPL-MCNC: 2.8 MG/DL (ref 1.6–2.4)
MCH RBC QN AUTO: 29 PG (ref 26–33)
MCHC RBC AUTO-ENTMCNC: 32.1 GM/DL (ref 32.2–35.5)
MCV RBC AUTO: 90.4 FL (ref 80–94)
PHOSPHATE SERPL-MCNC: 2.4 MG/DL (ref 2.4–4.7)
PLATELET # BLD AUTO: 162 THOU/MM3 (ref 130–400)
PMV BLD AUTO: 12.2 FL (ref 9.4–12.4)
POTASSIUM SERPL-SCNC: 4.3 MEQ/L (ref 3.5–5.2)
PSA SERPL-MCNC: 6.45 NG/ML (ref 0–1)
RBC # BLD AUTO: 4.27 MILL/MM3 (ref 4.7–6.1)
SODIUM SERPL-SCNC: 147 MEQ/L (ref 135–145)
WBC # BLD AUTO: 8 THOU/MM3 (ref 4.8–10.8)

## 2023-08-10 PROCEDURE — 84100 ASSAY OF PHOSPHORUS: CPT

## 2023-08-10 PROCEDURE — 97110 THERAPEUTIC EXERCISES: CPT

## 2023-08-10 PROCEDURE — 86255 FLUORESCENT ANTIBODY SCREEN: CPT

## 2023-08-10 PROCEDURE — 51798 US URINE CAPACITY MEASURE: CPT

## 2023-08-10 PROCEDURE — 74230 X-RAY XM SWLNG FUNCJ C+: CPT

## 2023-08-10 PROCEDURE — 99232 SBSQ HOSP IP/OBS MODERATE 35: CPT | Performed by: INTERNAL MEDICINE

## 2023-08-10 PROCEDURE — 2500000003 HC RX 250 WO HCPCS: Performed by: INTERNAL MEDICINE

## 2023-08-10 PROCEDURE — 83735 ASSAY OF MAGNESIUM: CPT

## 2023-08-10 PROCEDURE — 6360000002 HC RX W HCPCS

## 2023-08-10 PROCEDURE — 85027 COMPLETE CBC AUTOMATED: CPT

## 2023-08-10 PROCEDURE — 82948 REAGENT STRIP/BLOOD GLUCOSE: CPT

## 2023-08-10 PROCEDURE — 2580000003 HC RX 258

## 2023-08-10 PROCEDURE — 80048 BASIC METABOLIC PNL TOTAL CA: CPT

## 2023-08-10 PROCEDURE — 6370000000 HC RX 637 (ALT 250 FOR IP)

## 2023-08-10 PROCEDURE — G0103 PSA SCREENING: HCPCS

## 2023-08-10 PROCEDURE — 2580000003 HC RX 258: Performed by: INTERNAL MEDICINE

## 2023-08-10 PROCEDURE — 97166 OT EVAL MOD COMPLEX 45 MIN: CPT

## 2023-08-10 PROCEDURE — 95816 EEG AWAKE AND DROWSY: CPT | Performed by: PSYCHIATRY & NEUROLOGY

## 2023-08-10 PROCEDURE — 82378 CARCINOEMBRYONIC ANTIGEN: CPT

## 2023-08-10 PROCEDURE — 86038 ANTINUCLEAR ANTIBODIES: CPT

## 2023-08-10 PROCEDURE — C9113 INJ PANTOPRAZOLE SODIUM, VIA: HCPCS

## 2023-08-10 PROCEDURE — 95816 EEG AWAKE AND DROWSY: CPT

## 2023-08-10 PROCEDURE — 2140000000 HC CCU INTERMEDIATE R&B

## 2023-08-10 PROCEDURE — 6370000000 HC RX 637 (ALT 250 FOR IP): Performed by: INTERNAL MEDICINE

## 2023-08-10 PROCEDURE — 36415 COLL VENOUS BLD VENIPUNCTURE: CPT

## 2023-08-10 PROCEDURE — 92611 MOTION FLUOROSCOPY/SWALLOW: CPT

## 2023-08-10 PROCEDURE — 99233 SBSQ HOSP IP/OBS HIGH 50: CPT | Performed by: INTERNAL MEDICINE

## 2023-08-10 RX ORDER — DEXTROSE AND SODIUM CHLORIDE 5; .45 G/100ML; G/100ML
INJECTION, SOLUTION INTRAVENOUS CONTINUOUS
Status: DISCONTINUED | OUTPATIENT
Start: 2023-08-10 | End: 2023-08-11

## 2023-08-10 RX ORDER — PANTOPRAZOLE SODIUM 40 MG/1
40 TABLET, DELAYED RELEASE ORAL
Status: DISCONTINUED | OUTPATIENT
Start: 2023-08-11 | End: 2023-08-16 | Stop reason: HOSPADM

## 2023-08-10 RX ORDER — AMLODIPINE BESYLATE 5 MG/1
5 TABLET ORAL DAILY
Status: DISCONTINUED | OUTPATIENT
Start: 2023-08-11 | End: 2023-08-11

## 2023-08-10 RX ORDER — AMLODIPINE BESYLATE 2.5 MG/1
2.5 TABLET ORAL ONCE
Status: COMPLETED | OUTPATIENT
Start: 2023-08-10 | End: 2023-08-10

## 2023-08-10 RX ORDER — TAMSULOSIN HYDROCHLORIDE 0.4 MG/1
0.4 CAPSULE ORAL 2 TIMES DAILY
Status: DISCONTINUED | OUTPATIENT
Start: 2023-08-10 | End: 2023-08-16 | Stop reason: HOSPADM

## 2023-08-10 RX ORDER — AMLODIPINE BESYLATE 2.5 MG/1
2.5 TABLET ORAL DAILY
Status: DISCONTINUED | OUTPATIENT
Start: 2023-08-10 | End: 2023-08-10

## 2023-08-10 RX ADMIN — HEPARIN SODIUM 5000 UNITS: 5000 INJECTION INTRAVENOUS; SUBCUTANEOUS at 13:36

## 2023-08-10 RX ADMIN — TAMSULOSIN HYDROCHLORIDE 0.4 MG: 0.4 CAPSULE ORAL at 12:07

## 2023-08-10 RX ADMIN — BARIUM SULFATE 20 ML: 400 PASTE ORAL at 09:51

## 2023-08-10 RX ADMIN — TAMSULOSIN HYDROCHLORIDE 0.4 MG: 0.4 CAPSULE ORAL at 19:52

## 2023-08-10 RX ADMIN — AMLODIPINE BESYLATE 2.5 MG: 2.5 TABLET ORAL at 15:48

## 2023-08-10 RX ADMIN — PANTOPRAZOLE SODIUM 40 MG: 40 INJECTION, POWDER, FOR SOLUTION INTRAVENOUS at 10:11

## 2023-08-10 RX ADMIN — HEPARIN SODIUM 5000 UNITS: 5000 INJECTION INTRAVENOUS; SUBCUTANEOUS at 21:38

## 2023-08-10 RX ADMIN — ATORVASTATIN CALCIUM 80 MG: 80 TABLET, FILM COATED ORAL at 19:51

## 2023-08-10 RX ADMIN — HEPARIN SODIUM 5000 UNITS: 5000 INJECTION INTRAVENOUS; SUBCUTANEOUS at 05:35

## 2023-08-10 RX ADMIN — SODIUM CHLORIDE, PRESERVATIVE FREE 10 ML: 5 INJECTION INTRAVENOUS at 10:17

## 2023-08-10 RX ADMIN — HYDRALAZINE HYDROCHLORIDE 50 MG: 50 TABLET, FILM COATED ORAL at 21:44

## 2023-08-10 RX ADMIN — ASPIRIN 81 MG 81 MG: 81 TABLET ORAL at 10:22

## 2023-08-10 RX ADMIN — CLOPIDOGREL BISULFATE 75 MG: 75 TABLET ORAL at 10:22

## 2023-08-10 RX ADMIN — DEXTROSE AND SODIUM CHLORIDE: 5; 450 INJECTION, SOLUTION INTRAVENOUS at 10:11

## 2023-08-10 RX ADMIN — HYDRALAZINE HYDROCHLORIDE 50 MG: 50 TABLET, FILM COATED ORAL at 12:07

## 2023-08-10 RX ADMIN — AMLODIPINE BESYLATE 2.5 MG: 2.5 TABLET ORAL at 10:22

## 2023-08-10 RX ADMIN — BARIUM SULFATE 70 ML: 0.81 POWDER, FOR SUSPENSION ORAL at 09:51

## 2023-08-11 LAB
ANION GAP SERPL CALC-SCNC: 9 MEQ/L (ref 8–16)
BUN SERPL-MCNC: 37 MG/DL (ref 7–22)
CA-I BLD ISE-SCNC: 1.17 MMOL/L (ref 1.12–1.32)
CALCIUM SERPL-MCNC: 8.1 MG/DL (ref 8.5–10.5)
CANCER AG19-9 SERPL-ACNC: 30 U/ML (ref 0–35)
CHLORIDE SERPL-SCNC: 115 MEQ/L (ref 98–111)
CO2 SERPL-SCNC: 22 MEQ/L (ref 23–33)
CREAT SERPL-MCNC: 1.5 MG/DL (ref 0.4–1.2)
DEPRECATED RDW RBC AUTO: 43.5 FL (ref 35–45)
EKG ATRIAL RATE: 61 BPM
EKG P AXIS: 24 DEGREES
EKG P-R INTERVAL: 220 MS
EKG Q-T INTERVAL: 438 MS
EKG QRS DURATION: 96 MS
EKG QTC CALCULATION (BAZETT): 440 MS
EKG R AXIS: 22 DEGREES
EKG T AXIS: 41 DEGREES
EKG VENTRICULAR RATE: 61 BPM
ERYTHROCYTE [DISTWIDTH] IN BLOOD BY AUTOMATED COUNT: 13.1 % (ref 11.5–14.5)
FIBRINOGEN PPP-MCNC: 551 MG/100ML (ref 155–475)
GFR SERPL CREATININE-BSD FRML MDRD: 50 ML/MIN/1.73M2
GLUCOSE BLD STRIP.AUTO-MCNC: 104 MG/DL (ref 70–108)
GLUCOSE BLD STRIP.AUTO-MCNC: 114 MG/DL (ref 70–108)
GLUCOSE BLD STRIP.AUTO-MCNC: 130 MG/DL (ref 70–108)
GLUCOSE BLD STRIP.AUTO-MCNC: 160 MG/DL (ref 70–108)
GLUCOSE SERPL-MCNC: 113 MG/DL (ref 70–108)
HCT VFR BLD AUTO: 36.4 % (ref 42–52)
HGB BLD-MCNC: 11.7 GM/DL (ref 14–18)
MCH RBC QN AUTO: 29 PG (ref 26–33)
MCHC RBC AUTO-ENTMCNC: 32.1 GM/DL (ref 32.2–35.5)
MCV RBC AUTO: 90.1 FL (ref 80–94)
NUCLEAR IGG SER QL IA: DETECTED
PLATELET # BLD AUTO: 165 THOU/MM3 (ref 130–400)
PMV BLD AUTO: 11.8 FL (ref 9.4–12.4)
POTASSIUM SERPL-SCNC: 4.1 MEQ/L (ref 3.5–5.2)
RBC # BLD AUTO: 4.04 MILL/MM3 (ref 4.7–6.1)
SODIUM SERPL-SCNC: 146 MEQ/L (ref 135–145)
WBC # BLD AUTO: 8.7 THOU/MM3 (ref 4.8–10.8)

## 2023-08-11 PROCEDURE — 6360000002 HC RX W HCPCS

## 2023-08-11 PROCEDURE — 97530 THERAPEUTIC ACTIVITIES: CPT

## 2023-08-11 PROCEDURE — 97535 SELF CARE MNGMENT TRAINING: CPT

## 2023-08-11 PROCEDURE — 6370000000 HC RX 637 (ALT 250 FOR IP)

## 2023-08-11 PROCEDURE — 86301 IMMUNOASSAY TUMOR CA 19-9: CPT

## 2023-08-11 PROCEDURE — 2580000003 HC RX 258

## 2023-08-11 PROCEDURE — 82330 ASSAY OF CALCIUM: CPT

## 2023-08-11 PROCEDURE — 80048 BASIC METABOLIC PNL TOTAL CA: CPT

## 2023-08-11 PROCEDURE — 36415 COLL VENOUS BLD VENIPUNCTURE: CPT

## 2023-08-11 PROCEDURE — 6370000000 HC RX 637 (ALT 250 FOR IP): Performed by: INTERNAL MEDICINE

## 2023-08-11 PROCEDURE — 92523 SPEECH SOUND LANG COMPREHEN: CPT

## 2023-08-11 PROCEDURE — 99232 SBSQ HOSP IP/OBS MODERATE 35: CPT | Performed by: INTERNAL MEDICINE

## 2023-08-11 PROCEDURE — 85384 FIBRINOGEN ACTIVITY: CPT

## 2023-08-11 PROCEDURE — 2140000000 HC CCU INTERMEDIATE R&B

## 2023-08-11 PROCEDURE — 92526 ORAL FUNCTION THERAPY: CPT

## 2023-08-11 PROCEDURE — 85027 COMPLETE CBC AUTOMATED: CPT

## 2023-08-11 PROCEDURE — 97110 THERAPEUTIC EXERCISES: CPT

## 2023-08-11 PROCEDURE — 82948 REAGENT STRIP/BLOOD GLUCOSE: CPT

## 2023-08-11 PROCEDURE — 2580000003 HC RX 258: Performed by: INTERNAL MEDICINE

## 2023-08-11 PROCEDURE — 86147 CARDIOLIPIN ANTIBODY EA IG: CPT

## 2023-08-11 RX ORDER — DEXTROSE MONOHYDRATE 50 MG/ML
INJECTION, SOLUTION INTRAVENOUS CONTINUOUS
Status: DISCONTINUED | OUTPATIENT
Start: 2023-08-11 | End: 2023-08-12

## 2023-08-11 RX ORDER — AMLODIPINE BESYLATE 10 MG/1
10 TABLET ORAL DAILY
Status: DISCONTINUED | OUTPATIENT
Start: 2023-08-12 | End: 2023-08-16 | Stop reason: HOSPADM

## 2023-08-11 RX ORDER — AMLODIPINE BESYLATE 5 MG/1
5 TABLET ORAL ONCE
Status: COMPLETED | OUTPATIENT
Start: 2023-08-11 | End: 2023-08-11

## 2023-08-11 RX ADMIN — HYDRALAZINE HYDROCHLORIDE 50 MG: 50 TABLET, FILM COATED ORAL at 21:03

## 2023-08-11 RX ADMIN — HEPARIN SODIUM 5000 UNITS: 5000 INJECTION INTRAVENOUS; SUBCUTANEOUS at 21:04

## 2023-08-11 RX ADMIN — HYDRALAZINE HYDROCHLORIDE 50 MG: 50 TABLET, FILM COATED ORAL at 05:53

## 2023-08-11 RX ADMIN — SODIUM CHLORIDE, PRESERVATIVE FREE 10 ML: 5 INJECTION INTRAVENOUS at 21:04

## 2023-08-11 RX ADMIN — DEXTROSE MONOHYDRATE: 50 INJECTION, SOLUTION INTRAVENOUS at 08:36

## 2023-08-11 RX ADMIN — DEXTROSE AND SODIUM CHLORIDE: 5; 450 INJECTION, SOLUTION INTRAVENOUS at 04:40

## 2023-08-11 RX ADMIN — TAMSULOSIN HYDROCHLORIDE 0.4 MG: 0.4 CAPSULE ORAL at 21:03

## 2023-08-11 RX ADMIN — HEPARIN SODIUM 5000 UNITS: 5000 INJECTION INTRAVENOUS; SUBCUTANEOUS at 14:42

## 2023-08-11 RX ADMIN — ATORVASTATIN CALCIUM 80 MG: 80 TABLET, FILM COATED ORAL at 21:03

## 2023-08-11 RX ADMIN — DEXTROSE MONOHYDRATE: 50 INJECTION, SOLUTION INTRAVENOUS at 20:40

## 2023-08-11 RX ADMIN — PANTOPRAZOLE SODIUM 40 MG: 40 TABLET, DELAYED RELEASE ORAL at 05:53

## 2023-08-11 RX ADMIN — HYDRALAZINE HYDROCHLORIDE 50 MG: 50 TABLET, FILM COATED ORAL at 14:43

## 2023-08-11 RX ADMIN — AMLODIPINE BESYLATE 5 MG: 5 TABLET ORAL at 11:16

## 2023-08-11 RX ADMIN — AMLODIPINE BESYLATE 5 MG: 5 TABLET ORAL at 21:03

## 2023-08-11 RX ADMIN — SODIUM CHLORIDE, PRESERVATIVE FREE 10 ML: 5 INJECTION INTRAVENOUS at 11:17

## 2023-08-11 RX ADMIN — TAMSULOSIN HYDROCHLORIDE 0.4 MG: 0.4 CAPSULE ORAL at 11:15

## 2023-08-11 RX ADMIN — CLOPIDOGREL BISULFATE 75 MG: 75 TABLET ORAL at 11:15

## 2023-08-11 RX ADMIN — ASPIRIN 81 MG 81 MG: 81 TABLET ORAL at 11:15

## 2023-08-11 RX ADMIN — HEPARIN SODIUM 5000 UNITS: 5000 INJECTION INTRAVENOUS; SUBCUTANEOUS at 05:51

## 2023-08-12 LAB
25(OH)D3 SERPL-MCNC: 10 NG/ML (ref 30–100)
ANA PAT SER IF-IMP: ABNORMAL
ANION GAP SERPL CALC-SCNC: 10 MEQ/L (ref 8–16)
BACTERIA BLD AEROBE CULT: NORMAL
BACTERIA BLD AEROBE CULT: NORMAL
BUN SERPL-MCNC: 22 MG/DL (ref 7–22)
CALCIUM SERPL-MCNC: 8.5 MG/DL (ref 8.5–10.5)
CHLORIDE SERPL-SCNC: 111 MEQ/L (ref 98–111)
CO2 SERPL-SCNC: 22 MEQ/L (ref 23–33)
CREAT SERPL-MCNC: 1.4 MG/DL (ref 0.4–1.2)
DEPRECATED RDW RBC AUTO: 42.1 FL (ref 35–45)
ERYTHROCYTE [DISTWIDTH] IN BLOOD BY AUTOMATED COUNT: 12.7 % (ref 11.5–14.5)
GFR SERPL CREATININE-BSD FRML MDRD: 55 ML/MIN/1.73M2
GLUCOSE BLD STRIP.AUTO-MCNC: 106 MG/DL (ref 70–108)
GLUCOSE BLD STRIP.AUTO-MCNC: 109 MG/DL (ref 70–108)
GLUCOSE BLD STRIP.AUTO-MCNC: 115 MG/DL (ref 70–108)
GLUCOSE BLD STRIP.AUTO-MCNC: 127 MG/DL (ref 70–108)
GLUCOSE SERPL-MCNC: 116 MG/DL (ref 70–108)
HCT VFR BLD AUTO: 36.5 % (ref 42–52)
HGB BLD-MCNC: 11.6 GM/DL (ref 14–18)
MCH RBC QN AUTO: 28.6 PG (ref 26–33)
MCHC RBC AUTO-ENTMCNC: 31.8 GM/DL (ref 32.2–35.5)
MCV RBC AUTO: 90.1 FL (ref 80–94)
MISC. #1 REFERENCE GROUP TEST: NORMAL
NUCLEAR IGG SER QL IF: DETECTED
NUCLEAR IGG TITR SER IF: ABNORMAL {TITER}
PLATELET # BLD AUTO: 164 THOU/MM3 (ref 130–400)
PMV BLD AUTO: 11.4 FL (ref 9.4–12.4)
POTASSIUM SERPL-SCNC: 3.9 MEQ/L (ref 3.5–5.2)
RBC # BLD AUTO: 4.05 MILL/MM3 (ref 4.7–6.1)
SODIUM SERPL-SCNC: 143 MEQ/L (ref 135–145)
WBC # BLD AUTO: 9.5 THOU/MM3 (ref 4.8–10.8)

## 2023-08-12 PROCEDURE — 94761 N-INVAS EAR/PLS OXIMETRY MLT: CPT

## 2023-08-12 PROCEDURE — 6370000000 HC RX 637 (ALT 250 FOR IP)

## 2023-08-12 PROCEDURE — 6370000000 HC RX 637 (ALT 250 FOR IP): Performed by: INTERNAL MEDICINE

## 2023-08-12 PROCEDURE — 99233 SBSQ HOSP IP/OBS HIGH 50: CPT | Performed by: INTERNAL MEDICINE

## 2023-08-12 PROCEDURE — 82306 VITAMIN D 25 HYDROXY: CPT

## 2023-08-12 PROCEDURE — 99232 SBSQ HOSP IP/OBS MODERATE 35: CPT | Performed by: INTERNAL MEDICINE

## 2023-08-12 PROCEDURE — 2580000003 HC RX 258

## 2023-08-12 PROCEDURE — 82948 REAGENT STRIP/BLOOD GLUCOSE: CPT

## 2023-08-12 PROCEDURE — 6360000002 HC RX W HCPCS

## 2023-08-12 PROCEDURE — 80048 BASIC METABOLIC PNL TOTAL CA: CPT

## 2023-08-12 PROCEDURE — 36415 COLL VENOUS BLD VENIPUNCTURE: CPT

## 2023-08-12 PROCEDURE — 85027 COMPLETE CBC AUTOMATED: CPT

## 2023-08-12 PROCEDURE — 2140000000 HC CCU INTERMEDIATE R&B

## 2023-08-12 RX ORDER — ERGOCALCIFEROL 1.25 MG/1
50000 CAPSULE ORAL WEEKLY
Status: DISCONTINUED | OUTPATIENT
Start: 2023-08-13 | End: 2023-08-16 | Stop reason: HOSPADM

## 2023-08-12 RX ORDER — POLYVINYL ALCOHOL 14 MG/ML
1 SOLUTION/ DROPS OPHTHALMIC 3 TIMES DAILY
Status: DISCONTINUED | OUTPATIENT
Start: 2023-08-12 | End: 2023-08-16 | Stop reason: HOSPADM

## 2023-08-12 RX ORDER — HYDRALAZINE HYDROCHLORIDE 20 MG/ML
10 INJECTION INTRAMUSCULAR; INTRAVENOUS EVERY 6 HOURS PRN
Status: DISCONTINUED | OUTPATIENT
Start: 2023-08-12 | End: 2023-08-16 | Stop reason: HOSPADM

## 2023-08-12 RX ORDER — HYDRALAZINE HYDROCHLORIDE 50 MG/1
100 TABLET, FILM COATED ORAL EVERY 8 HOURS SCHEDULED
Status: DISCONTINUED | OUTPATIENT
Start: 2023-08-12 | End: 2023-08-16 | Stop reason: HOSPADM

## 2023-08-12 RX ADMIN — AMLODIPINE BESYLATE 10 MG: 10 TABLET ORAL at 09:15

## 2023-08-12 RX ADMIN — SODIUM CHLORIDE, PRESERVATIVE FREE 10 ML: 5 INJECTION INTRAVENOUS at 09:19

## 2023-08-12 RX ADMIN — ATORVASTATIN CALCIUM 80 MG: 80 TABLET, FILM COATED ORAL at 20:35

## 2023-08-12 RX ADMIN — CLOPIDOGREL BISULFATE 75 MG: 75 TABLET ORAL at 09:15

## 2023-08-12 RX ADMIN — HYDRALAZINE HYDROCHLORIDE 100 MG: 50 TABLET, FILM COATED ORAL at 13:39

## 2023-08-12 RX ADMIN — POLYVINYL ALCOHOL 1 DROP: 14 SOLUTION/ DROPS OPHTHALMIC at 13:55

## 2023-08-12 RX ADMIN — ASPIRIN 81 MG 81 MG: 81 TABLET ORAL at 09:15

## 2023-08-12 RX ADMIN — PANTOPRAZOLE SODIUM 40 MG: 40 TABLET, DELAYED RELEASE ORAL at 05:53

## 2023-08-12 RX ADMIN — HEPARIN SODIUM 5000 UNITS: 5000 INJECTION INTRAVENOUS; SUBCUTANEOUS at 05:53

## 2023-08-12 RX ADMIN — TAMSULOSIN HYDROCHLORIDE 0.4 MG: 0.4 CAPSULE ORAL at 20:35

## 2023-08-12 RX ADMIN — HEPARIN SODIUM 5000 UNITS: 5000 INJECTION INTRAVENOUS; SUBCUTANEOUS at 13:39

## 2023-08-12 RX ADMIN — HYDRALAZINE HYDROCHLORIDE 50 MG: 50 TABLET, FILM COATED ORAL at 05:53

## 2023-08-12 RX ADMIN — POLYVINYL ALCOHOL 1 DROP: 14 SOLUTION/ DROPS OPHTHALMIC at 20:35

## 2023-08-12 RX ADMIN — SODIUM CHLORIDE, PRESERVATIVE FREE 10 ML: 5 INJECTION INTRAVENOUS at 20:35

## 2023-08-12 RX ADMIN — HYDRALAZINE HYDROCHLORIDE 100 MG: 50 TABLET, FILM COATED ORAL at 20:35

## 2023-08-12 RX ADMIN — HEPARIN SODIUM 5000 UNITS: 5000 INJECTION INTRAVENOUS; SUBCUTANEOUS at 21:31

## 2023-08-12 RX ADMIN — TAMSULOSIN HYDROCHLORIDE 0.4 MG: 0.4 CAPSULE ORAL at 09:15

## 2023-08-12 ASSESSMENT — PAIN SCALES - GENERAL
PAINLEVEL_OUTOF10: 0

## 2023-08-12 NOTE — FLOWSHEET NOTE
08/12/23 1635   Treatment Team Notification   Reason for Communication Review case   Name of Team Member Notified Dr. Virginie Hernandez Team Role Resident   Method of Communication Secure Message   Response See orders   Notification Time [phone]     I gave hydralazine @ 1340 but BP is now 170/72. Keyonna Montemayor His Coreg & Diovan are on hold. ..do you want any prn BP meds? Thanks!

## 2023-08-13 PROBLEM — E87.0 HYPERNATREMIA: Status: ACTIVE | Noted: 2023-08-13

## 2023-08-13 LAB
ANION GAP SERPL CALC-SCNC: 11 MEQ/L (ref 8–16)
BUN SERPL-MCNC: 20 MG/DL (ref 7–22)
CALCIUM SERPL-MCNC: 8.5 MG/DL (ref 8.5–10.5)
CARDIOLIPIN IGG SER IA-ACNC: < 10 GPL
CARDIOLIPIN IGM SER IA-ACNC: < 10 MPL
CHLORIDE SERPL-SCNC: 104 MEQ/L (ref 98–111)
CO2 SERPL-SCNC: 23 MEQ/L (ref 23–33)
CREAT SERPL-MCNC: 1.5 MG/DL (ref 0.4–1.2)
GFR SERPL CREATININE-BSD FRML MDRD: 50 ML/MIN/1.73M2
GLUCOSE BLD STRIP.AUTO-MCNC: 114 MG/DL (ref 70–108)
GLUCOSE BLD STRIP.AUTO-MCNC: 121 MG/DL (ref 70–108)
GLUCOSE SERPL-MCNC: 103 MG/DL (ref 70–108)
POTASSIUM SERPL-SCNC: 4.4 MEQ/L (ref 3.5–5.2)
SODIUM SERPL-SCNC: 138 MEQ/L (ref 135–145)

## 2023-08-13 PROCEDURE — 6370000000 HC RX 637 (ALT 250 FOR IP): Performed by: INTERNAL MEDICINE

## 2023-08-13 PROCEDURE — 6370000000 HC RX 637 (ALT 250 FOR IP)

## 2023-08-13 PROCEDURE — 80048 BASIC METABOLIC PNL TOTAL CA: CPT

## 2023-08-13 PROCEDURE — 99232 SBSQ HOSP IP/OBS MODERATE 35: CPT | Performed by: INTERNAL MEDICINE

## 2023-08-13 PROCEDURE — 2140000000 HC CCU INTERMEDIATE R&B

## 2023-08-13 PROCEDURE — 6360000002 HC RX W HCPCS

## 2023-08-13 PROCEDURE — 99233 SBSQ HOSP IP/OBS HIGH 50: CPT | Performed by: INTERNAL MEDICINE

## 2023-08-13 PROCEDURE — 82948 REAGENT STRIP/BLOOD GLUCOSE: CPT

## 2023-08-13 PROCEDURE — 36415 COLL VENOUS BLD VENIPUNCTURE: CPT

## 2023-08-13 PROCEDURE — 2580000003 HC RX 258

## 2023-08-13 RX ADMIN — POLYVINYL ALCOHOL 1 DROP: 14 SOLUTION/ DROPS OPHTHALMIC at 10:48

## 2023-08-13 RX ADMIN — TAMSULOSIN HYDROCHLORIDE 0.4 MG: 0.4 CAPSULE ORAL at 19:45

## 2023-08-13 RX ADMIN — HEPARIN SODIUM 5000 UNITS: 5000 INJECTION INTRAVENOUS; SUBCUTANEOUS at 14:56

## 2023-08-13 RX ADMIN — ATORVASTATIN CALCIUM 80 MG: 80 TABLET, FILM COATED ORAL at 19:45

## 2023-08-13 RX ADMIN — HYDRALAZINE HYDROCHLORIDE 100 MG: 50 TABLET, FILM COATED ORAL at 14:59

## 2023-08-13 RX ADMIN — SODIUM CHLORIDE, PRESERVATIVE FREE 10 ML: 5 INJECTION INTRAVENOUS at 10:47

## 2023-08-13 RX ADMIN — AMLODIPINE BESYLATE 10 MG: 10 TABLET ORAL at 10:52

## 2023-08-13 RX ADMIN — POLYVINYL ALCOHOL 1 DROP: 14 SOLUTION/ DROPS OPHTHALMIC at 19:45

## 2023-08-13 RX ADMIN — ERGOCALCIFEROL 50000 UNITS: 1.25 CAPSULE ORAL at 10:52

## 2023-08-13 RX ADMIN — ASPIRIN 81 MG 81 MG: 81 TABLET ORAL at 10:48

## 2023-08-13 RX ADMIN — HEPARIN SODIUM 5000 UNITS: 5000 INJECTION INTRAVENOUS; SUBCUTANEOUS at 06:27

## 2023-08-13 RX ADMIN — HEPARIN SODIUM 5000 UNITS: 5000 INJECTION INTRAVENOUS; SUBCUTANEOUS at 21:42

## 2023-08-13 RX ADMIN — SODIUM CHLORIDE, PRESERVATIVE FREE 10 ML: 5 INJECTION INTRAVENOUS at 19:45

## 2023-08-13 RX ADMIN — HYDRALAZINE HYDROCHLORIDE 100 MG: 50 TABLET, FILM COATED ORAL at 06:28

## 2023-08-13 RX ADMIN — TAMSULOSIN HYDROCHLORIDE 0.4 MG: 0.4 CAPSULE ORAL at 10:50

## 2023-08-13 RX ADMIN — POLYVINYL ALCOHOL 1 DROP: 14 SOLUTION/ DROPS OPHTHALMIC at 14:59

## 2023-08-13 RX ADMIN — CLOPIDOGREL BISULFATE 75 MG: 75 TABLET ORAL at 10:48

## 2023-08-13 RX ADMIN — PANTOPRAZOLE SODIUM 40 MG: 40 TABLET, DELAYED RELEASE ORAL at 06:27

## 2023-08-13 ASSESSMENT — PAIN SCALES - GENERAL
PAINLEVEL_OUTOF10: 0

## 2023-08-14 ENCOUNTER — APPOINTMENT (OUTPATIENT)
Dept: MRI IMAGING | Age: 68
DRG: 064 | End: 2023-08-14
Payer: MEDICARE

## 2023-08-14 LAB
ANION GAP SERPL CALC-SCNC: 12 MEQ/L (ref 8–16)
BUN SERPL-MCNC: 26 MG/DL (ref 7–22)
CALCIUM SERPL-MCNC: 8.8 MG/DL (ref 8.5–10.5)
CHLORIDE SERPL-SCNC: 106 MEQ/L (ref 98–111)
CO2 SERPL-SCNC: 24 MEQ/L (ref 23–33)
CREAT SERPL-MCNC: 1.6 MG/DL (ref 0.4–1.2)
GFR SERPL CREATININE-BSD FRML MDRD: 47 ML/MIN/1.73M2
GLUCOSE BLD STRIP.AUTO-MCNC: 119 MG/DL (ref 70–108)
GLUCOSE BLD STRIP.AUTO-MCNC: 128 MG/DL (ref 70–108)
GLUCOSE BLD STRIP.AUTO-MCNC: 133 MG/DL (ref 70–108)
GLUCOSE SERPL-MCNC: 103 MG/DL (ref 70–108)
POTASSIUM SERPL-SCNC: 4.3 MEQ/L (ref 3.5–5.2)
SODIUM SERPL-SCNC: 142 MEQ/L (ref 135–145)
TRANSFERRIN SERPL-MCNC: 20 U/L (ref 8–52)

## 2023-08-14 PROCEDURE — 82948 REAGENT STRIP/BLOOD GLUCOSE: CPT

## 2023-08-14 PROCEDURE — 6370000000 HC RX 637 (ALT 250 FOR IP): Performed by: INTERNAL MEDICINE

## 2023-08-14 PROCEDURE — 82164 ANGIOTENSIN I ENZYME TEST: CPT

## 2023-08-14 PROCEDURE — 85220 BLOOC CLOT FACTOR V TEST: CPT

## 2023-08-14 PROCEDURE — 99232 SBSQ HOSP IP/OBS MODERATE 35: CPT | Performed by: INTERNAL MEDICINE

## 2023-08-14 PROCEDURE — 2140000000 HC CCU INTERMEDIATE R&B

## 2023-08-14 PROCEDURE — 6360000002 HC RX W HCPCS

## 2023-08-14 PROCEDURE — 85303 CLOT INHIBIT PROT C ACTIVITY: CPT

## 2023-08-14 PROCEDURE — 97530 THERAPEUTIC ACTIVITIES: CPT

## 2023-08-14 PROCEDURE — 2580000003 HC RX 258

## 2023-08-14 PROCEDURE — 6370000000 HC RX 637 (ALT 250 FOR IP)

## 2023-08-14 PROCEDURE — 80048 BASIC METABOLIC PNL TOTAL CA: CPT

## 2023-08-14 PROCEDURE — 51798 US URINE CAPACITY MEASURE: CPT

## 2023-08-14 PROCEDURE — 97535 SELF CARE MNGMENT TRAINING: CPT

## 2023-08-14 PROCEDURE — 70551 MRI BRAIN STEM W/O DYE: CPT

## 2023-08-14 PROCEDURE — 36415 COLL VENOUS BLD VENIPUNCTURE: CPT

## 2023-08-14 PROCEDURE — 92507 TX SP LANG VOICE COMM INDIV: CPT

## 2023-08-14 PROCEDURE — 92526 ORAL FUNCTION THERAPY: CPT

## 2023-08-14 PROCEDURE — 85306 CLOT INHIBIT PROT S FREE: CPT

## 2023-08-14 PROCEDURE — 99233 SBSQ HOSP IP/OBS HIGH 50: CPT | Performed by: INTERNAL MEDICINE

## 2023-08-14 RX ADMIN — POLYVINYL ALCOHOL 1 DROP: 14 SOLUTION/ DROPS OPHTHALMIC at 10:07

## 2023-08-14 RX ADMIN — POLYVINYL ALCOHOL 1 DROP: 14 SOLUTION/ DROPS OPHTHALMIC at 20:50

## 2023-08-14 RX ADMIN — HYDRALAZINE HYDROCHLORIDE 100 MG: 50 TABLET, FILM COATED ORAL at 14:37

## 2023-08-14 RX ADMIN — TAMSULOSIN HYDROCHLORIDE 0.4 MG: 0.4 CAPSULE ORAL at 21:37

## 2023-08-14 RX ADMIN — CLOPIDOGREL BISULFATE 75 MG: 75 TABLET ORAL at 10:02

## 2023-08-14 RX ADMIN — POLYVINYL ALCOHOL 1 DROP: 14 SOLUTION/ DROPS OPHTHALMIC at 14:36

## 2023-08-14 RX ADMIN — PANTOPRAZOLE SODIUM 40 MG: 40 TABLET, DELAYED RELEASE ORAL at 05:40

## 2023-08-14 RX ADMIN — HEPARIN SODIUM 5000 UNITS: 5000 INJECTION INTRAVENOUS; SUBCUTANEOUS at 05:40

## 2023-08-14 RX ADMIN — HEPARIN SODIUM 5000 UNITS: 5000 INJECTION INTRAVENOUS; SUBCUTANEOUS at 14:34

## 2023-08-14 RX ADMIN — HEPARIN SODIUM 5000 UNITS: 5000 INJECTION INTRAVENOUS; SUBCUTANEOUS at 20:50

## 2023-08-14 RX ADMIN — TAMSULOSIN HYDROCHLORIDE 0.4 MG: 0.4 CAPSULE ORAL at 10:03

## 2023-08-14 RX ADMIN — SODIUM CHLORIDE, PRESERVATIVE FREE 10 ML: 5 INJECTION INTRAVENOUS at 20:52

## 2023-08-14 RX ADMIN — HYDRALAZINE HYDROCHLORIDE 100 MG: 50 TABLET, FILM COATED ORAL at 20:50

## 2023-08-14 RX ADMIN — SODIUM CHLORIDE, PRESERVATIVE FREE 10 ML: 5 INJECTION INTRAVENOUS at 10:03

## 2023-08-14 RX ADMIN — ATORVASTATIN CALCIUM 80 MG: 80 TABLET, FILM COATED ORAL at 20:45

## 2023-08-14 RX ADMIN — ASPIRIN 81 MG 81 MG: 81 TABLET ORAL at 10:02

## 2023-08-14 RX ADMIN — AMLODIPINE BESYLATE 10 MG: 10 TABLET ORAL at 10:03

## 2023-08-14 RX ADMIN — HYDRALAZINE HYDROCHLORIDE 100 MG: 50 TABLET, FILM COATED ORAL at 05:41

## 2023-08-15 LAB
ANION GAP SERPL CALC-SCNC: 12 MEQ/L (ref 8–16)
BUN SERPL-MCNC: 29 MG/DL (ref 7–22)
CALCIUM SERPL-MCNC: 8.7 MG/DL (ref 8.5–10.5)
CHLORIDE SERPL-SCNC: 106 MEQ/L (ref 98–111)
CO2 SERPL-SCNC: 22 MEQ/L (ref 23–33)
CREAT SERPL-MCNC: 1.5 MG/DL (ref 0.4–1.2)
DEPRECATED RDW RBC AUTO: 43.2 FL (ref 35–45)
ERYTHROCYTE [DISTWIDTH] IN BLOOD BY AUTOMATED COUNT: 12.8 % (ref 11.5–14.5)
GFR SERPL CREATININE-BSD FRML MDRD: 50 ML/MIN/1.73M2
GLUCOSE BLD STRIP.AUTO-MCNC: 105 MG/DL (ref 70–108)
GLUCOSE BLD STRIP.AUTO-MCNC: 145 MG/DL (ref 70–108)
GLUCOSE BLD STRIP.AUTO-MCNC: 178 MG/DL (ref 70–108)
GLUCOSE SERPL-MCNC: 102 MG/DL (ref 70–108)
HCT VFR BLD AUTO: 38.1 % (ref 42–52)
HGB BLD-MCNC: 12.2 GM/DL (ref 14–18)
MCH RBC QN AUTO: 29.7 PG (ref 26–33)
MCHC RBC AUTO-ENTMCNC: 32 GM/DL (ref 32.2–35.5)
MCV RBC AUTO: 92.7 FL (ref 80–94)
PLATELET # BLD AUTO: 172 THOU/MM3 (ref 130–400)
PMV BLD AUTO: 11.5 FL (ref 9.4–12.4)
POTASSIUM SERPL-SCNC: 3.9 MEQ/L (ref 3.5–5.2)
RBC # BLD AUTO: 4.11 MILL/MM3 (ref 4.7–6.1)
SODIUM SERPL-SCNC: 140 MEQ/L (ref 135–145)
WBC # BLD AUTO: 11.4 THOU/MM3 (ref 4.8–10.8)

## 2023-08-15 PROCEDURE — 80048 BASIC METABOLIC PNL TOTAL CA: CPT

## 2023-08-15 PROCEDURE — 99232 SBSQ HOSP IP/OBS MODERATE 35: CPT | Performed by: INTERNAL MEDICINE

## 2023-08-15 PROCEDURE — 97530 THERAPEUTIC ACTIVITIES: CPT

## 2023-08-15 PROCEDURE — 92526 ORAL FUNCTION THERAPY: CPT

## 2023-08-15 PROCEDURE — 6370000000 HC RX 637 (ALT 250 FOR IP)

## 2023-08-15 PROCEDURE — 36415 COLL VENOUS BLD VENIPUNCTURE: CPT

## 2023-08-15 PROCEDURE — 82948 REAGENT STRIP/BLOOD GLUCOSE: CPT

## 2023-08-15 PROCEDURE — 2140000000 HC CCU INTERMEDIATE R&B

## 2023-08-15 PROCEDURE — 6360000002 HC RX W HCPCS

## 2023-08-15 PROCEDURE — 2580000003 HC RX 258

## 2023-08-15 PROCEDURE — 85027 COMPLETE CBC AUTOMATED: CPT

## 2023-08-15 PROCEDURE — 6370000000 HC RX 637 (ALT 250 FOR IP): Performed by: INTERNAL MEDICINE

## 2023-08-15 PROCEDURE — 99233 SBSQ HOSP IP/OBS HIGH 50: CPT | Performed by: INTERNAL MEDICINE

## 2023-08-15 PROCEDURE — 97535 SELF CARE MNGMENT TRAINING: CPT

## 2023-08-15 PROCEDURE — 92507 TX SP LANG VOICE COMM INDIV: CPT

## 2023-08-15 RX ADMIN — TAMSULOSIN HYDROCHLORIDE 0.4 MG: 0.4 CAPSULE ORAL at 19:58

## 2023-08-15 RX ADMIN — POLYVINYL ALCOHOL 1 DROP: 14 SOLUTION/ DROPS OPHTHALMIC at 09:11

## 2023-08-15 RX ADMIN — POLYVINYL ALCOHOL 1 DROP: 14 SOLUTION/ DROPS OPHTHALMIC at 19:58

## 2023-08-15 RX ADMIN — PANTOPRAZOLE SODIUM 40 MG: 40 TABLET, DELAYED RELEASE ORAL at 09:18

## 2023-08-15 RX ADMIN — AMLODIPINE BESYLATE 10 MG: 10 TABLET ORAL at 09:11

## 2023-08-15 RX ADMIN — SODIUM CHLORIDE, PRESERVATIVE FREE 10 ML: 5 INJECTION INTRAVENOUS at 20:05

## 2023-08-15 RX ADMIN — CLOPIDOGREL BISULFATE 75 MG: 75 TABLET ORAL at 09:11

## 2023-08-15 RX ADMIN — HEPARIN SODIUM 5000 UNITS: 5000 INJECTION INTRAVENOUS; SUBCUTANEOUS at 14:54

## 2023-08-15 RX ADMIN — HYDRALAZINE HYDROCHLORIDE 100 MG: 50 TABLET, FILM COATED ORAL at 05:27

## 2023-08-15 RX ADMIN — HYDRALAZINE HYDROCHLORIDE 100 MG: 50 TABLET, FILM COATED ORAL at 14:53

## 2023-08-15 RX ADMIN — ATORVASTATIN CALCIUM 80 MG: 80 TABLET, FILM COATED ORAL at 19:58

## 2023-08-15 RX ADMIN — SODIUM CHLORIDE, PRESERVATIVE FREE 10 ML: 5 INJECTION INTRAVENOUS at 09:11

## 2023-08-15 RX ADMIN — HYDRALAZINE HYDROCHLORIDE 100 MG: 50 TABLET, FILM COATED ORAL at 21:33

## 2023-08-15 RX ADMIN — TAMSULOSIN HYDROCHLORIDE 0.4 MG: 0.4 CAPSULE ORAL at 09:11

## 2023-08-15 RX ADMIN — ASPIRIN 81 MG 81 MG: 81 TABLET ORAL at 09:11

## 2023-08-15 RX ADMIN — HEPARIN SODIUM 5000 UNITS: 5000 INJECTION INTRAVENOUS; SUBCUTANEOUS at 21:33

## 2023-08-16 VITALS
SYSTOLIC BLOOD PRESSURE: 136 MMHG | WEIGHT: 193.56 LBS | HEIGHT: 66 IN | HEART RATE: 64 BPM | BODY MASS INDEX: 31.11 KG/M2 | OXYGEN SATURATION: 96 % | TEMPERATURE: 98.1 F | RESPIRATION RATE: 16 BRPM | DIASTOLIC BLOOD PRESSURE: 63 MMHG

## 2023-08-16 DIAGNOSIS — I63.9 CEREBROVASCULAR ACCIDENT (CVA), UNSPECIFIED MECHANISM (HCC): Primary | ICD-10-CM

## 2023-08-16 PROBLEM — Z86.73 HISTORY OF MULTIPLE STROKES: Status: ACTIVE | Noted: 2023-08-16

## 2023-08-16 LAB
ANION GAP SERPL CALC-SCNC: 11 MEQ/L (ref 8–16)
BUN SERPL-MCNC: 32 MG/DL (ref 7–22)
CALCIUM SERPL-MCNC: 8.7 MG/DL (ref 8.5–10.5)
CHLORIDE SERPL-SCNC: 106 MEQ/L (ref 98–111)
CO2 SERPL-SCNC: 24 MEQ/L (ref 23–33)
CREAT SERPL-MCNC: 1.8 MG/DL (ref 0.4–1.2)
DEPRECATED RDW RBC AUTO: 42 FL (ref 35–45)
ERYTHROCYTE [DISTWIDTH] IN BLOOD BY AUTOMATED COUNT: 12.7 % (ref 11.5–14.5)
FACT V ACT/NOR PPP: NORMAL %
GFR SERPL CREATININE-BSD FRML MDRD: 40 ML/MIN/1.73M2
GLUCOSE BLD STRIP.AUTO-MCNC: 108 MG/DL (ref 70–108)
GLUCOSE BLD STRIP.AUTO-MCNC: 141 MG/DL (ref 70–108)
GLUCOSE SERPL-MCNC: 104 MG/DL (ref 70–108)
HCT VFR BLD AUTO: 36.4 % (ref 42–52)
HGB BLD-MCNC: 11.7 GM/DL (ref 14–18)
MCH RBC QN AUTO: 29 PG (ref 26–33)
MCHC RBC AUTO-ENTMCNC: 32.1 GM/DL (ref 32.2–35.5)
MCV RBC AUTO: 90.3 FL (ref 80–94)
P2Y12 ASSAY: 265 PRU (ref 180–376)
PLATELET # BLD AUTO: 193 THOU/MM3 (ref 130–400)
PMV BLD AUTO: 11.6 FL (ref 9.4–12.4)
POTASSIUM SERPL-SCNC: 4 MEQ/L (ref 3.5–5.2)
PROT C ACT/NOR PPP: 110 % (ref 83–168)
PROT S ACT/NOR PPP: 83 % (ref 66–143)
RBC # BLD AUTO: 4.03 MILL/MM3 (ref 4.7–6.1)
SODIUM SERPL-SCNC: 141 MEQ/L (ref 135–145)
WBC # BLD AUTO: 11.2 THOU/MM3 (ref 4.8–10.8)

## 2023-08-16 PROCEDURE — 51798 US URINE CAPACITY MEASURE: CPT

## 2023-08-16 PROCEDURE — 6370000000 HC RX 637 (ALT 250 FOR IP): Performed by: INTERNAL MEDICINE

## 2023-08-16 PROCEDURE — 85730 THROMBOPLASTIN TIME PARTIAL: CPT

## 2023-08-16 PROCEDURE — 85613 RUSSELL VIPER VENOM DILUTED: CPT

## 2023-08-16 PROCEDURE — 99232 SBSQ HOSP IP/OBS MODERATE 35: CPT | Performed by: INTERNAL MEDICINE

## 2023-08-16 PROCEDURE — 2580000003 HC RX 258

## 2023-08-16 PROCEDURE — 82948 REAGENT STRIP/BLOOD GLUCOSE: CPT

## 2023-08-16 PROCEDURE — 6360000002 HC RX W HCPCS

## 2023-08-16 PROCEDURE — 85576 BLOOD PLATELET AGGREGATION: CPT

## 2023-08-16 PROCEDURE — 85027 COMPLETE CBC AUTOMATED: CPT

## 2023-08-16 PROCEDURE — 6370000000 HC RX 637 (ALT 250 FOR IP)

## 2023-08-16 PROCEDURE — 97530 THERAPEUTIC ACTIVITIES: CPT

## 2023-08-16 PROCEDURE — 36415 COLL VENOUS BLD VENIPUNCTURE: CPT

## 2023-08-16 PROCEDURE — 93270 REMOTE 30 DAY ECG REV/REPORT: CPT

## 2023-08-16 PROCEDURE — 85610 PROTHROMBIN TIME: CPT

## 2023-08-16 PROCEDURE — 80048 BASIC METABOLIC PNL TOTAL CA: CPT

## 2023-08-16 PROCEDURE — 99239 HOSP IP/OBS DSCHRG MGMT >30: CPT | Performed by: INTERNAL MEDICINE

## 2023-08-16 PROCEDURE — 51702 INSERT TEMP BLADDER CATH: CPT

## 2023-08-16 RX ORDER — POLYETHYLENE GLYCOL 3350 17 G/17G
17 POWDER, FOR SOLUTION ORAL DAILY PRN
Qty: 527 G | Refills: 0 | DISCHARGE
Start: 2023-08-16 | End: 2023-09-15

## 2023-08-16 RX ORDER — TAMSULOSIN HYDROCHLORIDE 0.4 MG/1
0.4 CAPSULE ORAL 2 TIMES DAILY
Qty: 30 CAPSULE | Refills: 3 | DISCHARGE
Start: 2023-08-16

## 2023-08-16 RX ORDER — AMLODIPINE BESYLATE 10 MG/1
10 TABLET ORAL DAILY
Qty: 30 TABLET | Refills: 3 | DISCHARGE
Start: 2023-08-17

## 2023-08-16 RX ORDER — ERGOCALCIFEROL 1.25 MG/1
50000 CAPSULE ORAL WEEKLY
Qty: 3 CAPSULE | DISCHARGE
Start: 2023-08-23 | End: 2023-09-07

## 2023-08-16 RX ORDER — PANTOPRAZOLE SODIUM 40 MG/1
40 TABLET, DELAYED RELEASE ORAL
Qty: 30 TABLET | Refills: 3 | DISCHARGE
Start: 2023-08-17

## 2023-08-16 RX ORDER — CLOPIDOGREL BISULFATE 75 MG/1
75 TABLET ORAL DAILY
Refills: 0 | DISCHARGE
Start: 2023-08-16 | End: 2023-08-16 | Stop reason: ALTCHOICE

## 2023-08-16 RX ORDER — MINERAL OIL AND WHITE PETROLATUM 150; 830 MG/G; MG/G
OINTMENT OPHTHALMIC
Refills: 0 | DISCHARGE
Start: 2023-08-16

## 2023-08-16 RX ORDER — HYDRALAZINE HYDROCHLORIDE 100 MG/1
100 TABLET, FILM COATED ORAL EVERY 8 HOURS SCHEDULED
Qty: 90 TABLET | Refills: 3 | DISCHARGE
Start: 2023-08-16

## 2023-08-16 RX ORDER — POLYVINYL ALCOHOL 14 MG/ML
1 SOLUTION/ DROPS OPHTHALMIC 3 TIMES DAILY
Refills: 4 | DISCHARGE
Start: 2023-08-16 | End: 2023-09-15

## 2023-08-16 RX ADMIN — AMLODIPINE BESYLATE 10 MG: 10 TABLET ORAL at 08:50

## 2023-08-16 RX ADMIN — POLYVINYL ALCOHOL 1 DROP: 14 SOLUTION/ DROPS OPHTHALMIC at 08:50

## 2023-08-16 RX ADMIN — SODIUM CHLORIDE, PRESERVATIVE FREE 10 ML: 5 INJECTION INTRAVENOUS at 08:50

## 2023-08-16 RX ADMIN — HYDRALAZINE HYDROCHLORIDE 100 MG: 50 TABLET, FILM COATED ORAL at 13:59

## 2023-08-16 RX ADMIN — HEPARIN SODIUM 5000 UNITS: 5000 INJECTION INTRAVENOUS; SUBCUTANEOUS at 06:00

## 2023-08-16 RX ADMIN — POLYVINYL ALCOHOL 1 DROP: 14 SOLUTION/ DROPS OPHTHALMIC at 13:59

## 2023-08-16 RX ADMIN — TAMSULOSIN HYDROCHLORIDE 0.4 MG: 0.4 CAPSULE ORAL at 08:50

## 2023-08-16 RX ADMIN — PANTOPRAZOLE SODIUM 40 MG: 40 TABLET, DELAYED RELEASE ORAL at 05:54

## 2023-08-16 RX ADMIN — ASPIRIN 81 MG 81 MG: 81 TABLET ORAL at 08:50

## 2023-08-16 RX ADMIN — CLOPIDOGREL BISULFATE 75 MG: 75 TABLET ORAL at 08:50

## 2023-08-16 RX ADMIN — HYDRALAZINE HYDROCHLORIDE 100 MG: 50 TABLET, FILM COATED ORAL at 05:54

## 2023-08-16 RX ADMIN — HEPARIN SODIUM 5000 UNITS: 5000 INJECTION INTRAVENOUS; SUBCUTANEOUS at 13:59

## 2023-08-16 NOTE — PROCEDURES
30 Day cardiac event monitor was applied to patient. Instructions were given and skin/monitor prep and application was demonstrated. Patient was instructed to remove monitor on 09/15/2023 and mail back to [employer].
Bladder scan completed and results given to Lavern RN     349 ml of urine in bladder at this time.
Bladder scan was completed and the amount of 342ML was resulted to Megan ASH.
Bladder scan was completed and the amount of 573ML was resulted to [facility].
There was poor anterior posterior amplitude gradient. There was no asymmetry between the two hemispheres. Sleep architecture was not seen. Occasional intrusions of 2 to 3 Hz of rhythmic diffuse lasting for 2-3 seconds. Photic stimulation did not produce driving response. The EKG channel demonstrated a normal sinus rhythm. Interpretation  This EEG was abnormal due to disorganized and slow background in polymorphic theta frequency. Occasional intrusions of 2 to 3 Hz of rhythmic delta activity was seen. Clinical correlation  This EEG was abnormal. Disorganized and slow background suggested moderate to severe encephalopathy of non specific etiology.      Je Harris MD  Diplomate, American Board of Psychiatry and Neurology  Diplomate, American Board of Clinical Neurophysiology  Diplomate, American Board of Epilepsy

## 2023-08-16 NOTE — CARE COORDINATION
08/08/23 0914   Readmission Assessment   Number of Days since last admission? 1-7 days   Previous Disposition SNF  ([facility])   Who is being Interviewed Caregiver  (From The [address] [place] [address])   What was the patient's/caregiver's perception as to why they think they needed to return back to the hospital? Other (Comment)  (AMS, DARINEL)   Did you visit your Primary Care Physician after you left the hospital, before you returned this time? No   Why weren't you able to visit your PCP? Other (Comment)  (From The [address] [place] [address])   Did you see a specialist, such as Cardiac, Pulmonary, Orthopedic Physician, etc. after you left the hospital? No   Who advised the patient to return to the hospital? Skilled Unit  (From The [address] [place] [address])   Does the patient report anything that got in the way of taking their medications? No  (From The [facility])   In our efforts to provide the best possible care to you and others like you, can you think of anything that we could have done to help you after you left the hospital the first time, so that you might not have needed to return so soon?  Other (Comment)  (From The [address] [place] [address])
8/10/23, 1:39 PM EDT    DISCHARGE PLANNING EVALUATION    This SW spoke with Cassie from [address] [place] [address], updated on patient's clinical status. Unsure if patient is going to benefit from skilled therapy at this time, or if he will need to return to a facility under his Medicaid. SW will talk with therapy and update Cassie from there.
8/10/23, 1:40 PM EDT    DISCHARGE ON GOING [address] [place] [facility] day: 3  Location: -32/032-A Reason for admit: Hiccups [R06.6]  Acute renal failure (ARF) (HCC) [N17.9]  Troponin level elevated [R77.8]  DARINEL (acute kidney injury) ([address]) [N17.9]  Hypothermia, initial encounter [T68. XXXA]  Altered mental status, unspecified altered mental status type [R41.82]  Sepsis, due to unspecified organism, unspecified whether acute organ dysfunction present [facility]) [A41.9]   Procedure:   8/7 CXR: Patchy opacities seen in the left upper lobe and left lower lobe. Findings likely relate to underlying infiltrates. 8/7 CT head: Diminished attenuation in the right cerebellar hemisphere new since previous study dated 7/27/2023 suspicious for an infarct. Diffusion MRI scan may be helpful for better evaluation; Areas of diminished attenuation in the white matter, right thalamus and left basal ganglia consistent with ischemic changes; Otherwise negative noncontrast CT scan of the brain. .  8/7 Renal US: No hydronephrosis. An 8 mm right renal cyst. Elevated renal resistive indices bilaterally that relates to underlying medical renal disease. 8/7 MRI brain: Areas of restricted diffusion in the right cerebellar hemisphere, in the periventricular white matter in the left frontal region, behind the atrium of the right lateral ventricle and in the left frontal subcortical white matter. Associated diminished   signal intensity on the ADC map consistent with acute infarcts possibly from an embolic source; Probable ischemic changes in the white matter and in the luis felipe; Otherwise negative MRI scan of the brain. 8/7 CT Abd/Pelvis: No renal calculus or obstructive uropathy. The urinary bladder wall is thickened in part related to underdistention. Mild prostatomegaly is observed. Correlate with urinalysis and PSA levels; Cholelithiasis. Chronic findings  8/9: LYLY: EF 55-60%. No evidence of thrombus, PFO/ASD.   8/10: EEG: This EEG
8/11/23, 1:48 PM EDT    DISCHARGE ON GOING [address] [facility] day: 4  Location: -32/032-A Reason for admit: Hiccups [R06.6]  Acute renal failure (ARF) (HCC) [N17.9]  Troponin level elevated [R77.8]  DARINEL (acute kidney injury) ([address]) [N17.9]  Hypothermia, initial encounter [T68. XXXA]  Altered mental status, unspecified altered mental status type [R41.82]  Sepsis, due to unspecified organism, unspecified whether acute organ dysfunction present [facility]) [A41.9]   Procedure:   8/7 CXR: Patchy opacities seen in the left upper lobe and left lower lobe. Findings likely relate to underlying infiltrates. 8/7 CT head: Diminished attenuation in the right cerebellar hemisphere new since previous study dated 7/27/2023 suspicious for an infarct. Diffusion MRI scan may be helpful for better evaluation; Areas of diminished attenuation in the white matter, right thalamus and left basal ganglia consistent with ischemic changes; Otherwise negative noncontrast CT scan of the brain. .  8/7 Renal US: No hydronephrosis. An 8 mm right renal cyst. Elevated renal resistive indices bilaterally that relates to underlying medical renal disease. 8/7 MRI brain: Areas of restricted diffusion in the right cerebellar hemisphere, in the periventricular white matter in the left frontal region, behind the atrium of the right lateral ventricle and in the left frontal subcortical white matter. Associated diminished   signal intensity on the ADC map consistent with acute infarcts possibly from an embolic source; Probable ischemic changes in the white matter and in the luis felipe; Otherwise negative MRI scan of the brain. 8/7 CT Abd/Pelvis: No renal calculus or obstructive uropathy. The urinary bladder wall is thickened in part related to underdistention. Mild prostatomegaly is observed. Correlate with urinalysis and PSA levels; Cholelithiasis. Chronic findings  8/9: LYLY: EF 55-60%. No evidence of thrombus, PFO/ASD.   8/10: EEG: This EEG
8/11/23, 4:50 PM EDT    DISCHARGE PLANNING EVALUATION    Left a message for Cassie with [facility] asking for precert to be started.
8/14/23, 8:50 AM EDT    DISCHARGE PLANNING EVALUATION    Spoke with Tillman Paget at The [address] [place] [address]. They did not start the precert on Friday. Put patient on the priority list to be seen by PT and OT. Will start precert when he has been seen. Update 3:07 pm: Spoke with Tillman Paget at \[facility]\"". They are starting the insurance precert today.
8/16/23, 11:08 AM EDT    Patient goals/plan/ treatment preferences discussed by  and . Patient goals/plan/ treatment preferences reviewed with patient/ family. Patient/ family verbalize understanding of discharge plan and are in agreement with goal/plan/treatment preferences. Understanding was demonstrated using the teach back method. AVS provided by RN at time of discharge, which includes all necessary medical information pertaining to the patients current course of illness, treatment, post-discharge goals of care, and treatment preferences. Services At/After Discharge: [facility] (SNF), In ambulance, Nursing service, OT, and PT       IMM Letter  IMM Letter given to Patient/Family/Significant other/Guardian/POA/by[de-identified] Maria Luisa ASH CM  IMM Letter date given[de-identified] 08/11/23  IMM Letter time given[de-identified] [address] will be discharged to The [address] [place] [address] today. He will be skilled at the facility under his [facility] Medicare benefit. He will be transported by ambulance. Discharge instructions and transport forms are attached to blue discharge packet. Spoke with Pallavi Escalona at [facility] and made her aware patient will be transport around 5:00pm.  Left a message for Primary decision maker Annie Frances. Called secondary decision maker Laurena Rinne. Made him aware of the discharge.
8/8/23, 3:22 PM EDT  Discharge Planning Evaluation  Social work consult received, patient from [facility]. PatientFamily preference is to return to [address] [place] [address]. The patient's current payor source at the facility is [facility]. Medicare skilled days available: yes  Insurance precert:  yes  Spoke with Cassie at the facility. Patient bed hold: yes  Anticipated transport plan: ambulance  Patient's Healthcare Decision Maker: Named in [address]    Readmission Risk Low 0-14, Mod 15-19), High > 20: Readmission Risk Score: 19.5    Current PCP: DIONTE Fernández CNP  PCP verified by CM? Yes    Patient Orientation: Alert and Oriented, Person    Patient Cognition: Short Term Memory Deficit  History Provided by: Patient, Medical Record    Advance Directives:      Code Status: Full Code   Patient's Primary Decision Maker is: Named in [facility] [address]     Primary Decision Maker: Gordy Lugos - Brother/Sister - [phone]    Secondary Decision Maker: Martin Mac - [phone]     Discharge Planning:    Patient lives with: Other (Comment) (ECF) [facility]  Primary Care Giver: Other (Comment) (ECF)  Patient Support Systems include: Family Members   Current Financial resources: Medicare  Current community resources: ECF/Home Care  Current services prior to admission: None            Current DME:              Type of Home Care services:  None    ADLS  Prior functional level: Bathing, Dressing, Toileting, Assistance with the following:, Cooking, Housework, Shopping, Mobility  Current functional level: Assistance with the following:, Bathing, Dressing, Toileting, Cooking, Housework, Shopping, Mobility    Family can provide assistance at DC: Other (comment) (ECF)  Would you like Case Management to discuss the discharge plan with any other family members/significant others, and if so, who?  Yes (brother)  Plans to Return to Present Housing: Yes  Other Identified
8/8/23, 8:53 AM EDT      DISCHARGE PLANNING EVALUATION    Jazz Prabhakar  Admitted: 8/7/2023  Hospital Day: 1    Location: [address] [place] [address]-A Reason for admit: Hiccups [R06.6]  Acute renal failure (ARF) (HCC) [N17.9]  Troponin level elevated [R77.8]  DARINEL (acute kidney injury) ([address]) [N17.9]  Hypothermia, initial encounter [T68. XXXA]  Altered mental status, unspecified altered mental status type [R41.82]  Sepsis, due to unspecified organism, unspecified whether acute organ dysfunction present ([address]) [A41.9]    Past Medical History:   Diagnosis Date    Cerebrovascular accident (CVA) due to thrombosis ([address]) 8/1/2023    Diabetes mellitus ([address])     Hyperlipidemia     Hypertension        Procedure:   8/7 CXR: Patchy opacities seen in the left upper lobe and left lower lobe. Findings likely relate to underlying infiltrates. 8/7 CT head:   1. Diminished attenuation in the right cerebellar hemisphere new since previous study dated 7/27/2023 suspicious for an infarct. Diffusion MRI scan may be helpful for better evaluation. 2. Areas of diminished attenuation in the white matter, right thalamus and left basal ganglia consistent with ischemic changes. 3. Otherwise negative noncontrast CT scan of the brain. .  8/7 Renal US: No hydronephrosis. An 8 mm right renal cyst. Elevated renal resistive indices bilaterally that relates to underlying medical renal disease. 8/7 MRI brain:   1. Areas of restricted diffusion in the right cerebellar hemisphere, in the periventricular white matter in the left frontal region, behind the atrium of the right lateral ventricle and in the left frontal subcortical white matter. Associated diminished   signal intensity on the ADC map consistent with acute infarcts possibly from an embolic source. 2. Probable ischemic changes in the white matter and in the luis felipe. 3. Otherwise negative MRI scan of the brain. 8/7 CT Abd/Pelvis:   1. No renal calculus or obstructive uropathy.  The urinary bladder wall is
8/9/23, 8:11 AM EDT    DISCHARGE ON [address] day: 2  Location: -[facility] Reason for admit: Hiccups [R06.6]  Acute renal failure (ARF) (HCC) [N17.9]  Troponin level elevated [R77.8]  DARINEL (acute kidney injury) ([address]) [N17.9]  Hypothermia, initial encounter [T68. XXXA]  Altered mental status, unspecified altered mental status type [R41.82]  Sepsis, due to unspecified organism, unspecified whether acute organ dysfunction present [facility]) [A41.9]   Procedure: 8/7 CXR: Patchy opacities seen in the left upper lobe and left lower lobe. Findings likely relate to underlying infiltrates. 8/7 CT head:   1. Diminished attenuation in the right cerebellar hemisphere new since previous study dated 7/27/2023 suspicious for an infarct. Diffusion MRI scan may be helpful for better evaluation. 2. Areas of diminished attenuation in the white matter, right thalamus and left basal ganglia consistent with ischemic changes. 3. Otherwise negative noncontrast CT scan of the brain. .  8/7 Renal US: No hydronephrosis. An 8 mm right renal cyst. Elevated renal resistive indices bilaterally that relates to underlying medical renal disease. 8/7 MRI brain:   1. Areas of restricted diffusion in the right cerebellar hemisphere, in the periventricular white matter in the left frontal region, behind the atrium of the right lateral ventricle and in the left frontal subcortical white matter. Associated diminished   signal intensity on the ADC map consistent with acute infarcts possibly from an embolic source. 2. Probable ischemic changes in the white matter and in the luis felipe. 3. Otherwise negative MRI scan of the brain. 8/7 CT Abd/Pelvis:   1. No renal calculus or obstructive uropathy. The urinary bladder wall is thickened in part related to underdistention. Mild prostatomegaly is observed. Correlate with urinalysis and PSA levels. 2. Cholelithiasis.  Chronic findings  8/9: pending LYLY  Barriers to Discharge:
EEG was abnormal due to disorganized and slow background in polymorphic theta frequency. Occasional intrusions of 2 to 3 Hz of rhythmic delta activity was seen  8/10 MBS: No laryngeal penetration or aspiration of barium. 8/14 MRI of brain:   1. There are areas of restricted diffusion in the right cerebellar hemisphere, behind the age of both lateral ventricles, in the periventricular white matter, in the left frontal and parietal regions and in the left posterior frontal/anterior parietal subcortical white matter consistent with recent ischemia, unchanged since previous study dated 8/7/2023.  2. There are no new areas of restricted diffusion noted. 3. There is mild atrophy and probable ischemic changes in the white matter and in the luis felipe. 4. There are areas of susceptibility artifact in the left and right cerebellar hemispheres    Barriers to Discharge: Hospitalist, Cardiology, Nephrology, Palliative care following. PT/OT/SLP. Awaiting precert. PCP: DIONTE Marie CNP  Readmission Risk Score: 18%  Patient Goals/Plan/Treatment Preferences: Plan to return to [address] [place] [address]. SW following. Awaiting precert, started 2/30/39.
was abnormal due to disorganized and slow background in polymorphic theta frequency. Occasional intrusions of 2 to 3 Hz of rhythmic delta activity was seen  8/10 MBS: No laryngeal penetration or aspiration of barium. 8/14 MRI of brain: ordered. Barriers to Discharge: Hospitalist, Cardiology, Nephrology, Palliative care following. PT/OT/SLP. PCP: DIONTE Champion CNP  Readmission Risk Score: 18.5%  Patient Goals/Plan/Treatment Preferences: Plan to return to [address] [place] [address]. Will need precert. SW following. Waiting on PT notes to start precert.

## 2023-08-16 NOTE — NURSE NAVIGATOR
Stroke Folder given. What is Stroke/CVA  Signs and Symptoms of stroke (BEFAST)  Treatments for Stroke  Personal Risk Factors for Stroke discussed  Education--Call 911    Pt not receptive to teaching at this time d/t confusion. Stroke folder left at bedside.

## 2023-08-17 LAB
F2 C.20210G>A GENO BLD/T: NEGATIVE
SPECIMEN SOURCE: NORMAL

## 2023-08-17 NOTE — DISCHARGE SUMMARY
the morning and at bedtime  What changed: when to take this            CONTINUE taking these medications      aspirin 81 MG EC tablet  Take 1 tablet by mouth daily     atorvastatin 80 MG tablet  Commonly known as: LIPITOR  Take 1 tablet by mouth nightly            STOP taking these medications      carvedilol 6.25 MG tablet  Commonly known as: COREG     chlorthalidone 25 MG tablet  Commonly known as: HYGROTON     clopidogrel 75 MG tablet  Commonly known as: PLAVIX     NIFEdipine 30 MG extended release tablet  Commonly known as: PROCARDIA XL     valsartan 160 MG tablet  Commonly known as: DIOVAN               Where to Get Your Medications        Information about where to get these medications is not yet available    Ask your nurse or doctor about these medications  amLODIPine 10 MG tablet  hydrALAZINE 100 MG tablet  lubrifresh P.M. ophthalmic ointment  pantoprazole 40 MG tablet  polyethylene glycol 17 g packet  polyvinyl alcohol 1.4 % ophthalmic solution  tamsulosin 0.4 MG capsule  ticagrelor 90 MG Tabs tablet  Vitamin D (Ergocalciferol) 26482 units Caps              Time Spent on discharge is 35 minutes in the examination, evaluation, counseling and review of medications and discharge plan. Thank you DIONTE Wild CNP for the opportunity to be involved in this patient's care.       Signed:    Electronically signed by Ravinder Florez DO on 8/17/2023 at 6:28 PM     Case was discussed with Attending, Dr. Vivienne Harvey DO

## 2023-08-18 LAB
APTT IMM NP PPP: ABNORMAL SEC (ref 32–48)
APTT P HEP NEUT PPP: ABNORMAL SEC (ref 32–48)
CONFIRM APTT STACLOT: ABNORMAL
DRVVT SCREEN TO CONFIRM RATIO: ABNORMAL RATIO
LA 3 SCREEN W REFLEX-IMP: ABNORMAL
LA NT DPL PPP QL: ABNORMAL
MIXING DRVVT: ABNORMAL SEC (ref 33–44)
PROTHROMBIN TIME: 15.7 SEC (ref 12–15.5)
REPTILASE TIME: ABNORMAL SEC
SCREEN APTT: 46 SEC (ref 32–48)
SCREEN DRVVT: 34 SEC (ref 33–44)
THROMBIN TIME: ABNORMAL SEC (ref 14.7–19.5)

## 2023-08-20 ENCOUNTER — HOSPITAL ENCOUNTER (EMERGENCY)
Age: 68
Discharge: HOME | End: 2023-08-21
Attending: EMERGENCY MEDICINE
Payer: MEDICARE

## 2023-08-20 ENCOUNTER — APPOINTMENT (OUTPATIENT)
Dept: CT IMAGING | Age: 68
End: 2023-08-20
Payer: MEDICARE

## 2023-08-20 DIAGNOSIS — R40.4 TRANSIENT ALTERATION OF AWARENESS: Primary | ICD-10-CM

## 2023-08-20 DIAGNOSIS — R31.9 HEMATURIA, UNSPECIFIED TYPE: ICD-10-CM

## 2023-08-20 DIAGNOSIS — N30.01 ACUTE CYSTITIS WITH HEMATURIA: ICD-10-CM

## 2023-08-20 LAB
ALBUMIN SERPL BCG-MCNC: 3.7 G/DL (ref 3.5–5.1)
ALP SERPL-CCNC: 113 U/L (ref 38–126)
ALT SERPL W/O P-5'-P-CCNC: 77 U/L (ref 11–66)
ANION GAP SERPL CALC-SCNC: 14 MEQ/L (ref 8–16)
AST SERPL-CCNC: 39 U/L (ref 5–40)
BACTERIA URNS QL MICRO: ABNORMAL /HPF
BASOPHILS ABSOLUTE: 0.1 THOU/MM3 (ref 0–0.1)
BASOPHILS NFR BLD AUTO: 0.8 %
BILIRUB CONJ SERPL-MCNC: < 0.2 MG/DL (ref 0–0.3)
BILIRUB SERPL-MCNC: 0.3 MG/DL (ref 0.3–1.2)
BILIRUB UR QL STRIP.AUTO: NEGATIVE
BUN SERPL-MCNC: 26 MG/DL (ref 7–22)
CALCIUM SERPL-MCNC: 9.1 MG/DL (ref 8.5–10.5)
CASTS #/AREA URNS LPF: ABNORMAL /LPF
CASTS 2: ABNORMAL /LPF
CHARACTER UR: ABNORMAL
CHLORIDE SERPL-SCNC: 103 MEQ/L (ref 98–111)
CO2 SERPL-SCNC: 22 MEQ/L (ref 23–33)
COLOR: ABNORMAL
CREAT SERPL-MCNC: 1.6 MG/DL (ref 0.4–1.2)
CRYSTALS URNS MICRO: ABNORMAL
DEPRECATED RDW RBC AUTO: 41.9 FL (ref 35–45)
EOSINOPHIL NFR BLD AUTO: 3.7 %
EOSINOPHILS ABSOLUTE: 0.4 THOU/MM3 (ref 0–0.4)
EPITHELIAL CELLS, UA: ABNORMAL /HPF
ERYTHROCYTE [DISTWIDTH] IN BLOOD BY AUTOMATED COUNT: 12.7 % (ref 11.5–14.5)
GFR SERPL CREATININE-BSD FRML MDRD: 47 ML/MIN/1.73M2
GLUCOSE BLD STRIP.AUTO-MCNC: 149 MG/DL (ref 70–108)
GLUCOSE SERPL-MCNC: 132 MG/DL (ref 70–108)
GLUCOSE UR QL STRIP.AUTO: NEGATIVE MG/DL
HCT VFR BLD AUTO: 37.8 % (ref 42–52)
HGB BLD-MCNC: 12.3 GM/DL (ref 14–18)
HGB UR QL STRIP.AUTO: ABNORMAL
IMM GRANULOCYTES # BLD AUTO: 0.05 THOU/MM3 (ref 0–0.07)
IMM GRANULOCYTES NFR BLD AUTO: 0.4 %
KETONES UR QL STRIP.AUTO: NEGATIVE
LIPASE SERPL-CCNC: 55.3 U/L (ref 5.6–51.3)
LYMPHOCYTES ABSOLUTE: 1.5 THOU/MM3 (ref 1–4.8)
LYMPHOCYTES NFR BLD AUTO: 13.8 %
MCH RBC QN AUTO: 29.4 PG (ref 26–33)
MCHC RBC AUTO-ENTMCNC: 32.5 GM/DL (ref 32.2–35.5)
MCV RBC AUTO: 90.2 FL (ref 80–94)
MISCELLANEOUS 2: ABNORMAL
MONOCYTES ABSOLUTE: 0.8 THOU/MM3 (ref 0.4–1.3)
MONOCYTES NFR BLD AUTO: 6.9 %
NEUTROPHILS NFR BLD AUTO: 74.4 %
NITRITE UR QL STRIP: NEGATIVE
NRBC BLD AUTO-RTO: 0 /100 WBC
OSMOLALITY SERPL CALC.SUM OF ELEC: 284.2 MOSMOL/KG (ref 275–300)
PH UR STRIP.AUTO: 5.5 [PH] (ref 5–9)
PLATELET # BLD AUTO: 221 THOU/MM3 (ref 130–400)
PMV BLD AUTO: 10.9 FL (ref 9.4–12.4)
POTASSIUM SERPL-SCNC: 4.5 MEQ/L (ref 3.5–5.2)
PROT SERPL-MCNC: 7.1 G/DL (ref 6.1–8)
PROT UR STRIP.AUTO-MCNC: 300 MG/DL
RBC # BLD AUTO: 4.19 MILL/MM3 (ref 4.7–6.1)
RBC URINE: > 200 /HPF
RENAL EPI CELLS #/AREA URNS HPF: ABNORMAL /[HPF]
SEGMENTED NEUTROPHILS ABSOLUTE COUNT: 8.3 THOU/MM3 (ref 1.8–7.7)
SODIUM SERPL-SCNC: 139 MEQ/L (ref 135–145)
SP GR UR REFRACT.AUTO: 1.02 (ref 1–1.03)
T4 FREE SERPL-MCNC: 1.99 NG/DL (ref 0.93–1.76)
TROPONIN, HIGH SENSITIVITY: 41 NG/L (ref 0–12)
TSH SERPL DL<=0.005 MIU/L-ACNC: 0.22 UIU/ML (ref 0.4–4.2)
UROBILINOGEN, URINE: 1 EU/DL (ref 0–1)
WBC # BLD AUTO: 11.1 THOU/MM3 (ref 4.8–10.8)
WBC #/AREA URNS HPF: ABNORMAL /HPF
WBC #/AREA URNS HPF: ABNORMAL /[HPF]
YEAST LIKE FUNGI URNS QL MICRO: ABNORMAL

## 2023-08-20 PROCEDURE — 82248 BILIRUBIN DIRECT: CPT

## 2023-08-20 PROCEDURE — 80053 COMPREHEN METABOLIC PANEL: CPT

## 2023-08-20 PROCEDURE — 81001 URINALYSIS AUTO W/SCOPE: CPT

## 2023-08-20 PROCEDURE — 93005 ELECTROCARDIOGRAM TRACING: CPT | Performed by: STUDENT IN AN ORGANIZED HEALTH CARE EDUCATION/TRAINING PROGRAM

## 2023-08-20 PROCEDURE — 70450 CT HEAD/BRAIN W/O DYE: CPT

## 2023-08-20 PROCEDURE — 84484 ASSAY OF TROPONIN QUANT: CPT

## 2023-08-20 PROCEDURE — 85025 COMPLETE CBC W/AUTO DIFF WBC: CPT

## 2023-08-20 PROCEDURE — 84439 ASSAY OF FREE THYROXINE: CPT

## 2023-08-20 PROCEDURE — 87086 URINE CULTURE/COLONY COUNT: CPT

## 2023-08-20 PROCEDURE — 83690 ASSAY OF LIPASE: CPT

## 2023-08-20 PROCEDURE — 99284 EMERGENCY DEPT VISIT MOD MDM: CPT

## 2023-08-20 PROCEDURE — 84443 ASSAY THYROID STIM HORMONE: CPT

## 2023-08-20 PROCEDURE — 6370000000 HC RX 637 (ALT 250 FOR IP): Performed by: STUDENT IN AN ORGANIZED HEALTH CARE EDUCATION/TRAINING PROGRAM

## 2023-08-20 PROCEDURE — 36415 COLL VENOUS BLD VENIPUNCTURE: CPT

## 2023-08-20 PROCEDURE — 82948 REAGENT STRIP/BLOOD GLUCOSE: CPT

## 2023-08-20 RX ORDER — CEPHALEXIN 500 MG/1
500 CAPSULE ORAL 4 TIMES DAILY
Qty: 28 CAPSULE | Refills: 0 | Status: SHIPPED | OUTPATIENT
Start: 2023-08-20 | End: 2023-08-27

## 2023-08-20 RX ORDER — CEPHALEXIN 250 MG/1
500 CAPSULE ORAL ONCE
Status: COMPLETED | OUTPATIENT
Start: 2023-08-20 | End: 2023-08-20

## 2023-08-20 RX ADMIN — CEPHALEXIN 500 MG: 250 CAPSULE ORAL at 22:26

## 2023-08-20 ASSESSMENT — PAIN - FUNCTIONAL ASSESSMENT
PAIN_FUNCTIONAL_ASSESSMENT: NONE - DENIES PAIN

## 2023-08-20 NOTE — ED TRIAGE NOTES
Pt present to ED from nursing home via EMS. EMS states they were called because the pt has dark colored urine and is confused. Upon arrival pt is oriented to person, situation, and place but is unsure of the year. Pt denies any pain at this time. Blood sugar is 149. EMS was not aware of how long this has been going on for. Respirations easy and unlabored.

## 2023-08-21 ENCOUNTER — TELEPHONE (OUTPATIENT)
Dept: FAMILY MEDICINE CLINIC | Age: 68
End: 2023-08-21

## 2023-08-21 VITALS
BODY MASS INDEX: 33.43 KG/M2 | HEART RATE: 61 BPM | TEMPERATURE: 98.6 F | SYSTOLIC BLOOD PRESSURE: 131 MMHG | HEIGHT: 66 IN | WEIGHT: 208 LBS | DIASTOLIC BLOOD PRESSURE: 53 MMHG | OXYGEN SATURATION: 100 % | RESPIRATION RATE: 18 BRPM

## 2023-08-21 PROCEDURE — 93010 ELECTROCARDIOGRAM REPORT: CPT | Performed by: INTERNAL MEDICINE

## 2023-08-21 ASSESSMENT — PAIN - FUNCTIONAL ASSESSMENT: PAIN_FUNCTIONAL_ASSESSMENT: NONE - DENIES PAIN

## 2023-08-21 NOTE — ED PROVIDER NOTES
[facility] EMERGENCY DEPT    Pt Name: Armin Stewart  MRN: [id number]  [facility] 1955  Date of evaluation: 8/20/2023  Resident Physician: Amaya Blakely MD  Attending Physician: Rolando Robb DO      CHIEF COMPLAINT     No chief complaint on file. HISTORY OF PRESENT ILLNESS   Armin Stewart is a 76 y.o. 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 Patient was recently discharged earlier this month where he initially presented for an abducens palsy. At the time MRI showed several acute infarcts of the right cerebral hemisphere, periventricular white matter of the left frontal lobe and right lateral ventricle. Patient was sent by nursing home as there is concern that the patient's indwelling Dale catheter was now producing dark urine. Per patient, he is feeling well. He is not otherwise in the emergency department. States that he has been having some arguments with nursing staff as they are not providing him with the meals that he is ordering. The patient has no other acute complaints at this time. PASTMEDICAL HISTORY     Past Medical History:   Diagnosis Date    Cerebrovascular accident (CVA) due to thrombosis ([address]) 8/1/2023    Diabetes mellitus ([address])     Hyperlipidemia     Hypertension        Patient Active Problem List   Diagnosis Code    Essential hypertension I10    Obesity (BMI 30-39. 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

## 2023-08-21 NOTE — ED NOTES
Pt resting in bed. New catheter inserted. Urine sample collected.      Aram Michele RN  08/20/23 2041

## 2023-08-22 LAB
BACTERIA UR CULT: ABNORMAL
ORGANISM: ABNORMAL

## 2023-08-22 NOTE — PROCEDURES
[place] [address]                                 EVENT MONITOR    PATIENT NAME: Jesus Love                    :        1955  MED REC NO:   [id number]                           ROOM:       0015  ACCOUNT NO:   [de-identified]                           ADMIT DATE: 2023  PROVIDER:     Julio C Alcazar M.D.    TEST TYPE:  Event monitor. CLINICAL HISTORY AND INDICATION:  This is a patient with hypertensive  urgency. EVENT MONITOR DESCRIPTION:  Event monitor was attached to the patient  between 2023 and 2023. EVENT MONITOR FINDINGS:  Baseline rhythm showed sinus rhythm,  nonspecific rare premature atrial leads were seen. Otherwise,  unremarkable event monitor with only few strips available for  interpretation. CONCLUSIONS:  Unremarkable event monitor with no obvious or sustained  arrhythmias.         Julio C Alcazar M.D.    D: 2023 10:55:53       T: 2023 12:41:56     [email or web address]  Job#: [id number]     Doc#: [id number]    CC:

## 2023-08-23 NOTE — PROGRESS NOTES
Pharmacy Note  ED Culture Follow-up    Raúl Perez is a 76 y.o. male. Allergies: Patient has no known allergies. Labs:  Lab Results   Component Value Date    BUN 26 (H) 08/20/2023    CREATININE 1.6 (H) 08/20/2023    WBC 11.1 (H) 08/20/2023     Estimated Creatinine Clearance: 48 mL/min (A) (based on SCr of 1.6 mg/dL (H)). Current antimicrobials:   Keflex 500mg 4x daily x7 days    ASSESSMENT:  Micro results:   Urine culture: positive for C.albicans     PLAN:  Need for intervention: Yes, but will defer to provider at nursing home  Discussed with: Bindu Cifuentes MD  Chosen treatment:    Results faxed to [address] [place] [address] with recommendation for fluconazole 100mg po X7 days    Patient response:   No need to contact patient    Called/sent in prescription to: Not applicable    Please call with any questions.  Antonio Baca [facility] [place] [facility], PharmD 4:26 PM 8/23/2023

## 2023-08-26 LAB
EKG ATRIAL RATE: 59 BPM
EKG P-R INTERVAL: 240 MS
EKG Q-T INTERVAL: 414 MS
EKG QRS DURATION: 94 MS
EKG QTC CALCULATION (BAZETT): 409 MS
EKG R AXIS: 11 DEGREES
EKG T AXIS: 97 DEGREES
EKG VENTRICULAR RATE: 59 BPM

## 2023-08-28 ENCOUNTER — CARE COORDINATION (OUTPATIENT)
Dept: CASE MANAGEMENT | Age: 68
End: 2023-08-28

## 2023-08-29 ENCOUNTER — CARE COORDINATION (OUTPATIENT)
Dept: CASE MANAGEMENT | Age: 68
End: 2023-08-29

## 2023-08-29 NOTE — CARE COORDINATION
Care Transitions Outreach Attempt/late entry    Call within 2 business days of discharge: Yes   Attempted to reach patient for transitions of care follow up. Unable to reach patient. Caller left a HIPAA compliant message with call back information for a return call. Patient: Mita Brown Patient : 1955 MRN: <[id number]>    Last Discharge [address]       Date Complaint Diagnosis Description Type Department Provider    23 Altered Mental Status Transient alteration of awareness . .. ED (DISCHARGE) [facility] Cristian Bailey, DO              Was this an external facility discharge? Yes, 23  Discharge Facility:  [facility] at [address] [place] [address] following upcoming appointments from discharge chart review:   [address] follow up appointment(s):   Future Appointments   Date Time Provider [address]   2023 10:45 AM DIONTE Rivera - [facility] Maura Alexander [facility]   2023 11:20 AM DO SAMIR Rodriguez [facility]   2023  1:30 PM [address] [facility] -

## 2023-08-31 ENCOUNTER — CARE COORDINATION (OUTPATIENT)
Dept: CARE COORDINATION | Age: 68
End: 2023-08-31

## 2023-08-31 NOTE — CARE COORDINATION
Unable to reach pt. Message left with this [facility]'s contact number. I contacted [address] [place] [address]. Spoke with nurse. Informed that pt was transferred to [facility]. No further outreach at this time.

## 2023-10-12 ENCOUNTER — OFFICE VISIT (OUTPATIENT)
Dept: NEPHROLOGY | Age: 68
End: 2023-10-12
Payer: MEDICARE

## 2023-10-12 VITALS
HEART RATE: 74 BPM | BODY MASS INDEX: 31.15 KG/M2 | OXYGEN SATURATION: 98 % | DIASTOLIC BLOOD PRESSURE: 72 MMHG | WEIGHT: 193 LBS | SYSTOLIC BLOOD PRESSURE: 161 MMHG

## 2023-10-12 DIAGNOSIS — N18.32 STAGE 3B CHRONIC KIDNEY DISEASE (HCC): Primary | ICD-10-CM

## 2023-10-12 PROCEDURE — 3077F SYST BP >= 140 MM HG: CPT | Performed by: INTERNAL MEDICINE

## 2023-10-12 PROCEDURE — 99214 OFFICE O/P EST MOD 30 MIN: CPT | Performed by: INTERNAL MEDICINE

## 2023-10-12 PROCEDURE — G8417 CALC BMI ABV UP PARAM F/U: HCPCS | Performed by: INTERNAL MEDICINE

## 2023-10-12 PROCEDURE — 1123F ACP DISCUSS/DSCN MKR DOCD: CPT | Performed by: INTERNAL MEDICINE

## 2023-10-12 PROCEDURE — 3078F DIAST BP <80 MM HG: CPT | Performed by: INTERNAL MEDICINE

## 2023-10-12 PROCEDURE — G8484 FLU IMMUNIZE NO ADMIN: HCPCS | Performed by: INTERNAL MEDICINE

## 2023-10-12 PROCEDURE — G8427 DOCREV CUR MEDS BY ELIG CLIN: HCPCS | Performed by: INTERNAL MEDICINE

## 2023-10-12 PROCEDURE — 1036F TOBACCO NON-USER: CPT | Performed by: INTERNAL MEDICINE

## 2023-10-12 PROCEDURE — 3017F COLORECTAL CA SCREEN DOC REV: CPT | Performed by: INTERNAL MEDICINE

## 2023-10-12 RX ORDER — POLYETHYLENE GLYCOL 3350 17 G/17G
17 POWDER, FOR SOLUTION ORAL DAILY PRN
COMMUNITY

## 2023-10-12 RX ORDER — LISINOPRIL 20 MG/1
20 TABLET ORAL DAILY
COMMUNITY
Start: 2023-10-10

## 2023-10-12 NOTE — PROGRESS NOTES
[facility] KIDNEY AND HYPERTENSION  [address] [address]  Dept: [phone]  Loc: [phone]  Progress Note  10/12/2023 2:27 PM      Pt Name:    Mita Brown  YOB: 1955  Primary Care Physician:  Rosanna Solis, APRN - CNP     Chief Complaint:   Chief Complaint   Patient presents with    Follow-Up from Hospital        History of Present Illness: This is a hospital follow-up visit for DARINEL. 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 He presented to the hospital with altered mental status. Had CVA. Also with severe DARINEL BUN was 161, creat 6.0 on admission which was prerenal and improved with IVF. Reached a laura of 1.4-1.6 mg/dL. Has not had any recent labs since he got out of the hospital .  He has a randolph catheter due to urinary retention, will be following up with urology. His bp is high,he states it has been running 890-645 systolic at ECF. He feels ok and denies any complaints. Pertinent items are noted in HPI.          Past History:  Past Medical History:   Diagnosis Date    Cerebrovascular accident (CVA) due to thrombosis ([address]) 8/1/2023    Diabetes mellitus ([address])     Hyperlipidemia     Hypertension      Past Surgical History:   Procedure Laterality Date    ENDOSCOPY, COLON, DIAGNOSTIC      swallowing difficulties    LAPAROSCOPY N/A 1/19/2022    EXPLORATORY LAPAROSCOPY, TRANSVERSE LOOP COLOSTOMY performed by Carlito Pettit MD at [address]  Aug 29, 2013    Neck Abcess Incision and Drainage (Dr. Mariposa Casas, [facility])    RECTAL SURGERY N/A 1/14/2022    FULL THICKNESS DEBRIDEMENT OF SACRAL DECUBITUS ULCER, [id number] CM performed by Carlito Pettit MD at [facility]    TRANSESOPHAGEAL ECHOCARDIOGRAM N/A 8/9/2023    TRANSESOPHAGEAL ECHOCARDIOGRAM performed by

## 2023-10-12 NOTE — PATIENT INSTRUCTIONS
Bloodwork soon once I see the results will adjust blood pressure medication but want to see your labs first as some of the meds can affect your kidneys.

## 2023-10-19 ENCOUNTER — OFFICE VISIT (OUTPATIENT)
Dept: UROLOGY | Age: 68
End: 2023-10-19
Payer: MEDICARE

## 2023-10-19 VITALS
HEIGHT: 66 IN | WEIGHT: 198 LBS | BODY MASS INDEX: 31.82 KG/M2 | SYSTOLIC BLOOD PRESSURE: 122 MMHG | DIASTOLIC BLOOD PRESSURE: 70 MMHG

## 2023-10-19 DIAGNOSIS — R33.8 ACUTE URINARY RETENTION: Primary | ICD-10-CM

## 2023-10-19 DIAGNOSIS — N40.1 BPH WITH OBSTRUCTION/LOWER URINARY TRACT SYMPTOMS: ICD-10-CM

## 2023-10-19 DIAGNOSIS — N13.8 BPH WITH OBSTRUCTION/LOWER URINARY TRACT SYMPTOMS: ICD-10-CM

## 2023-10-19 PROCEDURE — G8427 DOCREV CUR MEDS BY ELIG CLIN: HCPCS

## 2023-10-19 PROCEDURE — 1123F ACP DISCUSS/DSCN MKR DOCD: CPT

## 2023-10-19 PROCEDURE — 3017F COLORECTAL CA SCREEN DOC REV: CPT

## 2023-10-19 PROCEDURE — 99203 OFFICE O/P NEW LOW 30 MIN: CPT

## 2023-10-19 PROCEDURE — 3078F DIAST BP <80 MM HG: CPT

## 2023-10-19 PROCEDURE — G8484 FLU IMMUNIZE NO ADMIN: HCPCS

## 2023-10-19 PROCEDURE — 3074F SYST BP LT 130 MM HG: CPT

## 2023-10-19 PROCEDURE — G8417 CALC BMI ABV UP PARAM F/U: HCPCS

## 2023-10-19 PROCEDURE — 1036F TOBACCO NON-USER: CPT

## 2023-10-19 RX ORDER — TAMSULOSIN HYDROCHLORIDE 0.4 MG/1
0.4 CAPSULE ORAL 2 TIMES DAILY
Qty: 30 CAPSULE | Refills: 5 | Status: SHIPPED | OUTPATIENT
Start: 2023-10-19

## 2023-10-19 NOTE — PROGRESS NOTES
[address] [facility].  [address] [facility],[address] Floor [address]  Dept: [phone]  Loc: [phone]  Visit Date: 10/19/2023    ONELIA Carrillo is a 76 y.o. male that presents to the urology clinic as a new patient for hematuria, urinary retention follow-up. Hematuria in the setting of acute cystitis. Urinary retention requiring randolph catheter while IP. Randolph in place and draining upon presentation today. Moving his bowels. Patient did not have any LUTS prior to his strokes. Has not yet attempted void trial.    Single exchange since discharge from IP stay performed at [facility] where patient is currently residing.         Last BUN and creatinine:  Lab Results   Component Value Date    BUN 16 10/13/2023     Lab Results   Component Value Date    CREATININE 1.1 10/13/2023         PAST MEDICAL, FAMILY AND SOCIAL HISTORY:  Past Medical History:   Diagnosis Date    Cerebrovascular accident (CVA) due to thrombosis ([address]) 8/1/2023    Diabetes mellitus ([address])     Hyperlipidemia     Hypertension      Past Surgical History:   Procedure Laterality Date    ENDOSCOPY, COLON, DIAGNOSTIC      swallowing difficulties    LAPAROSCOPY N/A 1/19/2022    EXPLORATORY LAPAROSCOPY, TRANSVERSE LOOP COLOSTOMY performed by Jillian Goldmann, MD at [address]  Aug 29, 2013    Neck Abcess Incision and Drainage (Dr. Ze Villatoro, [facility])    RECTAL SURGERY N/A 1/14/2022    FULL THICKNESS DEBRIDEMENT OF SACRAL DECUBITUS ULCER, 21v06k3 CM performed by Jillian Goldmann, MD at [address] [facility]    TRANSESOPHAGEAL ECHOCARDIOGRAM N/A 8/9/2023    TRANSESOPHAGEAL ECHOCARDIOGRAM performed by Michoacano Gama MD at [address] [place] [address] Main History   Problem Relation Age of Onset    Heart Disease Father     Other Mother         alzheimers     Outpatient Medications Marked as Taking for the 10/19/23 encounter (Office Visit) with Miguelito Joel PA-C   Medication Sig Dispense Refill

## 2023-10-19 NOTE — PROGRESS NOTES
Patient has given me verbal consent to perform randolph removal  Yes    10 cc of water deflated from randloph balloon. 16 Fr randolph removed without difficulty. Pt will drink fluids and report to ER in 6-8 hours if patient unable to urinate. F/u with provider as scheduled.

## 2023-11-09 ENCOUNTER — TELEPHONE (OUTPATIENT)
Dept: CARDIOLOGY CLINIC | Age: 68
End: 2023-11-09

## 2023-11-09 ENCOUNTER — OFFICE VISIT (OUTPATIENT)
Dept: NEUROLOGY | Age: 68
End: 2023-11-09
Payer: MEDICARE

## 2023-11-09 VITALS
HEART RATE: 67 BPM | HEIGHT: 66 IN | SYSTOLIC BLOOD PRESSURE: 128 MMHG | DIASTOLIC BLOOD PRESSURE: 58 MMHG | BODY MASS INDEX: 31.96 KG/M2 | OXYGEN SATURATION: 98 %

## 2023-11-09 DIAGNOSIS — I63.10 CEREBROVASCULAR ACCIDENT (CVA) DUE TO EMBOLISM OF PRECEREBRAL ARTERY (HCC): Primary | ICD-10-CM

## 2023-11-09 DIAGNOSIS — I63.9 CEREBRAL INFARCTION, UNSPECIFIED MECHANISM (HCC): ICD-10-CM

## 2023-11-09 PROCEDURE — 99205 OFFICE O/P NEW HI 60 MIN: CPT | Performed by: PSYCHIATRY & NEUROLOGY

## 2023-11-09 PROCEDURE — 3078F DIAST BP <80 MM HG: CPT | Performed by: PSYCHIATRY & NEUROLOGY

## 2023-11-09 PROCEDURE — G8427 DOCREV CUR MEDS BY ELIG CLIN: HCPCS | Performed by: PSYCHIATRY & NEUROLOGY

## 2023-11-09 PROCEDURE — 3017F COLORECTAL CA SCREEN DOC REV: CPT | Performed by: PSYCHIATRY & NEUROLOGY

## 2023-11-09 PROCEDURE — 1123F ACP DISCUSS/DSCN MKR DOCD: CPT | Performed by: PSYCHIATRY & NEUROLOGY

## 2023-11-09 PROCEDURE — 1036F TOBACCO NON-USER: CPT | Performed by: PSYCHIATRY & NEUROLOGY

## 2023-11-09 PROCEDURE — G8417 CALC BMI ABV UP PARAM F/U: HCPCS | Performed by: PSYCHIATRY & NEUROLOGY

## 2023-11-09 PROCEDURE — G8484 FLU IMMUNIZE NO ADMIN: HCPCS | Performed by: PSYCHIATRY & NEUROLOGY

## 2023-11-09 PROCEDURE — 3074F SYST BP LT 130 MM HG: CPT | Performed by: PSYCHIATRY & NEUROLOGY

## 2023-11-09 NOTE — PATIENT INSTRUCTIONS
Continue with dual antiplatelets  Continue with lipid lowering medication, target LDL below 70  Homocystine level  Referral to cardiology re: consideration of ILR given history of multiple strokes in multiple vascular locations  Modify risk factors for stroke (blood pressure, cholesterol, diabetes, smoking cessation etc.. Control)  Follow through with physical and occupational therapy recommendations  Call with any new symptoms or concerns. Follow up in 4 months.

## 2023-11-09 NOTE — TELEPHONE ENCOUNTER
Referral from neuro for loop implant   Referral came in as a New pt   But pt saw Dr Faith in house and had  LYLY with Dr Faith     Lm for pt to call office

## 2023-11-16 ENCOUNTER — TELEPHONE (OUTPATIENT)
Dept: NEUROLOGY | Age: 68
End: 2023-11-16

## 2023-11-16 RX ORDER — FOLIC ACID 1 MG/1
1 TABLET ORAL DAILY
Qty: 90 TABLET | Refills: 1 | Status: SHIPPED | OUTPATIENT
Start: 2023-11-16

## 2023-11-16 NOTE — TELEPHONE ENCOUNTER
----- Message from Kirby Frankel, APRN - CNP sent at 11/16/2023  3:17 PM EST -----  Please let patient know his homocystine level is elevated=22.5  He needs to start on folic acid 1 mg daily  Script sent to [address] [place] [address], [facility]

## 2023-11-20 NOTE — TELEPHONE ENCOUNTER
Third attempt. Left message for patient to return call to office. Letter mailed and certified letter mailed to address on profile.

## 2023-11-22 NOTE — TELEPHONE ENCOUNTER
Patient is a resident at [facility].   I called there and patient is following with Dr. Chawla at [facility] on 12/4/2023.

## 2023-12-15 ENCOUNTER — APPOINTMENT (OUTPATIENT)
Dept: ULTRASOUND IMAGING | Age: 68
DRG: 683 | End: 2023-12-15
Attending: STUDENT IN AN ORGANIZED HEALTH CARE EDUCATION/TRAINING PROGRAM
Payer: MEDICARE

## 2023-12-15 ENCOUNTER — HOSPITAL ENCOUNTER (INPATIENT)
Age: 68
LOS: 7 days | Discharge: HOME | DRG: 683 | End: 2023-12-22
Attending: STUDENT IN AN ORGANIZED HEALTH CARE EDUCATION/TRAINING PROGRAM | Admitting: STUDENT IN AN ORGANIZED HEALTH CARE EDUCATION/TRAINING PROGRAM
Payer: MEDICARE

## 2023-12-15 PROBLEM — N18.9 ACUTE KIDNEY INJURY SUPERIMPOSED ON CHRONIC KIDNEY DISEASE (HCC): Status: ACTIVE | Noted: 2023-12-15

## 2023-12-15 PROBLEM — E87.5 HYPERKALEMIA: Status: ACTIVE | Noted: 2023-12-15

## 2023-12-15 PROBLEM — E87.20 METABOLIC ACIDOSIS: Status: ACTIVE | Noted: 2023-12-15

## 2023-12-15 PROBLEM — N17.9 ACUTE KIDNEY INJURY SUPERIMPOSED ON CHRONIC KIDNEY DISEASE (HCC): Status: ACTIVE | Noted: 2023-12-15

## 2023-12-15 LAB
ANION GAP SERPL CALC-SCNC: 18 MEQ/L (ref 8–16)
ANION GAP SERPL CALC-SCNC: 18 MEQ/L (ref 8–16)
APTT PPP: 33.6 SECONDS (ref 22–38)
BASOPHILS ABSOLUTE: 0.1 THOU/MM3 (ref 0–0.1)
BASOPHILS NFR BLD AUTO: 0.6 %
BUN SERPL-MCNC: 68 MG/DL (ref 7–22)
BUN SERPL-MCNC: 84 MG/DL (ref 7–22)
C CAYETANENSIS DNA SPEC QL NAA+PROBE: NOT DETECTED
CALCIUM SERPL-MCNC: 9 MG/DL (ref 8.5–10.5)
CALCIUM SERPL-MCNC: 9 MG/DL (ref 8.5–10.5)
CAMPY SP DNA.DIARRHEA STL QL NAA+PROBE: NOT DETECTED
CHLORIDE SERPL-SCNC: 105 MEQ/L (ref 98–111)
CHLORIDE SERPL-SCNC: 107 MEQ/L (ref 98–111)
CO2 SERPL-SCNC: 15 MEQ/L (ref 23–33)
CO2 SERPL-SCNC: 16 MEQ/L (ref 23–33)
CREAT SERPL-MCNC: 6.6 MG/DL (ref 0.4–1.2)
CREAT SERPL-MCNC: 7.4 MG/DL (ref 0.4–1.2)
CRYPTOSP DNA SPEC QL NAA+PROBE: NOT DETECTED
DEPRECATED RDW RBC AUTO: 48.7 FL (ref 35–45)
E COLI O157H7 DNA SPEC QL NAA+PROBE: NORMAL
E HISTOLYT DNA SPEC QL NAA+PROBE: NOT DETECTED
EAEC PAA PLAS AGGR+AATA ST NAA+NON-PRB: NOT DETECTED
EC STX1+STX2 + H7 FLIC SPEC NAA+PROBE: NOT DETECTED
EOSINOPHIL NFR BLD AUTO: 2.8 %
EOSINOPHILS ABSOLUTE: 0.3 THOU/MM3 (ref 0–0.4)
EPEC EAE GENE STL QL NAA+NON-PROBE: NOT DETECTED
ERYTHROCYTE [DISTWIDTH] IN BLOOD BY AUTOMATED COUNT: 13.6 % (ref 11.5–14.5)
ETEC LTA+ST1A+ST1B TOX ST NAA+NON-PROBE: NOT DETECTED
G LAMBLIA DNA SPEC QL NAA+PROBE: NOT DETECTED
GFR SERPL CREATININE-BSD FRML MDRD: 7 ML/MIN/1.73M2
GFR SERPL CREATININE-BSD FRML MDRD: 8 ML/MIN/1.73M2
GLUCOSE BLD STRIP.AUTO-MCNC: 111 MG/DL (ref 70–108)
GLUCOSE BLD STRIP.AUTO-MCNC: 87 MG/DL (ref 70–108)
GLUCOSE BLD STRIP.AUTO-MCNC: 97 MG/DL (ref 70–108)
GLUCOSE BLD STRIP.AUTO-MCNC: 97 MG/DL (ref 70–108)
GLUCOSE SERPL-MCNC: 95 MG/DL (ref 70–108)
GLUCOSE SERPL-MCNC: 99 MG/DL (ref 70–108)
HADV DNA SPEC QL NAA+PROBE: NOT DETECTED
HASTV RNA SPEC QL NAA+PROBE: NOT DETECTED
HCT VFR BLD AUTO: 47.5 % (ref 42–52)
HEPARIN UNFRACTIONATED: < 0.04 U/ML (ref 0.3–0.7)
HGB BLD-MCNC: 14.3 GM/DL (ref 14–18)
IMM GRANULOCYTES # BLD AUTO: 0.06 THOU/MM3 (ref 0–0.07)
IMM GRANULOCYTES NFR BLD AUTO: 0.6 %
INR PPP: 1.02 (ref 0.85–1.13)
LYMPHOCYTES ABSOLUTE: 1.5 THOU/MM3 (ref 1–4.8)
LYMPHOCYTES NFR BLD AUTO: 13.5 %
MCH RBC QN AUTO: 29 PG (ref 26–33)
MCHC RBC AUTO-ENTMCNC: 30.1 GM/DL (ref 32.2–35.5)
MCV RBC AUTO: 96.3 FL (ref 80–94)
MONOCYTES ABSOLUTE: 1.1 THOU/MM3 (ref 0.4–1.3)
MONOCYTES NFR BLD AUTO: 10.3 %
NEUTROPHILS NFR BLD AUTO: 72.2 %
NOROVIRUS GI + GII RNA STL NAA+PROBE: NOT DETECTED
NRBC BLD AUTO-RTO: 0 /100 WBC
P SHIGELLOIDES DNA STL QL NAA+PROBE: NOT DETECTED
PLATELET # BLD AUTO: 206 THOU/MM3 (ref 130–400)
PMV BLD AUTO: 10.9 FL (ref 9.4–12.4)
POTASSIUM SERPL-SCNC: 5.1 MEQ/L (ref 3.5–5.2)
POTASSIUM SERPL-SCNC: 5.3 MEQ/L (ref 3.5–5.2)
RBC # BLD AUTO: 4.93 MILL/MM3 (ref 4.7–6.1)
RV RNA SPEC QL NAA+PROBE: NOT DETECTED
SALMONELLA DNA SPEC QL NAA+PROBE: NOT DETECTED
SAPOVIRUS RNA SPEC QL NAA+PROBE: NOT DETECTED
SEGMENTED NEUTROPHILS ABSOLUTE COUNT: 7.9 THOU/MM3 (ref 1.8–7.7)
SHIGELLA SP+EIEC IPAH ST NAA+NON-PROBE: NOT DETECTED
SODIUM SERPL-SCNC: 139 MEQ/L (ref 135–145)
SODIUM SERPL-SCNC: 140 MEQ/L (ref 135–145)
V CHOLERAE DNA SPEC QL NAA+PROBE: NOT DETECTED
VIBRIO DNA SPEC NAA+PROBE: NOT DETECTED
WBC # BLD AUTO: 10.9 THOU/MM3 (ref 4.8–10.8)
Y ENTERO RECN STL QL NAA+PROBE: NOT DETECTED

## 2023-12-15 PROCEDURE — 6360000002 HC RX W HCPCS: Performed by: PHYSICIAN ASSISTANT

## 2023-12-15 PROCEDURE — 85520 HEPARIN ASSAY: CPT

## 2023-12-15 PROCEDURE — 85025 COMPLETE CBC W/AUTO DIFF WBC: CPT

## 2023-12-15 PROCEDURE — 51798 US URINE CAPACITY MEASURE: CPT

## 2023-12-15 PROCEDURE — 2580000003 HC RX 258: Performed by: INTERNAL MEDICINE

## 2023-12-15 PROCEDURE — 82948 REAGENT STRIP/BLOOD GLUCOSE: CPT

## 2023-12-15 PROCEDURE — 2500000003 HC RX 250 WO HCPCS: Performed by: INTERNAL MEDICINE

## 2023-12-15 PROCEDURE — 99222 1ST HOSP IP/OBS MODERATE 55: CPT | Performed by: INTERNAL MEDICINE

## 2023-12-15 PROCEDURE — 99223 1ST HOSP IP/OBS HIGH 75: CPT | Performed by: PHYSICIAN ASSISTANT

## 2023-12-15 PROCEDURE — 87507 IADNA-DNA/RNA PROBE TQ 12-25: CPT

## 2023-12-15 PROCEDURE — 85610 PROTHROMBIN TIME: CPT

## 2023-12-15 PROCEDURE — 36415 COLL VENOUS BLD VENIPUNCTURE: CPT

## 2023-12-15 PROCEDURE — C9113 INJ PANTOPRAZOLE SODIUM, VIA: HCPCS | Performed by: PHYSICIAN ASSISTANT

## 2023-12-15 PROCEDURE — 86160 COMPLEMENT ANTIGEN: CPT

## 2023-12-15 PROCEDURE — 76770 US EXAM ABDO BACK WALL COMP: CPT

## 2023-12-15 PROCEDURE — 1200000003 HC TELEMETRY R&B

## 2023-12-15 PROCEDURE — 2580000003 HC RX 258: Performed by: PHYSICIAN ASSISTANT

## 2023-12-15 PROCEDURE — 92610 EVALUATE SWALLOWING FUNCTION: CPT

## 2023-12-15 PROCEDURE — 85730 THROMBOPLASTIN TIME PARTIAL: CPT

## 2023-12-15 PROCEDURE — 80048 BASIC METABOLIC PNL TOTAL CA: CPT

## 2023-12-15 RX ORDER — AMLODIPINE BESYLATE 10 MG/1
10 TABLET ORAL DAILY
Status: DISCONTINUED | OUTPATIENT
Start: 2023-12-15 | End: 2023-12-22 | Stop reason: HOSPADM

## 2023-12-15 RX ORDER — SODIUM CHLORIDE 0.9 % (FLUSH) 0.9 %
5-40 SYRINGE (ML) INJECTION PRN
Status: DISCONTINUED | OUTPATIENT
Start: 2023-12-15 | End: 2023-12-22 | Stop reason: HOSPADM

## 2023-12-15 RX ORDER — MINERAL OIL AND WHITE PETROLATUM 150; 830 MG/G; MG/G
OINTMENT OPHTHALMIC
Status: DISCONTINUED | OUTPATIENT
Start: 2023-12-15 | End: 2023-12-22 | Stop reason: HOSPADM

## 2023-12-15 RX ORDER — DEXTROSE MONOHYDRATE 100 MG/ML
INJECTION, SOLUTION INTRAVENOUS CONTINUOUS PRN
Status: DISCONTINUED | OUTPATIENT
Start: 2023-12-15 | End: 2023-12-22 | Stop reason: HOSPADM

## 2023-12-15 RX ORDER — ACETAMINOPHEN 325 MG/1
650 TABLET ORAL EVERY 6 HOURS PRN
Status: DISCONTINUED | OUTPATIENT
Start: 2023-12-15 | End: 2023-12-22 | Stop reason: HOSPADM

## 2023-12-15 RX ORDER — ACETAMINOPHEN 650 MG/1
650 SUPPOSITORY RECTAL EVERY 6 HOURS PRN
Status: DISCONTINUED | OUTPATIENT
Start: 2023-12-15 | End: 2023-12-22 | Stop reason: HOSPADM

## 2023-12-15 RX ORDER — POTASSIUM CHLORIDE 7.45 MG/ML
10 INJECTION INTRAVENOUS PRN
Status: DISCONTINUED | OUTPATIENT
Start: 2023-12-15 | End: 2023-12-15

## 2023-12-15 RX ORDER — POTASSIUM CHLORIDE 20 MEQ/1
40 TABLET, EXTENDED RELEASE ORAL PRN
Status: DISCONTINUED | OUTPATIENT
Start: 2023-12-15 | End: 2023-12-15

## 2023-12-15 RX ORDER — HEPARIN SODIUM 1000 [USP'U]/ML
2000 INJECTION, SOLUTION INTRAVENOUS; SUBCUTANEOUS PRN
Status: DISCONTINUED | OUTPATIENT
Start: 2023-12-15 | End: 2023-12-19

## 2023-12-15 RX ORDER — HYDRALAZINE HYDROCHLORIDE 50 MG/1
100 TABLET, FILM COATED ORAL EVERY 8 HOURS SCHEDULED
Status: DISCONTINUED | OUTPATIENT
Start: 2023-12-15 | End: 2023-12-22 | Stop reason: HOSPADM

## 2023-12-15 RX ORDER — ATORVASTATIN CALCIUM 80 MG/1
80 TABLET, FILM COATED ORAL NIGHTLY
Status: DISCONTINUED | OUTPATIENT
Start: 2023-12-15 | End: 2023-12-22 | Stop reason: HOSPADM

## 2023-12-15 RX ORDER — IBUPROFEN 600 MG/1
1 TABLET ORAL PRN
Status: DISCONTINUED | OUTPATIENT
Start: 2023-12-15 | End: 2023-12-22 | Stop reason: HOSPADM

## 2023-12-15 RX ORDER — DIVALPROEX SODIUM 250 MG/1
250 TABLET, DELAYED RELEASE ORAL 4 TIMES DAILY
Status: ON HOLD | COMMUNITY
End: 2023-12-15

## 2023-12-15 RX ORDER — MAGNESIUM SULFATE IN WATER 40 MG/ML
2000 INJECTION, SOLUTION INTRAVENOUS PRN
Status: DISCONTINUED | OUTPATIENT
Start: 2023-12-15 | End: 2023-12-15

## 2023-12-15 RX ORDER — SODIUM CHLORIDE 0.9 % (FLUSH) 0.9 %
5-40 SYRINGE (ML) INJECTION EVERY 12 HOURS SCHEDULED
Status: DISCONTINUED | OUTPATIENT
Start: 2023-12-15 | End: 2023-12-22 | Stop reason: HOSPADM

## 2023-12-15 RX ORDER — DIVALPROEX SODIUM 125 MG/1
125 TABLET, DELAYED RELEASE ORAL 3 TIMES DAILY
COMMUNITY

## 2023-12-15 RX ORDER — ONDANSETRON 2 MG/ML
4 INJECTION INTRAMUSCULAR; INTRAVENOUS EVERY 6 HOURS PRN
Status: DISCONTINUED | OUTPATIENT
Start: 2023-12-15 | End: 2023-12-22 | Stop reason: HOSPADM

## 2023-12-15 RX ORDER — PANTOPRAZOLE SODIUM 40 MG/1
40 TABLET, DELAYED RELEASE ORAL
Status: DISCONTINUED | OUTPATIENT
Start: 2023-12-15 | End: 2023-12-22 | Stop reason: HOSPADM

## 2023-12-15 RX ORDER — ONDANSETRON 4 MG/1
4 TABLET, ORALLY DISINTEGRATING ORAL EVERY 8 HOURS PRN
Status: DISCONTINUED | OUTPATIENT
Start: 2023-12-15 | End: 2023-12-22 | Stop reason: HOSPADM

## 2023-12-15 RX ORDER — HEPARIN SODIUM 5000 [USP'U]/ML
5000 INJECTION, SOLUTION INTRAVENOUS; SUBCUTANEOUS EVERY 8 HOURS
Status: DISCONTINUED | OUTPATIENT
Start: 2023-12-15 | End: 2023-12-15 | Stop reason: ALTCHOICE

## 2023-12-15 RX ORDER — TAMSULOSIN HYDROCHLORIDE 0.4 MG/1
0.4 CAPSULE ORAL 2 TIMES DAILY
Status: DISCONTINUED | OUTPATIENT
Start: 2023-12-15 | End: 2023-12-16 | Stop reason: DRUGHIGH

## 2023-12-15 RX ORDER — HEPARIN SODIUM 1000 [USP'U]/ML
4000 INJECTION, SOLUTION INTRAVENOUS; SUBCUTANEOUS PRN
Status: DISCONTINUED | OUTPATIENT
Start: 2023-12-15 | End: 2023-12-19

## 2023-12-15 RX ORDER — POLYVINYL ALCOHOL 14 MG/ML
SOLUTION/ DROPS OPHTHALMIC
COMMUNITY

## 2023-12-15 RX ORDER — SODIUM CHLORIDE 9 MG/ML
INJECTION, SOLUTION INTRAVENOUS CONTINUOUS
Status: DISCONTINUED | OUTPATIENT
Start: 2023-12-15 | End: 2023-12-15

## 2023-12-15 RX ORDER — PANTOPRAZOLE SODIUM 40 MG/10ML
40 INJECTION, POWDER, LYOPHILIZED, FOR SOLUTION INTRAVENOUS DAILY
Status: DISCONTINUED | OUTPATIENT
Start: 2023-12-15 | End: 2023-12-20

## 2023-12-15 RX ORDER — LISINOPRIL 20 MG/1
20 TABLET ORAL DAILY
Status: DISCONTINUED | OUTPATIENT
Start: 2023-12-15 | End: 2023-12-20

## 2023-12-15 RX ORDER — FOLIC ACID 1 MG/1
1 TABLET ORAL DAILY
Status: DISCONTINUED | OUTPATIENT
Start: 2023-12-15 | End: 2023-12-22 | Stop reason: HOSPADM

## 2023-12-15 RX ORDER — POLYETHYLENE GLYCOL 3350 17 G/17G
17 POWDER, FOR SOLUTION ORAL DAILY PRN
Status: DISCONTINUED | OUTPATIENT
Start: 2023-12-15 | End: 2023-12-22 | Stop reason: HOSPADM

## 2023-12-15 RX ORDER — ENOXAPARIN SODIUM 100 MG/ML
40 INJECTION SUBCUTANEOUS DAILY
Status: DISCONTINUED | OUTPATIENT
Start: 2023-12-15 | End: 2023-12-15 | Stop reason: CLARIF

## 2023-12-15 RX ORDER — HEPARIN SODIUM 10000 [USP'U]/100ML
5-30 INJECTION, SOLUTION INTRAVENOUS CONTINUOUS
Status: DISCONTINUED | OUTPATIENT
Start: 2023-12-15 | End: 2023-12-15

## 2023-12-15 RX ORDER — SODIUM CHLORIDE 9 MG/ML
INJECTION, SOLUTION INTRAVENOUS PRN
Status: DISCONTINUED | OUTPATIENT
Start: 2023-12-15 | End: 2023-12-22 | Stop reason: HOSPADM

## 2023-12-15 RX ORDER — HEPARIN SODIUM 1000 [USP'U]/ML
4000 INJECTION, SOLUTION INTRAVENOUS; SUBCUTANEOUS ONCE
Status: COMPLETED | OUTPATIENT
Start: 2023-12-15 | End: 2023-12-15

## 2023-12-15 RX ADMIN — PANTOPRAZOLE SODIUM 40 MG: 40 INJECTION, POWDER, FOR SOLUTION INTRAVENOUS at 11:22

## 2023-12-15 RX ADMIN — SODIUM BICARBONATE: 84 INJECTION, SOLUTION INTRAVENOUS at 11:20

## 2023-12-15 RX ADMIN — SODIUM CHLORIDE, PRESERVATIVE FREE 30 ML: 5 INJECTION INTRAVENOUS at 11:22

## 2023-12-15 RX ADMIN — HEPARIN SODIUM 11 UNITS/KG/HR: 10000 INJECTION, SOLUTION INTRAVENOUS at 09:15

## 2023-12-15 RX ADMIN — SODIUM BICARBONATE: 84 INJECTION, SOLUTION INTRAVENOUS at 09:02

## 2023-12-15 RX ADMIN — SODIUM CHLORIDE, PRESERVATIVE FREE 10 ML: 5 INJECTION INTRAVENOUS at 09:18

## 2023-12-15 RX ADMIN — SODIUM CHLORIDE: 9 INJECTION, SOLUTION INTRAVENOUS at 03:36

## 2023-12-15 RX ADMIN — HEPARIN SODIUM 4000 UNITS: 1000 INJECTION INTRAVENOUS; SUBCUTANEOUS at 09:16

## 2023-12-15 NOTE — DISCHARGE INSTR - COC
Continuity of Care Form    Patient Name: Mary Kenney   :  1955  MRN:  [id number]    Admit date:  12/15/2023  Discharge date:  23    Code Status Order: DNR-CCA   Advance Directives:     Admitting Physician:  Annita Domínguez DO  PCP: Madie Jerry, APRN - CNP    Discharging Nurse: Abisai Montalvo MSN, [address] Unit/Room#: 6K-20/020-A  Discharging Unit Phone Number: [phone]    Emergency Contact:   Extended Emergency Contact Information  Primary Emergency Contact: wilian Barlow  Home Phone: [phone]  Relation: Brother/Sister   needed? No  Secondary Emergency Contact: [facility]  Mobile Phone: [phone]  Relation: Other   needed?  No    Past Surgical History:  Past Surgical History:   Procedure Laterality Date    ENDOSCOPY, COLON, DIAGNOSTIC      swallowing difficulties    LAPAROSCOPY N/A 2022    EXPLORATORY LAPAROSCOPY, TRANSVERSE LOOP COLOSTOMY performed by Noemy Abernathy MD at [facility]  Aug 29, 2013    Neck Abcess Incision and Drainage (Dr. Hayley Guadalupe, [facility])    RECTAL SURGERY N/A 2022    FULL THICKNESS DEBRIDEMENT OF SACRAL DECUBITUS ULCER, [id number] CM performed by Noemy Abernathy MD at [address] [place] [address]    TRANSESOPHAGEAL ECHOCARDIOGRAM N/A 2023    TRANSESOPHAGEAL ECHOCARDIOGRAM performed by Elmo Crespo MD at [facility]       Immunization History:   Immunization History   Administered Date(s) Administered    COVID-19, MODERNA BLUE border, Primary or Immunocompromised, (age 12y+), IM, 100 mcg/0.5mL 2021    COVID-19, PFIZER PURPLE top, DILUTE for use, (age 15 y+), 30mcg/0.3mL 2021    Influenza Virus Vaccine 2021    Influenza, FLUAD, (age 72 y+), Adjuvanted, 0.5mL 10/03/2020    PPD Test 2022    Pneumococcal, PPSV23, PNEUMOVAX 21, (age 2y+), SC/IM, 0.5mL 2022    Tetanus 2008    Tetanus Toxoid, absorbed 2008       Active Problems:  Patient Active Problem List   Diagnosis Code

## 2023-12-15 NOTE — CARE COORDINATION
12/15/23, 8:52 AM EST  Discharge Planning Evaluation  Social work consult received, patient from [facility]. Patient/Family preference is to return to [address]. The patient's current payor source at the facility is Medicaid. Medicare skilled days available: yes  Medicare does the patient have a three midnight qualifying stay? na  Insurance precert:  yes- is not skillable at this time, will return under [facility] with [facility] at the facility. Patient bed hold: yes  Anticipated transport plan: ambulance  Patient's Healthcare Decision Maker: Named in [address]      Readmission Risk Low 0-14, Mod 15-19), High > 20: Readmission Risk Score: 17.7    Current PCP: DIONTE Kauffman CNP  PCP verified by CM? Yes    Patient Orientation: Alert and Oriented    Patient Cognition: Other (see comment) (confusion at times)  History Provided by: Child/Family (history provided by family Haylie Abebe))    Advance Directives:      Code Status: DNR-CCA   Patient's Primary Decision Maker is: Named in Scanned ACP Document    Primary Decision Maker: [employer] - Brother/Sister - [phone]    Secondary Decision Maker: Roxy Alma - Other - [phone]     Discharge Planning:    Patient lives with:   Type of Home: [address] [place] [address] ([facility])  Primary Care Giver:  Other (Comment) ([facility])  Patient Support Systems include: Family Members, Other (Comment) (F)   Current Financial resources: Medicaid, Medicare  Current community resources: ECF/Home Care ([facility])  Current services prior to admission: Extended Care Facility            Current DME:              Type of Home Care services:       ADLS  Prior functional level: Assistance with the following:, Bathing, Dressing, Toileting, Cooking, Housework, Shopping, Mobility  Current functional level: Assistance with the following:, Velasquez Erica, Dressing, Mobility, Shopping, [address], Cooking    Family can provide assistance at DC:

## 2023-12-15 NOTE — PROGRESS NOTES
Pharmacist Review and Automatic Dose Adjustment of Prophylactic Enoxaparin    *Review reason for admission/hospital problem list*    The reviewing pharmacist has made an adjustment to the ordered enoxaparin dose or converted to UFH per the approved [facility] protocol and table as identified below. Kadeem Alfaro is a 76 y.o. male. Recent Labs     12/15/23  0344   CREATININE 6.6*       CrCl cannot be calculated (Unknown ideal weight. ). 10 ml/min    Recent Labs     12/15/23  0344   HGB 14.3   HCT 47.5        No results for input(s): \"INR\" in the last 72 hours. Height:   Ht Readings from Last 1 Encounters:   11/09/23 1.676 m (5' 6\")     Weight:  Wt Readings from Last 1 Encounters:   10/19/23 89.8 kg (198 lb)               Plan: Based upon the patient's weight and renal function, the ordered enoxaparin dose of 40 mg daily has been changed/converted to heparin 5000 units subq every 8 hours.       Thank you,  Stas Hawkins, [facility]  12/15/2023, 4:58 AM

## 2023-12-15 NOTE — PROCEDURES
Bladder scan completed at 2:50 and results told to [facility]. Scan showed 261 mL in the patients bladder.  Gui Siddiqi

## 2023-12-15 NOTE — PLAN OF CARE
Problem: Discharge Planning  Goal: Discharge to home or other facility with appropriate resources  12/15/2023 1734 by Ketan Cornelius RN  Outcome: Progressing  Flowsheets (Taken 12/15/2023 1734)  Discharge to home or other facility with appropriate resources: Identify barriers to discharge with patient and caregiver  Note: Patient preference to return to [facility] , once medically stable. Problem: Pain  Goal: Verbalizes/displays adequate comfort level or baseline comfort level  12/15/2023 1734 by Ketan Cornelius RN  Outcome: Progressing  Note: Patient denies pain at this time. Problem: Safety - Adult  Goal: Free from fall injury  12/15/2023 1734 by Ketan Cornelius RN  Outcome: Progressing  Flowsheets (Taken 12/15/2023 1734)  Free From Fall Injury: Instruct family/caregiver on patient safety  Note: No falls noted this shift. Continue falling star program. Bed alarm on, bed in low position. Call light and personal belongings in reach. Patient uses call light appropriately. Problem: Chronic Conditions and Co-morbidities  Goal: Patient's chronic conditions and co-morbidity symptoms are monitored and maintained or improved  Outcome: Progressing  Flowsheets (Taken 12/15/2023 1734)  Care Plan - Patient's Chronic Conditions and Co-Morbidity Symptoms are Monitored and Maintained or Improved:   Monitor and assess patient's chronic conditions and comorbid symptoms for stability, deterioration, or improvement   Collaborate with multidisciplinary team to address chronic and comorbid conditions and prevent exacerbation or deterioration     Care plan reviewed with patient. Patient verbalize understanding of the plan of care and contribute to goal setting.

## 2023-12-15 NOTE — PROGRESS NOTES
Patient was found on edge of bed with ostomy bag removed. The patient was cleaned up. Patient has had a aggressive behavior and stating \" I am hungry , I want food. \" I discussed with the patient he must remain NPO until the speech therapist sees. The patient has refused to follow orders until he receives a meal. Notified provider , face-to -face. Patient in need of straight cath and has refused multiple times until he receives his dinner tray. Patient has also refused IV placement , IV fluids on hold at this time. Provider aware.

## 2023-12-15 NOTE — PROGRESS NOTES
Patient admitted to room 6k 20. Alert and oriented x2-3 . Disoriented to time. Pt admitted from [facility] for weakness, diahrea and found to have DARINEL . Pt is originally from [address] [place] [address]. Patient has colostomy bag that is leaking upon arrival. Colostomy bag changed and secured at this time. Shasha Weber notified of patient admission. No skin issues noted at this time. Call light within reach. Patient resting eyes closed. No needs at this time. All fall precautions in place. Resp easy and unlabored. Virtual nurse unavailable at this time. RN tried to get a hold of  nurse at [facility] ; no response at this time. Voicemail was left at this time.

## 2023-12-15 NOTE — FLOWSHEET NOTE
Goal Outcome Evaluation:     VSS, Aox4. Adequate for discharge.                Patient refusing to be repositioned in bed off of his right side and not allowing to have vitals to be obtained.  Has stated he will not let staffing to be able to do these duties due to not having a meal.

## 2023-12-15 NOTE — PLAN OF CARE
Patient is a 51-year-old male with past medical history of CKD, hypertension, A-fib, multiple CVAs, dysphagia. Patient is a transfer from nursing home for evaluation of diarrhea, fatigue and weakness and decreased appetite. Patient was seen on 12/15/2023, patient is alert and oriented x 3 complains of diarrhea and not being able to eat. Patient kept n.p.o. concerns for dysphagia. Patient denies any chest pain, shortness of breath, abdominal discomfort.     Physical exam  General: No apparent distress, alert and oriented times  Lungs: Clear to auscultation bilateral  Cardio: RRR, no murmur rubs or Gallops  Abdomen: Soft, normoactive bowel sounds, nontender to palpation, colostomy bag  Neuro: No focal neurological deficits, able to move all 4 extremities spontaneously      Plan    DARINEL on CKD  -- Creatinine 6.6 on 12/15/2023, minimal urine output per nurse staff, GFR 8  -- Nephrology consulted for management of DARINEL on CKD, appears to be secondary to prerenal failure/intravascular volume depletion, pending urine lites  -- Patient started on IV bicarb drip, needs aggressive fluid replacement  -- No acute need for renal replacement therapy at this time  -- CT abdomen negative for hydronephrosis, continue to hold  -- Pending ultrasound of kidneys, obtain a C3 and C4 complement    Metabolic acidosis  -- Bicarb 16 on 12/15/2023  -- IV bicarb    Azotemia  -- BUN 68 on 12/15/2023, likely prerenal  -- Nephrology on board, continue IV fluids, no acute need for renal replacement therapy at this time    Hypertension  -- Hold Norvasc and hydralazine patient n.p.o.  -- Continue to hold lisinopril secondary to DARINEL    History of A-fib  -- [id number] of 6  -- Status post heparin, outpatient evaluation for Eliquis    History of CVA  -- Last CVA August 2023, living in a nursing facility  -- Residual deficits, on Brilinta, however held secondary to being NPO    Dysphagia  -- SLP following, patient is to be kept n.p.o., modified barium swallow tomorrow  -- Patient is refusing to get lab work and medical treatment. Patient is demanding to be fed, tried to explain to patient the importance of getting medical treatment at this time. Patient continues to refuse medical treatment until he is fed, patient agreeable to have medical treatment after he is fed 1 meal in the understanding that he will be n.p.o. until modified barium swallow tomorrow. Patient verbalizes understanding of the risks of eating prior to modified barium swallow, it was explained to the patient the risks oral intake such as but not limited to choking, risks of aspiration, potential aspiration pneumonia. -- Will order patient 1 meal, patient's agreeable for medical treatment at this time. Patient understands that he will will be n.p.o. after the 1 meal, and n.p.o. if he fails modified barium swallow.

## 2023-12-15 NOTE — H&P
Not on file   Occupational History    Not on file   Tobacco Use    Smoking status: Never    Smokeless tobacco: Never   Vaping Use    Vaping Use: Never used   Substance and Sexual Activity    Alcohol use: No    Drug use: No    Sexual activity: Not Currently   Other Topics Concern    Not on file   Social History Narrative    Not on file     Social Determinants of Health     Financial Resource Strain: Low Risk  (5/15/2023)    Overall Financial Resource Strain (CARDIA)     Difficulty of Paying Living Expenses: Not hard at all   Food Insecurity: Not on file (5/15/2023)   Transportation Needs: Unknown (5/15/2023)    PRAPARE - Transportation     Lack of Transportation (Medical): Not on file     Lack of Transportation (Non-Medical):  No   Physical Activity: Sufficiently Active (5/15/2023)    Exercise Vital Sign     Days of Exercise per Week: 5 days     Minutes of Exercise per Session: 30 min   Stress: Not on file   Social Connections: Not on file   Intimate Partner Violence: Not on file   Housing Stability: Unknown (5/15/2023)    Housing Stability Vital Sign     Unable to Pay for Housing in the Last Year: Not on file     Number of [address] in the Last Year: Not on file     Unstable Housing in the Last Year: No     FHX:   Family History   Problem Relation Age of Onset    Alzheimer's Disease Mother     Heart Disease Father      Allergies: No Known Allergies  Medications:     sodium chloride      sodium chloride        amLODIPine  10 mg Oral Daily    atorvastatin  80 mg Oral Nightly    folic acid  1 mg Oral Daily    hydrALAZINE  100 mg Oral 3 times per day    [Held by provider] lisinopril  20 mg Oral Daily    pantoprazole  40 mg Oral QAM AC    tamsulosin  0.4 mg Oral BID    ticagrelor  90 mg Oral BID    sodium chloride flush  5-40 mL IntraVENous 2 times per day    enoxaparin  40 mg SubCUTAneous Daily     lubrifresh P.M., , Q2H PRN  sodium chloride flush, 5-40 mL, PRN  sodium chloride, , PRN  potassium chloride, 40 mEq,

## 2023-12-15 NOTE — PROGRESS NOTES
VN placed call to pt's SNF [facility] and spoke with pt's nurse Maria Isabel Parents to complete admission documentation as best as possible. Maria Isabel Parents to fax over current medlist after confirming over the phone some of pt's meds, we decided that she will just fax them over to VN d/t ED's uploaded SNF med list being blurry and unable to make out any words. Of note, Carole Fuentes also made aware via perfect serve that pt's home med list stated pt was on depakote and verfied this with SNF nurse while on the phone but VN noted that it is not on pt's current MAR.

## 2023-12-15 NOTE — PROGRESS NOTES
IV team to room for IV placement. During IV attempt patient jerks arm away and attempts to use other arm to grab this nurses hand. This RN had to activate safety to protect patient and self. Patient will not allow this RN to look at arm again. Screaming no and food now.

## 2023-12-15 NOTE — PROGRESS NOTES
Transfer  Report from Jameson Wolf  Referring Physician Dr Venita Solano Physician Dr Jonathon Quintana  Patient Condition:  75 yo at [facility] presents to ER with HX of CKD, stage 3 from ECF. He c/o diffuse weakness, diarrhea, and decreased appetite x 1 week. His Creat is 5.6, last checked in October at which time it was 1.1. GFR is 10 last check in August it was 52. BUN 60, K 5.4, Lactic 2.4. No EKG changes, CT of abdomen shows inflammation vs enteritis. CXR (-). Pt was treated with fluids and Kayexalate. Vitals, Temp 97.3 HR 64 resp 18 106/53 99% on RA     Code Status DNR-CCA    Accepted on behalf of Dr Jonathon Quintana to 9G86 with diagnosis of  Acute on Chronic Renal failure.

## 2023-12-15 NOTE — PROGRESS NOTES
[address]  SPEECH THERAPY  STRZ RENAL TELEMETRY 6K  Clinical Swallow Evaluation      SLP Individual Minutes  Time In: 7786  Time Out: 6891  Minutes: 8  Timed Code Treatment Minutes: 0 Minutes       Date: 12/15/2023  Patient Name: Lamont Del Angel      CSN: [id number]   : 1955  (76 y.o.)  Gender: male   Referring Physician:  Susan Tariq PA-C     Diagnosis: DARINEL (acute kidney injury) [facility])    History of Present Illness/Injury: Patient presents to [facility] with above dx. Per physician H+P:\"Patient is transferred from [facility] for further evaluation of DARINEL. The patient was evaluated in the emergency department for a diarrhea illness. Patient is currently living in skilled nursing facility following a significant embolic stroke in 2023. Patient is unable to provide history of current illness but nursing home notes indicate he has had weakness, diarrhea, and decreased appetite x 1 week. Labs done at the SNF showed significant DARINEL. Patient is transferred for further evaluation and treatment. \"    MBS completed on 08/10/23 with recommendations for minced and moist diet and thin liquids with direct 1:1 supervision. ST consulted to further assess swallow integrity and assist in development of POC. Past Medical History:   Diagnosis Date    Cerebrovascular accident (CVA) due to thrombosis ([address]) 2023    Diabetes mellitus ([address])     Hyperlipidemia     Hypertension        SUBJECTIVE:  Patient seen with RN [facility] approval. Patient alert; however, confusion evident. Refusal for repositioning with patient crying out in discomfort. Assessment completed in semifowlers, side lying positioning. No medical contraindications to high fowlers positioning. OBJECTIVE:    Pain:  No pain reported. ; however crying out when attempted to re-position    Current Diet: NPO    Respiratory Status:  Room Air    Behavioral Observation:  Alert and Confused    CRANIAL NERVE ASSESSMENT   CN RECOMMENDATIONS/ASSESSMENT:  Instrumental Evaluation: Modified Barium Swallow (MBS)  Diet Recommendations:  NPO + conservative ice chips with staff only -meds permitted crushed in puree  Strategies:  Strategies pending MBS completion   Rehabilitation Potential: fair  Discharge Recommendations: Continue to Assess Pending Progress    EDUCATION:  Learner: Patient  Education:  Reviewed signs, symptoms and risks of aspiration, Reviewed ST goals and Plan of Care, and Reviewed recommendations for follow-up  Evaluation of Education: Needs further instruction, No evidence of learning, and Family not present    PLAN:  Skilled SLP intervention on acute care 3-5 x per week or until goals met and/or pt plateaus in function. Specific interventions for next session may include: MBS. PATIENT GOAL:    Did not state. Will further assess during treatment. SHORT TERM GOALS:  Short Term Goals  Time Frame for Short Term Goals: 1-2 sessions  Goal 1: Patient will complete MBS to further evaluate pharyngeal swallow and assist in POC development  Goal 2: Patient, staff and family will complete comprehensive oral care to reduce colonization of oral bacteria and limit the risk of infection.     LONG TERM GOALS:  No LTGs established d/t short [address], [address] [place] [address], [facility] [address]

## 2023-12-15 NOTE — CARE COORDINATION
12/15/23, 3:44 PM EST      DISCHARGE PLANNING EVALUATION    Jamie Means  Admitted: 12/15/2023  Hospital Day: 0    Location: 6K-20/020-A Reason for admit: DARINEL (acute kidney injury) ([facility]) [N17.9]  Acute kidney injury superimposed on chronic kidney disease ([facility]) [N17.9, N18.9]    Past Medical History:   Diagnosis Date    Cerebrovascular accident (CVA) due to thrombosis ([facility]) 8/1/2023    Diabetes mellitus ([facility])     Hyperlipidemia     Hypertension        Procedure:   12/14 CT A/P: Multiple right lower quadrant loops of thickened small bowel. Please correlate for infectious or inflammatory enteritis. Cholelithiasis. Prostatomegaly. 12/15 Renal US and MBS ordered. Barriers to Discharge: Pt transferred from [facility] for DARINEL. Hx of embolic stroke 7/0901, from [facility]. Creat 6.6 on admission (was 5.6 in [facility]). Admitted by hospitalist w consult to nephrology. Bicarb gtt. NPO awaiting MBS due to dysphasia. A-fib, heparin gtt. GI panel sent for diarrhea (colostomy). Pt pulled colostomy bag off today, telesitter was ordered and bag was replaced. PCP: DIONTE Quijano CNP    Readmission Risk Low 0-14, Mod 15-19), High > 20: Readmission Risk Score: 17.3      Advance Directives:      Code Status: DNR-CCA   Patient's Primary Decision Maker is: Named in [facility]    Primary Decision Maker: [facility] - Brother/Sister - [phone]    Secondary Decision Maker: Tiago Stoddard - [phone]    Patient Goals/Plan/Treatment Preferences: Silva Shaw is from [address].  on for dc planning. Transportation/Food Security/Housekeeping Addressed: No issues identified. If patient is discharged prior to next notation, then this note serves as note for discharge by case management.

## 2023-12-15 NOTE — CONSULTS
[facility]    [facility] [address] [place] [address],[address]  [address]  12/15/2023 9:38 AM    Pt Name:    Selvin Gusman  MRN:     [id number]   [id number]  YOB: 1955  Admit Date:    12/15/2023  2:31 AM  Primary Care Physician:  DIONTE Aguiar CNP    [facility] Number:   [id number]    Reason for Consult:  DARINEL, CKD  Requesting provider:  Hospitalist    History:   The patient is a 76 y.o. white male with history of hypertension, diabetes, history of CVA who is a poor historian. Patient had a CVA back in August of this year. He is a nursing home resident. He was transferred to outside emergency room for evaluation of acute kidney injury. It is reported that patient apparently has been having poor oral intake, generalized weakness and high output from his colostomy. Notes from outside EMR reviewed. Patient has history of hypertension, atrial fibrillation, BPH. Has a colostomy in place. Follows with Dr. Aicha Alcantara for cardiology. Had a stroke in August 2023. Patient has had some nausea associated with vomiting and high output from his colostomy. No alleviating or aggravating factors. Symptoms have been present for 2 days and gotten worse. He has intermittent confusion.   Takes lisinopril for hypertension    Past Medical History:  Past Medical History:   Diagnosis Date    Cerebrovascular accident (CVA) due to thrombosis ([address]) 8/1/2023    Diabetes mellitus ([address])     Hyperlipidemia     Hypertension        Past Surgical History:  Past Surgical History:   Procedure Laterality Date    ENDOSCOPY, COLON, DIAGNOSTIC      swallowing difficulties    LAPAROSCOPY N/A 1/19/2022    EXPLORATORY LAPAROSCOPY, TRANSVERSE LOOP COLOSTOMY performed by Layton Webber MD at [address]  Aug 29, 2013    Neck Abcess Incision and Drainage (Dr. Meghan Alexander, [facility])    RECTAL SURGERY N/A 1/14/2022    FULL THICKNESS DEBRIDEMENT OF SACRAL DECUBITUS ULCER, [id number] CM Constitutional and General Appearance: Awake, cooperative with exam, appears comfortable, no distress, not diaphoretic, chronically ill-appearing, frail and cachectic appearance  Eyes: no icteric sclera in left eye or right eye,  no pallor conjunctiva in left or right eye  Ears and Nose:  No active drainage from nose. Oral: Dry oral mucus membranes  Neck: No jugular venous distention  Lungs: Air entry B/L, no crackles or rales, no use of accessory muscles or labored breathing, poor effort  Chest: No chest wall tenderness  Heart: regular rate, S1, S2  Extremities: no LE edema, no tenderness  GI: soft, non-tender, no guarding, no distention, + colostomy  Skin: no rash seen on exposed extremities, warm to touch  Neuro: no slurred speech, no facial drooping    Lab Data  CBC:   Recent Labs     12/15/23  0344   WBC 10.9*   HGB 14.3   HCT 47.5        BMP:  Recent Labs     12/15/23  0344      K 5.1      CO2 16*   BUN 68*   CREATININE 6.6*   GLUCOSE 95   CALCIUM 9.0     Hepatic: No results for input(s): \"LABALBU\", \"AST\", \"ALT\", \"ALB\", \"BILITOT\", \"ALKPHOS\" in the last 72 hours. Diagnostics:  CT abdomen shows thickened small bowel loops, no hydronephrosis, enlarged prostate    Old tests reviewed. Baseline serum creatinine appears to be 1.1-1.3    Impression and Plan:  DARINEL on CKD 3  DARINEL appears to be secondary to prerenal failure/intravascular volume depletion  Check urine electrolytes  Start patient on IV bicarbonate drip. Needs aggressive IV fluid replacement. Anticipate improvement with supportive management. At this time no acute need for RRT  CT abdomen negative for hydronephrosis  Hold lisinopril  Metabolic acidosis. Start patient on IV bicarbonate drip  Borderline hyperkalemia  Azotemia secondary to prerenal failure. Continue IV fluids  Hx hypertension. Continue Norvasc and hydralazine.   Hold lisinopril  Nausea and vomiting  Hx atrial fibrillation  Diarrhea  Hx CVA      D/W patient

## 2023-12-16 ENCOUNTER — APPOINTMENT (OUTPATIENT)
Dept: GENERAL RADIOLOGY | Age: 68
DRG: 683 | End: 2023-12-16
Attending: STUDENT IN AN ORGANIZED HEALTH CARE EDUCATION/TRAINING PROGRAM
Payer: MEDICARE

## 2023-12-16 LAB
C3C SERPL-MCNC: 153 MG/DL (ref 90–180)
C4 SERPL-MCNC: 33 MG/DL (ref 10–40)
GLUCOSE BLD STRIP.AUTO-MCNC: 108 MG/DL (ref 70–108)
GLUCOSE BLD STRIP.AUTO-MCNC: 130 MG/DL (ref 70–108)
GLUCOSE BLD STRIP.AUTO-MCNC: 95 MG/DL (ref 70–108)

## 2023-12-16 PROCEDURE — 2580000003 HC RX 258: Performed by: INTERNAL MEDICINE

## 2023-12-16 PROCEDURE — 2500000003 HC RX 250 WO HCPCS: Performed by: INTERNAL MEDICINE

## 2023-12-16 PROCEDURE — 82948 REAGENT STRIP/BLOOD GLUCOSE: CPT

## 2023-12-16 PROCEDURE — 51798 US URINE CAPACITY MEASURE: CPT

## 2023-12-16 PROCEDURE — 6370000000 HC RX 637 (ALT 250 FOR IP): Performed by: INTERNAL MEDICINE

## 2023-12-16 PROCEDURE — 6370000000 HC RX 637 (ALT 250 FOR IP): Performed by: PHYSICIAN ASSISTANT

## 2023-12-16 PROCEDURE — 99233 SBSQ HOSP IP/OBS HIGH 50: CPT | Performed by: INTERNAL MEDICINE

## 2023-12-16 PROCEDURE — 99232 SBSQ HOSP IP/OBS MODERATE 35: CPT | Performed by: INTERNAL MEDICINE

## 2023-12-16 PROCEDURE — 2580000003 HC RX 258: Performed by: PHYSICIAN ASSISTANT

## 2023-12-16 PROCEDURE — 1200000003 HC TELEMETRY R&B

## 2023-12-16 RX ORDER — L. ACIDOPHILUS/L.BULGARICUS 100MM CELL
1 GRANULES IN PACKET (EA) ORAL DAILY
Status: ON HOLD | COMMUNITY
End: 2023-12-22 | Stop reason: HOSPADM

## 2023-12-16 RX ORDER — TAMSULOSIN HYDROCHLORIDE 0.4 MG/1
0.4 CAPSULE ORAL DAILY
Status: DISCONTINUED | OUTPATIENT
Start: 2023-12-16 | End: 2023-12-22 | Stop reason: HOSPADM

## 2023-12-16 RX ORDER — PROMETHAZINE HYDROCHLORIDE 25 MG/ML
6.25 INJECTION, SOLUTION INTRAMUSCULAR; INTRAVENOUS ONCE
Status: DISCONTINUED | OUTPATIENT
Start: 2023-12-16 | End: 2023-12-22 | Stop reason: HOSPADM

## 2023-12-16 RX ORDER — FLUOXETINE HYDROCHLORIDE 20 MG/1
20 CAPSULE ORAL DAILY
COMMUNITY

## 2023-12-16 RX ORDER — AMLODIPINE BESYLATE 5 MG/1
5 TABLET ORAL DAILY
Status: DISCONTINUED | OUTPATIENT
Start: 2023-12-17 | End: 2023-12-22 | Stop reason: HOSPADM

## 2023-12-16 RX ORDER — ONDANSETRON 4 MG/1
4 TABLET, FILM COATED ORAL EVERY 8 HOURS PRN
COMMUNITY

## 2023-12-16 RX ORDER — POLYETHYLENE GLYCOL 3350 17 G/17G
17 POWDER, FOR SOLUTION ORAL DAILY
Status: ON HOLD | COMMUNITY
End: 2023-12-22 | Stop reason: HOSPADM

## 2023-12-16 RX ADMIN — TAMSULOSIN HYDROCHLORIDE 0.4 MG: 0.4 CAPSULE ORAL at 20:14

## 2023-12-16 RX ADMIN — ONDANSETRON 4 MG: 4 TABLET, ORALLY DISINTEGRATING ORAL at 09:39

## 2023-12-16 RX ADMIN — SODIUM BICARBONATE: 84 INJECTION, SOLUTION INTRAVENOUS at 20:09

## 2023-12-16 RX ADMIN — SODIUM CHLORIDE, PRESERVATIVE FREE 10 ML: 5 INJECTION INTRAVENOUS at 20:14

## 2023-12-16 NOTE — PROGRESS NOTES
Spoke with Yasmeen Jaquez, patient's bedside RN at [facility] who helped me complete most of the remaining admission questions and med list.

## 2023-12-16 NOTE — PROGRESS NOTES
Progress Note    Patient:  Vernell Weinstein    Unit/Bed:6K-20/020-A  YOB: 1955  MRN: [id number]   Acct: [de-identified]   Admit date: 12/15/2023      Principal Problem:    DARINEL (acute kidney injury) [facility])  Active Problems:    Essential hypertension    Unspecified atrial fibrillation ([address])    History of multiple strokes    Acute kidney injury superimposed on chronic kidney disease (HCC)    Hyperkalemia    Metabolic acidosis  Resolved Problems:    * No resolved hospital problems. *    Disposition continued inpatient care secondary to medical issues below      Assessment and Plan:  Urine retention, suspected bladder intraluminal clot based on renal ultrasound patient to be evaluated by urology in a.m. likely will have Dale catheter placed with continuous bladder irrigation he is having some urine passage  Acute kidney injury on chronic kidney disease creatinine appears to be uptrending he has consented to replacement of peripheral IV today and continued IV bicarbonate administration C3 and C4 pending  Vomiting patient does not appear to have any physical exam signs of obstruction he has a colostomy bag in place he may have some nonspecific ileus related to acute kidney injury I will request KUB in a.m.   Malignant hypertension patient's blood pressures are adequate but I will resume his amlodipine tomorrow in a.m. and initiate Flomax again today to see if this will aid in urine output  Suspected chronic vascular dementia patient is had a prior history of ischemic stroke August 2023 and he is a skilled nursing facility resident, Ev Braga appears to be on hold at this time in light of hematuria from traumatic In-N-Out catheter  Dysphagia patient was to have a modified barium swallow evaluation today however he has not cooperated we will review any recommendations from speech therapy in the meantime no overt signs of upper airway rattling        Patient Seen, Chart, Consults notes,

## 2023-12-16 NOTE — PROGRESS NOTES
RN attempted to obtain patients vitals and do assessment and patient yelled no and requested RN get out of his room.

## 2023-12-16 NOTE — CONSULTS
96.3 12/15/2023 03:44 AM    MCH 29.0 12/15/2023 03:44 AM    MCHC 30.1 12/15/2023 03:44 AM    RDW 13.0 08/30/2013 06:51 AM     12/15/2023 03:44 AM    MPV 10.9 12/15/2023 03:44 AM     BMP:    Lab Results   Component Value Date/Time     12/15/2023 10:24 PM    K 5.3 12/15/2023 10:24 PM     12/15/2023 10:24 PM    CO2 15 12/15/2023 10:24 PM    BUN 84 12/15/2023 10:24 PM    CREATININE 7.4 12/15/2023 10:24 PM    CALCIUM 9.0 12/15/2023 10:24 PM    LABGLOM 7 12/15/2023 10:24 PM    LABGLOM 81 10/13/2023 12:00 AM    GLUCOSE 99 12/15/2023 10:24 PM     BUN/Creatinine:    Lab Results   Component Value Date/Time    BUN 84 12/15/2023 10:24 PM    CREATININE 7.4 12/15/2023 10:24 PM     Magnesium:    Lab Results   Component Value Date/Time    MG 2.8 08/10/2023 03:30 AM     Phosphorus:    Lab Results   Component Value Date/Time    PHOS 2.4 08/10/2023 03:30 AM     PT/INR:    Lab Results   Component Value Date/Time    PROTIME 15.7 08/16/2023 03:42 AM    INR 1.02 12/15/2023 08:55 AM     U/A:    Lab Results   Component Value Date/Time    COLORU RED 08/20/2023 08:45 PM    PHUR 5.5 08/20/2023 08:45 PM    LABCAST NONE SEEN 08/07/2023 12:29 PM    LABCAST NONE SEEN 08/07/2023 12:29 PM    WBCUA  08/20/2023 08:45 PM    RBCUA > 200 08/20/2023 08:45 PM    MUCUS NONE SEEN 01/15/2022 12:40 AM    YEAST MOD 08/20/2023 08:45 PM    BACTERIA NONE SEEN 08/20/2023 08:45 PM    SPECGRAV 1.015 08/07/2023 12:29 PM    LEUKOCYTESUR MODERATE 08/20/2023 08:45 PM    UROBILINOGEN 1.0 08/20/2023 08:45 PM    BILIRUBINUR NEGATIVE 08/20/2023 08:45 PM    BLOODU LARGE 08/20/2023 08:45 PM    GLUCOSEU NEGATIVE 08/20/2023 08:45 PM    AMORPHOUS NONE SEEN 01/15/2022 12:40 AM       Imaging: The patient has had a Renal US which I have independently reviewed along with its accompanying report. The study demonstrates     IMPRESSION:  No hydronephrosis.  Elevated resistive indices as on the prior study, which could be due to medical renal disease. Hypoechoic structure in the posterior bladder. Differential possibilities include blood clot, debris, or neoplasm. Patient unable to void. Nonemergent/incidental findings in the report. IMPRESSION/Plan  Urinary Retention   -s/p traumatic randolph catheter insertion on 12/15/2023. Urinary output difficult to quantify, as patient will urinate into his brief.  -Hx of CVA, consider NGB component.  -No UA or culture at this time. Orders in place. -PVRs have been acceptable  -Avoid Constipation  2. Hypoechoic structure in the posterior bladder per renal US on 12/16/2023  -differential include blood clots, debris, or neoplasm. Suspect blood clot/ debris as CT obtained on 12/14 (prior to catheterization attempt) with unremarkable kidneys, ureter, and bladder. 3. Prostatomegaly   -Takes Flomax on an outpatient basis   4. DARINEL on CKD   -likely secondary to volume depletion. No hydronephrosis on CT or Renal US  -nephrology following and discussed the importance of IV hydration with the patient      Patient without hematuria at this time per nurse and patient, has hx of traumatic randolph placement, and acceptable bladder scans. Therefore, will follow with serial bladder scans. Discussed need for possible randolph catheter if bladder scans are elevated with the patient. It is my understanding though, that the patient has been refusing certain aspects of his care including bladder scan. Discussed reasoning for bladder scan with patient. Will likely need cystoscopy on an outpatient basis.  Has office visit with Reid Mustafa PA-C on 1/5/2023    Case reviewed with Dr. Carmen Chadwick       Thank you for including us in the care of [place], [place]  12/16/23 6:09 PM  Urology

## 2023-12-16 NOTE — PLAN OF CARE
Problem: Discharge Planning  Goal: Discharge to home or other facility with appropriate resources  12/15/2023 2214 by Cruz Farnsworth RN  Outcome: Progressing  Flowsheets (Taken 12/15/2023 2150)  Discharge to home or other facility with appropriate resources: Identify barriers to discharge with patient and caregiver  12/15/2023 1734 by Daphne Durham RN  Outcome: Progressing  Flowsheets (Taken 12/15/2023 1734)  Discharge to home or other facility with appropriate resources: Identify barriers to discharge with patient and caregiver  Note: Patient preference to return to [facility] , once medically stable. Problem: Pain  Goal: Verbalizes/displays adequate comfort level or baseline comfort level  12/15/2023 2214 by Cruz Farnsworth RN  Outcome: Progressing  Flowsheets (Taken 12/15/2023 2016)  Verbalizes/displays adequate comfort level or baseline comfort level: Encourage patient to monitor pain and request assistance  12/15/2023 1734 by Daphne Durham RN  Outcome: Progressing  Note: Patient denies pain at this time. Problem: Safety - Adult  Goal: Free from fall injury  12/15/2023 2214 by Cruz Farnsworth RN  Outcome: Progressing  12/15/2023 1734 by Daphne Durham RN  Outcome: Progressing  Flowsheets (Taken 12/15/2023 1734)  Free From Fall Injury: Instruct family/caregiver on patient safety  Note: No falls noted this shift. Continue falling star program. Bed alarm on, bed in low position. Call light and personal belongings in reach. Patient uses call light appropriately.        Problem: Chronic Conditions and Co-morbidities  Goal: Patient's chronic conditions and co-morbidity symptoms are monitored and maintained or improved  12/15/2023 2214 by Cruz Farnsworth RN  Outcome: Progressing  Flowsheets (Taken 12/15/2023 2150)  Care Plan - Patient's Chronic Conditions and Co-Morbidity Symptoms are Monitored and Maintained or Improved: Monitor and assess patient's chronic conditions and comorbid symptoms for stability, deterioration, or improvement  12/15/2023 1734 by Mayi Morrell RN  Outcome: Progressing  Flowsheets (Taken 12/15/2023 1734)  Care Plan - Patient's Chronic Conditions and Co-Morbidity Symptoms are Monitored and Maintained or Improved:   Monitor and assess patient's chronic conditions and comorbid symptoms for stability, deterioration, or improvement   Collaborate with multidisciplinary team to address chronic and comorbid conditions and prevent exacerbation or deterioration     Problem: ABCDS Injury Assessment  Goal: Absence of physical injury  12/15/2023 2214 by Nickolas Lorenzo RN  Outcome: Progressing  12/15/2023 1734 by Mayi Morrell RN  Outcome: Progressing

## 2023-12-16 NOTE — PROGRESS NOTES
Recieved order summary, med list updated but MAR needed to reconcile meds. Called [facility] and was transferred to nurse but no answer. Note left for AM virtual nurse to follow up.

## 2023-12-16 NOTE — PROGRESS NOTES
RN requested bladder scan for pt. Results showed 311cc in bladder. Pt also pulled out IV access that was obtained this shift and would not allow RN to place another IV. RN attempted and pt balled up fist stating he was going to sock RN if I did not get out of his room. RN educated pt on the need for the IV and pt continued to refuse. RN updated provider on above information.

## 2023-12-16 NOTE — PROGRESS NOTES
RN entered pts room to ask about placing an IV. Pt refused to have IV placed. RN educated pt on the need for the IV and pt continued to refuse stating there is nothing wrong with him anymore.  RN notified provider of pts refusal

## 2023-12-16 NOTE — PROGRESS NOTES
[address]  SPEECH THERAPY MISSED TREATMENT NOTE  STRZ RENAL TELEMETRY 6K      Date: 2023  Patient Name: Mary Kenney        MRN: [id number]    : 1955  (76 y.o.)    REASON FOR MISSED TREATMENT:  POC consistent at date for completion of instrumental evaluation via MBS. NILSA Sarabia with call to fluoro suite to indicate patient's refusal for participation in assessment. Per clinical swallow evaluation completed on prior date, recommendations for NPO. Attending hospitalist notified. Will f/u for bedside interventions as patient permits on .        Shelby Bah M.A., [facility]

## 2023-12-17 ENCOUNTER — APPOINTMENT (OUTPATIENT)
Dept: GENERAL RADIOLOGY | Age: 68
DRG: 683 | End: 2023-12-17
Attending: STUDENT IN AN ORGANIZED HEALTH CARE EDUCATION/TRAINING PROGRAM
Payer: MEDICARE

## 2023-12-17 LAB
ANION GAP SERPL CALC-SCNC: 13 MEQ/L (ref 8–16)
BUN SERPL-MCNC: 102 MG/DL (ref 7–22)
CALCIUM SERPL-MCNC: 8.6 MG/DL (ref 8.5–10.5)
CHLORIDE SERPL-SCNC: 98 MEQ/L (ref 98–111)
CO2 SERPL-SCNC: 27 MEQ/L (ref 23–33)
CREAT SERPL-MCNC: 6.4 MG/DL (ref 0.4–1.2)
EKG ATRIAL RATE: 48 BPM
EKG P AXIS: 77 DEGREES
EKG P-R INTERVAL: 266 MS
EKG Q-T INTERVAL: 470 MS
EKG QRS DURATION: 92 MS
EKG QTC CALCULATION (BAZETT): 419 MS
EKG R AXIS: 15 DEGREES
EKG T AXIS: 103 DEGREES
EKG VENTRICULAR RATE: 48 BPM
GFR SERPL CREATININE-BSD FRML MDRD: 9 ML/MIN/1.73M2
GLUCOSE BLD STRIP.AUTO-MCNC: 100 MG/DL (ref 70–108)
GLUCOSE SERPL-MCNC: 123 MG/DL (ref 70–108)
POTASSIUM SERPL-SCNC: 4.1 MEQ/L (ref 3.5–5.2)
SODIUM SERPL-SCNC: 138 MEQ/L (ref 135–145)

## 2023-12-17 PROCEDURE — 99222 1ST HOSP IP/OBS MODERATE 55: CPT

## 2023-12-17 PROCEDURE — 99232 SBSQ HOSP IP/OBS MODERATE 35: CPT | Performed by: INTERNAL MEDICINE

## 2023-12-17 PROCEDURE — 93005 ELECTROCARDIOGRAM TRACING: CPT

## 2023-12-17 PROCEDURE — 2580000003 HC RX 258: Performed by: INTERNAL MEDICINE

## 2023-12-17 PROCEDURE — 6370000000 HC RX 637 (ALT 250 FOR IP): Performed by: INTERNAL MEDICINE

## 2023-12-17 PROCEDURE — 6360000002 HC RX W HCPCS: Performed by: PHYSICIAN ASSISTANT

## 2023-12-17 PROCEDURE — 2580000003 HC RX 258: Performed by: PHYSICIAN ASSISTANT

## 2023-12-17 PROCEDURE — 6360000002 HC RX W HCPCS

## 2023-12-17 PROCEDURE — 6360000002 HC RX W HCPCS: Performed by: INTERNAL MEDICINE

## 2023-12-17 PROCEDURE — 2500000003 HC RX 250 WO HCPCS: Performed by: INTERNAL MEDICINE

## 2023-12-17 PROCEDURE — 74018 RADEX ABDOMEN 1 VIEW: CPT

## 2023-12-17 PROCEDURE — 2580000003 HC RX 258

## 2023-12-17 PROCEDURE — 80048 BASIC METABOLIC PNL TOTAL CA: CPT

## 2023-12-17 PROCEDURE — 82948 REAGENT STRIP/BLOOD GLUCOSE: CPT

## 2023-12-17 PROCEDURE — 1200000003 HC TELEMETRY R&B

## 2023-12-17 PROCEDURE — 36415 COLL VENOUS BLD VENIPUNCTURE: CPT

## 2023-12-17 PROCEDURE — C9113 INJ PANTOPRAZOLE SODIUM, VIA: HCPCS | Performed by: PHYSICIAN ASSISTANT

## 2023-12-17 PROCEDURE — 93010 ELECTROCARDIOGRAM REPORT: CPT | Performed by: INTERNAL MEDICINE

## 2023-12-17 RX ORDER — LORAZEPAM 2 MG/ML
0.25 INJECTION INTRAMUSCULAR ONCE
Status: COMPLETED | OUTPATIENT
Start: 2023-12-17 | End: 2023-12-17

## 2023-12-17 RX ORDER — SODIUM CHLORIDE 9 MG/ML
INJECTION, SOLUTION INTRAVENOUS CONTINUOUS
Status: DISCONTINUED | OUTPATIENT
Start: 2023-12-17 | End: 2023-12-22 | Stop reason: HOSPADM

## 2023-12-17 RX ORDER — QUETIAPINE FUMARATE 25 MG/1
12.5 TABLET, FILM COATED ORAL ONCE
Status: DISCONTINUED | OUTPATIENT
Start: 2023-12-17 | End: 2023-12-17

## 2023-12-17 RX ADMIN — ZIPRASIDONE MESYLATE 5 MG: 20 INJECTION, POWDER, LYOPHILIZED, FOR SOLUTION INTRAMUSCULAR at 17:29

## 2023-12-17 RX ADMIN — SODIUM CHLORIDE: 9 INJECTION, SOLUTION INTRAVENOUS at 15:58

## 2023-12-17 RX ADMIN — LORAZEPAM 0.26 MG: 2 INJECTION INTRAMUSCULAR; INTRAVENOUS at 16:15

## 2023-12-17 RX ADMIN — LORAZEPAM 0.26 MG: 2 INJECTION INTRAMUSCULAR; INTRAVENOUS at 13:50

## 2023-12-17 RX ADMIN — PANTOPRAZOLE SODIUM 40 MG: 40 INJECTION, POWDER, FOR SOLUTION INTRAVENOUS at 09:21

## 2023-12-17 RX ADMIN — SODIUM CHLORIDE, PRESERVATIVE FREE 10 ML: 5 INJECTION INTRAVENOUS at 09:21

## 2023-12-17 RX ADMIN — SODIUM BICARBONATE: 84 INJECTION, SOLUTION INTRAVENOUS at 09:16

## 2023-12-17 RX ADMIN — TAMSULOSIN HYDROCHLORIDE 0.4 MG: 0.4 CAPSULE ORAL at 09:22

## 2023-12-17 NOTE — PROGRESS NOTES
PROGRESS NOTE      Patient:  Pamella Smith      Unit/Bed:6K-20/020-A    YOB: 1955    MRN: [id number]       Acct: [de-identified]     PCP: DIONTE Newman CNP    Date of Admission: 12/15/2023      Assessment/Plan:    Anticipated Discharge in : Pending clinical course    Active Hospital Problems    Diagnosis Date Noted    Essential hypertension [I10]      Priority: Medium    Acute kidney injury superimposed on chronic kidney disease ([address]) [N17.9, N18.9] 12/15/2023    Hyperkalemia [E87.5] 98/76/5616    Metabolic acidosis [I74.25] 12/15/2023    History of multiple strokes [Z86.73] 08/16/2023    DARINEL (acute kidney injury) ([address]) [N17.9] 08/07/2023    Unspecified atrial fibrillation ([address]) [I48.91] 01/27/2022     Urinary retention  --Status post traumatic Dale catheter insertion on 12/15/2023  -- Concerns for NGB component due to history of CVA  --Urine analysis and cultures pending, patient has been refusing medical treatment  --Urology following    Sinus bradycardia  -- Sinus bradycardia with first-degree AV  -- Continue to monitor heart rate, patient appears to be bradycardic at baseline  -- Avoid heart rate lowering drug    Agitation  --Patient has been inappropriate with nursing staff, ripped out IV, threw his food across the room, using profanity at nursing staff  --Status post 2 doses of Ativan IV  --Will try Geodon IM once, EKG obtained prior to Geodon administration no QTc prolongation    DARINEL on CKD  --Likely secondary to prerenal/intravascular volume depletion, needs aggressive IV fluid hydration however pulled out his IV  on 12/17/2023. Patient agreeable to labs today if he is fed, however in the last few days he has refused medical treatment.   --Goals of care discussion with family, regarding treatment    Azotemia  -- on 12/17/2023, nephrology following, however patient continues to refuse medical treatment  --Goals of care discussion with palliative    Vomiting  --KUB shows DNR-CCA    PT/OT Eval Status: Not ordered      Electronically signed by Skye Cortez MD on 12/17/2023 at 5:47 PM    This note was electronically signed. Parts of this note may have been dictated by use of voice recognition software and electronically transcribed. The note may contain errors not detected in proofreading. Please refer to my supervising physician's documentation if my documentation differs.

## 2023-12-18 LAB
ANION GAP SERPL CALC-SCNC: 15 MEQ/L (ref 8–16)
BASOPHILS ABSOLUTE: 0.1 THOU/MM3 (ref 0–0.1)
BASOPHILS NFR BLD AUTO: 1.3 %
BUN SERPL-MCNC: 100 MG/DL (ref 7–22)
CALCIUM SERPL-MCNC: 9 MG/DL (ref 8.5–10.5)
CHLORIDE SERPL-SCNC: 100 MEQ/L (ref 98–111)
CO2 SERPL-SCNC: 27 MEQ/L (ref 23–33)
CREAT SERPL-MCNC: 4.1 MG/DL (ref 0.4–1.2)
DEPRECATED RDW RBC AUTO: 44.5 FL (ref 35–45)
EOSINOPHIL NFR BLD AUTO: 8.7 %
EOSINOPHILS ABSOLUTE: 0.7 THOU/MM3 (ref 0–0.4)
ERYTHROCYTE [DISTWIDTH] IN BLOOD BY AUTOMATED COUNT: 13.2 % (ref 11.5–14.5)
GFR SERPL CREATININE-BSD FRML MDRD: 15 ML/MIN/1.73M2
GLUCOSE BLD STRIP.AUTO-MCNC: 101 MG/DL (ref 70–108)
GLUCOSE BLD STRIP.AUTO-MCNC: 82 MG/DL (ref 70–108)
GLUCOSE BLD STRIP.AUTO-MCNC: 93 MG/DL (ref 70–108)
GLUCOSE SERPL-MCNC: 87 MG/DL (ref 70–108)
HCT VFR BLD AUTO: 41.8 % (ref 42–52)
HGB BLD-MCNC: 13.4 GM/DL (ref 14–18)
IMM GRANULOCYTES # BLD AUTO: 0.03 THOU/MM3 (ref 0–0.07)
IMM GRANULOCYTES NFR BLD AUTO: 0.4 %
LYMPHOCYTES ABSOLUTE: 1.6 THOU/MM3 (ref 1–4.8)
LYMPHOCYTES NFR BLD AUTO: 20.5 %
MCH RBC QN AUTO: 29.1 PG (ref 26–33)
MCHC RBC AUTO-ENTMCNC: 32.1 GM/DL (ref 32.2–35.5)
MCV RBC AUTO: 90.9 FL (ref 80–94)
MONOCYTES ABSOLUTE: 0.8 THOU/MM3 (ref 0.4–1.3)
MONOCYTES NFR BLD AUTO: 10.6 %
NEUTROPHILS NFR BLD AUTO: 58.5 %
NRBC BLD AUTO-RTO: 0 /100 WBC
PLATELET # BLD AUTO: 164 THOU/MM3 (ref 130–400)
PMV BLD AUTO: 11.1 FL (ref 9.4–12.4)
POTASSIUM SERPL-SCNC: 4.2 MEQ/L (ref 3.5–5.2)
RBC # BLD AUTO: 4.6 MILL/MM3 (ref 4.7–6.1)
SEGMENTED NEUTROPHILS ABSOLUTE COUNT: 4.7 THOU/MM3 (ref 1.8–7.7)
SODIUM SERPL-SCNC: 142 MEQ/L (ref 135–145)
WBC # BLD AUTO: 8 THOU/MM3 (ref 4.8–10.8)

## 2023-12-18 PROCEDURE — 99232 SBSQ HOSP IP/OBS MODERATE 35: CPT | Performed by: INTERNAL MEDICINE

## 2023-12-18 PROCEDURE — 1200000000 HC SEMI PRIVATE

## 2023-12-18 PROCEDURE — 99231 SBSQ HOSP IP/OBS SF/LOW 25: CPT | Performed by: NURSE PRACTITIONER

## 2023-12-18 PROCEDURE — 80048 BASIC METABOLIC PNL TOTAL CA: CPT

## 2023-12-18 PROCEDURE — 1200000003 HC TELEMETRY R&B

## 2023-12-18 PROCEDURE — 85025 COMPLETE CBC W/AUTO DIFF WBC: CPT

## 2023-12-18 PROCEDURE — 82948 REAGENT STRIP/BLOOD GLUCOSE: CPT

## 2023-12-18 PROCEDURE — 36415 COLL VENOUS BLD VENIPUNCTURE: CPT

## 2023-12-18 NOTE — PROGRESS NOTES
[address]   SPEECH THERAPY COMMUNICATION NOTE  STRZ RENAL TELEMETRY 6K      Date: 2023  Patient Name: Renate Zelaya        MRN: [id number]    : 1955  (76 y.o.)    REASON FOR COMMUNICATION:  Patient with deferral to complete MBS on  and on this date (23) despite ST recommendations. Discussion with primary RN, Eliceo Hardy re: clinical findings from CSE completed on 12/15/23. RN reports patient tolerating current diet of minced and moist with stable pulmonary status. Per RN patient with limited cooperation with medical interventions. At this time, ST to sign off d/t noncompliance. Should pulmonary status decline or overt s/s of aspiration arise, recommend downgrade to NPO + re-consult to ST services.      Gracia Freedman, [address]

## 2023-12-18 NOTE — PROGRESS NOTES
Progress Note    Patient:  Kimberley Harris    Unit/Bed:6K-20/020-A  YOB: 1955  MRN: [id number]   Acct: [de-identified]   Admit date: 12/15/2023      Principal Problem:    DARINEL (acute kidney injury) [facility])  Active Problems:    Essential hypertension    Unspecified atrial fibrillation ([address])    History of multiple strokes    Acute kidney injury superimposed on chronic kidney disease (HCC)    Hyperkalemia    Metabolic acidosis  Resolved Problems:    * No resolved hospital problems. *    Disposition  renal function improved it appears to be improving spontaneously despite lack of IV access today, I must establish contact with medical power of  to better establish long-term care goals      Assessment and Plan:  Urinary retention--Status post traumatic Dale catheter insertion on 12/15/2023  2. DARINEL on CKD  --Likely secondary to prerenal/intravascular volume depletion, needs aggressive IV fluid hydration however pulled out his IV  on 12/17/20  3. Sinus bradycardia  -- Sinus bradycardia with first-degree AV  4. Urinary retention  --Status post traumatic Adle catheter insertion on 12/15/2023      Patient Seen, Chart, Consults notes, Labs, Radiology studies reviewed.     Subjective: Day 3 of stay with hospital stay    Hospital stay has been complicated by intermittent lack of cooperation from the patient including removing his peripheral IV which was in place to administer IV fluids to assist with suspected acute kidney injury, he is also refused Dale catheter secondary to suspected urine retention, he has been evaluated by urology and is followed by nephrology for his impaired renal function he appears to be making urine and has had acceptable bladder scans his serum creatinine today at least has trended downward    I am unable to have the patient complete a speech evaluation to evaluate his swallow function since the patient has intermittent cooperation    I suspect this patient

## 2023-12-18 NOTE — PLAN OF CARE
Problem: Discharge Planning  Goal: Discharge to home or other facility with appropriate resources  12/18/2023 0137 by Phan Avitia RN  Outcome: Progressing  Flowsheets (Taken 12/18/2023 0137)  Discharge to home or other facility with appropriate resources: Identify barriers to discharge with patient and caregiver     Problem: Pain  Goal: Verbalizes/displays adequate comfort level or baseline comfort level  12/18/2023 0137 by Phan Avitia RN  Outcome: Progressing  Flowsheets (Taken 12/18/2023 0137)  Verbalizes/displays adequate comfort level or baseline comfort level: Encourage patient to monitor pain and request assistance     Problem: Safety - Adult  Goal: Free from fall injury  12/18/2023 0137 by Phan Avitia RN  Outcome: Progressing  Flowsheets (Taken 12/18/2023 0137)  Free From Fall Injury: Instruct family/caregiver on patient safety     Problem: Chronic Conditions and Co-morbidities  Goal: Patient's chronic conditions and co-morbidity symptoms are monitored and maintained or improved  12/18/2023 0137 by Phan Avitia RN  Outcome: Progressing  Flowsheets (Taken 12/18/2023 0137)  Care Plan - Patient's Chronic Conditions and Co-Morbidity Symptoms are Monitored and Maintained or Improved:   Monitor and assess patient's chronic conditions and comorbid symptoms for stability, deterioration, or improvement   Collaborate with multidisciplinary team to address chronic and comorbid conditions and prevent exacerbation or deterioration   Update acute care plan with appropriate goals if chronic or comorbid symptoms are exacerbated and prevent overall improvement and discharge     Problem: ABCDS Injury Assessment  Goal: Absence of physical injury  12/18/2023 0137 by Phan Avitia RN  Outcome: Progressing  Flowsheets (Taken 12/18/2023 0137)  Absence of Physical Injury: Implement safety measures based on patient assessment     Problem: Infection - Adult  Goal: Absence of infection at discharge  12/18/2023 0137

## 2023-12-18 NOTE — CARE COORDINATION
12/18/23, 2:46 PM EST    DISCHARGE ON GOING EVALUATION    Carlos Shoulder day: 3  Location: -20/020-A Reason for admit: DARINEL (acute kidney injury) ([address]) [N17.9]  Acute kidney injury superimposed on chronic kidney disease ([address]) [N17.9, N18.9]   Procedure: 12/14 CT A/P: Multiple right lower quadrant loops of thickened small bowel. Please correlate for infectious or inflammatory enteritis. Cholelithiasis. Prostatomegaly. 12/16 renal US: No hydronephrosis. Elevated resistive indices as on the prior study, which could be due to medical renal disease. Hypoechoic structure in the posterior bladder. Differential possibilities include blood clot, debris, or neoplasm. Barriers to Discharge: creat 4.1. Refusing  MBS, IVF and meds. Palliative care consulted. PCP: DIONTE Wells CNP  Readmission Risk Score: 18.5%  Patient Goals/Plan/Treatment Preferences: return to [facility]. No precert required.

## 2023-12-18 NOTE — PROGRESS NOTES
Urology Progress Note    Reason for Consult:   s/p traumatic catheter placement, suprapubic pain, and suspected clot in the urinary bladder per renal US     Chief Complaint: DARINEL     HISTORY OF PRESENT ILLNESS:                 The patient is a 76 y.o. male with significant past medical history of gross hematuria and urinary retention admitted to [facility] with DARINEL on CKD. Creatinine of 5.6 at outside facility and thought to be secondary to high stool output from colostomy/volume depletion. Mr. Robert Wang is known to [facility]. He was last seen in 10/2023 by Vamsi Ku PA-C  for hematuria in the setting of acute cystitis and urinary retention during his previous hospital admission. Flomax continued and randolph catheter removed for void trial. [place] was to replace the randolph catheter if the patient was unable to spontaneously void. Urology has been consulted this admission for urinary retention s/p traumatic randolph catheter placement and suspected blood clot in the urinary bladder per renal US. Catheterization attempted on 12/15/2023. Reports of blood with insertion significant enough to inhibit urinary flow through catheter tubing. Follow-up renal US with no hydronephrosis and hypoechoic structure in the posterior bladder. Differential possibilities include blood clot, debris, and neoplasm. Denied constipation, hematuria, nausea, vomiting, chest pain, SOB, calf pain, and difficulty with urination. Bladder scans reviewed. Bladder scan of 261cc and 156cc obtained on 12/15/2023. Bladder scan of 311cc on 12/16/2023. CT a/p completed on 12/14/2023, prior to above mentioned catheterization attempt. At this time, the kidneys, ureters, and bladder were unremarkable. Subjective:     Patient is resting in bed, voiding yellow urine with incontinence overnight,  +BM via colostomy, ambulating with assistance, tolerating diet minced and moist, denies any nausea or vomiting.  There are complaints of no pain

## 2023-12-19 LAB
ANION GAP SERPL CALC-SCNC: 13 MEQ/L (ref 8–16)
BASOPHILS ABSOLUTE: 0.1 THOU/MM3 (ref 0–0.1)
BASOPHILS NFR BLD AUTO: 1.3 %
BUN SERPL-MCNC: 104 MG/DL (ref 7–22)
CALCIUM SERPL-MCNC: 8.8 MG/DL (ref 8.5–10.5)
CHLORIDE SERPL-SCNC: 103 MEQ/L (ref 98–111)
CO2 SERPL-SCNC: 27 MEQ/L (ref 23–33)
CREAT SERPL-MCNC: 3 MG/DL (ref 0.4–1.2)
DEPRECATED RDW RBC AUTO: 44 FL (ref 35–45)
EOSINOPHIL NFR BLD AUTO: 6.2 %
EOSINOPHILS ABSOLUTE: 0.5 THOU/MM3 (ref 0–0.4)
ERYTHROCYTE [DISTWIDTH] IN BLOOD BY AUTOMATED COUNT: 13.1 % (ref 11.5–14.5)
GFR SERPL CREATININE-BSD FRML MDRD: 22 ML/MIN/1.73M2
GLUCOSE BLD STRIP.AUTO-MCNC: 103 MG/DL (ref 70–108)
GLUCOSE BLD STRIP.AUTO-MCNC: 113 MG/DL (ref 70–108)
GLUCOSE SERPL-MCNC: 90 MG/DL (ref 70–108)
HCT VFR BLD AUTO: 42.5 % (ref 42–52)
HGB BLD-MCNC: 13.4 GM/DL (ref 14–18)
IMM GRANULOCYTES # BLD AUTO: 0.02 THOU/MM3 (ref 0–0.07)
IMM GRANULOCYTES NFR BLD AUTO: 0.3 %
LYMPHOCYTES ABSOLUTE: 1.5 THOU/MM3 (ref 1–4.8)
LYMPHOCYTES NFR BLD AUTO: 19.4 %
MCH RBC QN AUTO: 28.7 PG (ref 26–33)
MCHC RBC AUTO-ENTMCNC: 31.5 GM/DL (ref 32.2–35.5)
MCV RBC AUTO: 91 FL (ref 80–94)
MONOCYTES ABSOLUTE: 1 THOU/MM3 (ref 0.4–1.3)
MONOCYTES NFR BLD AUTO: 13 %
NEUTROPHILS NFR BLD AUTO: 59.8 %
NRBC BLD AUTO-RTO: 0 /100 WBC
PLATELET # BLD AUTO: 156 THOU/MM3 (ref 130–400)
PMV BLD AUTO: 11 FL (ref 9.4–12.4)
POTASSIUM SERPL-SCNC: 4.4 MEQ/L (ref 3.5–5.2)
RBC # BLD AUTO: 4.67 MILL/MM3 (ref 4.7–6.1)
SEGMENTED NEUTROPHILS ABSOLUTE COUNT: 4.6 THOU/MM3 (ref 1.8–7.7)
SODIUM SERPL-SCNC: 143 MEQ/L (ref 135–145)
WBC # BLD AUTO: 7.7 THOU/MM3 (ref 4.8–10.8)

## 2023-12-19 PROCEDURE — 36415 COLL VENOUS BLD VENIPUNCTURE: CPT

## 2023-12-19 PROCEDURE — 99232 SBSQ HOSP IP/OBS MODERATE 35: CPT | Performed by: INTERNAL MEDICINE

## 2023-12-19 PROCEDURE — 80048 BASIC METABOLIC PNL TOTAL CA: CPT

## 2023-12-19 PROCEDURE — 6370000000 HC RX 637 (ALT 250 FOR IP): Performed by: INTERNAL MEDICINE

## 2023-12-19 PROCEDURE — 85025 COMPLETE CBC W/AUTO DIFF WBC: CPT

## 2023-12-19 PROCEDURE — 1200000000 HC SEMI PRIVATE

## 2023-12-19 PROCEDURE — 99231 SBSQ HOSP IP/OBS SF/LOW 25: CPT | Performed by: UROLOGY

## 2023-12-19 PROCEDURE — 82948 REAGENT STRIP/BLOOD GLUCOSE: CPT

## 2023-12-19 RX ADMIN — TAMSULOSIN HYDROCHLORIDE 0.4 MG: 0.4 CAPSULE ORAL at 08:42

## 2023-12-19 NOTE — PROGRESS NOTES
Pt was in bed and alone. He was dealing with acute kidney injury. He was encouraged and blessed.     12/19/23 1457   Encounter Summary   Encounter Overview/Reason  Initial Encounter   Service Provided For: Patient   Referral/Consult From: [address] Family members   Last Encounter  12/19/23   Complexity of Encounter Low   Begin Time 1350   End Time  1356   Total Time Calculated 6 min   Spiritual/Emotional needs   Type Spiritual Support   Assessment/Intervention/Outcome   Assessment Hopeful   Intervention Empowerment

## 2023-12-19 NOTE — CARE COORDINATION
12/19/23, 1:10 PM EST    DISCHARGE ON GOING EVALUATION    Parisa Fell day: 4  Location: -20/020-A Reason for admit: DARINEL (acute kidney injury) ([address] [facility]) [N17.9]  Acute kidney injury superimposed on chronic kidney disease ([address] [N17.9, N18.9]     Procedure:   12/14 CT A/P: Multiple right lower quadrant loops of thickened small bowel. Please correlate for infectious or inflammatory enteritis. Cholelithiasis. Prostatomegaly. 12/16 renal US: No hydronephrosis. Elevated resistive indices as on the prior study, which could be due to medical renal disease. Hypoechoic structure in the posterior bladder. Differential possibilities include blood clot, debris, or neoplasm. Barriers to Discharge: Hospitalist, nephrology, urology following. Palliative care nurse saw today (see note) and is attempting to reach POA to have goals of care meeting, as pt continues to refuse MBS, IVF and some meds. Labs were drawn today, showing an improved Creat of 3.0 down from 4.1 yesterday. PCP: DIONTE Marie CNP  Readmission Risk Score: 18.5%    Patient Goals/Plan/Treatment Preferences: Plans to return to [facility]. No precert needed. SW on.

## 2023-12-19 NOTE — PROGRESS NOTES
Urology Progress Note    Reason for Consult:   s/p traumatic catheter placement, suprapubic pain, and suspected clot in the urinary bladder per renal US     Subjective: \"    Patient is resting in bed, voiding yellow urine with incontinence overnight,  +BM via colostomy, ambulating with assistance, tolerating diet minced and moist, denies any nausea or vomiting. There are complaints of no pain at this time. Cont to be incontinent, no hematuria  Needs Ua sent    IMPRESSION/Plan  Urinary Retention   -s/p traumatic randolph catheter insertion on 12/15/2023. Urinary output difficult to quantify, as patient will urinate into his brief.  -Hx of CVA, consider NGB component.  -No UA or culture at this time. Orders in place. Staff aware to try to collect as well as patient. -PVRs have been acceptable. Post void bladder scan today ordered.   -Avoid Constipation  - Collect serial urine samples. each time the patient voids, collect a urine sample, sonali it with the date and time, and leave it on the bathroom countertop for observation and further assessment.   - Staff to consider condom cath if needed. 2. Hypoechoic structure in the posterior bladder per renal US on 12/16/2023  -differential include blood clots, debris, or neoplasm. Suspect blood clot/ debris as CT obtained on 12/14 (prior to catheterization attempt) with unremarkable kidneys, ureter, and bladder. 3. Prostatomegaly   -Takes Flomax on an outpatient basis   4. DARINEL on CKD   -likely secondary to volume depletion. No hydronephrosis on CT or Renal US  -nephrology following, pt refused IV fluids. Patient without hematuria at this time per nurse and patient, has hx of traumatic randolph placement, and acceptable bladder scans. Therefore, will follow with serial bladder scans. Follow serial urines ordered as well to monitor. . Discussed need for possible randolph catheter if bladder scans are elevated with the patient.  It is my understanding though, that the

## 2023-12-19 NOTE — FLOWSHEET NOTE
12/19/23 8890   Safe Environment   Safety Measures Bed in low position;Call light within reach; Side rails X 3  (Virtual Nurse Safety Round Complete)     Call placed to patient's room, patient did not respond to audio, video turned on for safety purposes. Patient is resting in bed with eyes closed, call light within reach, no signs or symtpoms of distress noted.

## 2023-12-19 NOTE — PLAN OF CARE
Problem: Discharge Planning  Goal: Discharge to home or other facility with appropriate resources  Outcome: Progressing     Problem: Pain  Goal: Verbalizes/displays adequate comfort level or baseline comfort level  Outcome: Progressing     Problem: Safety - Adult  Goal: Free from fall injury  Outcome: Progressing     Problem: Chronic Conditions and Co-morbidities  Goal: Patient's chronic conditions and co-morbidity symptoms are monitored and maintained or improved  Outcome: Progressing     Problem: ABCDS Injury Assessment  Goal: Absence of physical injury  Outcome: Progressing     Problem: Genitourinary - Adult  Goal: Absence of urinary retention  Outcome: Progressing     Problem: Infection - Adult  Goal: Absence of infection at discharge  Outcome: Progressing     Problem: Nutrition Deficit:  Goal: Optimize nutritional status  Outcome: Progressing     Problem: Skin/Tissue Integrity  Goal: Absence of new skin breakdown  Description: 1. Monitor for areas of redness and/or skin breakdown  2. Assess vascular access sites hourly  3. Every 4-6 hours minimum:  Change oxygen saturation probe site  4. Every 4-6 hours:  If on nasal continuous positive airway pressure, respiratory therapy assess nares and determine need for appliance change or resting period.   Outcome: Progressing

## 2023-12-19 NOTE — FLOWSHEET NOTE
12/19/23 1056   Safe Environment   Safety Measures Bed in low position;Call light within reach; Side rails X 2  (Virtual Nurse Safety Round Complete)     Call placed to patient's room, patient did not respond to audio, video turned on for safety purposes. Patient is resting in bed, call light within reach, no signs or symtpoms of distress noted.

## 2023-12-20 ENCOUNTER — TELEPHONE (OUTPATIENT)
Dept: UROLOGY | Age: 68
End: 2023-12-20

## 2023-12-20 LAB
ANION GAP SERPL CALC-SCNC: 12 MEQ/L (ref 8–16)
BASOPHILS ABSOLUTE: 0.1 THOU/MM3 (ref 0–0.1)
BASOPHILS NFR BLD AUTO: 1.2 %
BUN SERPL-MCNC: 90 MG/DL (ref 7–22)
CALCIUM SERPL-MCNC: 8.8 MG/DL (ref 8.5–10.5)
CHLORIDE SERPL-SCNC: 103 MEQ/L (ref 98–111)
CO2 SERPL-SCNC: 26 MEQ/L (ref 23–33)
CREAT SERPL-MCNC: 2.1 MG/DL (ref 0.4–1.2)
DEPRECATED RDW RBC AUTO: 43 FL (ref 35–45)
EOSINOPHIL NFR BLD AUTO: 7.4 %
EOSINOPHILS ABSOLUTE: 0.5 THOU/MM3 (ref 0–0.4)
ERYTHROCYTE [DISTWIDTH] IN BLOOD BY AUTOMATED COUNT: 12.8 % (ref 11.5–14.5)
GFR SERPL CREATININE-BSD FRML MDRD: 34 ML/MIN/1.73M2
GLUCOSE SERPL-MCNC: 89 MG/DL (ref 70–108)
HCT VFR BLD AUTO: 41.6 % (ref 42–52)
HGB BLD-MCNC: 13.3 GM/DL (ref 14–18)
IMM GRANULOCYTES # BLD AUTO: 0.01 THOU/MM3 (ref 0–0.07)
IMM GRANULOCYTES NFR BLD AUTO: 0.1 %
LYMPHOCYTES ABSOLUTE: 1.4 THOU/MM3 (ref 1–4.8)
LYMPHOCYTES NFR BLD AUTO: 20 %
MCH RBC QN AUTO: 29.2 PG (ref 26–33)
MCHC RBC AUTO-ENTMCNC: 32 GM/DL (ref 32.2–35.5)
MCV RBC AUTO: 91.2 FL (ref 80–94)
MONOCYTES ABSOLUTE: 1 THOU/MM3 (ref 0.4–1.3)
MONOCYTES NFR BLD AUTO: 13.6 %
NEUTROPHILS NFR BLD AUTO: 57.7 %
NRBC BLD AUTO-RTO: 0 /100 WBC
PLATELET # BLD AUTO: 149 THOU/MM3 (ref 130–400)
PMV BLD AUTO: 11.2 FL (ref 9.4–12.4)
POTASSIUM SERPL-SCNC: 4.1 MEQ/L (ref 3.5–5.2)
RBC # BLD AUTO: 4.56 MILL/MM3 (ref 4.7–6.1)
SEGMENTED NEUTROPHILS ABSOLUTE COUNT: 4.2 THOU/MM3 (ref 1.8–7.7)
SODIUM SERPL-SCNC: 141 MEQ/L (ref 135–145)
WBC # BLD AUTO: 7.2 THOU/MM3 (ref 4.8–10.8)

## 2023-12-20 PROCEDURE — 99231 SBSQ HOSP IP/OBS SF/LOW 25: CPT | Performed by: NURSE PRACTITIONER

## 2023-12-20 PROCEDURE — 80048 BASIC METABOLIC PNL TOTAL CA: CPT

## 2023-12-20 PROCEDURE — 36415 COLL VENOUS BLD VENIPUNCTURE: CPT

## 2023-12-20 PROCEDURE — 1200000000 HC SEMI PRIVATE

## 2023-12-20 PROCEDURE — 6370000000 HC RX 637 (ALT 250 FOR IP): Performed by: INTERNAL MEDICINE

## 2023-12-20 PROCEDURE — 99232 SBSQ HOSP IP/OBS MODERATE 35: CPT | Performed by: INTERNAL MEDICINE

## 2023-12-20 PROCEDURE — 85025 COMPLETE CBC W/AUTO DIFF WBC: CPT

## 2023-12-20 NOTE — PROGRESS NOTES
08/20/2023 08:45 PM       Radiology:  XR ABDOMEN (KUB) (SINGLE AP VIEW)   Final Result   Nonobstructive bowel gas pattern. **This report has been created using voice recognition software. It may contain minor errors which are inherent in voice recognition technology. **      Final report electronically signed by Dr Chilo Hodges on 12/17/2023 10:28 AM      US RENAL COMPLETE   Final Result   No hydronephrosis. Elevated resistive indices as on the prior study, which    could be due to medical renal disease. Hypoechoic structure in the posterior bladder. Differential possibilities    include blood clot, debris, or neoplasm. Patient unable to void. Nonemergent/incidental findings in the report. This document has been electronically signed by: Ro Matta MD on    12/16/2023 01:54 AM      Technique Used: Duplex examination performed utilizing grayscale, color    and spectral analysis. US RENAL COMPLETE    Result Date: 12/16/2023  US Kidneys US Renal Duplex Doppler COMPARISON: US/SR - US RENAL COMPLETE - 08/07/2023 11:08 AM EDT FINDINGS: Right kidney: Length 11.5 cm. No hydronephrosis. Anechoic 1.0 cm cyst in the interpolar right kidney. No visible mass. No calculi. Normal Doppler blood flow. Normal arterial waveform. Elevated resistive index measuring 0.82 (previously 0.80). Left kidney: Length 12.1 cm. No hydronephrosis. No visible mass. No calculi. Normal Doppler blood flow. Normal arterial waveform. Elevated resistive index measuring 0.75 (previously 0.84). Heterogeneous, hypoechoic, avascular structure in the posterior bladder measuring 4.4 x 5.5 x 2.3 cm. Bilateral ureteral jets could not be demonstrated during the study. Prevoid bladder volume measures 310 mL. Patient was unable to void. Mildly enlarged prostate. No hydronephrosis. Elevated resistive indices as on the prior study, which could be due to medical renal disease. Hypoechoic structure in the posterior bladder. Differential possibilities include blood clot, debris, or neoplasm. Patient unable to void. Nonemergent/incidental findings in the report. This document has been electronically signed by: Linwood Gallagher MD on 12/16/2023 01:54 AM Technique Used: Duplex examination performed utilizing grayscale, color and spectral analysis.       Electronically signed by Elizabeth Cohen MD on 12/20/2023 at 10:18 AM

## 2023-12-20 NOTE — PROGRESS NOTES
Urology Progress Note          Reason for Consult:   s/p traumatic catheter placement, suprapubic pain, and suspected clot in the urinary bladder per renal US      Subjective:      Patient is resting in bed, voiding yellow urine with incontinence overnight,  +BM via colostomy, ambulating with assistance, tolerating diet minced and moist, denies any nausea or vomiting. There are complaints of no pain at this time. Pt makes inappropriate sexual comments. Cont to be incontinent, no hematuria    IMPRESSION/Plan  Urinary Retention   -s/p traumatic randolph catheter insertion on 12/15/2023. Urinary output difficult to quantify, as patient will urinate into his brief.  -Hx of CVA, consider NGB component.  -No UA or culture at this time. Orders in place. Staff aware to try to collect as well as patient. -PVRs have been acceptable. Post void bladder scan today ordered.   -Avoid Constipation  - Collect serial urine samples. each time the patient voids, collect a urine sample, sonali it with the date and time, and leave it on the bathroom countertop for observation and further assessment.   - Staff to consider condom cath if needed. 2. Hypoechoic structure in the posterior bladder per renal US on 12/16/2023  -differential include blood clots, debris, or neoplasm. Suspect blood clot/ debris as CT obtained on 12/14 (prior to catheterization attempt) with unremarkable kidneys, ureter, and bladder. 3. Prostatomegaly   -Takes Flomax on an outpatient basis   4. DARINEL on CKD   -likely secondary to volume depletion. No hydronephrosis on CT or Renal US  -nephrology following, pt refused IV fluids. Patient without hematuria at this time per nurse and patient, has hx of traumatic randolph placement, and acceptable bladder scans a few days prior. Therefore, will follow with serial bladder scans. Follow serial urines ordered as well to monitor. . Discussed need for possible randolph catheter if bladder scans are elevated with the Needs (12/16/2023)    PRAPARE - Transportation     Lack of Transportation (Medical): No     Lack of Transportation (Non-Medical): No   Physical Activity: Sufficiently Active (5/15/2023)    Exercise Vital Sign     Days of Exercise per Week: 5 days     Minutes of Exercise per Session: 30 min   Stress: Not on file   Social Connections: Not on file   Intimate Partner Violence: Not on file   Housing Stability: Low Risk  (12/16/2023)    Housing Stability Vital Sign     Unable to Pay for Housing in the Last Year: No     Number of Places Lived in the Last Year: 1     Unstable Housing in the Last Year: No     Family History   Problem Relation Age of Onset    Alzheimer's Disease Mother     Heart Disease Father      No Known Allergies      Constitutional: Alert and oriented , inappropriate comments  HEENT:   Head:         Normocephalic and atraumatic. Mucous membranes are normal.   Eyes:         EOM are normal.  Nose:    The external appearance of the nose is normal  Ears: The ears appear normal to external inspection. Cardiovascular:       Normal rate, regular rhythm. Pulmonary/Chest:  Normal respiratory rate and rhthym. No use of accessory muscles. Lungs clear bilaterally. Abdominal:          Soft. No tenderness. Active bowel sounds. Colostomy bag in palce  Musculoskeletal:    Normal range of motion. He exhibits no edema or tenderness of lower extremities. Extremities:    No cyanosis, clubbing, or edema present. Neurological:    Alert and oriented.      Labs:  WBC:    Lab Results   Component Value Date/Time    WBC 7.2 12/20/2023 05:45 AM     Hemoglobin/Hematocrit:    Lab Results   Component Value Date/Time    HGB 13.3 12/20/2023 05:45 AM    HCT 41.6 12/20/2023 05:45 AM     BMP:    Lab Results   Component Value Date/Time     12/20/2023 05:45 AM    K 4.1 12/20/2023 05:45 AM     12/20/2023 05:45 AM    CO2 26 12/20/2023 05:45 AM    BUN 90 12/20/2023 05:45 AM    LABALBU 3.7 08/20/2023 08:04 PM

## 2023-12-20 NOTE — PALLIATIVE CARE
Follow Up / Progress Note        Patient:   Kadeem Alfaro  YOB: 1955  Age:  76 y.o. [facility]  MRN:  [id number]          Rounded on pt. He was quiet in bed with the blanket pulled over his head. No visitors present. Call placed to his brother Kaylee Lora but no answer received. Plan/Follow-Up:  Palliative care will continue to follow Linda Abad during this hospitalization and continue to reach out to family.          Electronically signed by Alfonso Sanchez RN on 12/20/2023 at 1:32 PM             Palliative Care Office: [phone]
regarding goals of care, due to pt's poor insight. Pt states no questions or needs. Emotional support given . Writer will attempt to contact pt's POA, his brother, Christi Palomino, to discuss goals of care, as well as pt's refusal        of conservative treatments. Plan discussed with pt's nurse, Rehan Lemus. Plan/Follow-Up:-    Call placed to pt's brother and Da Montes, at [phone] with no answer, message left requesting call back. No HIPPA info contained in message. Palliative care will await return call from pt's brother to discuss goals of care for pt. Addendum 1:30pm- second call placed to pt's brother phone at [phone] with no answer, second message left requesting call back. No HIPPA info contained in message.            Electronically signed by Russel Cortez RN on 12/19/2023 at 10:54 AM           Palliative Care Office: [phone]

## 2023-12-21 LAB
ANION GAP SERPL CALC-SCNC: 12 MEQ/L (ref 8–16)
BASOPHILS ABSOLUTE: 0.1 THOU/MM3 (ref 0–0.1)
BASOPHILS NFR BLD AUTO: 1.3 %
BUN SERPL-MCNC: 71 MG/DL (ref 7–22)
CALCIUM SERPL-MCNC: 8.8 MG/DL (ref 8.5–10.5)
CHLORIDE SERPL-SCNC: 104 MEQ/L (ref 98–111)
CO2 SERPL-SCNC: 26 MEQ/L (ref 23–33)
CREAT SERPL-MCNC: 1.8 MG/DL (ref 0.4–1.2)
DEPRECATED RDW RBC AUTO: 42.3 FL (ref 35–45)
EOSINOPHIL NFR BLD AUTO: 6.4 %
EOSINOPHILS ABSOLUTE: 0.6 THOU/MM3 (ref 0–0.4)
ERYTHROCYTE [DISTWIDTH] IN BLOOD BY AUTOMATED COUNT: 12.8 % (ref 11.5–14.5)
GFR SERPL CREATININE-BSD FRML MDRD: 40 ML/MIN/1.73M2
GLUCOSE BLD STRIP.AUTO-MCNC: 89 MG/DL (ref 70–108)
GLUCOSE BLD STRIP.AUTO-MCNC: 96 MG/DL (ref 70–108)
GLUCOSE BLD STRIP.AUTO-MCNC: 98 MG/DL (ref 70–108)
GLUCOSE SERPL-MCNC: 92 MG/DL (ref 70–108)
HCT VFR BLD AUTO: 40.4 % (ref 42–52)
HGB BLD-MCNC: 13.1 GM/DL (ref 14–18)
IMM GRANULOCYTES # BLD AUTO: 0.02 THOU/MM3 (ref 0–0.07)
IMM GRANULOCYTES NFR BLD AUTO: 0.2 %
LYMPHOCYTES ABSOLUTE: 1.6 THOU/MM3 (ref 1–4.8)
LYMPHOCYTES NFR BLD AUTO: 18.1 %
MCH RBC QN AUTO: 29 PG (ref 26–33)
MCHC RBC AUTO-ENTMCNC: 32.4 GM/DL (ref 32.2–35.5)
MCV RBC AUTO: 89.6 FL (ref 80–94)
MONOCYTES ABSOLUTE: 1.1 THOU/MM3 (ref 0.4–1.3)
MONOCYTES NFR BLD AUTO: 12.7 %
NEUTROPHILS NFR BLD AUTO: 61.3 %
NRBC BLD AUTO-RTO: 0 /100 WBC
PLATELET # BLD AUTO: 145 THOU/MM3 (ref 130–400)
PMV BLD AUTO: 11.3 FL (ref 9.4–12.4)
POTASSIUM SERPL-SCNC: 4.1 MEQ/L (ref 3.5–5.2)
RBC # BLD AUTO: 4.51 MILL/MM3 (ref 4.7–6.1)
SEGMENTED NEUTROPHILS ABSOLUTE COUNT: 5.3 THOU/MM3 (ref 1.8–7.7)
SODIUM SERPL-SCNC: 142 MEQ/L (ref 135–145)
WBC # BLD AUTO: 8.7 THOU/MM3 (ref 4.8–10.8)

## 2023-12-21 PROCEDURE — 85025 COMPLETE CBC W/AUTO DIFF WBC: CPT

## 2023-12-21 PROCEDURE — 80048 BASIC METABOLIC PNL TOTAL CA: CPT

## 2023-12-21 PROCEDURE — 1200000000 HC SEMI PRIVATE

## 2023-12-21 PROCEDURE — 99232 SBSQ HOSP IP/OBS MODERATE 35: CPT | Performed by: INTERNAL MEDICINE

## 2023-12-21 PROCEDURE — 82948 REAGENT STRIP/BLOOD GLUCOSE: CPT

## 2023-12-21 PROCEDURE — 36415 COLL VENOUS BLD VENIPUNCTURE: CPT

## 2023-12-21 NOTE — FLOWSHEET NOTE
12/21/23 5049   Safe Environment   Safety Measures Other (comment)  ([place] safety round)     VN called into patients room and introduced myself and role. Patient did not answer. Video activated for safety check. Patient resting comfortably in bed. No obvious signs of distress noted at this time.

## 2023-12-21 NOTE — FLOWSHEET NOTE
12/21/23 1057   Safe Environment   Safety Measures Other (comment)  (VN safety round)     VN called into patients room and introduced myself and role. Patient did not answer. Video activated for safety check. Patient resting comfortably in bed. No obvious signs of distress noted.

## 2023-12-21 NOTE — CARE COORDINATION
12/21/23, 12:30 PM EST    DISCHARGE ON GOING EVALUATION    Shell Delvalle day: 6  Location: -20/020-A Reason for admit: DARINEL (acute kidney injury) ([address] [facility]) [N17.9]  Acute kidney injury superimposed on chronic kidney disease ([address] [facility]) [N17.9, N18.9]     Procedure:   12/14 CT A/P: Multiple right lower quadrant loops of thickened small bowel. Please correlate for infectious or inflammatory enteritis. Cholelithiasis. Prostatomegaly. 12/16 renal US: No hydronephrosis. Elevated resistive indices as on the prior study, which could be due to medical renal disease. Hypoechoic structure in the posterior bladder. Differential possibilities include blood clot, debris, or neoplasm    Barriers to Discharge: Hospitalist, nephrology, urology following. Continues to refuse MBS. Tolerating PO meds and food today per primary RN. Labs improving. Discharge anticipated tomorrow per Dr Raza Phoenix if pt tolerates PO intake. PCP: DIONTE Loza - CNP  Readmission Risk Score: 17.3%    Patient Goals/Plan/Treatment Preferences: Plans to return to [facility]. No precert needed. SW on.

## 2023-12-21 NOTE — PROGRESS NOTES
This AM patient was able to take a bite of peaches and ate without difficulty.  Patient also fed self lunch and ate about 25%

## 2023-12-21 NOTE — PROGRESS NOTES
Hospitalist Progress Note      Patient:  Mary Kenney    Unit/Bed:6K-20/020-A  YOB: 1955  MRN: [id number]   Acct: [de-identified]   PCP: DIONTE Penny CNP  Date of Admission: 12/15/2023    Assessment/Plan:    #DARINEL on CKD 3.  -Cr improving. Refusing IV,unable to get any iv fluids. Patient states he is drinking enough fluids.  -Nephrology has been following. #Metabolic acidosis POA. -Resolved    #Diarrhea POA. -High output from colostomy bag. -GI panel negative.  -resolved. #Hypertension.  -Uncontrolled. -Resumed on  home meds as patient tolerating minced and moised meals. #Dysphagia. -Unable to cooperate for modified barium swallow but he is tolerating at least minced and moist meals no signs of aspiration.  -Discuss with SLP if diet can be advanced. #Atrial fibrillation. -[id number] score 6  -Start eliquis if no contraindication. #H/O Multiple CVA. -Still has significant deficit.  -Currently lives in SNF. #Urinary retention. -S/P Dale catheter insertion.  -Continue with Flomax. Subjective (past 24 hours):   Patient seen and examined at bed side. Denies any complaints. No acute distress noted. Past medical history, family history, social history and allergies reviewed again and is unchanged since admission. ROS (All review of systems completed. Pertinent positives noted.  Otherwise All other systems reviewed and negative.)     Medications:  Reviewed    Infusion Medications    sodium chloride 100 mL/hr at 12/17/23 1558    sodium chloride      dextrose       Scheduled Medications    promethazine  6.25 mg IntraMUSCular Once    amLODIPine  5 mg Oral Daily    tamsulosin  0.4 mg Oral Daily    [Held by provider] amLODIPine  10 mg Oral Daily    atorvastatin  80 mg Oral Nightly    folic acid  1 mg Oral Daily    hydrALAZINE  100 mg Oral 3 times per day    pantoprazole  40 mg Oral QAM AC \"CKTOTAL\", \"TROPONINI\" in the last 72 hours. Microbiology:    Blood culture #1:   Lab Results   Component Value Date/Time    [facility]  08/07/2023 08:36 AM     No growth 24 hours. No growth 48 hours. No growth at 5 days       Blood culture #2:No results found for: \"BLOODCULT2\"    Organism:  Lab Results   Component Value Date/Time    ORG Candida albicans 08/20/2023 08:45 PM         Lab Results   Component Value Date/Time    LABGRAM  01/14/2022 08:25 PM     Few segmented neutrophils observed. No epithelial cells observed. Many gram positive cocci occurring singly and in pairs. Many gram negative bacilli. Moderate large gram positive bacilli. MRSA culture only:No results found for: \"MRSAC\"    Urine culture:   Lab Results   Component Value Date/Time    LABURIN No growth-preliminary No growth 08/07/2023 12:29 PM       Respiratory culture: No results found for: \"CULTRESP\"    Aerobic and Anaerobic :  Lab Results   Component Value Date/Time    LABAERO No Acinetobacter species isolated. 07/27/2023 06:35 PM     Lab Results   Component Value Date/Time    LABANAE  01/14/2022 08:25 PM     Culture yielded heavy mixed anaerobic growth, including gram positive bacilli and multiple colony types of both gram negative bacill and gram positive cocci. If a true mixed aerobic and anaerobic infection is suspected, then broad spectrum empiric antibiotic therapy is indicated and should include coverage for anaerobic organisms. Urinalysis:      Lab Results   Component Value Date/Time    NITRU NEGATIVE 08/20/2023 08:45 PM    WBCUA  08/20/2023 08:45 PM    BACTERIA NONE SEEN 08/20/2023 08:45 PM    RBCUA > 200 08/20/2023 08:45 PM    BLOODU LARGE 08/20/2023 08:45 PM    SPECGRAV 1.015 08/07/2023 12:29 PM    GLUCOSEU NEGATIVE 08/20/2023 08:45 PM       Radiology:  XR ABDOMEN (KUB) (SINGLE AP VIEW)   Final Result   Nonobstructive bowel gas pattern. **This report has been created using voice recognition software.  It may

## 2023-12-21 NOTE — PLAN OF CARE
Problem: Discharge Planning  Goal: Discharge to home or other facility with appropriate resources  12/21/2023 0823 by Yossi Neves RN  Outcome: Progressing  12/20/2023 2356 by Shahnaz Glez RN  Outcome: Progressing     Problem: Pain  Goal: Verbalizes/displays adequate comfort level or baseline comfort level  12/21/2023 0823 by Yossi Neves RN  Outcome: Progressing  12/20/2023 2356 by Shahnaz Glez RN  Outcome: Progressing     Problem: Safety - Adult  Goal: Free from fall injury  12/21/2023 0823 by Yossi Neves RN  Outcome: Progressing  12/20/2023 2356 by Shahnaz Glez RN  Outcome: Progressing     Problem: Chronic Conditions and Co-morbidities  Goal: Patient's chronic conditions and co-morbidity symptoms are monitored and maintained or improved  12/21/2023 0823 by Yossi Neves RN  Outcome: Progressing  12/20/2023 2356 by Shahnaz Glez RN  Outcome: Progressing     Problem: ABCDS Injury Assessment  Goal: Absence of physical injury  12/21/2023 0823 by Yossi Neves RN  Outcome: Progressing  12/20/2023 2356 by Shahnaz Glez RN  Outcome: Progressing     Problem: Genitourinary - Adult  Goal: Absence of urinary retention  12/21/2023 0823 by Yossi Neves RN  Outcome: Progressing  12/20/2023 2356 by Shahnaz Glez RN  Outcome: Progressing     Problem: Infection - Adult  Goal: Absence of infection at discharge  12/21/2023 0823 by Yossi Neves RN  Outcome: Progressing  12/20/2023 2356 by Shahnaz Glez RN  Outcome: Progressing     Problem: Nutrition Deficit:  Goal: Optimize nutritional status  12/21/2023 0823 by Yossi Neves RN  Outcome: Progressing  12/20/2023 2356 by Shahnaz Glez RN  Outcome: Progressing     Problem: Skin/Tissue Integrity  Goal: Absence of new skin breakdown  Description: 1. Monitor for areas of redness and/or skin breakdown  2. Assess vascular access sites hourly  3. Every 4-6 hours minimum:  Change oxygen saturation probe site  4.   Every 4-6 hours:  If on nasal continuous positive airway pressure, respiratory therapy assess nares and determine need for appliance change or resting period.   12/21/2023 0823 by Rosibel Munguia RN  Outcome: Progressing  12/20/2023 0632 by Warren Grace RN  Outcome: Progressing

## 2023-12-22 VITALS
DIASTOLIC BLOOD PRESSURE: 59 MMHG | TEMPERATURE: 98.5 F | RESPIRATION RATE: 17 BRPM | HEART RATE: 56 BPM | HEIGHT: 66 IN | OXYGEN SATURATION: 99 % | WEIGHT: 197.97 LBS | BODY MASS INDEX: 31.82 KG/M2 | SYSTOLIC BLOOD PRESSURE: 132 MMHG

## 2023-12-22 LAB
ANION GAP SERPL CALC-SCNC: 9 MEQ/L (ref 8–16)
BUN SERPL-MCNC: 54 MG/DL (ref 7–22)
CALCIUM SERPL-MCNC: 9.8 MG/DL (ref 8.5–10.5)
CHLORIDE SERPL-SCNC: 103 MEQ/L (ref 98–111)
CO2 SERPL-SCNC: 30 MEQ/L (ref 23–33)
CREAT SERPL-MCNC: 1.8 MG/DL (ref 0.4–1.2)
GFR SERPL CREATININE-BSD FRML MDRD: 40 ML/MIN/1.73M2
GLUCOSE SERPL-MCNC: 138 MG/DL (ref 70–108)
POTASSIUM SERPL-SCNC: 5.6 MEQ/L (ref 3.5–5.2)
REASON FOR REJECTION: NORMAL
REJECTED TEST: NORMAL
SODIUM SERPL-SCNC: 142 MEQ/L (ref 135–145)

## 2023-12-22 PROCEDURE — 80048 BASIC METABOLIC PNL TOTAL CA: CPT

## 2023-12-22 PROCEDURE — 99232 SBSQ HOSP IP/OBS MODERATE 35: CPT | Performed by: INTERNAL MEDICINE

## 2023-12-22 PROCEDURE — 36415 COLL VENOUS BLD VENIPUNCTURE: CPT

## 2023-12-22 NOTE — PLAN OF CARE
Problem: Discharge Planning  Goal: Discharge to home or other facility with appropriate resources  Outcome: Completed     Problem: Pain  Goal: Verbalizes/displays adequate comfort level or baseline comfort level  Outcome: Completed     Problem: Safety - Adult  Goal: Free from fall injury  Outcome: Completed     Problem: Chronic Conditions and Co-morbidities  Goal: Patient's chronic conditions and co-morbidity symptoms are monitored and maintained or improved  Outcome: Completed     Problem: ABCDS Injury Assessment  Goal: Absence of physical injury  Outcome: Completed     Problem: Genitourinary - Adult  Goal: Absence of urinary retention  Outcome: Completed     Problem: Infection - Adult  Goal: Absence of infection at discharge  Outcome: Completed     Problem: Nutrition Deficit:  Goal: Optimize nutritional status  Outcome: Completed     Problem: Skin/Tissue Integrity  Goal: Absence of new skin breakdown  Description: 1. Monitor for areas of redness and/or skin breakdown  2. Assess vascular access sites hourly  3. Every 4-6 hours minimum:  Change oxygen saturation probe site  4. Every 4-6 hours:  If on nasal continuous positive airway pressure, respiratory therapy assess nares and determine need for appliance change or resting period.   Outcome: Completed

## 2023-12-22 NOTE — PROGRESS NOTES
Patient was noncompliant with his nighttime medications. He, however, drank a cup of water and a can of ginger ale with a straw. No aspiration or difficulty swallowing noted.

## 2023-12-22 NOTE — CARE COORDINATION
12/22/23, 10:26 AM EST    Patient goals/plan/ treatment preferences discussed by  and . Patient goals/plan/ treatment preferences reviewed with patient/ family. Patient/ family verbalize understanding of discharge plan and are in agreement with goal/plan/treatment preferences. Understanding was demonstrated using the teach back method. AVS provided by RN at time of discharge, which includes all necessary medical information pertaining to the patients current course of illness, treatment, post-discharge goals of care, and treatment preferences. Services At/After Discharge: [facility] ([facility])       IMM Letter  IMM Letter given to Patient/Family/Significant other/Guardian/POA/by[de-identified] Partha Francois RN CM  IMM Letter date given[de-identified] 12/21/23  IMM Letter time given[de-identified] 6151        Patient discharge today to [facility] and is a long-term Patient. Patient returning under his medicaid LOC. Blue envelope on chart and RN updated. AVS faxed at discharge. [employer] picking up at [place]. Transport forms faxed to [phone].

## 2023-12-22 NOTE — PROGRESS NOTES
0830- Patient refused vital this AM, lab draw, and also medications. 0912-Patient's hand held for blood to be drawn.

## 2023-12-22 NOTE — DISCHARGE SUMMARY
Discharge Summary    Date: 12/22/2023  Patient Name: Ruthellen Libman    YOB: 1955     Age: 76 y.o. Admit Date: 12/15/2023  Discharge Date: 12/22/2023  Discharge Condition: Good    Admission Diagnosis  DARINEL (acute kidney injury) ([address]) [N17.9]; Acute kidney injury superimposed on chronic kidney disease ([address]) [N17.9, N18.9]      Discharge Diagnosis  Principal Problem:    DARINEL (acute kidney injury) ([address])  Active Problems:    Essential hypertension    Unspecified atrial fibrillation ([address])    History of multiple strokes    Acute kidney injury superimposed on chronic kidney disease (HCC)    Hyperkalemia    Metabolic acidosis  Resolved Problems:    * No resolved hospital problems. [facility] Stay  Narrative of Hospital Course:   Patient is transferred from [facility] for further evaluation of DARINEL. The patient was evaluated in the emergency department for a diarrhea illness. Patient is currently living in skilled nursing facility following a significant embolic stroke in August 2023. Patient is unable to provide history of current illness but nursing home notes indicate he has had weakness, diarrhea, and decreased appetite x 1 week. Labs done at the SNF showed significant DARINEL. Patient is transferred for further evaluation and treatment. #DARINEL on CKD 3.  -Cr improving. Refusing IV,unable to get any iv fluids. Patient states he is drinking enough fluids.  -Nephrology has been following.  -Kidney function improved with Cr 1.8 at discharge.  -Needs to check BMP in 2 days and follow up with nephrology. #Metabolic acidosis POA. -Resolved     #Diarrhea POA. -High output from colostomy bag. -GI panel negative.  -resolved. #Hypertension.  -Uncontrolled. -Resumed on  home meds as patient tolerating minced and moised meals. #Dysphagia. -Unable to cooperate for modified barium swallow but he is tolerating at least minced and moist meals no signs of aspiration.  -Tolerating well. No signs of aspiration. #Atrial fibrillation. -IQMPW2ZCV2 score 6  -Start eliquis if no contraindication. #H/O Multiple CVA. -Still has significant deficit.  -Currently lives in SNF. #Urinary retention. -S/P Dale catheter insertion,removal.  -Continue with Flomax. Consultants:  IP CONSULT TO NEPHROLOGY  IP CONSULT TO SOCIAL WORK  IP CONSULT TO UROLOGY  PALLIATIVE CARE EVAL    Surgeries/procedures Performed:      Treatments:            Discharge Plan/Disposition:  Home    Hospital/Incidental Findings Requiring Follow Up:    Patient Instructions:    Diet: Renal Diet and Low Fat, Low Cholesterol Diet    Activity:Activity as Tolerated  For number of days (if applicable): Other Instructions:    Provider Follow-Up:   No follow-ups on file. Significant Diagnostic Studies:    Recent Labs:  Admission on 12/15/2023  No results displayed because visit has over 200 results. ------------    Radiology last 7 days:  XR ABDOMEN (KUB) (SINGLE AP VIEW)    Result Date: 12/17/2023  Nonobstructive bowel gas pattern. **This report has been created using voice recognition software. It may contain minor errors which are inherent in voice recognition technology. ** Final report electronically signed by Dr Elisha Barber on 12/17/2023 10:28 AM    US RENAL COMPLETE    Result Date: 12/16/2023  No hydronephrosis. Elevated resistive indices as on the prior study, which could be due to medical renal disease. Hypoechoic structure in the posterior bladder. Differential possibilities include blood clot, debris, or neoplasm. Patient unable to void. Nonemergent/incidental findings in the report. This document has been electronically signed by: Suha Voss MD on 12/16/2023 01:54 AM Technique Used: Duplex examination performed utilizing grayscale, color and spectral analysis.        [unfilled]    Discharge Medications    Current Discharge Medication List        Current Discharge Medication List        Current Discharge

## 2024-01-03 LAB
ESTIMATED AVERAGE GLUCOSE: 108
HBA1C MFR BLD: 5.4 %

## 2024-03-07 NOTE — PROGRESS NOTES
[facility]    Brief Operative Note    Pre-operative diagnosis: Postoperative infection, unspecified type, initial encounter [T81.40XA]  S/P lumbar fusion [Z98.1]  Post-operative diagnosis Same as pre-operative diagnosis    Procedure: Lumbar wound irrigation and debridement, N/A - Spine  with wound vacuum, N/A - Spine  Plus cultures plus microscope plus Veraflo irrigation/suction device   Surgeon: Surgeon(s) and Role:     * Gerardo Ambrose MD - Primary       Rajani Solis RN, - Wound Care CWOCN  Anesthesia: General   Estimated Blood Loss: Minimal    Drains:   Veraflo  Specimens:   ID Type Source Tests Collected by Time Destination   A : Lumbar 4-5 superficial fluid Body fluid, unsp Spine, Lumbar AFB CULTURE AND STAIN NON BLOOD, ANAEROBIC BACTERIAL CULTURE ROUTINE, GRAM STAIN, KOH PREP, FUNGAL OR YEAST CULTURE ROUTINE, AEROBIC BACTERIAL CULTURE ROUTINE, MTB COMPLEX AND RESISTANCE Gerardo Ambrose MD 3/7/2024  9:54 AM    B : Lumbar 4-5 deep swab #1 Swab Spine, Lumbar ANAEROBIC BACTERIAL CULTURE ROUTINE, GRAM STAIN, FUNGAL OR YEAST CULTURE ROUTINE, AEROBIC BACTERIAL CULTURE ROUTINE Gerardo Ambrose MD 3/7/2024 10:09 AM    C : Lumbar 4-5 deep swab #2 Swab Spine, Lumbar ANAEROBIC BACTERIAL CULTURE ROUTINE, GRAM STAIN, FUNGAL OR YEAST CULTURE ROUTINE, AEROBIC BACTERIAL CULTURE ROUTINE Gerardo Ambrose MD 3/7/2024 10:15 AM      Findings:   Cloudy brown fluid .  Complications: None.  Implants: * No implants in log *           PROGRESS NOTE      Patient:  Neto Trujillo      Unit/Bed:3B-38/038-A    YOB: 1955    MRN: [id number]       Acct: [de-identified]     PCP: DIONTE Rodriguez - CNP    Date of Admission: 12/7/2021      Assessment/Plan:    Acute hypoxic resp failure secondary to Covid-19  COVID 19 Pneumonia  - Patient required high flow nasal cannula this admission. Currently on 6L NC.  - CTA 12/7/2021 showing no PE  - Continue Decadron, Baricitnib, Vitamins. Completed 5 days of Zithromax. Encouraged pulmonary hygiene. - Wean off O2 as tolerated     Type II diabetes mellitus.  - A1c 9.9 on 12/8/21. New diagnosis. - Continue Lantus 30 units QHS, Humalog 10 units TID AC plus SSI    - Will need close follow up with PCP, referral to diabetes clinic. DARINEL  - BUN:Creatinine ratio 66/1.9, supports pre-renal etiology. Urine dark this am. Dehydration?  - Hold Cozaar and HCTZ this am. Hydrate with NS @ 100 cc/hr. - Repeat BMP in afternoon    Hyperkalemia  - Likely due to uremia / DARINEL; however, specimen with low-level hemolysis. - Hold Cozaar this am.  - Repeat BMP in afternoon    Anion gap metabolic acidosis  - Likely due to DARINEL  - Will hydrate and repeat BMP in afternoon. Leukocytosis  - WBC 20.4 this am. Secondary to steroids. Has been afebrile. - Monitor CBC    Primary Hypertension  - Blood pressures better controlled currently  - Continue Norvasc 10 mg daily, Cardura 2 mg daily, Hydralazine 100 mg TID, HCTZ 50 mg daily, Cozaar 100 mg daily   - Will hold Cozaar and HCTZ today due to DARINEL    Urinary retention  Gross hematuria - resolved  - Dale catheter in place. Will try voiding trial this week  - Continue Flomax  - Urology signed off. Follow up at discharge with Dr. Lebron Garcia. May need cystoscopy. GERD  - Likely due to being on steroids. Improving.  - Continue Pepcid      Sinus bradycardia  - Likely due to COVID/Decadron?  - 12/13: EKG shows sinus [place] with first degree AV block.    - Echo 12/13 no structural heart disease. EF 60%. - Hold home Toprol.  - Continue to monitor. Asymptomatic     Obesity  - Lifestyle modifications       Chief Complaint: shortness of breath    Hospital Course:      77 y. o. male who presented to [facility] with feeling well for 4 weeks; he has a past medical history of hypertension; he states he has been short of breath along with fatigue, myalgias and a nonproductive cough for the last 4 weeks; he states the last 2 days his symptoms have increased which prompted him for an evaluation in the ER today; he has received 1 dose of the Moderna vaccination for Covid.     He complains of lightheadedness and dizziness along with a productive cough and shortness of breath; he denies chest pain, denies nausea, vomiting or diarrhea; he tells me he is not eaten anything in the last 2 to 3 weeks; he also tells me he is not taking his blood pressure medicine in the last 2 to 3 weeks either; in the ER it was noted that he had gross hematuria which patient states this is new.     In the emergency department, he had initial O2 sat of 78%, he was initially placed on 6 L bringing him up to 90% and is currently on high flow oxygen at 90% FiO2 and 40 L satting 98%; chest x-ray showed pneumonia; CT of the abdomen and chest are currently pending, he is being admitted        Subjective:  Patient seen and examined. No acute events overnight. Patient states he feels okay today, but still having some shortness of breath and cough. He denies any pain, fevers, chills, chest pain, palpitations, nausea/vomiting, constipation, diarrhea, leg pain or swelling. He reports improvement in GERD-symptoms with starting Pepcid. Developed DARINEL overnight.      Medications:  Reviewed    Infusion Medications    sodium chloride      dextrose      sodium chloride       Scheduled Medications    baricitinib  2 mg Oral Daily    famotidine  20 mg Oral Daily    insulin glargine  30 Units SubCUTAneous Nightly    insulin lispro  10 Units SubCUTAneous TID     [Held by provider] hydroCHLOROthiazide  50 mg Oral Daily    [Held by provider] losartan  100 mg Oral Daily    doxazosin  2 mg Oral Daily    hydrALAZINE  100 mg Oral Q8H    [Held by provider] metoprolol succinate  100 mg Oral Daily    dexamethasone  6 mg Oral Daily    insulin lispro  0-12 Units SubCUTAneous TID     insulin lispro  0-6 Units SubCUTAneous Nightly    tamsulosin  0.4 mg Oral Daily    amLODIPine  10 mg Oral Daily    sodium chloride flush  5-40 mL IntraVENous 2 times per day    enoxaparin  30 mg SubCUTAneous BID    Vitamin D  2,000 Units Oral Daily    ascorbic acid  500 mg Oral Daily    zinc sulfate  50 mg Oral Daily     PRN Meds: simethicone, perflutren lipid microspheres, glucose, dextrose, glucagon (rDNA), dextrose, sodium chloride flush, sodium chloride, ondansetron **OR** ondansetron, polyethylene glycol, acetaminophen **OR** acetaminophen, guaiFENesin-dextromethorphan      Intake/Output Summary (Last 24 hours) at 12/17/2021 1005  Last data filed at 12/17/2021 0319  Gross per 24 hour   Intake 780 ml   Output 525 ml   Net 255 ml       Diet:  ADULT DIET; Regular; 4 carb choices (60 gm/meal); No Added Salt (3-4 gm)    Exam:  /63   Pulse 78   Temp 98.3 °F (36.8 °C) (Oral)   Resp 18   Ht 5' 6\" (1.676 m)   Wt 210 lb 12.8 oz (95.6 kg)   SpO2 92%   BMI 34.02 kg/m²       General appearance: No apparent distress, appears stated age and cooperative. Sitting in chair, oxygen intact  HEENT: Pupils equal, round, and reactive to light. Conjunctivae/corneas clear. Neck: Supple, with full range of motion. No jugular venous distention. Trachea midline. Respiratory:  Normal respiratory effort on supplemental oxygen, lungs with diminished breath sounds bilaterally, no wheezing  Cardiovascular: RRR, L0/R9 with systolic ejection murmur  Abdomen: Soft, non-tender, non-distended with normal bowel sounds.   Musculoskeletal: passive and active ROM x 4 extremities. Skin: Skin color, texture, turgor normal.  No rashes or lesions. Neurologic:  Neurovascularly intact without any focal sensory/motor deficits. Cranial nerves: II-XII intact, grossly non-focal.  Psychiatric: Alert and oriented, thought content appropriate, normal insight  Capillary Refill: Brisk,< 3 seconds   Peripheral Pulses: +2 palpable, equal bilaterally     Labs:   Recent Labs     12/15/21  0341 12/16/21  0339 12/17/21  0352   WBC 17.5* 19.5* 20.4*   HGB 15.5 15.7 14.8   HCT 48.1 48.0 45.0    265 280     Recent Labs     12/15/21  0341 12/16/21  0339 12/17/21  0352   * 132* 132*   K 4.7 4.9 5.4*   CL 98 97* 97*   CO2 19* 19* 18*   BUN 35* 37* 66*   CREATININE 1.1 1.2 1.9*   CALCIUM 9.1 8.9 8.7     No results for input(s): AST, ALT, BILIDIR, BILITOT, ALKPHOS in the last 72 hours. No results for input(s): INR in the last 72 hours. No results for input(s): Kristal Kaska in the last 72 hours. Urinalysis:      Lab Results   Component Value Date    NITRU NEGATIVE 12/08/2021    WBCUA 0-2 12/08/2021    BACTERIA NONE SEEN 12/08/2021    RBCUA 5-10 12/08/2021    BLOODU SMALL 12/08/2021    GLUCOSEU >= 1000 12/08/2021       Radiology:  XR CHEST PORTABLE   Final Result   Redemonstration of diffuse patchy opacities which is more confluent in the right upper lobe and mildly worsened in the interval.               **This report has been created using voice recognition software. It may contain minor errors which are inherent in voice recognition technology. **      Final report electronically signed by Dr. Starr Figueroa MD on 12/11/2021 11:21 AM      CTA CHEST [address]   Final Result       1. No pulmonary embolism. No aneurysm of the thoracic or abdominal aorta. No dissection. 2. Extensive bilateral infiltrates. **This report has been created using voice recognition software. It may contain minor errors which are inherent in voice recognition technology. **      Final report electronically signed by Dr. Briceño Seen on 12/7/2021 1:41 PM      CTA ABDOMEN PELVIS W WO CONTRAST   Final Result       1. No pulmonary embolism. No aneurysm of the thoracic or abdominal aorta. No dissection. 2. Extensive bilateral infiltrates. **This report has been created using voice recognition software. It may contain minor errors which are inherent in voice recognition technology. **      Final report electronically signed by Dr. Briceño Seen on 12/7/2021 1:41 PM      XR CHEST PORTABLE   Final Result   Multifocal airspace opacities suggest pneumonia in the appropriate clinical setting. The patient is pending CT assessment. **This report has been created using voice recognition software. It may contain minor errors which are inherent in voice recognition technology. **      Final report electronically signed by Dr Becki Kimble on 12/7/2021 10:58 AM          Diet: ADULT DIET; Regular; 4 carb choices (60 gm/meal); No Added Salt (3-4 gm)    DVT prophylaxis: [x] Lovenox                                 [] SCDs                                 [] SQ Heparin                                 [] Encourage ambulation           [] Already on Anticoagulation     Disposition:    [] Home       [] TCU       [] Rehab       [] Psych       [x] SNF       [] [place]       [] Other-    Code Status: Full Code    PT/OT Eval Status: on board      Electronically signed by Cheo Hernandez MD on 12/17/2021 at 10:05 AM    This note was electronically signed. Parts of this note may have been dictated by use of voice recognition software and electronically transcribed. The note may contain errors not detected in proofreading. Please refer to my supervising physician's documentation if my documentation differs.

## 2024-04-29 NOTE — TELEPHONE ENCOUNTER
5/12 appointment cancelled. Spoke with provider, ok to stop using TRIAD paste since wound is healed and not draining. Patient inquired about colostomy reversal, informed patient he would need to contact Dr. Wali Oconnor office since that is who performed the surgery. Patient verbalizes understanding. Also notified patient referral is good for 3 months after his last appointment. PGY-1 Progress Note discussed with attending    PAGER #: [[phone]] TILL 5:00 PM  PLEASE CONTACT ON CALL TEAM:  - On Call Team (Please refer to Danie) FROM 5:00 PM - 8:30PM  - Nightfloat Team FROM 8:30 -7:30 AM    INTERVAL HPI/OVERNIGHT EVENTS:     MEDICATIONS  (STANDING):  acetaminophen     Tablet .. 650 milliGRAM(s) Oral every 6 hours  aspirin enteric coated 81 milliGRAM(s) Oral daily  calcium acetate 667 milliGRAM(s) Oral three times a day with meals  cefepime   IVPB 1000 milliGRAM(s) IV Intermittent every 12 hours  chlorhexidine 2% Cloths 1 Application(s) Topical daily  furosemide   Injectable 80 milliGRAM(s) IV Push two times a day  heparin   Injectable 5000 Unit(s) SubCutaneous every 12 hours  hydrALAZINE 100 milliGRAM(s) Oral every 8 hours  labetalol 300 milliGRAM(s) Oral every 8 hours  NIFEdipine XL 30 milliGRAM(s) Oral daily    MEDICATIONS  (PRN):  albuterol    90 MICROgram(s) HFA Inhaler 2 Puff(s) Inhalation every 6 hours PRN Bronchospasm  guaiFENesin Oral Liquid (Sugar-Free) 200 milliGRAM(s) Oral every 6 hours PRN Cough      REVIEW OF SYSTEMS:  CONSTITUTIONAL:  No fevers or chills, good appetite, good general state  EYES/ENT:  No visual changes;  No vertigo or throat pain   NECK:  No neck pain or stiffness  RESPIRATORY:  No cough, wheezing, hemoptysis; No shortness of breath  CARDIOVASCULAR:  No chest pain or palpitations  GASTROINTESTINAL:  No abdominal pain. No nausea, vomiting, or hematemesis; No diarrhea or constipation. No melena or hematochezia.  GENITOURINARY:  No dysuria, frequency or hematuria  NEUROLOGICAL:  No HA, no numbness or LE weakness  MSK: no back pain, no joint pain  SKIN:  No itching, no skin rash    Vital Signs Last 24 Hrs  T(C): 36.9 (29 Apr 2024 08:44), Max: 37.8 (29 Apr 2024 05:31)  T(F): 98.5 (29 Apr 2024 08:44), Max: 100 (29 Apr 2024 05:31)  HR: 78 (29 Apr 2024 08:44) (67 - 90)  BP: 152/84 (29 Apr 2024 08:44) (123/89 - 167/94)  BP(mean): 103 (28 Apr 2024 20:37) (103 - 103)  RR: 17 (29 Apr 2024 08:44) (17 - 20)  SpO2: 97% (29 Apr 2024 08:44) (92% - 100%)    Parameters below as of 29 Apr 2024 08:44  Patient On (Oxygen Delivery Method): room air        PHYSICAL EXAM:  VITALS: Vital Signs Last 24 Hrs  T(C): 36.9 (29 Apr 2024 08:44), Max: 37.8 (29 Apr 2024 05:31)  T(F): 98.5 (29 Apr 2024 08:44), Max: 100 (29 Apr 2024 05:31)  HR: 78 (29 Apr 2024 08:44) (67 - 90)  BP: 152/84 (29 Apr 2024 08:44) (123/89 - 167/94)  BP(mean): 103 (28 Apr 2024 20:37) (103 - 103)  RR: 17 (29 Apr 2024 08:44) (17 - 20)  SpO2: 97% (29 Apr 2024 08:44) (92% - 100%)    Parameters below as of 29 Apr 2024 08:44  Patient On (Oxygen Delivery Method): room air      Gen: AOx3, NAD, non-toxic, pleasant  HEAD:  Atraumatic, Normocephalic  EYES: PERRLA, conjunctiva and sclera clear  ENT: Moist mucous membranes  NECK: Supple, No JVD  CV: S1+S2 normal, no murmurs   Resp: Clear bilat, no resp distress, no crackles/wheezes  Abd: Soft, nontender, +BS  Ext: No LE edema, no cyanosis, LE pulses present  : Henry (+/-)  IV/Skin: No thrombophlebitis, no skin rash  Msk: No arthralgias, no joint swelling  Neuro: AAOx3. No sensory deficits, no motor deficits                          11.1   5.18  )-----------( 64       ( 29 Apr 2024 08:45 )             34.3     04-29    136  |  100  |  76<H>  ----------------------------<  124<H>  5.0   |  23  |  10.40<H>    Ca    8.0<L>      29 Apr 2024 08:45  Phos  6.7     04-29  Mg     2.1     04-29    TPro  5.8<L>  /  Alb  2.7<L>  /  TBili  0.6  /  DBili  x   /  AST  27  /  ALT  17  /  AlkPhos  61  04-29    LIVER FUNCTIONS - ( 29 Apr 2024 08:45 )  Alb: 2.7 g/dL / Pro: 5.8 g/dL / ALK PHOS: 61 U/L / ALT: 17 U/L DA / AST: 27 U/L / GGT: x               PT/INR - ( 27 Apr 2024 17:49 )   PT: 11.0 sec;   INR: 0.96 ratio         PTT - ( 27 Apr 2024 17:49 )  PTT:30.2 sec    CAPILLARY BLOOD GLUCOSE      RADIOLOGY & ADDITIONAL TESTS:                   PGY-1 Progress Note discussed with attending    PAGER #: [[phone]] TILL 5:00 PM  PLEASE CONTACT ON CALL TEAM:  - On Call Team (Please refer to Danie) FROM 5:00 PM - 8:30PM  - Nightfloat Team FROM 8:30 -7:30 AM    INTERVAL HPI/OVERNIGHT EVENTS: s/p emergent dialysis yesterday, flash pulmonary edema     MEDICATIONS  (STANDING):  acetaminophen     Tablet .. 650 milliGRAM(s) Oral every 6 hours  aspirin enteric coated 81 milliGRAM(s) Oral daily  calcium acetate 667 milliGRAM(s) Oral three times a day with meals  cefepime   IVPB 1000 milliGRAM(s) IV Intermittent every 12 hours  chlorhexidine 2% Cloths 1 Application(s) Topical daily  furosemide   Injectable 80 milliGRAM(s) IV Push two times a day  heparin   Injectable 5000 Unit(s) SubCutaneous every 12 hours  hydrALAZINE 100 milliGRAM(s) Oral every 8 hours  labetalol 300 milliGRAM(s) Oral every 8 hours  NIFEdipine XL 30 milliGRAM(s) Oral daily    MEDICATIONS  (PRN):  albuterol    90 MICROgram(s) HFA Inhaler 2 Puff(s) Inhalation every 6 hours PRN Bronchospasm  guaiFENesin Oral Liquid (Sugar-Free) 200 milliGRAM(s) Oral every 6 hours PRN Cough      REVIEW OF SYSTEMS:  CONSTITUTIONAL:  No fevers or chills, good appetite, good general state  EYES/ENT:  No visual changes;  No vertigo or throat pain   NECK:  No neck pain or stiffness  RESPIRATORY:  No cough, wheezing, hemoptysis; No shortness of breath  CARDIOVASCULAR:  No chest pain or palpitations  GASTROINTESTINAL:  No abdominal pain. No nausea, vomiting, or hematemesis; No diarrhea or constipation. No melena or hematochezia.  GENITOURINARY:  No dysuria, frequency or hematuria  NEUROLOGICAL:  No HA, no numbness or LE weakness  MSK: no back pain, no joint pain  SKIN:  No itching, no skin rash    Vital Signs Last 24 Hrs  T(C): 36.9 (29 Apr 2024 08:44), Max: 37.8 (29 Apr 2024 05:31)  T(F): 98.5 (29 Apr 2024 08:44), Max: 100 (29 Apr 2024 05:31)  HR: 78 (29 Apr 2024 08:44) (67 - 90)  BP: 152/84 (29 Apr 2024 08:44) (123/89 - 167/94)  BP(mean): 103 (28 Apr 2024 20:37) (103 - 103)  RR: 17 (29 Apr 2024 08:44) (17 - 20)  SpO2: 97% (29 Apr 2024 08:44) (92% - 100%)    Parameters below as of 29 Apr 2024 08:44  Patient On (Oxygen Delivery Method): room air        PHYSICAL EXAM:  VITALS: Vital Signs Last 24 Hrs  T(C): 36.9 (29 Apr 2024 08:44), Max: 37.8 (29 Apr 2024 05:31)  T(F): 98.5 (29 Apr 2024 08:44), Max: 100 (29 Apr 2024 05:31)  HR: 78 (29 Apr 2024 08:44) (67 - 90)  BP: 152/84 (29 Apr 2024 08:44) (123/89 - 167/94)  BP(mean): 103 (28 Apr 2024 20:37) (103 - 103)  RR: 17 (29 Apr 2024 08:44) (17 - 20)  SpO2: 97% (29 Apr 2024 08:44) (92% - 100%)    Parameters below as of 29 Apr 2024 08:44  Patient On (Oxygen Delivery Method): room air      Gen: AOx3, NAD, non-toxic, pleasant  HEAD:  Atraumatic, Normocephalic  EYES: PERRLA, conjunctiva and sclera clear  ENT: Moist mucous membranes  NECK: Supple, No JVD  CV: S1+S2 normal, no murmurs   Resp: Clear bilat, no resp distress, no crackles/wheezes  Abd: Soft, nontender, +BS  Ext: No LE edema, no cyanosis, LE pulses present  : No ellis  IV/Skin: No thrombophlebitis, no skin rash  Msk: No arthralgias, no joint swelling  Neuro: AAOx3. No sensory deficits, no motor deficits                          11.1   5.18  )-----------( 64       ( 29 Apr 2024 08:45 )             34.3     04-29    136  |  100  |  76<H>  ----------------------------<  124<H>  5.0   |  23  |  10.40<H>    Ca    8.0<L>      29 Apr 2024 08:45  Phos  6.7     04-29  Mg     2.1     04-29    TPro  5.8<L>  /  Alb  2.7<L>  /  TBili  0.6  /  DBili  x   /  AST  27  /  ALT  17  /  AlkPhos  61  04-29    LIVER FUNCTIONS - ( 29 Apr 2024 08:45 )  Alb: 2.7 g/dL / Pro: 5.8 g/dL / ALK PHOS: 61 U/L / ALT: 17 U/L DA / AST: 27 U/L / GGT: x               PT/INR - ( 27 Apr 2024 17:49 )   PT: 11.0 sec;   INR: 0.96 ratio         PTT - ( 27 Apr 2024 17:49 )  PTT:30.2 sec    CAPILLARY BLOOD GLUCOSE      RADIOLOGY & ADDITIONAL TESTS:

## 2024-05-28 ENCOUNTER — HOSPITAL ENCOUNTER (OUTPATIENT)
Dept: CT IMAGING | Age: 69
Discharge: HOME | End: 2024-05-28
Attending: RADIOLOGY

## 2024-05-28 DIAGNOSIS — Z00.6 ENCOUNTER FOR EXAMINATION FOR NORMAL COMPARISON OR CONTROL IN CLINICAL RESEARCH PROGRAM: ICD-10-CM

## 2024-12-02 ENCOUNTER — COMMUNITY OUTREACH (OUTPATIENT)
Dept: FAMILY MEDICINE CLINIC | Age: 69
End: 2024-12-02

## 2024-12-02 NOTE — PROGRESS NOTES
Patient's  shows they are overdue for Hemoglobin A1C  Care Everywhere and  files searched.   updated with 1/3/24 A1C result.

## 2024-12-24 NOTE — PROGRESS NOTES
Patient asking for IV fentanyl for dressing change tonight. No IV access at this time. Explained to patient that he will not have IV narcotics at [facility] for dressing changes and ask to try oral pain medications. Patient states norco does not work for him. Dr. Nixon Kapoor and Dr. Patsy Arzola with above concern. Await orders. Monitor. [Care Plan reviewed and provided to patient/caregiver] : Care plan reviewed and provided to patient/caregiver [Understands and communicates without difficulty] : Patient/Caregiver understands and communicates without difficulty

## (undated) DEVICE — SOLUTION SURG PREP POV IOD 7.5% 4 OZ

## (undated) DEVICE — GENERAL LAPAROSCOPY PACK-LF: Brand: MEDLINE INDUSTRIES, INC.

## (undated) DEVICE — PAD,SANITARY,11 IN,MAXI,W/WINGS,N-STRL: Brand: MEDLINE

## (undated) DEVICE — PENCIL SMK EVAC 15FT BLADE ELECTRD ROCKER F/TELSCP

## (undated) DEVICE — GAUZE,SPONGE,8"X4",12PLY,XRAY,STRL,LF: Brand: MEDLINE

## (undated) DEVICE — Z DISCONTINUED BY MEDLINE USE 2711682 TRAY SKIN PREP DRY W/ PREM GLV

## (undated) DEVICE — PREP SOL PVP IODINE 4%  4 OZ/BTL

## (undated) DEVICE — HYPODERMIC SAFETY NEEDLE: Brand: MAGELLAN

## (undated) DEVICE — ADHESIVE SKIN CLSR 0.7ML TOP DERMBND ADV

## (undated) DEVICE — BANDAGE ADH W1XL3IN NAT FAB WVN FLX DURABLE N ADH PD SEAL

## (undated) DEVICE — TROCAR: Brand: KII FIOS FIRST ENTRY

## (undated) DEVICE — GLOVE ORTHO 8   MSG9480

## (undated) DEVICE — PACK PROCEDURE SURG SET UP SRMC

## (undated) DEVICE — INTENDED FOR TISSUE SEPARATION, AND OTHER PROCEDURES THAT REQUIRE A SHARP SURGICAL BLADE TO PUNCTURE OR CUT.: Brand: BARD-PARKER ® CARBON RIB-BACK BLADES

## (undated) DEVICE — TROCAR: Brand: KII SHIELDED BLADED ACCESS SYSTEM

## (undated) DEVICE — GLOVE ORANGE PI 7 1/2   MSG9075

## (undated) DEVICE — PACK-MAJOR

## (undated) DEVICE — APPLIER CLP M L L11.4IN DIA10MM ENDOSCP ROT MULT FOR LIG

## (undated) DEVICE — SUTURE VCRL + SZ 2-0 L27IN ABSRB VLT UR-6 5/8 CIR TAPR PNT VCP602H

## (undated) DEVICE — SOLUTION ANTIFOG VIS SYS CLEARIFY LAPSCP

## (undated) DEVICE — GOWN,SIRUS,NONRNF,SETINSLV,XL,20/CS: Brand: MEDLINE

## (undated) DEVICE — COVER,LIGHT HANDLE,FLX,2/PK: Brand: MEDLINE INDUSTRIES, INC.

## (undated) DEVICE — APPLICATOR PREP 26ML 0.7% IOD POVACRYLEX 74% ISO ALC ST

## (undated) DEVICE — 1/2 IN. X 18 IN. LENGTH: Brand: SILICONE TUBING, PENROSE DRAIN

## (undated) DEVICE — GLOVE ORANGE PI 8   MSG9080

## (undated) DEVICE — Z DUPLICATE USE 2431315 SET INSUF TBNG HI FLO W/ SMK EVAC FOR PNEUMOCLEAR

## (undated) DEVICE — PACK PROC LAP II AURORA

## (undated) DEVICE — SPONGE LAP W18XL18IN WHT COT 4 PLY FLD STRUNG RADPQ DISP ST

## (undated) DEVICE — SOLUTION IV IRRIG POUR BRL 0.9% SODIUM CHL 2F7124

## (undated) DEVICE — GAUZE,PACKING STRIP,IODOFORM,1"X5YD,STRL: Brand: CURAD